# Patient Record
Sex: MALE | Race: WHITE | Employment: OTHER | URBAN - METROPOLITAN AREA
[De-identification: names, ages, dates, MRNs, and addresses within clinical notes are randomized per-mention and may not be internally consistent; named-entity substitution may affect disease eponyms.]

---

## 2017-01-02 ENCOUNTER — OFFICE VISIT (OUTPATIENT)
Dept: FAMILY MEDICINE | Facility: CLINIC | Age: 77
End: 2017-01-02
Payer: MEDICARE

## 2017-01-02 VITALS
RESPIRATION RATE: 18 BRPM | SYSTOLIC BLOOD PRESSURE: 134 MMHG | HEART RATE: 83 BPM | TEMPERATURE: 97 F | HEIGHT: 71 IN | OXYGEN SATURATION: 98 % | DIASTOLIC BLOOD PRESSURE: 86 MMHG | WEIGHT: 217 LBS | BODY MASS INDEX: 30.38 KG/M2

## 2017-01-02 DIAGNOSIS — F32.A ANXIETY AND DEPRESSION: Chronic | ICD-10-CM

## 2017-01-02 DIAGNOSIS — F03.90 DEMENTIA WITHOUT BEHAVIORAL DISTURBANCE, UNSPECIFIED DEMENTIA TYPE: Chronic | ICD-10-CM

## 2017-01-02 DIAGNOSIS — E78.5 HYPERLIPIDEMIA LDL GOAL <130: ICD-10-CM

## 2017-01-02 DIAGNOSIS — N18.3 CHRONIC KIDNEY DISEASE (CKD), STAGE 3 (MODERATE): Chronic | ICD-10-CM

## 2017-01-02 DIAGNOSIS — I42.9 CARDIOMYOPATHY (H): Primary | ICD-10-CM

## 2017-01-02 DIAGNOSIS — F41.9 ANXIETY AND DEPRESSION: Chronic | ICD-10-CM

## 2017-01-02 PROCEDURE — 99214 OFFICE O/P EST MOD 30 MIN: CPT | Performed by: INTERNAL MEDICINE

## 2017-01-02 RX ORDER — METOPROLOL SUCCINATE 50 MG/1
50 TABLET, EXTENDED RELEASE ORAL DAILY
Qty: 90 TABLET | Refills: 3 | Status: CANCELLED | OUTPATIENT
Start: 2017-01-02

## 2017-01-02 RX ORDER — ATORVASTATIN CALCIUM 40 MG/1
40 TABLET, FILM COATED ORAL AT BEDTIME
Qty: 90 TABLET | Refills: 3 | Status: SHIPPED | OUTPATIENT
Start: 2017-01-02 | End: 2017-11-09

## 2017-01-02 RX ORDER — VENLAFAXINE 75 MG/1
75 TABLET ORAL 2 TIMES DAILY
Qty: 180 TABLET | Refills: 1 | Status: SHIPPED | OUTPATIENT
Start: 2017-01-02 | End: 2017-07-24

## 2017-01-02 NOTE — NURSING NOTE
"Chief Complaint   Patient presents with     Hypertension     Hyperlipidemia       Initial /86 mmHg  Pulse 83  Temp(Src) 97  F (36.1  C) (Oral)  Resp 18  Ht 5' 11\" (1.803 m)  Wt 217 lb (98.431 kg)  BMI 30.28 kg/m2  SpO2 98% Estimated body mass index is 30.28 kg/(m^2) as calculated from the following:    Height as of this encounter: 5' 11\" (1.803 m).    Weight as of this encounter: 217 lb (98.431 kg).  BP completed using cuff size: large    Kayleigh Taveras CMA (AAMA)      "

## 2017-01-02 NOTE — MR AVS SNAPSHOT
After Visit Summary   1/2/2017    Gibson Villalta    MRN: [id number]           Patient Information     Date Of Birth          1940        Visit Information        Provider Department      1/2/2017 11:30 AM Flori Reyes,  [facility]        Today's Diagnoses     Cardiomyopathy (H)    -  1     Hyperlipidemia LDL goal <130         Dementia without behavioral disturbance, unspecified dementia type         Anxiety and depression           Care Instructions    Try taking 25 mg amitriptyline. If you notice difficulty sleeping take 50 mg again.  Schedule a morning appointment for the spring for fasting labs.  See you in about 6 months or sooner with questions.         Follow-ups after your visit        Your next 10 appointments already scheduled     Jan 17, 2017  2:00 PM   Anticoagulation Visit with  ANTICOAGULATION CLINIC   [facility] [place] ([facility])    [address]  [place] [address] [phone]            Mar 21, 2017  4:30 PM   Remote ICD Check with ALEJANDRO DCR2   [facility] [place] ([facility])    [address]  [place] [address] [phone]           This appointment is for a remote check of your debrillator.  This is not an appointment at the office.              Future tests that were ordered for you today     Open Future Orders        Priority Expected Expires Ordered    Lipid panel reflex to direct LDL Routine 1/3/2017 7/2/2017 1/2/2017    Comprehensive metabolic panel (BMP + Alb, Alk Phos, ALT, AST, Total. Bili, TP) Routine 1/3/2017 7/2/2017 1/2/2017            Who to contact     If you have questions or need follow up information about today's clinic visit or your schedule please contact [facility] directly at [phone].  Normal or non-critical lab and imaging results will be communicated to you by MyChart, letter or phone within 4 business days after the clinic  "has received the results. If you do not hear from us within 7 days, please contact the clinic through DOCUSYS or phone. If you have a critical or abnormal lab result, we will notify you by phone as soon as possible.  Submit refill requests through [employer] or call your pharmacy and they will forward the refill request to us. Please allow 3 business days for your refill to be completed.          Additional Information About Your Visit        DOCUSYS Information     [employer] gives you secure access to your electronic health record. If you see a primary care provider, you can also send messages to your care team and make appointments. If you have questions, please call your primary care clinic.  If you do not have a primary care provider, please call [phone] and they will assist you.        Your Vitals Were     Pulse Temperature Respirations Height BMI (Body Mass Index) Pulse Oximetry    83 97  F (36.1  C) (Oral) 18 5' 11\" (1.803 m) 30.28 kg/m2 98%       Blood Pressure from Last 3 Encounters:   01/02/17 134/86   12/13/16 108/74   11/05/16 118/78    Weight from Last 3 Encounters:   01/02/17 217 lb (98.431 kg)   12/13/16 211 lb 11.2 oz (96.026 kg)   11/05/16 206 lb (93.441 kg)                 Today's Medication Changes          These changes are accurate as of: 1/2/17 12:37 PM.  If you have any questions, ask your nurse or doctor.               These medicines have changed or have updated prescriptions.        Dose/Directions    amitriptyline 25 MG tablet   Commonly known as:  ELAVIL   This may have changed:    - medication strength  - how much to take   Used for:  Anxiety and depression   Changed by:  Flori Reyes DO        Dose:  25 mg   Take 1 tablet (25 mg) by mouth At Bedtime   Quantity:  90 tablet   Refills:  1       venlafaxine 75 MG tablet   Commonly known as:  EFFEXOR   This may have changed:  medication strength   Used for:  Anxiety and depression   Changed by:  Flori Reyes DO        Dose:  75 " mg   Take 1 tablet (75 mg) by mouth 2 times daily   Quantity:  180 tablet   Refills:  1            Where to get your medicines      These medications were sent to [id number] - [place] - [address]  [address], [place] [address]     Phone:  [phone]    - amitriptyline 25 MG tablet  - atorvastatin 40 MG tablet  - venlafaxine 75 MG tablet             Primary Care Provider Office Phone # Fax #    Flori Reyes,  [phone]       [facility] [address] [facility] [address]  [facility] [address]        Thank you!     Thank you for choosing [facility]  for your care. Our goal is always to provide you with excellent care. Hearing back from our patients is one way we can continue to improve our services. Please take a few minutes to complete the written survey that you may receive in the mail after your visit with us. Thank you!             Your Updated Medication List - Protect others around you: Learn how to safely use, store and throw away your medicines at www.disposemymeds.org.          This list is accurate as of: 1/2/17 12:37 PM.  Always use your most recent med list.                   Brand Name Dispense Instructions for use    ACE/ARB NOT PRESCRIBED (INTENTIONAL)      ACE & ARB not prescribed due to Symptomatic hypotension not due to excessive diuresis       albuterol 108 (90 BASE) MCG/ACT Inhaler    PROAIR HFA/PROVENTIL HFA/VENTOLIN HFA     Inhale 2 puffs into the lungs every 6 hours as needed for shortness of breath / dyspnea or wheezing       amitriptyline 25 MG tablet    ELAVIL    90 tablet    Take 1 tablet (25 mg) by mouth At Bedtime       ASPIRIN NOT PRESCRIBED    INTENTIONAL    0 each    Antiplatelet medication not prescribed intentionally due to Current anticoagulant therapy (warfarin/enoxaparin)       atorvastatin 40 MG tablet    LIPITOR    90 tablet    Take 1 tablet (40 mg) by mouth At Bedtime       ferrous  sulfate 325 (65 FE) MG tablet    IRON     Take 325 mg by mouth daily       LASIX 20 MG tablet   Generic drug:  furosemide     30 tablet    Take one half tablet daily 10 mg       metoprolol 50 MG 24 hr tablet    TOPROL-XL    90 tablet    Take 1 tablet (50 mg) by mouth daily       Multi-vitamin Tabs tablet      Take 1 tablet by mouth daily       OMEGA-3 FISH OIL PO      Take 1 g by mouth daily       venlafaxine 75 MG tablet    EFFEXOR    180 tablet    Take 1 tablet (75 mg) by mouth 2 times daily       VITAMIN D (CHOLECALCIFEROL) PO      Take 50,000 Units by mouth Once per week       warfarin 2 MG tablet    COUMADIN    180 tablet    6 mg on Thursdays; 4 mg all other days OR as instructed by INR clinic.

## 2017-01-02 NOTE — PATIENT INSTRUCTIONS
Try taking 25 mg amitriptyline. If you notice difficulty sleeping take 50 mg again.  Schedule a morning appointment for the spring for fasting labs.  See you in about 6 months or sooner with questions.

## 2017-01-02 NOTE — PROGRESS NOTES
"  .  SUBJECTIVE:                                                    Gibson Villalta is a 76 year old male who presents to clinic today for the following health issues:    Gibson presents to the clinic for a follow-up appointment accompanied by his wife Izabella.     Hyperlipidemia Follow-Up      Rate your low fat/cholesterol diet?: not monitoring fat    Taking statin?  Yes, no muscle aches from statin    Other lipid medications/supplements?:  none     Hypertension Follow-up      Outpatient blood pressures are not being checked.    Low Salt Diet: not monitoring salt       Amount of exercise or physical activity: around house    Problems taking medications regularly: No    Medication side effects: none    Diet: regular (no restrictions)    Gibson continues to follow with cardiology.   Gibson expresses that he is sleeping well and will sometimes be tired in the morning, however he has poor sleep hygiene.  Izabella notes that he stays up late until 0230 and waking at 0830. They both note that he will sometimes nap during the day.  She goes to be sooner but is only sleeping lightly until he comes to bed. He denies problems staying up late - he can't recall what he does when he is awake. Reportedly has always been a bit of a night owl.  Izabella manages medication dispensing in the home. Mood is good. Izabella got rid of the Xanax and she whispers, \"he's forgotten all about that one\".       Problem list and histories reviewed & adjusted, as indicated.    ROS:  Detailed as above    This document serves as a record of the services and decisions personally performed and made by Flori Reyes DO. It was created on his/her behalf by Liane Adam, a trained medical scribe. The creation of this document is based the provider's statements to the medical scribe.  Emanuel Adam 11:50 AM, January 2, 2017    OBJECTIVE:                                                    /86 mmHg  Pulse 83  Temp(Src) 97  F (36.1  C) " "(Oral)  Resp 18  Ht 1.803 m (5' 11\")  Wt 98.431 kg (217 lb)  BMI 30.28 kg/m2  SpO2 98%  Body mass index is 30.28 kg/(m^2).   Alert, pleasant, NAD  He is looking so much stronger physically but mentally continues to have significant memory loss with repetitive speech/ideas  Neck without adenopathy, no asymmetry, masses, or scars and thyroid normal to palpation  Lungs clear to auscultation - no rales, rhonchi or wheezes  Heart regular rate and rhythm, normal S1 S2, no S3 or S4, no murmur, click or rub, no peripheral edema  ICD scar well healed  Very talkative and cheerful     ASSESSMENT/PLAN:                                                      1. Cardiomyopathy (H)  Follows with cardiology  ICD scar well healed and pt expresses positive results  He is gaining physical strength and seems euvolemic with improved BPs  - Comprehensive metabolic panel (BMP + Alb, Alk Phos, ALT, AST, Total. Bili, TP); Future    2. Hyperlipidemia   - Lipid panel reflex to direct LDL; Future  - atorvastatin (LIPITOR) 40 MG tablet; Take 1 tablet (40 mg) by mouth At Bedtime  Dispense: 90 tablet; Refill: 3    3. Dementia without behavioral disturbance, unspecified dementia type  Very repetitive speech, pt admits he is more forgetful  Izabella watches over him very carefully  NOTE, due to his weight gain, ok to hold off on EGD at this time (he had a slightly abnormal CT stomach)    4. Anxiety and depression  Tapering amitriptyline from 50 mg down to 25 mg b/c unsure if it is really helpful anyway and I'd rather not have him on doses >25 mg due to age  He and Izabella are very agreeable to cutting down on meds as able  Of note he has some sleep hygiene issues that we discussed again today  - amitriptyline (ELAVIL) 25 MG tablet; Take 1 tablet (25 mg) by mouth At Bedtime  Dispense: 90 tablet; Refill: 1  - venlafaxine (EFFEXOR) 75 MG tablet; Take 1 tablet (75 mg) by mouth 2 times daily  Dispense: 180 tablet; Refill: 1    5. CKD3  Will recheck all " labs this spring    Patient Instructions   Try taking 25 mg amitriptyline. If you notice difficulty sleeping take 50 mg again.  Schedule a morning appointment for the spring for fasting labs.  See you in about 6 months or sooner with questions.     The information in this document, created by the medical scribe for me, accurately reflects the services I personally performed and the decisions made by me. I have reviewed and approved this document for accuracy prior to leaving the patient care area.  Flori Reyes DO  11:51 AM, 01/02/2017    Flori Reyes DO  [facility]

## 2017-01-17 ENCOUNTER — ANTICOAGULATION THERAPY VISIT (OUTPATIENT)
Dept: NURSING | Facility: CLINIC | Age: 77
End: 2017-01-17
Payer: MEDICARE

## 2017-01-17 DIAGNOSIS — Z79.01 LONG-TERM (CURRENT) USE OF ANTICOAGULANTS: Primary | ICD-10-CM

## 2017-01-17 LAB — INR POINT OF CARE: 1.7 (ref 0.86–1.14)

## 2017-01-17 PROCEDURE — 85610 PROTHROMBIN TIME: CPT | Mod: QW

## 2017-01-17 PROCEDURE — 36416 COLLJ CAPILLARY BLOOD SPEC: CPT

## 2017-01-17 PROCEDURE — 99207 ZZC NO CHARGE NURSE ONLY: CPT

## 2017-01-17 NOTE — PROGRESS NOTES
ANTICOAGULATION FOLLOW-UP CLINIC VISIT    Patient Name:  Gibson Villalta  Date:  1/17/2017  Contact Type:  Face to Face    SUBJECTIVE:        OBJECTIVE    INR PROTIME   Date Value Ref Range Status   01/17/2017 1.7* 0.86 - 1.14 Final       ASSESSMENT / PLAN  No question data found.  Anticoagulation Summary as of 1/17/2017     INR goal 2.0-3.0   Selected INR 1.7! (1/17/2017)   Maintenance plan 6 mg (1 mg x 6) on Tue, Thu; 4 mg (1 mg x 4) all other days   Full instructions 6 mg on Tue, Thu; 4 mg all other days   Weekly total 32 mg   Plan last modified Lesia Hallman RN (1/17/2017)   Next INR check 2/7/2017   Priority INR   Target end date     Indications   Long-term (current) use of anticoagulants [Z79.01] [Z79.01]         Anticoagulation Episode Summary     INR check location     Preferred lab     Send INR reminders to [facility] ANTICOAGULATION    Comments       Anticoagulation Care Providers     Provider Role Specialty Phone number    Flori Reyes DO [facility] [phone]            See the Encounter Report to view Anticoagulation Flowsheet and Dosing Calendar (Go to Encounters tab in chart review, and find the Anticoagulation Therapy Visit)    Dosage adjustment made based on physician directed care plan.  Made adjustment to maintenance plan.   Patient will do 6 mg on Tuesday and Thursday; 4 mg all other days.     Lesia Hallman RN

## 2017-01-17 NOTE — MR AVS SNAPSHOT
Gibson Villalta   1/17/2017 2:00 PM   Anticoagulation Therapy Visit    Description:  76 year old male   Provider:   ANTICOAGULATION CLINIC   Department:  [facility] Nurse           INR as of 1/17/2017     Selected INR 1.7! (1/17/2017)      Anticoagulation Summary as of 1/17/2017     INR goal 2.0-3.0   Selected INR 1.7! (1/17/2017)   Full instructions 6 mg on Tue, Thu; 4 mg all other days   Next INR check 2/7/2017    Indications   Long-term (current) use of anticoagulants [Z79.01] [Z79.01]         Your next Anticoagulation Clinic appointment(s)     Feb 07, 2017  8:30 AM   Anticoagulation Visit with  ANTICOAGULATION CLINIC   [facility] ([facility])    [address]  [place] [address]   [phone]              Contact Numbers     Clinic Number:         January 2017 Details    Sun Mon Tue Wed Thu Fri Sat     1               2               3               4               5               6               7                 8               9               10               11               12               13               14                 15               16               17      6 mg   See details      18      4 mg         19      6 mg         20      4 mg         21      4 mg           22      4 mg         23      4 mg         24      6 mg         25      4 mg         26      6 mg         27      4 mg         28      4 mg           29      4 mg         30      4 mg         31      6 mg              Date Details   01/17 This INR check               How to take your warfarin dose     To take:  4 mg Take 4 of the 1 mg tablets.    To take:  6 mg Take 6 of the 1 mg tablets.           February 2017 Details    Sun Mon Tue Wed Thu Fri Sat        1      4 mg         2      6 mg         3      4 mg         4      4 mg           5      4 mg         6      4 mg         7            8               9               10               11                 12               13               14                15               16               17               18                 19               20               21               22               23               24               25                 26               27               28                    Date Details   No additional details    Date of next INR:  2/7/2017         How to take your warfarin dose     To take:  4 mg Take 4 of the 1 mg tablets.    To take:  6 mg Take 6 of the 1 mg tablets.

## 2017-01-24 DIAGNOSIS — I50.22 CHRONIC SYSTOLIC HEART FAILURE (H): Primary | ICD-10-CM

## 2017-01-24 RX ORDER — FUROSEMIDE 20 MG
10 TABLET ORAL DAILY
Qty: 45 TABLET | Refills: 3 | Status: SHIPPED | OUTPATIENT
Start: 2017-01-24 | End: 2017-10-20

## 2017-02-07 ENCOUNTER — MYC MEDICAL ADVICE (OUTPATIENT)
Dept: FAMILY MEDICINE | Facility: CLINIC | Age: 77
End: 2017-02-07

## 2017-02-07 ENCOUNTER — ANTICOAGULATION THERAPY VISIT (OUTPATIENT)
Dept: NURSING | Facility: CLINIC | Age: 77
End: 2017-02-07
Payer: MEDICARE

## 2017-02-07 DIAGNOSIS — I50.21 ACUTE SYSTOLIC CONGESTIVE HEART FAILURE (H): ICD-10-CM

## 2017-02-07 DIAGNOSIS — E78.5 HYPERLIPIDEMIA LDL GOAL <130: ICD-10-CM

## 2017-02-07 DIAGNOSIS — I42.9 CARDIOMYOPATHY (H): ICD-10-CM

## 2017-02-07 DIAGNOSIS — Z79.01 LONG-TERM (CURRENT) USE OF ANTICOAGULANTS: Primary | ICD-10-CM

## 2017-02-07 DIAGNOSIS — N18.3 CHRONIC KIDNEY DISEASE (CKD), STAGE 3 (MODERATE): Chronic | ICD-10-CM

## 2017-02-07 LAB
ALBUMIN SERPL-MCNC: 3.8 G/DL (ref 3.4–5)
ALP SERPL-CCNC: 131 U/L (ref 40–150)
ALT SERPL W P-5'-P-CCNC: 50 U/L (ref 0–70)
ANION GAP SERPL CALCULATED.3IONS-SCNC: 8 MMOL/L (ref 3–14)
AST SERPL W P-5'-P-CCNC: ABNORMAL U/L (ref 0–45)
BILIRUB SERPL-MCNC: 0.6 MG/DL (ref 0.2–1.3)
BUN SERPL-MCNC: 18 MG/DL (ref 7–30)
CALCIUM SERPL-MCNC: 9.3 MG/DL (ref 8.5–10.1)
CHLORIDE SERPL-SCNC: 104 MMOL/L (ref 94–109)
CHOLEST SERPL-MCNC: 176 MG/DL
CO2 SERPL-SCNC: 29 MMOL/L (ref 20–32)
CREAT SERPL-MCNC: 1.3 MG/DL (ref 0.66–1.25)
CREAT UR-MCNC: 105 MG/DL
DEPRECATED CALCIDIOL+CALCIFEROL SERPL-MC: 37 UG/L (ref 20–75)
GFR SERPL CREATININE-BSD FRML MDRD: 54 ML/MIN/1.7M2
GLUCOSE SERPL-MCNC: 109 MG/DL (ref 70–99)
HDLC SERPL-MCNC: 42 MG/DL
HGB BLD-MCNC: 15.1 G/DL (ref 13.3–17.7)
INR POINT OF CARE: 1.4 (ref 0.86–1.14)
LDLC SERPL CALC-MCNC: 93 MG/DL
MAGNESIUM SERPL-MCNC: 2.5 MG/DL (ref 1.6–2.3)
MICROALBUMIN UR-MCNC: 22 MG/L
MICROALBUMIN/CREAT UR: 20.48 MG/G CR (ref 0–17)
NONHDLC SERPL-MCNC: 134 MG/DL
PHOSPHATE SERPL-MCNC: 3.5 MG/DL (ref 2.5–4.5)
POTASSIUM SERPL-SCNC: ABNORMAL MMOL/L (ref 3.4–5.3)
PROT SERPL-MCNC: 8.4 G/DL (ref 6.8–8.8)
PTH-INTACT SERPL-MCNC: 110 PG/ML (ref 12–72)
SODIUM SERPL-SCNC: 141 MMOL/L (ref 133–144)
TRIGL SERPL-MCNC: 203 MG/DL

## 2017-02-07 PROCEDURE — 85018 HEMOGLOBIN: CPT | Performed by: INTERNAL MEDICINE

## 2017-02-07 PROCEDURE — 82306 VITAMIN D 25 HYDROXY: CPT | Performed by: INTERNAL MEDICINE

## 2017-02-07 PROCEDURE — 84100 ASSAY OF PHOSPHORUS: CPT | Performed by: INTERNAL MEDICINE

## 2017-02-07 PROCEDURE — 85610 PROTHROMBIN TIME: CPT | Mod: QW

## 2017-02-07 PROCEDURE — 80061 LIPID PANEL: CPT | Performed by: INTERNAL MEDICINE

## 2017-02-07 PROCEDURE — 99207 ZZC NO CHARGE NURSE ONLY: CPT

## 2017-02-07 PROCEDURE — 83735 ASSAY OF MAGNESIUM: CPT | Performed by: INTERNAL MEDICINE

## 2017-02-07 PROCEDURE — 36416 COLLJ CAPILLARY BLOOD SPEC: CPT

## 2017-02-07 PROCEDURE — 83970 ASSAY OF PARATHORMONE: CPT | Performed by: INTERNAL MEDICINE

## 2017-02-07 PROCEDURE — 80053 COMPREHEN METABOLIC PANEL: CPT | Performed by: INTERNAL MEDICINE

## 2017-02-07 PROCEDURE — 82043 UR ALBUMIN QUANTITATIVE: CPT | Performed by: INTERNAL MEDICINE

## 2017-02-07 NOTE — MR AVS SNAPSHOT
Gibson Villalta   2/7/2017 8:30 AM   Anticoagulation Therapy Visit    Description:  76 year old male   Provider:   ANTICOAGULATION CLINIC   Department:   Nurse           INR as of 2/7/2017     Selected INR 1.4! (2/7/2017)      Anticoagulation Summary as of 2/7/2017     INR goal 2.0-3.0   Selected INR 1.4! (2/7/2017)   Full instructions 6 mg on Tue, Thu, Sat; 4 mg all other days   Next INR check 2/21/2017    Indications   Long-term (current) use of anticoagulants [Z79.01] [Z79.01]         Your next Anticoagulation Clinic appointment(s)     Feb 21, 2017  1:45 PM   Anticoagulation Visit with  ANTICOAGULATION CLINIC   [facility]    [address]  [place] [address]   [phone]              Contact Numbers     Clinic Number:         February 2017 Details    Sun Mon Tue Wed Thu Fri Sat        1               2               3               4                 5               6               7      6 mg   See details      8      4 mg         9      6 mg         10      4 mg         11      6 mg           12      4 mg         13      4 mg         14      6 mg         15      4 mg         16      6 mg         17      4 mg         18      6 mg           19      4 mg         20      4 mg         21            22               23               24               25                 26               27               28                    Date Details   02/07 This INR check       Date of next INR:  2/21/2017         How to take your warfarin dose     To take:  4 mg Take 4 of the 1 mg tablets.    To take:  6 mg Take 6 of the 1 mg tablets.

## 2017-02-07 NOTE — PROGRESS NOTES
ANTICOAGULATION FOLLOW-UP CLINIC VISIT    Patient Name:  Gibson Villalta  Date:  2/7/2017  Contact Type:  Face to Face    SUBJECTIVE:     Patient Findings     Positives Unexplained INR or factor level change    Comments Asked wife to check TARO Brand or not.            OBJECTIVE    INR PROTIME   Date Value Ref Range Status   02/07/2017 1.4* 0.86 - 1.14 Final       ASSESSMENT / PLAN  INR assessment SUB    Recheck INR In: 2 WEEKS    INR Location Clinic      Anticoagulation Summary as of 2/7/2017     INR goal 2.0-3.0   Selected INR 1.4! (2/7/2017)   Maintenance plan 6 mg (1 mg x 6) on Tue, Thu, Sat; 4 mg (1 mg x 4) all other days   Full instructions 6 mg on Tue, Thu, Sat; 4 mg all other days   Weekly total 34 mg   Plan last modified Lesia Hallman, RN (2/7/2017)   Next INR check 2/21/2017   Priority INR   Target end date     Indications   Long-term (current) use of anticoagulants [Z79.01] [Z79.01]         Anticoagulation Episode Summary     INR check location     Preferred lab     Send INR reminders to [facility] ANTICOAGULATION    Comments       Anticoagulation Care Providers     Provider Role Specialty Phone number    Flori ReyesDO [facility] [phone]            See the Encounter Report to view Anticoagulation Flowsheet and Dosing Calendar (Go to Encounters tab in chart review, and find the Anticoagulation Therapy Visit)    Dosage adjustment made based on physician directed care plan.  I asked wife to check pill bottle at home and see if it says TARO brand.  Currently they get medication through [facility].  Patient's wife will check bottle and either call clinic or send MyChart.     Lesia Hallman, RN

## 2017-02-09 DIAGNOSIS — R73.01 ELEVATED FASTING GLUCOSE: Primary | ICD-10-CM

## 2017-02-09 DIAGNOSIS — N18.3 CHRONIC KIDNEY DISEASE (CKD), STAGE 3 (MODERATE): Chronic | ICD-10-CM

## 2017-02-21 ENCOUNTER — ANTICOAGULATION THERAPY VISIT (OUTPATIENT)
Dept: NURSING | Facility: CLINIC | Age: 77
End: 2017-02-21
Payer: MEDICARE

## 2017-02-21 DIAGNOSIS — R73.01 ELEVATED FASTING GLUCOSE: ICD-10-CM

## 2017-02-21 DIAGNOSIS — Z79.01 LONG-TERM (CURRENT) USE OF ANTICOAGULANTS: ICD-10-CM

## 2017-02-21 DIAGNOSIS — N18.3 CHRONIC KIDNEY DISEASE (CKD), STAGE 3 (MODERATE): Chronic | ICD-10-CM

## 2017-02-21 LAB
HBA1C MFR BLD: 6.6 % (ref 4.3–6)
INR POINT OF CARE: 2.4 (ref 0.86–1.14)
MAGNESIUM SERPL-MCNC: 2.4 MG/DL (ref 1.6–2.3)
POTASSIUM SERPL-SCNC: 4.5 MMOL/L (ref 3.4–5.3)

## 2017-02-21 PROCEDURE — 83036 HEMOGLOBIN GLYCOSYLATED A1C: CPT | Performed by: INTERNAL MEDICINE

## 2017-02-21 PROCEDURE — 85610 PROTHROMBIN TIME: CPT | Mod: QW

## 2017-02-21 PROCEDURE — 84132 ASSAY OF SERUM POTASSIUM: CPT | Performed by: INTERNAL MEDICINE

## 2017-02-21 PROCEDURE — 99207 ZZC NO CHARGE NURSE ONLY: CPT

## 2017-02-21 PROCEDURE — 36416 COLLJ CAPILLARY BLOOD SPEC: CPT

## 2017-02-21 PROCEDURE — 83735 ASSAY OF MAGNESIUM: CPT | Performed by: INTERNAL MEDICINE

## 2017-02-21 NOTE — PROGRESS NOTES
ANTICOAGULATION FOLLOW-UP CLINIC VISIT    Patient Name:  Gibson Villalta  Date:  2/21/2017  Contact Type:  Face to Face    SUBJECTIVE:        OBJECTIVE    INR Protime   Date Value Ref Range Status   02/21/2017 2.4 (A) 0.86 - 1.14 Final       ASSESSMENT / PLAN  INR assessment THER    Recheck INR In: 3 WEEKS    INR Location Clinic      Anticoagulation Summary as of 2/21/2017     INR goal 2.0-3.0   Today's INR 2.4   Maintenance plan 6 mg (1 mg x 6) on Tue, Thu, Sat; 4 mg (1 mg x 4) all other days   Full instructions 6 mg on Tue, Thu, Sat; 4 mg all other days   Weekly total 34 mg   No change documented Lesia Hallman RN   Plan last modified Lesia Hallman RN (2/7/2017)   Next INR check 3/14/2017   Priority INR   Target end date     Indications   Long-term (current) use of anticoagulants [Z79.01] [Z79.01]         Anticoagulation Episode Summary     INR check location     Preferred lab     Send INR reminders to [facility] ANTICOAGULATION    Comments       Anticoagulation Care Providers     Provider Role Specialty Phone number    Flori Reyes DO [facility] [phone]            See the Encounter Report to view Anticoagulation Flowsheet and Dosing Calendar (Go to Encounters tab in chart review, and find the Anticoagulation Therapy Visit)    Dosage adjustment made based on physician directed care plan.    Lesia Hallman RN

## 2017-02-21 NOTE — MR AVS SNAPSHOT
Gibson JAYA Villalta   2/21/2017 1:45 PM   Anticoagulation Therapy Visit    Description:  76 year old male   Provider:   ANTICOAGULATION CLINIC   Department:   Nurse           INR as of 2/21/2017     Today's INR 2.4      Anticoagulation Summary as of 2/21/2017     INR goal 2.0-3.0   Today's INR 2.4   Full instructions 6 mg on Tue, Thu, Sat; 4 mg all other days   Next INR check 3/14/2017    Indications   Long-term (current) use of anticoagulants [Z79.01] [Z79.01]         Your next Anticoagulation Clinic appointment(s)     Mar 14, 2017  2:00 PM CDT   Anticoagulation Visit with  ANTICOAGULATION CLINIC   [facility]    [address]  [place] [address] [phone]              Contact Numbers     Clinic Number:         February 2017 Details    Sun Mon Tue Wed Thu Fri Sat        1               2               3               4                 5               6               7               8               9               10               11                 12               13               14               15               16               17               18                 19               20               21      6 mg   See details      22      4 mg         23      6 mg         24      4 mg         25      6 mg           26      4 mg         27      4 mg         28      6 mg              Date Details   02/21 This INR check               How to take your warfarin dose     To take:  4 mg Take 4 of the 1 mg tablets.    To take:  6 mg Take 6 of the 1 mg tablets.           March 2017 Details    Sun Mon Tue Wed Thu Fri Sat        1      4 mg         2      6 mg         3      4 mg         4      6 mg           5      4 mg         6      4 mg         7      6 mg         8      4 mg         9      6 mg         10      4 mg         11      6 mg           12      4 mg         13      4 mg         14            15               16               17               18                  19               20               21               22               23               24               25                 26               27               28               29               30               31                 Date Details   No additional details    Date of next INR:  3/14/2017         How to take your warfarin dose     To take:  4 mg Take 4 of the 1 mg tablets.    To take:  6 mg Take 6 of the 1 mg tablets.

## 2017-02-24 PROBLEM — E11.22: Chronic | Status: ACTIVE | Noted: 2017-02-24

## 2017-02-24 NOTE — PROGRESS NOTES
Gibson and Izabella,  Thank you for getting your labs drawn.  The potassium is normal and magnesium satisfactory.  A test that looks at your average blood sugar over a 3 month time period called, Hemoglobin A1c, is slightly elevated and actually in a diabetes range. However this is low enough that no additional medications are needed. We'll simply monitor this in clinic as I want to make sure you continue to get a good amount of healthy calories. You may want to try to work on reducing the portions of carbohydrates in your diet (such as: breads, rice, pasta, sugars) which will help to lower the blood sugar. We can discuss this more at your next office visit or sooner if you have questions/concerns.

## 2017-03-13 DIAGNOSIS — I51.3 RV (RIGHT VENTRICULAR) MURAL THROMBUS: Chronic | ICD-10-CM

## 2017-03-13 DIAGNOSIS — Z79.01 LONG-TERM (CURRENT) USE OF ANTICOAGULANTS: ICD-10-CM

## 2017-03-14 ENCOUNTER — OFFICE VISIT (OUTPATIENT)
Dept: CARDIOLOGY | Facility: CLINIC | Age: 77
End: 2017-03-14
Attending: NURSE PRACTITIONER
Payer: MEDICARE

## 2017-03-14 ENCOUNTER — ANTICOAGULATION THERAPY VISIT (OUTPATIENT)
Dept: NURSING | Facility: CLINIC | Age: 77
End: 2017-03-14
Payer: MEDICARE

## 2017-03-14 VITALS
OXYGEN SATURATION: 97 % | HEIGHT: 73 IN | HEART RATE: 70 BPM | WEIGHT: 227.7 LBS | SYSTOLIC BLOOD PRESSURE: 126 MMHG | DIASTOLIC BLOOD PRESSURE: 80 MMHG | BODY MASS INDEX: 30.18 KG/M2 | RESPIRATION RATE: 16 BRPM

## 2017-03-14 DIAGNOSIS — I50.22 CHRONIC SYSTOLIC HEART FAILURE (H): ICD-10-CM

## 2017-03-14 DIAGNOSIS — I42.9 CARDIOMYOPATHY (H): ICD-10-CM

## 2017-03-14 DIAGNOSIS — I42.9 CARDIOMYOPATHY, UNSPECIFIED (H): Chronic | ICD-10-CM

## 2017-03-14 DIAGNOSIS — Z79.01 LONG-TERM (CURRENT) USE OF ANTICOAGULANTS: ICD-10-CM

## 2017-03-14 LAB
ANION GAP SERPL CALCULATED.3IONS-SCNC: 13.6 MMOL/L (ref 6–17)
BUN SERPL-MCNC: 22 MG/DL (ref 7–30)
CALCIUM SERPL-MCNC: 9.5 MG/DL (ref 8.5–10.5)
CHLORIDE SERPL-SCNC: 103 MMOL/L (ref 98–107)
CO2 SERPL-SCNC: 27 MMOL/L (ref 23–29)
CREAT SERPL-MCNC: 1.62 MG/DL (ref 0.7–1.3)
GFR SERPL CREATININE-BSD FRML MDRD: 42 ML/MIN/1.7M2
GLUCOSE SERPL-MCNC: 184 MG/DL (ref 70–105)
INR POINT OF CARE: 1.9 (ref 0.86–1.14)
POTASSIUM SERPL-SCNC: 4.6 MMOL/L (ref 3.5–5.1)
SODIUM SERPL-SCNC: 139 MMOL/L (ref 136–145)

## 2017-03-14 PROCEDURE — 80048 BASIC METABOLIC PNL TOTAL CA: CPT | Performed by: NURSE PRACTITIONER

## 2017-03-14 PROCEDURE — 99207 ZZC NO CHARGE NURSE ONLY: CPT

## 2017-03-14 PROCEDURE — 85610 PROTHROMBIN TIME: CPT | Mod: QW

## 2017-03-14 PROCEDURE — 36416 COLLJ CAPILLARY BLOOD SPEC: CPT

## 2017-03-14 PROCEDURE — 99213 OFFICE O/P EST LOW 20 MIN: CPT | Performed by: NURSE PRACTITIONER

## 2017-03-14 RX ORDER — HYDRALAZINE HYDROCHLORIDE 10 MG/1
10 TABLET, FILM COATED ORAL 3 TIMES DAILY
Qty: 90 TABLET | Refills: 1 | Status: SHIPPED | OUTPATIENT
Start: 2017-03-14 | End: 2017-04-11

## 2017-03-14 RX ORDER — ISOSORBIDE MONONITRATE 30 MG/1
15 TABLET, EXTENDED RELEASE ORAL DAILY
Qty: 30 TABLET | Refills: 1 | Status: SHIPPED | OUTPATIENT
Start: 2017-03-14 | End: 2017-04-11

## 2017-03-14 RX ORDER — WARFARIN SODIUM 2 MG/1
TABLET ORAL
Qty: 180 TABLET | Refills: 0 | Status: SHIPPED | OUTPATIENT
Start: 2017-03-14 | End: 2017-05-19

## 2017-03-14 NOTE — PROGRESS NOTES
ANTICOAGULATION FOLLOW-UP CLINIC VISIT    Patient Name:  Gibson Villalta  Date:  3/14/2017  Contact Type:  Face to Face    SUBJECTIVE:     Patient Findings     Positives No Problem Findings           OBJECTIVE    INR Protime   Date Value Ref Range Status   03/14/2017 1.9 (A) 0.86 - 1.14 Final       ASSESSMENT / PLAN  INR assessment THER    Recheck INR In: 3 WEEKS    INR Location Clinic      Anticoagulation Summary as of 3/14/2017     INR goal 2.0-3.0   Today's INR 1.9!   Maintenance plan 4 mg (1 mg x 4) on Mon, Wed, Fri; 6 mg (1 mg x 6) all other days   Full instructions 4 mg on Mon, Wed, Fri; 6 mg all other days   Weekly total 36 mg   Plan last modified Lesia Hallman RN (3/14/2017)   Next INR check 4/4/2017   Priority INR   Target end date     Indications   Long-term (current) use of anticoagulants [Z79.01] [Z79.01]         Anticoagulation Episode Summary     INR check location     Preferred lab     Send INR reminders to [facility] ANTICOAGULATION    Comments       Anticoagulation Care Providers     Provider Role Specialty Phone number    Flori ReyesDO Responsible Internal Medicine [phone]            See the Encounter Report to view Anticoagulation Flowsheet and Dosing Calendar (Go to Encounters tab in chart review, and find the Anticoagulation Therapy Visit)    Dosage adjustment made based on physician directed care plan.  Made a slight adjustment again to maintenance dose.   Will have patient do 4 mg Mon, Wed, Fri; 6 mg all the rest.   This is a 5 % increase. We have gradually been increasing the patient's weekly dose and his INR only seems to fluctuate.   Will see where INR is at next check.     Lesia Hallman RN

## 2017-03-14 NOTE — MR AVS SNAPSHOT
After Visit Summary   3/14/2017    Gibson Vilallta    MRN: [id number]           Patient Information     Date Of Birth          1940        Visit Information        Provider Department      3/14/2017 3:50 PM Jinny Adam, ALLEN ENRIQUEZ [facility]ART AT [place]        Today's Diagnoses     Chronic systolic heart failure (H)        Cardiomyopathy (H)          Care Instructions    Call CORE Nurse for any questions or concerns:   Marta Maldonado or Anne: [phone]   If you have concerns after hours, please call [phone], option 2    1. Medication changes: 1. start hydralazine 10 mg three times per day, 8am-2 PM-8 PM  2. Imdur 15 mg once a day, this is 1/2 tablet of 30 mg     You need to lose 10 pounds     2. Weigh yourself daily and write it down.     3. Call CORE nurse if your weight is up more than 2 pounds in one day, or 5 pounds in one week.    4. Call CORE nurse if you feel more short of breath, have more abdominal bloating or leg swelling.    5. Continue low sodium diet (less than 2000mg daily). If you eat less salt, you will retain less fluid.     6. Lab results:   Component      Latest Ref Rng & Units 3/14/2017   Sodium      136 - 145 mmol/L 139   Potassium      3.5 - 5.1 mmol/L 4.6   Chloride      98 - 107 mmol/L 103   Carbon Dioxide      23 - 29 mmol/L 27   Anion Gap      6 - 17 mmol/L 13.6   Glucose      70 - 105 mg/dL 184 (H)   Urea Nitrogen      7 - 30 mg/dL 22   Creatinine      0.70 - 1.30 mg/dL 1.62 (H)   GFR Estimate      >60 mL/min/1.7m2 42 (L)   GFR Estimate If Black      >60 mL/min/1.7m2 51 (L)   Calcium      8.5 - 10.5 mg/dL 9.5       **Do NOT take Aleve or Ibuprofen without checking with your doctor first        [facility]: Cardiomyopathy, Optimization, Rehabilitation, Education   The CORE Clinic is a heart failure specialty clinic within the [facility] where you will work with specialized nurse  practioners, physician assistants, doctors and registered nurses. They are dedicated to helping patients with heart failure to carefully adjust medications, receive education, and learn who and when to call if symptoms develop. They specialize in helping you better understand your condition, slow the progression of your disease, improve the length and quality of your life, help you detect future heart problems before they become life threatening, and avoid hospitalizations.              Follow-ups after your visit        Additional Services     Follow-Up with CORE Clinic - ACE visit                 Your next 10 appointments already scheduled     Mar 21, 2017  4:30 PM CDT   Remote ICD Check with ALEJANDRO DCR2   [facility] AT [place] ([facility])    [address]  [place] [address] [phone]           This appointment is for a remote check of your debrillator.  This is not an appointment at the office.            Apr 04, 2017  2:15 PM CDT   Anticoagulation Visit with  ANTICOAGULATION CLINIC   [facility] ([facility])    [address]  [place] [id number] [phone]              Future tests that were ordered for you today     Open Future Orders        Priority Expected Expires Ordered    Follow-Up with CORE Clinic - ACE visit Routine 4/11/2017 3/14/2018 3/14/2017    Basic metabolic panel Routine 4/11/2017 3/14/2018 3/14/2017            Who to contact     If you have questions or need follow up information about today's clinic visit or your schedule please contact [facility] directly at [phone].  Normal or non-critical lab and imaging results will be communicated to you by MyChart, letter or phone within 4 business days after the clinic has received the results. If you do not hear from us within 7 days, please contact the clinic through MyChart or phone. If you have a critical or  "abnormal lab result, we will notify you by phone as soon as possible.  Submit refill requests through [employer] or call your pharmacy and they will forward the refill request to us. Please allow 3 business days for your refill to be completed.          Additional Information About Your Visit        [employer] Information     [employer] gives you secure access to your electronic health record. If you see a primary care provider, you can also send messages to your care team and make appointments. If you have questions, please call your primary care clinic.  If you do not have a primary care provider, please call [phone] and they will assist you.        Care EveryWhere ID     This is your Care EveryWhere ID. This could be used by other organizations to access your [facility] medical records  [id number]        Your Vitals Were     Pulse Respirations Height Pulse Oximetry BMI (Body Mass Index)       70 16 1.854 m (6' 1\") 97% 30.04 kg/m2        Blood Pressure from Last 3 Encounters:   03/14/17 126/80   01/02/17 134/86   12/13/16 108/74    Weight from Last 3 Encounters:   03/14/17 103.3 kg (227 lb 11.2 oz)   01/02/17 98.4 kg (217 lb)   12/13/16 96 kg (211 lb 11.2 oz)              We Performed the Following     Follow-Up with CORE Clinic - ACE visit          Today's Medication Changes          These changes are accurate as of: 3/14/17  4:24 PM.  If you have any questions, ask your nurse or doctor.               Start taking these medicines.        Dose/Directions    hydrALAZINE 10 MG tablet   Commonly known as:  APRESOLINE   Used for:  Chronic systolic heart failure (H), Cardiomyopathy (H)   Started by:  Jinny Adam APRN CNP        Dose:  10 mg   Take 1 tablet (10 mg) by mouth 3 times daily   Quantity:  90 tablet   Refills:  1       isosorbide mononitrate 30 MG 24 hr tablet   Commonly known as:  IMDUR   Used for:  Chronic systolic heart failure (H), Cardiomyopathy (H)   Started by:  Jinny Adam APRN CNP        Dose:  " 15 mg   Take 0.5 tablets (15 mg) by mouth daily   Quantity:  30 tablet   Refills:  1            Where to get your medicines      These medications [id number] - [place], [place] - [address]  [address], [place] [address]     Phone:  [phone]     hydrALAZINE 10 MG tablet    isosorbide mononitrate 30 MG 24 hr tablet                Primary Care Provider Office Phone # Fax #    Flori Reyes,  [phone]       [facility] [address]  [facility] [address]        Thank you!     Thank you for choosing [facility] AT [place]  for your care. Our goal is always to provide you with excellent care. Hearing back from our patients is one way we can continue to improve our services. Please take a few minutes to complete the written survey that you may receive in the mail after your visit with us. Thank you!             Your Updated Medication List - Protect others around you: Learn how to safely use, store and throw away your medicines at www.disposemymeds.org.          This list is accurate as of: 3/14/17  4:24 PM.  Always use your most recent med list.                   Brand Name Dispense Instructions for use    ACE/ARB NOT PRESCRIBED (INTENTIONAL)      ACE & ARB not prescribed due to Symptomatic hypotension not due to excessive diuresis       albuterol 108 (90 BASE) MCG/ACT Inhaler    PROAIR HFA/PROVENTIL HFA/VENTOLIN HFA     Inhale 2 puffs into the lungs every 6 hours as needed for shortness of breath / dyspnea or wheezing       amitriptyline 25 MG tablet    ELAVIL    90 tablet    Take 1 tablet (25 mg) by mouth At Bedtime       ASPIRIN NOT PRESCRIBED    INTENTIONAL    0 each    Antiplatelet medication not prescribed intentionally due to Current anticoagulant therapy (warfarin/enoxaparin)       atorvastatin 40 MG tablet    LIPITOR    90 tablet    Take 1 tablet (40 mg) by mouth At Bedtime        ferrous sulfate 325 (65 FE) MG tablet    IRON     Take 325 mg by mouth daily       furosemide 20 MG tablet    LASIX    45 tablet    Take 0.5 tablets (10 mg) by mouth daily       hydrALAZINE 10 MG tablet    APRESOLINE    90 tablet    Take 1 tablet (10 mg) by mouth 3 times daily       isosorbide mononitrate 30 MG 24 hr tablet    IMDUR    30 tablet    Take 0.5 tablets (15 mg) by mouth daily       metoprolol 50 MG 24 hr tablet    TOPROL-XL    90 tablet    Take 1 tablet (50 mg) by mouth daily       Multi-vitamin Tabs tablet      Take 1 tablet by mouth daily       OMEGA-3 FISH OIL PO      Take 1 g by mouth daily       venlafaxine 75 MG tablet    EFFEXOR    180 tablet    Take 1 tablet (75 mg) by mouth 2 times daily       VITAMIN D (CHOLECALCIFEROL) PO      Take 50,000 Units by mouth Once per week       * warfarin 2 MG tablet    COUMADIN    180 tablet    6 mg on Thursdays; 4 mg all other days OR as instructed by INR clinic.       * warfarin 2 MG tablet    COUMADIN    180 tablet    Take 3 tablets (6mg) by mouth Mon, Wed and 2 tablets (4mg) rest of the days of the week or as directed by your INR clinic       * Notice:  This list has 2 medication(s) that are the same as other medications prescribed for you. Read the directions carefully, and ask your doctor or other care provider to review them with you.

## 2017-03-14 NOTE — PATIENT INSTRUCTIONS
Call CORE Nurse for any questions or concerns:   Marta Maldonado, or Leah: [phone]   If you have concerns after hours, please call [phone], option 2    1. Medication changes: 1. start hydralazine 10 mg three times per day, 8am-2 PM-8 PM  2. Imdur 15 mg once a day, this is 1/2 tablet of 30 mg     You need to lose 10 pounds     2. Weigh yourself daily and write it down.     3. Call CORE nurse if your weight is up more than 2 pounds in one day, or 5 pounds in one week.    4. Call CORE nurse if you feel more short of breath, have more abdominal bloating or leg swelling.    5. Continue low sodium diet (less than 2000mg daily). If you eat less salt, you will retain less fluid.     6. Lab results:   Component      Latest Ref Rng & Units 3/14/2017   Sodium      136 - 145 mmol/L 139   Potassium      3.5 - 5.1 mmol/L 4.6   Chloride      98 - 107 mmol/L 103   Carbon Dioxide      23 - 29 mmol/L 27   Anion Gap      6 - 17 mmol/L 13.6   Glucose      70 - 105 mg/dL 184 (H)   Urea Nitrogen      7 - 30 mg/dL 22   Creatinine      0.70 - 1.30 mg/dL 1.62 (H)   GFR Estimate      >60 mL/min/1.7m2 42 (L)   GFR Estimate If Black      >60 mL/min/1.7m2 51 (L)   Calcium      8.5 - 10.5 mg/dL 9.5       **Do NOT take Aleve or Ibuprofen without checking with your doctor first        [facility]: Cardiomyopathy, Optimization, Rehabilitation, Education   The [facility] Clinic is a heart failure specialty clinic within the [facility] where you will work with specialized nurse practioners, physician assistants, doctors and registered nurses. They are dedicated to helping patients with heart failure to carefully adjust medications, receive education, and learn who and when to call if symptoms develop. They specialize in helping you better understand your condition, slow the progression of your disease, improve the length and quality of your life, help you detect future heart problems before they become life  threatening, and avoid hospitalizations.

## 2017-03-14 NOTE — MR AVS SNAPSHOT
Gibson Villalta   3/14/2017 2:00 PM   Anticoagulation Therapy Visit    Description:  76 year old male   Provider:   ANTICOAGULATION CLINIC   Department:  Cs Nurse           INR as of 3/14/2017     Today's INR 1.9!      Anticoagulation Summary as of 3/14/2017     INR goal 2.0-3.0   Today's INR 1.9!   Full instructions 4 mg on Mon, Wed, Fri; 6 mg all other days   Next INR check 4/4/2017    Indications   Long-term (current) use of anticoagulants [Z79.01] [Z79.01]         Your next Anticoagulation Clinic appointment(s)     Apr 04, 2017  2:15 PM CDT   Anticoagulation Visit with  ANTICOAGULATION CLINIC   [facility]    [address]  [place] [address] [phone]              Contact Numbers     Clinic Number:         March 2017 Details    Sun Mon Tue Wed Thu Fri Sat        1               2               3               4                 5               6               7               8               9               10               11                 12               13               14      6 mg   See details      15      4 mg         16      6 mg         17      4 mg         18      6 mg           19      6 mg         20      4 mg         21      6 mg         22      4 mg         23      6 mg         24      4 mg         25      6 mg           26      6 mg         27      4 mg         28      6 mg         29      4 mg         30      6 mg         31      4 mg           Date Details   03/14 This INR check               How to take your warfarin dose     To take:  4 mg Take 4 of the 1 mg tablets.    To take:  6 mg Take 6 of the 1 mg tablets.           April 2017 Details    Sun Mon Tue Wed Thu Fri Sat           1      6 mg           2      6 mg         3      4 mg         4            5               6               7               8                 9               10               11               12               13               14               15                  16               17               18               19               20               21               22                 23               24               25               26               27               28               29                 30                      Date Details   No additional details    Date of next INR:  4/4/2017         How to take your warfarin dose     To take:  4 mg Take 4 of the 1 mg tablets.    To take:  6 mg Take 6 of the 1 mg tablets.

## 2017-03-14 NOTE — LETTER
3/14/2017    Flori Reyes, [facility]   [address]  [place] [address]    RE: [place]       Dear Colleague,    I had the pleasure of seeing Gibson Villalta in the [facility].    Gibson is a 76-year-old gentleman who is here today for a 3-month followup for his heart failure with a reduced ejection fraction.  Gibson' past medical history includes coronary artery disease with an MI over 20 years ago, hypertension, hyperlipidemia and a history of dementia.  His last angiogram revealed disease in his proximal and mid LAD as well as his distal RCA; however, the FFR was negative and no stenting or revascularization was done.  In the past, he has had problems with lower blood pressure and he also has chronic kidney disease and we have been unable to add an ACE inhibitor or an ARB.        In the fall of 2016, he underwent biventricular ICD implantation by Dr. Hernandez.  He is followed in our Device Clinic.  His last device check was in December.  It showed his underlying rhythm was sinus rhythm with a first degree AV block.  He had good heart rate stability and good variability.  He had zero atrial or ventricular arrhythmias.  He is due for another device check next week.      He was seen by his primary clinic in January.  A fasting lipid profile was done.  It demonstrated total cholesterol of 176, HDL 42, LDL 93, triglycerides 203.  His LDL was elevated higher than it was the year before and his triglycerides were also elevated.  He is currently on atorvastatin 40 mg per day.      In the interim since we have seen him 3 months ago, he has gained about 10 pounds.  He has not been weighing himself daily.  He also has not been watching the sodium in his diet as carefully.  A basic metabolic panel was drawn today.  I reviewed the results with Gibson today.  His sodium was 139, his potassium 4.6, chloride was 103, his glucose was elevated to 184,  BUN 22.  His creatinine was elevated to 1.6.      Today, Gibson denies chest pain, chest pressure, worsening shortness of breath.  He denies any lower ankle swelling.  He denies syncope.  He denies any PND.  Please see physical exam.      Outpatient Encounter Prescriptions as of 3/14/2017   Medication Sig Dispense Refill     warfarin (COUMADIN) 2 MG tablet Take 3 tablets (6mg) by mouth Mon, Wed and 2 tablets (4mg) rest of the days of the week or as directed by your INR clinic 180 tablet 0     [DISCONTINUED] hydrALAZINE (APRESOLINE) 10 MG tablet Take 1 tablet (10 mg) by mouth 3 times daily 90 tablet 1     [DISCONTINUED] isosorbide mononitrate (IMDUR) 30 MG 24 hr tablet Take 0.5 tablets (15 mg) by mouth daily 30 tablet 1     furosemide (LASIX) 20 MG tablet Take 0.5 tablets (10 mg) by mouth daily 45 tablet 3     atorvastatin (LIPITOR) 40 MG tablet Take 1 tablet (40 mg) by mouth At Bedtime 90 tablet 3     amitriptyline (ELAVIL) 25 MG tablet Take 1 tablet (25 mg) by mouth At Bedtime 90 tablet 1     venlafaxine (EFFEXOR) 75 MG tablet Take 1 tablet (75 mg) by mouth 2 times daily 180 tablet 1     [DISCONTINUED] warfarin (COUMADIN) 2 MG tablet 6 mg on Thursdays; 4 mg all other days OR as instructed by INR clinic. 180 tablet 0     multivitamin, therapeutic with minerals (MULTI-VITAMIN) TABS Take 1 tablet by mouth daily       Omega-3 Fatty Acids (OMEGA-3 FISH OIL PO) Take 1 g by mouth daily       metoprolol (TOPROL-XL) 50 MG 24 hr tablet Take 1 tablet (50 mg) by mouth daily 90 tablet 3     ACE/ARB NOT PRESCRIBED, INTENTIONAL, ACE & ARB not prescribed due to Symptomatic hypotension not due to excessive diuresis       ASPIRIN NOT PRESCRIBED (INTENTIONAL) Antiplatelet medication not prescribed intentionally due to Current anticoagulant therapy (warfarin/enoxaparin) 0 each 0     albuterol (PROAIR HFA, PROVENTIL HFA, VENTOLIN HFA) 108 (90 BASE) MCG/ACT inhaler Inhale 2 puffs into the lungs every 6 hours as needed for shortness of  breath / dyspnea or wheezing       ferrous sulfate (IRON) 325 (65 FE) MG tablet Take 325 mg by mouth daily       VITAMIN D, CHOLECALCIFEROL, PO Take 50,000 Units by mouth Once per week       No facility-administered encounter medications on file as of 3/14/2017.      IMPRESSION AND PLAN:   1.  Patient with history of heart failure with a reduced ejection fraction.  He appears euvolemic.  His weight gain is more due to dietary indiscretion rather than fluid volume overload.  With a stronger blood pressure today, I would like to introduce hydralazine, isosorbide mononitrate for some vasodilatory effect.  I did talk about side effects of lightheadedness and dizziness.  He may not tolerate this, but because of his renal function he remains off of any ARB or ACE inhibitor.  He will come back and see me in one month.  We will recheck his lab work also at that time.   2.  Chronic kidney disease.  His creatinine has taken a bump.  We will recheck that in 1 month.   3.  Hyperlipidemia.  This is being followed by his primary clinic.  With his family history of coronary artery disease and his personal history of an MI 20 years ago, his LDL should be closer to 70.  I did talk with him about how weight loss can certainly help him reach that goal.      It has been my pleasure to be involved in Gibson' care.     Sincerely,    ALLEN Paredes CNP     [facility]

## 2017-03-15 NOTE — PROGRESS NOTES
HISTORY OF PRESENT ILLNESS:  Gibson is a 76-year-old gentleman who is here today for a 3-month followup for his heart failure with a reduced ejection fraction.  Gibson' past medical history includes coronary artery disease with an MI over 20 years ago, hypertension, hyperlipidemia and a history of dementia.  His last angiogram revealed disease in his proximal and mid LAD as well as his distal RCA; however, the FFR was negative and no stenting or revascularization was done.  In the past, he has had problems with lower blood pressure and he also has chronic kidney disease and we have been unable to add an ACE inhibitor or an ARB.        In the fall of 2016, he underwent biventricular ICD implantation by Dr. Hernandez.  He is followed in our Device Clinic.  His last device check was in December.  It showed his underlying rhythm was sinus rhythm with a first degree AV block.  He had good heart rate stability and good variability.  He had zero atrial or ventricular arrhythmias.  He is due for another device check next week.      He was seen by his primary clinic in January.  A fasting lipid profile was done.  It demonstrated total cholesterol of 176, HDL 42, LDL 93, triglycerides 203.  His LDL was elevated higher than it was the year before and his triglycerides were also elevated.  He is currently on atorvastatin 40 mg per day.      In the interim since we have seen him 3 months ago, he has gained about 10 pounds.  He has not been weighing himself daily.  He also has not been watching the sodium in his diet as carefully.  A basic metabolic panel was drawn today.  I reviewed the results with Gibson today.  His sodium was 139, his potassium 4.6, chloride was 103, his glucose was elevated to 184, BUN 22.  His creatinine was elevated to 1.6.      Today, Gibson denies chest pain, chest pressure, worsening shortness of breath.  He denies any lower ankle swelling.  He denies syncope.  He denies any PND.  Please see physical  exam.      IMPRESSION AND PLAN:   1.  Patient with history of heart failure with a reduced ejection fraction.  He appears euvolemic.  His weight gain is more due to dietary indiscretion rather than fluid volume overload.  With a stronger blood pressure today, I would like to introduce hydralazine, isosorbide mononitrate for some vasodilatory effect.  I did talk about side effects of lightheadedness and dizziness.  He may not tolerate this, but because of his renal function he remains off of any ARB or ACE inhibitor.  He will come back and see me in one month.  We will recheck his lab work also at that time.   2.  Chronic kidney disease.  His creatinine has taken a bump.  We will recheck that in 1 month.   3.  Hyperlipidemia.  This is being followed by his primary clinic.  With his family history of coronary artery disease and his personal history of an MI 20 years ago, his LDL should be closer to 70.  I did talk with him about how weight loss can certainly help him reach that goal.      It has been my pleasure to be involved in Isaias' care.         ALLEN WEINSTEIN, ZOE             D: 2017 16:54   T: 03/15/2017 12:50   MT: ANA      Name:     ISAIAS DUMONT   MRN:      [id number]        Account:      [id number]   :      1940           Service Date: 2017      Document: [id number]

## 2017-03-21 ENCOUNTER — ALLIED HEALTH/NURSE VISIT (OUTPATIENT)
Dept: CARDIOLOGY | Facility: CLINIC | Age: 77
End: 2017-03-21
Payer: MEDICARE

## 2017-03-21 DIAGNOSIS — Z95.810 ICD (IMPLANTABLE CARDIOVERTER-DEFIBRILLATOR) IN PLACE: Primary | ICD-10-CM

## 2017-03-21 PROCEDURE — 93296 REM INTERROG EVL PM/IDS: CPT | Performed by: INTERNAL MEDICINE

## 2017-03-21 PROCEDURE — 93295 DEV INTERROG REMOTE 1/2/MLT: CPT | Performed by: INTERNAL MEDICINE

## 2017-03-21 NOTE — MR AVS SNAPSHOT
After Visit Summary   3/21/2017    Gibson Villalta    MRN: [id number]           Patient Information     Date Of Birth          1940        Visit Information        Provider Department      3/21/2017 4:30 PM ALEJANDRO DCR2 [facility]        Today's Diagnoses     ICD (implantable cardioverter-defibrillator) in place    -  1       Follow-ups after your visit        Your next 10 appointments already scheduled     Mar 21, 2017  4:30 PM CDT   Remote ICD Check with ALEJANDRO DCR2   [facility] ([facility])    [address]  [place] [place] [address] [phone]           This appointment is for a remote check of your debrillator.  This is not an appointment at the office.            Apr 04, 2017  2:15 PM CDT   Anticoagulation Visit with  ANTICOAGULATION CLINIC   [facility]    [address]  [place] [address] [phone]            Apr 11, 2017 12:50 PM CDT   LAB with ALEJANDRO LAB   [facility] ([facility])    [id number]  [place] [address] [phone]           Patient must bring picture ID.  Patient should be prepared to give a urine specimen  Please do not eat 10-12 hours before your appointment if you are coming in fasting for labs on lipids, cholesterol, or glucose (sugar).  Pregnant women should follow their Care Team instructions. Water with medications is okay. Do not drink coffee or other fluids.   If you have concerns about taking  your medications, please ask at office or if scheduling via Cliptonet, send a message by clicking on Secure Messaging, Message Your Care Team.            Apr 11, 2017  1:50 PM CDT   Core Return with ALLEN Engle CNP   [facility] ([facility])    [id number]  [facility] [address]    [phone]            Jun 22, 2017  4:30 PM CDT   Remote ICD Check with ALEJANDRO DCR2   [facility] [place] ([facility])    [address]  [place] [address] [phone]           This appointment is for a remote check of your debrillator.  This is not an appointment at the office.              Who to contact     If you have questions or need follow up information about today's clinic visit or your schedule please contact [facility] [place] directly at [phone].  Normal or non-critical lab and imaging results will be communicated to you by Infotophart, letter or phone within 4 business days after the clinic has received the results. If you do not hear from us within 7 days, please contact the clinic through Egnytet or phone. If you have a critical or abnormal lab result, we will notify you by phone as soon as possible.  Submit refill requests through Tenrox or call your pharmacy and they will forward the refill request to us. Please allow 3 business days for your refill to be completed.          Additional Information About Your Visit        MyChart Information     [employer] gives you secure access to your electronic health record. If you see a primary care provider, you can also send messages to your care team and make appointments. If you have questions, please call your primary care clinic.  If you do not have a primary care provider, please call [phone] and they will assist you.        Care EveryWhere ID     This is your Care EveryWhere ID. This could be used by other organizations to access your [place] medical records  [id number]         Blood Pressure from Last 3 Encounters:   03/14/17 126/80   01/02/17 134/86   12/13/16 108/74    Weight from Last 3 Encounters:   03/14/17 103.3 kg (227 lb 11.2 oz)   01/02/17 98.4 kg (217 lb)   12/13/16 96 kg (211 lb 11.2 oz)              We Performed the Following     ICD  DEVICE INTERROGAT REMOTE (84596)     INTERROGATION DEVICE EVAL REMOTE, PACER/ICD (27796)        Primary Care Provider Office Phone # Fax #    Flori Reyes [phone]       [facility] [address]  [facility] [address]        Thank you!     Thank you for choosing [facility] [place]  for your care. Our goal is always to provide you with excellent care. Hearing back from our patients is one way we can continue to improve our services. Please take a few minutes to complete the written survey that you may receive in the mail after your visit with us. Thank you!             Your Updated Medication List - Protect others around you: Learn how to safely use, store and throw away your medicines at www.disposemymeds.org.          This list is accurate as of: 3/21/17  1:58 PM.  Always use your most recent med list.                   Brand Name Dispense Instructions for use    ACE/ARB NOT PRESCRIBED (INTENTIONAL)      ACE & ARB not prescribed due to Symptomatic hypotension not due to excessive diuresis       albuterol 108 (90 BASE) MCG/ACT Inhaler    PROAIR HFA/PROVENTIL HFA/VENTOLIN HFA     Inhale 2 puffs into the lungs every 6 hours as needed for shortness of breath / dyspnea or wheezing       amitriptyline 25 MG tablet    ELAVIL    90 tablet    Take 1 tablet (25 mg) by mouth At Bedtime       ASPIRIN NOT PRESCRIBED    INTENTIONAL    0 each    Antiplatelet medication not prescribed intentionally due to Current anticoagulant therapy (warfarin/enoxaparin)       atorvastatin 40 MG tablet    LIPITOR    90 tablet    Take 1 tablet (40 mg) by mouth At Bedtime       ferrous sulfate 325 (65 FE) MG tablet    IRON     Take 325 mg by mouth daily       furosemide 20 MG tablet    LASIX    45 tablet    Take 0.5 tablets (10 mg) by mouth daily       hydrALAZINE 10 MG tablet    APRESOLINE    90 tablet    Take 1 tablet (10 mg) by mouth 3 times daily       isosorbide  mononitrate 30 MG 24 hr tablet    IMDUR    30 tablet    Take 0.5 tablets (15 mg) by mouth daily       metoprolol 50 MG 24 hr tablet    TOPROL-XL    90 tablet    Take 1 tablet (50 mg) by mouth daily       Multi-vitamin Tabs tablet      Take 1 tablet by mouth daily       OMEGA-3 FISH OIL PO      Take 1 g by mouth daily       venlafaxine 75 MG tablet    EFFEXOR    180 tablet    Take 1 tablet (75 mg) by mouth 2 times daily       VITAMIN D (CHOLECALCIFEROL) PO      Take 50,000 Units by mouth Once per week       * warfarin 2 MG tablet    COUMADIN    180 tablet    6 mg on Thursdays; 4 mg all other days OR as instructed by INR clinic.       * warfarin 2 MG tablet    COUMADIN    180 tablet    Take 3 tablets (6mg) by mouth Mon, Wed and 2 tablets (4mg) rest of the days of the week or as directed by your INR clinic       * Notice:  This list has 2 medication(s) that are the same as other medications prescribed for you. Read the directions carefully, and ask your doctor or other care provider to review them with you.

## 2017-03-21 NOTE — PROGRESS NOTES
Medtronic Quad ICD Remote Device Check  AP: 4 % BVP: 97 %  Mode: DDDR        Presenting Rhythm: AS/BVP  Heart Rate: Adequate variation per histogram  Sensing: stable    Pacing Threshold: stable    Impedance: stable  Battery Status: 8.8 years  Atrial Arrhythmia: none  Ventricular Arrhythmia: none  ATP: none    Shocks: none    Care Plan: f/u 3 months remote ICD check. Called and reviewed results with pt and set up next remote ICD check. He is feeling well s/p BV implant. Kylah

## 2017-04-04 ENCOUNTER — ANTICOAGULATION THERAPY VISIT (OUTPATIENT)
Dept: NURSING | Facility: CLINIC | Age: 77
End: 2017-04-04
Payer: MEDICARE

## 2017-04-04 DIAGNOSIS — Z79.01 LONG-TERM (CURRENT) USE OF ANTICOAGULANTS: ICD-10-CM

## 2017-04-04 LAB — INR POINT OF CARE: 1.9 (ref 0.86–1.14)

## 2017-04-04 PROCEDURE — 85610 PROTHROMBIN TIME: CPT | Mod: QW

## 2017-04-04 PROCEDURE — 99207 ZZC NO CHARGE NURSE ONLY: CPT

## 2017-04-04 PROCEDURE — 36416 COLLJ CAPILLARY BLOOD SPEC: CPT

## 2017-04-04 NOTE — PROGRESS NOTES
ANTICOAGULATION FOLLOW-UP CLINIC VISIT    Patient Name:  Gibson Villalta  Date:  4/4/2017  Contact Type:  Face to Face    SUBJECTIVE:     Patient Findings     Positives Med error    Comments Patient was being given 4 mg on Sunday instead of instructed 6 mg dose.            OBJECTIVE    INR Protime   Date Value Ref Range Status   04/04/2017 1.9 (A) 0.86 - 1.14 Final       ASSESSMENT / PLAN  INR assessment THER    Recheck INR In: 3 WEEKS    INR Location Clinic      Anticoagulation Summary as of 4/4/2017     INR goal 2.0-3.0   Today's INR 1.9!   Maintenance plan 4 mg (1 mg x 4) on Mon, Wed, Fri; 6 mg (1 mg x 6) all other days   Full instructions 4 mg on Mon, Wed, Fri; 6 mg all other days   Weekly total 36 mg   No change documented Lesia Hallman RN   Plan last modified Lesia Hallman RN (3/14/2017)   Next INR check 4/25/2017   Priority INR   Target end date     Indications   Long-term (current) use of anticoagulants [Z79.01] [Z79.01]         Anticoagulation Episode Summary     INR check location     Preferred lab     Send INR reminders to CS ANTICOAGULATION    Comments       Anticoagulation Care Providers     Provider Role Specialty Phone number    Flori ReyesDO Responsible Internal Medicine [phone]            See the Encounter Report to view Anticoagulation Flowsheet and Dosing Calendar (Go to Encounters tab in chart review, and find the Anticoagulation Therapy Visit)    Dosage adjustment made based on physician directed care plan.  Medication error: patient was given 4 mg instead of instructed 6 mg dose on Sundays.   Will have patient do 4 mg MonWedFri, 6 mg all other days.   Recheck in 3 weeks.     Lesia Hallman RN

## 2017-04-11 ENCOUNTER — OFFICE VISIT (OUTPATIENT)
Dept: CARDIOLOGY | Facility: CLINIC | Age: 77
End: 2017-04-11
Attending: NURSE PRACTITIONER
Payer: MEDICARE

## 2017-04-11 VITALS
RESPIRATION RATE: 16 BRPM | HEART RATE: 75 BPM | OXYGEN SATURATION: 95 % | BODY MASS INDEX: 30.59 KG/M2 | DIASTOLIC BLOOD PRESSURE: 88 MMHG | WEIGHT: 230.8 LBS | HEIGHT: 73 IN | SYSTOLIC BLOOD PRESSURE: 128 MMHG

## 2017-04-11 DIAGNOSIS — I42.9 CARDIOMYOPATHY (H): ICD-10-CM

## 2017-04-11 DIAGNOSIS — I50.22 CHRONIC SYSTOLIC HEART FAILURE (H): ICD-10-CM

## 2017-04-11 LAB
ANION GAP SERPL CALCULATED.3IONS-SCNC: 15.8 MMOL/L (ref 6–17)
BUN SERPL-MCNC: 16 MG/DL (ref 7–30)
CALCIUM SERPL-MCNC: 8.6 MG/DL (ref 8.5–10.5)
CHLORIDE SERPL-SCNC: 105 MMOL/L (ref 98–107)
CO2 SERPL-SCNC: 23 MMOL/L (ref 23–29)
CREAT SERPL-MCNC: 1.46 MG/DL (ref 0.7–1.3)
GFR SERPL CREATININE-BSD FRML MDRD: 47 ML/MIN/1.7M2
GLUCOSE SERPL-MCNC: 150 MG/DL (ref 70–105)
POTASSIUM SERPL-SCNC: 3.8 MMOL/L (ref 3.5–5.1)
SODIUM SERPL-SCNC: 140 MMOL/L (ref 136–145)

## 2017-04-11 PROCEDURE — 80048 BASIC METABOLIC PNL TOTAL CA: CPT | Performed by: NURSE PRACTITIONER

## 2017-04-11 PROCEDURE — 99214 OFFICE O/P EST MOD 30 MIN: CPT | Performed by: NURSE PRACTITIONER

## 2017-04-11 PROCEDURE — 36415 COLL VENOUS BLD VENIPUNCTURE: CPT | Performed by: NURSE PRACTITIONER

## 2017-04-11 RX ORDER — ISOSORBIDE MONONITRATE 30 MG/1
15 TABLET, EXTENDED RELEASE ORAL DAILY
Qty: 45 TABLET | Refills: 1 | Status: SHIPPED | OUTPATIENT
Start: 2017-04-11 | End: 2017-10-24

## 2017-04-11 RX ORDER — HYDRALAZINE HYDROCHLORIDE 10 MG/1
10 TABLET, FILM COATED ORAL 3 TIMES DAILY
Qty: 180 TABLET | Refills: 1 | Status: SHIPPED | OUTPATIENT
Start: 2017-04-11 | End: 2017-09-11

## 2017-04-11 NOTE — PROGRESS NOTES
HISTORY OF PRESENT ILLNESS:  Gibson is a 76-year-old gentleman who returns to the [facility] at the [facility].  We follow him for his ischemic cardiomyopathy with a reduced ejection fraction.  His past medical history includes chronic kidney disease, obesity status post bariatric surgery, coronary artery disease to include a myocardial infarction, moderate coronary artery disease in his LAD and distal RCA status post biventricular ICD implantation for prevention of sudden cardiac death.  The patient was also diagnosed with multiple RV blood clots with his hospitalization 1 year ago.  He was started on warfarin therapy.  An echo done prior to his biventricular ICD on 10/19/2016 showed no RV clot.  The patient remains on warfarin therapy.      I saw Gibson about 1 month ago.  At that time he was doing well and at that time he had some weight gain due to some dietary indiscretions rather than fluid volume overload.  His blood pressure was strong; therefore, I introduced hydralazine with isosorbide mononitrate as his chronic kidney disease prohibited use with ARB or ACE inhibitor.  Today he is here in followup.  Today he denies chest pain, chest pressure.  He denies lightheadedness or dizziness.  Today he reports he has more energy.  He is much less short of breath when he does stairs, climbing.  He believes that with the addition of the above medications he has more energy.      Basic metabolic panel today shows a sodium of 140, potassium 3.8, BUN 16, creatinine 1.46.      PHYSICAL EXAMINATION:   VITAL SIGNS:  Weight today is 230 pounds.  Previous weight last month was 227.  Blood pressure 128/88.     GENERAL:  The patient is alert and oriented.   SKIN:  Warm and dry.  He is in no acute distress.   CARDIAC:  Heart tones are regular with an S1, S2 without an S3, S4.   LUNGS:  Clear without crackles or wheezes.   ABDOMEN:  Soft.   EXTREMITIES:  Lower extremities are free of edema.       IMPRESSION AND PLAN:     1.  The patient with heart failure with a depressed ejection fraction.  He appears to be euvolemic.  His weight gain is due to dietary indiscretion.  He has a history of obesity with previous body weight of 300 prior to bariatric surgery procedure.  He said after that he lost about 100 pounds.  Today I will continue with his current medications to include beta blockade, isosorbide mononitrate, hydralazine and low-dose Lasix.   2.  History of RV clot.  I am going to repeat a cardiac echo to help with the decision making if he still needs warfarin therapy.  I will have him follow up with his primary cardiologist, Dr. Hernandez, to review the results of the echo and to make the decision to stop the warfarin therapy.  He has had no issues with bleeding on the warfarin therapy.  I am hoping that he has an improvement with his ejection fraction with good medical management and with his biventricular ICD.  Perhaps, he can come off the warfarin therapy if there is no RV clot present.   3.  BiV ICD.  The patient's last device was interrogated last month.  He has had no shocks.  There has been no presence of atrial or ventricular arrhythmia.   4.  Chronic kidney disease.  The patient's creatinine has improved from 1.6 to 1.46.  I think this is his baseline.      It has been my pleasure to be involved in this gentleman's care.         ALLEN WEINSTEIN, ZOE             D: 2017 15:10   T: 2017 16:09   MT: ANA      Name:     ISAIAS DUMONT   MRN:      [id number]        Account:      [id number]   :      1940           Service Date: 2017      Document: [id number]

## 2017-04-11 NOTE — MR AVS SNAPSHOT
After Visit Summary   4/11/2017    Gibson Villalta    MRN: [id number]           Patient Information     Date Of Birth          1940        Visit Information        Provider Department      4/11/2017 1:50 PM Jinny Adam APRN CNP [facility] [place]        Today's Diagnoses     Chronic systolic heart failure (H)        Cardiomyopathy (H)          Care Instructions    Call CORE Nurse for any questions or concerns:   Marta Maldonado or Anne: [phone]   If you have concerns after hours, please call [phone], option 2    1. Medication changes: No medication changes    Cardiac echo and office visit with Dr. Hernandez    2. Weigh yourself daily and write it down.     3. Call CORE nurse if your weight is up more than 2 pounds in one day, or 5 pounds in one week.    4. Call CORE nurse if you feel more short of breath, have more abdominal bloating or leg swelling.    5. Continue low sodium diet (less than 2000mg daily). If you eat less salt, you will retain less fluid.     6. Lab results: **    **Do NOT take Aleve or Ibuprofen without checking with your doctor first        CORE Clinic: Cardiomyopathy, Optimization, Rehabilitation, Education   The CORE Clinic is a heart failure specialty clinic within the [facility] where you will work with specialized nurse practioners, physician assistants, doctors and registered nurses. They are dedicated to helping patients with heart failure to carefully adjust medications, receive education, and learn who and when to call if symptoms develop. They specialize in helping you better understand your condition, slow the progression of your disease, improve the length and quality of your life, help you detect future heart problems before they become life threatening, and avoid hospitalizations.              Follow-ups after your visit        Additional Services     Follow-Up with  Electrophysiologist                 Your next 10 appointments already scheduled     Apr 25, 2017  2:15 PM CDT   Anticoagulation Visit with  ANTICOAGULATION CLINIC   [facility]    [address]  [place] [address] [phone]            Jun 22, 2017  4:30 PM CDT   Remote ICD Check with ALEJANDRO DCR2   [facility] [place] ([facility])    [address]  [place] [address] [phone]           This appointment is for a remote check of your debrillator.  This is not an appointment at the office.              Future tests that were ordered for you today     Open Future Orders        Priority Expected Expires Ordered    Echocardiogram Routine 5/11/2017 4/11/2018 4/11/2017    Follow-Up with Electrophysiologist Routine 5/11/2017 4/11/2018 4/11/2017            Who to contact     If you have questions or need follow up information about today's clinic visit or your schedule please contact [facility] [place] directly at [phone].  Normal or non-critical lab and imaging results will be communicated to you by FOODithart, letter or phone within 4 business days after the clinic has received the results. If you do not hear from us within 7 days, please contact the clinic through Zimplistict or phone. If you have a critical or abnormal lab result, we will notify you by phone as soon as possible.  Submit refill requests through [employer] or call your pharmacy and they will forward the refill request to us. Please allow 3 business days for your refill to be completed.          Additional Information About Your Visit        FOODithart Information     [employer] gives you secure access to your electronic health record. If you see a primary care provider, you can also send messages to your care team and make appointments. If you have questions, please call your primary care clinic.  If you do not have a primary  "care provider, please call [phone] and they will assist you.        Care EveryWhere ID     This is your Care EveryWhere ID. This could be used by other organizations to access your [place] medical records  [id number]        Your Vitals Were     Pulse Respirations Height Pulse Oximetry BMI (Body Mass Index)       75 16 1.854 m (6' 1\") 95% 30.45 kg/m2        Blood Pressure from Last 3 Encounters:   04/11/17 128/88   03/14/17 126/80   01/02/17 134/86    Weight from Last 3 Encounters:   04/11/17 104.7 kg (230 lb 12.8 oz)   03/14/17 103.3 kg (227 lb 11.2 oz)   01/02/17 98.4 kg (217 lb)              We Performed the Following     Follow-Up with CORE Clinic - ACE visit          Where to get your medicines      [id number] [address] [address]     Phone:  [phone]     hydrALAZINE 10 MG tablet    isosorbide mononitrate 30 MG 24 hr tablet          Primary Care Provider Office Phone # Fax #    Flori Joshua ReyesDO [phone]       [facility] [address] [facility] [address]  [facility] [address]        Thank you!     Thank you for choosing [facility] [place]  for your care. Our goal is always to provide you with excellent care. Hearing back from our patients is one way we can continue to improve our services. Please take a few minutes to complete the written survey that you may receive in the mail after your visit with us. Thank you!             Your Updated Medication List - Protect others around you: Learn how to safely use, store and throw away your medicines at www.disposemymeds.org.          This list is accurate as of: 4/11/17  2:39 PM.  Always use your most recent med list.                   Brand Name Dispense Instructions for use    ACE/ARB NOT PRESCRIBED (INTENTIONAL)      ACE & ARB not prescribed due to Symptomatic hypotension not due " to excessive diuresis       albuterol 108 (90 BASE) MCG/ACT Inhaler    PROAIR HFA/PROVENTIL HFA/VENTOLIN HFA     Inhale 2 puffs into the lungs every 6 hours as needed for shortness of breath / dyspnea or wheezing       amitriptyline 25 MG tablet    ELAVIL    90 tablet    Take 1 tablet (25 mg) by mouth At Bedtime       ASPIRIN NOT PRESCRIBED    INTENTIONAL    0 each    Antiplatelet medication not prescribed intentionally due to Current anticoagulant therapy (warfarin/enoxaparin)       atorvastatin 40 MG tablet    LIPITOR    90 tablet    Take 1 tablet (40 mg) by mouth At Bedtime       ferrous sulfate 325 (65 FE) MG tablet    IRON     Take 325 mg by mouth daily       furosemide 20 MG tablet    LASIX    45 tablet    Take 0.5 tablets (10 mg) by mouth daily       hydrALAZINE 10 MG tablet    APRESOLINE    180 tablet    Take 1 tablet (10 mg) by mouth 3 times daily       isosorbide mononitrate 30 MG 24 hr tablet    IMDUR    45 tablet    Take 0.5 tablets (15 mg) by mouth daily       metoprolol 50 MG 24 hr tablet    TOPROL-XL    90 tablet    Take 1 tablet (50 mg) by mouth daily       Multi-vitamin Tabs tablet      Take 1 tablet by mouth daily       OMEGA-3 FISH OIL PO      Take 1 g by mouth daily       venlafaxine 75 MG tablet    EFFEXOR    180 tablet    Take 1 tablet (75 mg) by mouth 2 times daily       VITAMIN D (CHOLECALCIFEROL) PO      Take 50,000 Units by mouth Once per week       * warfarin 2 MG tablet    COUMADIN    180 tablet    6 mg on Thursdays; 4 mg all other days OR as instructed by INR clinic.       * warfarin 2 MG tablet    COUMADIN    180 tablet    Take 3 tablets (6mg) by mouth Mon, Wed and 2 tablets (4mg) rest of the days of the week or as directed by your INR clinic       * Notice:  This list has 2 medication(s) that are the same as other medications prescribed for you. Read the directions carefully, and ask your doctor or other care provider to review them with you.

## 2017-04-11 NOTE — PATIENT INSTRUCTIONS
Call CORE Nurse for any questions or concerns:   Marta Maldonado, or Leah: [phone]   If you have concerns after hours, please call [phone], option [phone]    1. Medication changes: No medication changes    Cardiac echo and office visit with Dr. Heranndez    2. Weigh yourself daily and write it down.     3. Call CORE nurse if your weight is up more than 2 pounds in one day, or 5 pounds in one week.    4. Call CORE nurse if you feel more short of breath, have more abdominal bloating or leg swelling.    5. Continue low sodium diet (less than 2000mg daily). If you eat less salt, you will retain less fluid.     6. Lab results: **    **Do NOT take Aleve or Ibuprofen without checking with your doctor first        [facility]: Cardiomyopathy, Optimization, Rehabilitation, Education   The [facility] is a heart failure specialty clinic within the [facility] where you will work with specialized nurse practioners, physician assistants, doctors and registered nurses. They are dedicated to helping patients with heart failure to carefully adjust medications, receive education, and learn who and when to call if symptoms develop. They specialize in helping you better understand your condition, slow the progression of your disease, improve the length and quality of your life, help you detect future heart problems before they become life threatening, and avoid hospitalizations.

## 2017-04-11 NOTE — LETTER
4/11/2017    Flori Reyes, [facility]   [address]  [facility] [address]    RE: Gibson Villalta       Dear Colleague,    I had the pleasure of seeing Gibson Villalta in the [facility].    Gibson is a 76-year-old gentleman who returns to the [facility] Clinic at the [facility].  We follow him for his ischemic cardiomyopathy with a reduced ejection fraction.  His past medical history includes chronic kidney disease, obesity status post bariatric surgery, coronary artery disease to include a myocardial infarction, moderate coronary artery disease in his LAD and distal RCA status post biventricular ICD implantation for prevention of sudden cardiac death.  The patient was also diagnosed with multiple RV blood clots with his hospitalization 1 year ago.  He was started on warfarin therapy.  An echo done prior to his biventricular ICD on 10/19/2016 showed no RV clot.  The patient remains on warfarin therapy.      I saw Gibson about 1 month ago.  At that time he was doing well and at that time he had some weight gain due to some dietary indiscretions rather than fluid volume overload.  His blood pressure was strong; therefore, I introduced hydralazine with isosorbide mononitrate as his chronic kidney disease prohibited use with ARB or ACE inhibitor.  Today he is here in followup.  Today he denies chest pain, chest pressure.  He denies lightheadedness or dizziness.  Today he reports he has more energy.  He is much less short of breath when he does stairs, climbing.  He believes that with the addition of the above medications he has more energy.      Basic metabolic panel today shows a sodium of 140, potassium 3.8, BUN 16, creatinine 1.46.      PHYSICAL EXAMINATION:   VITAL SIGNS:  Weight today is 230 pounds.  Previous weight last month was 227.  Blood pressure 128/88.     GENERAL:  The patient is alert and oriented.   SKIN:  Warm  and dry.  He is in no acute distress.   CARDIAC:  Heart tones are regular with an S1, S2 without an S3, S4.   LUNGS:  Clear without crackles or wheezes.   ABDOMEN:  Soft.   EXTREMITIES:  Lower extremities are free of edema.     Outpatient Encounter Prescriptions as of 4/11/2017   Medication Sig Dispense Refill     isosorbide mononitrate (IMDUR) 30 MG 24 hr tablet Take 0.5 tablets (15 mg) by mouth daily 45 tablet 1     hydrALAZINE (APRESOLINE) 10 MG tablet Take 1 tablet (10 mg) by mouth 3 times daily 180 tablet 1     [DISCONTINUED] warfarin (COUMADIN) 2 MG tablet Take 3 tablets (6mg) by mouth Mon, Wed and 2 tablets (4mg) rest of the days of the week or as directed by your INR clinic 180 tablet 0     furosemide (LASIX) 20 MG tablet Take 0.5 tablets (10 mg) by mouth daily 45 tablet 3     atorvastatin (LIPITOR) 40 MG tablet Take 1 tablet (40 mg) by mouth At Bedtime 90 tablet 3     amitriptyline (ELAVIL) 25 MG tablet Take 1 tablet (25 mg) by mouth At Bedtime 90 tablet 1     venlafaxine (EFFEXOR) 75 MG tablet Take 1 tablet (75 mg) by mouth 2 times daily 180 tablet 1     [DISCONTINUED] warfarin (COUMADIN) 2 MG tablet 6 mg on Thursdays; 4 mg all other days OR as instructed by INR clinic. 180 tablet 0     multivitamin, therapeutic with minerals (MULTI-VITAMIN) TABS Take 1 tablet by mouth daily       Omega-3 Fatty Acids (OMEGA-3 FISH OIL PO) Take 1 g by mouth daily       [DISCONTINUED] metoprolol (TOPROL-XL) 50 MG 24 hr tablet Take 1 tablet (50 mg) by mouth daily 90 tablet 3     ACE/ARB NOT PRESCRIBED, INTENTIONAL, ACE & ARB not prescribed due to Symptomatic hypotension not due to excessive diuresis       ASPIRIN NOT PRESCRIBED (INTENTIONAL) Antiplatelet medication not prescribed intentionally due to Current anticoagulant therapy (warfarin/enoxaparin) 0 each 0     albuterol (PROAIR HFA, PROVENTIL HFA, VENTOLIN HFA) 108 (90 BASE) MCG/ACT inhaler Inhale 2 puffs into the lungs every 6 hours as needed for shortness of breath /  dyspnea or wheezing       ferrous sulfate (IRON) 325 (65 FE) MG tablet Take 325 mg by mouth daily       VITAMIN D, CHOLECALCIFEROL, PO Take 50,000 Units by mouth Once per week       [DISCONTINUED] hydrALAZINE (APRESOLINE) 10 MG tablet Take 1 tablet (10 mg) by mouth 3 times daily 90 tablet 1     [DISCONTINUED] isosorbide mononitrate (IMDUR) 30 MG 24 hr tablet Take 0.5 tablets (15 mg) by mouth daily 30 tablet 1     No facility-administered encounter medications on file as of 4/11/2017.       IMPRESSION AND PLAN:     1.  The patient with heart failure with a depressed ejection fraction.  He appears to be euvolemic.  His weight gain is due to dietary indiscretion.  He has a history of obesity with previous body weight of 300 prior to bariatric surgery procedure.  He said after that he lost about 100 pounds.  Today I will continue with his current medications to include beta blockade, isosorbide mononitrate, hydralazine and low-dose Lasix.   2.  History of RV clot.  I am going to repeat a cardiac echo to help with the decision making if he still needs warfarin therapy.  I will have him follow up with his primary cardiologist, Dr. Hernandez, to review the results of the echo and to make the decision to stop the warfarin therapy.  He has had no issues with bleeding on the warfarin therapy.  I am hoping that he has an improvement with his ejection fraction with good medical management and with his biventricular ICD.  Perhaps, he can come off the warfarin therapy if there is no RV clot present.   3.  BiV ICD.  The patient's last device was interrogated last month.  He has had no shocks.  There has been no presence of atrial or ventricular arrhythmia.   4.  Chronic kidney disease.  The patient's creatinine has improved from 1.6 to 1.46.  I think this is his baseline.      It has been my pleasure to be involved in this gentleman's care.     Sincerely,    ALLEN Paredes [facility]

## 2017-04-25 ENCOUNTER — OFFICE VISIT (OUTPATIENT)
Dept: FAMILY MEDICINE | Facility: CLINIC | Age: 77
End: 2017-04-25
Payer: MEDICARE

## 2017-04-25 ENCOUNTER — ANTICOAGULATION THERAPY VISIT (OUTPATIENT)
Dept: NURSING | Facility: CLINIC | Age: 77
End: 2017-04-25
Payer: MEDICARE

## 2017-04-25 VITALS
HEART RATE: 90 BPM | OXYGEN SATURATION: 95 % | BODY MASS INDEX: 30.48 KG/M2 | DIASTOLIC BLOOD PRESSURE: 74 MMHG | HEIGHT: 73 IN | WEIGHT: 230 LBS | TEMPERATURE: 97.3 F | SYSTOLIC BLOOD PRESSURE: 106 MMHG

## 2017-04-25 DIAGNOSIS — M70.42 PREPATELLAR BURSITIS OF LEFT KNEE: Primary | ICD-10-CM

## 2017-04-25 DIAGNOSIS — Z79.01 LONG-TERM (CURRENT) USE OF ANTICOAGULANTS: ICD-10-CM

## 2017-04-25 LAB — INR POINT OF CARE: 2 (ref 0.86–1.14)

## 2017-04-25 PROCEDURE — 99213 OFFICE O/P EST LOW 20 MIN: CPT | Performed by: NURSE PRACTITIONER

## 2017-04-25 PROCEDURE — 36416 COLLJ CAPILLARY BLOOD SPEC: CPT

## 2017-04-25 PROCEDURE — 99207 ZZC NO CHARGE NURSE ONLY: CPT

## 2017-04-25 PROCEDURE — 85610 PROTHROMBIN TIME: CPT | Mod: QW

## 2017-04-25 NOTE — MR AVS SNAPSHOT
After Visit Summary   4/25/2017    Gibson Villalta    MRN: [id number]           Patient Information     Date Of Birth          1940        Visit Information        Provider Department      4/25/2017 3:00 PM Katelyn Akhtar APRN [facility]        Today's Diagnoses     Prepatellar bursitis of left knee    -  1      Care Instructions      IF you start getting discomfort, let me know and we can send you to our specialist     What is bursitis?  A bursa is a fluid-filled sac that helps cushion the muscles, tendons, and bones around a joint. When a bursa becomes inflamed, it s called bursitis. Common symptoms of bursitis include pain, tenderness, and swelling that limits movement of the joint.  What causes bursitis?  Bursitis is most often caused by overuse of a joint. The repeated movements irritate the bursa and may cause it to swell. When that happens, other surrounding tissues may become inflamed or have less space to move. Bursitis is most common in large joints such as the knee, shoulder, and hip.       Nonsurgical treatment involves both rest and exercise.    How is bursitis treated?  To help reduce pain and swelling, your healthcare provider may recommend one or more of the following:     Rest gives the bursa time to heal. This means limiting activities that put stress on the joint.    Anti-inflammatory medications help reduce painful swelling. In some cases, this can include injections of cortisone or other steroid medicines into the bursa.    Splints and support bandages improve your comfort and allow the bursa to heal.    Physical therapy may be used to increase flexibility and strengthen muscles that support the joint.    Aspiration removes extra fluid from the bursa using a needle. This can help your healthcare provider find out what is causing your bursitis. For example, it might be an infection or overuse.     Surgery can be used to remove an inflamed or  infected bursa. This is rarely needed.    9209-1479 [employer]. [address] [place] [address], [place] [address]. All rights reserved. This information is not intended as a substitute for professional medical care. Always follow your healthcare professional's instructions.              Follow-ups after your visit        Your next 10 appointments already scheduled     Apr 25, 2017  3:00 PM CDT   [facility] ALL[facility]    [address] [facility] [address] [phone]           Bring a current list of meds and any records pertaining to this visit.  For Physicals, please bring immunization records and any forms needing to be filled out.  Please arrive 10 minutes early to complete paperwork.            May 11, 2017  7:30 AM CDT   Ech Complete with SHCVECRUDDY   [facility] ([facility])    [address] [place], [place] [id number]  [facility] [address] [phone]           1.  Please bring or wear a comfortable two-piece outfit. 2.  You may eat, drink and take your normal medicines. 3.  For any questions that cannot be answered, please contact the ordering physician            May 11, 2017 10:45 AM CDT   P EP RETURN with Josias Hernandez MD   [facility] [place] ([facility])    [address] [place] [address] [address]  [facility] [address] [phone]            May 16, 2017  2:15 PM CDT   Anticoagulation Visit with [facility]   [facility]    [address] [facility] [address] [phone]            Jun 22, 2017  4:30 PM CDT   Remote ICD Check with ALEJANDRO DCR2   [facility] ([facility])    [address] [facility] [address]  [facility] [address] [phone]           This appointment is for a remote check of your debrillator.   "This is not an appointment at the office.              Who to contact     If you have questions or need follow up information about today's clinic visit or your schedule please contact [facility] directly at [phone].  Normal or non-critical lab and imaging results will be communicated to you by MyChart, letter or phone within 4 business days after the clinic has received the results. If you do not hear from us within 7 days, please contact the clinic through [employer] or phone. If you have a critical or abnormal lab result, we will notify you by phone as soon as possible.  Submit refill requests through [employer] or call your pharmacy and they will forward the refill request to us. Please allow 3 business days for your refill to be completed.          Additional Information About Your Visit        [employer] Information     [employer] gives you secure access to your electronic health record. If you see a primary care provider, you can also send messages to your care team and make appointments. If you have questions, please call your primary care clinic.  If you do not have a primary care provider, please call [phone] and they will assist you.        Care EveryWhere ID     This is your Care EveryWhere ID. This could be used by other organizations to access your [facility] medical records  [id number]        Your Vitals Were     Pulse Temperature Height Pulse Oximetry BMI (Body Mass Index)       90 97.3  F (36.3  C) (Oral) 6' 1\" (1.854 m) 95% 30.34 kg/m2        Blood Pressure from Last 3 Encounters:   04/25/17 106/74   04/11/17 128/88   03/14/17 126/80    Weight from Last 3 Encounters:   04/25/17 230 lb (104.3 kg)   04/11/17 230 lb 12.8 oz (104.7 kg)   03/14/17 227 lb 11.2 oz (103.3 kg)              Today, you had the following     No orders found for display         Today's Medication Changes          These changes are accurate as of: 4/25/17  2:59 PM.  If you have any questions, ask your nurse or doctor. "               These medicines have changed or have updated prescriptions.        Dose/Directions    warfarin 2 MG tablet   Commonly known as:  COUMADIN   This may have changed:  Another medication with the same name was removed. Continue taking this medication, and follow the directions you see here.   Used for:  Long-term (current) use of anticoagulants, RV (right ventricular) mural thrombus (H)   Changed by:  Flori Reyes DO        Take 3 tablets (6mg) by mouth Mon, Wed and 2 tablets (4mg) rest of the days of the week or as directed by your INR clinic   Quantity:  180 tablet   Refills:  0                Primary Care Provider Office Phone # Fax #    Flori Reyes [phone]       [facility] [address]  [facility] [address]        Thank you!     Thank you for choosing [facility]  for your care. Our goal is always to provide you with excellent care. Hearing back from our patients is one way we can continue to improve our services. Please take a few minutes to complete the written survey that you may receive in the mail after your visit with us. Thank you!             Your Updated Medication List - Protect others around you: Learn how to safely use, store and throw away your medicines at www.disposemymeds.org.          This list is accurate as of: 4/25/17  2:59 PM.  Always use your most recent med list.                   Brand Name Dispense Instructions for use    ACE/ARB NOT PRESCRIBED (INTENTIONAL)      ACE & ARB not prescribed due to Symptomatic hypotension not due to excessive diuresis       albuterol 108 (90 BASE) MCG/ACT Inhaler    PROAIR HFA/PROVENTIL HFA/VENTOLIN HFA     Inhale 2 puffs into the lungs every 6 hours as needed for shortness of breath / dyspnea or wheezing       amitriptyline 25 MG tablet    ELAVIL    90 tablet    Take 1 tablet (25 mg) by mouth At Bedtime       ASPIRIN NOT PRESCRIBED    INTENTIONAL    0 each    Antiplatelet medication  not prescribed intentionally due to Current anticoagulant therapy (warfarin/enoxaparin)       atorvastatin 40 MG tablet    LIPITOR    90 tablet    Take 1 tablet (40 mg) by mouth At Bedtime       COENZYME Q-10 PO      Take by mouth daily       ferrous sulfate 325 (65 FE) MG tablet    IRON     Take 325 mg by mouth daily       furosemide 20 MG tablet    LASIX    45 tablet    Take 0.5 tablets (10 mg) by mouth daily       hydrALAZINE 10 MG tablet    APRESOLINE    180 tablet    Take 1 tablet (10 mg) by mouth 3 times daily       isosorbide mononitrate 30 MG 24 hr tablet    IMDUR    45 tablet    Take 0.5 tablets (15 mg) by mouth daily       metoprolol 50 MG 24 hr tablet    TOPROL-XL    90 tablet    Take 1 tablet (50 mg) by mouth daily       Multi-vitamin Tabs tablet      Take 1 tablet by mouth daily       OMEGA-3 FISH OIL PO      Take 1 g by mouth daily       venlafaxine 75 MG tablet    EFFEXOR    180 tablet    Take 1 tablet (75 mg) by mouth 2 times daily       VITAMIN D (CHOLECALCIFEROL) PO      Take 50,000 Units by mouth Once per week       warfarin 2 MG tablet    COUMADIN    180 tablet    Take 3 tablets (6mg) by mouth Mon, Wed and 2 tablets (4mg) rest of the days of the week or as directed by your INR clinic

## 2017-04-25 NOTE — PATIENT INSTRUCTIONS
IF you start getting discomfort, let me know and we can send you to our specialist     What is bursitis?  A bursa is a fluid-filled sac that helps cushion the muscles, tendons, and bones around a joint. When a bursa becomes inflamed, it s called bursitis. Common symptoms of bursitis include pain, tenderness, and swelling that limits movement of the joint.  What causes bursitis?  Bursitis is most often caused by overuse of a joint. The repeated movements irritate the bursa and may cause it to swell. When that happens, other surrounding tissues may become inflamed or have less space to move. Bursitis is most common in large joints such as the knee, shoulder, and hip.       Nonsurgical treatment involves both rest and exercise.    How is bursitis treated?  To help reduce pain and swelling, your healthcare provider may recommend one or more of the following:     Rest gives the bursa time to heal. This means limiting activities that put stress on the joint.    Anti-inflammatory medications help reduce painful swelling. In some cases, this can include injections of cortisone or other steroid medicines into the bursa.    Splints and support bandages improve your comfort and allow the bursa to heal.    Physical therapy may be used to increase flexibility and strengthen muscles that support the joint.    Aspiration removes extra fluid from the bursa using a needle. This can help your healthcare provider find out what is causing your bursitis. For example, it might be an infection or overuse.     Surgery can be used to remove an inflamed or infected bursa. This is rarely needed.    4377-5350 [employer]. [address] [place] [address], [place] [address]. All rights reserved. This information is not intended as a substitute for professional medical care. Always follow your healthcare professional's instructions.

## 2017-04-25 NOTE — PROGRESS NOTES
ANTICOAGULATION FOLLOW-UP CLINIC VISIT    Patient Name:  Gibson Villalta  Date:  4/25/2017  Contact Type:  Face to Face    SUBJECTIVE:     Patient Findings     Positives Other complaints    Comments Per wife of patient, they may be discontinuing coumadin. Patient scheduled with Cardiology on 5/11/17 for discussion of possibly d/c coumadin.            OBJECTIVE    INR Protime   Date Value Ref Range Status   04/25/2017 2.0 (A) 0.86 - 1.14 Final       ASSESSMENT / PLAN  INR assessment THER    Recheck INR In: 3 WEEKS    INR Location Clinic      Anticoagulation Summary as of 4/25/2017     INR goal 2.0-3.0   Today's INR 2.0   Maintenance plan 4 mg (1 mg x 4) on Mon, Wed, Fri; 6 mg (1 mg x 6) all other days   Full instructions 4 mg on Mon, Wed, Fri; 6 mg all other days   Weekly total 36 mg   No change documented Lesia Hallman RN   Plan last modified Lesia Hallman RN (3/14/2017)   Next INR check 5/16/2017   Priority INR   Target end date     Indications   Long-term (current) use of anticoagulants [Z79.01] [Z79.01]         Anticoagulation Episode Summary     INR check location     Preferred lab     Send INR reminders to CS ANTICOAGULATION    Comments       Anticoagulation Care Providers     Provider Role Specialty Phone number    Flori Reyes DO Josiane [facility] Internal Medicine [phone]            See the Encounter Report to view Anticoagulation Flowsheet and Dosing Calendar (Go to Encounters tab in chart review, and find the Anticoagulation Therapy Visit)    Dosage adjustment made based on physician directed care plan.  Patient is scheduled with Cardiology 5/11/17 to see if patient can discontinue coumadin.   Scheduled patient for 3 week follow up. Call to cancel if not needed if coumadin discontinued.     Lesia Hallman RN

## 2017-04-25 NOTE — NURSING NOTE
"Chief Complaint   Patient presents with     Knee left     lump noticed Thursda.  No known injury, pain, not warm to the touch.         Initial /74 (BP Location: Right arm, Patient Position: Chair, Cuff Size: Adult Regular)  Pulse 90  Temp 97.3  F (36.3  C) (Oral)  Ht 6' 1\" (1.854 m)  Wt 230 lb (104.3 kg)  SpO2 95%  BMI 30.34 kg/m2 Estimated body mass index is 30.34 kg/(m^2) as calculated from the following:    Height as of this encounter: 6' 1\" (1.854 m).    Weight as of this encounter: 230 lb (104.3 kg).  Medication Reconciliation: complete  "

## 2017-04-25 NOTE — PROGRESS NOTES
HPI      Thursday, 5 days ago, noticed swelling overlying kneecap   No known trauma  When he sits in the recliner he crosses the foot on top of the knee which presses down   No pain   No change of ROM       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     Myocardial infarction (H) 1995 and 2010     Nonischemic cardiomyopathy (H) 7/22/2016     Obesity      Rosacea      Rotator cuff disorder     Tear x 2     RV (right ventricular) mural thrombus (H) 7/22/2016     Past Surgical History:   Procedure Laterality Date     ANGIOPLASTY  1995 and 2010 with stent     GASTRIC BYPASS  2001     HEART CATH LEFT HEART CATH  7/19/16    Non ischemic cardiomyopathy; Non functionally significant LAD and RCA disease      OTHER SURGICAL HISTORY  2006    Spinal surgery     OTHER SURGICAL HISTORY  Nov 2016    CRT-D implantation     Social History   Substance Use Topics     Smoking status: Former Smoker     Packs/day: 2.00     Years: 8.00     Types: Cigarettes     Quit date: 1/1/1980     Smokeless tobacco: Never Used      Comment: Quit in his 30s - 2 ppd for 8 years     Alcohol use 0.0 oz/week     0 Standard drinks or equivalent per week      Comment: maybe 1 beer a month     Current Outpatient Prescriptions   Medication Sig Dispense Refill     COENZYME Q-10 PO Take by mouth daily       isosorbide mononitrate (IMDUR) 30 MG 24 hr tablet Take 0.5 tablets (15 mg) by mouth daily 45 tablet 1     hydrALAZINE (APRESOLINE) 10 MG tablet Take 1 tablet (10 mg) by mouth 3 times daily 180 tablet 1     warfarin (COUMADIN) 2 MG tablet Take 3 tablets (6mg) by mouth Mon, Wed and 2 tablets (4mg) rest of the days of the week or  "as directed by your INR clinic 180 tablet 0     furosemide (LASIX) 20 MG tablet Take 0.5 tablets (10 mg) by mouth daily 45 tablet 3     atorvastatin (LIPITOR) 40 MG tablet Take 1 tablet (40 mg) by mouth At Bedtime 90 tablet 3     amitriptyline (ELAVIL) 25 MG tablet Take 1 tablet (25 mg) by mouth At Bedtime 90 tablet 1     venlafaxine (EFFEXOR) 75 MG tablet Take 1 tablet (75 mg) by mouth 2 times daily 180 tablet 1     multivitamin, therapeutic with minerals (MULTI-VITAMIN) TABS Take 1 tablet by mouth daily       Omega-3 Fatty Acids (OMEGA-3 FISH OIL PO) Take 1 g by mouth daily       metoprolol (TOPROL-XL) 50 MG 24 hr tablet Take 1 tablet (50 mg) by mouth daily 90 tablet 3     ACE/ARB NOT PRESCRIBED, INTENTIONAL, ACE & ARB not prescribed due to Symptomatic hypotension not due to excessive diuresis       ASPIRIN NOT PRESCRIBED (INTENTIONAL) Antiplatelet medication not prescribed intentionally due to Current anticoagulant therapy (warfarin/enoxaparin) 0 each 0     albuterol (PROAIR HFA, PROVENTIL HFA, VENTOLIN HFA) 108 (90 BASE) MCG/ACT inhaler Inhale 2 puffs into the lungs every 6 hours as needed for shortness of breath / dyspnea or wheezing       ferrous sulfate (IRON) 325 (65 FE) MG tablet Take 325 mg by mouth daily       VITAMIN D, CHOLECALCIFEROL, PO Take 50,000 Units by mouth Once per week       [DISCONTINUED] warfarin (COUMADIN) 2 MG tablet 6 mg on Thursdays; 4 mg all other days OR as instructed by INR clinic. 180 tablet 0     Allergies   Allergen Reactions     Wasps [Hornets]      Sand wasp --- years ago.         Reviewed PMH, med list and allergies.      ROS  Detailed as above       /74 (BP Location: Right arm, Patient Position: Chair, Cuff Size: Adult Regular)  Pulse 90  Temp 97.3  F (36.3  C) (Oral)  Ht 6' 1\" (1.854 m)  Wt 230 lb (104.3 kg)  SpO2 95%  BMI 30.34 kg/m2      Physical Exam   Constitutional: He is well-developed, well-nourished, and in no distress.   Pulmonary/Chest: Effort normal. "   Musculoskeletal: Normal range of motion.   Enlarged bursa overlying left patella. nontender    Neurological: He is alert.   Skin: Skin is warm and dry. No erythema.   Psychiatric: Mood and affect normal.   Vitals reviewed.      Assessment and Plan:       ICD-10-CM    1. Prepatellar bursitis of left knee M70.42        As he is not having any pain, will continue to watch and wait   He should call with any discomfort and we will then refer to ortho if needed       ALLEN Saldana, CNP  [facility]

## 2017-05-11 ENCOUNTER — OFFICE VISIT (OUTPATIENT)
Dept: CARDIOLOGY | Facility: CLINIC | Age: 77
End: 2017-05-11
Attending: NURSE PRACTITIONER
Payer: MEDICARE

## 2017-05-11 ENCOUNTER — HOSPITAL ENCOUNTER (OUTPATIENT)
Dept: CARDIOLOGY | Facility: CLINIC | Age: 77
Discharge: HOME | End: 2017-05-11
Attending: NURSE PRACTITIONER | Admitting: NURSE PRACTITIONER
Payer: MEDICARE

## 2017-05-11 VITALS
DIASTOLIC BLOOD PRESSURE: 88 MMHG | BODY MASS INDEX: 30.91 KG/M2 | WEIGHT: 233.2 LBS | HEIGHT: 73 IN | SYSTOLIC BLOOD PRESSURE: 128 MMHG | HEART RATE: 82 BPM

## 2017-05-11 DIAGNOSIS — I42.9 CARDIOMYOPATHY (H): ICD-10-CM

## 2017-05-11 DIAGNOSIS — I50.22 CHRONIC SYSTOLIC HEART FAILURE (H): ICD-10-CM

## 2017-05-11 PROCEDURE — 25500064 ZZH RX 255 OP 636: Performed by: NURSE PRACTITIONER

## 2017-05-11 PROCEDURE — 99214 OFFICE O/P EST MOD 30 MIN: CPT | Mod: 25 | Performed by: INTERNAL MEDICINE

## 2017-05-11 PROCEDURE — 93306 TTE W/DOPPLER COMPLETE: CPT | Mod: 26 | Performed by: INTERNAL MEDICINE

## 2017-05-11 PROCEDURE — 40000264 ECHO COMPLETE WITH OPTISON

## 2017-05-11 RX ORDER — METOPROLOL SUCCINATE 50 MG/1
50 TABLET, EXTENDED RELEASE ORAL DAILY
Qty: 90 TABLET | Refills: 3 | Status: SHIPPED | OUTPATIENT
Start: 2017-05-11 | End: 2017-10-18

## 2017-05-11 RX ADMIN — HUMAN ALBUMIN MICROSPHERES AND PERFLUTREN 3 ML: 10; .22 INJECTION, SOLUTION INTRAVENOUS at 08:30

## 2017-05-11 NOTE — LETTER
5/11/2017    Flori Reyes, [facility]   [address]  [facility] [address]    RE: [place]       Dear Colleague,    I had the pleasure of seeing Gibson Villalta in the [facility].    REASON FOR VISIT:  Evaluation of cardiomyopathy and biventricular ICD.      Mr. 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      He informs me that his respiratory symptoms have improved substantially.  He seems to be more  energetic and heart failure seems to be under control.        Most recently he was evaluated by Jinny Adam and there was a question about whether or not he could stop taking Coumadin.  He previously had an LV clot, however, on most recent echo in 10/2016 showed false cord and no presence of LV clot; therefore, there was a question whether or not he could stop anticoagulation.      An echo was repeated yesterday and he was found to have recurrence of LV clot.  I reviewed INR data and he has had several subtherapeutic INRs.  He had a  subtherapeutic in February, March and April.       Outpatient Encounter Prescriptions as of 5/11/2017   Medication Sig Dispense Refill     metoprolol (TOPROL-XL) 50 MG 24 hr tablet Take 1 tablet (50 mg) by mouth daily 90 tablet 3     COENZYME Q-10 PO Take by mouth daily       isosorbide mononitrate (IMDUR) 30 MG 24 hr tablet Take 0.5 tablets (15 mg) by mouth daily 45 tablet 1     hydrALAZINE (APRESOLINE) 10 MG tablet Take 1 tablet (10 mg) by mouth 3 times daily 180 tablet 1     [DISCONTINUED] warfarin (COUMADIN) 2 MG tablet Take 3 tablets (6mg) by mouth Mon, Wed and 2 tablets (4mg) rest of the days of the week or as directed by your INR clinic 180 tablet 0     furosemide (LASIX)  20 MG tablet Take 0.5 tablets (10 mg) by mouth daily 45 tablet 3     atorvastatin (LIPITOR) 40 MG tablet Take 1 tablet (40 mg) by mouth At Bedtime 90 tablet 3     amitriptyline (ELAVIL) 25 MG tablet Take 1 tablet (25 mg) by mouth At Bedtime 90 tablet 1     [DISCONTINUED] venlafaxine (EFFEXOR) 75 MG tablet Take 1 tablet (75 mg) by mouth 2 times daily 180 tablet 1     multivitamin, therapeutic with minerals (MULTI-VITAMIN) TABS Take 1 tablet by mouth daily       Omega-3 Fatty Acids (OMEGA-3 FISH OIL PO) Take 1 g by mouth daily       albuterol (PROAIR HFA, PROVENTIL HFA, VENTOLIN HFA) 108 (90 BASE) MCG/ACT inhaler Inhale 2 puffs into the lungs every 6 hours as needed for shortness of breath / dyspnea or wheezing       ferrous sulfate (IRON) 325 (65 FE) MG tablet Take 325 mg by mouth daily       [DISCONTINUED] VITAMIN D, CHOLECALCIFEROL, PO Take 50,000 Units by mouth Once per week       [DISCONTINUED] metoprolol (TOPROL-XL) 50 MG 24 hr tablet Take 1 tablet (50 mg) by mouth daily 90 tablet 3     ACE/ARB NOT PRESCRIBED, INTENTIONAL, ACE & ARB not prescribed due to Symptomatic hypotension not due to excessive diuresis       ASPIRIN NOT PRESCRIBED (INTENTIONAL) Antiplatelet medication not prescribed intentionally due to Current anticoagulant therapy (warfarin/enoxaparin) 0 each 0     [DISCONTINUED] sodium chloride (PF) 0.9% PF flush 10 mL        No facility-administered encounter medications on file as of 5/11/2017.       ASSESSMENT AND PLAN:   1.  Heart failure secondary to mixed cardiomyopathy.  He responded well to CRT-D implantation.  He experienced significant improvement in overall respiratory/heart failure symptoms.  He is euvolemic on physical exam.  We will continue current medical therapy.     2.  Device care.  Device was interrogated in March.  Lead parameters are stable.  He is biventricular pacing over 95% of the time.   3.  LV clot.  INR was subtherapeutic.  We will check an INR today.  I recommend checking an  INR on a weekly basis for the next month.  If we have trouble keeping his INRs therapeutic we may switch him to a NOAC.  We should repeat an echo in 1 month     4.  Hypertension.  Blood pressure is well controlled.   5.  Acute renal failure/CKD.  Most recent creatinine of 1.46 on 04/11 seems to be stable.     Again, thank you for allowing me to participate in the care of your patient.      Sincerely,    Josias Hernandez MD     [facility]

## 2017-05-11 NOTE — MR AVS SNAPSHOT
After Visit Summary   5/11/2017    Gibson Villalta    MRN: [id number]           Patient Information     Date Of Birth          1940        Visit Information        Provider Department      5/11/2017 10:45 AM Josias Hernandez MD [facility]        Today's Diagnoses     Chronic systolic heart failure (H)        Cardiomyopathy (H)           Follow-ups after your visit        Your next 10 appointments already scheduled     May 11, 2017  2:00 PM CDT   Anticoagulation Visit with ALEJANDRO ANTICOAGULATION   [facility]    [address]  [place] [place] [address] [phone]            May 16, 2017  2:15 PM CDT   Anticoagulation Visit with [facility]AGULATION CLINIC   [facility]    [address]  [place] [address] [phone]            Jun 20, 2017  2:00 PM CDT   Ech Limited with [id number]   [facility] CV Echocardiography ([facility])    [address]  [place] [address] [phone]           1.  Please bring or wear a comfortable two-piece outfit. 2.  You may eat, drink and take your normal medicines. 3.  For any questions that cannot be answered, please contact the ordering physician            Jun 22, 2017  4:30 PM CDT   Remote ICD Check with ALEJANDRO DCR2   [facility] ([facility])    [address]  [place] [address] [phone]           This appointment is for a remote check of your debrillator.  This is not an appointment at the office.              Future tests that were ordered for you today     Open Future Orders        Priority Expected Expires Ordered    INR Routine 5/11/2017 5/11/2019 5/11/2017    Echocardiogram Limited Routine 6/20/2017 5/11/2018 5/11/2017    ECHO COMPLETE WITH OPTISON  "Routine 5/11/2017 4/11/2018 4/11/2017            Who to contact     If you have questions or need follow up information about today's clinic visit or your schedule please contact [facility] [place] directly at [phone].  Normal or non-critical lab and imaging results will be communicated to you by MyChart, letter or phone within 4 business days after the clinic has received the results. If you do not hear from us within 7 days, please contact the clinic through [employer] or phone. If you have a critical or abnormal lab result, we will notify you by phone as soon as possible.  Submit refill requests through [employer] or call your pharmacy and they will forward the refill request to us. Please allow 3 business days for your refill to be completed.          Additional Information About Your Visit        [employer] Information     [employer] gives you secure access to your electronic health record. If you see a primary care provider, you can also send messages to your care team and make appointments. If you have questions, please call your primary care clinic.  If you do not have a primary care provider, please call [phone] and they will assist you.        Care EveryWhere ID     This is your Care EveryWhere ID. This could be used by other organizations to access your [place] medical records  [id number]        Your Vitals Were     Pulse Height BMI (Body Mass Index)             82 1.854 m (6' 1\") 30.77 kg/m2          Blood Pressure from Last 3 Encounters:   05/11/17 128/88   04/25/17 106/74   04/11/17 128/88    Weight from Last 3 Encounters:   05/11/17 105.8 kg (233 lb 3.2 oz)   04/25/17 104.3 kg (230 lb)   04/11/17 104.7 kg (230 lb 12.8 oz)              We Performed the Following     Follow-Up with Electrophysiologist          Where to get your medicines      These medications were sent to [id number] - [place], [place] - [address] [place] [address] " [place], [place] [address]     Phone:  [phone]     metoprolol 50 MG 24 hr tablet          Primary Care Provider Office Phone # Fax #    Flori Reyes [phone]       [facility] [address]  [facility] [address]        Thank you!     Thank you for choosing [facility] [place]  for your care. Our goal is always to provide you with excellent care. Hearing back from our patients is one way we can continue to improve our services. Please take a few minutes to complete the written survey that you may receive in the mail after your visit with us. Thank you!             Your Updated Medication List - Protect others around you: Learn how to safely use, store and throw away your medicines at www.disposemymeds.org.          This list is accurate as of: 5/11/17 11:13 AM.  Always use your most recent med list.                   Brand Name Dispense Instructions for use    ACE/ARB NOT PRESCRIBED (INTENTIONAL)      ACE & ARB not prescribed due to Symptomatic hypotension not due to excessive diuresis       albuterol 108 (90 BASE) MCG/ACT Inhaler    PROAIR HFA/PROVENTIL HFA/VENTOLIN HFA     Inhale 2 puffs into the lungs every 6 hours as needed for shortness of breath / dyspnea or wheezing       amitriptyline 25 MG tablet    ELAVIL    90 tablet    Take 1 tablet (25 mg) by mouth At Bedtime       ASPIRIN NOT PRESCRIBED    INTENTIONAL    0 each    Antiplatelet medication not prescribed intentionally due to Current anticoagulant therapy (warfarin/enoxaparin)       atorvastatin 40 MG tablet    LIPITOR    90 tablet    Take 1 tablet (40 mg) by mouth At Bedtime       COENZYME Q-10 PO      Take by mouth daily       ferrous sulfate 325 (65 FE) MG tablet    IRON     Take 325 mg by mouth daily       furosemide 20 MG tablet    LASIX    45 tablet    Take 0.5 tablets (10 mg) by mouth daily       hydrALAZINE 10 MG tablet    APRESOLINE    180 tablet    Take 1 tablet (10  mg) by mouth 3 times daily       isosorbide mononitrate 30 MG 24 hr tablet    IMDUR    45 tablet    Take 0.5 tablets (15 mg) by mouth daily       metoprolol 50 MG 24 hr tablet    TOPROL-XL    90 tablet    Take 1 tablet (50 mg) by mouth daily       Multi-vitamin Tabs tablet      Take 1 tablet by mouth daily       OMEGA-3 FISH OIL PO      Take 1 g by mouth daily       venlafaxine 75 MG tablet    EFFEXOR    180 tablet    Take 1 tablet (75 mg) by mouth 2 times daily       VITAMIN D (CHOLECALCIFEROL) PO      Take 50,000 Units by mouth Once per week       warfarin 2 MG tablet    COUMADIN    180 tablet    Take 3 tablets (6mg) by mouth Mon, Wed and 2 tablets (4mg) rest of the days of the week or as directed by your INR clinic

## 2017-05-11 NOTE — PROGRESS NOTES
"HPI and Plan:   See dictation  [id number]    Physical Exam:  Vitals: /88  Pulse 82  Ht 1.854 m (6' 1\")  Wt 105.8 kg (233 lb 3.2 oz)  BMI 30.77 kg/m2    Constitutional:  AAO x3.  Pt is in NAD.  HEAD: normocephalic.  SKIN: Skin normal color, texture and turgor with no lesions or eruptions.  Eyes: PERRL, EOMI.  ENT:  Supple, normal JVP. No lymphadenopathy or thyroid enlargement.  Chest:  CTAB.  Cardiac:    RRR, normal  S1 and S2.  No murmurs rubs or gallop.    Abdomen:  Normal BS.  Soft, non-tender and non-distended.  No rebound or guarding.    Extremities:  Pedious pulses palpable B/L.  No LE edema noticed.   Neurological: Strength and sensation grossly symmetric and intact throughout.       CURRENT MEDICATIONS:  Current Outpatient Prescriptions   Medication Sig Dispense Refill     COENZYME Q-10 PO Take by mouth daily       isosorbide mononitrate (IMDUR) 30 MG 24 hr tablet Take 0.5 tablets (15 mg) by mouth daily 45 tablet 1     hydrALAZINE (APRESOLINE) 10 MG tablet Take 1 tablet (10 mg) by mouth 3 times daily 180 tablet 1     warfarin (COUMADIN) 2 MG tablet Take 3 tablets (6mg) by mouth Mon, Wed and 2 tablets (4mg) rest of the days of the week or as directed by your INR clinic 180 tablet 0     furosemide (LASIX) 20 MG tablet Take 0.5 tablets (10 mg) by mouth daily 45 tablet 3     atorvastatin (LIPITOR) 40 MG tablet Take 1 tablet (40 mg) by mouth At Bedtime 90 tablet 3     amitriptyline (ELAVIL) 25 MG tablet Take 1 tablet (25 mg) by mouth At Bedtime 90 tablet 1     venlafaxine (EFFEXOR) 75 MG tablet Take 1 tablet (75 mg) by mouth 2 times daily 180 tablet 1     multivitamin, therapeutic with minerals (MULTI-VITAMIN) TABS Take 1 tablet by mouth daily       Omega-3 Fatty Acids (OMEGA-3 FISH OIL PO) Take 1 g by mouth daily       albuterol (PROAIR HFA, PROVENTIL HFA, VENTOLIN HFA) 108 (90 BASE) MCG/ACT inhaler Inhale 2 puffs into the lungs every 6 hours as needed for shortness of breath / dyspnea or wheezing       " ferrous sulfate (IRON) 325 (65 FE) MG tablet Take 325 mg by mouth daily       VITAMIN D, CHOLECALCIFEROL, PO Take 50,000 Units by mouth Once per week       ACE/ARB NOT PRESCRIBED, INTENTIONAL, ACE & ARB not prescribed due to Symptomatic hypotension not due to excessive diuresis       ASPIRIN NOT PRESCRIBED (INTENTIONAL) Antiplatelet medication not prescribed intentionally due to Current anticoagulant therapy (warfarin/enoxaparin) 0 each 0       ALLERGIES     Allergies   Allergen Reactions     Wasps [Hornets]      Sand wasp --- years ago.         PAST MEDICAL HISTORY:  Past Medical History:   Diagnosis Date     Acute kidney injury (H)      Anemia      Anxiety      BPH (benign prostatic hypertrophy)      Carpal tunnel syndrome     Right     Chronic kidney disease (CKD), stage 3 (moderate)      Dementia      History of depression      LBBB (left bundle branch block)      Lumbar herniated disc     L5-S1     Mixed cardiomyopathy 2016     Myocardial infarction (H)  and      Nonischemic cardiomyopathy (H) 2016     Obesity      Rosacea      Rotator cuff disorder     Tear x 2     RV (right ventricular) mural thrombus (H) 2016       PAST SURGICAL HISTORY:  Past Surgical History:   Procedure Laterality Date     ANGIOPLASTY   and  with stent     GASTRIC BYPASS       HEART CATH LEFT HEART CATH  16    Non ischemic cardiomyopathy; Non functionally significant LAD and RCA disease      OTHER SURGICAL HISTORY      Spinal surgery     OTHER SURGICAL HISTORY  2016    CRT-D implantation       FAMILY HISTORY:  Family History   Problem Relation Age of Onset     Coronary Artery Disease Father 39     Alzheimer Disease Mother      Coronary Artery Disease Son      Two sons have  from MIs (ages 39 and 51)       SOCIAL HISTORY:  Social History     Social History     Marital status:      Spouse name: N/A     Number of children: N/A     Years of education: N/A     Occupational  History     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 No     Special Diet Yes     low fat, low salt      Exercise No     Social History Narrative       Review of Systems:  Skin:  Negative     Eyes:  Positive for glasses  ENT:  Negative    Respiratory:  Negative    Cardiovascular:    Positive for;palpitations  Gastroenterology: Negative    Genitourinary:  Negative    Musculoskeletal:  Negative    Neurologic:  Negative    Psychiatric:  Negative    Heme/Lymph/Imm:  Negative    Endocrine:  Negative        Recent Lab Results:  LIPID RESULTS:  Lab Results   Component Value Date    CHOL 176 02/07/2017    HDL 42 02/07/2017    LDL 93 02/07/2017    TRIG 203 (H) 02/07/2017       LIVER ENZYME RESULTS:  Lab Results   Component Value Date    AST Unsatisfactory specimen - hemolyzed 02/07/2017    ALT 50 02/07/2017       CBC RESULTS:  Lab Results   Component Value Date    WBC 7.8 11/04/2016    RBC 4.26 (L) 11/04/2016    HGB 15.1 02/07/2017    HCT 42.1 11/04/2016    MCV 99 11/04/2016    MCH 32.9 11/04/2016    MCHC 33.3 11/04/2016    RDW 15.0 11/04/2016     11/04/2016       BMP RESULTS:  Lab Results   Component Value Date     04/11/2017    POTASSIUM 3.8 04/11/2017    CHLORIDE 105 04/11/2017    CO2 23 04/11/2017    ANIONGAP 15.8 04/11/2017     (H) 04/11/2017    BUN 16 04/11/2017    CR 1.46 (H) 04/11/2017    GFRESTIMATED 47 (L) 04/11/2017    GFRESTBLACK 57 (L) 04/11/2017    SHELLEY 8.6 04/11/2017        A1C RESULTS:  Lab  Results   Component Value Date    A1C 6.6 (H) 2017       INR RESULTS:  Lab Results   Component Value Date    INR 2.0 (A) 2017    INR 1.9 (A) 2017    INR 2.70 (H) 2016    INR 2.03 (H) 2016         ECHOCARDIOGRAM  Recent Results (from the past 8760 hour(s))   ECHO COMPLETE WITH OPTISON    Narrative    [id number]  [facility]  [id number]  [id number]^KORI^CRISTINA^KATHERINE           [facility]  Echocardiography Laboratory  [address] [place] [address]        Name: ISAIAS DUMONT  MRN: [id number]  : 1940  Study Date: 2017 07:31 AM  Age: 76 yrs  Gender: Male  Patient Location: [facility] [place] [facility]  Reason For Study: Chronic systolic (congestive) heart failure, Cardiomyopathy  Ordering Physician: CRISTINA SHEIKH  Referring Physician: CRISTINA SHEIKH  Performed By: [facility] Gloria aAron     BSA: 2.2 m2  Height: 73 in  Weight: 200 lb  HR: 80  BP: 108/74 mmHg  _____________________________________________________________________________  __     _____________________________________________________________________________  __        Interpretation Summary     There is what appears to be an apical mural thrombus at the distal  inferolateral wall. This was not seen on prior study of 10/19/2016 on  comparison of images. Findings called to Dr. Hernandez's nurse.  Left ventricular systolic function is severely reduced.  There are regional wall motion abnormalities as specified.  The study was technically difficult.  _____________________________________________________________________________  __        Left Ventricle  There is mild-moderate left ventricular dilatation. There is mild to moderate  concentric left ventricular hypertrophy. The visual ejection fraction is  estimated at 25-30%. Left ventricular systolic function is severely reduced. E  by E prime ratio is greater than 15, that likely suggests increased left  ventricular filling pressures. There are regional wall motion abnormalities  as  specified. There is anterior, distal denae-and inferoseptal and apical  akinesis. There is severe hypokinesis of the distal lateral wall and mid-  distal inferolateral walls. These are consistent with infarcts in the LAD and  LCx territories. Flattened septum is consistent with RV pressure/volume  overload. There is what appears to be an apical mural thrombus at the distal  inferolateral wall. This was not seen on prior study of 10/19/2016. Findings  called to Dr. Hernandez's nurse.     Right Ventricle  The right ventricle is mildly dilated. The right ventricle is not well  visualized. Mildly decreased right ventricular systolic function. Cannot  visualize the ventricle well enough to evaluate for thrombus. There is a  pacemaker lead in the right ventricle.     Atria  The left atrium is mildly dilated. The left atrium is not well visualized.  Pacer wire in right atrium. Right atrium not well visualized. Reverberations  are seen from the wire but it is not adequately visualized on available views.  The septum is not well visualized.     Mitral Valve  The mitral valve leaflets appear thickened, but open well. The mitral  regurgitant jet is anteriorly directed, which is consistent with posterior  leaflet pathology. Probably moderate mitral regurgitation by color flow. Jet  may be underestimated on the basis of eccentricity. Appears less prominent  than on prior study of 10/16/2016.        Tricuspid Valve  The tricuspid valve is not well visualized. There is trace to mild tricuspid  regurgitation. Right ventricular systolic pressure could not be approximated  due to inadequate tricuspid regurgitation.     Aortic Valve  The aortic valve is trileaflet. There is mild to moderate (1-2+) aortic  regurgitation. Diastolic flow reversal is seen in the descending thoracic  aorta but do not appear clearly holodiastolic. No hemodynamically significant  valvular aortic stenosis.     Pulmonic Valve  The pulmonic valve is not well  seen, but is grossly normal. The pulmonic valve  is not well visualized. There is no pulmonic valvular regurgitation. Normal  pulmonic valve velocity.     Vessels  Borderline aortic root dilatation. Normal size ascending aorta. Inferior vena  cava not well visualized for estimation of right atrial pressure.     Pericardium  There is no pericardial effusion.        Rhythm  The rhythm was normal sinus.  _____________________________________________________________________________  __  MMode/2D Measurements & Calculations  IVSd: 1.4 cm     LVIDd: 5.9 cm  LVIDs: 5.2 cm  LVPWd: 1.1 cm  FS: 11.2 %  EDV(Teich): 171.7 ml  ESV(Teich): 130.6 ml  LV mass(C)d: 328.4 grams  LV mass(C)dI: 152.6 grams/m2  Ao root diam: 3.7 cm  LA dimension: 4.0 cm  asc Aorta Diam: 3.5 cm  LA/Ao: 1.1  LVOT diam: 2.2 cm  LVOT area: 3.9 cm2        Doppler Measurements & Calculations  MV E max annalisa: 64.1 cm/sec  MV A max annalisa: 90.2 cm/sec  MV E/A: 0.71  MV dec time: 0.20 sec  LV V1 max P.3 mmHg  LV V1 max: 76.2 cm/sec  LV V1 VTI: 14.4 cm  SV(LVOT): 55.9 ml  PA acc time: 0.10 sec  Lateral E/e': 15.9  Medial E/e': 19.0              _____________________________________________________________________________  __        Report approved by: Kathleen Javier 2017 10:16 AM      ECHO COMPLETE WITH OPTISON    Narrative    Interpretation Summary              [facility]  [facility]  Echocardiography Laboratory  [address]  [place] [address]  Phone [phone]  Fax [phone]        Name: ISAIAS DUMONT  MRN: [id number]  : 1940  Study Date: 10/19/2016 03:16 PM  Age: 75 yrs  Gender: Male  Patient Location: [facility]  Reason For Study:     Ordering Physician: AMY MORE  Referring Physician: Flori Reyes  Performed By: Anastasia Dhaliwal RDCS  BSA: 2.2 m2  Height: 73 in  Weight: 210 lb      ______________________________________________________________________________     Procedure  Complete Echo Adult. Contrast Optison.     ______________________________________________________________________________     Interpretation Summary     The visual ejection fraction is estimated at 20-25%.  Left ventricular systolic function is severely reduced.  The right ventricular systolic function is moderate to severely reduced.  There is moderate to mod-severe (2-3+) mitral regurgitation.  Compared to prior study, there is no significant change.  ______________________________________________________________________________           Left Ventricle  The left ventricle is moderately dilated. The visual ejection fraction is  estimated at 20-25%. Left ventricular systolic function is severely reduced.  E by E prime ratio is greater than 15, that likely suggests increased left  ventricular filling pressures. The mid to basal anterior wall, the basal  anterolateral wall, the basal inferior and basal inferolateral wall contracts  normally. The remaining walls are esentially akinetic. A false chord is noted  (normal variant).  Right Ventricle  The right ventricle is normal size. The right ventricular systolic function  is moderate to severely reduced.     Atria  The left atrium is mildly dilated. Right atrial size is normal. There is no  color Doppler evidence of an atrial shunt.     Mitral Valve  There is moderate to mod-severe (2-3+) mitral regurgitation.     Tricuspid Valve  There is trace tricuspid regurgitation. The right ventricular systolic  pressure is approximated at 33.8 mmHg plus the right atrial pressure.     Aortic Valve  The aortic valve is trileaflet. There is trace aortic regurgitation. No  hemodynamically significant valvular aortic stenosis.     Pulmonic Valve  Normal pulmonic valve velocity.  Vessels  The ascending aorta is Mildly dilated. The inferior vena cava is not dilated.     Pericardium  There is  no pericardial effusion.     Rhythm  The rhythm appears to be sinus rhythm on the EKG tracing . However no  distinct A wave can be seen on the mitral inflow nor tissue Doppler analysis.  Clinical correlation required.  ______________________________________________________________________________     MMode/2D Measurements & Calculations  IVSd: 0.99 cm  LVIDd: 6.5 cm  LVIDs: 5.9 cm  LVPWd: 0.79 cm  FS: 10.0 %  EDV(Teich): 218.9 ml  ESV(Teich): 172.0 ml  LV mass(C)d: 245.9 grams  asc Aorta Diam: 3.9 cm  LA Volume (BP): 78.6 ml  LA Volume Index (BP): 35.7 ml/m2           Doppler Measurements & Calculations  MV E max annalisa: 137.3 cm/sec  MV dec time: 0.13 sec  AI P1/2t: 666.8 msec  MR ERO: 0.16 cm2  MR volume: 25.8 ml  PA acc time: 0.05 sec  TR max annalisa: 290.7 cm/sec  TR max P.8 mmHg  Medial E/e': 21.0              ______________________________________________________________________________        Report approved by: Kathleen Mueller 10/19/2016 04:39 PM      ECHO COMPLETE WITH OPTISON    Narrative    Interpretation Summary                    [facility]  Echocardiography Laboratory  [address] [place] [address]        Name: ISAIAS DUMONT  MRN: [id number]  : 1940  Study Date: 2016 09:17 AM  Age: 75 yrs  Gender: Male  Patient Location: [facility]  Reason For Study: Syncope  Ordering Physician: JOSE MARTINEZ  Performed By: Flori Forman RDCS     BSA: 2.1 m2  Height: 73 in  Weight: 190 lb  HR: 85  BP: 114/91 mmHg  ______________________________________________________________________________        Procedure  Complete Portable Echo Adult. Contrast Optison.  ______________________________________________________________________________     Interpretation Summary     Left ventricular systolic function is severely reduced.  The visual ejection fraction is estimated at <20%.  The right ventricular systolic function is moderately reduced.  There is moderate to  mod-severe (2-3+) mitral regurgitation.  There is no comparison study available. The study was technically adequate.  ______________________________________________________________________________           Left Ventricle  The left ventricle is moderately dilated. There is mild concentric left  ventricular hypertrophy. Left ventricular systolic function is severely  reduced. The visual ejection fraction is estimated at <20%. Left ventricular  diastolic function is indeterminent. There is apical akinesis. Distal  anterior/anteroseptal and inferior akinesis. Best thickening segments are mid  and basal inferolateral, lateral and antererior. There is severe global  hypokinesia of the left ventricle. There is no thrombus seen in the left  ventricle.     Right Ventricle  The right ventricle is moderately dilated. The right ventricular systolic  function is moderately reduced.  Atria  The left atrium is moderately dilated. The right atrium is moderate to  severely dilated. There is no color Doppler evidence of an atrial shunt.     Mitral Valve  There is no evidence of mitral valve prolapse. MV leaflet motion is tethered  with apical displacement of papillary muscles and increased tenting of MV  apparatus. There is moderate to mod-severe (2-3+) mitral regurgitation.     Tricuspid Valve  There is mild (1+) tricuspid regurgitation. The right ventricular systolic  pressure is approximated at 40.8 mmHg plus the right atrial pressure. Dilated  IVC (>2.5cm) with no respiratory collapse; right atrial pressure is estimated  at >20mmHg. Right ventricular systolic pressure is elevated, consistent with  moderate to severe pulmonary hypertension.     Aortic Valve  The aortic valve is trileaflet. There is mild trileaflet aortic sclerosis.  There is mild to moderate (1-2+) aortic regurgitation. No aortic stenosis is  present.     Pulmonic Valve  Normal pulmonic valve.     Vessels  The ascending aorta is Borderline  dilated.  Pericardium  There is no pericardial effusion.     Rhythm  The rhythm was atrial fibrillation. BBB noted.     ______________________________________________________________________________  MMode/2D Measurements & Calculations  IVSd: 1.2 cm  LVIDd: 6.5 cm  LVIDs: 5.8 cm  LVPWd: 1.0 cm  FS: 10.8 %  EDV(Teich): 213.2 ml  ESV(Teich): 164.1 ml  LV mass(C)d: 318.8 grams  Ao root diam: 3.2 cm  LA dimension: 4.8 cm  asc Aorta Diam: 3.9 cm  LA/Ao: 1.5     EF(MOD-bp): 24.6 %  LA Volume (BP): 81.6 ml     LA Volume Index (BP): 38.7 ml/m2        Doppler Measurements & Calculations  MV E max annalisa: 124.8 cm/sec  MV dec time: 0.18 sec  MR ERO: 0.18 cm2  MR volume: 27.2 ml  TR max annalisa: 319.3 cm/sec  TR max P.8 mmHg  Lateral E/e': 12.3  Medial E/e': 28.7              ______________________________________________________________________________        Report approved by: Kathleen Potter 2016 11:56 AM            Orders Placed This Encounter   Procedures     INR     Holter Monitor 48 hour - Adult     Echocardiogram Limited     No orders of the defined types were placed in this encounter.    Medications Discontinued During This Encounter   Medication Reason     metoprolol (TOPROL-XL) 50 MG 24 hr tablet          Encounter Diagnoses   Name Primary?     Chronic systolic heart failure (H)      Cardiomyopathy (H)          CC  Jinny Adam, APRN [facility]  [address]  [place] [id number]

## 2017-05-12 ENCOUNTER — ANTICOAGULATION THERAPY VISIT (OUTPATIENT)
Dept: NURSING | Facility: CLINIC | Age: 77
End: 2017-05-12
Payer: MEDICARE

## 2017-05-12 DIAGNOSIS — Z79.01 LONG-TERM (CURRENT) USE OF ANTICOAGULANTS: ICD-10-CM

## 2017-05-12 LAB — INR POINT OF CARE: 2.3 (ref 0.86–1.14)

## 2017-05-12 PROCEDURE — 99207 ZZC NO CHARGE NURSE ONLY: CPT

## 2017-05-12 PROCEDURE — 36416 COLLJ CAPILLARY BLOOD SPEC: CPT

## 2017-05-12 PROCEDURE — 85610 PROTHROMBIN TIME: CPT | Mod: QW

## 2017-05-12 NOTE — MR AVS SNAPSHOT
Gibson Villalta   5/12/2017 9:00 AM   Anticoagulation Therapy Visit    Description:  76 year old male   Provider:   ANTICOAGULATION CLINIC   Department:   Nurse           INR as of 5/12/2017     Today's INR 2.3      Anticoagulation Summary as of 5/12/2017     INR goal 2.0-3.0   Today's INR 2.3   Full instructions 4 mg on Mon, Wed, Fri; 6 mg all other days   Next INR check     Indications   Long-term (current) use of anticoagulants [Z79.01] [Z79.01]         Your next Anticoagulation Clinic appointment(s)     May 22, 2017  2:45 PM CDT   [facility]  [facility]    [address] [facility] [id number] [phone]              Contact Numbers     Clinic Number:         May 2017 Details    Sun Mon Tue Wed Thu Fri Sat      1               2               3               4               5               6                 7               8               9               10               11               12      4 mg   See details      13      6 mg           14      6 mg         15      4 mg         16      6 mg         17      4 mg         18      6 mg         19      4 mg         20      6 mg           21      6 mg         22      4 mg         23      6 mg         24      4 mg         25      6 mg         26      4 mg         27      6 mg           28      6 mg         29      4 mg         30      6 mg         31      4 mg             Date Details   05/12 This INR check      Date of next INR: No date specified         How to take your warfarin dose     To take:  4 mg Take 4 of the 1 mg tablets.    To take:  6 mg Take 6 of the 1 mg tablets.

## 2017-05-12 NOTE — PROGRESS NOTES
"  ANTICOAGULATION FOLLOW-UP CLINIC VISIT    Patient Name:  Gibson Villalta  Date:  5/12/2017  Contact Type:  Face to Face    SUBJECTIVE:     Patient Findings     Positives No Problem Findings           OBJECTIVE    INR Protime   Date Value Ref Range Status   05/12/2017 2.3 (A) 0.86 - 1.14 Final       ASSESSMENT / PLAN  INR assessment THER    Recheck INR In: 10 DAYS    INR Location Clinic      Anticoagulation Summary as of 5/12/2017     INR goal 2.0-3.0   Today's INR 2.3   Maintenance plan 4 mg (1 mg x 4) on Mon, Wed, Fri; 6 mg (1 mg x 6) all other days   Full instructions 4 mg on Mon, Wed, Fri; 6 mg all other days   Weekly total 36 mg   Plan last modified Lesia Hallman RN (3/14/2017)   Next INR check    Priority INR   Target end date     Indications   Long-term (current) use of anticoagulants [Z79.01] [Z79.01]         Anticoagulation Episode Summary     INR check location     Preferred lab     Send INR reminders to [facility] ANTICOAGULATION    Comments       Anticoagulation Care Providers     Provider Role Specialty Phone number    Flori Reyes Josiane,  [facility] [phone]            See the Encounter Report to view Anticoagulation Flowsheet and Dosing Calendar (Go to Encounters tab in chart review, and find the Anticoagulation Therapy Visit)    Dosage adjustment made based on physician directed care plan.  Continue same dosing and recheck 10 days.  Wife states the cardiologist prefers \"more frequent INR checks.\"    Julia Burden RN               "

## 2017-05-12 NOTE — PROGRESS NOTES
REASON FOR VISIT:  Evaluation of cardiomyopathy and biventricular ICD.      HISTORY OF PRESENT ILLNESS:  Mr. 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      He informs me that his respiratory symptoms have improved substantially.  He seems to be more  energetic and heart failure seems to be under control.        Most recently he was evaluated by Jinny Adam and there was a question about whether or not he could stop taking Coumadin.  He previously had an LV clot, however, on most recent echo in 10/2016 showed false cord and no presence of LV clot; therefore, there was a question whether or not he could stop anticoagulation.      An echo was repeated yesterday and he was found to have recurrence of LV clot.  I reviewed INR data and he has had several subtherapeutic INRs.  He had a  subtherapeutic in February, March and April.        ASSESSMENT AND PLAN:   1.  Heart failure secondary to mixed cardiomyopathy.  He responded well to CRT-D implantation.  He experienced significant improvement in overall respiratory/heart failure symptoms.  He is euvolemic on physical exam.  We will continue current medical therapy.     2.  Device care.  Device was interrogated in March.  Lead parameters are stable.  He is biventricular pacing over 95% of the time.   3.  LV clot.  INR was subtherapeutic.  We will check an INR today.  I recommend checking an INR on a weekly basis for the next month.  If we have trouble keeping his INRs therapeutic we may switch him to a NOAC.  We should repeat an echo in 1 month     4.  Hypertension.  Blood pressure is well controlled.   5.  Acute renal failure/CKD.  Most recent creatinine of 1.46 on 04/11 seems to be stable.           AMY MORE MD              D: 2017 11:12   T: 2017 08:05   MT: IKER      Name:     ISAIAS DUMONT   MRN:      [id number]        Account:      [id number]   :      1940           Service Date: 2017      Document: [id number]

## 2017-05-19 DIAGNOSIS — Z79.01 LONG-TERM (CURRENT) USE OF ANTICOAGULANTS: ICD-10-CM

## 2017-05-19 DIAGNOSIS — I51.3 RV (RIGHT VENTRICULAR) MURAL THROMBUS: Chronic | ICD-10-CM

## 2017-05-19 NOTE — TELEPHONE ENCOUNTER
Pending Prescriptions:                       Disp   Refills    warfarin (COUMADIN) 2 MG tablet [Pharmacy*180 ta*0            Sig: TAKE 3 TABLETS (6MG) BY MOUTH ON MON & WED. AND           TAKE 2 TABLETS (4MG) THE REST OF THE DAYS OF THE           WEEK OR AS DIRECTED BY INR CLINIC        Last Written Prescription Date: 3/14/17  Last Fill Qty: 180, # refills: 0  Last Office Visit with [facility], [facility] or [facility] prescribing provider: 4/25/17 Giovana, 1/2/17 Reyes       Date and Result of Last PT/INR:   Lab Results   Component Value Date    INR 2.3 05/12/2017    INR 2.0 04/25/2017    INR 2.70 11/05/2016    INR 2.03 11/04/2016          RT Justin(R)

## 2017-05-22 ENCOUNTER — ANTICOAGULATION THERAPY VISIT (OUTPATIENT)
Dept: NURSING | Facility: CLINIC | Age: 77
End: 2017-05-22
Payer: MEDICARE

## 2017-05-22 DIAGNOSIS — Z79.01 LONG-TERM (CURRENT) USE OF ANTICOAGULANTS: ICD-10-CM

## 2017-05-22 LAB — INR POINT OF CARE: 2.1 (ref 0.86–1.14)

## 2017-05-22 PROCEDURE — 85610 PROTHROMBIN TIME: CPT | Mod: QW

## 2017-05-22 PROCEDURE — 36416 COLLJ CAPILLARY BLOOD SPEC: CPT

## 2017-05-22 PROCEDURE — 99207 ZZC NO CHARGE NURSE ONLY: CPT

## 2017-05-22 RX ORDER — WARFARIN SODIUM 2 MG/1
TABLET ORAL
Qty: 180 TABLET | Refills: 0 | Status: SHIPPED | OUTPATIENT
Start: 2017-05-22 | End: 2017-07-24

## 2017-05-22 NOTE — PROGRESS NOTES
ANTICOAGULATION FOLLOW-UP CLINIC VISIT    Patient Name:  Gibson Villalta  Date:  5/22/2017  Contact Type:  Face to Face    SUBJECTIVE:     Patient Findings     Comments Appointment with Dr. Hernandez 5/11/17 results explaining recurrence of LV clot:     He previously had an LV clot, however, on most recent echo in 10/2016 showed false cord and no presence of LV clot; therefore, there was a question whether or not he could stop anticoagulation. The echo was repeated yesterday and he was found to have recurrence of LV clot.       I reviewed his INRs and he has had several INRs that have been subtherapeutic. He was subtherapeutic in February, March and April.   3.  He had recurrence of clot.  INR was subtherapeutic.  We will check an INR today.  I recommend checking an INR on a weekly basis for the next month.  If we have trouble keeping his INRs therapeutic we may switch him to a NOAC.       I have increased his dose to try and keep him in the upper half of his range and we are to check him every week for a month as above.           OBJECTIVE    INR Protime   Date Value Ref Range Status   05/22/2017 2.1 (A) 0.86 - 1.14 Final       ASSESSMENT / PLAN  INR assessment THER    Recheck INR In: 8 DAYS    INR Location Clinic      Anticoagulation Summary as of 5/22/2017     INR goal 2.0-3.0   Today's INR 2.1   Maintenance plan 4 mg (2 mg x 2) on Sun; 6 mg (2 mg x 3) all other days   Full instructions 4 mg on Sun; 6 mg all other days   Weekly total 40 mg   Plan last modified Anne Joseph RN (5/22/2017)   Next INR check 5/30/2017   Priority INR   Target end date     Indications   Long-term (current) use of anticoagulants [Z79.01] [Z79.01]         Anticoagulation Episode Summary     INR check location     Preferred lab     Send INR reminders to  ANTICOAGULATION    Comments       Anticoagulation Care Providers     Provider Role Specialty Phone number    Flori Reyes DO [facility] Internal Medicine [phone]             See the Encounter Report to view Anticoagulation Flowsheet and Dosing Calendar (Go to Encounters tab in chart review, and find the Anticoagulation Therapy Visit)    Dosage adjustment made based on physician directed care plan.    Anne Joseph RN

## 2017-05-22 NOTE — MR AVS SNAPSHOT
Gibson Villalta   5/22/2017 2:45 PM   Anticoagulation Therapy Visit    Description:  76 year old male   Provider:   ANTICOAGULATION CLINIC   Department:   Nurse           INR as of 5/22/2017     Today's INR 2.1      Anticoagulation Summary as of 5/22/2017     INR goal 2.0-3.0   Today's INR 2.1   Full instructions 4 mg on Sun; 6 mg all other days   Next INR check 5/30/2017    Indications   Long-term (current) use of anticoagulants [Z79.01] [Z79.01]         Your next Anticoagulation Clinic appointment(s)     May 30, 2017  3:00 PM CDT   Anticoagulation Visit with  ANTICOAGULATION CLINIC   [facility] ([facility])    [address]  [place] [address]   [phone]              Contact Numbers     Clinic Number:         May 2017 Details    Sun Mon Tue Wed Thu Fri Sat      1               2               3               4               5               6                 7               8               9               10               11               12               13                 14               15               16               17               18               19               20                 21               22      6 mg   See details      23      6 mg         24      6 mg         25      6 mg         26      6 mg         27      6 mg           28      4 mg         29      6 mg         30            31                   Date Details   05/22 This INR check       Date of next INR:  5/30/2017         How to take your warfarin dose     To take:  4 mg Take 2 of the 2 mg tablets.    To take:  6 mg Take 3 of the 2 mg tablets.

## 2017-05-22 NOTE — TELEPHONE ENCOUNTER
Prescription approved per [facility] Refill Protocol.  Lesia Hallman RN

## 2017-05-30 ENCOUNTER — ANTICOAGULATION THERAPY VISIT (OUTPATIENT)
Dept: NURSING | Facility: CLINIC | Age: 77
End: 2017-05-30
Payer: MEDICARE

## 2017-05-30 DIAGNOSIS — Z79.01 LONG-TERM (CURRENT) USE OF ANTICOAGULANTS: ICD-10-CM

## 2017-05-30 LAB — INR POINT OF CARE: 2.8 (ref 0.86–1.14)

## 2017-05-30 PROCEDURE — 85610 PROTHROMBIN TIME: CPT | Mod: QW

## 2017-05-30 PROCEDURE — 99207 ZZC NO CHARGE NURSE ONLY: CPT

## 2017-05-30 PROCEDURE — 36416 COLLJ CAPILLARY BLOOD SPEC: CPT

## 2017-05-30 NOTE — PROGRESS NOTES
ANTICOAGULATION FOLLOW-UP CLINIC VISIT    Patient Name:  Gibson Villalta  Date:  5/30/2017  Contact Type:  Face to Face    SUBJECTIVE:     Patient Findings     Positives No Problem Findings           OBJECTIVE    INR Protime   Date Value Ref Range Status   05/30/2017 2.8 (A) 0.86 - 1.14 Final       ASSESSMENT / PLAN  INR assessment THER    Recheck INR In: 1 WEEK    INR Location Clinic      Anticoagulation Summary as of 5/30/2017     INR goal 2.0-3.0   Today's INR 2.8   Maintenance plan 4 mg (2 mg x 2) on Sun; 6 mg (2 mg x 3) all other days   Full instructions 4 mg on Sun; 6 mg all other days   Weekly total 40 mg   No change documented Anne Joseph RN   Plan last modified Anne Joseph RN (5/22/2017)   Next INR check    Priority INR   Target end date     Indications   Long-term (current) use of anticoagulants [Z79.01] [Z79.01]         Anticoagulation Episode Summary     INR check location     Preferred lab     Send INR reminders to [facility] ANTICOAGULATION    Comments       Anticoagulation Care Providers     Provider Role Specialty Phone number    Flori Reyes DO [facility] Internal Medicine [phone]            See the Encounter Report to view Anticoagulation Flowsheet and Dosing Calendar (Go to Encounters tab in chart review, and find the Anticoagulation Therapy Visit)    Dosage adjustment made based on physician directed care plan.    Anne Joseph RN

## 2017-05-30 NOTE — MR AVS SNAPSHOT
Gibson Villalta   5/30/2017 3:00 PM   Anticoagulation Therapy Visit    Description:  76 year old male   Provider:   ANTICOAGULATION CLINIC   Department:   Nurse           INR as of 5/30/2017     Today's INR 2.8      Anticoagulation Summary as of 5/30/2017     INR goal 2.0-3.0   Today's INR 2.8   Full instructions 4 mg on Sun; 6 mg all other days   Next INR check 6/6/2017    Indications   Long-term (current) use of anticoagulants [Z79.01] [Z79.01]         Your next Anticoagulation Clinic appointment(s)     Jun 06, 2017  1:45 PM CDT   Anticoagulation Visit with  ANTICOAGULATION CLINIC   [facility] ([facility])    [address]  [place] [address] [phone]              Contact Numbers     Clinic Number:         May 2017 Details    Sun Mon Tue Wed Thu Fri Sat      1               2               3               4               5               6                 7               8               9               10               11               12               13                 14               15               16               17               18               19               20                 21               22               23               24               25               26               27                 28               29               30      6 mg   See details      31      6 mg             Date Details   05/30 This INR check               How to take your warfarin dose     To take:  6 mg Take 3 of the 2 mg tablets.           June 2017 Details    Sun Mon Tue Wed Thu Fri Sat         1      6 mg         2      6 mg         3      6 mg           4      4 mg         5      6 mg         6            7               8               9               10                 11               12               13               14               15               16               17                 18               19               20               21               22                23               24                 25               26               27               28               29               30                 Date Details   No additional details    Date of next INR:  6/6/2017         How to take your warfarin dose     To take:  4 mg Take 2 of the 2 mg tablets.    To take:  6 mg Take 3 of the 2 mg tablets.

## 2017-06-06 ENCOUNTER — ANTICOAGULATION THERAPY VISIT (OUTPATIENT)
Dept: NURSING | Facility: CLINIC | Age: 77
End: 2017-06-06
Payer: MEDICARE

## 2017-06-06 DIAGNOSIS — Z79.01 LONG-TERM (CURRENT) USE OF ANTICOAGULANTS: ICD-10-CM

## 2017-06-06 LAB — INR POINT OF CARE: 2.6 (ref 0.86–1.14)

## 2017-06-06 PROCEDURE — 99207 ZZC NO CHARGE NURSE ONLY: CPT

## 2017-06-06 PROCEDURE — 36416 COLLJ CAPILLARY BLOOD SPEC: CPT

## 2017-06-06 PROCEDURE — 85610 PROTHROMBIN TIME: CPT | Mod: QW

## 2017-06-06 NOTE — MR AVS SNAPSHOT
Gibson Villalta   6/6/2017 1:45 PM   Anticoagulation Therapy Visit    Description:  76 year old male   Provider:   ANTICOAGULATION CLINIC   Department:   Nurse           INR as of 6/6/2017     Today's INR 2.6      Anticoagulation Summary as of 6/6/2017     INR goal 2.0-3.0   Today's INR 2.6   Full instructions 4 mg on Sun; 6 mg all other days   Next INR check 6/19/2017    Indications   Long-term (current) use of anticoagulants [Z79.01] [Z79.01]         Your next Anticoagulation Clinic appointment(s)     Jun 19, 2017  2:15 PM CDT   Anticoagulation Visit with  ANTICOAGULATION CLINIC   [facility]    [address]  [place] [address] [phone]              Contact Numbers     Clinic Number:         June 2017 Details    Sun Mon Tue Wed Thu Fri Sat         1               2               3                 4               5               6      6 mg   See details      7      6 mg         8      6 mg         9      6 mg         10      6 mg           11      4 mg         12      6 mg         13      6 mg         14      6 mg         15      6 mg         16      6 mg         17      6 mg           18      4 mg         19            20               21               22               23               24                 25               26               27               28               29               30                 Date Details   06/06 This INR check       Date of next INR:  6/19/2017         How to take your warfarin dose     To take:  4 mg Take 2 of the 2 mg tablets.    To take:  6 mg Take 3 of the 2 mg tablets.

## 2017-06-06 NOTE — PROGRESS NOTES
ANTICOAGULATION FOLLOW-UP CLINIC VISIT    Patient Name:  Gibson Villalta  Date:  6/6/2017  Contact Type:  Face to Face    SUBJECTIVE:     Patient Findings     Positives No Problem Findings    Comments Patient requiring close monitoring per Cardiologist Dr. Hernandez due to recent clot formation.  Patient will be out of town from 6/11/17-6/18/17. Will recheck INR on 6/19/17. Patient has Echo 6/20/17             OBJECTIVE    INR Protime   Date Value Ref Range Status   06/06/2017 2.6 (A) 0.86 - 1.14 Final       ASSESSMENT / PLAN  INR assessment THER    Recheck INR In: 2 WEEKS    INR Location Clinic      Anticoagulation Summary as of 6/6/2017     INR goal 2.0-3.0   Today's INR 2.6   Maintenance plan 4 mg (2 mg x 2) on Sun; 6 mg (2 mg x 3) all other days   Full instructions 4 mg on Sun; 6 mg all other days   Weekly total 40 mg   No change documented Lesia Hallman RN   Plan last modified Anne Joseph RN (5/22/2017)   Next INR check 6/19/2017   Priority INR   Target end date     Indications   Long-term (current) use of anticoagulants [Z79.01] [Z79.01]         Anticoagulation Episode Summary     INR check location     Preferred lab     Send INR reminders to [facility] ANTICOAGULATION    Comments       Anticoagulation Care Providers     Provider Role Specialty Phone number    Flori ReyesDO [facility] Internal Medicine [phone]            See the Encounter Report to view Anticoagulation Flowsheet and Dosing Calendar (Go to Encounters tab in chart review, and find the Anticoagulation Therapy Visit)    Dosage adjustment made based on physician directed care plan.  Patient here with wife.   Patient is to have INR checked on weekly basis due to recent clot formation per Dr. Hernandez Cardiologist.   Patient's INR is therapeutic today. Will recheck on 6/19/17 when he returns from being out of town from 6/11-6/18.   Patient has echo scheduled 6/20/17.    Lesia Hallman RN

## 2017-06-19 ENCOUNTER — ANTICOAGULATION THERAPY VISIT (OUTPATIENT)
Dept: NURSING | Facility: CLINIC | Age: 77
End: 2017-06-19
Payer: MEDICARE

## 2017-06-19 DIAGNOSIS — Z79.01 LONG-TERM (CURRENT) USE OF ANTICOAGULANTS: ICD-10-CM

## 2017-06-19 LAB — INR POINT OF CARE: 3.9 (ref 0.86–1.14)

## 2017-06-19 PROCEDURE — 36416 COLLJ CAPILLARY BLOOD SPEC: CPT

## 2017-06-19 PROCEDURE — 85610 PROTHROMBIN TIME: CPT | Mod: QW

## 2017-06-19 PROCEDURE — 99207 ZZC NO CHARGE NURSE ONLY: CPT

## 2017-06-19 NOTE — PROGRESS NOTES
ANTICOAGULATION FOLLOW-UP CLINIC VISIT    Patient Name:  Gibson Villalta  Date:  6/19/2017  Contact Type:  Face to Face    SUBJECTIVE:        OBJECTIVE    INR Protime   Date Value Ref Range Status   06/19/2017 3.9 (A) 0.86 - 1.14 Final       ASSESSMENT / PLAN  INR assessment SUPRA    Recheck INR In: 2 WEEKS    INR Location Clinic      Anticoagulation Summary as of 6/19/2017     INR goal 2.0-3.0   Today's INR 3.9!   Maintenance plan 4 mg (2 mg x 2) on Sun; 6 mg (2 mg x 3) all other days   Full instructions 6/19: Hold; Otherwise 4 mg on Sun; 6 mg all other days   Weekly total 40 mg   Plan last modified Anne Joseph RN (5/22/2017)   Next INR check 7/3/2017   Priority INR   Target end date     Indications   Long-term (current) use of anticoagulants [Z79.01] [Z79.01]         Anticoagulation Episode Summary     INR check location     Preferred lab     Send INR reminders to [facility] ANTICOAGULATION    Comments       Anticoagulation Care Providers     Provider Role Specialty Phone number    Reyes Flori DO [facility] Internal Medicine [phone]            See the Encounter Report to view Anticoagulation Flowsheet and Dosing Calendar (Go to Encounters tab in chart review, and find the Anticoagulation Therapy Visit)    Patient is here with his wife.  Denies any changes with diet, exercise, medications.  Patient was on vacation last week; some dietary changes. Dosing based on [facility] Protocol and Provider directed care plan.      Rachel Mckeon RN

## 2017-06-19 NOTE — MR AVS SNAPSHOT
Gibson Villalta   6/19/2017 2:15 PM   Anticoagulation Therapy Visit    Description:  76 year old male   Provider:   ANTICOAGULATION CLINIC   Department:   Nurse           INR as of 6/19/2017     Today's INR 3.9!      Anticoagulation Summary as of 6/19/2017     INR goal 2.0-3.0   Today's INR 3.9!   Full instructions 6/19: Hold; Otherwise 4 mg on Sun; 6 mg all other days   Next INR check 7/3/2017    Indications   Long-term (current) use of anticoagulants [Z79.01] [Z79.01]         Your next Anticoagulation Clinic appointment(s)     Jul 03, 2017  1:30 PM CDT   Anticoagulation [facility] with  [facility] CLINIC   [facility]    [address]  [place] [address] [phone]              Contact Numbers     Clinic Number:         June 2017 Details    Sun Mon Tue Wed Thu Fri Sat         1               2               3                 4               5               6               7               8               9               10                 11               12               13               14               15               16               17                 18               19      Hold   See details      20      6 mg         21      6 mg         22      6 mg         23      6 mg         24      6 mg           25      4 mg         26      6 mg         27      6 mg         28      6 mg         29      6 mg         30      6 mg           Date Details   06/19 This INR check               How to take your warfarin dose     To take:  4 mg Take 2 of the 2 mg tablets.    To take:  6 mg Take 3 of the 2 mg tablets.    Hold Do not take your warfarin dose. See the Details table to the right for additional instructions.                July 2017 Details    Sun Mon Tue Wed Thu Fri Sat           1      6 mg           2      4 mg         3            4               5               6               7               8                 9               10               11                12               13               14               15                 16               17               18               19               20               21               22                 23               24               25               26               27               28               29                 30               31                     Date Details   No additional details    Date of next INR:  7/3/2017         How to take your warfarin dose     To take:  4 mg Take 2 of the 2 mg tablets.    To take:  6 mg Take 3 of the 2 mg tablets.

## 2017-06-20 ENCOUNTER — HOSPITAL ENCOUNTER (OUTPATIENT)
Dept: CARDIOLOGY | Facility: CLINIC | Age: 77
Discharge: HOME | End: 2017-06-20
Attending: INTERNAL MEDICINE | Admitting: INTERNAL MEDICINE
Payer: MEDICARE

## 2017-06-20 DIAGNOSIS — I42.9 CARDIOMYOPATHY (H): ICD-10-CM

## 2017-06-20 PROCEDURE — 93321 DOPPLER ECHO F-UP/LMTD STD: CPT | Mod: 26 | Performed by: INTERNAL MEDICINE

## 2017-06-20 PROCEDURE — 25500064 ZZH RX 255 OP 636: Performed by: INTERNAL MEDICINE

## 2017-06-20 PROCEDURE — 93308 TTE F-UP OR LMTD: CPT

## 2017-06-20 PROCEDURE — 93325 DOPPLER ECHO COLOR FLOW MAPG: CPT | Mod: 26 | Performed by: INTERNAL MEDICINE

## 2017-06-20 PROCEDURE — 93308 TTE F-UP OR LMTD: CPT | Mod: 26 | Performed by: INTERNAL MEDICINE

## 2017-06-20 PROCEDURE — 40000264 ECHO LIMITED WITH LUMASON

## 2017-06-20 RX ADMIN — SULFUR HEXAFLUORIDE 2 ML: KIT at 14:45

## 2017-06-22 ENCOUNTER — ALLIED HEALTH/NURSE VISIT (OUTPATIENT)
Dept: CARDIOLOGY | Facility: CLINIC | Age: 77
End: 2017-06-22
Payer: MEDICARE

## 2017-06-22 DIAGNOSIS — Z95.810 ICD (IMPLANTABLE CARDIOVERTER-DEFIBRILLATOR) IN PLACE: Primary | ICD-10-CM

## 2017-06-22 PROCEDURE — 93295 DEV INTERROG REMOTE 1/2/MLT: CPT | Performed by: INTERNAL MEDICINE

## 2017-06-22 PROCEDURE — 93296 REM INTERROG EVL PM/IDS: CPT | Performed by: INTERNAL MEDICINE

## 2017-06-22 NOTE — MR AVS SNAPSHOT
After Visit Summary   6/22/2017    Gibson Villalta    MRN: [id number]           Patient Information     Date Of Birth          1940        Visit Information        Provider Department      6/22/2017 4:30 PM ALEJANDRO DCR2 [facility]        Today's Diagnoses     ICD (implantable cardioverter-defibrillator) in place    -  1       Follow-ups after your visit        Your next 10 appointments already scheduled     Jun 22, 2017  4:30 PM CDT   Remote ICD Check with ALEJANDRO DCR2   [facility] ([facility])    [address]  [place] [address]   [phone]           This appointment is for a remote check of your debrillator.  This is not an appointment at the office.            Jul 03, 2017  1:30 PM CDT   Anticoagulation Visit with  ANTICOAGULATION CLINIC   [facility] ([facility])    [address]  [place] [address]   [phone]            Oct 05, 2017  4:30 PM CDT   Remote ICD Check with ALEJANDRO DCR2   [facility] ([facility])    [address]  [place] [address]   [phone]           This appointment is for a remote check of your debrillator.  This is not an appointment at the office.              Who to contact     If you have questions or need follow up information about today's clinic visit or your schedule please contact [facility] directly at [phone].  Normal or non-critical lab and imaging results will be communicated to you by MyChart, letter or phone within 4 business days after the clinic has received the results. If you do not hear from us within 7 days, please contact the clinic through Sp[employer] or phone. If you have a critical or abnormal lab result, we will notify you by phone as soon as possible.  Submit refill requests through LinQpayt or call  your pharmacy and they will forward the refill request to us. Please allow 3 business days for your refill to be completed.          Additional Information About Your Visit        MyChart Information     [facility] gives you secure access to your electronic health record. If you see a primary care provider, you can also send messages to your care team and make appointments. If you have questions, please call your primary care clinic.  If you do not have a primary care provider, please call [phone] and they will assist you.        Care EveryWhere ID     This is your Care EveryWhere ID. This could be used by other organizations to access your [place] medical records  [id number]         Blood Pressure from Last 3 Encounters:   05/11/17 128/88   04/25/17 106/74   04/11/17 128/88    Weight from Last 3 Encounters:   05/11/17 105.8 kg (233 lb 3.2 oz)   04/25/17 104.3 kg (230 lb)   04/11/17 104.7 kg (230 lb 12.8 oz)              We Performed the Following     ICD DEVICE INTERROGAT REMOTE (59667)     INTERROGATION DEVICE EVAL REMOTE, PACER/ICD (02289)        Primary Care Provider Office Phone # Fax #    Flori Reyes, [phone]       [facility] [address]  [place] [address]        Equal Access to Services     BEL MCLAIN : Hadii aad ku hadasho Soomaali, waaxda luqadaha, qaybta kaalmada adeegyada, waxay idiin hayaan aparna doty . So [facility] [phone].    ATENCIÓN: Si habla español, tiene a de luna disposición servicios gratuitos de asistencia lingüística. Llame al [phone].    We comply with applicable federal civil rights laws and [place] laws. We do not discriminate on the basis of race, color, national origin, age, disability sex, sexual orientation or gender identity.            Thank you!     Thank you for choosing [facility]  for your care. Our goal is always to provide you with excellent care. Hearing back from our  patients is one way we can continue to improve our services. Please take a few minutes to complete the written survey that you may receive in the mail after your visit with us. Thank you!             Your Updated Medication List - Protect others around you: Learn how to safely use, store and throw away your medicines at www.disposemymeds.org.          This list is accurate as of: 6/22/17  2:50 PM.  Always use your most recent med list.                   Brand Name Dispense Instructions for use Diagnosis    ACE/ARB NOT PRESCRIBED (INTENTIONAL)      ACE & ARB not prescribed due to Symptomatic hypotension not due to excessive diuresis    HFrEF (heart failure with reduced ejection fraction) (H)       albuterol 108 (90 BASE) MCG/ACT Inhaler    PROAIR HFA/PROVENTIL HFA/VENTOLIN HFA     Inhale 2 puffs into the lungs every 6 hours as needed for shortness of breath / dyspnea or wheezing        amitriptyline 25 MG tablet    ELAVIL    90 tablet    Take 1 tablet (25 mg) by mouth At Bedtime    Anxiety and depression       ASPIRIN NOT PRESCRIBED    INTENTIONAL    0 each    Antiplatelet medication not prescribed intentionally due to Current anticoagulant therapy (warfarin/enoxaparin)    HFrEF (heart failure with reduced ejection fraction) (H)       atorvastatin 40 MG tablet    LIPITOR    90 tablet    Take 1 tablet (40 mg) by mouth At Bedtime    Hyperlipidemia LDL goal <130       COENZYME Q-10 PO      Take by mouth daily        ferrous sulfate 325 (65 FE) MG tablet    IRON     Take 325 mg by mouth daily        furosemide 20 MG tablet    LASIX    45 tablet    Take 0.5 tablets (10 mg) by mouth daily    Chronic systolic heart failure (H)       hydrALAZINE 10 MG tablet    APRESOLINE    180 tablet    Take 1 tablet (10 mg) by mouth 3 times daily    Chronic systolic heart failure (H), Cardiomyopathy (H)       isosorbide mononitrate 30 MG 24 hr tablet    IMDUR    45 tablet    Take 0.5 tablets (15 mg) by mouth daily    Chronic systolic  heart failure (H), Cardiomyopathy (H)       metoprolol 50 MG 24 hr tablet    TOPROL-XL    90 tablet    Take 1 tablet (50 mg) by mouth daily    Cardiomyopathy (H)       Multi-vitamin Tabs tablet      Take 1 tablet by mouth daily        OMEGA-3 FISH OIL PO      Take 1 g by mouth daily        venlafaxine 75 MG tablet    EFFEXOR    180 tablet    Take 1 tablet (75 mg) by mouth 2 times daily    Anxiety and depression       VITAMIN D (CHOLECALCIFEROL) PO      Take 50,000 Units by mouth Once per week        warfarin 2 MG tablet    COUMADIN    180 tablet    TAKE 3 TABLETS (6MG) BY MOUTH ON MON & WED. AND TAKE 2 TABLETS (4MG) THE REST OF THE DAYS OF THE WEEK OR AS DIRECTED BY INR CLINIC    Long-term (current) use of anticoagulants, RV (right ventricular) mural thrombus (H)

## 2017-06-22 NOTE — PROGRESS NOTES
Medtronic Amplia CRT-D Remote Device Check  AP: 5 % BIV Paced: 98.8 %  Mode: DDDR 50        Presenting Rhythm: AS?BIV Paced  Heart Rate: Stable with adequate variability  Sensing: Stable    Pacing Threshold: Stable    Impedance: Stable  Battery Status: 8.4 yrs  Atrial Arrhythmia: None  Ventricular Arrhythmia: None  ATP: None    Shocks: None    Care Plan: Writer called pt with results of transmission and he has no complaints. Informed of next Carelink scheduled for October. MADISON Alegria RN.

## 2017-06-23 ENCOUNTER — TELEPHONE (OUTPATIENT)
Dept: CARDIOLOGY | Facility: CLINIC | Age: 77
End: 2017-06-23

## 2017-06-23 NOTE — TELEPHONE ENCOUNTER
Message    Caregiver waiting Received: Yesterday       Josias Hernandez MD  [facility] Ep Nurse                     Echo revealed resolution of LV clot.  Please update pt on results.  No change in plan.     Josias Hernandez       Writer called pt with results as above and he verbalized understanding. MADISON Alegria RN.

## 2017-07-03 ENCOUNTER — ANTICOAGULATION THERAPY VISIT (OUTPATIENT)
Dept: NURSING | Facility: CLINIC | Age: 77
End: 2017-07-03
Payer: MEDICARE

## 2017-07-03 DIAGNOSIS — Z79.01 LONG-TERM (CURRENT) USE OF ANTICOAGULANTS: ICD-10-CM

## 2017-07-03 LAB — INR POINT OF CARE: 3 (ref 0.86–1.14)

## 2017-07-03 PROCEDURE — 36416 COLLJ CAPILLARY BLOOD SPEC: CPT

## 2017-07-03 PROCEDURE — 99207 ZZC NO CHARGE NURSE ONLY: CPT

## 2017-07-03 PROCEDURE — 85610 PROTHROMBIN TIME: CPT | Mod: QW

## 2017-07-03 NOTE — PROGRESS NOTES
ANTICOAGULATION FOLLOW-UP CLINIC VISIT    Patient Name:  Gibson Villalta  Date:  7/3/2017  Contact Type:  Face to Face    SUBJECTIVE:     Patient Findings     Positives No Problem Findings           OBJECTIVE    INR Protime   Date Value Ref Range Status   07/03/2017 3.0 (A) 0.86 - 1.14 Final       ASSESSMENT / PLAN  INR assessment THER    Recheck INR In: 3 WEEKS    INR Location Clinic      Anticoagulation Summary as of 7/3/2017     INR goal 2.0-3.0   Today's INR 3.0   Maintenance plan 4 mg (2 mg x 2) on Sun, Tue; 6 mg (2 mg x 3) all other days   Full instructions 7/4: 4 mg; 7/11: 4 mg; 7/18: 4 mg; Otherwise 4 mg on Sun, Tue; 6 mg all other days   Weekly total 38 mg   Plan last modified Rachel Mckeon RN (7/3/2017)   Next INR check 7/24/2017   Priority INR   Target end date     Indications   Long-term (current) use of anticoagulants [Z79.01] [Z79.01]         Anticoagulation Episode Summary     INR check location     Preferred lab     Send INR reminders to  ANTICOAGULATION    Comments       Anticoagulation Care Providers     Provider Role Specialty Phone number    Flori Reyes DO Josiane [facility] Internal Medicine [phone]            See the Encounter Report to view Anticoagulation Flowsheet and Dosing Calendar (Go to Encounters tab in chart review, and find the Anticoagulation Therapy Visit)    Patient is here with his wife Izabella.  Dosing based on [facility] Protocol and Provider directed care plan.      Rachel Mckeon, RN

## 2017-07-03 NOTE — MR AVS SNAPSHOT
Gibson JAYA Villalta   7/3/2017 1:30 PM   Anticoagulation Therapy Visit    Description:  76 year old male   Provider:   ANTICOAGULATION CLINIC   Department:   Nurse           INR as of 7/3/2017     Today's INR 3.0      Anticoagulation Summary as of 7/3/2017     INR goal 2.0-3.0   Today's INR 3.0   Full instructions 7/4: 4 mg; 7/11: 4 mg; 7/18: 4 mg; Otherwise 4 mg on Sun, Tue; 6 mg all other days   Next INR check 7/24/2017    Indications   Long-term (current) use of anticoagulants [Z79.01] [Z79.01]         Your next Anticoagulation Clinic appointment(s)     Jul 24, 2017  1:45 PM CDT   Anticoagulation Visit with  ANTICOAGULATION CLINIC   [facility] [place] ([facility])    [address]  [place] [address]   [phone]              Contact Numbers     Clinic Number:         July 2017 Details    Sun Mon Tue Wed Thu Fri Sat           1                 2               3      6 mg   See details      4      4 mg         5      6 mg         6      6 mg         7      6 mg         8      6 mg           9      4 mg         10      6 mg         11      4 mg         12      6 mg         13      6 mg         14      6 mg         15      6 mg           16      4 mg         17      6 mg         18      4 mg         19      6 mg         20      6 mg         21      6 mg         22      6 mg           23      4 mg         24            25               26               27               28               29                 30               31                     Date Details   07/03 This INR check       Date of next INR:  7/24/2017         How to take your warfarin dose     To take:  4 mg Take 2 of the 2 mg tablets.    To take:  6 mg Take 3 of the 2 mg tablets.

## 2017-07-07 ENCOUNTER — TELEPHONE (OUTPATIENT)
Dept: CARDIOLOGY | Facility: CLINIC | Age: 77
End: 2017-07-07

## 2017-07-24 ENCOUNTER — ANTICOAGULATION THERAPY VISIT (OUTPATIENT)
Dept: NURSING | Facility: CLINIC | Age: 77
End: 2017-07-24
Payer: MEDICARE

## 2017-07-24 DIAGNOSIS — Z79.01 LONG-TERM (CURRENT) USE OF ANTICOAGULANTS: ICD-10-CM

## 2017-07-24 DIAGNOSIS — F41.9 ANXIETY AND DEPRESSION: Chronic | ICD-10-CM

## 2017-07-24 DIAGNOSIS — F32.A ANXIETY AND DEPRESSION: Chronic | ICD-10-CM

## 2017-07-24 DIAGNOSIS — E55.9 VITAMIN D DEFICIENCY: ICD-10-CM

## 2017-07-24 LAB — INR POINT OF CARE: 2.6 (ref 0.86–1.14)

## 2017-07-24 PROCEDURE — 85610 PROTHROMBIN TIME: CPT | Mod: QW

## 2017-07-24 PROCEDURE — 36416 COLLJ CAPILLARY BLOOD SPEC: CPT

## 2017-07-24 PROCEDURE — 99207 ZZC NO CHARGE NURSE ONLY: CPT

## 2017-07-24 RX ORDER — ERGOCALCIFEROL 1.25 MG/1
50000 CAPSULE, LIQUID FILLED ORAL WEEKLY
Qty: 12 CAPSULE | Refills: 3 | Status: SHIPPED | OUTPATIENT
Start: 2017-07-24 | End: 2018-01-26

## 2017-07-24 RX ORDER — VENLAFAXINE 75 MG/1
75 TABLET ORAL 2 TIMES DAILY
Qty: 180 TABLET | Refills: 1 | Status: SHIPPED | OUTPATIENT
Start: 2017-07-24 | End: 2018-01-24

## 2017-07-24 RX ORDER — WARFARIN SODIUM 2 MG/1
TABLET ORAL
Qty: 180 TABLET | Refills: 0 | Status: SHIPPED | OUTPATIENT
Start: 2017-07-24 | End: 2017-09-21

## 2017-07-24 NOTE — TELEPHONE ENCOUNTER
PCP,  Pt requested refills at end of INR visit. Prepped below.  Pt has next inr in 3 weeks and could add on any labs needed.  Did not complete PHQ9 today, but pt did verbalize feeling very good on current effexor dose.   Last PHQ score 2 on 7/26/17   Also requested Vitamin D 50,000iu, in which this dosing is not on protocol, and no history of VitD lab in system.  Please review both.  Approved Warfarin per protocol.  Jeimy Nazario RN      Pending Prescriptions:                       Disp   Refills    venlafaxine (EFFEXOR) 75 MG tablet        180 ta*1            Sig: Take 1 tablet (75 mg) by mouth 2 times daily    vitamin D (ERGOCALCIFEROL) 35253 UNIT cap*24 cap*1            Sig: Take 1 capsule (50,000 Units) by mouth twice a           week    Signed Prescriptions:                        Disp   Refills    warfarin (COUMADIN) 2 MG tablet            180 ta*0        Sig: Take 2-3 tablets daily as directed by INR clinic  Authorizing Provider: RICK PIZARRO  Ordering User: JEIMY NAZARIO           Last Written Prescription Date: 1/2017  Last Fill Quantity: 180; # refills: 1  Last Office Visit with [facility], [facility] or [facility] prescribing provider:  1/2/17        Last PHQ-9 score on record=   PHQ-9 SCORE 7/27/2016   Total Score 2       Lab Results   Component Value Date    AST Unsatisfactory specimen - hemolyzed 02/07/2017     Lab Results   Component Value Date    ALT 50 02/07/2017       No results found for: EHV938, DXXH448, FXPW16WNHKY, VITD3, D2VIT, D3VIT, DTOT, [id number], [id number], [id number], [id number], [id number], [id number]

## 2017-07-24 NOTE — MR AVS SNAPSHOT
Gibson LAKE Ambar   7/24/2017 1:45 PM   Anticoagulation Therapy Visit    Description:  76 year old male   Provider:   ANTICOAGULATION CLINIC   Department:   Nurse           INR as of 7/24/2017     Today's INR 2.6      Anticoagulation Summary as of 7/24/2017     INR goal 2.0-3.0   Today's INR 2.6   Full instructions 4 mg on Sun, Tue; 6 mg all other days   Next INR check 8/14/2017    Indications   Long-term (current) use of anticoagulants [Z79.01] [Z79.01]         Your next Anticoagulation Clinic appointment(s)     Aug 14, 2017  1:30 PM CDT   Anticoagulation Visit with  ANTICOAGULATION CLINIC   [facility] ([facility]    [address]  [place] [address] [phone]              Contact Numbers     Clinic Number:         July 2017 Details    Sun Mon Tue Wed Thu Fri Sat           1                 2               3               4               5               6               7               8                 9               10               11               12               13               14               15                 16               17               18               19               20               21               22                 23               24      6 mg   See details      25      4 mg         26      6 mg         27      6 mg         28      6 mg         29      6 mg           30      4 mg         31      6 mg               Date Details   07/24 This INR check               How to take your warfarin dose     To take:  4 mg Take 2 of the 2 mg tablets.    To take:  6 mg Take 3 of the 2 mg tablets.           August 2017 Details    Sun Mon Tue Wed Thu Fri Sat       1      4 mg         2      6 mg         3      6 mg         4      6 mg         5      6 mg           6      4 mg         7      6 mg         8      4 mg         9      6 mg         10      6 mg         11      6 mg         12      6 mg           13      4 mg         14            15                16               17               18               19                 20               21               22               23               24               25               26                 27               28               29               30               31                  Date Details   No additional details    Date of next INR:  8/14/2017         How to take your warfarin dose     To take:  4 mg Take 2 of the 2 mg tablets.    To take:  6 mg Take 3 of the 2 mg tablets.

## 2017-07-24 NOTE — PROGRESS NOTES
ANTICOAGULATION FOLLOW-UP CLINIC VISIT    Patient Name:  Gibson Villalta  Date:  7/24/2017  Contact Type:  Face to Face    SUBJECTIVE:     Patient Findings     Positives No Problem Findings           OBJECTIVE    INR Protime   Date Value Ref Range Status   07/24/2017 2.6 (A) 0.86 - 1.14 Final       ASSESSMENT / PLAN  No question data found.  Anticoagulation Summary as of 7/24/2017     INR goal 2.0-3.0   Today's INR 2.6   Maintenance plan 4 mg (2 mg x 2) on Sun, Tue; 6 mg (2 mg x 3) all other days   Full instructions 4 mg on Sun, Tue; 6 mg all other days   Weekly total 38 mg   No change documented Bushra Rivera RN   Plan last modified Rachel Mckeon RN (7/3/2017)   Next INR check 8/14/2017   Priority INR   Target end date     Indications   Long-term (current) use of anticoagulants [Z79.01] [Z79.01]         Anticoagulation Episode Summary     INR check location     Preferred lab     Send INR reminders to [facility] ANTICOAGULATION    Comments       Anticoagulation Care Providers     Provider Role Specialty Phone number    Flori Reyes DO [facility] Internal Medicine [phone]            See the Encounter Report to view Anticoagulation Flowsheet and Dosing Calendar (Go to Encounters tab in chart review, and find the Anticoagulation Therapy Visit)    Dosage adjustment made based on physician directed care plan.    Bushra Rivera RN

## 2017-07-25 NOTE — TELEPHONE ENCOUNTER
Meds sent in  His Vit D is up to date from February - please let him know he doesn't need labs right now but should f/u with me later this fall for OV

## 2017-08-14 ENCOUNTER — ANTICOAGULATION THERAPY VISIT (OUTPATIENT)
Dept: NURSING | Facility: CLINIC | Age: 77
End: 2017-08-14
Payer: MEDICARE

## 2017-08-14 DIAGNOSIS — Z79.01 LONG-TERM (CURRENT) USE OF ANTICOAGULANTS: ICD-10-CM

## 2017-08-14 LAB — INR POINT OF CARE: 2.2 (ref 0.86–1.14)

## 2017-08-14 PROCEDURE — 85610 PROTHROMBIN TIME: CPT | Mod: QW

## 2017-08-14 PROCEDURE — 99207 ZZC NO CHARGE NURSE ONLY: CPT

## 2017-08-14 PROCEDURE — 36416 COLLJ CAPILLARY BLOOD SPEC: CPT

## 2017-08-14 NOTE — MR AVS SNAPSHOT
Gibson Villalta   8/14/2017 1:30 PM   Anticoagulation Therapy Visit    Description:  76 year old male   Provider:   ANTICOAGULATION CLINIC   Department:   Nurse           INR as of 8/14/2017     Today's INR 2.2      Anticoagulation Summary as of 8/14/2017     INR goal 2.0-3.0   Today's INR 2.2   Full instructions 4 mg on Sun; 6 mg all other days   Next INR check 9/5/2017    Indications   Long-term (current) use of anticoagulants [Z79.01] [Z79.01]         Your next Anticoagulation Clinic appointment(s)     Sep 05, 2017  1:45 PM CDT   Anticoagulation Visit with  ANTICOAGULATION CLINIC   [facility]    [address]  [place] [address] [phone]              Contact Numbers     Clinic Number:         August 2017 Details    Sun Mon Tue Wed Thu Fri Sat       1               2               3               4               5                 6               7               8               9               10               11               12                 13               14      6 mg   See details      15      6 mg         16      6 mg         17      6 mg         18      6 mg         19      6 mg           20      4 mg         21      6 mg         22      6 mg         23      6 mg         24      6 mg         25      6 mg         26      6 mg           27      4 mg         28      6 mg         29      6 mg         30      6 mg         31      6 mg            Date Details   08/14 This INR check               How to take your warfarin dose     To take:  4 mg Take 2 of the 2 mg tablets.    To take:  6 mg Take 3 of the 2 mg tablets.           September 2017 Details    Sun Mon Tue Wed Thu Fri Sat          1      6 mg         2      6 mg           3      4 mg         4      6 mg         5            6               7               8               9                 10               11               12               13               14               15               16  "PRIMARY CARE PHYSICIAN:  Jocelyn Ramos MD    CHIEF COMPLAINT:  Sent by the hematology oncologist Dr. Silverman for   dehydration.    HISTORY OF PRESENT ILLNESS:  This is a 66-year-old gentleman with history of   colon cancer.  He is followed by Dr. Tray Silverman and is currently undergoing   chemotherapy.  He had surgery about 5 months ago at [place] to remove the   tumor itself and he had 3 positive lymph nodes.  Since that time, he is   repleted 2 rounds of chemo.  He reports he was feeling well up until end of   December when he feels like the chemo \"caught up to me.\"  He states he started   to feel more fatigued, started to have bouts of nausea, though no vomiting.    He has lost a fair amount of weight since then, and he reports that food has   an unpleasant taste _____ and he has much less appetite.  He developed   diarrhea about 7 days ago.  He denies any blood or melena in the diarrhea.    The patient was seen by Dr. Tray Silverman today and labs were drawn.  He was   found to be dehydrated with elevated creatinine and was therefore told to come   to the emergency room for evaluation.    REVIEW OF SYSTEMS:  Positive as noted above, otherwise all systems are   reviewed and negative.    PREVIOUS MEDICAL HISTORY:  1.  Colon cancer as noted above.  2.  Hypertension.  3.  Dyslipidemia.  4.  Question type 2 diabetes:  The patient states he does not have diabetes,   though it is listed in our system.  His last A1c was 5.7, which would imply   that he is correct and our records are mistaken.  5.  Gout.    MEDICATIONS:  1.  Allopurinol 100 mg p.o. daily.  2.  Clonazepam 1 mg p.o. b.i.d.  3.  Ferrous sulfate 325 mg p.o. daily.  4.  Cozaar 100 mg p.o. daily.  5.  Crestor 40 mg at bedtime.  6.  Maxzide/Dyazide 37.5/25 one p.o. daily.    ALLERGIES:  LIDOCAINE.    SOCIAL HISTORY:  The patient is a former smoker having quit in the late 1990s,   20-pack-year total exposure.  He drinks alcohol very rarely.    SURGICAL "                 17               18               19               20               21               22               23                 24               25               26               27               28               29               30                Date Details   No additional details    Date of next INR:  9/5/2017         How to take your warfarin dose     To take:  4 mg Take 2 of the 2 mg tablets.    To take:  6 mg Take 3 of the 2 mg tablets.            HISTORY:  1.  ORIF.  2.  Colon resection.    FAMILY HISTORY:  Family history is reviewed, but not relevant.    PHYSICAL EXAMINATION:  VITAL SIGNS:  In ED, temp 36.9, heart rate 60, respiratory rate 20, BP 93/47,   satting 95% on room air.  GENERAL:  The patient is awake, alert.  He is in no acute distress.  HEENT:  Head is normocephalic and atraumatic.  Mucous members are moist.    Sclerae and conjunctivae are benign.  NECK:  Trachea is in the midline.  Neck is supple.  There is no JVD or bruits.  RESPIRATORY:  Clear to auscultation bilaterally.  CARDIAC:  Regular rate and rhythm.  No murmurs, rubs or clicks.  ABDOMEN:  Soft.  No guarding, rebound, hepatosplenomegaly, or masses.    Nontender to palpation.  EXTREMITIES:  Trace edema.  No clubbing or cyanosis.  Peripheral pulses   maintained.  Calves nontender to palpation.  SKIN:  Warm and dry.  No rashes are appreciated.  NEUROLOGIC:  Alert and oriented.  Speech clear, content logical.  PSYCHIATRIC:  Mood and affect are appropriate.  Hygiene neat and clean.    LABORATORY DATA:  White count 5.4, hemoglobin 11.8, platelet count 262.    Sodium is 137, potassium 2.8, BUN 81, creatinine 5.12.  LFTs are within normal   limits.  Phosphorus is 4.6 and magnesium of 1.9.  Troponin I is 0.03.  Renal   ultrasound demonstrates a solitary kidney on the left side, right not   visualized.  Kidney on the left side shows cystic lesions, but no   hydronephrosis.    ASSESSMENT AND PLAN:  This is a 66-year-old gentleman with problem list as   follows:  1.  Acute kidney failure or acute kidney injury:  This is at least in part due   to dehydration as he has had a week of diarrhea.  The patient will be   admitted.  We will fluid resuscitate him, renal ultrasound does not   demonstrate any obstructive etiology, though it does demonstrate a solitary   kidney.  Nephrology, Dr. Salamanca has been consulted and we look forward to his   evaluation.  We will avoid nephrotoxins, hold the patient's  losartan and   diuretics.  We will also be holding his allopurinol.  2.  Hypokalemia:  We will replace both IV and p.o.  His magnesium is   reasonable today, but will follow that closely as well.  I suspect the   etiology is related to the patient's diarrhea.  3.  History of colon cancer:  We will consult oncology tomorrow.  The patient   does have a third round of chemo coming up.  Hopefully, we can keep him on the   schedule.  4.  History of gout:  Holding allopurinol given the patient's renal function.  5.  Question type 2 diabetes:  I suspect this has been entered in error in our   system.  A1c 5.7 in October.  6.  Dyslipidemia:  Continue statin.  7.  Hypertension:  Holding diuretics due to renal function and angiotensin   receptor blockers and due to renal function.  Cover with p.r.n. and consider   institution of other medications dependent on how his renal function does over   the next 24 hours.  8.  Diarrhea:  The patient has no immediately obvious explanation for his   diarrhea; however, he does have multiple _____ exposures and is   immunosuppressed.  We will check C. diff and stool studies.       ____________________________________     DO MINAL Burgos / ISABEL    DD:  01/20/2017 17:58:17  DT:  01/20/2017 19:31:21    D#:  [id number]  Job#:  [id number]    cc: Jocelyn Ramos MD

## 2017-08-14 NOTE — PROGRESS NOTES
ANTICOAGULATION FOLLOW-UP CLINIC VISIT    Patient Name:  Gibson Villalta  Date:  8/14/2017  Contact Type:  Face to Face    SUBJECTIVE:        OBJECTIVE    INR Protime   Date Value Ref Range Status   08/14/2017 2.2 (A) 0.86 - 1.14 Final       ASSESSMENT / PLAN  INR assessment THER    Recheck INR In: 3 WEEKS    INR Location Clinic      Anticoagulation Summary as of 8/14/2017     INR goal 2.0-3.0   Today's INR 2.2   Maintenance plan 4 mg (2 mg x 2) on Sun; 6 mg (2 mg x 3) all other days   Full instructions 4 mg on Sun; 6 mg all other days   Weekly total 40 mg   Plan last modified Rachel Mckeon RN (8/14/2017)   Next INR check 9/5/2017   Priority INR   Target end date     Indications   Long-term (current) use of anticoagulants [Z79.01] [Z79.01]         Anticoagulation Episode Summary     INR check location     Preferred lab     Send INR reminders to [facility] ANTICOAGULATION    Comments       Anticoagulation Care Providers     Provider Role Specialty Phone number    Reyes Flori Josiane,  [facility] [phone]            See the Encounter Report to view Anticoagulation Flowsheet and Dosing Calendar (Go to Encounters tab in chart review, and find the Anticoagulation Therapy Visit)    Patient is here with his wife. She states INR should be above 2.5. This is what cardiology prefers.  Because of this, Weekly coumadin dosing increased by 2 mg. Dosing based on [facility] Protocol and Provider directed care plan.      Rachel Mckeon, RN

## 2017-08-25 ENCOUNTER — OFFICE VISIT (OUTPATIENT)
Dept: FAMILY MEDICINE | Facility: CLINIC | Age: 77
End: 2017-08-25
Payer: MEDICARE

## 2017-08-25 VITALS
HEART RATE: 84 BPM | BODY MASS INDEX: 31.37 KG/M2 | SYSTOLIC BLOOD PRESSURE: 122 MMHG | HEIGHT: 73 IN | TEMPERATURE: 98 F | OXYGEN SATURATION: 95 % | DIASTOLIC BLOOD PRESSURE: 82 MMHG | WEIGHT: 236.7 LBS

## 2017-08-25 DIAGNOSIS — F03.90 DEMENTIA WITHOUT BEHAVIORAL DISTURBANCE, UNSPECIFIED DEMENTIA TYPE: Chronic | ICD-10-CM

## 2017-08-25 DIAGNOSIS — E11.22 CONTROLLED TYPE 2 DIABETES MELLITUS WITH CHRONIC KIDNEY DISEASE, WITHOUT LONG-TERM CURRENT USE OF INSULIN, UNSPECIFIED CKD STAGE (H): Chronic | ICD-10-CM

## 2017-08-25 DIAGNOSIS — I50.22 CHRONIC SYSTOLIC HEART FAILURE (H): Chronic | ICD-10-CM

## 2017-08-25 DIAGNOSIS — H61.21 IMPACTED CERUMEN OF RIGHT EAR: ICD-10-CM

## 2017-08-25 DIAGNOSIS — Z98.84 HISTORY OF WEIGHT LOSS SURGERY: ICD-10-CM

## 2017-08-25 DIAGNOSIS — Z00.01 ENCOUNTER FOR ROUTINE ADULT MEDICAL EXAM WITH ABNORMAL FINDINGS: Primary | ICD-10-CM

## 2017-08-25 DIAGNOSIS — Z13.0 SCREENING FOR IRON DEFICIENCY ANEMIA: ICD-10-CM

## 2017-08-25 LAB
ERYTHROCYTE [DISTWIDTH] IN BLOOD BY AUTOMATED COUNT: 13 % (ref 10–15)
HBA1C MFR BLD: 6.5 % (ref 4.3–6)
HCT VFR BLD AUTO: 44.9 % (ref 40–53)
HGB BLD-MCNC: 15.1 G/DL (ref 13.3–17.7)
MCH RBC QN AUTO: 32.8 PG (ref 26.5–33)
MCHC RBC AUTO-ENTMCNC: 33.6 G/DL (ref 31.5–36.5)
MCV RBC AUTO: 97 FL (ref 78–100)
PLATELET # BLD AUTO: 203 10E9/L (ref 150–450)
RBC # BLD AUTO: 4.61 10E12/L (ref 4.4–5.9)
VIT B12 SERPL-MCNC: 364 PG/ML (ref 193–986)
WBC # BLD AUTO: 7.7 10E9/L (ref 4–11)

## 2017-08-25 PROCEDURE — 80053 COMPREHEN METABOLIC PANEL: CPT | Performed by: INTERNAL MEDICINE

## 2017-08-25 PROCEDURE — 99207 C FOOT EXAM  NO CHARGE: CPT | Performed by: INTERNAL MEDICINE

## 2017-08-25 PROCEDURE — 36415 COLL VENOUS BLD VENIPUNCTURE: CPT | Performed by: INTERNAL MEDICINE

## 2017-08-25 PROCEDURE — 69209 REMOVE IMPACTED EAR WAX UNI: CPT | Performed by: INTERNAL MEDICINE

## 2017-08-25 PROCEDURE — 85027 COMPLETE CBC AUTOMATED: CPT | Performed by: INTERNAL MEDICINE

## 2017-08-25 PROCEDURE — 82607 VITAMIN B-12: CPT | Performed by: INTERNAL MEDICINE

## 2017-08-25 PROCEDURE — 83036 HEMOGLOBIN GLYCOSYLATED A1C: CPT | Performed by: INTERNAL MEDICINE

## 2017-08-25 PROCEDURE — 82728 ASSAY OF FERRITIN: CPT | Performed by: INTERNAL MEDICINE

## 2017-08-25 PROCEDURE — G0439 PPPS, SUBSEQ VISIT: HCPCS | Performed by: INTERNAL MEDICINE

## 2017-08-25 NOTE — MR AVS SNAPSHOT
After Visit Summary   8/25/2017    Gibson Villalta    MRN: [id number]           Patient Information     Date Of Birth          1940        Visit Information        Provider Department      8/25/2017 1:00 PM Flori Reyes,  [facility]        Today's Diagnoses     Encounter for routine adult medical exam with abnormal findings    -  1    Dementia without behavioral disturbance, unspecified dementia type        Chronic systolic heart failure (H)        Controlled type 2 diabetes mellitus with chronic kidney disease, without long-term current use of insulin, unspecified CKD stage (H)        History of weight loss surgery        Screening for iron deficiency anemia        Impacted cerumen of right ear          Care Instructions    Labs today and ear wash  STOP Amitriptyline and let me know if you notice any changes in mood or sleep  Follow up in 6 months    Preventive Health Recommendations:   Male Ages 65 and over    Yearly exam:             See your health care provider every year in order to  o   Review health changes.   o   Discuss preventive care.    o   Review your medicines if your doctor has prescribed any.    Talk with your health care provider about whether you should have a test to screen for prostate cancer (PSA).    Every 3 years, have a diabetes test (fasting glucose). If you are at risk for diabetes, you should have this test more often.    Every 5 years, have a cholesterol test. Have this test more often if you are at risk for high cholesterol or heart disease.     Every 10 years, have a colonoscopy. Or, have a yearly FIT test (stool test). These exams will check for colon cancer.    Talk to with your health care provider about screening for Abdominal Aortic Aneurysm if you have a family history of AAA or have a history of smoking.    Shots:     Get a flu shot each year.     Get a tetanus shot every 10 years.     Talk to your doctor about your pneumonia vaccines.  There are now two you should receive - Pneumovax (PPSV 23) and Prevnar (PCV 13).     Talk to your doctor about a shingles vaccine.     Talk to your doctor about the hepatitis B vaccine.  Nutrition:     Eat at least 5 servings of fruits and vegetables each day.     Eat whole-grain bread, whole-wheat pasta and brown rice instead of white grains and rice.     Talk to your provider about Calcium and Vitamin D.   Lifestyle    Exercise for at least 150 minutes a week (30 minutes a day, 5 days a week). This will help you control your weight and prevent disease.     Limit alcohol to one drink per day.     No smoking.     Wear sunscreen to prevent skin cancer.     See your dentist every six months for an exam and cleaning.     See your eye doctor every 1 to 2 years to screen for conditions such as glaucoma, macular degeneration, cataracts, etc           Follow-ups after your visit        Your next 10 appointments already scheduled     Sep 05, 2017  1:45 PM CDT   Anticoagulation Visit with  ANTICOAGULATION CLINIC   [facility] ([facility])    [address]  [place] [id number] [phone]            Oct 05, 2017  4:30 PM CDT   Remote ICD Check with ALEJANDRO DCR2   [facility] [place] ([facility])    [address]  [place] [id number] [phone]           This appointment is for a remote check of your debrillator.  This is not an appointment at the office.              Who to contact     If you have questions or need follow up information about today's clinic visit or your schedule please contact [facility] directly at [phone].  Normal or non-critical lab and imaging results will be communicated to you by MyChart, letter or phone within 4 business days after the clinic has received the results. If you do not hear from us within 7 days, please contact the clinic through MyChart or phone. If you have a critical or abnormal  "lab result, we will notify you by phone as soon as possible.  Submit refill requests through [employer] or call your pharmacy and they will forward the refill request to us. Please allow 3 business days for your refill to be completed.          Additional Information About Your Visit        Front Apphart Information     [employer] gives you secure access to your electronic health record. If you see a primary care provider, you can also send messages to your care team and make appointments. If you have questions, please call your primary care clinic.  If you do not have a primary care provider, please call [phone] and they will assist you.        Care EveryWhere ID     This is your Care EveryWhere ID. This could be used by other organizations to access your [place] medical records  [id number]        Your Vitals Were     Pulse Temperature Height Pulse Oximetry BMI (Body Mass Index)       84 98  F (36.7  C) (Oral) 6' 1\" (1.854 m) 95% 31.23 kg/m2        Blood Pressure from Last 3 Encounters:   08/25/17 122/82   05/11/17 128/88   04/25/17 106/74    Weight from Last 3 Encounters:   08/25/17 236 lb 11.2 oz (107.4 kg)   05/11/17 233 lb 3.2 oz (105.8 kg)   04/25/17 230 lb (104.3 kg)              We Performed the Following     CBC with platelets     Comprehensive metabolic panel     Ferritin     HC REMOVAL IMPACTED CERUMEN IRRIGATION/LVG UNILAT     Hemoglobin A1c     Vitamin B12        Primary Care Provider Office Phone # Fax #    Flori Joshua DO Amy [phone] [address]  [place] [address]        Equal Access to Services     [facility]: Hadii aad ku hadasho Soomaali, waaxda luqadaha, qaybta kaalmada adedave, rusty doty . So [facility] [phone].    ATENCIÓN: Si habla español, tiene a de luna disposición servicios gratuitos de asistencia lingüística. Llame al [phone].    We comply with applicable federal civil rights laws and [place] laws. We do not discriminate " on the basis of race, color, national origin, age, disability sex, sexual orientation or gender identity.            Thank you!     Thank you for choosing [facility]  for your care. Our goal is always to provide you with excellent care. Hearing back from our patients is one way we can continue to improve our services. Please take a few minutes to complete the written survey that you may receive in the mail after your visit with us. Thank you!             Your Updated Medication List - Protect others around you: Learn how to safely use, store and throw away your medicines at www.disposemymeds.org.          This list is accurate as of: 8/25/17  1:41 PM.  Always use your most recent med list.                   Brand Name Dispense Instructions for use Diagnosis    ACE/ARB NOT PRESCRIBED (INTENTIONAL)      ACE & ARB not prescribed due to Symptomatic hypotension not due to excessive diuresis    HFrEF (heart failure with reduced ejection fraction) (H)       albuterol 108 (90 BASE) MCG/ACT Inhaler    PROAIR HFA/PROVENTIL HFA/VENTOLIN HFA     Inhale 2 puffs into the lungs every 6 hours as needed for shortness of breath / dyspnea or wheezing        ASPIRIN NOT PRESCRIBED    INTENTIONAL    0 each    Antiplatelet medication not prescribed intentionally due to Current anticoagulant therapy (warfarin/enoxaparin)    HFrEF (heart failure with reduced ejection fraction) (H)       atorvastatin 40 MG tablet    LIPITOR    90 tablet    Take 1 tablet (40 mg) by mouth At Bedtime    Hyperlipidemia LDL goal <130       COENZYME Q-10 PO      Take by mouth daily        ferrous sulfate 325 (65 FE) MG tablet    IRON     Take 325 mg by mouth daily        furosemide 20 MG tablet    LASIX    45 tablet    Take 0.5 tablets (10 mg) by mouth daily    Chronic systolic heart failure (H)       hydrALAZINE 10 MG tablet    APRESOLINE    180 tablet    Take 1 tablet (10 mg) by mouth 3 times daily    Chronic systolic heart failure (H),  Cardiomyopathy (H)       isosorbide mononitrate 30 MG 24 hr tablet    IMDUR    45 tablet    Take 0.5 tablets (15 mg) by mouth daily    Chronic systolic heart failure (H), Cardiomyopathy (H)       metoprolol 50 MG 24 hr tablet    TOPROL-XL    90 tablet    Take 1 tablet (50 mg) by mouth daily    Cardiomyopathy (H)       Multi-vitamin Tabs tablet      Take 1 tablet by mouth daily        OMEGA-3 FISH OIL PO      Take 1 g by mouth daily        venlafaxine 75 MG tablet    EFFEXOR    180 tablet    Take 1 tablet (75 mg) by mouth 2 times daily    Anxiety and depression       vitamin D 44992 UNIT capsule    ERGOCALCIFEROL    12 capsule    Take 1 capsule (50,000 Units) by mouth once a week    Vitamin D deficiency       warfarin 2 MG tablet    COUMADIN    180 tablet    Take 2-3 tablets daily as directed by INR clinic    Long-term (current) use of anticoagulants

## 2017-08-25 NOTE — PROGRESS NOTES
SUBJECTIVE:   CC: Gibson Villalta is an 76 year old male who presents for preventative health visit.     Answers for HPI/ROS submitted by the patient on 8/22/2017   Annual Exam:  Getting at least 3 servings of Calcium per day:: Yes  Bi-annual eye exam:: Yes - new glasses  Dental care twice a year:: Yes - dentures now  Sleep apnea or symptoms of sleep apnea:: None  Diet:: Low salt  Frequency of exercise:: 1 day/week  Taking medications regularly:: Yes  Medication side effects:: None  Additional concerns today:: No  PHQ-2 Score: 0  Duration of exercise:: Less than 15 minutes      COGNITIVE SCREENING:  Not appropriate due to known dementia    Here with his wife Izabella and he appreciates being able to breathe better the past year since had ICD placed and cardiac meds stabilized  Good appetite and gaining weight (but has to be careful not to gain too much); h/o past bariatric surgery  Sleeps from 2 am - 10 am ; has always been a night owl. Izabella can hear him up eating ice cream late at night.  Didn't notice any significant change good or bad with cutting Amitriptyline dose in half at last OV  He has lack of insight into dementia but admits that he recalls people telling him that he has dementia and Izabella says he is doing pretty good  [place] well; kids nearby  No known skin changes  No bowel or bladder changes and has good urinary flow; feels able to empty bladder all the way  Mood is good with great sense of humor    Today's PHQ-2 Score:   PHQ-2 ( 1999 [employer]) 8/25/2017 8/25/2017   Q1: Little interest or pleasure in doing things 0 0   Q2: Feeling down, depressed or hopeless 0 0   PHQ-2 Score 0 0   Q1: Little interest or pleasure in doing things - -   Q2: Feeling down, depressed or hopeless - -   PHQ-2 Score - -     Abuse: Current or Past(Physical, Sexual or Emotional)- No  Do you feel safe in your environment - Yes    Social History   Substance Use Topics     Smoking status: Former Smoker     Packs/day:  "2.00     Years: 8.00     Types: Cigarettes     Quit date: 1/1/1980     Smokeless tobacco: Never Used      Comment: Quit in his 30s - 2 ppd for 8 years     Alcohol use 0.0 oz/week     0 Standard drinks or equivalent per week      Comment: maybe 1 beer a month     The patient does not drink >3 drinks per day nor >7 drinks per week.    Last PSA:   PSA   Date Value Ref Range Status   12/22/2015 1.79 ng/mL Final       Reviewed orders with patient. Reviewed health maintenance and updated orders accordingly - Yes      ROS:  Comprehensive ROS negative unless as stated above in HPI.        OBJECTIVE:   /82 (BP Location: Right arm, Patient Position: Sitting, Cuff Size: Adult Large)  Pulse 84  Temp 98  F (36.7  C) (Oral)  Ht 6' 1\" (1.854 m)  Wt 236 lb 11.2 oz (107.4 kg)  SpO2 95%  BMI 31.23 kg/m2  EXAM:  GENERAL: healthy, alert and no distress  EYES: Eyes grossly normal to inspection, PERRL and conjunctivae and sclerae normal  HENT: oropharynx clear, oral mucous membranes moist and upper/lower dentures; cerumen impaction on right  NECK: no adenopathy, no asymmetry, masses, or scars and thyroid normal to palpation  RESP: lungs clear to auscultation - no rales, rhonchi or wheezes  CV: regular rate and rhythm, normal S1 S2, no S3 or S4, no murmur, click or rub, no peripheral edema and no carotid bruits, ICD site well healed  ABDOMEN: obese, soft, nontender, no hepatosplenomegaly, no masses and bowel sounds normal  MS: Left prepatellar bursa swelling without erythema/warmth/tenderness  SKIN: no suspicious lesions or rashes  NEURO: Normal strength and tone, speech normal  PSYCH: making jokes, affect normal/bright, able to carry on a conversation well but does look to Izabella some  DIABETIC FOOT EXAM: no ulcers/lesions noted    ASSESSMENT/PLAN:   1. Encounter for routine adult medical exam with abnormal findings  Major health issues are cardiac and dementia which are currently stable  Has reportedly never had colonoscopy; " "ok to hold off given appropriate weight trend, normal hemoglobin and comorbidities     2. Dementia without behavioral disturbance, unspecified dementia type  Wife Izabella is here to help with corroborative history and she states things have been stable  H/o past scores as follows:  SLUMS 11 and CPT 4.1/5.7 requiring 24 hr supervision    3. Chronic systolic heart failure as well as history of cardiomyopathy with ICD implantation and RV thrombus on chronic anticoagulation  Currently stable and patient is appreciative of how well he feels  Appreciate cardiology support and INR team  - CBC with platelets  - Comprehensive metabolic panel    4. Controlled type 2 diabetes mellitus with chronic kidney disease, without long-term current use of insulin (H)  Diet controlled, however, he is eating more ice cream  - Hemoglobin A1c  - Vitamin B12    5. History of weight loss surgery  Will check iron and B12 levels  History of fluctuating weight over the years    6. Screening for iron deficiency anemia  Still takes iron  - Ferritin    7. Impacted cerumen of right ear  Ear wash by MA  - HC REMOVAL IMPACTED CERUMEN IRRIGATION/LVG UNILAT    COUNSELING:  Reviewed preventive health counseling, as reflected in patient instructions       Regular exercise       Healthy diet/nutrition   reports that he quit smoking about 37 years ago. His smoking use included Cigarettes. He has a 16.00 pack-year smoking history. He has never used smokeless tobacco.  Estimated body mass index is 31.23 kg/(m^2) as calculated from the following:    Height as of this encounter: 6' 1\" (1.854 m).    Weight as of this encounter: 236 lb 11.2 oz (107.4 kg).   Weight management plan: Discussed healthy diet and exercise guidelines and patient will follow up in 6 months in clinic to re-evaluate.    Patient Instructions   Labs today and ear wash  STOP Amitriptyline and let me know if you notice any changes in mood or sleep  Follow up in 6 months    Flori Reyes, " DO  [facility]

## 2017-08-25 NOTE — NURSING NOTE
"Chief Complaint   Patient presents with     Wellness Visit       Initial /82 (BP Location: Right arm, Patient Position: Sitting, Cuff Size: Adult Large)  Pulse 84  Temp 98  F (36.7  C) (Oral)  Ht 6' 1\" (1.854 m)  Wt 236 lb 11.2 oz (107.4 kg)  SpO2 95%  BMI 31.23 kg/m2 Estimated body mass index is 31.23 kg/(m^2) as calculated from the following:    Height as of this encounter: 6' 1\" (1.854 m).    Weight as of this encounter: 236 lb 11.2 oz (107.4 kg).  Medication Reconciliation: complete     Carlos Alberto Perez CMA     "

## 2017-08-25 NOTE — PROGRESS NOTES
SUBJECTIVE:   Gibson Villalta is a 76 year old male who presents for Preventive Visit.    Are you in the first 12 months of your Medicare coverage?  No    Physical   Annual:     Getting at least 3 servings of Calcium per day::  Yes    Bi-annual eye exam::  Yes    Dental care twice a year::  Yes    Sleep apnea or symptoms of sleep apnea::  None    Diet::  Low salt    Frequency of exercise::  1 day/week    Duration of exercise::  Less than 15 minutes    Taking medications regularly::  Yes    Medication side effects::  None    Additional concerns today::  No      COGNITIVE SCREENING:  Not appropriate due to known dementia    Reviewed and updated as needed this visit by clinical staff  Tobacco  Allergies         Reviewed and updated as needed this visit by Provider        Social History   Substance Use Topics     Smoking status: Former Smoker     Packs/day: 2.00     Years: 8.00     Types: Cigarettes     Quit date: 1/1/1980     Smokeless tobacco: Never Used      Comment: Quit in his 30s - 2 ppd for 8 years     Alcohol use 0.0 oz/week     0 Standard drinks or equivalent per week      Comment: maybe 1 beer a month       The patient does not drink >3 drinks per day nor >7 drinks per week.          Today's PHQ-2 Score: PHQ-2 ( 1999 Pfizer) 8/25/2017   Q1: Little interest or pleasure in doing things 0   Q2: Feeling down, depressed or hopeless 0   PHQ-2 Score 0   Q1: Little interest or pleasure in doing things -   Q2: Feeling down, depressed or hopeless -   PHQ-2 Score -       Do you feel safe in your environment - Yes    Do you have a Health Care Directive?: Yes: Advance Directive has been received and scanned.    Current providers sharing in care for this patient include:   Patient Care Team:  Flori Reyes DO as PCP - General (Internal Medicine)      Hearing impairment: No    Ability to successfully perform activities of daily living: Yes, no assistance needed     Fall risk:  Fallen 2 or more times in the past  "year?: No  Any fall with injury in the past year?: No      Home safety:  none identified    The following health maintenance items are reviewed in Epic and correct as of today:Health Maintenance   Topic Date Due     FOOT EXAM Q1 YEAR  10/21/1941     EYE EXAM Q1 YEAR  10/21/1941     COLON CANCER SCREEN (SYSTEM ASSIGNED)  10/21/1991     ADVANCE DIRECTIVE PLANNING Q5 YRS  10/21/1996     TETANUS IMMUNIZATION (SYSTEM ASSIGNED)  2014     A1C Q6 MO  2017     FALL RISK ASSESSMENT  2017     INFLUENZA VACCINE (SYSTEM ASSIGNED)  2017     HAIR QUESTIONNAIRE 1 YEAR  2017     BMP Q6 MOS  10/11/2017     CBC Q1 YR  2017     ALT Q1 YR  2018     HEMOGLOBIN Q1 YR  2018     LIPID MONITORING Q1 YEAR  2018     MICROALBUMIN Q1 YEAR  2018     CREATININE Q1 YEAR  2018     BMP Q1 YR  2018     TSH W/ FREE T4 REFLEX Q2 YEAR  2018     HF ACTION PLAN Q3 YR  2019     PNEUMOCOCCAL  Completed     AORTIC ANEURYSM SCREENING (SYSTEM ASSIGNED)  Completed     {Chronicprobdata (Optional):074616}    {Decision Support (Optional):102214}    ROS:  {ROS COMP:318437}    OBJECTIVE:   /82 (BP Location: Right arm, Patient Position: Sitting, Cuff Size: Adult Large)  Pulse 84  Temp 98  F (36.7  C) (Oral)  Ht 6' 1\" (1.854 m)  Wt 236 lb 11.2 oz (107.4 kg)  SpO2 95%  BMI 31.23 kg/m2 Estimated body mass index is 31.23 kg/(m^2) as calculated from the following:    Height as of this encounter: 6' 1\" (1.854 m).    Weight as of this encounter: 236 lb 11.2 oz (107.4 kg).  EXAM:   {Exam :778080}    ASSESSMENT / PLAN:   {Diag Picklist:553014}    End of Life Planning:  Patient currently has an advanced directive: { :035809}    COUNSELING:  {Medicare Counselin}    {BP Counseling- Complete if BP >= 120/80  (Optional):068013}    Estimated body mass index is 31.23 kg/(m^2) as calculated from the following:    Height as of this encounter: 6' 1\" (1.854 m).    Weight as of this " encounter: 236 lb 11.2 oz (107.4 kg).  {Weight Management Plan -- Complete if patient has an normal BMI (Optional):780574}   reports that he quit smoking about 37 years ago. His smoking use included Cigarettes. He has a 16.00 pack-year smoking history. He has never used smokeless tobacco.  {Tobacco Cessation -- Complete if patient is a smoker (Optional):894380}    Appropriate preventive services were discussed with this patient, including applicable screening as appropriate for cardiovascular disease, diabetes, osteopenia/osteoporosis, and glaucoma.  As appropriate for age/gender, discussed screening for colorectal cancer, prostate cancer, breast cancer, and cervical cancer. Checklist reviewing preventive services available has been given to the patient.    Reviewed patients plan of care and provided an AVS. The {CarePlan:119491} for Gibson meets the Care Plan requirement. This Care Plan has been established and reviewed with the {PATIENT, FAMILY MEMBER, CAREGIVER:077469}.    Counseling Resources:  ATP IV Guidelines  Pooled Cohorts Equation Calculator  Breast Cancer Risk Calculator  FRAX Risk Assessment  ICSI Preventive Guidelines  Dietary Guidelines for Americans, 2010  USDA's MyPlate  ASA Prophylaxis  Lung CA Screening    Flori Reyes DO  [facility]

## 2017-08-25 NOTE — PATIENT INSTRUCTIONS
Labs today and ear wash  STOP Amitriptyline and let me know if you notice any changes in mood or sleep  Follow up in 6 months    Preventive Health Recommendations:   Male Ages 65 and over    Yearly exam:             See your health care provider every year in order to  o   Review health changes.   o   Discuss preventive care.    o   Review your medicines if your doctor has prescribed any.    Talk with your health care provider about whether you should have a test to screen for prostate cancer (PSA).    Every 3 years, have a diabetes test (fasting glucose). If you are at risk for diabetes, you should have this test more often.    Every 5 years, have a cholesterol test. Have this test more often if you are at risk for high cholesterol or heart disease.     Every 10 years, have a colonoscopy. Or, have a yearly FIT test (stool test). These exams will check for colon cancer.    Talk to with your health care provider about screening for Abdominal Aortic Aneurysm if you have a family history of AAA or have a history of smoking.    Shots:     Get a flu shot each year.     Get a tetanus shot every 10 years.     Talk to your doctor about your pneumonia vaccines. There are now two you should receive - Pneumovax (PPSV 23) and Prevnar (PCV 13).     Talk to your doctor about a shingles vaccine.     Talk to your doctor about the hepatitis B vaccine.  Nutrition:     Eat at least 5 servings of fruits and vegetables each day.     Eat whole-grain bread, whole-wheat pasta and brown rice instead of white grains and rice.     Talk to your provider about Calcium and Vitamin D.   Lifestyle    Exercise for at least 150 minutes a week (30 minutes a day, 5 days a week). This will help you control your weight and prevent disease.     Limit alcohol to one drink per day.     No smoking.     Wear sunscreen to prevent skin cancer.     See your dentist every six months for an exam and cleaning.     See your eye doctor every 1 to 2 years to screen  for conditions such as glaucoma, macular degeneration, cataracts, etc

## 2017-08-26 LAB
ALBUMIN SERPL-MCNC: 3.6 G/DL (ref 3.4–5)
ALP SERPL-CCNC: 128 U/L (ref 40–150)
ALT SERPL W P-5'-P-CCNC: 51 U/L (ref 0–70)
ANION GAP SERPL CALCULATED.3IONS-SCNC: 9 MMOL/L (ref 3–14)
AST SERPL W P-5'-P-CCNC: 32 U/L (ref 0–45)
BILIRUB SERPL-MCNC: 0.4 MG/DL (ref 0.2–1.3)
BUN SERPL-MCNC: 18 MG/DL (ref 7–30)
CALCIUM SERPL-MCNC: 8.9 MG/DL (ref 8.5–10.1)
CHLORIDE SERPL-SCNC: 107 MMOL/L (ref 94–109)
CO2 SERPL-SCNC: 23 MMOL/L (ref 20–32)
CREAT SERPL-MCNC: 1.34 MG/DL (ref 0.66–1.25)
FERRITIN SERPL-MCNC: 107 NG/ML (ref 26–388)
GFR SERPL CREATININE-BSD FRML MDRD: 52 ML/MIN/1.7M2
GLUCOSE SERPL-MCNC: 116 MG/DL (ref 70–99)
POTASSIUM SERPL-SCNC: 4 MMOL/L (ref 3.4–5.3)
PROT SERPL-MCNC: 8 G/DL (ref 6.8–8.8)
SODIUM SERPL-SCNC: 139 MMOL/L (ref 133–144)

## 2017-09-05 ENCOUNTER — ANTICOAGULATION THERAPY VISIT (OUTPATIENT)
Dept: NURSING | Facility: CLINIC | Age: 77
End: 2017-09-05
Payer: MEDICARE

## 2017-09-05 DIAGNOSIS — Z79.01 LONG-TERM (CURRENT) USE OF ANTICOAGULANTS: ICD-10-CM

## 2017-09-05 LAB — INR POINT OF CARE: 2.4 (ref 0.86–1.14)

## 2017-09-05 PROCEDURE — 36416 COLLJ CAPILLARY BLOOD SPEC: CPT

## 2017-09-05 PROCEDURE — 85610 PROTHROMBIN TIME: CPT | Mod: QW

## 2017-09-05 PROCEDURE — 99207 ZZC NO CHARGE NURSE ONLY: CPT

## 2017-09-05 NOTE — MR AVS SNAPSHOT
Gibson Villalta   9/5/2017 1:45 PM   Anticoagulation Therapy Visit    Description:  76 year old male   Provider:   ANTICOAGULATION CLINIC   Department:   Nurse           INR as of 9/5/2017     Today's INR 2.4      Anticoagulation Summary as of 9/5/2017     INR goal 2.0-3.0   Today's INR 2.4   Full instructions 4 mg on Sun; 6 mg all other days   Next INR check 10/3/2017    Indications   Long-term (current) use of anticoagulants [Z79.01] [Z79.01]         Your next Anticoagulation Clinic appointment(s)     Oct 03, 2017  9:15 AM CDT   Anticoagulation Visit with  ANTICOAGULATION CLINIC   [facility]    [address]  [place] [address] [phone]              Contact Numbers     Clinic Number:         September 2017 Details    Sun Mon Tue Wed Thu Fri Sat          1               2                 3               4               5      6 mg   See details      6      6 mg         7      6 mg         8      6 mg         9      6 mg           10      4 mg         11      6 mg         12      6 mg         13      6 mg         14      6 mg         15      6 mg         16      6 mg           17      4 mg         18      6 mg         19      6 mg         20      6 mg         21      6 mg         22      6 mg         23      6 mg           24      4 mg         25      6 mg         26      6 mg         27      6 mg         28      6 mg         29      6 mg         30      6 mg          Date Details   09/05 This INR check               How to take your warfarin dose     To take:  4 mg Take 2 of the 2 mg tablets.    To take:  6 mg Take 3 of the 2 mg tablets.           October 2017 Details    Sun Mon Tue Wed Thu Fri Sat     1      4 mg         2      6 mg         3            4               5               6               7                 8               9               10               11               12               13               14                 15                16               17               18               19               20               21                 22               23               24               25               26               27               28                 29               30               31                    Date Details   No additional details    Date of next INR:  10/3/2017         How to take your warfarin dose     To take:  4 mg Take 2 of the 2 mg tablets.    To take:  6 mg Take 3 of the 2 mg tablets.

## 2017-09-05 NOTE — PROGRESS NOTES
ANTICOAGULATION FOLLOW-UP CLINIC VISIT    Patient Name:  Gibson Villalta  Date:  9/5/2017  Contact Type:  Face to Face    SUBJECTIVE:        OBJECTIVE    INR Protime   Date Value Ref Range Status   09/05/2017 2.4 (A) 0.86 - 1.14 Final       ASSESSMENT / PLAN  INR assessment THER    Recheck INR In: 4 WEEKS    INR Location Clinic      Anticoagulation Summary as of 9/5/2017     INR goal 2.0-3.0   Today's INR 2.4   Maintenance plan 4 mg (2 mg x 2) on Sun; 6 mg (2 mg x 3) all other days   Full instructions 4 mg on Sun; 6 mg all other days   Weekly total 40 mg   No change documented Rachel Mckeon RN   Plan last modified Rachel Mckeon RN (8/14/2017)   Next INR check 10/3/2017   Priority INR   Target end date     Indications   Long-term (current) use of anticoagulants [Z79.01] [Z79.01]         Anticoagulation Episode Summary     INR check location     Preferred lab     Send INR reminders to [facility] ANT[facility]AGUL[facility]    Comments       Anticoagulation Care Providers     Provider Role Specialty Phone number    ReyesFlori hawkins DO [facility] Internal Medicine [phone]            See the Encounter Report to view Anticoagulation Flowsheet and Dosing Calendar (Go to Encounters tab in chart review, and find the Anticoagulation Therapy Visit)    Patient is here with his wife Izabella.  Dosing based on [facility] Protocol and Provider directed care plan.      Rachel Mckeon RN

## 2017-09-11 DIAGNOSIS — I50.22 CHRONIC SYSTOLIC HEART FAILURE (H): ICD-10-CM

## 2017-09-11 DIAGNOSIS — I42.9 CARDIOMYOPATHY (H): ICD-10-CM

## 2017-09-11 RX ORDER — HYDRALAZINE HYDROCHLORIDE 10 MG/1
10 TABLET, FILM COATED ORAL 3 TIMES DAILY
Qty: 270 TABLET | Refills: 2 | Status: SHIPPED | OUTPATIENT
Start: 2017-09-11 | End: 2018-01-29

## 2017-09-21 DIAGNOSIS — Z79.01 LONG-TERM (CURRENT) USE OF ANTICOAGULANTS: ICD-10-CM

## 2017-09-22 RX ORDER — WARFARIN SODIUM 2 MG/1
TABLET ORAL
Qty: 270 TABLET | Refills: 0 | Status: SHIPPED | OUTPATIENT
Start: 2017-09-22 | End: 2017-12-19

## 2017-09-22 NOTE — TELEPHONE ENCOUNTER
Pending Prescriptions:                       Disp   Refills    warfarin (COUMADIN) 2 MG tablet [Pharmacy*270 ta*0            Sig: TAKE 2-3 TABLETS BY MOUTH DAILY AS DIRECTED BY           INR CLINIC        Last Written Prescription Date: 7/24/17  Last Fill Qty: 180, # refills: 0  Last Office Visit with [facility], [facility] or [facility] prescribing provider: 8/25/17 Reyes       Date and Result of Last PT/INR:   Lab Results   Component Value Date    INR 2.4 09/05/2017    INR 2.2 08/14/2017    INR 2.70 11/05/2016    INR 2.03 11/04/2016          Viola Urena RT(R)

## 2017-10-03 ENCOUNTER — ANTICOAGULATION THERAPY VISIT (OUTPATIENT)
Dept: NURSING | Facility: CLINIC | Age: 77
End: 2017-10-03
Payer: MEDICARE

## 2017-10-03 DIAGNOSIS — Z79.01 LONG-TERM (CURRENT) USE OF ANTICOAGULANTS: ICD-10-CM

## 2017-10-03 LAB — INR POINT OF CARE: 2.6 (ref 0.86–1.14)

## 2017-10-03 PROCEDURE — 36416 COLLJ CAPILLARY BLOOD SPEC: CPT

## 2017-10-03 PROCEDURE — 99207 ZZC NO CHARGE NURSE ONLY: CPT

## 2017-10-03 PROCEDURE — 85610 PROTHROMBIN TIME: CPT | Mod: QW

## 2017-10-03 NOTE — PROGRESS NOTES
ANTICOAGULATION FOLLOW-UP CLINIC VISIT    Patient Name:  Gibson Villalta  Date:  10/3/2017  Contact Type:  Face to Face    SUBJECTIVE:     Patient Findings     Positives No Problem Findings           OBJECTIVE    INR Protime   Date Value Ref Range Status   10/03/2017 2.6 (A) 0.86 - 1.14 Final       ASSESSMENT / PLAN  INR assessment THER    Recheck INR In: 4 WEEKS    INR Location Clinic      Anticoagulation Summary as of 10/3/2017     INR goal 2.0-3.0   Today's INR 2.6   Maintenance plan 4 mg (2 mg x 2) on Sun; 6 mg (2 mg x 3) all other days   Full instructions 4 mg on Sun; 6 mg all other days   Weekly total 40 mg   No change documented Rachel Mckeon RN   Plan last modified Rachel Mckeon RN (8/14/2017)   Next INR check 10/31/2017   Priority INR   Target end date     Indications   Long-term (current) use of anticoagulants [Z79.01] [Z79.01]         Anticoagulation Episode Summary     INR check location     Preferred lab     Send INR reminders to [facility] ANTICOAGULATION    Comments       Anticoagulation Care Providers     Provider Role Specialty Phone number    Flori Reyes DO [facility] Internal Medicine [phone]            See the Encounter Report to view Anticoagulation Flowsheet and Dosing Calendar (Go to Encounters tab in chart review, and find the Anticoagulation Therapy Visit)    Dosing based on [facility] Protocol and Provider directed care plan.      Rachel Mckeon RN

## 2017-10-03 NOTE — MR AVS SNAPSHOT
Gibson Villalta   10/3/2017 9:15 AM   Anticoagulation Therapy Visit    Description:  76 year old male   Provider:   ANTICOAGULATION CLINIC   Department:   Nurse           INR as of 10/3/2017     Today's INR 2.6      Anticoagulation Summary as of 10/3/2017     INR goal 2.0-3.0   Today's INR 2.6   Full instructions 4 mg on Sun; 6 mg all other days   Next INR check 10/31/2017    Indications   Long-term (current) use of anticoagulants [Z79.01] [Z79.01]         Your next Anticoagulation Clinic appointment(s)     Oct 31, 2017  9:15 AM CDT   Anticoagulation Visit with  ANTICOAGULATION CLINIC   [facility] ([facility])    [address]  [place] [address] [phone]              Contact Numbers     Clinic Number:         October 2017 Details    Sun Mon Tue Wed Thu Fri Sat     1               2               3      6 mg   See details      4      6 mg         5      6 mg         6      6 mg         7      6 mg           8      4 mg         9      6 mg         10      6 mg         11      6 mg         12      6 mg         13      6 mg         14      6 mg           15      4 mg         16      6 mg         17      6 mg         18      6 mg         19      6 mg         20      6 mg         21      6 mg           22      4 mg         23      6 mg         24      6 mg         25      6 mg         26      6 mg         27      6 mg         28      6 mg           29      4 mg         30      6 mg         31                 Date Details   10/03 This INR check       Date of next INR:  10/31/2017         How to take your warfarin dose     To take:  4 mg Take 2 of the 2 mg tablets.    To take:  6 mg Take 3 of the 2 mg tablets.

## 2017-10-05 ENCOUNTER — ALLIED HEALTH/NURSE VISIT (OUTPATIENT)
Dept: CARDIOLOGY | Facility: CLINIC | Age: 77
End: 2017-10-05
Payer: MEDICARE

## 2017-10-05 DIAGNOSIS — Z95.810 ICD (IMPLANTABLE CARDIOVERTER-DEFIBRILLATOR) IN PLACE: ICD-10-CM

## 2017-10-05 DIAGNOSIS — I42.8 NONISCHEMIC CARDIOMYOPATHY (H): Primary | ICD-10-CM

## 2017-10-05 PROCEDURE — 93296 REM INTERROG EVL PM/IDS: CPT | Performed by: INTERNAL MEDICINE

## 2017-10-05 PROCEDURE — 93295 DEV INTERROG REMOTE 1/2/MLT: CPT | Performed by: INTERNAL MEDICINE

## 2017-10-05 NOTE — PROGRESS NOTES
Medtronic Amplia MRI compatible (D) ICD Remote Device Check  AP: 5 % : 98 % BiV paved  Mode: DDDR 50/130        Presenting Rhythm: AS/BiV paced  Heart Rate: Stable with good variability  Sensing: WNL    Pacing Threshold: WNL    Impedance: WNL  Battery Status: Estimated at 8.2 years remaining longevity  Atrial Arrhythmia: 0  Ventricular Arrhythmia: 0  ATP: 0    Shocks: 0    Care Plan: Due for annual in office device check in 3 months. Patient saw Dr Hernandez this summer.BESS Nesbitt

## 2017-10-05 NOTE — MR AVS SNAPSHOT
After Visit Summary   10/5/2017    Gibson Villalta    MRN: [id number]           Patient Information     Date Of Birth          1940        Visit Information        Provider Department      10/5/2017 4:30 PM ALEJANDRO DCR2 [facility]        Today's Diagnoses     Nonischemic cardiomyopathy (H)    -  1    ICD (implantable cardioverter-defibrillator) in place           Follow-ups after your visit        Your next 10 appointments already scheduled     Oct 05, 2017  4:30 PM CDT   Remote ICD Check with ALEJANDRO DCR2   [facility] ([facility])    [address]  [place] [id number] [phone]           This appointment is for a remote check of your debrillator.  This is not an appointment at the office.            Oct 31, 2017  9:15 AM CDT   Anticoagulation Visit with  ANTICOAGULATION CLINIC   [facility]    [address]  [place] [id number] [phone]              Who to contact     If you have questions or need follow up information about today's clinic visit or your schedule please contact [facility] directly at [phone].  Normal or non-critical lab and imaging results will be communicated to you by MyChart, letter or phone within 4 business days after the clinic has received the results. If you do not hear from us within 7 days, please contact the clinic through MyChart or phone. If you have a critical or abnormal lab result, we will notify you by phone as soon as possible.  Submit refill requests through [employer] or call your pharmacy and they will forward the refill request to us. Please allow 3 business days for your refill to be completed.          Additional Information About Your Visit        MyChart Information     [employer] gives you secure access to your electronic health record. If you see a primary  care provider, you can also send messages to your care team and make appointments. If you have questions, please call your primary care clinic.  If you do not have a primary care provider, please call [phone] and they will assist you.        Care EveryWhere ID     This is your Care EveryWhere ID. This could be used by other organizations to access your [facility] medical records  [id number]         Blood Pressure from Last 3 Encounters:   08/25/17 122/82   05/11/17 128/88   04/25/17 106/74    Weight from Last 3 Encounters:   08/25/17 107.4 kg (236 lb 11.2 oz)   05/11/17 105.8 kg (233 lb 3.2 oz)   04/25/17 104.3 kg (230 lb)              We Performed the Following     ICD DEVICE INTERROGAT REMOTE (16858)     INTERROGATION DEVICE EVAL REMOTE, PACER/ICD (89565)        Primary Care Provider Office Phone # Fax #    Flori Reyes,  [phone] [address] [facility] [address]  [facility] [address]        Equal Access to Services     [facility] Center[facility]AY[facility] : Hadii aad ku hadasho Soomaali, waaxda luqadaha, qaybta kaalmada adeegyada, waxay irlanda doty . So [facility] [phone].    ATENCIÓN: Si habla español, tiene a de luna disposición servicios gratuitos de asistencia lingüística. Yvette al [phone].    We comply with applicable federal civil rights laws and [place] laws. We do not discriminate on the basis of race, color, national origin, age, disability, sex, sexual orientation, or gender identity.            Thank you!     Thank you for choosing [facility] [place]  for your care. Our goal is always to provide you with excellent care. Hearing back from our patients is one way we can continue to improve our services. Please take a few minutes to complete the written survey that you may receive in the mail after your visit with us. Thank you!             Your Updated Medication List - Protect others around you: Learn how to safely use, store and throw away your  medicines at www.disposemymeds.org.          This list is accurate as of: 10/5/17  7:41 AM.  Always use your most recent med list.                   Brand Name Dispense Instructions for use Diagnosis    ACE/ARB NOT PRESCRIBED (INTENTIONAL)      ACE & ARB not prescribed due to Symptomatic hypotension not due to excessive diuresis    HFrEF (heart failure with reduced ejection fraction) (H)       albuterol 108 (90 BASE) MCG/ACT Inhaler    PROAIR HFA/PROVENTIL HFA/VENTOLIN HFA     Inhale 2 puffs into the lungs every 6 hours as needed for shortness of breath / dyspnea or wheezing        ASPIRIN NOT PRESCRIBED    INTENTIONAL    0 each    Antiplatelet medication not prescribed intentionally due to Current anticoagulant therapy (warfarin/enoxaparin)    HFrEF (heart failure with reduced ejection fraction) (H)       atorvastatin 40 MG tablet    LIPITOR    90 tablet    Take 1 tablet (40 mg) by mouth At Bedtime    Hyperlipidemia LDL goal <130       COENZYME Q-10 PO      Take by mouth daily        ferrous sulfate 325 (65 FE) MG tablet    IRON     Take 325 mg by mouth daily        furosemide 20 MG tablet    LASIX    45 tablet    Take 0.5 tablets (10 mg) by mouth daily    Chronic systolic heart failure (H)       hydrALAZINE 10 MG tablet    APRESOLINE    270 tablet    Take 1 tablet (10 mg) by mouth 3 times daily    Chronic systolic heart failure (H), Cardiomyopathy (H)       isosorbide mononitrate 30 MG 24 hr tablet    IMDUR    45 tablet    Take 0.5 tablets (15 mg) by mouth daily    Chronic systolic heart failure (H), Cardiomyopathy (H)       metoprolol 50 MG 24 hr tablet    TOPROL-XL    90 tablet    Take 1 tablet (50 mg) by mouth daily    Cardiomyopathy (H)       Multi-vitamin Tabs tablet      Take 1 tablet by mouth daily        OMEGA-3 FISH OIL PO      Take 1 g by mouth daily        venlafaxine 75 MG tablet    EFFEXOR    180 tablet    Take 1 tablet (75 mg) by mouth 2 times daily    Anxiety and depression       vitamin D 99249  UNIT capsule    ERGOCALCIFEROL    12 capsule    Take 1 capsule (50,000 Units) by mouth once a week    Vitamin D deficiency       warfarin 2 MG tablet    COUMADIN    270 tablet    TAKE 2-3 TABLETS BY MOUTH DAILY AS DIRECTED BY INR CLINIC    Long-term (current) use of anticoagulants

## 2017-10-18 DIAGNOSIS — I42.9 CARDIOMYOPATHY (H): ICD-10-CM

## 2017-10-18 RX ORDER — METOPROLOL SUCCINATE 50 MG/1
50 TABLET, EXTENDED RELEASE ORAL DAILY
Qty: 90 TABLET | Refills: 2 | Status: SHIPPED | OUTPATIENT
Start: 2017-10-18 | End: 2018-01-26

## 2017-10-20 DIAGNOSIS — I42.9 CARDIOMYOPATHY (H): ICD-10-CM

## 2017-10-20 DIAGNOSIS — I50.22 CHRONIC SYSTOLIC HEART FAILURE (H): ICD-10-CM

## 2017-10-20 RX ORDER — FUROSEMIDE 20 MG
10 TABLET ORAL DAILY
Qty: 45 TABLET | Refills: 3 | Status: SHIPPED | OUTPATIENT
Start: 2017-10-20 | End: 2018-01-26

## 2017-10-24 RX ORDER — ISOSORBIDE MONONITRATE 30 MG/1
15 TABLET, EXTENDED RELEASE ORAL DAILY
Qty: 45 TABLET | Refills: 2 | Status: SHIPPED | OUTPATIENT
Start: 2017-10-24 | End: 2018-01-29

## 2017-10-31 ENCOUNTER — ANTICOAGULATION THERAPY VISIT (OUTPATIENT)
Dept: NURSING | Facility: CLINIC | Age: 77
End: 2017-10-31
Payer: MEDICARE

## 2017-10-31 DIAGNOSIS — Z79.01 LONG-TERM (CURRENT) USE OF ANTICOAGULANTS: ICD-10-CM

## 2017-10-31 LAB — INR POINT OF CARE: 3.2 (ref 0.86–1.14)

## 2017-10-31 PROCEDURE — 36416 COLLJ CAPILLARY BLOOD SPEC: CPT

## 2017-10-31 PROCEDURE — 99207 ZZC NO CHARGE NURSE ONLY: CPT

## 2017-10-31 PROCEDURE — 85610 PROTHROMBIN TIME: CPT | Mod: QW

## 2017-10-31 NOTE — PROGRESS NOTES
ANTICOAGULATION FOLLOW-UP CLINIC VISIT    Patient Name:  Gibson Villalta  Date:  10/31/2017  Contact Type:  Face to Face    SUBJECTIVE:     Patient Findings     Positives Unexplained INR or factor level change           OBJECTIVE    INR Protime   Date Value Ref Range Status   10/31/2017 3.2 (A) 0.86 - 1.14 Final       ASSESSMENT / PLAN  INR assessment SUPRA    Recheck INR In: 4 WEEKS    INR Location Clinic      Anticoagulation Summary as of 10/31/2017     INR goal 2.0-3.0   Today's INR 3.2!   Maintenance plan 4 mg (2 mg x 2) on Sun; 6 mg (2 mg x 3) all other days   Full instructions 4 mg on Sun; 6 mg all other days   Weekly total 40 mg   No change documented Rachel Mckeon RN   Plan last modified Rachel Mckeon RN (8/14/2017)   Next INR check 11/28/2017   Priority INR   Target end date     Indications   Long-term (current) use of anticoagulants [Z79.01] [Z79.01]         Anticoagulation Episode Summary     INR check location     Preferred lab     Send INR reminders to [facility] ANTICOAGULATION    Comments       Anticoagulation Care Providers     Provider Role Specialty Phone number    Flori Reyes DO [facility] Internal Medicine [phone]            See the Encounter Report to view Anticoagulation Flowsheet and Dosing Calendar (Go to Encounters tab in chart review, and find the Anticoagulation Therapy Visit)    Denies any changes overall. Discussed Vit K food intake this week.  Dosing based on [facility] Protocol and Provider directed care plan.      Rachel Mckeon, RN

## 2017-10-31 NOTE — MR AVS SNAPSHOT
Gibson Villalta   10/31/2017 9:15 AM   Anticoagulation Therapy Visit    Description:  77 year old male   Provider:   ANTICOAGULATION CLINIC   Department:   Nurse           INR as of 10/31/2017     Today's INR 3.2!      Anticoagulation Summary as of 10/31/2017     INR goal 2.0-3.0   Today's INR 3.2!   Full instructions 4 mg on Sun; 6 mg all other days   Next INR check 11/28/2017    Indications   Long-term (current) use of anticoagulants [Z79.01] [Z79.01]         Your next Anticoagulation Clinic appointment(s)     Nov 28, 2017  1:30 PM CST   Anticoagulation Visit with  ANTICOAGULATION CLINIC   [facility]    [address]  [place] [address] [phone]              Contact Numbers     Clinic Number:         October 2017 Details    Sun Mon Tue Wed Thu Fri Sat     1               2               3               4               5               6               7                 8               9               10               11               12               13               14                 15               16               17               18               19               20               21                 22               23               24               25               26               27               28                 29               30               31      6 mg   See details           Date Details   10/31 This INR check               How to take your warfarin dose     To take:  6 mg Take 3 of the 2 mg tablets.           November 2017 Details    Sun Mon Tue Wed Thu Fri Sat        1      6 mg         2      6 mg         3      6 mg         4      6 mg           5      4 mg         6      6 mg         7      6 mg         8      6 mg         9      6 mg         10      6 mg         11      6 mg           12      4 mg         13      6 mg         14      6 mg         15      6 mg         16      6 mg         17      6 mg         18      6 mg            19      4 mg         20      6 mg         21      6 mg         22      6 mg         23      6 mg         24      6 mg         25      6 mg           26      4 mg         27      6 mg         28            29               30                  Date Details   No additional details    Date of next INR:  11/28/2017         How to take your warfarin dose     To take:  4 mg Take 2 of the 2 mg tablets.    To take:  6 mg Take 3 of the 2 mg tablets.

## 2017-11-09 DIAGNOSIS — E78.5 HYPERLIPIDEMIA LDL GOAL <130: ICD-10-CM

## 2017-11-09 RX ORDER — ATORVASTATIN CALCIUM 40 MG/1
TABLET, FILM COATED ORAL
Qty: 90 TABLET | Refills: 1 | Status: SHIPPED | OUTPATIENT
Start: 2017-11-09 | End: 2018-01-26

## 2017-11-28 ENCOUNTER — ANTICOAGULATION THERAPY VISIT (OUTPATIENT)
Dept: NURSING | Facility: CLINIC | Age: 77
End: 2017-11-28
Payer: MEDICARE

## 2017-11-28 ENCOUNTER — TELEPHONE (OUTPATIENT)
Dept: FAMILY MEDICINE | Facility: CLINIC | Age: 77
End: 2017-11-28

## 2017-11-28 DIAGNOSIS — Z79.01 LONG-TERM (CURRENT) USE OF ANTICOAGULANTS: ICD-10-CM

## 2017-11-28 DIAGNOSIS — J34.89 PERFORATED NASAL SEPTUM: ICD-10-CM

## 2017-11-28 DIAGNOSIS — M70.52 BURSITIS OF LEFT KNEE, UNSPECIFIED BURSA: Primary | ICD-10-CM

## 2017-11-28 LAB — INR POINT OF CARE: 4.4 (ref 0.86–1.14)

## 2017-11-28 PROCEDURE — 36416 COLLJ CAPILLARY BLOOD SPEC: CPT

## 2017-11-28 PROCEDURE — 99207 ZZC NO CHARGE NURSE ONLY: CPT

## 2017-11-28 PROCEDURE — 85610 PROTHROMBIN TIME: CPT | Mod: QW

## 2017-11-28 NOTE — TELEPHONE ENCOUNTER
"TO PCP:  Patient was seen today in INR and has a couple questions:    1) He has a bursitis pocket on his left Knee Cap the size of a golf ball x 6 months.  Can you drain?  I also recommended home remedies to see if helps.    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  Can you refer? States his son also has this.    Please advise.  Thank you.  Rachel Mckeon RN      "

## 2017-11-28 NOTE — MR AVS SNAPSHOT
Gibson LA[place] Melaashliesaturnino   11/28/2017 1:30 PM   Anticoagulation Therapy Visit    Description:  77 year old male   Provider:   ANTICOAGULATION CLINIC   Department:  [facility] Nurse           INR as of 11/28/2017     Today's INR 4.4!      Anticoagulation Summary as of 11/28/2017     INR goal 2.0-3.0   Today's INR 4.4!   Full instructions 11/28: Hold; Otherwise 4 mg on Sun; 6 mg all other days   Next INR check 12/12/2017    Indications   Long-term (current) use of anticoagulants [Z79.01] [Z79.01]         Your next Anticoagulation Clinic appointment(s)     Dec 12, 2017  2:15 PM CST   Anticoagulation Visit with  ANTICOAGULATION CLINIC   [facility]    [address]  [place] [address]   [phone]              Contact Numbers     Clinic Number:         November 2017 Details    Sun Mon Tue Wed Thu Fri Sat        1               2               3               4                 5               6               7               8               9               10               11                 12               13               14               15               16               17               18                 19               20               21               22               23               24               25                 26               27               28      Hold   See details      29      6 mg         30      6 mg            Date Details   11/28 This INR check               How to take your warfarin dose     To take:  6 mg Take 3 of the 2 mg tablets.    Hold Do not take your warfarin dose. See the Details table to the right for additional instructions.                December 2017 Details    Sun Mon Tue Wed Thu Fri Sat          1      6 mg         2      6 mg           3      4 mg         4      6 mg         5      6 mg         6      6 mg         7      6 mg         8      6 mg         9      6 mg           10      4 mg         11      6 mg         12            13                14               15               16                 17               18               19               20               21               22               23                 24               25               26               27               28               29               30                 31                      Date Details   No additional details    Date of next INR:  12/12/2017         How to take your warfarin dose     To take:  4 mg Take 2 of the 2 mg tablets.    To take:  6 mg Take 3 of the 2 mg tablets.

## 2017-11-28 NOTE — PROGRESS NOTES
ANTICOAGULATION FOLLOW-UP CLINIC VISIT    Patient Name:  Gibson Villalta  Date:  11/28/2017  Contact Type:  Face to Face    SUBJECTIVE:     Patient Findings     Positives Unexplained INR or factor level change           OBJECTIVE    INR Protime   Date Value Ref Range Status   11/28/2017 4.4 (A) 0.86 - 1.14 Final       ASSESSMENT / PLAN  INR assessment SUPRA    Recheck INR In: 2 WEEKS    INR Location Clinic      Anticoagulation Summary as of 11/28/2017     INR goal 2.0-3.0   Today's INR 4.4!   Maintenance plan 4 mg (2 mg x 2) on Sun; 6 mg (2 mg x 3) all other days   Full instructions 11/28: Hold; Otherwise 4 mg on Sun; 6 mg all other days   Weekly total 40 mg   Plan last modified Rachel Mckeon RN (8/14/2017)   Next INR check 12/12/2017   Priority INR   Target end date     Indications   Long-term (current) use of anticoagulants [Z79.01] [Z79.01]         Anticoagulation Episode Summary     INR check location     Preferred lab     Send INR reminders to [facility] ANT[facility]AGULATION    Comments       Anticoagulation Care Providers     Provider Role Specialty Phone number    Flori Reyes,  [facility] [phone]            See the Encounter Report to view Anticoagulation Flowsheet and Dosing Calendar (Go to Encounters tab in chart review, and find the Anticoagulation Therapy Visit)    Denies changes overall. Wife Izabella helps with medications.  Dosing based on [facility] Protocol and Provider directed care plan.      Rachel Mckeon, RN

## 2017-11-29 ENCOUNTER — TELEPHONE (OUTPATIENT)
Dept: FAMILY MEDICINE | Facility: CLINIC | Age: 77
End: 2017-11-29

## 2017-11-29 DIAGNOSIS — I24.0 RV (RIGHT VENTRICULAR) MURAL THROMBUS WITHOUT MI (H): Chronic | ICD-10-CM

## 2017-11-29 DIAGNOSIS — Z79.01 LONG-TERM (CURRENT) USE OF ANTICOAGULANTS: Primary | Chronic | ICD-10-CM

## 2017-11-29 NOTE — TELEPHONE ENCOUNTER
I cannot drain the bursitis  I did place ortho  and ENT referrals  Please call his wife Izabella to give her the information for scheduling  Thanks!

## 2017-11-29 NOTE — TELEPHONE ENCOUNTER
To PCP:  Patient's INR Clinic Referral has .  Please see new INR clinic referral above.  Please sign if appropriate.  Thank you.  Rachel Mckeon RN

## 2017-11-29 NOTE — TELEPHONE ENCOUNTER
Patient has been informed of referrals and information.  Rachel Mckeon, RN          Ortho -   All areas ~ [phone]    ENT  [facility] [place] [phone]

## 2017-12-04 ENCOUNTER — OFFICE VISIT (OUTPATIENT)
Dept: ORTHOPEDICS | Facility: CLINIC | Age: 77
End: 2017-12-04
Payer: MEDICARE

## 2017-12-04 VITALS
BODY MASS INDEX: 31.28 KG/M2 | SYSTOLIC BLOOD PRESSURE: 140 MMHG | DIASTOLIC BLOOD PRESSURE: 82 MMHG | WEIGHT: 236 LBS | HEIGHT: 73 IN

## 2017-12-04 DIAGNOSIS — Z79.01 LONG-TERM (CURRENT) USE OF ANTICOAGULANTS: Chronic | ICD-10-CM

## 2017-12-04 DIAGNOSIS — M70.42 PREPATELLAR BURSITIS OF LEFT KNEE: Primary | ICD-10-CM

## 2017-12-04 PROCEDURE — 99203 OFFICE O/P NEW LOW 30 MIN: CPT | Performed by: FAMILY MEDICINE

## 2017-12-04 NOTE — PROGRESS NOTES
[facility]   Clinic Visit s Dec 4, 2017    PCP: Flori Reyes Josiane Arreaga is a 77 year old male who is seen in consultation at the request of Dr. Reyes for   Chief Complaint   Patient presents with     Knee Pain       Injury: Reports insidious onset without acute precipitating event.     Location of Pain: left anterior, minimal pain, just discomfort with appearance and with pressure by kneeling.   Duration of Pain: 5 month(s)  Rating of Pain at worst: 0/10  Rating of Pain Currently: 0/10  Pain is worse with: nothing  Pain is better with: nothing  Treatment so far consists of: no treatment tried to date  Associated symptoms: large swollen bursa anteriorly, no distal numbness or tingling; denies swelling or warmth  Prior History of related problems: none    Hx of recurrent falls until pacemaker placed. Wife recalls direct blow to anterior LEFT knee months ago.       Social History: retired     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     Myocardial infarction 1995 and 2010     Nonischemic cardiomyopathy (H) 7/22/2016     Obesity      Rosacea      Rotator cuff disorder     Tear x 2     RV (right ventricular) mural thrombus 7/22/2016       Patient Active Problem List    Diagnosis Date Noted     Cardiomyopathy (H) 03/14/2017     Priority: Medium     Controlled type 2 diabetes mellitus with chronic kidney disease, without long-term current use of insulin, unspecified CKD stage (H) 02/24/2017     Priority: Medium     New Dx Feb 2017       HFrEF (heart failure with reduced ejection fraction) (H) 08/16/2016     Priority: Medium     Chronic kidney disease  "(CKD), stage 3 (moderate)      Priority: Medium     Anxiety and depression      Priority: Medium     BPH (benign prostatic hypertrophy)      Priority: Medium     LBBB (left bundle branch block)      Priority: Medium     Long-term (current) use of anticoagulants [Z79.01] 2016     Priority: Medium     Mixed cardiomyopathy 2016     Priority: Medium     RV (right ventricular) mural thrombus without MI 2016     Priority: Medium     Dementia without behavioral disturbance, unspecified dementia type 2016     Priority: Medium     History of gastric bypass 2016     Priority: Medium       Family History   Problem Relation Age of Onset     Coronary Artery Disease Father 39     Alzheimer Disease Mother      Coronary Artery Disease Son      Two sons have  from MIs (ages 39 and 51)       Social History     Social History     Marital status:      Spouse name: N/A     Number of children: N/A     Years of education: N/A     Occupational History     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[employer]; retired     Social History Main Topics     Smoking status: Former Smoker     Packs/day: 2.00     Years: 8.00     Types: Cigarettes     Quit date: 1980     Smokeless tobacco: Never Used      Comment: Quit in his 30s - 2 ppd for 8 years     Alcohol use 0.0 oz/week     0 Standard drinks or equivalent per week      Comment: maybe 1 beer a month       Past Surgical History:   Procedure Laterality Date     ANGIOPLASTY   and  with stent     GASTRIC BYPASS       HEART CATH LEFT HEART CATH  16    Non ischemic cardiomyopathy; Non functionally significant LAD and RCA disease      OTHER SURGICAL HISTORY      Spinal surgery     OTHER SURGICAL HISTORY  2016    CRT-D implantation       Review of Systems   Musculoskeletal: Positive for joint pain.   All other systems reviewed and are negative.        Physical Exam   Musculoskeletal:        Right knee: Normal.     /82  Ht 6' 1\" (1.854 m)  " Wt 236 lb (107 kg)  BMI 31.14 kg/m2  Constitutional:well-developed, well-nourished, and in no distress.   Cardiovascular: Intact distal pulses.    Neurological: alert. Gait Normal:   Gait, station, stance, and balance appear normal for age  Skin: Skin is warm and dry.   Psychiatric: Mood and affect normal.   Respiratory: unlabored, speaks in full sentences  Lymph: no LAD, no lymphangitis          Left Knee Exam   Swelling: Mild  Effusion: No    Tenderness   None    Range of Motion   Extension: Normal  Flexion:     Normal    Tests   McMurrays:  Medial - Negative      Lateral - Negative  Lachman:  Anterior - Negative    Posterior - Negative  Drawer:       Anterior - Negative     Posterior - Negative  Varus:  Negative  Valgus: Negative  Pivot Shift: Negative  Patellar Apprehension: No    Comments:  Golf ball sized swelling anterior LEFT knee, no unusual warmth or redness, no acute tenderness to palpation/       Crepitus with range of motion    Well healed transverse scar across surface of swollen bursa      Right Knee Exam   Right knee exam is normal.    Tenderness   None    Range of Motion   Normal right knee ROM    Muscle Strength   Normal right knee strength        ASSESSMENT/PLAN    ICD-10-CM    1. Prepatellar bursitis of left knee M70.42    2. Long-term (current) use of anticoagulants [Z79.01] Z79.01      Nature of bursitis discussed.  Probable cause due to prolonged pressure OR single isolated acute blow. acute Aspiration and injection not recommended given lack of constitutional or local infection symptoms, and high rates of recurrence following aspiration as well as risk of chronic draining lesions and risk of promoting infection.  However current swelling is painless, and has no local or systemic inflammatory symptoms. anticoagulation warrants caution but a one time attempt of aspiration for this well consolidated bursitis is an option, under [place] guidance. Pt agrees to proceed ,and will schedule this at a  later date.

## 2017-12-04 NOTE — LETTER
12/4/2017         RE: Gibson Villalta  [address]   [place] [address]        Dear Colleague,    Thank you for referring your patient, Gibson Villalta, to the [facility]. Please see a copy of my visit note below.    HPI     [facility]   Clinic Visit s Dec 4, 2017    PCP: Flori Reyes Josianefrances Arreaga is a 77 year old male who is seen in consultation at the request of Dr. Reyes for   Chief Complaint   Patient presents with     Knee Pain       Injury: Reports insidious onset without acute precipitating event.     Location of Pain: left anterior, minimal pain, just discomfort with appearance and with pressure by kneeling.   Duration of Pain: 5 month(s)  Rating of Pain at worst: 0/10  Rating of Pain Currently: 0/10  Pain is worse with: nothing  Pain is better with: nothing  Treatment so far consists of: no treatment tried to date  Associated symptoms: large swollen bursa anteriorly, no distal numbness or tingling; denies swelling or warmth  Prior History of related problems: none    Hx of recurrent falls until pacemaker placed. Wife recalls direct blow to anterior LEFT knee months ago.       Social History: retired     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     Myocardial infarction 1995 and 2010     Nonischemic cardiomyopathy (H) 7/22/2016     Obesity      Rosacea      Rotator cuff disorder     Tear x 2     RV (right ventricular) mural thrombus 7/22/2016       Patient Active Problem List    Diagnosis Date Noted     Cardiomyopathy (H) 03/14/2017     Priority: Medium      Controlled type 2 diabetes mellitus with chronic kidney disease, without long-term current use of insulin, unspecified CKD stage (H) 2017     Priority: Medium     New Dx 2017       HFrEF (heart failure with reduced ejection fraction) (H) 2016     Priority: Medium     Chronic kidney disease (CKD), stage 3 (moderate)      Priority: Medium     Anxiety and depression      Priority: Medium     BPH (benign prostatic hypertrophy)      Priority: Medium     LBBB (left bundle branch block)      Priority: Medium     Long-term (current) use of anticoagulants [Z79.01] 2016     Priority: Medium     Mixed cardiomyopathy 2016     Priority: Medium     RV (right ventricular) mural thrombus without MI 2016     Priority: Medium     Dementia without behavioral disturbance, unspecified dementia type 2016     Priority: Medium     History of gastric bypass 2016     Priority: Medium       Family History   Problem Relation Age of Onset     Coronary Artery Disease Father 39     Alzheimer Disease Mother      Coronary Artery Disease Son      Two sons have  from MIs (ages 39 and 51)       Social History     Social History     Marital status:      Spouse name: N/A     Number of children: N/A     Years of education: N/A     Occupational History     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[employer]; retired     Social History Main Topics     Smoking status: Former Smoker     Packs/day: 2.00     Years: 8.00     Types: Cigarettes     Quit date: 1980     Smokeless tobacco: Never Used      Comment: Quit in his 30s - 2 ppd for 8 years     Alcohol use 0.0 oz/week     0 Standard drinks or equivalent per week      Comment: maybe 1 beer a month       Past Surgical History:   Procedure Laterality Date     ANGIOPLASTY   and  with stent     GASTRIC BYPASS       HEART CATH LEFT HEART CATH  16    Non ischemic cardiomyopathy; Non functionally significant LAD and RCA disease      OTHER SURGICAL  "HISTORY  2006    Spinal surgery     OTHER SURGICAL HISTORY  Nov 2016    CRT-D implantation       Review of Systems   Musculoskeletal: Positive for joint pain.   All other systems reviewed and are negative.        Physical Exam   Musculoskeletal:        Right knee: Normal.     /82  Ht 6' 1\" (1.854 m)  Wt 236 lb (107 kg)  BMI 31.14 kg/m2  Constitutional:well-developed, well-nourished, and in no distress.   Cardiovascular: Intact distal pulses.    Neurological: alert. Gait Normal:   Gait, station, stance, and balance appear normal for age  Skin: Skin is warm and dry.   Psychiatric: Mood and affect normal.   Respiratory: unlabored, speaks in full sentences  Lymph: no LAD, no lymphangitis          Left Knee Exam   Swelling: Mild  Effusion: No    Tenderness   None    Range of Motion   Extension: Normal  Flexion:     Normal    Tests   McMurrays:  Medial - Negative      Lateral - Negative  Lachman:  Anterior - Negative    Posterior - Negative  Drawer:       Anterior - Negative     Posterior - Negative  Varus:  Negative  Valgus: Negative  Pivot Shift: Negative  Patellar Apprehension: No    Comments:  Golf ball sized swelling anterior LEFT knee, no unusual warmth or redness, no acute tenderness to palpation/       Crepitus with range of motion    Well healed transverse scar across surface of swollen bursa      Right Knee Exam   Right knee exam is normal.    Tenderness   None    Range of Motion   Normal right knee ROM    Muscle Strength   Normal right knee strength        ASSESSMENT/PLAN    ICD-10-CM    1. Prepatellar bursitis of left knee M70.42    2. Long-term (current) use of anticoagulants [Z79.01] Z79.01      Nature of bursitis discussed.  Probable cause due to prolonged pressure OR single isolated acute blow. acute Aspiration and injection not recommended given lack of constitutional or local infection symptoms, and high rates of recurrence following aspiration as well as risk of chronic draining lesions and " risk of promoting infection.  However current swelling is painless, and has no local or systemic inflammatory symptoms. anticoagulation warrants caution but a one time attempt of aspiration for this well consolidated bursitis is an option, under US guidance. Pt agrees to proceed ,and will schedule this at a later date.       Again, thank you for allowing me to participate in the care of your patient.        Sincerely,        Ronaldo Turcios MD

## 2017-12-04 NOTE — MR AVS SNAPSHOT
After Visit Summary   12/4/2017    Gibson Villalta    MRN: [id number]           Patient Information     Date Of Birth          1940        Visit Information        Provider Department      12/4/2017 4:20 PM Ronaldo Turcios MD [facility]        Today's Diagnoses     Prepatellar bursitis of left knee    -  1    Long-term (current) use of anticoagulants [Z79.01]           Follow-ups after your visit        Your next 10 appointments already scheduled     Dec 11, 2017  2:00 PM CST   Return Visit with Ronaldo Turcios MD   [facility] ([facility] [place])    [address] [place] [address] , [place] [address]  [place] [id number] [phone]            Dec 12, 2017  2:15 PM CST   Anticoagulation Visit with  ANTICOAGULATION CLINIC   [facility]    [address] [address] [phone]            Jan 09, 2018 10:30 AM CST   ICD Check with FLAVIA FAROOQ   [facility])    [address]  [place] [address] [phone]              Who to contact     If you have questions or need follow up information about today's clinic visit or your schedule please contact [facility] directly at [phone].  Normal or non-critical lab and imaging results will be communicated to you by MyChart, letter or phone within 4 business days after the clinic has received the results. If you do not hear from us within 7 days, please contact the clinic through MyChart or phone. If you have a critical or abnormal lab result, we will notify you by phone as soon as possible.  Submit refill requests through E la Carte or call your pharmacy and they will forward the refill request to us. Please allow 3 business days for your refill to be completed.     "      Additional Information About Your Visit        MyChart Information     Urge gives you secure access to your electronic health record. If you see a primary care provider, you can also send messages to your care team and make appointments. If you have questions, please call your primary care clinic.  If you do not have a primary care provider, please call [phone] and they will assist you.        Care EveryWhere ID     This is your Care EveryWhere ID. This could be used by other organizations to access your [place] medical records  [id number]        Your Vitals Were     Height BMI (Body Mass Index)                6' 1\" (1.854 m) 31.14 kg/m2           Blood Pressure from Last 3 Encounters:   12/04/17 140/82   08/25/17 122/82   05/11/17 128/88    Weight from Last 3 Encounters:   12/04/17 236 lb (107 kg)   08/25/17 236 lb 11.2 oz (107.4 kg)   05/11/17 233 lb 3.2 oz (105.8 kg)              Today, you had the following     No orders found for display       Primary Care Provider Office Phone # Fax #    Flori Reyes,  [phone] [address]  [place] [address]        Equal Access to Services     BEL MCLAIN AH: Hadii aad ku hadasho Soomaali, waaxda luqadaha, qaybta kaalmada adeegyada, waxay idiin hayjohnn adeeg khwild lawinsome ah. So [facility] [phone].    ATENCIÓN: Si habla español, tiene a de luna disposición servicios gratuitos de asistencia lingüística. Llame al [phone].    We comply with applicable federal civil rights laws and [place] laws. We do not discriminate on the basis of race, color, national origin, age, disability, sex, sexual orientation, or gender identity.            Thank you!     Thank you for choosing [place] [facility]  for your care. Our goal is always to provide you with excellent care. Hearing back from our patients is one way we can continue to improve our services. Please take a few minutes to complete the " written survey that you may receive in the mail after your visit with us. Thank you!             Your Updated Medication List - Protect others around you: Learn how to safely use, store and throw away your medicines at www.Oddslifeem19payeds.org.          This list is accurate as of: 12/4/17 11:59 PM.  Always use your most recent med list.                   Brand Name Dispense Instructions for use Diagnosis    ACE/ARB/ARNI NOT PRESCRIBED (INTENTIONAL)      ACE & ARB not prescribed due to Symptomatic hypotension not due to excessive diuresis    HFrEF (heart failure with reduced ejection fraction) (H)       albuterol 108 (90 BASE) MCG/ACT Inhaler    PROAIR HFA/PROVENTIL HFA/VENTOLIN HFA     Inhale 2 puffs into the lungs every 6 hours as needed for shortness of breath / dyspnea or wheezing        ASPIRIN NOT PRESCRIBED    INTENTIONAL    0 each    Antiplatelet medication not prescribed intentionally due to Current anticoagulant therapy (warfarin/enoxaparin)    HFrEF (heart failure with reduced ejection fraction) (H)       atorvastatin 40 MG tablet    LIPITOR    90 tablet    TAKE 1 TABLET BY MOUTH AT BEDTIME    Hyperlipidemia LDL goal <130       COENZYME Q-10 PO      Take by mouth daily        ferrous sulfate 325 (65 FE) MG tablet    IRON     Take 325 mg by mouth daily        furosemide 20 MG tablet    LASIX    45 tablet    Take 0.5 tablets (10 mg) by mouth daily    Chronic systolic heart failure (H)       hydrALAZINE 10 MG tablet    APRESOLINE    270 tablet    Take 1 tablet (10 mg) by mouth 3 times daily    Chronic systolic heart failure (H), Cardiomyopathy (H)       isosorbide mononitrate 30 MG 24 hr tablet    IMDUR    45 tablet    Take 0.5 tablets (15 mg) by mouth daily    Chronic systolic heart failure (H), Cardiomyopathy (H)       metoprolol 50 MG 24 hr tablet    TOPROL-XL    90 tablet    Take 1 tablet (50 mg) by mouth daily    Cardiomyopathy (H)       Multi-vitamin Tabs tablet      Take 1 tablet by mouth daily         OMEGA-3 FISH OIL PO      Take 1 g by mouth daily        venlafaxine 75 MG tablet    EFFEXOR    180 tablet    Take 1 tablet (75 mg) by mouth 2 times daily    Anxiety and depression       vitamin D 52946 UNIT capsule    ERGOCALCIFEROL    12 capsule    Take 1 capsule (50,000 Units) by mouth once a week    Vitamin D deficiency       warfarin 2 MG tablet    COUMADIN    270 tablet    TAKE 2-3 TABLETS BY MOUTH DAILY AS DIRECTED BY INR CLINIC    Long-term (current) use of anticoagulants

## 2017-12-11 ENCOUNTER — OFFICE VISIT (OUTPATIENT)
Dept: ORTHOPEDICS | Facility: CLINIC | Age: 77
End: 2017-12-11
Payer: MEDICARE

## 2017-12-11 DIAGNOSIS — M70.42 PREPATELLAR BURSITIS OF LEFT KNEE: Primary | ICD-10-CM

## 2017-12-11 PROCEDURE — 20611 DRAIN/INJ JOINT/BURSA W/US: CPT | Mod: LT | Performed by: FAMILY MEDICINE

## 2017-12-11 RX ORDER — LIDOCAINE HYDROCHLORIDE 10 MG/ML
1 INJECTION, SOLUTION INFILTRATION; PERINEURAL ONCE
Qty: 1 ML | Refills: 0 | OUTPATIENT
Start: 2017-12-11 | End: 2017-12-11

## 2017-12-11 RX ORDER — METHYLPREDNISOLONE ACETATE 40 MG/ML
40 INJECTION, SUSPENSION INTRA-ARTICULAR; INTRALESIONAL; INTRAMUSCULAR; SOFT TISSUE ONCE
Qty: 1 ML | Refills: 0 | OUTPATIENT
Start: 2017-12-11 | End: 2017-12-11

## 2017-12-11 NOTE — MR AVS SNAPSHOT
After Visit Summary   12/11/2017    Gibson Villalta    MRN: [id number]           Patient Information     Date Of Birth          1940        Visit Information        Provider Department      12/11/2017 2:00 PM Ronaldo Turcios MD [facility]        Today's Diagnoses     Prepatellar bursitis of left knee    -  1       Follow-ups after your visit        Your next 10 appointments already scheduled     Dec 12, 2017  2:15 PM CST   Anticoagulation Visit with  ANTICOAGULATION CLINIC   [facility] ([facility])    [address] [facility] [id number] [phone]            Jan 09, 2018 10:30 AM CST   ICD Check with FLAVIA FAROOQ   [facility] ([facility])    [address]  [facility] [id number] [phone]              Who to contact     If you have questions or need follow up information about today's clinic visit or your schedule please contact [facility] directly at [phone].  Normal or non-critical lab and imaging results will be communicated to you by Red Karaokehart, letter or phone within 4 business days after the clinic has received the results. If you do not hear from us within 7 days, please contact the clinic through Red Karaokehart or phone. If you have a critical or abnormal lab result, we will notify you by phone as soon as possible.  Submit refill requests through Inmagic or call your pharmacy and they will forward the refill request to us. Please allow 3 business days for your refill to be completed.          Additional Information About Your Visit        MyChart Information     Inmagic gives you secure access to your electronic health record. If you see a primary care provider, you can also send messages to your care team and make appointments. If you have questions, please call your primary care clinic.  If  you do not have a primary care provider, please call [phone] and they will assist you.        Care EveryWhere ID     This is your Care EveryWhere ID. This could be used by other organizations to access your [place] medical records  [id number]         Blood Pressure from Last 3 Encounters:   12/04/17 140/82   08/25/17 122/82   05/11/17 128/88    Weight from Last 3 Encounters:   12/04/17 236 lb (107 kg)   08/25/17 236 lb 11.2 oz (107.4 kg)   05/11/17 233 lb 3.2 oz (105.8 kg)              We Performed the Following     HC ARTHROCENTESIS ASPIR&/INJ MAJOR JT/BURSA W/US     METHYLPREDNISOLONE 40 MG INJ          Today's Medication Changes          These changes are accurate as of: 12/11/17  9:37 PM.  If you have any questions, ask your nurse or doctor.               Start taking these medicines.        Dose/Directions    lidocaine 1 % injection   Used for:  Prepatellar bursitis of left knee        Dose:  1 mL   1 mL by INTRA-ARTICULAR route once for 1 dose   Quantity:  1 mL   Refills:  0       methylPREDNISolone acetate 40 MG/ML injection   Commonly known as:  DEPO-MEDROL   Used for:  Prepatellar bursitis of left knee        Dose:  40 mg   1 mL (40 mg) by INTRA-ARTICULAR route once for 1 dose   Quantity:  1 mL   Refills:  0            Where to get your medicines      Some of these will need a paper prescription and others can be bought over the counter.  Ask your nurse if you have questions.     You don't need a prescription for these medications     lidocaine 1 % injection    methylPREDNISolone acetate 40 MG/ML injection                Primary Care Provider Office Phone # Fax #    Flori Joshua DO Amy [phone] [address] [facility] [address]  [place] [address]        Equal Access to Services     BG MCLAIN AH: Mark Lance, waalisada doreen, qaybta kaalmadelon tim, rusty thomas. So [facility] [phone].    ATENCIÓN: Si habla español, tiene a de luna disposición  servicios gratuitos de asistencia lingüística. Yvette vogel [phone].    We comply with applicable federal civil rights laws and [place] laws. We do not discriminate on the basis of race, color, national origin, age, disability, sex, sexual orientation, or gender identity.            Thank you!     Thank you for choosing [facility]  for your care. Our goal is always to provide you with excellent care. Hearing back from our patients is one way we can continue to improve our services. Please take a few minutes to complete the written survey that you may receive in the mail after your visit with us. Thank you!             Your Updated Medication List - Protect others around you: Learn how to safely use, store and throw away your medicines at www.disposemymeds.org.          This list is accurate as of: 12/11/17  9:37 PM.  Always use your most recent med list.                   Brand Name Dispense Instructions for use Diagnosis    ACE/ARB/ARNI NOT PRESCRIBED (INTENTIONAL)      ACE & ARB not prescribed due to Symptomatic hypotension not due to excessive diuresis    HFrEF (heart failure with reduced ejection fraction) (H)       albuterol 108 (90 BASE) MCG/ACT Inhaler    PROAIR HFA/PROVENTIL HFA/VENTOLIN HFA     Inhale 2 puffs into the lungs every 6 hours as needed for shortness of breath / dyspnea or wheezing        ASPIRIN NOT PRESCRIBED    INTENTIONAL    0 each    Antiplatelet medication not prescribed intentionally due to Current anticoagulant therapy (warfarin/enoxaparin)    HFrEF (heart failure with reduced ejection fraction) (H)       atorvastatin 40 MG tablet    LIPITOR    90 tablet    TAKE 1 TABLET BY MOUTH AT BEDTIME    Hyperlipidemia LDL goal <130       COENZYME Q-10 PO      Take by mouth daily        ferrous sulfate 325 (65 FE) MG tablet    IRON     Take 325 mg by mouth daily        furosemide 20 MG tablet    LASIX    45 tablet    Take 0.5 tablets (10 mg) by mouth  daily    Chronic systolic heart failure (H)       hydrALAZINE 10 MG tablet    APRESOLINE    270 tablet    Take 1 tablet (10 mg) by mouth 3 times daily    Chronic systolic heart failure (H), Cardiomyopathy (H)       isosorbide mononitrate 30 MG 24 hr tablet    IMDUR    45 tablet    Take 0.5 tablets (15 mg) by mouth daily    Chronic systolic heart failure (H), Cardiomyopathy (H)       lidocaine 1 % injection     1 mL    1 mL by INTRA-ARTICULAR route once for 1 dose    Prepatellar bursitis of left knee       methylPREDNISolone acetate 40 MG/ML injection    DEPO-MEDROL    1 mL    1 mL (40 mg) by INTRA-ARTICULAR route once for 1 dose    Prepatellar bursitis of left knee       metoprolol 50 MG 24 hr tablet    TOPROL-XL    90 tablet    Take 1 tablet (50 mg) by mouth daily    Cardiomyopathy (H)       Multi-vitamin Tabs tablet      Take 1 tablet by mouth daily        OMEGA-3 FISH OIL PO      Take 1 g by mouth daily        venlafaxine 75 MG tablet    EFFEXOR    180 tablet    Take 1 tablet (75 mg) by mouth 2 times daily    Anxiety and depression       vitamin D 24295 UNIT capsule    ERGOCALCIFEROL    12 capsule    Take 1 capsule (50,000 Units) by mouth once a week    Vitamin D deficiency       warfarin 2 MG tablet    COUMADIN    270 tablet    TAKE 2-3 TABLETS BY MOUTH DAILY AS DIRECTED BY INR CLINIC    Long-term (current) use of anticoagulants

## 2017-12-11 NOTE — LETTER
12/11/2017         RE: Gibson Villalta  [address]   [place] [facility] [address]        Dear Colleague,    Thank you for referring your patient, Gibson Villalta, to the [facility] [facility]. Please see a copy of my visit note below.    ASSESSMENT/PLAN    ICD-10-CM    1. Prepatellar bursitis of left knee M70.42 HC ARTHROCENTESIS ASPIR&/INJ MAJOR JT/BURSA W/US     METHYLPREDNISOLONE 40 MG INJ     methylPREDNISolone acetate (DEPO-MEDROL) 40 MG/ML injection     lidocaine 1 % injection      Pt placed in supine position. 12 MHZ probe used to confirm effusion and needle trajectory into prepatellar bursa of the LEFT knee. Once identified,the  anterior left knee was prepped in sterile fashion. using Chloroprep applied to skin, 5 cc 1% lidocaine injected subq for pain control prior to aspiration. Needle removed and aspiration conducted.     Aspirate: sterile gel used for US visualization. Sonographically guided 18G 1.5 inch needle was directly visualized entering down into the LEFT prepatellar bursa and 6 cc of serous fluid was removed from the LEFT  Knee. Aspirate was not sent to lab. Injection site was wiped off, dried and clean bandage applied. Tubigrip sleeve then given for compression Pt tolerated procedure well, no complications observed. Pt instructed to monitor for increased pain, redness, swelling or fevers.       Again, thank you for allowing me to participate in the care of your patient.        Sincerely,        Ronaldo Turcios MD

## 2017-12-12 ENCOUNTER — ANTICOAGULATION THERAPY VISIT (OUTPATIENT)
Dept: NURSING | Facility: CLINIC | Age: 77
End: 2017-12-12
Payer: MEDICARE

## 2017-12-12 DIAGNOSIS — Z79.01 LONG-TERM (CURRENT) USE OF ANTICOAGULANTS: ICD-10-CM

## 2017-12-12 LAB — INR POINT OF CARE: 2.8 (ref 0.86–1.14)

## 2017-12-12 PROCEDURE — 85610 PROTHROMBIN TIME: CPT | Mod: QW

## 2017-12-12 PROCEDURE — 36416 COLLJ CAPILLARY BLOOD SPEC: CPT

## 2017-12-12 PROCEDURE — 99207 ZZC NO CHARGE NURSE ONLY: CPT

## 2017-12-12 NOTE — PROGRESS NOTES
ANTICOAGULATION FOLLOW-UP CLINIC VISIT    Patient Name:  Gibson Villalta  Date:  12/12/2017  Contact Type:  Face to Face    SUBJECTIVE:     Patient Findings     Positives No Problem Findings           OBJECTIVE    INR Protime   Date Value Ref Range Status   12/12/2017 2.8 (A) 0.86 - 1.14 Final       ASSESSMENT / PLAN  INR assessment THER    Recheck INR In: 4 WEEKS    INR Location Clinic      Anticoagulation Summary as of 12/12/2017     INR goal 2.0-3.0   Today's INR 2.8   Maintenance plan 4 mg (2 mg x 2) on Sun; 6 mg (2 mg x 3) all other days   Full instructions 4 mg on Sun; 6 mg all other days   Weekly total 40 mg   No change documented Leonor Merritt RN   Plan last modified Rachel Mckeon RN (8/14/2017)   Next INR check 1/9/2018   Priority INR   Target end date     Indications   Long-term (current) use of anticoagulants [Z79.01] [Z79.01]         Anticoagulation Episode Summary     INR check location     Preferred lab     Send INR reminders to [facility] ANTICOAGULATION    Comments       Anticoagulation Care Providers     Provider Role Specialty Phone number    Flori Reyes DO [facility] [phone]            See the Encounter Report to view Anticoagulation Flowsheet and Dosing Calendar (Go to Encounters tab in chart review, and find the Anticoagulation Therapy Visit)    Dosage adjustment made based on physician directed care plan.    Leonor Merritt, BESS

## 2017-12-12 NOTE — MR AVS SNAPSHOT
Gibson Villalta   12/12/2017 2:15 PM   Anticoagulation Therapy Visit    Description:  77 year old male   Provider:   ANTICOAGULATION CLINIC   Department:  [facility] Nurse           INR as of 12/12/2017     Today's INR 2.8      Anticoagulation Summary as of 12/12/2017     INR goal 2.0-3.0   Today's INR 2.8   Full instructions 4 mg on Sun; 6 mg all other days   Next INR check 1/9/2018    Indications   Long-term (current) use of anticoagulants [Z79.01] [Z79.01]         Your next Anticoagulation Clinic appointment(s)     Jan 09, 2018 11:45 AM CST   Anticoagulation Visit with  ANTICOAGULATION CLINIC   [facility]    [address]  [place] [address] [phone]              Contact Numbers     Clinic Number:         December 2017 Details    Sun Mon Tue Wed Thu Fri Sat          1               2                 3               4               5               6               7               8               9                 10               11               12      6 mg   See details      13      6 mg         14      6 mg         15      6 mg         16      6 mg           17      4 mg         18      6 mg         19      6 mg         20      6 mg         21      6 mg         22      6 mg         23      6 mg           24      4 mg         25      6 mg         26      6 mg         27      6 mg         28      6 mg         29      6 mg         30      6 mg           31      4 mg                Date Details   12/12 This INR check               How to take your warfarin dose     To take:  4 mg Take 2 of the 2 mg tablets.    To take:  6 mg Take 3 of the 2 mg tablets.           January 2018 Details    Sun Mon Tue Wed Thu Fri Sat      1      6 mg         2      6 mg         3      6 mg         4      6 mg         5      6 mg         6      6 mg           7      4 mg         8      6 mg         9            10               11               12               13                 14                15               16               17               18               19               20                 21               22               23               24               25               26               27                 28               29               30               31                   Date Details   No additional details    Date of next INR:  1/9/2018         How to take your warfarin dose     To take:  4 mg Take 2 of the 2 mg tablets.    To take:  6 mg Take 3 of the 2 mg tablets.

## 2017-12-12 NOTE — PROGRESS NOTES
ASSESSMENT/PLAN    ICD-10-CM    1. Prepatellar bursitis of left knee M70.42 HC ARTHROCENTESIS ASPIR&/INJ MAJOR JT/BURSA W/US     METHYLPREDNISOLONE 40 MG INJ     methylPREDNISolone acetate (DEPO-MEDROL) 40 MG/ML injection     lidocaine 1 % injection      Pt placed in supine position. 12 MHZ probe used to confirm effusion and needle trajectory into prepatellar bursa of the LEFT knee. Once identified,the  anterior left knee was prepped in sterile fashion. using Chloroprep applied to skin, 5 cc 1% lidocaine injected subq for pain control prior to aspiration. Needle removed and aspiration conducted.     Aspirate: sterile gel used for US visualization. Sonographically guided 18G 1.5 inch needle was directly visualized entering down into the LEFT prepatellar bursa and 6 cc of serous fluid was removed from the LEFT  Knee. Aspirate was not sent to lab. Injection site was wiped off, dried and clean bandage applied. Tubigrip sleeve then given for compression Pt tolerated procedure well, no complications observed. Pt instructed to monitor for increased pain, redness, swelling or fevers.

## 2018-01-09 ENCOUNTER — ANTICOAGULATION THERAPY VISIT (OUTPATIENT)
Dept: NURSING | Facility: CLINIC | Age: 78
End: 2018-01-09
Payer: COMMERCIAL

## 2018-01-09 ENCOUNTER — ALLIED HEALTH/NURSE VISIT (OUTPATIENT)
Dept: CARDIOLOGY | Facility: CLINIC | Age: 78
End: 2018-01-09
Payer: COMMERCIAL

## 2018-01-09 DIAGNOSIS — Z79.01 LONG-TERM (CURRENT) USE OF ANTICOAGULANTS: ICD-10-CM

## 2018-01-09 DIAGNOSIS — Z95.810 ICD (IMPLANTABLE CARDIOVERTER-DEFIBRILLATOR) IN PLACE: Primary | ICD-10-CM

## 2018-01-09 LAB — INR POINT OF CARE: 3.4 (ref 0.86–1.14)

## 2018-01-09 PROCEDURE — 93284 PRGRMG EVAL IMPLANTABLE DFB: CPT | Performed by: INTERNAL MEDICINE

## 2018-01-09 PROCEDURE — 85610 PROTHROMBIN TIME: CPT | Mod: QW

## 2018-01-09 PROCEDURE — 36416 COLLJ CAPILLARY BLOOD SPEC: CPT

## 2018-01-09 PROCEDURE — 99207 ZZC NO CHARGE NURSE ONLY: CPT

## 2018-01-09 NOTE — MR AVS SNAPSHOT
After Visit Summary   1/9/2018    Gibson Villalta    MRN: [id number]           Patient Information     Date Of Birth          1940        Visit Information        Provider Department      1/9/2018 10:30 AM ALEJANDRO DCRN [facility]        Today's Diagnoses     ICD (implantable cardioverter-defibrillator) in place    -  1       Follow-ups after your visit        Your next 10 appointments already scheduled     Jan 09, 2018 11:45 AM CST   Anticoagulation Visit with  ANTICOAGULATION CLINIC   [facility]    [address]  [place] [id number] [phone]            Apr 26, 2018  4:30 PM CDT   Remote ICD Check with ALEJANDRO DCR2   [facility] ([facility])    [address]  [place] [address] [phone]           This appointment is for a remote check of your debrillator.  This is not an appointment at the office.              Who to contact     If you have questions or need follow up information about today's clinic visit or your schedule please contact [facility] directly at [phone].  Normal or non-critical lab and imaging results will be communicated to you by Dominohart, letter or phone within 4 business days after the clinic has received the results. If you do not hear from us within 7 days, please contact the clinic through Dominohart or phone. If you have a critical or abnormal lab result, we will notify you by phone as soon as possible.  Submit refill requests through [employer] or call your pharmacy and they will forward the refill request to us. Please allow 3 business days for your refill to be completed.          Additional Information About Your Visit        MyChart Information     [employer] gives you secure access to your electronic health record. If you see a primary care provider, you can also send messages to your  care team and make appointments. If you have questions, please call your primary care clinic.  If you do not have a primary care provider, please call [phone] and they will assist you.        Care EveryWhere ID     This is your Care EveryWhere ID. This could be used by other organizations to access your [facility] medical records  [id number]         Blood Pressure from Last 3 Encounters:   12/04/17 140/82   08/25/17 122/82   05/11/17 128/88    Weight from Last 3 Encounters:   12/04/17 107 kg (236 lb)   08/25/17 107.4 kg (236 lb 11.2 oz)   05/11/17 105.8 kg (233 lb 3.2 oz)              We Performed the Following     ICD DEVICE PROGRAMMING EVAL, MULTI LEAD ICD (51361)        Primary Care Provider Office Phone # Fax #    Flori Reyes [phone] [address] [facility] [address]  [place] [address]        Equal Access to Services     [facility] : Hadii aad ku hadasho Soomaali, waaxda luqadaha, qaybta kaalmada adeegyada, waxay idiin hayaan adeeg kharash la'aan . So [facility] [phone].    ATENCIÓN: Si habla español, tiene a de luna disposición servicios gratuitos de asistencia lingüística. Llame al [phone].    We comply with applicable federal civil rights laws and [place] laws. We do not discriminate on the basis of race, color, national origin, age, disability, sex, sexual orientation, or gender identity.            Thank you!     Thank you for choosing [facility]  for your care. Our goal is always to provide you with excellent care. Hearing back from our patients is one way we can continue to improve our services. Please take a few minutes to complete the written survey that you may receive in the mail after your visit with us. Thank you!             Your Updated Medication List - Protect others around you: Learn how to safely use, store and throw away your medicines at www.disposemymeds.org.          This list is accurate as of: 1/9/18 11:04 AM.  Always  use your most recent med list.                   Brand Name Dispense Instructions for use Diagnosis    ACE/ARB/ARNI NOT PRESCRIBED (INTENTIONAL)      ACE & ARB not prescribed due to Symptomatic hypotension not due to excessive diuresis    HFrEF (heart failure with reduced ejection fraction) (H)       albuterol 108 (90 BASE) MCG/ACT Inhaler    PROAIR HFA/PROVENTIL HFA/VENTOLIN HFA     Inhale 2 puffs into the lungs every 6 hours as needed for shortness of breath / dyspnea or wheezing        ASPIRIN NOT PRESCRIBED    INTENTIONAL    0 each    Antiplatelet medication not prescribed intentionally due to Current anticoagulant therapy (warfarin/enoxaparin)    HFrEF (heart failure with reduced ejection fraction) (H)       atorvastatin 40 MG tablet    LIPITOR    90 tablet    TAKE 1 TABLET BY MOUTH AT BEDTIME    Hyperlipidemia LDL goal <130       COENZYME Q-10 PO      Take by mouth daily        ferrous sulfate 325 (65 FE) MG tablet    IRON     Take 325 mg by mouth daily        furosemide 20 MG tablet    LASIX    45 tablet    Take 0.5 tablets (10 mg) by mouth daily    Chronic systolic heart failure (H)       hydrALAZINE 10 MG tablet    APRESOLINE    270 tablet    Take 1 tablet (10 mg) by mouth 3 times daily    Chronic systolic heart failure (H), Cardiomyopathy (H)       isosorbide mononitrate 30 MG 24 hr tablet    IMDUR    45 tablet    Take 0.5 tablets (15 mg) by mouth daily    Chronic systolic heart failure (H), Cardiomyopathy (H)       metoprolol 50 MG 24 hr tablet    TOPROL-XL    90 tablet    Take 1 tablet (50 mg) by mouth daily    Cardiomyopathy (H)       Multi-vitamin Tabs tablet      Take 1 tablet by mouth daily        OMEGA-3 FISH OIL PO      Take 1 g by mouth daily        venlafaxine 75 MG tablet    EFFEXOR    180 tablet    Take 1 tablet (75 mg) by mouth 2 times daily    Anxiety and depression       vitamin D 60338 UNIT capsule    ERGOCALCIFEROL    12 capsule    Take 1 capsule (50,000 Units) by mouth once a week     Vitamin D deficiency       warfarin 2 MG tablet    COUMADIN    270 tablet    TAKE 2-3 TABLETS BY MOUTH DAILY AS DIRECTED BY INR CLINIC    Long-term (current) use of anticoagulants

## 2018-01-09 NOTE — PROGRESS NOTES
ANTICOAGULATION FOLLOW-UP CLINIC VISIT    Patient Name:  Gibson Villalta  Date:  1/9/2018  Contact Type:  Face to Face    SUBJECTIVE:        OBJECTIVE    INR Protime   Date Value Ref Range Status   01/09/2018 3.4 (A) 0.86 - 1.14 Final       ASSESSMENT / PLAN  INR assessment SUPRA    Recheck INR In: 4 WEEKS    INR Location Clinic      Anticoagulation Summary as of 1/9/2018     INR goal 2.0-3.0   Today's INR 3.4!   Maintenance plan 4 mg (2 mg x 2) on Sun; 6 mg (2 mg x 3) all other days   Full instructions 4 mg on Sun; 6 mg all other days   Weekly total 40 mg   No change documented Rachel Mckeon RN   Plan last modified Rachel Mckeon RN (8/14/2017)   Next INR check 2/6/2018   Priority INR   Target end date     Indications   Long-term (current) use of anticoagulants [Z79.01] [Z79.01]         Anticoagulation Episode Summary     INR check location     Preferred lab     Send INR reminders to CS ANTICOAGULATION    Comments       Anticoagulation Care Providers     Provider Role Specialty Phone number    Flori Reyes DO [facility] Internal Medicine [phone]            See the Encounter Report to view Anticoagulation Flowsheet and Dosing Calendar (Go to Encounters tab in chart review, and find the Anticoagulation Therapy Visit)    Patient is here with his wife.  No changes overall.  No extra signs of bruising or bleeding.  Dosing based on [facility] Protocol and Provider directed care plan.      Rachel Mckeon RN

## 2018-01-09 NOTE — MR AVS SNAPSHOT
Gibson Villalta   1/9/2018 11:45 AM   Anticoagulation Therapy Visit    Description:  77 year old male   Provider:   ANTICOAGULATION CLINIC   Department:   Nurse           INR as of 1/9/2018     Today's INR 3.4!      Anticoagulation Summary as of 1/9/2018     INR goal 2.0-3.0   Today's INR 3.4!   Full instructions 4 mg on Sun; 6 mg all other days   Next INR check 2/6/2018    Indications   Long-term (current) use of anticoagulants [Z79.01] [Z79.01]         Your next Anticoagulation Clinic appointment(s)     Jan 09, 2018 11:45 AM CST   Anticoagulation Visit with  ANT[facility]AGULATION CLINIC   [facility]    [address]  [place] [address]   [phone]            Feb 06, 2018 11:30 AM CST   Anticoagulation Visit with  ANTICOAGULATION CLINIC   [facility]    [address]  [place] [address]   [phone]              Contact Numbers     Clinic Number:         January 2018 Details    Sun Mon Tue Wed Thu Fri Sat      1               2               3               4               5               6                 7               8               9      6 mg   See details      10      6 mg         11      6 mg         12      6 mg         13      6 mg           14      4 mg         15      6 mg         16      6 mg         17      6 mg         18      6 mg         19      6 mg         20      6 mg           21      4 mg         22      6 mg         23      6 mg         24      6 mg         25      6 mg         26      6 mg         27      6 mg           28      4 mg         29      6 mg         30      6 mg         31      6 mg             Date Details   01/09 This INR check               How to take your warfarin dose     To take:  4 mg Take 2 of the 2 mg tablets.    To take:  6 mg Take 3 of the 2 mg tablets.           February 2018 Details    Sun Mon Tue Wed Thu Fri Sat         1      6 mg         2      6 mg         3      6  mg           4      4 mg         5      6 mg         6            7               8               9               10                 11               12               13               14               15               16               17                 18               19               20               21               22               23               24                 25               26               27               28                   Date Details   No additional details    Date of next INR:  2/6/2018         How to take your warfarin dose     To take:  4 mg Take 2 of the 2 mg tablets.    To take:  6 mg Take 3 of the 2 mg tablets.

## 2018-01-09 NOTE — PROGRESS NOTES
Medtronic Amplia CRT-D Device Check  AP: 5 % BIV Paced: 97.7 % (of which on 0.6% is LV Paced)  Mode: DDDR 50        Underlying Rhythm: NSR with 1st degree HB  Heart Rate: Stable with adequate variability.  Sensing: Stable    Pacing Threshold: Stable    Impedance: Stable  Battery Status: 7.9 yrs  Device Site: Without signs of infection  Atrial Arrhythmia: None  Ventricular Arrhythmia: None  ATP: None    Shocks: None  Setting Change: None    Care Plan: Pt has no complaints. Next Carelink scheduled in April. MADISON Alegria RN.

## 2018-01-24 ENCOUNTER — TELEPHONE (OUTPATIENT)
Dept: FAMILY MEDICINE | Facility: CLINIC | Age: 78
End: 2018-01-24

## 2018-01-24 DIAGNOSIS — F41.9 ANXIETY AND DEPRESSION: Chronic | ICD-10-CM

## 2018-01-24 DIAGNOSIS — F32.A ANXIETY AND DEPRESSION: Chronic | ICD-10-CM

## 2018-01-24 RX ORDER — VENLAFAXINE 75 MG/1
75 TABLET ORAL 2 TIMES DAILY
Qty: 180 TABLET | Refills: 1 | Status: SHIPPED | OUTPATIENT
Start: 2018-01-24 | End: 2018-07-30

## 2018-01-24 RX ORDER — VENLAFAXINE 75 MG/1
75 TABLET ORAL 2 TIMES DAILY
Qty: 60 TABLET | Refills: 0 | Status: SHIPPED | OUTPATIENT
Start: 2018-01-24 | End: 2018-02-14

## 2018-01-24 NOTE — TELEPHONE ENCOUNTER
Reason for Call: call back and prescription    Detailed comments: Pt's wife calling in stating that her  never received venlafaxine (EFFEXOR) 75 MG tablet through mail order and he is now out of medication. They're hoping to have this refilled through [facility] pharmacy in [facility] [phone]. Please call when filled.     Phone Number Patient can be reached at: Home number on file [phone] (home)    Best Time: any    Can we leave a detailed message on this number? YES    Call taken on 1/24/2018 at 10:14 AM by Leah Kay

## 2018-01-24 NOTE — TELEPHONE ENCOUNTER
Spoke with Izabella.  Mail order has not sent Effexor as of yet.  Short term refill sent to local [facility] pharmacy.  Also sent new prescription to [facility] pharmacy per [facility] Protocol.  Rachel Mckeon RN

## 2018-01-26 DIAGNOSIS — E55.9 VITAMIN D DEFICIENCY: ICD-10-CM

## 2018-01-26 DIAGNOSIS — E78.5 HYPERLIPIDEMIA LDL GOAL <130: ICD-10-CM

## 2018-01-26 DIAGNOSIS — I50.22 CHRONIC SYSTOLIC HEART FAILURE (H): ICD-10-CM

## 2018-01-26 DIAGNOSIS — F41.9 ANXIETY AND DEPRESSION: Chronic | ICD-10-CM

## 2018-01-26 DIAGNOSIS — I42.9 CARDIOMYOPATHY (H): ICD-10-CM

## 2018-01-26 DIAGNOSIS — Z79.01 LONG-TERM (CURRENT) USE OF ANTICOAGULANTS: ICD-10-CM

## 2018-01-26 DIAGNOSIS — F32.A ANXIETY AND DEPRESSION: Chronic | ICD-10-CM

## 2018-01-27 NOTE — TELEPHONE ENCOUNTER
"Pt switching to [employer]    vitamin D (ERGOCALCIFEROL) 34600 UNIT capsule      Last Written Prescription Date:  7/24/17  Last Fill Quantity: 12 capsule,   # refills: 3  Last Office Visit: 8/25/17 Reyes  Future Office visit:       Routing refill request to provider for review/approval because:  Drug not on the [facility], [facility] or [facility] refill protocol or controlled substance          Requested Prescriptions   Pending Prescriptions Disp Refills     atorvastatin (LIPITOR) 40 MG tablet  Last Written Prescription Date:  11/9/17  Last Fill Quantity: 90 tablet,  # refills: 1   Last Office Visit with [facility] provider:  8/25/17 Amy   Future Office Visit:      90 tablet 1     Sig: Take 1 tablet (40 mg) by mouth At Bedtime    Statins Protocol Failed    1/26/2018  7:33 PM       Failed - LDL on file in past 12 months    Recent Labs   Lab Test  02/07/17   0817   LDL  93          Passed - No abnormal creatine kinase in past 12 months    No lab results found.         Passed - Recent or future visit with authorizing provider    Patient had office visit in the last year or has a visit in the next 30 days with authorizing provider.  See \"Patient Info\" tab in inbasket, or \"Choose Columns\" in Meds & Orders section of the refill encounter.          Passed - Patient is age 18 or older                  venlafaxine (EFFEXOR) 75 MG tablet  This may be a duplicate med  Last Written Prescription Date:  1/24/18  Last Fill Quantity: 180 tablet,  # refills: 1   Last Office Visit with [facility] provider:  8/25/17 Amy   Future Office Visit:      180 tablet 1     Sig: Take 1 tablet (75 mg) by mouth 2 times daily    Serotonin-Norepinephrine Reuptake Inhibitors  Failed    1/26/2018  7:33 PM       Failed - Blood pressure under 140/90    BP Readings from Last 3 Encounters:   12/04/17 140/82   08/25/17 122/82   05/11/17 128/88          Failed - Normal serum creatinine on file in past 12 months    Recent Labs   Lab Test  08/25/17   1416   CR  1.34*          Passed - " "Recent or future visit with authorizing provider's specialty    Patient had office visit in the last year or has a visit in the next 30 days with authorizing provider.  See \"Patient Info\" tab in inbasket, or \"Choose Columns\" in Meds & Orders section of the refill encounter.          Passed - Patient is age 18 or older                    warfarin (COUMADIN) 2 MG tablet  Last Written Prescription Date:  12/20/17  Last Fill Quantity: 270 tablet,  # refills: 0   Last Office Visit with [facility] provider:  8/25/17 Amy   Future Office Visit:      270 tablet 0     Sig: TAKE 2-3 TABLETS BY MOUTH DAILY AS DIRECTED BY INR CLINIC    Vitamin K Antagonists Failed    1/26/2018  7:33 PM       Failed - INR is within goal in the past 6 weeks    Confirm INR is within goal in the past 6 weeks.     Recent Labs   Lab Test 01/09/18   INR  3.4*          Passed - Recent or future visit with authorizing provider's specialty    Patient had office visit in the last year or has a visit in the next 30 days with authorizing provider.  See \"Patient Info\" tab in inbasket, or \"Choose Columns\" in Meds & Orders section of the refill encounter.          Passed - Patient is 18 years of age or older                    metoprolol succinate (TOPROL-XL) 50 MG 24 hr tablet  Last Written Prescription Date:  10/18/17  Last Fill Quantity: 90 tablet,  # refills: 2   Last Office Visit with [facility] provider:  8/25/17 Amy   Future Office Visit:      90 tablet 2     Sig: Take 1 tablet (50 mg) by mouth daily    Beta-Blockers Protocol Failed    1/26/2018  7:33 PM       Failed - Blood pressure under 140/90    BP Readings from Last 3 Encounters:   12/04/17 140/82   08/25/17 122/82   05/11/17 128/88          Passed - Patient is age 6 or older       Passed - Recent or future visit with authorizing provider's specialty    Patient had office visit in the last year or has a visit in the next 30 days with authorizing provider.  See \"Patient Info\" tab in inbasket, or \"Choose " "Columns\" in Meds & Orders section of the refill encounter.                       vitamin D (ERGOCALCIFEROL) 51729 UNIT capsule  See above 12 capsule 3     Sig: Take 1 capsule (50,000 Units) by mouth once a week    There is no refill protocol information for this order                    furosemide (LASIX) 20 MG tablet  Last Written Prescription Date:  10/20/17   Last Fill Quantity: 45 tablet,  # refills: 3   Last Office Visit with [facility] provider:  8/25/17 Amy   Future Office Visit:      45 tablet 3     Sig: Take 0.5 tablets (10 mg) by mouth daily    Diuretics (Including Combos) Protocol Failed    1/26/2018  7:33 PM       Failed - Blood pressure under 140/90    BP Readings from Last 3 Encounters:   12/04/17 140/82   08/25/17 122/82   05/11/17 128/88          Failed - Normal serum creatinine on file in past 12 months    Recent Labs   Lab Test  08/25/17   1416   CR  1.34*           Passed - Recent or future visit with authorizing provider's specialty    Patient had office visit in the last year or has a visit in the next 30 days with authorizing provider.  See \"Patient Info\" tab in inbasket, or \"Choose Columns\" in Meds & Orders section of the refill encounter.          Passed - Patient is age 18 or older       Passed - Normal serum potassium on file in past 12 months    Recent Labs   Lab Test  08/25/17   1416   POTASSIUM  4.0           Passed - Normal serum sodium on file in past 12 months    Recent Labs   Lab Test  08/25/17   1416   NA  139              "

## 2018-01-29 ENCOUNTER — TELEPHONE (OUTPATIENT)
Dept: CARDIOLOGY | Facility: CLINIC | Age: 78
End: 2018-01-29

## 2018-01-29 DIAGNOSIS — I50.22 CHRONIC SYSTOLIC HEART FAILURE (H): ICD-10-CM

## 2018-01-29 DIAGNOSIS — I42.9 CARDIOMYOPATHY (H): ICD-10-CM

## 2018-01-29 RX ORDER — ERGOCALCIFEROL 1.25 MG/1
50000 CAPSULE, LIQUID FILLED ORAL WEEKLY
Qty: 12 CAPSULE | Refills: 1 | Status: SHIPPED | OUTPATIENT
Start: 2018-01-29 | End: 2018-02-14

## 2018-01-29 RX ORDER — HYDRALAZINE HYDROCHLORIDE 10 MG/1
10 TABLET, FILM COATED ORAL 3 TIMES DAILY
Qty: 270 TABLET | Refills: 1 | Status: SHIPPED | OUTPATIENT
Start: 2018-01-29 | End: 2018-09-05

## 2018-01-29 RX ORDER — ISOSORBIDE MONONITRATE 30 MG/1
15 TABLET, EXTENDED RELEASE ORAL DAILY
Qty: 45 TABLET | Refills: 1 | Status: SHIPPED | OUTPATIENT
Start: 2018-01-29 | End: 2018-07-19

## 2018-01-29 RX ORDER — FUROSEMIDE 20 MG
10 TABLET ORAL DAILY
Qty: 45 TABLET | Refills: 2 | Status: SHIPPED | OUTPATIENT
Start: 2018-01-29 | End: 2018-02-14

## 2018-01-29 RX ORDER — VENLAFAXINE 75 MG/1
75 TABLET ORAL 2 TIMES DAILY
Start: 2018-01-29

## 2018-01-29 RX ORDER — METOPROLOL SUCCINATE 50 MG/1
50 TABLET, EXTENDED RELEASE ORAL DAILY
Qty: 90 TABLET | Refills: 1 | Status: SHIPPED | OUTPATIENT
Start: 2018-01-29 | End: 2018-02-14

## 2018-01-29 RX ORDER — WARFARIN SODIUM 2 MG/1
TABLET ORAL
Qty: 270 TABLET | Refills: 0 | Status: SHIPPED | OUTPATIENT
Start: 2018-01-29 | End: 2018-02-14

## 2018-01-29 RX ORDER — ATORVASTATIN CALCIUM 40 MG/1
40 TABLET, FILM COATED ORAL AT BEDTIME
Qty: 90 TABLET | Refills: 0 | Status: SHIPPED | OUTPATIENT
Start: 2018-01-29 | End: 2018-02-14

## 2018-01-29 NOTE — TELEPHONE ENCOUNTER
Patient is switching pharmacies.     Due for OV in 8/2018. Updated refills for this date.     Catie ROMERO RN

## 2018-01-29 NOTE — TELEPHONE ENCOUNTER
Fax received from pt's [facility], requesting refill for Hydralazine and Imdur. Writer called pt and he verified that he would like all medications now filled via [facility] vs [facility]. Refills completed. Annual OV with Dr. Hernandez due in May. Order placed.  Writer called [facility] and message left regarding pharmacy change. MADISON Alegria RN.

## 2018-02-06 ENCOUNTER — ANTICOAGULATION THERAPY VISIT (OUTPATIENT)
Dept: NURSING | Facility: CLINIC | Age: 78
End: 2018-02-06
Payer: COMMERCIAL

## 2018-02-06 DIAGNOSIS — Z79.01 LONG-TERM (CURRENT) USE OF ANTICOAGULANTS: ICD-10-CM

## 2018-02-06 LAB — INR POINT OF CARE: 2.2 (ref 0.86–1.14)

## 2018-02-06 PROCEDURE — 85610 PROTHROMBIN TIME: CPT | Mod: QW

## 2018-02-06 PROCEDURE — 36416 COLLJ CAPILLARY BLOOD SPEC: CPT

## 2018-02-06 PROCEDURE — 99207 ZZC NO CHARGE NURSE ONLY: CPT

## 2018-02-06 NOTE — PROGRESS NOTES
ANTICOAGULATION FOLLOW-UP CLINIC VISIT    Patient Name:  Gibson Villalta  Date:  2/6/2018  Contact Type:  Face to Face    SUBJECTIVE:        OBJECTIVE    INR Protime   Date Value Ref Range Status   02/06/2018 2.2 (A) 0.86 - 1.14 Final       ASSESSMENT / PLAN  INR assessment THER    Recheck INR In: 4 WEEKS    INR Location Clinic      Anticoagulation Summary as of 2/6/2018     INR goal 2.0-3.0   Today's INR 2.2   Maintenance plan 4 mg (2 mg x 2) on Sun; 6 mg (2 mg x 3) all other days   Full instructions 4 mg on Sun; 6 mg all other days   Weekly total 40 mg   No change documented Bushra Rivera RN   Plan last modified Rachel Mckeon RN (8/14/2017)   Next INR check 3/6/2018   Priority INR   Target end date     Indications   Long-term (current) use of anticoagulants [Z79.01] [Z79.01]         Anticoagulation Episode Summary     INR check location     Preferred lab     Send INR reminders to [facility] ANTICOAGULATION    Comments       Anticoagulation Care Providers     Provider Role Specialty Phone number    Flori Reyes DO [facility] Internal Medicine [phone]            See the Encounter Report to view Anticoagulation Flowsheet and Dosing Calendar (Go to Encounters tab in chart review, and find the Anticoagulation Therapy Visit)    Dosage adjustment made based on physician directed care plan.    Bushra Rivera, RN

## 2018-02-06 NOTE — MR AVS SNAPSHOT
Gibson Villalta   2/6/2018 11:30 AM   Anticoagulation Therapy Visit    Description:  77 year old male   Provider:   ANTICOAGULATION CLINIC   Department:  [facility] Nurse           INR as of 2/6/2018     Today's INR 2.2      Anticoagulation Summary as of 2/6/2018     INR goal 2.0-3.0   Today's INR 2.2   Full instructions 4 mg on Sun; 6 mg all other days   Next INR check 3/6/2018    Indications   Long-term (current) use of anticoagulants [Z79.01] [Z79.01]         Your next Anticoagulation Clinic appointment(s)     Mar 06, 2018 11:15 AM CST   Anticoagulation Visit with  [facility]ULATION CLINIC   [facility]    [address]  [place] [address] [phone]              Contact Numbers     Clinic Number:         February 2018 Details    Sun Mon Tue Wed Thu Fri Sat         1               2               3                 4               5               6      6 mg   See details      7      6 mg         8      6 mg         9      6 mg         10      6 mg           11      4 mg         12      6 mg         13      6 mg         14      6 mg         15      6 mg         16      6 mg         17      6 mg           18      4 mg         19      6 mg         20      6 mg         21      6 mg         22      6 mg         23      6 mg         24      6 mg           25      4 mg         26      6 mg         27      6 mg         28      6 mg             Date Details   02/06 This INR check               How to take your warfarin dose     To take:  4 mg Take 2 of the 2 mg tablets.    To take:  6 mg Take 3 of the 2 mg tablets.           March 2018 Details    Sun Mon Tue Wed Thu Fri Sat         1      6 mg         2      6 mg         3      6 mg           4      4 mg         5      6 mg         6            7               8               9               10                 11               12               13               14               15               16               17                  18               19               20               21               22               23               24                 25               26               27               28               29               30               31                Date Details   No additional details    Date of next INR:  3/6/2018         How to take your warfarin dose     To take:  4 mg Take 2 of the 2 mg tablets.    To take:  6 mg Take 3 of the 2 mg tablets.

## 2018-02-14 DIAGNOSIS — I42.9 CARDIOMYOPATHY (H): ICD-10-CM

## 2018-02-14 DIAGNOSIS — F32.A ANXIETY AND DEPRESSION: Chronic | ICD-10-CM

## 2018-02-14 DIAGNOSIS — F41.9 ANXIETY AND DEPRESSION: Chronic | ICD-10-CM

## 2018-02-14 DIAGNOSIS — E55.9 VITAMIN D DEFICIENCY: ICD-10-CM

## 2018-02-14 DIAGNOSIS — I50.22 CHRONIC SYSTOLIC HEART FAILURE (H): ICD-10-CM

## 2018-02-14 DIAGNOSIS — Z79.01 LONG-TERM (CURRENT) USE OF ANTICOAGULANTS: ICD-10-CM

## 2018-02-14 DIAGNOSIS — E78.5 HYPERLIPIDEMIA LDL GOAL <130: ICD-10-CM

## 2018-02-14 NOTE — TELEPHONE ENCOUNTER
Warfarin  Last Written Prescription Date:  01/29/2018  Last Fill Quantity: 270,  # refills: 1   Last office visit: 8/25/2017 with prescribing provider:  Dr.Olson De La Garza Office Visit:      Vit D  Last Written Prescription Date:  01/29/2018  Last Fill Quantity: 12,  # refills: 1   Last office visit: 8/25/2017 with prescribing provider:  Dr.Olson De La Garza Office Visit:      Venlafaxine  Last Written Prescription Date:  01/24/2018  Last Fill Quantity: 60,  # refills: 0   Last office visit: 8/25/2017 with prescribing provider:  Dr.Olson De La Garza Office Visit:      Metoprolol  Last Written Prescription Date:  90  Last Fill Quantity: 90,  # refills: 1   Last office visit: 8/25/2017 with prescribing provider:  Dr.Olson De La Garza Office Visit:      Furosemide  Last Written Prescription Date:  01/29/2018  Last Fill Quantity: 45,  # refills: 2   Last office visit: 8/25/2017 with prescribing provider:  Dr.Olson De La Garza Office Visit:      Atorvastatin  Last Written Prescription Date:  01/29/2018  Last Fill Quantity: 90,  # refills: 0   Last office visit: 8/25/2017 with prescribing provider:  Dr.Olson De La Garza Office Visit:        Requested Prescriptions   Pending Prescriptions Disp Refills     warfarin (COUMADIN) 2 MG tablet 270 tablet 0     Sig: TAKE 2-3 TABLETS BY MOUTH DAILY AS DIRECTED BY INR CLINIC    There is no refill protocol information for this order        vitamin D (ERGOCALCIFEROL) 68399 UNIT capsule 12 capsule 1     Sig: Take 1 capsule (50,000 Units) by mouth once a week    There is no refill protocol information for this order        venlafaxine (EFFEXOR) 75 MG tablet 60 tablet 0     Sig: Take 1 tablet (75 mg) by mouth 2 times daily    There is no refill protocol information for this order        metoprolol succinate (TOPROL-XL) 50 MG 24 hr tablet 90 tablet 1     Sig: Take 1 tablet (50 mg) by mouth daily    There is no refill protocol information for this order        furosemide (LASIX) 20 MG tablet 45 tablet 2      Sig: Take 0.5 tablets (10 mg) by mouth daily    There is no refill protocol information for this order        atorvastatin (LIPITOR) 40 MG tablet 90 tablet 0     Sig: Take 1 tablet (40 mg) by mouth At Bedtime    There is no refill protocol information for this order

## 2018-02-15 RX ORDER — FUROSEMIDE 20 MG
10 TABLET ORAL DAILY
Qty: 45 TABLET | Refills: 1 | Status: ON HOLD | OUTPATIENT
Start: 2018-02-15 | End: 2018-10-16

## 2018-02-15 RX ORDER — VENLAFAXINE 75 MG/1
75 TABLET ORAL 2 TIMES DAILY
Qty: 60 TABLET | Refills: 0 | Status: SHIPPED | OUTPATIENT
Start: 2018-02-15 | End: 2018-06-08

## 2018-02-15 RX ORDER — ATORVASTATIN CALCIUM 40 MG/1
40 TABLET, FILM COATED ORAL AT BEDTIME
Qty: 90 TABLET | Refills: 1 | Status: SHIPPED | OUTPATIENT
Start: 2018-02-15 | End: 2018-08-29

## 2018-02-15 RX ORDER — WARFARIN SODIUM 2 MG/1
TABLET ORAL
Qty: 270 TABLET | Refills: 0 | Status: SHIPPED | OUTPATIENT
Start: 2018-02-15 | End: 2018-06-08

## 2018-02-15 RX ORDER — METOPROLOL SUCCINATE 50 MG/1
50 TABLET, EXTENDED RELEASE ORAL DAILY
Qty: 90 TABLET | Refills: 1 | Status: SHIPPED | OUTPATIENT
Start: 2018-02-15 | End: 2018-08-20

## 2018-02-15 RX ORDER — ERGOCALCIFEROL 1.25 MG/1
50000 CAPSULE, LIQUID FILLED ORAL WEEKLY
Qty: 12 CAPSULE | Refills: 1 | Status: SHIPPED | OUTPATIENT
Start: 2018-02-15 | End: 2018-06-15

## 2018-02-15 NOTE — TELEPHONE ENCOUNTER
"Requested Prescriptions   Pending Prescriptions Disp Refills     warfarin (COUMADIN) 2 MG tablet 270 tablet 0     Sig: TAKE 2-3 TABLETS BY MOUTH DAILY AS DIRECTED BY INR CLINIC    Vitamin K Antagonists Failed    2/14/2018  3:06 PM       Failed - INR is within goal in the past 6 weeks    Confirm INR is within goal in the past 6 weeks.     Recent Labs   Lab Test 02/06/18   INR  2.2*                      Passed - Recent or future visit with authorizing provider's specialty    Patient had office visit in the last year or has a visit in the next 30 days with authorizing provider.  See \"Patient Info\" tab in inbasket, or \"Choose Columns\" in Meds & Orders section of the refill encounter.            Passed - Patient is 18 years of age or older        vitamin D (ERGOCALCIFEROL) 98929 UNIT capsule 12 capsule 1     Sig: Take 1 capsule (50,000 Units) by mouth once a week    There is no refill protocol information for this order        venlafaxine (EFFEXOR) 75 MG tablet 60 tablet 0     Sig: Take 1 tablet (75 mg) by mouth 2 times daily    Serotonin-Norepinephrine Reuptake Inhibitors  Failed    2/14/2018  3:06 PM       Failed - Blood pressure under 140/90 in past 12 months    BP Readings from Last 3 Encounters:   12/04/17 140/82   08/25/17 122/82   05/11/17 128/88                Failed - Normal serum creatinine on file in past 12 months    Recent Labs   Lab Test  08/25/17   1416   CR  1.34*            Passed - Recent or future visit with authorizing provider's specialty    Patient had office visit in the last year or has a visit in the next 30 days with authorizing provider.  See \"Patient Info\" tab in inbasket, or \"Choose Columns\" in Meds & Orders section of the refill encounter.            Passed - Patient is age 18 or older        metoprolol succinate (TOPROL-XL) 50 MG 24 hr tablet 90 tablet 1     Sig: Take 1 tablet (50 mg) by mouth daily    Beta-Blockers Protocol Failed    2/14/2018  3:06 PM       Failed - Blood pressure under " "140/90 in past 12 months    BP Readings from Last 3 Encounters:   12/04/17 140/82   08/25/17 122/82   05/11/17 128/88                Passed - Patient is age 6 or older       Passed - Recent or future visit with authorizing provider's specialty    Patient had office visit in the last year or has a visit in the next 30 days with authorizing provider.  See \"Patient Info\" tab in inbasket, or \"Choose Columns\" in Meds & Orders section of the refill encounter.             furosemide (LASIX) 20 MG tablet 45 tablet 2     Sig: Take 0.5 tablets (10 mg) by mouth daily    Diuretics (Including Combos) Protocol Failed    2/14/2018  3:06 PM       Failed - Blood pressure under 140/90 in past 12 months    BP Readings from Last 3 Encounters:   12/04/17 140/82   08/25/17 122/82   05/11/17 128/88                Failed - Normal serum creatinine on file in past 12 months    Recent Labs   Lab Test  08/25/17   1416   CR  1.34*             Passed - Recent or future visit with authorizing provider's specialty    Patient had office visit in the last year or has a visit in the next 30 days with authorizing provider.  See \"Patient Info\" tab in inbasket, or \"Choose Columns\" in Meds & Orders section of the refill encounter.            Passed - Patient is age 18 or older       Passed - Normal serum potassium on file in past 12 months    Recent Labs   Lab Test  08/25/17   1416   POTASSIUM  4.0                   Passed - Normal serum sodium on file in past 12 months    Recent Labs   Lab Test  08/25/17   1416   NA  139              atorvastatin (LIPITOR) 40 MG tablet 90 tablet 0     Sig: Take 1 tablet (40 mg) by mouth At Bedtime    Statins Protocol Failed    2/14/2018  3:06 PM       Failed - LDL on file in past 12 months    Recent Labs   Lab Test  02/07/17   0817   LDL  93            Passed - No abnormal creatine kinase in past 12 months    No lab results found.         Passed - Recent or future visit with authorizing provider    Patient had office " "visit in the last year or has a visit in the next 30 days with authorizing provider.  See \"Patient Info\" tab in inbasket, or \"Choose Columns\" in Meds & Orders section of the refill encounter.            Passed - Patient is age 18 or older          "

## 2018-03-06 ENCOUNTER — ANTICOAGULATION THERAPY VISIT (OUTPATIENT)
Dept: NURSING | Facility: CLINIC | Age: 78
End: 2018-03-06
Payer: COMMERCIAL

## 2018-03-06 DIAGNOSIS — Z79.01 LONG-TERM (CURRENT) USE OF ANTICOAGULANTS: ICD-10-CM

## 2018-03-06 LAB — INR POINT OF CARE: 2.3 (ref 0.86–1.14)

## 2018-03-06 PROCEDURE — 36416 COLLJ CAPILLARY BLOOD SPEC: CPT

## 2018-03-06 PROCEDURE — 85610 PROTHROMBIN TIME: CPT | Mod: QW

## 2018-03-06 PROCEDURE — 99207 ZZC NO CHARGE NURSE ONLY: CPT

## 2018-03-06 NOTE — PROGRESS NOTES
ANTICOAGULATION FOLLOW-UP CLINIC VISIT    Patient Name:  Gibson Villalta  Date:  3/6/2018  Contact Type:  Face to Face    SUBJECTIVE:        OBJECTIVE    INR Protime   Date Value Ref Range Status   03/06/2018 2.3 (A) 0.86 - 1.14 Final       ASSESSMENT / PLAN  INR assessment THER    Recheck INR In: 5 WEEKS    INR Location Clinic      Anticoagulation Summary as of 3/6/2018     INR goal 2.0-3.0   Today's INR 2.3   Maintenance plan 4 mg (2 mg x 2) on Sun; 6 mg (2 mg x 3) all other days   Full instructions 4 mg on Sun; 6 mg all other days   Weekly total 40 mg   No change documented Rachel Mckeon RN   Plan last modified Rachel Mckeon RN (8/14/2017)   Next INR check 4/10/2018   Priority INR   Target end date     Indications   Long-term (current) use of anticoagulants [Z79.01] [Z79.01]         Anticoagulation Episode Summary     INR check location     Preferred lab     Send INR reminders to [facility] ANT[facility]AG[facility]    Comments       Anticoagulation Care Providers     Provider Role Specialty Phone number    Reyes Flori DO Josiane [facility] [phone]            See the Encounter Report to view Anticoagulation Flowsheet and Dosing Calendar (Go to Encounters tab in chart review, and find the Anticoagulation Therapy Visit)    Patient is here with his wife Izabella. Denies any signs of increased bruising or bleeding. Dosing based on [facility] Protocol and Provider directed care plan.      Rachel Mckeon RN

## 2018-03-06 NOTE — MR AVS SNAPSHOT
Gibson Villalta   3/6/2018 11:15 AM   Anticoagulation Therapy Visit    Description:  77 year old male   Provider:   ANTICOAGULATION CLINIC   Department:   Nurse           INR as of 3/6/2018     Today's INR 2.3      Anticoagulation Summary as of 3/6/2018     INR goal 2.0-3.0   Today's INR 2.3   Full instructions 4 mg on Sun; 6 mg all other days   Next INR check 4/10/2018    Indications   Long-term (current) use of anticoagulants [Z79.01] [Z79.01]         Your next Anticoagulation Clinic appointment(s)     Apr 10, 2018 11:30 AM CDT   Anticoagulation Visit with  ANTICOAGULATION CLINIC   [facility]    [address]  [place] [id number] [phone]              Contact Numbers     Clinic Number:         March 2018 Details    Sun Mon Tue Wed Thu Fri Sat         1               2               3                 4               5               6      6 mg   See details      7      6 mg         8      6 mg         9      6 mg         10      6 mg           11      4 mg         12      6 mg         13      6 mg         14      6 mg         15      6 mg         16      6 mg         17      6 mg           18      4 mg         19      6 mg         20      6 mg         21      6 mg         22      6 mg         23      6 mg         24      6 mg           25      4 mg         26      6 mg         27      6 mg         28      6 mg         29      6 mg         30      6 mg         31      6 mg          Date Details   03/06 This INR check               How to take your warfarin dose     To take:  4 mg Take 2 of the 2 mg tablets.    To take:  6 mg Take 3 of the 2 mg tablets.           April 2018 Details    Sun Mon Tue Wed Thu Fri Sat     1      4 mg         2      6 mg         3      6 mg         4      6 mg         5      6 mg         6      6 mg         7      6 mg           8      4 mg         9      6 mg         10            11               12               13                14                 15               16               17               18               19               20               21                 22               23               24               25               26               27               28                 29               30                     Date Details   No additional details    Date of next INR:  4/10/2018         How to take your warfarin dose     To take:  4 mg Take 2 of the 2 mg tablets.    To take:  6 mg Take 3 of the 2 mg tablets.

## 2018-04-10 ENCOUNTER — ANTICOAGULATION THERAPY VISIT (OUTPATIENT)
Dept: NURSING | Facility: CLINIC | Age: 78
End: 2018-04-10
Payer: COMMERCIAL

## 2018-04-10 DIAGNOSIS — Z79.01 LONG-TERM (CURRENT) USE OF ANTICOAGULANTS: ICD-10-CM

## 2018-04-10 LAB — INR POINT OF CARE: 4 (ref 0.86–1.14)

## 2018-04-10 PROCEDURE — 36416 COLLJ CAPILLARY BLOOD SPEC: CPT

## 2018-04-10 PROCEDURE — 85610 PROTHROMBIN TIME: CPT | Mod: QW

## 2018-04-10 PROCEDURE — 99207 ZZC NO CHARGE NURSE ONLY: CPT

## 2018-04-10 NOTE — PROGRESS NOTES
ANTICOAGULATION FOLLOW-UP CLINIC VISIT    Patient Name:  Gibson Villalta  Date:  4/10/2018  Contact Type:  Face to Face    SUBJECTIVE:     Patient Findings     Positives Unexplained INR or factor level change    Comments Denies any unusual bleeding, no blood in stool/urine, no bloody noses, no bruising  Patient reports feeling well. No new medications added.   No accidental double dosing.           OBJECTIVE    INR Protime   Date Value Ref Range Status   04/10/2018 4.0 (A) 0.86 - 1.14 Final       ASSESSMENT / PLAN  INR assessment SUPRA    Recheck INR In: 2 WEEKS    INR Location Clinic      Anticoagulation Summary as of 4/10/2018     INR goal 2.0-3.0   Today's INR 4.0!   Maintenance plan 4 mg (2 mg x 2) on Sun; 6 mg (2 mg x 3) all other days   Full instructions 4/10: Hold; Otherwise 4 mg on Sun; 6 mg all other days   Weekly total 40 mg   Plan last modified Rachel Mckeon RN (8/14/2017)   Next INR check 4/24/2018   Priority INR   Target end date     Indications   Long-term (current) use of anticoagulants [Z79.01] [Z79.01]         Anticoagulation Episode Summary     INR check location     Preferred lab     Send INR reminders to [facility]    Comments       Anticoagulation Care Providers     Provider Role Specialty Phone number    Flori Reyes Josiane,  [facility] Internal Medicine [phone]            See the Encounter Report to view Anticoagulation Flowsheet and Dosing Calendar (Go to Encounters tab in chart review, and find the Anticoagulation Therapy Visit)    Dosage adjustment made based on physician directed care plan.  Hold dosing tonight. Resume maintenance plan starting tomorrow. Recheck in 2 weeks.     Lesia Hallman RN

## 2018-04-10 NOTE — MR AVS SNAPSHOT
Gibson Villalta   4/10/2018 11:30 AM   Anticoagulation Therapy Visit    Description:  77 year old male   Provider:   ANTICOAGULATION CLINIC   Department:   Nurse           INR as of 4/10/2018     Today's INR 4.0!      Anticoagulation Summary as of 4/10/2018     INR goal 2.0-3.0   Today's INR 4.0!   Full instructions 4/10: Hold; Otherwise 4 mg on Sun; 6 mg all other days   Next INR check 4/24/2018    Indications   Long-term (current) use of anticoagulants [Z79.01] [Z79.01]         Your next Anticoagulation Clinic appointment(s)     Apr 10, 2018 11:30 AM CDT   Anticoagulation Visit with  ANTICOAGULATION CLINIC   [facility]    [address]  [place] [address]   [phone]            Apr 24, 2018  2:00 PM CDT   Anticoagulation Visit with  ANTICOAGULATION CLINIC   [facility]    [address]  [place] [address]   [phone]              Contact Numbers     Clinic Number:         April 2018 Details    Sun Mon Tue Wed Thu Fri Sat     1               2               3               4               5               6               7                 8               9               10      Hold   See details      11      6 mg         12      6 mg         13      6 mg         14      6 mg           15      4 mg         16      6 mg         17      6 mg         18      6 mg         19      6 mg         20      6 mg         21      6 mg           22      4 mg         23      6 mg         24            25               26               27               28                 29               30                     Date Details   04/10 This INR check       Date of next INR:  4/24/2018         How to take your warfarin dose     To take:  4 mg Take 2 of the 2 mg tablets.    To take:  6 mg Take 3 of the 2 mg tablets.    Hold Do not take your warfarin dose. See the Details table to the right for additional instructions.

## 2018-04-24 ENCOUNTER — ANTICOAGULATION THERAPY VISIT (OUTPATIENT)
Dept: NURSING | Facility: CLINIC | Age: 78
End: 2018-04-24
Payer: COMMERCIAL

## 2018-04-24 DIAGNOSIS — Z79.01 LONG-TERM (CURRENT) USE OF ANTICOAGULANTS: ICD-10-CM

## 2018-04-24 LAB — INR POINT OF CARE: 3.8 (ref 0.86–1.14)

## 2018-04-24 PROCEDURE — 85610 PROTHROMBIN TIME: CPT | Mod: QW

## 2018-04-24 PROCEDURE — 99207 ZZC NO CHARGE NURSE ONLY: CPT

## 2018-04-24 PROCEDURE — 36416 COLLJ CAPILLARY BLOOD SPEC: CPT

## 2018-04-24 NOTE — PROGRESS NOTES
ANTICOAGULATION FOLLOW-UP CLINIC VISIT    Patient Name:  Gibson Villalta  Date:  4/24/2018  Contact Type:  Face to Face    SUBJECTIVE:     Patient Findings     Positives Unexplained INR or factor level change           OBJECTIVE    INR Protime   Date Value Ref Range Status   04/24/2018 3.8 (A) 0.86 - 1.14 Final       ASSESSMENT / PLAN  INR assessment SUPRA    Recheck INR In: 2 WEEKS    INR Location Clinic      Anticoagulation Summary as of 4/24/2018     INR goal 2.0-3.0   Today's INR 3.8!   Maintenance plan 4 mg (2 mg x 2) on Sun, Thu; 6 mg (2 mg x 3) all other days   Full instructions 4 mg on Sun, Thu; 6 mg all other days   Weekly total 38 mg   Plan last modified Lesia Hallman RN (4/24/2018)   Next INR check 5/8/2018   Priority INR   Target end date     Indications   Long-term (current) use of anticoagulants [Z79.01] [Z79.01]         Anticoagulation Episode Summary     INR check location     Preferred lab     Send INR reminders to [facility] ANT[facility]AG[facility]    Comments       Anticoagulation Care Providers     Provider Role Specialty Phone number    Flori Reyes DO [facility] Internal Medicine [phone]            See the Encounter Report to view Anticoagulation Flowsheet and Dosing Calendar (Go to Encounters tab in chart review, and find the Anticoagulation Therapy Visit)    Dosage adjustment made based on physician directed care plan.    Lesia Hallman RN

## 2018-04-24 NOTE — MR AVS SNAPSHOT
Gibson Villalta   4/24/2018 2:00 PM   Anticoagulation Therapy Visit    Description:  77 year old male   Provider:   ANTICOAGULATION CLINIC   Department:   Nurse           INR as of 4/24/2018     Today's INR 3.8!      Anticoagulation Summary as of 4/24/2018     INR goal 2.0-3.0   Today's INR 3.8!   Full instructions 4 mg on Sun, Thu; 6 mg all other days   Next INR check 5/8/2018    Indications   Long-term (current) use of anticoagulants [Z79.01] [Z79.01]         Your next Anticoagulation Clinic appointment(s)     May 08, 2018  1:30 PM CDT   Anticoagulation Visit with  ANTICOAGULATION CLINIC   [facility]    [address]  [place] [address]   [phone]              Contact Numbers     Clinic Number:         April 2018 Details    Sun Mon Tue Wed Thu Fri Sat     1               2               3               4               5               6               7                 8               9               10               11               12               13               14                 15               16               17               18               19               20               21                 22               23               24      6 mg   See details      25      6 mg         26      4 mg         27      6 mg         28      6 mg           29      4 mg         30      6 mg               Date Details   04/24 This INR check               How to take your warfarin dose     To take:  4 mg Take 2 of the 2 mg tablets.    To take:  6 mg Take 3 of the 2 mg tablets.           May 2018 Details    Sun Mon Tue Wed Thu Fri Sat       1      6 mg         2      6 mg         3      4 mg         4      6 mg         5      6 mg           6      4 mg         7      6 mg         8            9               10               11               12                 13               14               15               16               17               18               19                  20               21               22               23               24               25               26                 27               28               29               30               31                  Date Details   No additional details    Date of next INR:  5/8/2018         How to take your warfarin dose     To take:  4 mg Take 2 of the 2 mg tablets.    To take:  6 mg Take 3 of the 2 mg tablets.

## 2018-04-26 ENCOUNTER — ALLIED HEALTH/NURSE VISIT (OUTPATIENT)
Dept: CARDIOLOGY | Facility: CLINIC | Age: 78
End: 2018-04-26
Payer: COMMERCIAL

## 2018-04-26 DIAGNOSIS — I42.8 NONISCHEMIC CARDIOMYOPATHY (H): Primary | ICD-10-CM

## 2018-04-26 DIAGNOSIS — Z95.810 ICD (IMPLANTABLE CARDIOVERTER-DEFIBRILLATOR) IN PLACE: ICD-10-CM

## 2018-04-26 PROCEDURE — 93295 DEV INTERROG REMOTE 1/2/MLT: CPT | Performed by: INTERNAL MEDICINE

## 2018-04-26 PROCEDURE — 93296 REM INTERROG EVL PM/IDS: CPT | Performed by: INTERNAL MEDICINE

## 2018-04-26 NOTE — PROGRESS NOTES
[employer] Quad CRT-D Remote Device Check    AP: 5 % BiVP: 98 %  Mode: DDDR         Presenting Rhythm: AS/BiVP  Heart Rate: stable with good variability, optivol at baseline, patient is active 0.5 hours/day  Sensing: WNL    Pacing Threshold: WNL    Impedance: WNL  Battery Status: estimated 7.8 years longevity remaining   Atrial Arrhythmia: none  Ventricular Arrhythmia: none  ATP: 0    Shocks: 0    Care Plan: Continue with Q3 month remote checks on 8/16/18. Patient is seeing Dr Hernandez on 5/17. Letter sent with results and plan. SP

## 2018-04-26 NOTE — MR AVS SNAPSHOT
After Visit Summary   4/26/2018    Gibson Villalta    MRN: [id number]           Patient Information     Date Of Birth          1940        Visit Information        Provider Department      4/26/2018 4:30 PM ALEJANDRO DCR2 [facility]        Today's Diagnoses     Nonischemic cardiomyopathy (H)    -  1    ICD (implantable cardioverter-defibrillator) in place           Follow-ups after your visit        Your next 10 appointments already scheduled     Apr 26, 2018  4:30 PM CDT   Remote ICD Check with ALEJANDRO DCR2   [facility] ([facility])    [address] [address] [phone] OPT 2           This appointment is for a remote check of your debrillator.  This is not an appointment at the office.            May 08, 2018  1:30 PM CDT   Anticoagulation Visit with  ANTICOAGULATION CLINIC   [facility] ([facility]    [address] [facility] [address]   [phone]            May 17, 2018  2:15 PM CDT   [facility] EP RETURN with Josias Hernandez MD   [facility] ([facility])    [address] [address] [phone] OPT 2            Aug 16, 2018  4:30 PM CDT   Remote ICD Check with ALEJANDRO DCR2   [facility] ([facility])    [address] [address] [phone] OPT 2           This appointment is for a remote check of your debrillator.  This is not an appointment at the office.              Who to contact     If you have questions or need follow up information about today's clinic visit or your schedule please contact [facility] directly at [phone].  Normal or non-critical lab and imaging results will be communicated to you by MyChart, letter or phone within 4 business days after the  clinic has received the results. If you do not hear from us within 7 days, please contact the clinic through [employer] or phone. If you have a critical or abnormal lab result, we will notify you by phone as soon as possible.  Submit refill requests through [employer] or call your pharmacy and they will forward the refill request to us. Please allow 3 business days for your refill to be completed.          Additional Information About Your Visit        [employer] Information     [employer] gives you secure access to your electronic health record. If you see a primary care provider, you can also send messages to your care team and make appointments. If you have questions, please call your primary care clinic.  If you do not have a primary care provider, please call [phone] and they will assist you.        Care EveryWhere ID     This is your Care EveryWhere ID. This could be used by other organizations to access your [facility] medical records  [id number]         Blood Pressure from Last 3 Encounters:   12/04/17 140/82   08/25/17 122/82   05/11/17 128/88    Weight from Last 3 Encounters:   12/04/17 107 kg (236 lb)   08/25/17 107.4 kg (236 lb 11.2 oz)   05/11/17 105.8 kg (233 lb 3.2 oz)              We Performed the Following     ICD DEVICE INTERROGAT REMOTE (32033)     INTERROGATION DEVICE EVAL REMOTE, PACER/ICD (05302)        Primary Care Provider Office Phone # Fax #    Flori Reyes,  [phone] [address] [facility] [address]  [facility] [place] [address]        Equal Access to Services     BEL MCLAIN AH: Hadii aad ku hadasho Soomaali, waaxda luqadaha, qaybta kaalmada adeegyada, waxay irlanda haybradley doty . So [facility] [phone].    ATENCIÓN: Si habla español, tiene a de luna disposición servicios gratuitos de asistencia lingüística. Llame al [phone].    We comply with applicable federal civil rights laws and [place] laws. We do not discriminate on the basis of race, color, national origin, age,  disability, sex, sexual orientation, or gender identity.            Thank you!     Thank you for choosing [facility]  for your care. Our goal is always to provide you with excellent care. Hearing back from our patients is one way we can continue to improve our services. Please take a few minutes to complete the written survey that you may receive in the mail after your visit with us. Thank you!             Your Updated Medication List - Protect others around you: Learn how to safely use, store and throw away your medicines at www.disposemymeds.org.          This list is accurate as of 4/26/18  8:20 AM.  Always use your most recent med list.                   Brand Name Dispense Instructions for use Diagnosis    ACE/ARB/ARNI NOT PRESCRIBED (INTENTIONAL)      ACE & ARB not prescribed due to Symptomatic hypotension not due to excessive diuresis    HFrEF (heart failure with reduced ejection fraction) (H)       albuterol 108 (90 Base) MCG/ACT Inhaler    PROAIR HFA/PROVENTIL HFA/VENTOLIN HFA     Inhale 2 puffs into the lungs every 6 hours as needed for shortness of breath / dyspnea or wheezing        ASPIRIN NOT PRESCRIBED    INTENTIONAL    0 each    Antiplatelet medication not prescribed intentionally due to Current anticoagulant therapy (warfarin/enoxaparin)    HFrEF (heart failure with reduced ejection fraction) (H)       atorvastatin 40 MG tablet    LIPITOR    90 tablet    Take 1 tablet (40 mg) by mouth At Bedtime    Hyperlipidemia LDL goal <130       COENZYME Q-10 PO      Take by mouth daily        ferrous sulfate 325 (65 Fe) MG tablet    IRON     Take 325 mg by mouth daily        furosemide 20 MG tablet    LASIX    45 tablet    Take 0.5 tablets (10 mg) by mouth daily    Chronic systolic heart failure (H)       hydrALAZINE 10 MG tablet    APRESOLINE    270 tablet    Take 1 tablet (10 mg) by mouth 3 times daily    Chronic systolic heart failure (H), Cardiomyopathy (H)        isosorbide mononitrate 30 MG 24 hr tablet    IMDUR    45 tablet    Take 0.5 tablets (15 mg) by mouth daily    Chronic systolic heart failure (H), Cardiomyopathy (H)       metoprolol succinate 50 MG 24 hr tablet    TOPROL-XL    90 tablet    Take 1 tablet (50 mg) by mouth daily    Cardiomyopathy (H)       Multi-vitamin Tabs tablet      Take 1 tablet by mouth daily        OMEGA-3 FISH OIL PO      Take 1 g by mouth daily        * venlafaxine 75 MG tablet    EFFEXOR    180 tablet    Take 1 tablet (75 mg) by mouth 2 times daily    Anxiety and depression       * venlafaxine 75 MG tablet    EFFEXOR    60 tablet    Take 1 tablet (75 mg) by mouth 2 times daily    Anxiety and depression       vitamin D 42760 UNIT capsule    ERGOCALCIFEROL    12 capsule    Take 1 capsule (50,000 Units) by mouth once a week    Vitamin D deficiency       warfarin 2 MG tablet    COUMADIN    270 tablet    TAKE 2-3 TABLETS BY MOUTH DAILY AS DIRECTED BY INR CLINIC    Long-term (current) use of anticoagulants       * Notice:  This list has 2 medication(s) that are the same as other medications prescribed for you. Read the directions carefully, and ask your doctor or other care provider to review them with you.

## 2018-04-26 NOTE — LETTER
Gibson Villalta    [address]   [place] [address]    April 26, 2018      Dear Gibson Villalta,     Your transmission sent on 4/26 has been reviewed. It was determined the results are normal. No events were detected.   __X__ Your next scheduled date for you to send a transmission is on 8/16.   Please call [facility] at [phone] to change your appointment if needed. You may call and leave a message for a device RN if you have any questions at [phone] and they will return your call. Device clinic hours: Monday - Friday  8:00am-4:00pm   Sincerely,   PRECIOUS

## 2018-05-08 ENCOUNTER — ANTICOAGULATION THERAPY VISIT (OUTPATIENT)
Dept: NURSING | Facility: CLINIC | Age: 78
End: 2018-05-08
Payer: COMMERCIAL

## 2018-05-08 DIAGNOSIS — Z79.01 LONG-TERM (CURRENT) USE OF ANTICOAGULANTS: ICD-10-CM

## 2018-05-08 LAB — INR POINT OF CARE: 3.6 (ref 0.86–1.14)

## 2018-05-08 PROCEDURE — 36416 COLLJ CAPILLARY BLOOD SPEC: CPT

## 2018-05-08 PROCEDURE — 99207 ZZC NO CHARGE NURSE ONLY: CPT

## 2018-05-08 PROCEDURE — 85610 PROTHROMBIN TIME: CPT | Mod: QW

## 2018-05-08 NOTE — MR AVS SNAPSHOT
Gibson JAYA Villalta   5/8/2018 1:30 PM   Anticoagulation Therapy Visit    Description:  77 year old male   Provider:   ANTICOAGULATION CLINIC   Department:   Nurse           INR as of 5/8/2018     Today's INR 3.6!      Anticoagulation Summary as of 5/8/2018     INR goal 2.0-3.0   Today's INR 3.6!   Full instructions 4 mg on Sun, Tue, Thu; 6 mg all other days   Next INR check 5/23/2018    Indications   Long-term (current) use of anticoagulants [Z79.01] [Z79.01]         Your next Anticoagulation Clinic appointment(s)     May 23, 2018  1:30 PM CDT   Anticoagulation Visit with  ANTICOAGULATION CLINIC   [facility]    [address]  [place] [address] [phone]              Contact Numbers     Clinic Number:         May 2018 Details    Sun Mon Tue Wed Thu Fri Sat       1               2               3               4               5                 6               7               8      4 mg   See details      9      6 mg         10      4 mg         11      6 mg         12      6 mg           13      4 mg         14      6 mg         15      4 mg         16      6 mg         17      4 mg         18      6 mg         19      6 mg           20      4 mg         21      6 mg         22      4 mg         23            24               25               26                 27               28               29               30               31                  Date Details   05/08 This INR check       Date of next INR:  5/23/2018         How to take your warfarin dose     To take:  4 mg Take 2 of the 2 mg tablets.    To take:  6 mg Take 3 of the 2 mg tablets.

## 2018-05-08 NOTE — PROGRESS NOTES
ANTICOAGULATION FOLLOW-UP CLINIC VISIT    Patient Name:  Gibson Villalta  Date:  5/8/2018  Contact Type:  Face to Face    SUBJECTIVE:     Patient Findings     Positives Unexplained INR or factor level change    Comments Denies any change in diet, new medications, patient reports feeling well today.  Denies bruising/bleeding.  Unexplained elevated INR.   Will do 4 mg three days week and 6 mg AOD. Recheck in 2 weeks.            OBJECTIVE    INR Protime   Date Value Ref Range Status   05/08/2018 3.6 (A) 0.86 - 1.14 Final       ASSESSMENT / PLAN  INR assessment SUPRA    Recheck INR In: 2 WEEKS    INR Location Clinic      Anticoagulation Summary as of 5/8/2018     INR goal 2.0-3.0   Today's INR 3.6!   Maintenance plan 4 mg (2 mg x 2) on Sun, Tue, Thu; 6 mg (2 mg x 3) all other days   Full instructions 4 mg on Sun, Tue, Thu; 6 mg all other days   Weekly total 36 mg   Plan last modified Lesia Hallman RN (5/8/2018)   Next INR check 5/23/2018   Priority INR   Target end date     Indications   Long-term (current) use of anticoagulants [Z79.01] [Z79.01]         Anticoagulation Episode Summary     INR check location     Preferred lab     Send INR reminders to [facility] ANTICOAGULATION    Comments       Anticoagulation Care Providers     Provider Role Specialty Phone number    Reyes Flori DO [facility] [phone]            See the Encounter Report to view Anticoagulation Flowsheet and Dosing Calendar (Go to Encounters tab in chart review, and find the Anticoagulation Therapy Visit)    Dosage adjustment made based on physician directed care plan.      Lesia Hallman RN

## 2018-05-14 ENCOUNTER — TRANSFERRED RECORDS (OUTPATIENT)
Dept: HEALTH INFORMATION MANAGEMENT | Facility: CLINIC | Age: 78
End: 2018-05-14

## 2018-05-17 ENCOUNTER — OFFICE VISIT (OUTPATIENT)
Dept: CARDIOLOGY | Facility: CLINIC | Age: 78
End: 2018-05-17
Attending: INTERNAL MEDICINE
Payer: COMMERCIAL

## 2018-05-17 VITALS
BODY MASS INDEX: 30.75 KG/M2 | WEIGHT: 232 LBS | DIASTOLIC BLOOD PRESSURE: 63 MMHG | SYSTOLIC BLOOD PRESSURE: 103 MMHG | HEIGHT: 73 IN | HEART RATE: 91 BPM

## 2018-05-17 DIAGNOSIS — Z95.810 ICD (IMPLANTABLE CARDIOVERTER-DEFIBRILLATOR) IN PLACE: Primary | ICD-10-CM

## 2018-05-17 DIAGNOSIS — I50.22 CHRONIC SYSTOLIC HEART FAILURE (H): ICD-10-CM

## 2018-05-17 DIAGNOSIS — I42.8 NONISCHEMIC CARDIOMYOPATHY (H): ICD-10-CM

## 2018-05-17 PROCEDURE — 99214 OFFICE O/P EST MOD 30 MIN: CPT | Performed by: INTERNAL MEDICINE

## 2018-05-17 NOTE — PROGRESS NOTES
"[id number]    Electrophysiology/ Clinic Note         H&P and Plan:         Josias Hernandez MD    Physical Exam:  Vitals: /63  Pulse 91  Ht 1.854 m (6' 1\")  Wt 105.2 kg (232 lb)  BMI 30.61 kg/m2    Constitutional:  AAO x3.  Pt is in NAD.  HEAD: normocephalic.  SKIN: Skin normal color, texture and turgor with no lesions or eruptions.  Eyes: PERRL, EOMI.  ENT:  Supple, normal JVP. No lymphadenopathy or thyroid enlargement.  Chest:  CTAB.  Cardiac:    RRR, normal  S1 and S2.  No murmurs rubs or gallop.   Abdomen:  Normal BS.  Soft, non-tender and non-distended.  No rebound or guarding.    Extremities:  Pedious pulses palpable B/L.  No LE edema noticed.   Neurological: Strength and sensation grossly symmetric and intact throughout.       CURRENT MEDICATIONS:  Current Outpatient Prescriptions   Medication Sig Dispense Refill     ACE/ARB NOT PRESCRIBED, INTENTIONAL, ACE & ARB not prescribed due to Symptomatic hypotension not due to excessive diuresis       ASPIRIN NOT PRESCRIBED (INTENTIONAL) Antiplatelet medication not prescribed intentionally due to Current anticoagulant therapy (warfarin/enoxaparin) 0 each 0     atorvastatin (LIPITOR) 40 MG tablet Take 1 tablet (40 mg) by mouth At Bedtime 90 tablet 1     COENZYME Q-10 PO Take by mouth daily       ferrous sulfate (IRON) 325 (65 FE) MG tablet Take 325 mg by mouth daily       furosemide (LASIX) 20 MG tablet Take 0.5 tablets (10 mg) by mouth daily 45 tablet 1     hydrALAZINE (APRESOLINE) 10 MG tablet Take 1 tablet (10 mg) by mouth 3 times daily 270 tablet 1     isosorbide mononitrate (IMDUR) 30 MG 24 hr tablet Take 0.5 tablets (15 mg) by mouth daily 45 tablet 1     metoprolol succinate (TOPROL-XL) 50 MG 24 hr tablet Take 1 tablet (50 mg) by mouth daily 90 tablet 1     multivitamin, therapeutic with minerals (MULTI-VITAMIN) TABS Take 1 tablet by mouth daily       venlafaxine (EFFEXOR) 75 MG tablet Take 1 tablet (75 mg) by mouth 2 times daily 180 tablet 1     venlafaxine " (EFFEXOR) 75 MG tablet Take 1 tablet (75 mg) by mouth 2 times daily 60 tablet 0     vitamin D (ERGOCALCIFEROL) 25047 UNIT capsule Take 1 capsule (50,000 Units) by mouth once a week 12 capsule 1     warfarin (COUMADIN) 2 MG tablet TAKE 2-3 TABLETS BY MOUTH DAILY AS DIRECTED BY INR CLINIC 270 tablet 0     albuterol (PROAIR HFA, PROVENTIL HFA, VENTOLIN HFA) 108 (90 BASE) MCG/ACT inhaler Inhale 2 puffs into the lungs every 6 hours as needed for shortness of breath / dyspnea or wheezing         ALLERGIES     Allergies   Allergen Reactions     Wasps [Hornets]      Sand wasp --- years ago.         PAST MEDICAL HISTORY:  Past Medical History:   Diagnosis Date     Acute kidney injury (H)      Anemia      Anxiety      BPH (benign prostatic hypertrophy)      Carpal tunnel syndrome     Right     Chronic kidney disease (CKD), stage 3 (moderate)      Dementia      History of depression      LBBB (left bundle branch block) 2013     Lumbar herniated disc     L5-S1     Mixed cardiomyopathy 2016     Myocardial infarction  and      Nonischemic cardiomyopathy (H) 2016     Obesity      Rosacea      Rotator cuff disorder     Tear x 2     RV (right ventricular) mural thrombus 2016       PAST SURGICAL HISTORY:  Past Surgical History:   Procedure Laterality Date     ANGIOPLASTY   and  with stent     GASTRIC BYPASS       HEART CATH LEFT HEART CATH  16    Non ischemic cardiomyopathy; Non functionally significant LAD and RCA disease      OTHER SURGICAL HISTORY      Spinal surgery     OTHER SURGICAL HISTORY  2016    CRT-D implantation       FAMILY HISTORY:  Family History   Problem Relation Age of Onset     Coronary Artery Disease Father 39     Alzheimer Disease Mother      Coronary Artery Disease Son      Two sons have  from MIs (ages 39 and 51)       SOCIAL HISTORY:  Social History     Social History     Marital status:      Spouse name: N/A     Number of children: N/A     Years  of education: N/A     Occupational History     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 No     Special Diet Yes     low fat, low salt      Exercise No     Social History Narrative       Review of Systems:  Skin:  Negative     Eyes:  Positive for glasses  ENT:  Negative    Respiratory:  Negative    Cardiovascular:  Negative    Gastroenterology: Negative    Genitourinary:  Negative    Musculoskeletal:  Negative    Neurologic:  Negative    Psychiatric:  Negative    Heme/Lymph/Imm:  Negative    Endocrine:  Negative        Recent Lab Results:  LIPID RESULTS:  Lab Results   Component Value Date    CHOL 176 02/07/2017    HDL 42 02/07/2017    LDL 93 02/07/2017    TRIG 203 (H) 02/07/2017       LIVER ENZYME RESULTS:  Lab Results   Component Value Date    AST 32 08/25/2017    ALT 51 08/25/2017       CBC RESULTS:  Lab Results   Component Value Date    WBC 7.7 08/25/2017    RBC 4.61 08/25/2017    HGB 15.1 08/25/2017    HCT 44.9 08/25/2017    MCV 97 08/25/2017    MCH 32.8 08/25/2017    MCHC 33.6 08/25/2017    RDW 13.0 08/25/2017     08/25/2017       BMP RESULTS:  Lab Results   Component Value Date     08/25/2017    POTASSIUM 4.0 08/25/2017    CHLORIDE 107 08/25/2017    CO2 23 08/25/2017    ANIONGAP 9 08/25/2017     (H) 08/25/2017    BUN 18 08/25/2017    CR 1.34 (H) 08/25/2017    GFRESTIMATED 52 (L) 08/25/2017    GFRESTBLACK 63 08/25/2017    SHELLEY 8.9 08/25/2017        A1C RESULTS:  Lab Results   Component Value  Date    A1C 6.5 (H) 08/25/2017       INR RESULTS:  Lab Results   Component Value Date    INR 3.6 (A) 05/08/2018    INR 3.8 (A) 04/24/2018    INR 2.70 (H) 11/05/2016    INR 2.03 (H) 11/04/2016         ECHOCARDIOGRAM  No results found for this or any previous visit (from the past 8760 hour(s)).      Orders Placed This Encounter   Procedures     Echocardiogram Limited     No orders of the defined types were placed in this encounter.    Medications Discontinued During This Encounter   Medication Reason     Omega-3 Fatty Acids (OMEGA-3 FISH OIL PO) Stopped by Patient         Encounter Diagnoses   Name Primary?     Chronic systolic heart failure (H)      Cardiomyopathy (H)          CC  Josias Hernandez MD  [address]  [place] [id number]

## 2018-05-17 NOTE — LETTER
2018      Flori Josianefrances Reyes, DO  [address]  [place] [address]      RE: Gibson Dumont       Dear Colleague,    I had the pleasure of seeing Gibson Dumont in the [facility].    Service Date: 2018      REASON FOR VISIT:  Evaluation of cardiomyopathy and biventricular ICD.      HISTORY OF PRESENT ILLNESS:  Mr. 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      At the moment, he is doing well.  He informs me that after BiV was implanted, his stamina has improved substantially.  He no longer feels any shortness of breath.  He denies any symptoms such as chest pain, lightheadedness, near-syncope or syncopal episode.      Device was interrogated on 2018.  He is biventricular paced 98% of the time.  Lead parameters are stable.  Echo obtained in 2017 revealed EF around 20-25% and no clots were seen.      ASSESSMENT AND PLAN:   1.  Heart failure secondary to mixed cardiomyopathy.  He is euvolemic on physical exam.  Continue current medical therapy with Lipitor, Lasix, hydralazine, Imdur, metoprolol and Coumadin.   2.  Device care.  Continue device checks 3 months.   3.  Previous LV clot resolved.  Continue anticoagulation with Coumadin indefinitely.   4.  Hypertension.  Blood pressure is well controlled.   5.  Followup care.  We will obtain an echo this year.  He will follow up with us on a yearly basis.         AMY MORE MD             D: 2018   T: 2018   MT: [facility]      Name:     GIBSON DUMONT   MRN:      [id number]        Account:      [id number]   :      1940           Service Date: 2018      Document: [id number]         Outpatient Encounter Prescriptions as of 2018    Medication Sig Dispense Refill     ACE/ARB NOT PRESCRIBED, INTENTIONAL, ACE & ARB not prescribed due to Symptomatic hypotension not due to excessive diuresis       ASPIRIN NOT PRESCRIBED (INTENTIONAL) Antiplatelet medication not prescribed intentionally due to Current anticoagulant therapy (warfarin/enoxaparin) 0 each 0     atorvastatin (LIPITOR) 40 MG tablet Take 1 tablet (40 mg) by mouth At Bedtime 90 tablet 1     COENZYME Q-10 PO Take by mouth daily       ferrous sulfate (IRON) 325 (65 FE) MG tablet Take 325 mg by mouth daily       furosemide (LASIX) 20 MG tablet Take 0.5 tablets (10 mg) by mouth daily 45 tablet 1     hydrALAZINE (APRESOLINE) 10 MG tablet Take 1 tablet (10 mg) by mouth 3 times daily 270 tablet 1     isosorbide mononitrate (IMDUR) 30 MG 24 hr tablet Take 0.5 tablets (15 mg) by mouth daily 45 tablet 1     metoprolol succinate (TOPROL-XL) 50 MG 24 hr tablet Take 1 tablet (50 mg) by mouth daily 90 tablet 1     multivitamin, therapeutic with minerals (MULTI-VITAMIN) TABS Take 1 tablet by mouth daily       venlafaxine (EFFEXOR) 75 MG tablet Take 1 tablet (75 mg) by mouth 2 times daily 180 tablet 1     venlafaxine (EFFEXOR) 75 MG tablet Take 1 tablet (75 mg) by mouth 2 times daily 60 tablet 0     vitamin D (ERGOCALCIFEROL) 35675 UNIT capsule Take 1 capsule (50,000 Units) by mouth once a week 12 capsule 1     warfarin (COUMADIN) 2 MG tablet TAKE 2-3 TABLETS BY MOUTH DAILY AS DIRECTED BY INR CLINIC 270 tablet 0     albuterol (PROAIR HFA, PROVENTIL HFA, VENTOLIN HFA) 108 (90 BASE) MCG/ACT inhaler Inhale 2 puffs into the lungs every 6 hours as needed for shortness of breath / dyspnea or wheezing       [DISCONTINUED] Omega-3 Fatty Acids (OMEGA-3 FISH OIL PO) Take 1 g by mouth daily       No facility-administered encounter medications on file as of 5/17/2018.        Again, thank you for allowing me to participate in the care of your patient.      Sincerely,    Josias Hernandez MD     [facility]  [facility]

## 2018-05-17 NOTE — MR AVS SNAPSHOT
After Visit Summary   5/17/2018    Gibson Villalta    MRN: [id number]           Patient Information     Date Of Birth          1940        Visit Information        Provider Department      5/17/2018 2:15 PM Josias Hernandez MD [facility]        Today's Diagnoses     ICD (implantable cardioverter-defibrillator) in place    -  1    Chronic systolic heart failure (H)        Nonischemic cardiomyopathy (H)           Follow-ups after your visit        Your next 10 appointments already scheduled     May 23, 2018  1:30 PM CDT   Anticoagulation Visit with [facility]   [facility] ([facility])    [address] [address] [phone]            May 29, 2018  1:45 PM CDT   Ech Limited with [id number]   [facility] CV Echocardiography (Cardiovascular Imaging at [facility])    [address]  [address]  [place] [id number] [phone]           1.  Please bring or wear a comfortable two-piece outfit. 2.  You may eat, drink and take your normal medicines. 3.  For any questions that cannot be answered, please contact the ordering physician            Aug 16, 2018  4:30 PM CDT   Remote ICD Check with ALEJANDRO DCR2   [facility] ([facility])    [address]  [place] [id number] [phone] OPT 2           This appointment is for a remote check of your debrillator.  This is not an appointment at the office.              Future tests that were ordered for you today     Open Future Orders        Priority Expected Expires Ordered    Echocardiogram Limited Routine 5/24/2018 5/17/2019 5/17/2018            Who to contact     If you have questions or need follow up information about today's clinic visit or your schedule please contact [facility] directly at [phone].  Normal or  "non-critical lab and imaging results will be communicated to you by MyChart, letter or phone within 4 business days after the clinic has received the results. If you do not hear from us within 7 days, please contact the clinic through [employer] or phone. If you have a critical or abnormal lab result, we will notify you by phone as soon as possible.  Submit refill requests through [employer] or call your pharmacy and they will forward the refill request to us. Please allow 3 business days for your refill to be completed.          Additional Information About Your Visit        [employer] Information     [employer] gives you secure access to your electronic health record. If you see a primary care provider, you can also send messages to your care team and make appointments. If you have questions, please call your primary care clinic.  If you do not have a primary care provider, please call [phone] and they will assist you.        Care EveryWhere ID     This is your Care EveryWhere ID. This could be used by other organizations to access your [facility] medical records  [id number]        Your Vitals Were     Pulse Height BMI (Body Mass Index)             91 1.854 m (6' 1\") 30.61 kg/m2          Blood Pressure from Last 3 Encounters:   05/17/18 103/63   12/04/17 140/82   08/25/17 122/82    Weight from Last 3 Encounters:   05/17/18 105.2 kg (232 lb)   12/04/17 107 kg (236 lb)   08/25/17 107.4 kg (236 lb 11.2 oz)              We Performed the Following     Follow-Up with Electrophysiologist        Primary Care Provider Office Phone # Fax #    Flori Josiane Reyes [phone] [address]  [place] [address]        Equal Access to Services     [facility] : Hadii aad ku hadkarissao Sovalentinaali, waaxda luqadaha, qaybta kaalmada aderenanyada, rusty thomas. So [facility] [phone].    ATENCIÓN: Si habla español, tiene a de luna disposición servicios gratuitos de asistencia lingüística. Llame al " [phone].    We comply with applicable federal civil rights laws and [place] laws. We do not discriminate on the basis of race, color, national origin, age, disability, sex, sexual orientation, or gender identity.            Thank you!     Thank you for choosing [facility]  [facility]. Our goal is always to provide you with excellent care. Hearing back from our patients is one way we can continue to improve our services. Please take a few minutes to complete the written survey that you may receive in the mail after your visit with us. Thank you!             Your Updated Medication List - Protect others around you: Learn how to safely use, store and throw away your medicines at www.disposemymeds.org.          This list is accurate as of 5/17/18  3:32 PM.  Always use your most recent med list.                   Brand Name Dispense Instructions for use Diagnosis    ACE/ARB/ARNI NOT PRESCRIBED (INTENTIONAL)      ACE & ARB not prescribed due to Symptomatic hypotension not due to excessive diuresis    HFrEF (heart failure with reduced ejection fraction) (H)       albuterol 108 (90 Base) MCG/ACT Inhaler    PROAIR HFA/PROVENTIL HFA/VENTOLIN HFA     Inhale 2 puffs into the lungs every 6 hours as needed for shortness of breath / dyspnea or wheezing        ASPIRIN NOT PRESCRIBED    INTENTIONAL    0 each    Antiplatelet medication not prescribed intentionally due to Current anticoagulant therapy (warfarin/enoxaparin)    HFrEF (heart failure with reduced ejection fraction) (H)       atorvastatin 40 MG tablet    LIPITOR    90 tablet    Take 1 tablet (40 mg) by mouth At Bedtime    Hyperlipidemia LDL goal <130       COENZYME Q-10 PO      Take by mouth daily        ferrous sulfate 325 (65 Fe) MG tablet    IRON     Take 325 mg by mouth daily        furosemide 20 MG tablet    LASIX    45 tablet    Take 0.5 tablets (10 mg) by mouth daily    Chronic systolic heart failure (H)        hydrALAZINE 10 MG tablet    APRESOLINE    270 tablet    Take 1 tablet (10 mg) by mouth 3 times daily    Chronic systolic heart failure (H), Cardiomyopathy (H)       isosorbide mononitrate 30 MG 24 hr tablet    IMDUR    45 tablet    Take 0.5 tablets (15 mg) by mouth daily    Chronic systolic heart failure (H), Cardiomyopathy (H)       metoprolol succinate 50 MG 24 hr tablet    TOPROL-XL    90 tablet    Take 1 tablet (50 mg) by mouth daily    Cardiomyopathy (H)       Multi-vitamin Tabs tablet      Take 1 tablet by mouth daily        * venlafaxine 75 MG tablet    EFFEXOR    180 tablet    Take 1 tablet (75 mg) by mouth 2 times daily    Anxiety and depression       * venlafaxine 75 MG tablet    EFFEXOR    60 tablet    Take 1 tablet (75 mg) by mouth 2 times daily    Anxiety and depression       vitamin D 59715 UNIT capsule    ERGOCALCIFEROL    12 capsule    Take 1 capsule (50,000 Units) by mouth once a week    Vitamin D deficiency       warfarin 2 MG tablet    COUMADIN    270 tablet    TAKE 2-3 TABLETS BY MOUTH DAILY AS DIRECTED BY INR CLINIC    Long-term (current) use of anticoagulants       * Notice:  This list has 2 medication(s) that are the same as other medications prescribed for you. Read the directions carefully, and ask your doctor or other care provider to review them with you.

## 2018-05-18 NOTE — PROGRESS NOTES
Service Date: 2018      REASON FOR VISIT:  Evaluation of cardiomyopathy and biventricular ICD.      HISTORY OF PRESENT ILLNESS:  Mr. 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      At the moment, he is doing well.  He informs that after BiV was implanted, his stamina has improved substantially.  He no longer feels any shortness of breath.  He denies any symptoms such as chest pain, lightheadedness, near-syncope or syncopal episode.      Device was interrogated on 2018.  He is biventricular paced 98% of the time.  Lead parameters are stable.  Echo obtained in 2017 revealed EF around 20-25% and no clots were seen.      ASSESSMENT AND PLAN:   1.  Heart failure secondary to mixed cardiomyopathy.  He is euvolemic on physical exam.  Continue current medical therapy with Lipitor, Lasix, hydralazine, Imdur, metoprolol and Coumadin.   2.  Device care.  Continue device checks 3 months.   3.  Previous LV clot resolved.  Continue anticoagulation with Coumadin indefinitely.   4.  Hypertension.  Blood pressure is well controlled.   5.  Followup care.  We will obtain an echo this year.  He will follow up with us on a yearly basis.         AMY MORE MD             D: 2018   T: 2018   MT: [facility]      Name:     ISAAIS DUMONT   MRN:      [id number]        Account:      [id number]   :      1940           Service Date: 2018      Document: [id number]

## 2018-05-23 ENCOUNTER — ANTICOAGULATION THERAPY VISIT (OUTPATIENT)
Dept: NURSING | Facility: CLINIC | Age: 78
End: 2018-05-23
Payer: COMMERCIAL

## 2018-05-23 DIAGNOSIS — Z79.01 LONG-TERM (CURRENT) USE OF ANTICOAGULANTS: ICD-10-CM

## 2018-05-23 LAB — INR POINT OF CARE: 2.6 (ref 0.86–1.14)

## 2018-05-23 PROCEDURE — 99207 ZZC NO CHARGE NURSE ONLY: CPT

## 2018-05-23 PROCEDURE — 85610 PROTHROMBIN TIME: CPT | Mod: QW

## 2018-05-23 PROCEDURE — 36416 COLLJ CAPILLARY BLOOD SPEC: CPT

## 2018-05-23 NOTE — MR AVS SNAPSHOT
Gibson JAYA Villalta   5/23/2018 1:30 PM   Anticoagulation Therapy Visit    Description:  77 year old male   Provider:   ANTICOAGULATION CLINIC   Department:   Nurse           INR as of 5/23/2018     Today's INR 2.6      Anticoagulation Summary as of 5/23/2018     INR goal 2.0-3.0   Today's INR 2.6   Full warfarin instructions 4 mg on Sun, Tue, Thu; 6 mg all other days   Next INR check 6/13/2018    Indications   Long-term (current) use of anticoagulants [Z79.01] [Z79.01]         Your next Anticoagulation Clinic appointment(s)     Jun 13, 2018  1:30 PM CDT   Anticoagulation [facility] with  [facility] CLINIC   [facility]    [address]  [place] [address] [phone]              Contact Numbers     Clinic Number:         May 2018 Details    Sun Mon Tue Wed Thu Fri Sat       1               2               3               4               5                 6               7               8               9               10               11               12                 13               14               15               16               17               18               19                 20               21               22               23      6 mg   See details      24      4 mg         25      6 mg         26      6 mg           27      4 mg         28      6 mg         29      4 mg         30      6 mg         31      4 mg            Date Details   05/23 This INR check               How to take your warfarin dose     To take:  4 mg Take 2 of the 2 mg tablets.    To take:  6 mg Take 3 of the 2 mg tablets.           June 2018 Details    Sun Mon Tue Wed Thu Fri Sat          1      6 mg         2      6 mg           3      4 mg         4      6 mg         5      4 mg         6      6 mg         7      4 mg         8      6 mg         9      6 mg           10      4 mg         11      6 mg         12      4 mg         13            14               15                16                 17               18               19               20               21               22               23                 24               25               26               27               28               29               30                Date Details   No additional details    Date of next INR:  6/13/2018         How to take your warfarin dose     To take:  4 mg Take 2 of the 2 mg tablets.    To take:  6 mg Take 3 of the 2 mg tablets.

## 2018-05-23 NOTE — PROGRESS NOTES
ANTICOAGULATION FOLLOW-UP CLINIC VISIT    Patient Name:  Gibson Villalta  Date:  5/23/2018  Contact Type:  Face to Face    SUBJECTIVE:     Patient Findings     Positives No Problem Findings           OBJECTIVE    INR Protime   Date Value Ref Range Status   05/23/2018 2.6 (A) 0.86 - 1.14 Final       ASSESSMENT / PLAN  INR assessment THER    Recheck INR In: 3 WEEKS    INR Location Clinic      Anticoagulation Summary as of 5/23/2018     INR goal 2.0-3.0   Today's INR 2.6   Warfarin maintenance plan 4 mg (2 mg x 2) on Sun, Tue, Thu; 6 mg (2 mg x 3) all other days   Full warfarin instructions 4 mg on Sun, Tue, Thu; 6 mg all other days   Weekly warfarin total 36 mg   No change documented Leonor Merritt RN   Plan last modified Lesia Hallman RN (5/8/2018)   Next INR check 6/13/2018   Priority INR   Target end date     Indications   Long-term (current) use of anticoagulants [Z79.01] [Z79.01]         Anticoagulation Episode Summary     INR check location     Preferred lab     Send INR reminders to CS ANTICOAGULATION    Comments       Anticoagulation Care Providers     Provider Role Specialty Phone number    Flori Reyes DO [facility] [phone]            See the Encounter Report to view Anticoagulation Flowsheet and Dosing Calendar (Go to Encounters tab in chart review, and find the Anticoagulation Therapy Visit)    Dosage adjustment made based on physician directed care plan.    Leonor Merritt RN

## 2018-05-29 ENCOUNTER — HOSPITAL ENCOUNTER (OUTPATIENT)
Dept: CARDIOLOGY | Facility: CLINIC | Age: 78
Discharge: HOME | End: 2018-05-29
Attending: INTERNAL MEDICINE | Admitting: INTERNAL MEDICINE
Payer: MEDICARE

## 2018-05-29 DIAGNOSIS — I50.22 CHRONIC SYSTOLIC HEART FAILURE (H): ICD-10-CM

## 2018-05-29 PROCEDURE — 93308 TTE F-UP OR LMTD: CPT | Mod: 26 | Performed by: INTERNAL MEDICINE

## 2018-05-29 PROCEDURE — 93321 DOPPLER ECHO F-UP/LMTD STD: CPT | Mod: 26 | Performed by: INTERNAL MEDICINE

## 2018-05-29 PROCEDURE — 25500064 ZZH RX 255 OP 636: Performed by: INTERNAL MEDICINE

## 2018-05-29 PROCEDURE — 93325 DOPPLER ECHO COLOR FLOW MAPG: CPT | Mod: 26 | Performed by: INTERNAL MEDICINE

## 2018-05-29 PROCEDURE — 93325 DOPPLER ECHO COLOR FLOW MAPG: CPT

## 2018-05-29 RX ADMIN — HUMAN ALBUMIN MICROSPHERES AND PERFLUTREN 5 ML: 10; .22 INJECTION, SOLUTION INTRAVENOUS at 14:30

## 2018-06-13 ENCOUNTER — ANTICOAGULATION THERAPY VISIT (OUTPATIENT)
Dept: NURSING | Facility: CLINIC | Age: 78
End: 2018-06-13
Payer: COMMERCIAL

## 2018-06-13 LAB — INR POINT OF CARE: 2.3 (ref 0.86–1.14)

## 2018-06-13 PROCEDURE — 99207 ZZC NO CHARGE NURSE ONLY: CPT

## 2018-06-13 PROCEDURE — 85610 PROTHROMBIN TIME: CPT | Mod: QW

## 2018-06-13 PROCEDURE — 36416 COLLJ CAPILLARY BLOOD SPEC: CPT

## 2018-06-13 NOTE — MR AVS SNAPSHOT
Gibson Villalta   6/13/2018 1:30 PM   Anticoagulation Therapy Visit    Description:  77 year old male   Provider:   ANTICOAGULATION CLINIC   Department:   Nurse           INR as of 6/13/2018     Today's INR 2.3      Anticoagulation Summary as of 6/13/2018     INR goal 2.0-3.0   Today's INR 2.3   Full warfarin instructions 4 mg on Sun, Tue, Thu; 6 mg all other days   Next INR check 7/11/2018    Indications   Long-term (current) use of anticoagulants [Z79.01] [Z79.01]         Your next Anticoagulation Clinic appointment(s)     Jul 11, 2018  1:30 PM CDT   Anticoagulation Visit with  ANTICOAGULATION CLINIC   [facility] ([facility])    [address]  [place] [address] [phone]              Contact Numbers     Clinic Number:         June 2018 Details    Sun Mon Tue Wed Thu Fri Sat          1               2                 3               4               5               6               7               8               9                 10               11               12               13      6 mg   See details      14      4 mg         15      6 mg         16      6 mg           17      4 mg         18      6 mg         19      4 mg         20      6 mg         21      4 mg         22      6 mg         23      6 mg           24      4 mg         25      6 mg         26      4 mg         27      6 mg         28      4 mg         29      6 mg         30      6 mg          Date Details   06/13 This INR check               How to take your warfarin dose     To take:  4 mg Take 2 of the 2 mg tablets.    To take:  6 mg Take 3 of the 2 mg tablets.           July 2018 Details    Sun Mon Tue Wed Thu Fri Sat     1      4 mg         2      6 mg         3      4 mg         4      6 mg         5      4 mg         6      6 mg         7      6 mg           8      4 mg         9      6 mg         10      4 mg         11            12               13               14                 15                16               17               18               19               20               21                 22               23               24               25               26               27               28                 29               30               31                    Date Details   No additional details    Date of next INR:  7/11/2018         How to take your warfarin dose     To take:  4 mg Take 2 of the 2 mg tablets.    To take:  6 mg Take 3 of the 2 mg tablets.

## 2018-06-13 NOTE — PROGRESS NOTES
ANTICOAGULATION FOLLOW-UP CLINIC VISIT    Patient Name:  Gibson Villalta  Date:  6/13/2018  Contact Type:  Face to Face    SUBJECTIVE:     Patient Findings     Positives No Problem Findings           OBJECTIVE    INR Protime   Date Value Ref Range Status   06/13/2018 2.3 (A) 0.86 - 1.14 Final       ASSESSMENT / PLAN  INR assessment THER    Recheck INR In: 3 WEEKS    INR Location Clinic      Anticoagulation Summary as of 6/13/2018     INR goal 2.0-3.0   Today's INR 2.3   Warfarin maintenance plan 4 mg (2 mg x 2) on Sun, Tue, Thu; 6 mg (2 mg x 3) all other days   Full warfarin instructions 4 mg on Sun, Tue, Thu; 6 mg all other days   Weekly warfarin total 36 mg   Plan last modified Lesia Hallman RN (5/8/2018)   Next INR check    Priority INR   Target end date     Indications   Long-term (current) use of anticoagulants [Z79.01] [Z79.01]         Anticoagulation Episode Summary     INR check location     Preferred lab     Send INR reminders to CS ANTICOAGULATION    Comments       Anticoagulation Care Providers     Provider Role Specialty Phone number    Betzaida Reyesserge Joshua DO [facility] Internal Medicine [phone]            See the Encounter Report to view Anticoagulation Flowsheet and Dosing Calendar (Go to Encounters tab in chart review, and find the Anticoagulation Therapy Visit)    Dosage adjustment made based on physician directed care plan. INR 2.3 after slight weekly decrease 3 weeks ago. Continue same.     Julia Burden RN

## 2018-06-15 DIAGNOSIS — E55.9 VITAMIN D DEFICIENCY: ICD-10-CM

## 2018-06-18 NOTE — TELEPHONE ENCOUNTER
Vitamin D      Last Written Prescription Date:  02/15/18  Last Fill Quantity: 12,   # refills: 1  Last Office Visit: 08/25/17  Future Office visit:       Routing refill request to provider for review/approval because:  Drug not on the [facility], [facility] or [facility] refill protocol or controlled substance    Meena Mcmahan MA

## 2018-06-19 RX ORDER — ERGOCALCIFEROL 1.25 MG/1
CAPSULE, LIQUID FILLED ORAL
Qty: 12 CAPSULE | Refills: 1 | Status: SHIPPED | OUTPATIENT
Start: 2018-06-19 | End: 2019-03-15

## 2018-06-19 NOTE — TELEPHONE ENCOUNTER
Routing refill request to provider for review/approval because:  Drug not on the [facility] refill protocol   Please authorize if appropriate.  Thanks,  Eri Goddard RN

## 2018-07-11 ENCOUNTER — ANTICOAGULATION THERAPY VISIT (OUTPATIENT)
Dept: NURSING | Facility: CLINIC | Age: 78
End: 2018-07-11
Payer: COMMERCIAL

## 2018-07-11 DIAGNOSIS — Z79.01 LONG-TERM (CURRENT) USE OF ANTICOAGULANTS: ICD-10-CM

## 2018-07-11 LAB — INR POINT OF CARE: 3 (ref 0.86–1.14)

## 2018-07-11 PROCEDURE — 85610 PROTHROMBIN TIME: CPT | Mod: QW

## 2018-07-11 PROCEDURE — 99207 ZZC NO CHARGE NURSE ONLY: CPT

## 2018-07-11 PROCEDURE — 36416 COLLJ CAPILLARY BLOOD SPEC: CPT

## 2018-07-11 NOTE — MR AVS SNAPSHOT
Gibson Villalta   7/11/2018 1:30 PM   Anticoagulation Therapy Visit    Description:  77 year old male   Provider:   ANTICOAGULATION CLINIC   Department:  Cs Nurse           INR as of 7/11/2018     Today's INR 3.0      Anticoagulation Summary as of 7/11/2018     INR goal 2.0-3.0   Today's INR 3.0   Full warfarin instructions 4 mg on Sun, Tue, Thu; 6 mg all other days   Next INR check 8/15/2018    Indications   Long-term (current) use of anticoagulants [Z79.01] [Z79.01]         Your next Anticoagulation Clinic appointment(s)     Aug 15, 2018  1:45 PM CDT   Anticoagulation Visit with  [facility]AGULATION CLINIC   [facility]    [address]  [place] [address] [phone]              Contact Numbers     Clinic Number:         July 2018 Details    Sun Mon Tue Wed Thu Fri Sat     1               2               3               4               5               6               7                 8               9               10               11      6 mg   See details      12      4 mg         13      6 mg         14      6 mg           15      4 mg         16      6 mg         17      4 mg         18      6 mg         19      4 mg         20      6 mg         21      6 mg           22      4 mg         23      6 mg         24      4 mg         25      6 mg         26      4 mg         27      6 mg         28      6 mg           29      4 mg         30      6 mg         31      4 mg              Date Details   07/11 This INR check               How to take your warfarin dose     To take:  4 mg Take 2 of the 2 mg tablets.    To take:  6 mg Take 3 of the 2 mg tablets.           August 2018 Details    Sun Mon Tue Wed Thu Fri Sat        1      6 mg         2      4 mg         3      6 mg         4      6 mg           5      4 mg         6      6 mg         7      4 mg         8      6 mg         9      4 mg         10      6 mg         11      6 mg           12      4 mg          13      6 mg         14      4 mg         15            16               17               18                 19               20               21               22               23               24               25                 26               27               28               29               30               31                 Date Details   No additional details    Date of next INR:  8/15/2018         How to take your warfarin dose     To take:  4 mg Take 2 of the 2 mg tablets.    To take:  6 mg Take 3 of the 2 mg tablets.

## 2018-07-11 NOTE — PROGRESS NOTES
ANTICOAGULATION FOLLOW-UP CLINIC VISIT    Patient Name:  Gibson Villalta  Date:  7/11/2018  Contact Type:  Face to Face    SUBJECTIVE:     Patient Findings     Positives No Problem Findings           OBJECTIVE    INR Protime   Date Value Ref Range Status   07/11/2018 3.0 (A) 0.86 - 1.14 Final       ASSESSMENT / PLAN  INR assessment THER    Recheck INR In: 6 WEEKS    INR Location Clinic      Anticoagulation Summary as of 7/11/2018     INR goal 2.0-3.0   Today's INR 3.0   Warfarin maintenance plan 4 mg (2 mg x 2) on Sun, Tue, Thu; 6 mg (2 mg x 3) all other days   Full warfarin instructions 4 mg on Sun, Tue, Thu; 6 mg all other days   Weekly warfarin total 36 mg   No change documented Leonor Merritt RN   Plan last modified Lesia Hallman RN (5/8/2018)   Next INR check 8/15/2018   Priority INR   Target end date     Indications   Long-term (current) use of anticoagulants [Z79.01] [Z79.01]         Anticoagulation Episode Summary     INR check location     Preferred lab     Send INR reminders to CS ANTICOAGULATION    Comments       Anticoagulation Care Providers     Provider Role Specialty Phone number    Flori Reyes DO [facility] Internal Medicine [phone]            See the Encounter Report to view Anticoagulation Flowsheet and Dosing Calendar (Go to Encounters tab in chart review, and find the Anticoagulation Therapy Visit)    Dosage adjustment made based on physician directed care plan.    Leonor Merritt RN

## 2018-07-19 DIAGNOSIS — I50.22 CHRONIC SYSTOLIC HEART FAILURE (H): ICD-10-CM

## 2018-07-19 DIAGNOSIS — I42.9 CARDIOMYOPATHY (H): ICD-10-CM

## 2018-07-19 RX ORDER — ISOSORBIDE MONONITRATE 30 MG/1
15 TABLET, EXTENDED RELEASE ORAL DAILY
Qty: 45 TABLET | Refills: 1 | Status: ON HOLD | OUTPATIENT
Start: 2018-07-19 | End: 2018-10-16

## 2018-07-30 ENCOUNTER — HOSPITAL ENCOUNTER (INPATIENT)
Facility: CLINIC | Age: 78
LOS: 3 days | Discharge: HOME | DRG: 841 | End: 2018-08-02
Attending: EMERGENCY MEDICINE | Admitting: HOSPITALIST
Payer: MEDICARE

## 2018-07-30 ENCOUNTER — ANESTHESIA EVENT (OUTPATIENT)
Dept: GASTROENTEROLOGY | Facility: CLINIC | Age: 78
DRG: 841 | End: 2018-07-30
Payer: MEDICARE

## 2018-07-30 ENCOUNTER — ANESTHESIA (OUTPATIENT)
Dept: GASTROENTEROLOGY | Facility: CLINIC | Age: 78
DRG: 841 | End: 2018-07-30
Payer: MEDICARE

## 2018-07-30 ENCOUNTER — APPOINTMENT (OUTPATIENT)
Dept: CT IMAGING | Facility: CLINIC | Age: 78
DRG: 841 | End: 2018-07-30
Attending: EMERGENCY MEDICINE
Payer: MEDICARE

## 2018-07-30 DIAGNOSIS — K92.2 UPPER GI BLEED: ICD-10-CM

## 2018-07-30 PROBLEM — K92.1 MELENA: Status: ACTIVE | Noted: 2018-07-30

## 2018-07-30 LAB
ABO + RH BLD: NORMAL
ABO + RH BLD: NORMAL
ALBUMIN SERPL-MCNC: 3.4 G/DL (ref 3.4–5)
ALP SERPL-CCNC: 143 U/L (ref 40–150)
ALT SERPL W P-5'-P-CCNC: 40 U/L (ref 0–70)
ANION GAP SERPL CALCULATED.3IONS-SCNC: 9 MMOL/L (ref 3–14)
AST SERPL W P-5'-P-CCNC: 27 U/L (ref 0–45)
BASOPHILS # BLD AUTO: 0 10E9/L (ref 0–0.2)
BASOPHILS NFR BLD AUTO: 0.3 %
BILIRUB SERPL-MCNC: 0.4 MG/DL (ref 0.2–1.3)
BLD GP AB SCN SERPL QL: NORMAL
BLOOD BANK CMNT PATIENT-IMP: NORMAL
BUN SERPL-MCNC: 29 MG/DL (ref 7–30)
CALCIUM SERPL-MCNC: 8.5 MG/DL (ref 8.5–10.1)
CHLORIDE SERPL-SCNC: 105 MMOL/L (ref 94–109)
CO2 SERPL-SCNC: 24 MMOL/L (ref 20–32)
CREAT SERPL-MCNC: 1.23 MG/DL (ref 0.66–1.25)
DIFFERENTIAL METHOD BLD: ABNORMAL
EOSINOPHIL # BLD AUTO: 0.3 10E9/L (ref 0–0.7)
EOSINOPHIL NFR BLD AUTO: 2.2 %
ERYTHROCYTE [DISTWIDTH] IN BLOOD BY AUTOMATED COUNT: 13 % (ref 10–15)
GFR SERPL CREATININE-BSD FRML MDRD: 57 ML/MIN/1.7M2
GLUCOSE BLDC GLUCOMTR-MCNC: 151 MG/DL (ref 70–99)
GLUCOSE BLDC GLUCOMTR-MCNC: 164 MG/DL (ref 70–99)
GLUCOSE SERPL-MCNC: 152 MG/DL (ref 70–99)
HBA1C MFR BLD: 7.1 % (ref 0–5.6)
HCT VFR BLD AUTO: 41.3 % (ref 40–53)
HEMOCCULT STL QL: POSITIVE
HGB BLD-MCNC: 12.5 G/DL (ref 13.3–17.7)
HGB BLD-MCNC: 13.9 G/DL (ref 13.3–17.7)
IMM GRANULOCYTES # BLD: 0.1 10E9/L (ref 0–0.4)
IMM GRANULOCYTES NFR BLD: 0.4 %
INR PPP: 3.56 (ref 0.86–1.14)
LIPASE SERPL-CCNC: 105 U/L (ref 73–393)
LYMPHOCYTES # BLD AUTO: 1.1 10E9/L (ref 0.8–5.3)
LYMPHOCYTES NFR BLD AUTO: 9.5 %
MCH RBC QN AUTO: 31.8 PG (ref 26.5–33)
MCHC RBC AUTO-ENTMCNC: 33.7 G/DL (ref 31.5–36.5)
MCV RBC AUTO: 95 FL (ref 78–100)
MONOCYTES # BLD AUTO: 1.2 10E9/L (ref 0–1.3)
MONOCYTES NFR BLD AUTO: 10.3 %
NEUTROPHILS # BLD AUTO: 8.9 10E9/L (ref 1.6–8.3)
NEUTROPHILS NFR BLD AUTO: 77.3 %
NRBC # BLD AUTO: 0 10*3/UL
NRBC BLD AUTO-RTO: 0 /100
PLATELET # BLD AUTO: 218 10E9/L (ref 150–450)
POTASSIUM SERPL-SCNC: 3.7 MMOL/L (ref 3.4–5.3)
PROT SERPL-MCNC: 7.8 G/DL (ref 6.8–8.8)
RBC # BLD AUTO: 4.37 10E12/L (ref 4.4–5.9)
SODIUM SERPL-SCNC: 138 MMOL/L (ref 133–144)
SPECIMEN EXP DATE BLD: NORMAL
UPPER GI ENDOSCOPY: NORMAL
WBC # BLD AUTO: 11.5 10E9/L (ref 4–11)

## 2018-07-30 PROCEDURE — 00000146 ZZHCL STATISTIC GLUCOSE BY METER IP

## 2018-07-30 PROCEDURE — 88342 IMHCHEM/IMCYTCHM 1ST ANTB: CPT | Performed by: INTERNAL MEDICINE

## 2018-07-30 PROCEDURE — 88341 IMHCHEM/IMCYTCHM EA ADD ANTB: CPT | Mod: 26 | Performed by: INTERNAL MEDICINE

## 2018-07-30 PROCEDURE — 86850 RBC ANTIBODY SCREEN: CPT | Performed by: NURSE PRACTITIONER

## 2018-07-30 PROCEDURE — 25000125 ZZHC RX 250: Performed by: NURSE ANESTHETIST, CERTIFIED REGISTERED

## 2018-07-30 PROCEDURE — 25000128 H RX IP 250 OP 636: Performed by: PHYSICIAN ASSISTANT

## 2018-07-30 PROCEDURE — 93005 ELECTROCARDIOGRAM TRACING: CPT

## 2018-07-30 PROCEDURE — 88342 IMHCHEM/IMCYTCHM 1ST ANTB: CPT | Mod: 26 | Performed by: INTERNAL MEDICINE

## 2018-07-30 PROCEDURE — 25000125 ZZHC RX 250: Performed by: EMERGENCY MEDICINE

## 2018-07-30 PROCEDURE — 80053 COMPREHEN METABOLIC PANEL: CPT | Performed by: EMERGENCY MEDICINE

## 2018-07-30 PROCEDURE — A9270 NON-COVERED ITEM OR SERVICE: HCPCS | Mod: GY | Performed by: PHYSICIAN ASSISTANT

## 2018-07-30 PROCEDURE — 96374 THER/PROPH/DIAG INJ IV PUSH: CPT | Mod: 59

## 2018-07-30 PROCEDURE — 25000128 H RX IP 250 OP 636: Performed by: INTERNAL MEDICINE

## 2018-07-30 PROCEDURE — 12000000 ZZH R&B MED SURG/OB

## 2018-07-30 PROCEDURE — 83690 ASSAY OF LIPASE: CPT | Performed by: EMERGENCY MEDICINE

## 2018-07-30 PROCEDURE — 99285 EMERGENCY DEPT VISIT HI MDM: CPT | Mod: 25

## 2018-07-30 PROCEDURE — 40000010 ZZH STATISTIC ANES STAT CODE-CRNA PER MINUTE: Performed by: INTERNAL MEDICINE

## 2018-07-30 PROCEDURE — 82272 OCCULT BLD FECES 1-3 TESTS: CPT | Performed by: EMERGENCY MEDICINE

## 2018-07-30 PROCEDURE — G0500 MOD SEDAT ENDO SERVICE >5YRS: HCPCS | Performed by: INTERNAL MEDICINE

## 2018-07-30 PROCEDURE — 96376 TX/PRO/DX INJ SAME DRUG ADON: CPT

## 2018-07-30 PROCEDURE — 43239 EGD BIOPSY SINGLE/MULTIPLE: CPT | Performed by: INTERNAL MEDICINE

## 2018-07-30 PROCEDURE — 86901 BLOOD TYPING SEROLOGIC RH(D): CPT | Performed by: NURSE PRACTITIONER

## 2018-07-30 PROCEDURE — 0DB68ZX EXCISION OF STOMACH, VIA NATURAL OR ARTIFICIAL OPENING ENDOSCOPIC, DIAGNOSTIC: ICD-10-PCS | Performed by: INTERNAL MEDICINE

## 2018-07-30 PROCEDURE — 86900 BLOOD TYPING SEROLOGIC ABO: CPT | Performed by: NURSE PRACTITIONER

## 2018-07-30 PROCEDURE — 25000128 H RX IP 250 OP 636: Performed by: EMERGENCY MEDICINE

## 2018-07-30 PROCEDURE — 37000009 ZZH ANESTHESIA TECHNICAL FEE, EACH ADDTL 15 MIN: Performed by: INTERNAL MEDICINE

## 2018-07-30 PROCEDURE — 88305 TISSUE EXAM BY PATHOLOGIST: CPT | Mod: 26 | Performed by: INTERNAL MEDICINE

## 2018-07-30 PROCEDURE — 83036 HEMOGLOBIN GLYCOSYLATED A1C: CPT | Performed by: EMERGENCY MEDICINE

## 2018-07-30 PROCEDURE — 85018 HEMOGLOBIN: CPT | Performed by: PHYSICIAN ASSISTANT

## 2018-07-30 PROCEDURE — 93010 ELECTROCARDIOGRAM REPORT: CPT | Performed by: INTERNAL MEDICINE

## 2018-07-30 PROCEDURE — 88305 TISSUE EXAM BY PATHOLOGIST: CPT | Performed by: INTERNAL MEDICINE

## 2018-07-30 PROCEDURE — 25000128 H RX IP 250 OP 636: Performed by: ANESTHESIOLOGY

## 2018-07-30 PROCEDURE — 99223 1ST HOSP IP/OBS HIGH 75: CPT | Mod: AI | Performed by: PHYSICIAN ASSISTANT

## 2018-07-30 PROCEDURE — 25000132 ZZH RX MED GY IP 250 OP 250 PS 637: Mod: GY | Performed by: PHYSICIAN ASSISTANT

## 2018-07-30 PROCEDURE — 96375 TX/PRO/DX INJ NEW DRUG ADDON: CPT

## 2018-07-30 PROCEDURE — 43235 EGD DIAGNOSTIC BRUSH WASH: CPT | Performed by: INTERNAL MEDICINE

## 2018-07-30 PROCEDURE — 37000008 ZZH ANESTHESIA TECHNICAL FEE, 1ST 30 MIN: Performed by: INTERNAL MEDICINE

## 2018-07-30 PROCEDURE — 25000128 H RX IP 250 OP 636: Performed by: NURSE ANESTHETIST, CERTIFIED REGISTERED

## 2018-07-30 PROCEDURE — 88341 IMHCHEM/IMCYTCHM EA ADD ANTB: CPT | Performed by: INTERNAL MEDICINE

## 2018-07-30 PROCEDURE — 85018 HEMOGLOBIN: CPT | Performed by: NURSE PRACTITIONER

## 2018-07-30 PROCEDURE — 36415 COLL VENOUS BLD VENIPUNCTURE: CPT | Performed by: NURSE PRACTITIONER

## 2018-07-30 PROCEDURE — 74177 CT ABD & PELVIS W/CONTRAST: CPT

## 2018-07-30 PROCEDURE — 85025 COMPLETE CBC W/AUTO DIFF WBC: CPT | Performed by: EMERGENCY MEDICINE

## 2018-07-30 PROCEDURE — 85610 PROTHROMBIN TIME: CPT | Performed by: EMERGENCY MEDICINE

## 2018-07-30 RX ORDER — ATORVASTATIN CALCIUM 40 MG/1
40 TABLET, FILM COATED ORAL AT BEDTIME
Status: DISCONTINUED | OUTPATIENT
Start: 2018-07-30 | End: 2018-08-02 | Stop reason: HOSPADM

## 2018-07-30 RX ORDER — ONDANSETRON 2 MG/ML
4 INJECTION INTRAMUSCULAR; INTRAVENOUS EVERY 30 MIN PRN
Status: DISCONTINUED | OUTPATIENT
Start: 2018-07-30 | End: 2018-07-30

## 2018-07-30 RX ORDER — LIDOCAINE HYDROCHLORIDE 20 MG/ML
INJECTION, SOLUTION INFILTRATION; PERINEURAL PRN
Status: DISCONTINUED | OUTPATIENT
Start: 2018-07-30 | End: 2018-07-30

## 2018-07-30 RX ORDER — SODIUM CHLORIDE 9 MG/ML
INJECTION, SOLUTION INTRAVENOUS CONTINUOUS
Status: ACTIVE | OUTPATIENT
Start: 2018-07-30 | End: 2018-07-31

## 2018-07-30 RX ORDER — SODIUM CHLORIDE, SODIUM LACTATE, POTASSIUM CHLORIDE, CALCIUM CHLORIDE 600; 310; 30; 20 MG/100ML; MG/100ML; MG/100ML; MG/100ML
INJECTION, SOLUTION INTRAVENOUS CONTINUOUS
Status: DISCONTINUED | OUTPATIENT
Start: 2018-07-30 | End: 2018-07-30

## 2018-07-30 RX ORDER — DEXAMETHASONE SODIUM PHOSPHATE 4 MG/ML
INJECTION, SOLUTION INTRA-ARTICULAR; INTRALESIONAL; INTRAMUSCULAR; INTRAVENOUS; SOFT TISSUE PRN
Status: DISCONTINUED | OUTPATIENT
Start: 2018-07-30 | End: 2018-07-30

## 2018-07-30 RX ORDER — ONDANSETRON 4 MG/1
4 TABLET, ORALLY DISINTEGRATING ORAL EVERY 6 HOURS PRN
Status: DISCONTINUED | OUTPATIENT
Start: 2018-07-30 | End: 2018-08-02 | Stop reason: HOSPADM

## 2018-07-30 RX ORDER — ONDANSETRON 4 MG/1
4 TABLET, ORALLY DISINTEGRATING ORAL EVERY 30 MIN PRN
Status: DISCONTINUED | OUTPATIENT
Start: 2018-07-30 | End: 2018-07-30

## 2018-07-30 RX ORDER — HYDRALAZINE HYDROCHLORIDE 10 MG/1
10 TABLET, FILM COATED ORAL 3 TIMES DAILY
Status: DISCONTINUED | OUTPATIENT
Start: 2018-07-30 | End: 2018-08-02 | Stop reason: HOSPADM

## 2018-07-30 RX ORDER — HYDROMORPHONE HYDROCHLORIDE 1 MG/ML
.3-.5 INJECTION, SOLUTION INTRAMUSCULAR; INTRAVENOUS; SUBCUTANEOUS EVERY 10 MIN PRN
Status: DISCONTINUED | OUTPATIENT
Start: 2018-07-30 | End: 2018-07-30

## 2018-07-30 RX ORDER — ONDANSETRON 2 MG/ML
4 INJECTION INTRAMUSCULAR; INTRAVENOUS
Status: COMPLETED | OUTPATIENT
Start: 2018-07-30 | End: 2018-07-30

## 2018-07-30 RX ORDER — ONDANSETRON 2 MG/ML
4 INJECTION INTRAMUSCULAR; INTRAVENOUS ONCE
Status: COMPLETED | OUTPATIENT
Start: 2018-07-30 | End: 2018-07-30

## 2018-07-30 RX ORDER — METOPROLOL SUCCINATE 50 MG/1
50 TABLET, EXTENDED RELEASE ORAL EVERY EVENING
Status: DISCONTINUED | OUTPATIENT
Start: 2018-07-30 | End: 2018-08-02 | Stop reason: HOSPADM

## 2018-07-30 RX ORDER — FENTANYL CITRATE 50 UG/ML
25-50 INJECTION, SOLUTION INTRAMUSCULAR; INTRAVENOUS
Status: DISCONTINUED | OUTPATIENT
Start: 2018-07-30 | End: 2018-07-30 | Stop reason: HOSPADM

## 2018-07-30 RX ORDER — HYDRALAZINE HYDROCHLORIDE 20 MG/ML
2.5-5 INJECTION INTRAMUSCULAR; INTRAVENOUS EVERY 10 MIN PRN
Status: DISCONTINUED | OUTPATIENT
Start: 2018-07-30 | End: 2018-07-30 | Stop reason: HOSPADM

## 2018-07-30 RX ORDER — IOPAMIDOL 755 MG/ML
110 INJECTION, SOLUTION INTRAVASCULAR ONCE
Status: COMPLETED | OUTPATIENT
Start: 2018-07-30 | End: 2018-07-30

## 2018-07-30 RX ORDER — FENTANYL CITRATE 50 UG/ML
INJECTION, SOLUTION INTRAMUSCULAR; INTRAVENOUS PRN
Status: DISCONTINUED | OUTPATIENT
Start: 2018-07-30 | End: 2018-07-30 | Stop reason: HOSPADM

## 2018-07-30 RX ORDER — ALBUTEROL SULFATE 0.83 MG/ML
2.5 SOLUTION RESPIRATORY (INHALATION) EVERY 4 HOURS PRN
Status: DISCONTINUED | OUTPATIENT
Start: 2018-07-30 | End: 2018-07-30 | Stop reason: HOSPADM

## 2018-07-30 RX ORDER — ONDANSETRON 2 MG/ML
4 INJECTION INTRAMUSCULAR; INTRAVENOUS EVERY 6 HOURS PRN
Status: DISCONTINUED | OUTPATIENT
Start: 2018-07-30 | End: 2018-08-02 | Stop reason: HOSPADM

## 2018-07-30 RX ORDER — PROPOFOL 10 MG/ML
INJECTION, EMULSION INTRAVENOUS PRN
Status: DISCONTINUED | OUTPATIENT
Start: 2018-07-30 | End: 2018-07-30

## 2018-07-30 RX ORDER — NALOXONE HYDROCHLORIDE 0.4 MG/ML
.1-.4 INJECTION, SOLUTION INTRAMUSCULAR; INTRAVENOUS; SUBCUTANEOUS
Status: DISCONTINUED | OUTPATIENT
Start: 2018-07-30 | End: 2018-08-02 | Stop reason: HOSPADM

## 2018-07-30 RX ORDER — VENLAFAXINE 75 MG/1
75 TABLET ORAL 2 TIMES DAILY
Status: DISCONTINUED | OUTPATIENT
Start: 2018-07-30 | End: 2018-08-02 | Stop reason: HOSPADM

## 2018-07-30 RX ORDER — LIDOCAINE 40 MG/G
CREAM TOPICAL
Status: DISCONTINUED | OUTPATIENT
Start: 2018-07-30 | End: 2018-07-30 | Stop reason: HOSPADM

## 2018-07-30 RX ORDER — PROPOFOL 10 MG/ML
INJECTION, EMULSION INTRAVENOUS CONTINUOUS PRN
Status: DISCONTINUED | OUTPATIENT
Start: 2018-07-30 | End: 2018-07-30

## 2018-07-30 RX ORDER — SODIUM CHLORIDE 9 MG/ML
INJECTION, SOLUTION INTRAVENOUS CONTINUOUS
Status: DISCONTINUED | OUTPATIENT
Start: 2018-07-30 | End: 2018-07-30

## 2018-07-30 RX ORDER — MEPERIDINE HYDROCHLORIDE 25 MG/ML
12.5 INJECTION INTRAMUSCULAR; INTRAVENOUS; SUBCUTANEOUS
Status: DISCONTINUED | OUTPATIENT
Start: 2018-07-30 | End: 2018-07-30

## 2018-07-30 RX ORDER — FUROSEMIDE 20 MG/1
10 TABLET ORAL DAILY
Status: DISCONTINUED | OUTPATIENT
Start: 2018-07-31 | End: 2018-08-02 | Stop reason: HOSPADM

## 2018-07-30 RX ORDER — NALOXONE HYDROCHLORIDE 0.4 MG/ML
.1-.4 INJECTION, SOLUTION INTRAMUSCULAR; INTRAVENOUS; SUBCUTANEOUS
Status: DISCONTINUED | OUTPATIENT
Start: 2018-07-30 | End: 2018-07-30

## 2018-07-30 RX ADMIN — Medication 1 MG: at 21:51

## 2018-07-30 RX ADMIN — SODIUM CHLORIDE: 9 INJECTION, SOLUTION INTRAVENOUS at 16:31

## 2018-07-30 RX ADMIN — SUCCINYLCHOLINE CHLORIDE 100 MG: 20 INJECTION, SOLUTION INTRAMUSCULAR; INTRAVENOUS; PARENTERAL at 13:19

## 2018-07-30 RX ADMIN — SODIUM CHLORIDE 8 MG/HR: 9 INJECTION, SOLUTION INTRAVENOUS at 11:27

## 2018-07-30 RX ADMIN — PHENYLEPHRINE HYDROCHLORIDE 100 MCG: 10 INJECTION, SOLUTION INTRAMUSCULAR; INTRAVENOUS; SUBCUTANEOUS at 13:40

## 2018-07-30 RX ADMIN — PHYTONADIONE 2 MG: 2 INJECTION, EMULSION INTRAMUSCULAR; INTRAVENOUS; SUBCUTANEOUS at 10:56

## 2018-07-30 RX ADMIN — SODIUM CHLORIDE, POTASSIUM CHLORIDE, SODIUM LACTATE AND CALCIUM CHLORIDE: 600; 310; 30; 20 INJECTION, SOLUTION INTRAVENOUS at 13:14

## 2018-07-30 RX ADMIN — IOPAMIDOL 110 ML: 755 INJECTION, SOLUTION INTRAVENOUS at 09:18

## 2018-07-30 RX ADMIN — DEXAMETHASONE SODIUM PHOSPHATE 4 MG: 4 INJECTION, SOLUTION INTRA-ARTICULAR; INTRALESIONAL; INTRAMUSCULAR; INTRAVENOUS; SOFT TISSUE at 13:33

## 2018-07-30 RX ADMIN — VENLAFAXINE HYDROCHLORIDE 75 MG: 75 TABLET ORAL at 21:47

## 2018-07-30 RX ADMIN — ONDANSETRON 4 MG: 2 INJECTION INTRAMUSCULAR; INTRAVENOUS at 08:24

## 2018-07-30 RX ADMIN — PHENYLEPHRINE HYDROCHLORIDE 100 MCG: 10 INJECTION, SOLUTION INTRAMUSCULAR; INTRAVENOUS; SUBCUTANEOUS at 13:19

## 2018-07-30 RX ADMIN — LIDOCAINE HYDROCHLORIDE 100 MG: 20 INJECTION, SOLUTION INFILTRATION; PERINEURAL at 13:19

## 2018-07-30 RX ADMIN — HYDRALAZINE HYDROCHLORIDE 10 MG: 10 TABLET ORAL at 16:40

## 2018-07-30 RX ADMIN — SODIUM CHLORIDE, PRESERVATIVE FREE 73 ML: 5 INJECTION INTRAVENOUS at 09:18

## 2018-07-30 RX ADMIN — PROPOFOL 75 MCG/KG/MIN: 10 INJECTION, EMULSION INTRAVENOUS at 13:23

## 2018-07-30 RX ADMIN — DEXMEDETOMIDINE HYDROCHLORIDE 20 MCG: 100 INJECTION, SOLUTION INTRAVENOUS at 13:19

## 2018-07-30 RX ADMIN — PROPOFOL 130 MG: 10 INJECTION, EMULSION INTRAVENOUS at 13:19

## 2018-07-30 RX ADMIN — ATORVASTATIN CALCIUM 40 MG: 40 TABLET, FILM COATED ORAL at 21:47

## 2018-07-30 RX ADMIN — SODIUM CHLORIDE 1000 ML: 9 INJECTION, SOLUTION INTRAVENOUS at 08:00

## 2018-07-30 RX ADMIN — ONDANSETRON 4 MG: 2 INJECTION INTRAMUSCULAR; INTRAVENOUS at 13:22

## 2018-07-30 RX ADMIN — PANTOPRAZOLE SODIUM 40 MG: 40 INJECTION, POWDER, FOR SOLUTION INTRAVENOUS at 08:10

## 2018-07-30 ASSESSMENT — ACTIVITIES OF DAILY LIVING (ADL)
FALL_HISTORY_WITHIN_LAST_SIX_MONTHS: NO
TOILETING: 0-->INDEPENDENT
ADLS_ACUITY_SCORE: 12
SWALLOWING: 0-->SWALLOWS FOODS/LIQUIDS WITHOUT DIFFICULTY
AMBULATION: 0-->INDEPENDENT
RETIRED_EATING: 0-->INDEPENDENT
RETIRED_COMMUNICATION: 0-->UNDERSTANDS/COMMUNICATES WITHOUT DIFFICULTY
COGNITION: 1 - ATTENTION OR MEMORY DEFICITS
DRESS: 0-->INDEPENDENT
TRANSFERRING: 0-->INDEPENDENT
BATHING: 0-->INDEPENDENT

## 2018-07-30 ASSESSMENT — ENCOUNTER SYMPTOMS
ABDOMINAL PAIN: 1
FEVER: 0
BLOOD IN STOOL: 0
DIARRHEA: 0
COUGH: 0
ANAL BLEEDING: 1
HEMATURIA: 0
VOMITING: 0
SHORTNESS OF BREATH: 0
DYSRHYTHMIAS: 0
DYSURIA: 0
BACK PAIN: 0
SEIZURES: 0
NAUSEA: 0

## 2018-07-30 ASSESSMENT — COPD QUESTIONNAIRES: COPD: 0

## 2018-07-30 ASSESSMENT — LIFESTYLE VARIABLES: TOBACCO_USE: 0

## 2018-07-30 NOTE — IP AVS SNAPSHOT
[facility] [address] [facility]    [address] [place] [address]    Phone:  [phone]                                       After Visit Summary   7/30/2018    Gibson Villalta    MRN: [id number]           After Visit Summary Signature Page     I have received my discharge instructions, and my questions have been answered. I have discussed any challenges I see with this plan with the nurse or doctor.    ..........................................................................................................................................  Patient/Patient Representative Signature      ..........................................................................................................................................  Patient Representative Print Name and Relationship to Patient    ..................................................               ................................................  Date                                            Time    ..........................................................................................................................................  Reviewed by Signature/Title    ...................................................              ..............................................  Date                                                            Time

## 2018-07-30 NOTE — ED NOTES
"[facility]  ED Nurse Handoff Report    ED Chief complaint: Rectal Bleeding (Patient states being awake since 2am.  Stomachache, bloating.  This morning 1 episode of black stool.  )      ED Diagnosis:   Final diagnoses:   Upper GI bleed       Code Status: Full Code    Allergies:   Allergies   Allergen Reactions     Wasps [Hornets]      Sand wasp --- years ago.         Activity level - Baseline/Home:  Independent    Activity Level - Current:   Independent     Needed?: No    Isolation: No  Infection: Not Applicable  Bariatric?: No    Vital Signs:   Vitals:    07/30/18 0712 07/30/18 0928   BP: (!) 188/98 (!) 188/98   Resp: 16 (!) 38   Temp: 97.6  F (36.4  C)    TempSrc: Oral    SpO2: 97%    Weight: 98.9 kg (218 lb)    Height: 1.854 m (6' 1\")        Cardiac Rhythm: ,          Pain level: 0-10 Pain Scale: 7    Is this patient confused?: Yes  Pt diagnosed in 2012 for dementia, is forgetful but does well   [place] - Suicide Severity Rating Scale Completed?  Yes  If yes, what color did the patient score?  White    Patient Report: Initial Complaint: Pt here after noticing dark stool with blood.  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 Pt is forgetful but even tempered.     OBS brochure/video discussed/provided to patient: N/A    ED Medications:   Medications   pantoprazole (PROTONIX) 40 mg IV push injection (not administered)   phytonadione (AQUA-MEPHYTON) 2 mg in sodium chloride 0.9 % 50 mL intermittent infusion (not administered)   0.9% sodium chloride BOLUS (0 mLs Intravenous Stopped 7/30/18 0827)   pantoprazole (PROTONIX) 40 mg IV push injection (40 mg Intravenous Given 7/30/18 0810)   ondansetron (ZOFRAN) injection 4 mg (4 mg Intravenous Given 7/30/18 0824) "   iopamidol (ISOVUE-370) solution 110 mL (110 mLs Intravenous Given 7/30/18 0918)   Saline flush (73 mLs Intravenous Given 7/30/18 0918)       Drips infusing?:  Yes fluids    For the majority of the shift this patient was Green.   Interventions performed were none.    Severe Sepsis OR Septic Shock Diagnosis Present: No      ED NURSE PHONE NUMBER:  [id number]  Jamia Garcia RN

## 2018-07-30 NOTE — H&P
Admitted:     07/30/2018      PRIMARY CARE PROVIDER:  Flori Reyes DO      CHIEF COMPLAINT:  Rectal bleeding.      HISTORY OBTAINED:  History obtained from the patient though limited due to his dementia, as well as his wife who was present at bedside.      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  The patient reports he has recently been in his usual state of health.  This morning he had a black liquidy stool around 6 a.m.  He also noted some epigastric abdominal discomfort throughout the day today.  His wife adds that he was complaining of a stomachache occasionally yesterday, but seemed to be worsening today.  He had no issues with food the day prior and last night had pizza and ice cream.  He was concerned with the appearance of his stool and decided to present to the emergency department for evaluation.  He denies any previous history of GI bleeding.  He did not see any david red blood in his stool.        The patient was evaluated in the emergency department by Dr. Sanchez.  Laboratory evaluation was notable for a stable hemoglobin of 13.9 and a supratherapeutic INR of 3.56.  His Hemoccult was positive.  The patient was started on IV Protonix and given 2 mg of IV vitamin K due to concern for possible GI bleeding.  Due to his epigastric discomfort, a CT of the abdomen and pelvis was obtained showing some soft tissue thickening near his gastric bypass site as well as some fluid and higher density material in the excluded portion of the stomach of unclear etiology and lastly with 2 abnormal  bladder wall lesions concerning for possible malignancy.        The patient is presently evaluated in the emergency department by me.  He reports he is feeling well at this time and denies any current abdominal pain.  He denies any further stools since his initial melenic stool this morning, however, his wife adds that he mentioned having another stool after arrival to the emergency department, so validity of this is unclear.  He has not had a colonoscopy in many years per patient and family report.  He does not take any NSAID medications and does not drink much alcohol.  From a cardiac standpoint, he reports he has been very stable and has not had issues with chest pain or shortness of breath or lower extremity edema.  He currently reports feeling well and denies any dizziness, chest pain, shortness of breath, nausea, vomiting, or abdominal pain.      REVIEW OF SYSTEMS:  A 10-point review of systems was conducted and is negative aside from the information in the HPI.      PAST MEDICAL HISTORY:   1.  Dementia, requires 24-hour supervision and does not drive.   2.  History of RV and LV mural thrombus, initially seen on cardiac MRI in 07/2016.  A repeat echo a few months later showed resolution of this, but thrombus was again identified in 05/2017 and resolved the following month.  He has been on warfarin since initial diagnosis in 2016.  Last echocardiogram was 05/2018 and did not show any clot at that time. He will be on warfarin indefinitely.   3.  Gastric bypass, Smita-en-Y in the year 2000 at [facility].  No complications per family.   4.  Hypertension.   5.  Chronic kidney disease, baseline creatinine seems to be around 1.3-1.5.   6.  Coronary artery disease with prior MI.  The patient's last angiogram was 07/2016 and showed disease in mid and proximal LAD as well as distal RCA.  No stents were placed at that time.   7.  Cardiomyopathy with reduced ejection fraction, status post ICD placement.  Last echo  2018 showed an EF of 25-30%.  He follows with Dr. Hernandez as an outpatient.   8.  Type 2 diabetes mellitus, diet-controlled.  Last A1c was from 2017 and was 6.5.   9.  BPH.   10.  Left bundle branch block.      PAST SURGICAL HISTORY:    Past Surgical History:   Procedure Laterality Date     ANGIOPLASTY   and  with stent     ESOPHAGOSCOPY, GASTROSCOPY, DUODENOSCOPY (EGD), COMBINED N/A 2018    Procedure: COMBINED ESOPHAGOSCOPY, GASTROSCOPY, DUODENOSCOPY (EGD), BIOPSY SINGLE OR MULTIPLE;  gastroscopy;  Surgeon: Parish Xiong MD;  Location:  GI     ESOPHAGOSCOPY, GASTROSCOPY, DUODENOSCOPY (EGD), COMBINED N/A 2018    Procedure: COMBINED ESOPHAGOSCOPY, GASTROSCOPY, DUODENOSCOPY (EGD);  EGD;  Surgeon: Parish Xiong MD;  Location:  GI     GASTRIC BYPASS       HEART CATH LEFT HEART CATH  16    Non ischemic cardiomyopathy; Non functionally significant LAD and RCA disease      OTHER SURGICAL HISTORY      Spinal surgery     OTHER SURGICAL HISTORY  2016    CRT-D implantation          ALLERGIES:  WASPS.      SOCIAL HISTORY:  The patient reports rare alcohol use.  He is a former smoker who smoked for approximately 20 years and quit in .  He lives with his wife in [place].  He does not drive due to dementia.      FAMILY HISTORY:  His father  of an MI at age 54.  Two of his children have also  of MI at age 39 and 51.      LABORATORY DATA:  CMP is within normal limits aside from creatinine of 1.23 and GFR estimated of 57.  He is Hemoccult-positive.  Glucose is 152.  On admission, his white blood cell count is 11.5, hemoglobin 13.9, hematocrit 41.3 and platelets are 218.  He has a left shift with neutrophil 8.9.  His INR is 3.56.      IMAGING:  A CT of the abdomen and pelvis shows an ill-defined soft tissue thickening adjacent to gastric bypass, postoperative changes in stomach increased in prominence since prior exam.  A normal finding versus gastric neoplasm.  There is  food and higher density material within the excluded portion of the stomach of greater volume that would typically be seen in the setting of gastric bypass.  Higher density material could represent debris, ingested food or possibly blood products.  Two bladder wall lesions are seen and new since 08/2016, suspicious for neoplasm.      PHYSICAL EXAMINATION:    VITAL SIGNS:  Temperature 97.6, heart rate 80, blood pressure 140/96, respiratory rate 26, oxygen saturation is 97% on room air.   GENERAL:  Alert, oriented to time, general situation.  Believes it is 2017 and knows the month.  He is quite forgetful.   EYES:  Pupils equal and reactive to light, EOMI.   ENT:  Mucous membranes are moist.   CARDIOVASCULAR:  Regular rate and rhythm, no murmurs appreciated.   RESPIRATORY:  Lungs are clear to auscultation bilaterally, no increased work of breathing or wheezing.   GASTROINTESTINAL:  Positive bowel sounds.  Abdomen is soft.  He has mild tenderness to palpation in the epigastric region.  No rebound or guarding.   MUSCULOSKELETAL:  The patient moves all 4 extremities.  Normal tone.   SKIN:  Warm and dry.  He has a few scabbed areas in the right lower extremity.   NEUROLOGIC:  Cranial nerves II-XII are grossly intact.  No focal deficits.      ASSESSMENT AND PLAN:  Gibson Villalta is a 77-year-old gentleman with a history of dementia, RV and LV mural thrombus on Coumadin, Smita-en-Y gastric bypass, hypertension, chronic kidney disease, coronary artery disease, mixed cardiomyopathy with severely reduced ejection fraction of 25-30%, diet-controlled diabetes and known left bundle branch block, who presented to the emergency department for evaluation of rectal bleeding.  He will be admitted to inpatient status, as I anticipate greater than a 2-midnight stay.   1.  Melena, suspected upper GI bleed:  The patient reports 1 episode of loose black stool this morning prior to admission.  He did not notice any gross red blood.  He  does have some mild associated epigastric discomfort.  He is anticoagulated with warfarin for a history of a RV thrombus and INR is 3.56 on admission.  He has no previous history of GI bleeding.  He does not use NSAIDs or drink much alcohol.  Hemoglobin is stable on admission at 13.9.  He is hypertensive, but otherwise hemodynamically stable.  CT of the abdomen and pelvis was obtained due to abdominal pain showing post-gastric bypass changes with some tissue thickening in bypass site which could be normal or representative of neoplasm.  General surgery was curb-sided in the emergency department and recommended a GI consult to start.   -  GI consulted, spoke with Dr. Xiong who will take patient to endoscopy from the emergency department.  I appreciate assistance:   -  The patient received 2 mg of IV vitamin K in the ER.  We will hold warfarin in the setting of suspected GI bleed and follow INR daily.     -  N.p.o. until procedure.   -  Protonix drip was started in the ER, we will continue.   -  Hemoglobin q. 8 hours.   -  We will hold off on general surgery consults until EGD is complete.   2.  Bladder wall lesions.  A concern for possible neoplasm.  The patient has no known history of a bladder malignancy and denies any recent symptoms of hematuria.  Again, hemoglobin is stable at 13.9.   -  We will ask urology to see him during this admission to determine whether he needs inpatient or outpatient followup, appreciate assistance.   3.  History of a RV and LV mural thrombus.  Initially identified on a cardiac MRI in 2016.  This has been present intermittently twice since that time.  He has been on warfarin since 2016.  Last echocardiogram in 05/2018 was negative for signs of clot.  INR this admission is 3.5, however, this is being reversed in the setting of suspected acute GI bleed:   -  We will resume warfarin when safe per GI.   4.  Cardiomyopathy with reduced ejection fraction.  Last echocardiogram in 05/2018 showed  ejection fraction of 25-30%.  He follows with Dr. Hernandez as an outpatient.  He reports that he has been stable from a cardiac standpoint without any recent or active chest pain, shortness of breath or volume overload.  He is on Lasix daily.  He currently appears euvolemic:   -  We will continue prior to admission beta blocker and Imdur as long as BP is stable.   -  Hold Lasix pending results of the EGD.   -  We will follow intake and output and daily weights.   5.  Coronary artery disease with prior MI.  The patient denies any recent issues with chest pain or shortness of breath.  His last cardiac catheterization was in 07/2016 and showed a proximal and mid LAD disease as well as some distal RCA disease.  No stents were placed at that time.  He is not on aspirin due to his warfarin use.  He is on a beta blocker and Imdur:   -  Continue prior to admission Imdur and metoprolol if BP remains stable.   6.  Hypertension.  Continue prior to admission metoprolol, hydralazine, Imdur with hold parameters.   7.  Type 2 diabetes mellitus, diet-controlled.  Last A1c in 2017 was 6.5:   -  We will recheck A1c.   -  We will monitor blood sugars with sliding scale insulin p.r.n. with frequency pending EGD findings.   8.  Chronic kidney disease.  His baseline creatinine appears to be around 1.3-1.5.  It was 1.2 on admission:   -  Avoid nephrotoxins.   -  Follow BMP.   9.  Dementia.  He requires 24-hour assistance.  No longer drives.  Per wife, orientation waxes and wanes but on my exam he is conversant and has a general understanding of why he was here:   -  Monitor clinically, avoid narcotics and benzodiazepines to prevent delirium.   10. Leukocytosis. Unclear significance. No fever. Will follow in am and pursue further if increasing evidence of infection.      CODE STATUS:  The patient is FULL CODE.  This was discussed with him and his wife on admission.      The patient was seen and examined with Dr. Suresh Mireles who agrees with  the above plan.            OMAYRA SANCHEZ MD       As dictated by TOVA MCKEON PA-C            D: 2018   T: 2018   MT: DA      Name:     ISAIAS DUMONT   MRN:      [id number]        Account:      [id number]   :      1940        Admitted:     2018                   Document: [id number]

## 2018-07-30 NOTE — OR NURSING
VSS,  alert and oriented.  Patient is pain free.  Discharge criteria from pacu has been met and am waiting to give report to the floor nurse.

## 2018-07-30 NOTE — ED NOTES
Called report to Adan RN. Pt will be going to Endo before going to floor. Floor called with update.

## 2018-07-30 NOTE — PHARMACY-ADMISSION MEDICATION HISTORY
Admission medication history interview status for the 7/30/2018  admission is complete. See EPIC admission navigator for prior to admission medications     Medication history source reliability:Good    Actions taken by pharmacist (provider contacted, etc):Verified all medications except atorvastatin & metoprolol with patient's pills that he brought into the hospital. Patient states no recent changes in medications since last clinic visit.      Additional medication history information not noted on PTA med list :None    Medication reconciliation/reorder completed by provider prior to medication history? No    Time spent in this activity: 20 minutes    Prior to Admission medications    Medication Sig Last Dose Taking? Auth Provider   atorvastatin (LIPITOR) 40 MG tablet Take 1 tablet (40 mg) by mouth At Bedtime 7/29/2018 at pm Yes Flori Reyes DO   COENZYME Q-10 PO Take 200 mg by mouth daily  7/29/2018 at am Yes Reported, Patient   ferrous sulfate (IRON) 325 (65 FE) MG tablet Take 325 mg by mouth daily 7/29/2018 at am Yes Unknown, Entered By History   furosemide (LASIX) 20 MG tablet Take 0.5 tablets (10 mg) by mouth daily 7/29/2018 at am Yes Flori Reyes DO   hydrALAZINE (APRESOLINE) 10 MG tablet Take 1 tablet (10 mg) by mouth 3 times daily 7/29/2018 at Unknown time Yes Josias Hernandez MD   isosorbide mononitrate (IMDUR) 30 MG 24 hr tablet Take 0.5 tablets (15 mg) by mouth daily 7/29/2018 at am Yes Josias Hernandez MD   metoprolol succinate (TOPROL-XL) 50 MG 24 hr tablet Take 1 tablet (50 mg) by mouth daily  Patient taking differently: Take 50 mg by mouth every evening  7/29/2018 at pm Yes Flori Reyes DO   multivitamin, therapeutic with minerals (MULTI-VITAMIN) TABS Take 1 tablet by mouth daily 7/29/2018 at am Yes Reported, Patient   venlafaxine (EFFEXOR) 75 MG tablet TAKE 1 TABLET TWICE DAILY 7/29/2018 at Unknown time Yes Flori Reyes DO   vitamin D (ERGOCALCIFEROL) 06003 UNIT  capsule TAKE 1 CAPSULE ONCE A WEEK 7/24/2018 Yes Katelyn Akhtar APRN CNP   WARFARIN SODIUM PO Take 4 mg by mouth three times a week Sun, Tues, Thurs 7/29/2018 at pm Yes Unknown, Entered By History   WARFARIN SODIUM PO Take 6 mg by mouth four times a week Mon, Wed, Fri, Sat 7/28/2018 at pm Yes Unknown, Entered By History   ACE/ARB NOT PRESCRIBED, INTENTIONAL, ACE & ARB not prescribed due to Symptomatic hypotension not due to excessive diuresis   Folri Reyes,    ASPIRIN NOT PRESCRIBED (INTENTIONAL) Antiplatelet medication not prescribed intentionally due to Current anticoagulant therapy (warfarin/enoxaparin)   Flori Reyes, DO

## 2018-07-30 NOTE — ANESTHESIA POSTPROCEDURE EVALUATION
Patient: Gibson Villalta    Procedure(s):  gastroscopy - Wound Class: II-Clean Contaminated    Diagnosis:GI BLEED  Diagnosis Additional Information: No value filed.    Anesthesia Type:  General, RSI, ETT    Note:  Anesthesia Post Evaluation    Patient location during evaluation: PACU  Patient participation: Able to fully participate in evaluation  Level of consciousness: awake  Pain management: adequate  Airway patency: patent  Cardiovascular status: acceptable  Respiratory status: acceptable  Hydration status: acceptable  PONV: none     Anesthetic complications: None          Last vitals:  Vitals:    07/30/18 1400 07/30/18 1410 07/30/18 1420   BP: 97/63 91/63 103/78   Resp: 19 17 18   Temp:      SpO2: 98% 98% 97%         Electronically Signed By: TREY ALSTON MD  July 30, 2018  2:32 PM

## 2018-07-30 NOTE — ANESTHESIA CARE TRANSFER NOTE
Patient: Gibson Villalta    Procedure(s):  gastroscopy - Wound Class: II-Clean Contaminated    Diagnosis: GI BLEED  Diagnosis Additional Information: No value filed.    Anesthesia Type:   General, RSI, ETT     Note:  Airway :Face Mask  Patient transferred to:PACU  Comments: Neuromuscular blockade not used after succinylcholine for intubation, spontaneous return of TOF 4/4 with sustained tetany, spontaneous respirations, adequate tidal volumes, followed commands to voice, oropharynx suctioned with soft flexible catheter, extubated atraumatically, extubated with suction, airway patent after extubation.  Oxygen via facemask at 10 liters per minute to PACU. Oxygen tubing connected to wall O2 in PACU, SpO2, NiBP, and EKG monitors and alarms on and functioning, Ronit Hugger warmer connected to patient gown, report on patient's clinical status given to PACU RN. Handoff Report: Identifed the Patient, Identified the Reponsible Provider, Reviewed the pertinent medical history, Discussed the surgical course, Reviewed Intra-OP anesthesia mangement and issues during anesthesia, Set expectations for post-procedure period and Allowed opportunity for questions and acknowledgement of understanding      Vitals: (Last set prior to Anesthesia Care Transfer)    CRNA VITALS  7/30/2018 1323 - 7/30/2018 1402      7/30/2018             Pulse: 82    Ht Rate: 82    SpO2: 95 %    Resp Rate (observed): (!)  1                Electronically Signed By: ALLEN Mccauley CRNA  July 30, 2018  2:02 PM

## 2018-07-30 NOTE — ANESTHESIA PREPROCEDURE EVALUATION
Procedure: Procedure(s):  COMBINED ESOPHAGOSCOPY, GASTROSCOPY, DUODENOSCOPY (EGD)  Preop diagnosis: GI BLEED    Allergies   Allergen Reactions     Wasps [Hornets]      Sand wasp --- years ago.       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     Myocardial infarction 1995 and 2010     Nonischemic cardiomyopathy (H) 7/22/2016     Obesity      Rosacea      Rotator cuff disorder     Tear x 2     RV (right ventricular) mural thrombus 7/22/2016     Past Surgical History:   Procedure Laterality Date     ANGIOPLASTY  1995 and 2010 with stent     GASTRIC BYPASS  2001     HEART CATH LEFT HEART CATH  7/19/16    Non ischemic cardiomyopathy; Non functionally significant LAD and RCA disease      OTHER SURGICAL HISTORY  2006    Spinal surgery     OTHER SURGICAL HISTORY  Nov 2016    CRT-D implantation     Prior to Admission medications    Medication Sig Start Date End Date Taking? Authorizing Provider   atorvastatin (LIPITOR) 40 MG tablet Take 1 tablet (40 mg) by mouth At Bedtime 2/15/18  Yes Flori Reyes DO   COENZYME Q-10 PO Take 200 mg by mouth daily    Yes Reported, Patient   ferrous sulfate (IRON) 325 (65 FE) MG tablet Take 325 mg by mouth daily   Yes Unknown, Entered By History   furosemide (LASIX) 20 MG tablet Take 0.5 tablets (10 mg) by mouth daily 2/15/18  Yes Flori Reyes DO   hydrALAZINE (APRESOLINE) 10 MG tablet Take 1 tablet (10 mg) by mouth 3 times daily 1/29/18  Yes Josias Hernandez MD   isosorbide mononitrate (IMDUR) 30 MG 24 hr tablet Take 0.5 tablets (15 mg) by mouth daily 7/19/18  Yes Josias Hernandez MD   metoprolol succinate (TOPROL-XL) 50 MG 24 hr tablet  Take 1 tablet (50 mg) by mouth daily  Patient taking differently: Take 50 mg by mouth every evening  2/15/18  Yes Flori Reyes DO   multivitamin, therapeutic with minerals (MULTI-VITAMIN) TABS Take 1 tablet by mouth daily   Yes Reported, Patient   venlafaxine (EFFEXOR) 75 MG tablet TAKE 1 TABLET TWICE DAILY 6/11/18  Yes Flori Reyes DO   vitamin D (ERGOCALCIFEROL) 06271 UNIT capsule TAKE 1 CAPSULE ONCE A WEEK 6/19/18  Yes Katelyn Akhtar, ALLEN CNP   WARFARIN SODIUM PO Take 4 mg by mouth three times a week Sun, Tues, Thurs   Yes Unknown, Entered By History   WARFARIN SODIUM PO Take 6 mg by mouth four times a week Mon, Wed, Fri, Sat   Yes Unknown, Entered By History   ACE/ARB NOT PRESCRIBED, INTENTIONAL, ACE & ARB not prescribed due to Symptomatic hypotension not due to excessive diuresis 9/29/16   Flori Reyes DO   ASPIRIN NOT PRESCRIBED (INTENTIONAL) Antiplatelet medication not prescribed intentionally due to Current anticoagulant therapy (warfarin/enoxaparin) 9/29/16   Flori Reyes DO     Current Facility-Administered Medications Ordered in Epic   Medication Dose Route Frequency Last Rate Last Dose     fentaNYL (PF) (SUBLIMAZE) injection    PRN   50 mcg at 07/30/18 1216     lidocaine (LMX4) cream   Topical Q1H PRN         lidocaine 1 % 1 mL  1 mL Other Q1H PRN         midazolam (VERSED) injection    PRN   1 mg at 07/30/18 1222     ondansetron (ZOFRAN) injection 4 mg  4 mg Intravenous Once PRN         pantoprazole (PROTONIX) 80 mg in sodium chloride 0.9 % 100 mL infusion  8 mg/hr Intravenous Continuous 10 mL/hr at 07/30/18 1127 8 mg/hr at 07/30/18 1127     sodium chloride (PF) 0.9% PF flush 3 mL  3 mL Intracatheter Q1H PRN         sodium chloride (PF) 0.9% PF flush 3 mL  3 mL Intracatheter Q8H         Current Outpatient Prescriptions Ordered in Epic   Medication     [DISCONTINUED] venlafaxine (EFFEXOR) 75 MG tablet     [DISCONTINUED] warfarin (COUMADIN) 2 MG tablet     Wt Readings from  Last 1 Encounters:   07/30/18 98.9 kg (218 lb)     Temp Readings from Last 1 Encounters:   07/30/18 36.4  C (97.6  F) (Oral)     BP Readings from Last 6 Encounters:   07/30/18 (!) 138/120   05/17/18 103/63   12/04/17 140/82   08/25/17 122/82   05/11/17 128/88   04/25/17 106/74     Pulse Readings from Last 4 Encounters:   05/17/18 91   08/25/17 84   05/11/17 82   04/25/17 90     Resp Readings from Last 1 Encounters:   07/30/18 21     SpO2 Readings from Last 1 Encounters:   07/30/18 96%     Recent Labs   Lab Test  07/30/18   0750  08/25/17   1416   NA  138  139   POTASSIUM  3.7  4.0   CHLORIDE  105  107   CO2  24  23   ANIONGAP  9  9   GLC  152*  116*   BUN  29  18   CR  1.23  1.34*   SHELLEY  8.5  8.9     Recent Labs   Lab Test  07/30/18   0750  08/25/17   1416   WBC  11.5*  7.7   HGB  13.9  15.1   PLT  218  203     Recent Labs   Lab Test  07/30/18   0750 07/11/18   INR  3.56*  3.0*      RECENT LABS:   ECG:   ECHO:   CXR:        Anesthesia Evaluation     .             ROS/MED HX    ENT/Pulmonary:      (-) tobacco use, asthma, COPD and sleep apnea   Neurologic:      (-) seizures, CVA and migraines   Cardiovascular:     (+) Dyslipidemia, hypertension--CAD, --stent,. Taking blood thinners : . . POLLARD, . pacemaker :. .      (-) arrhythmias   METS/Exercise Tolerance:     Hematologic:     (+) History of blood clots (hx RV thrombus) -     (-) anemia and other hematologic disorder   Musculoskeletal:        (-) arthritis   GI/Hepatic:     (+) GERD Asymptomatic on medication, Other GI/Hepatic upper GI bleed, hx gastric bypass      Renal/Genitourinary:     (+) chronic renal disease, type: CRI, BPH,       Endo:     (+) type II DM .   (-) Type I DM, thyroid disease and obesity   Psychiatric:     (+) psychiatric history anxiety      Infectious Disease:         Malignancy:         Other:                     Physical Exam  Normal systems: cardiovascular    Airway   Mallampati: III  TM distance: >3 FB  Neck ROM: full    Dental   (+)  chipped    Cardiovascular   Rhythm and rate: regular and normal  (-) no murmur    Pulmonary    breath sounds clear to auscultation                    Anesthesia Plan      History & Physical Review      ASA Status:  3 .    NPO Status:  Full stomach    Plan for General, RSI and ETT with Propofol induction. Maintenance will be TIVA.    PONV prophylaxis:  Ondansetron (or other 5HT-3)  Patient not tolerating EGD - found stomach full of blood and aborted procedure      Postoperative Care  Postoperative pain management:  Multi-modal analgesia.      Consents  Anesthetic plan, risks, benefits and alternatives discussed with:  Patient..                          .

## 2018-07-30 NOTE — PROGRESS NOTES
Admission    Patient arrives to room 622-2 via cart from ED.  Care plan note:  A&Ox4 but forgetful with known dementia. VSS on 2 L NC. On continuous pulse ox. Denies pain or SOB. SBA to BR. EGD performed today and general anesthesia required. Biopsies taken, pathology results pending. NS infusing at 50 mL/hr. Monitor closely for S/S of FVO and inform MD if present. Clear liquid diet- BID and HS glucose checks. Hgb checks Q12hr. Skin with abrasions on mari LE otherwise WDL. Wife at bedside.     Inpatient nursing criteria listed below were met:    PCD's Documented: Yes  Skin issues/needs documented :Yes  Isolation education started/completed NA  Patient allergies verified with patient: Yes  Verified completion of [place] Risk Assessment Tool:  Yes  Verified completion of Guardianship screening tool: Yes  Fall Prevention: Care plan updated, Education given and documented Yes  Care Plan initiated: Yes  Home medications documented in belongings flowsheet: NA  Patient belongings documented in belongings flowsheet: Yes  Reminder note (belongings/ medications) placed in discharge instructions:Yes  Admission profile/ required documentation complete: Yes

## 2018-07-30 NOTE — CONSULTS
See GI dictated consult    Will--> EGD  Agree with IVF/PPIs    Parish Xiong MD  [facility]   Office: [phone]   Pager: [phone]  Monday-Thursday 8am to 5pm, Fridays 8am to 4pm

## 2018-07-30 NOTE — PROVIDER NOTIFICATION
Update. Initial H&P dictated and pending. 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 Discussed with Dr. Xiong. Path pending and should be back tomorrow. For now patient can have clear liquids. Continue to hold warfarin and let INR drift down in case further intervention is needed this hospitalization.  Will check hgb q12. Hydrate with gently NS, low threshold to discontinue this if he develops any respiratory issues in the setting of known CHF. He is hemodynamically stable.     Livia Garrett PA-C

## 2018-07-30 NOTE — CONSULTS
Consult Date:  07/30/2018      HISTORY OF PRESENT ILLNESS:  This patient is a 77-year-old man that we are asked to see because of bleeding.  By history, he states he was well until early this morning.  He developed mild nonspecific discomfort.  It was not associated with nausea or vomiting.  With this, he and his wife state he had dark-colored liquid diarrhea.      With these symptoms, he presented to the hospital and was admitted.  He has no history of ulcer disease.  He has had a gastric bypass in the past.  He has not been taking anti-inflammatories.      In the ER he was noted to have a normal hemoglobin, minimally elevated INR of 3.2 and occult blood in the stool.  Because of this, we are asked to see him.      As noted, the patient up to this point had otherwise been feeling well and without issues.      ALLERGIES:  HE HAS NO ALLERGIES.      MEDICATIONS:  Prior to admission include atorvastatin, Lasix, iron, hydralazine, isosorbide, metoprolol, vitamins, venlafaxine, Coumadin and baby aspirin.      PAST SURGICAL HISTORY:  Prior surgeries include gastric bypass as discussed.        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      SOCIAL HISTORY:  The patient quit smoking many years ago, is not a drinker.  He is seen today with his wife.      FAMILY HISTORY:  Noncontributory with no GI disease noted.      REVIEW OF SYSTEMS:  No drinking, no vomiting, no yellowness or jaundice.      PHYSICAL EXAMINATION:   GENERAL:  Now he appears a pleasant man in no acute distress, alert and oriented but slow to answer questions.  He has no real pallor.   HEENT:  Pharynx is clear.   NECK:  No adenopathy in the neck.   HEART:  S1, S2 appear normal.   LUNGS:  Clear.   ABDOMEN:  Soft and nontender, without  hepatosplenomegaly.  He does not have an enlarged liver.     SKIN:  Shows no liver stigmata.   EXTREMITIES:  No peripheral edema is noted.     MUSCULOSKELETAL:  No CVA or spinal tenderness.   NEUROLOGIC:  He is oriented to person, place, but not to date.      LABORATORY DATA:  Electrolytes are intact.  Creatinine is 1.23.  His BUN is 29.  Hemoglobin 13.9.  INR is 3.56.  CAT scan of his abdomen is reviewed.  It shows evidence of question of thickening by the area of his gastric bypass.  Two bladder lesions are also noted, which are felt to be suspicious.      IMPRESSION:  The etiology for the clinical bleeding is indeterminate.  With the history, an upper endoscopy will be performed.  While his INR is mildly elevated, we can do this with perhaps clipping or biopsying of any lesion that is seen.      To this end, this procedure will be performed shortly.  I have spoken with the patient and his wife in detail regarding this.  Informed consent was obtained and further recommendations will be made pending the procedure.      We appreciate assisting in Mr. Dumont's care.         YU HOLLAND MD             D: 2018   T: 2018   MT: SUJATA      Name:     ISAIAS DUMONT   MRN:      [id number]        Account:       [id number]   :      1940           Consult Date:  2018      Document: [id number]       cc: Flori Bee MD

## 2018-07-30 NOTE — PROGRESS NOTES
EGD attempted--pt fighting sedation  Will ask anesthesia for help with MAC    Parish Xiong MD  [facility]   Office: [phone]   Pager: [phone]  Monday-Thursday 8am to 5pm, Fridays 8am to 4pm

## 2018-07-30 NOTE — IP AVS SNAPSHOT
MRN:[id number]                      After Visit Summary   7/30/2018    Gibson Villalta    MRN: [id number]           Thank you!     Thank you for choosing [facility] for your care. Our goal is always to provide you with excellent care. Hearing back from our patients is one way we can continue to improve our services. Please take a few minutes to complete the written survey that you may receive in the mail after you visit with us. Thank you!        Patient Information     Date Of Birth          1940        Designated Caregiver       Most Recent Value    Caregiver    Will someone help with your care after discharge? yes    Name of designated caregiver Izabella    Phone number of caregiver [phone]    Caregiver address Same as pt      About your hospital stay     You were admitted on:  July 30, 2018 You last received care in the:  [place] [address] [facility]    You were discharged on:  August 2, 2018        Reason for your hospital stay       GI bleed, probable cancer                  Who to Call     For medical emergencies, please call 911.  For non-urgent questions about your medical care, please call your primary care provider or clinic, [phone]  For questions related to your surgery, please call your surgery clinic        Attending Provider     Provider Janette Chilel MD Emergency Medicine    Suresh Mireles MD Internal Medicine       Primary Care Provider Office Phone # Fax #    Flori Joshua DO Amy [phone]      After Care Instructions     Activity       Your activity upon discharge: activity as tolerated            Diet       Follow this diet upon discharge:       Regular Diet Adult                  Follow-up Appointments     Follow-up and recommended labs and tests        Follow up with oncology in clinic for discussion of biopsy results and discussion of treatment plan.                  Your next 10 appointments  "already scheduled     Aug 15, 2018  1:45 PM CDT   Anticoagulation Visit with [facility]   [facility]    [address]  [place] [address] [phone]            Aug 16, 2018  4:30 PM CDT   Remote ICD Check with ALEJANDRO DCR2   [facility]   [place] ([facility])    [address]  [place] [id number] [phone] OPT 2           This appointment is for a remote check of your debrillator.  This is not an appointment at the office.              Pending Results     Date and Time Order Name Status Description    8/2/2018 1229 Surgical pathology exam In process     8/2/2018 1227 Leukemia Lymphoma Evaluation (Flow Cytometry) In process             Statement of Approval     Ordered          08/02/18 9669  I have reviewed and agree with all the recommendations and orders detailed in this document.  EFFECTIVE NOW     Approved and electronically signed by:  Asha Brand MD             Admission Information     Date & Time Provider Department Dept. Phone    7/30/2018 Suresh Mireles MD [place] [address] [facility] [phone]      Your Vitals Were     Blood Pressure Pulse Temperature Respirations Height Weight    123/76 (BP Location: Right arm) 71 97.8  F (36.6  C) (Oral) 16 1.854 m (6' 1\") 101.4 kg (223 lb 8.7 oz)    Pulse Oximetry BMI (Body Mass Index)                95% 29.49 kg/m2          [employer] Information     [employer] gives you secure access to your electronic health record. If you see a primary care provider, you can also send messages to your care team and make appointments. If you have questions, please call your primary care clinic.  If you do not have a primary care provider, please call [phone] and they will assist you.        Care EveryWhere ID     This is your Care EveryWhere ID. This could be used by other organizations to access your [facility] medical " records  [id number]        Equal Access to Services     [facility] RAYNE : Hadii swetha Lance, bam logan, rusty valderrama. So [facility] [phone].    ATENCIÓN: Si habla español, tiene a de luna disposición servicios gratuitos de asistencia lingüística. Llame al [phone].    We comply with applicable federal civil rights laws and [place] laws. We do not discriminate on the basis of race, color, national origin, age, disability, sex, sexual orientation, or gender identity.               Review of your medicines      CONTINUE these medicines which may have CHANGED, or have new prescriptions. If we are uncertain of the size of tablets/capsules you have at home, strength may be listed as something that might have changed.        Dose / Directions    metoprolol succinate 50 MG 24 hr tablet   Commonly known as:  TOPROL-XL   This may have changed:  when to take this   Used for:  Cardiomyopathy (H)        Dose:  50 mg   Take 1 tablet (50 mg) by mouth daily   Quantity:  90 tablet   Refills:  1         CONTINUE these medicines which have NOT CHANGED        Dose / Directions    ACE/ARB/ARNI NOT PRESCRIBED (INTENTIONAL)   Used for:  HFrEF (heart failure with reduced ejection fraction) (H)        ACE & ARB not prescribed due to Symptomatic hypotension not due to excessive diuresis   Refills:  0       ASPIRIN NOT PRESCRIBED   Commonly known as:  INTENTIONAL   Used for:  HFrEF (heart failure with reduced ejection fraction) (H)        Antiplatelet medication not prescribed intentionally due to Current anticoagulant therapy (warfarin/enoxaparin)   Quantity:  0 each   Refills:  0       atorvastatin 40 MG tablet   Commonly known as:  LIPITOR   Used for:  Hyperlipidemia LDL goal <130        Dose:  40 mg   Take 1 tablet (40 mg) by mouth At Bedtime   Quantity:  90 tablet   Refills:  1       COENZYME Q-10 PO        Dose:  200 mg   Take 200 mg by mouth daily   Refills:  0        ferrous sulfate 325 (65 Fe) MG tablet   Commonly known as:  IRON        Dose:  325 mg   Take 325 mg by mouth daily   Refills:  0       furosemide 20 MG tablet   Commonly known as:  LASIX   Used for:  Chronic systolic heart failure (H)        Dose:  10 mg   Take 0.5 tablets (10 mg) by mouth daily   Quantity:  45 tablet   Refills:  1       hydrALAZINE 10 MG tablet   Commonly known as:  APRESOLINE   Used for:  Chronic systolic heart failure (H), Cardiomyopathy (H)        Dose:  10 mg   Take 1 tablet (10 mg) by mouth 3 times daily   Quantity:  270 tablet   Refills:  1       isosorbide mononitrate 30 MG 24 hr tablet   Commonly known as:  IMDUR   Used for:  Chronic systolic heart failure (H), Cardiomyopathy (H)        Dose:  15 mg   Take 0.5 tablets (15 mg) by mouth daily   Quantity:  45 tablet   Refills:  1       Multi-vitamin Tabs tablet        Dose:  1 tablet   Take 1 tablet by mouth daily   Refills:  0       venlafaxine 75 MG tablet   Commonly known as:  EFFEXOR   Used for:  Anxiety and depression        TAKE 1 TABLET TWICE DAILY   Quantity:  180 tablet   Refills:  1       vitamin D 56942 UNIT capsule   Commonly known as:  ERGOCALCIFEROL   Used for:  Vitamin D deficiency        TAKE 1 CAPSULE ONCE A WEEK   Quantity:  12 capsule   Refills:  1         STOP taking     WARFARIN SODIUM PO                    Protect others around you: Learn how to safely use, store and throw away your medicines at www.disposemymeds.org.             Medication List: This is a list of all your medications and when to take them. Check marks below indicate your daily home schedule. Keep this list as a reference.      Medications           Morning Afternoon Evening Bedtime As Needed    ACE/ARB/ARNI NOT PRESCRIBED (INTENTIONAL)   ACE & ARB not prescribed due to Symptomatic hypotension not due to excessive diuresis                                ASPIRIN NOT PRESCRIBED   Commonly known as:  INTENTIONAL   Antiplatelet medication not prescribed  intentionally due to Current anticoagulant therapy (warfarin/enoxaparin)                                atorvastatin 40 MG tablet   Commonly known as:  LIPITOR   Take 1 tablet (40 mg) by mouth At Bedtime   Last time this was given:  40 mg on 8/1/2018  9:28 PM                                COENZYME Q-10 PO   Take 200 mg by mouth daily                                ferrous sulfate 325 (65 Fe) MG tablet   Commonly known as:  IRON   Take 325 mg by mouth daily                                furosemide 20 MG tablet   Commonly known as:  LASIX   Take 0.5 tablets (10 mg) by mouth daily   Last time this was given:  10 mg on 8/2/2018  9:29 AM                                hydrALAZINE 10 MG tablet   Commonly known as:  APRESOLINE   Take 1 tablet (10 mg) by mouth 3 times daily   Last time this was given:  10 mg on 8/2/2018  4:18 PM                                isosorbide mononitrate 30 MG 24 hr tablet   Commonly known as:  IMDUR   Take 0.5 tablets (15 mg) by mouth daily   Last time this was given:  15 mg on 8/2/2018  9:29 AM                                metoprolol succinate 50 MG 24 hr tablet   Commonly known as:  TOPROL-XL   Take 1 tablet (50 mg) by mouth daily   Last time this was given:  50 mg on 8/1/2018  8:15 PM                                Multi-vitamin Tabs tablet   Take 1 tablet by mouth daily                                venlafaxine 75 MG tablet   Commonly known as:  EFFEXOR   TAKE 1 TABLET TWICE DAILY   Last time this was given:  75 mg on 8/2/2018  9:29 AM                                vitamin D 77996 UNIT capsule   Commonly known as:  ERGOCALCIFEROL   TAKE 1 CAPSULE ONCE A WEEK

## 2018-07-30 NOTE — OR NURSING
EGD note:  Pt unable to tolerate scope in esophagus.  Procedure aborted per MD.  Ordered with anesthesia.

## 2018-07-30 NOTE — ED PROVIDER NOTES
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 The patient states that last night he felt a little bloated. He ate normal food yesterday including pizza and ice cream, as did his wife. He is now complaining of generalized upper abdominal discomfort that is hard to describe. No vomiting. He denies any david blood in his stool. No back pain, hematuria, dysuria. Of note, the patient is status post gastric bypass in 2000 and has not had any complications from this. The patient otherwise denies any fevers, cough, dyspnea, chest pain.    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   Respiratory: Negative for cough and shortness of breath.    Gastrointestinal: Positive for abdominal pain and anal bleeding. Negative for blood in stool, diarrhea, nausea and vomiting.   Genitourinary: Negative for dysuria and hematuria.   Musculoskeletal: Negative for back pain.   All other systems reviewed and are negative.    Physical Exam   Patient Vitals for the past 24 hrs:   BP Temp Temp src Heart Rate Resp SpO2 Height Weight   07/30/18  "1158 (!) 134/116 - - 114 13 95 % - -   07/30/18 1157 - - - - - - 1.854 m (6' 1\") 98.9 kg (218 lb)   07/30/18 1130 125/74 - - 92 25 - - -   07/30/18 1100 130/79 - - 98 14 - - -   07/30/18 1030 (!) 140/96 - - 80 26 - - -   07/30/18 1000 144/86 - - 80 12 - - -   07/30/18 0928 (!) 188/98 - - 75 (!) 38 - - -   07/30/18 0712 (!) 188/98 97.6  F (36.4  C) Oral 72 16 97 % 1.854 m (6' 1\") 98.9 kg (218 lb)     Physical Exam    Physical Exam   Constitutional:  Patient is oriented to person, place, and time. They appear well-developed and well-nourished. Mild distress secondary to abdominal discomfort   HENT:   Mouth/Throat:   Oropharynx is clear and moist.   Eyes:    Conjunctivae normal and EOM are normal. Pupils are equal, round, and reactive to light.   Neck:    Normal range of motion.   Cardiovascular: Normal rate, regular rhythm and normal heart sounds.  Exam reveals no gallop and no friction rub.  No murmur heard.  Pulmonary/Chest:  Effort normal and breath sounds normal. Patient has no wheezes. Patient has no rales.   Abdominal:   Soft. Bowel sounds are normal. Patient exhibits no mass. Mild tenderness in the epigastrium. There is no rebound and no guarding.   Genitourinary:  TAWNY shows a scant amount of black stool. No hemorrhoids. No active bleeding.  Musculoskeletal:  Normal range of motion. Patient exhibits no edema.   Neurological:   Patient is alert and oriented to person, place, and time. Patient has normal strength. No cranial nerve deficit or sensory deficit. GCS 15  Skin:   Skin is warm and dry. No rash noted. No erythema.   Psychiatric:   Patient has a normal mood and affect. Patient's behavior is normal. Judgment and thought content normal.         Emergency Department Course     Imaging:  Radiographic findings were communicated with the patient who voiced understanding of the findings.    CT-scan Abdomen/Pelvis w/ contrast:  1. Ill-defined soft tissue thickening adjacent to the gastric bypass  postoperative " changes in the stomach have increased in prominence  since the previous exam. This finding could be a normal postoperative  appearance; however, development of gastric neoplasm cannot be  excluded.  2. There is fluid and higher density material within the excluded  portion of the stomach, of greater volume than would typically be seen  in the setting of a gastric bypass. This higher density material  within the stomach is of uncertain etiology and could represent  debris, ingested food, or possibly blood products. No definite  communication between the gastric remnant and the excluded portion of  the stomach is visualized.  3. Two bladder wall lesions appear to enhance, and the larger of these  on the right posteriorly is new since 8/9/2016, and suspicious for  bladder neoplasm such as transitional cell carcinoma. Urologic  consultation is recommended. Cystoscopy should be considered for  further evaluation.  Preliminary result per radiology.     Laboratory:  CBC: WBC 11.5 (H), o/w WNL (HGB 13.9, )  INR: 3.56 (high)  CMP: Glucose 152, o/w WNL (Creatinine 1.23)  Lipase: 105  Occult Stool: Positive    Interventions:  0726 NS 1 L bolus IV  0810 Protonix 40 mg IV  0824 Zofran 4 mg IV  1056 - Aqua-mephyton 2 mg IV in NS 50 ml intermittent  1127 - Protonix 80 mg IV drip (8 unit/hour)    Emergency Department Course:  Past medical records, nursing notes, and vitals reviewed.  0710: I performed an exam of the patient and obtained history, as documented above.    IV inserted and blood drawn.    A stool sample was sent to laboratory for testing.     The patient was sent for a CT while in the emergency department, findings above.    1012: I discussed the case with Dr. Alvarez from General Surgery regarding the patient.    I rechecked the patient. Explained findings to patient. I reexamined the patient's abdomen, who did not have significantly reproducible pain.    1052: Rechecked the patient, findings and plan explained  to the patient, who consents to admission. Discussed the patient with Dr. Mireles, who will admit the patient to a monitored bed for further observation, evaluation, and treatment.    Impression & Plan      Medical Decision Making:   Gibson Villalta is a 77 year old male who presents to the ED with black liquid stool this morning and epigastric pain. He's never had this before. He felt fine yesterday. His physical exam here did not show peritoneal signs and its noted that he was hypertensive on arrival, but heart rate was normal and he was afebrile while his blood pressure has come down through his stay here.     Diagnostic evaluation was performed. His WBC is 11.5, his hemoglobin is normal. His INR is supratherapeutic at 3.56 (H) secondary to Coumadin use. His BUN and Creatinine are normal, no metabolic derangement. His liver function and lipase are normal. Hemoccult was positive, but there were no signs of gross hematochezia or hemorrhoids or bleeding on exam. CT findings as noted above. I did speak with Dr. Alvarez of General Surgery regarding this patient being that he had abnormal findings at the bypass site and certainly could have been a complication from his original gastric bypass. However, at this time, Dr. Alvarez that his was more consistent with acute peptic ulcer.     I did reexamine the patient's abdomen after he returned from CT scan. His pain had improved and he did not have any significantly reproducible pain. He received a Protonix bolus followed by a Protonix drip for presumed upper GI bleed. I will reverse his Coumadin level per protocol. This order was changed to 2 mg IV and I will admit to Dr. Mireles.     Diagnosis:    ICD-10-CM    1. Upper GI bleed K92.2      Disposition:  Admitted to Medicine/telemetry.  Janette Sanchez  7/30/2018    EMERGENCY DEPARTMENT  Jeb MAYBERRY Chi, am serving as a scribe at 10:16 AM on 7/30/2018 to document services personally performed by Janette Sanchez  MD Milly based on my observations and the provider's statements to me.           Janette Sanchez MD  07/30/18 7754

## 2018-07-30 NOTE — PROGRESS NOTES
RECEIVING UNIT ED HANDOFF REVIEW    ED Nurse Handoff Report was reviewed by: Gloria Matta on July 30, 2018 at 11:15 AM

## 2018-07-31 LAB
ALBUMIN UR-MCNC: NEGATIVE MG/DL
ANION GAP SERPL CALCULATED.3IONS-SCNC: 5 MMOL/L (ref 3–14)
APPEARANCE UR: CLEAR
BILIRUB UR QL STRIP: NEGATIVE
BUN SERPL-MCNC: 33 MG/DL (ref 7–30)
CALCIUM SERPL-MCNC: 8.3 MG/DL (ref 8.5–10.1)
CHLORIDE SERPL-SCNC: 107 MMOL/L (ref 94–109)
CO2 SERPL-SCNC: 27 MMOL/L (ref 20–32)
COLOR UR AUTO: ABNORMAL
CREAT SERPL-MCNC: 1.29 MG/DL (ref 0.66–1.25)
ERYTHROCYTE [DISTWIDTH] IN BLOOD BY AUTOMATED COUNT: 13.2 % (ref 10–15)
GFR SERPL CREATININE-BSD FRML MDRD: 54 ML/MIN/1.7M2
GLUCOSE BLDC GLUCOMTR-MCNC: 122 MG/DL (ref 70–99)
GLUCOSE BLDC GLUCOMTR-MCNC: 138 MG/DL (ref 70–99)
GLUCOSE BLDC GLUCOMTR-MCNC: 173 MG/DL (ref 70–99)
GLUCOSE SERPL-MCNC: 129 MG/DL (ref 70–99)
GLUCOSE UR STRIP-MCNC: NEGATIVE MG/DL
HCT VFR BLD AUTO: 34 % (ref 40–53)
HGB BLD-MCNC: 10.9 G/DL (ref 13.3–17.7)
HGB BLD-MCNC: 11.3 G/DL (ref 13.3–17.7)
HGB UR QL STRIP: ABNORMAL
INR PPP: 1.21 (ref 0.86–1.14)
KETONES UR STRIP-MCNC: NEGATIVE MG/DL
LEUKOCYTE ESTERASE UR QL STRIP: NEGATIVE
MCH RBC QN AUTO: 31.7 PG (ref 26.5–33)
MCHC RBC AUTO-ENTMCNC: 33.2 G/DL (ref 31.5–36.5)
MCV RBC AUTO: 95 FL (ref 78–100)
NITRATE UR QL: NEGATIVE
PH UR STRIP: 5 PH (ref 5–7)
PLATELET # BLD AUTO: 180 10E9/L (ref 150–450)
POTASSIUM SERPL-SCNC: 4 MMOL/L (ref 3.4–5.3)
RBC # BLD AUTO: 3.57 10E12/L (ref 4.4–5.9)
SODIUM SERPL-SCNC: 139 MMOL/L (ref 133–144)
SOURCE: ABNORMAL
SP GR UR STRIP: 1.01 (ref 1–1.03)
UROBILINOGEN UR STRIP-MCNC: NORMAL MG/DL (ref 0–2)
WBC # BLD AUTO: 17.3 10E9/L (ref 4–11)

## 2018-07-31 PROCEDURE — 81003 URINALYSIS AUTO W/O SCOPE: CPT | Performed by: PHYSICIAN ASSISTANT

## 2018-07-31 PROCEDURE — 85018 HEMOGLOBIN: CPT | Performed by: NURSE PRACTITIONER

## 2018-07-31 PROCEDURE — 25000132 ZZH RX MED GY IP 250 OP 250 PS 637: Mod: GY | Performed by: PHYSICIAN ASSISTANT

## 2018-07-31 PROCEDURE — 25000125 ZZHC RX 250: Performed by: PHYSICIAN ASSISTANT

## 2018-07-31 PROCEDURE — 80048 BASIC METABOLIC PNL TOTAL CA: CPT | Performed by: PHYSICIAN ASSISTANT

## 2018-07-31 PROCEDURE — 99232 SBSQ HOSP IP/OBS MODERATE 35: CPT | Performed by: HOSPITALIST

## 2018-07-31 PROCEDURE — 12000000 ZZH R&B MED SURG/OB

## 2018-07-31 PROCEDURE — 36415 COLL VENOUS BLD VENIPUNCTURE: CPT | Performed by: NURSE PRACTITIONER

## 2018-07-31 PROCEDURE — 85027 COMPLETE CBC AUTOMATED: CPT | Performed by: PHYSICIAN ASSISTANT

## 2018-07-31 PROCEDURE — A9270 NON-COVERED ITEM OR SERVICE: HCPCS | Mod: GY | Performed by: PHYSICIAN ASSISTANT

## 2018-07-31 PROCEDURE — 00000146 ZZHCL STATISTIC GLUCOSE BY METER IP

## 2018-07-31 PROCEDURE — 85610 PROTHROMBIN TIME: CPT | Performed by: PHYSICIAN ASSISTANT

## 2018-07-31 PROCEDURE — 36415 COLL VENOUS BLD VENIPUNCTURE: CPT | Performed by: PHYSICIAN ASSISTANT

## 2018-07-31 RX ADMIN — VENLAFAXINE HYDROCHLORIDE 75 MG: 75 TABLET ORAL at 20:22

## 2018-07-31 RX ADMIN — ATORVASTATIN CALCIUM 40 MG: 40 TABLET, FILM COATED ORAL at 21:41

## 2018-07-31 RX ADMIN — Medication 10 MG: at 08:13

## 2018-07-31 RX ADMIN — PANTOPRAZOLE SODIUM 40 MG: 40 INJECTION, POWDER, FOR SOLUTION INTRAVENOUS at 08:13

## 2018-07-31 RX ADMIN — HYDRALAZINE HYDROCHLORIDE 10 MG: 10 TABLET ORAL at 21:42

## 2018-07-31 RX ADMIN — HYDRALAZINE HYDROCHLORIDE 10 MG: 10 TABLET ORAL at 08:13

## 2018-07-31 RX ADMIN — VENLAFAXINE HYDROCHLORIDE 75 MG: 75 TABLET ORAL at 08:13

## 2018-07-31 RX ADMIN — METOPROLOL SUCCINATE 50 MG: 50 TABLET, EXTENDED RELEASE ORAL at 20:21

## 2018-07-31 RX ADMIN — HYDRALAZINE HYDROCHLORIDE 10 MG: 10 TABLET ORAL at 16:28

## 2018-07-31 RX ADMIN — Medication 15 MG: at 08:12

## 2018-07-31 ASSESSMENT — ACTIVITIES OF DAILY LIVING (ADL)
ADLS_ACUITY_SCORE: 12

## 2018-07-31 NOTE — PROGRESS NOTES
[facility]  Hospitalist Progress Note        Suresh Mireles MD   07/31/2018        Interval History:      Feels fine, no more episodes of melena today         Assessment and Plan:        Gibson Villalta is a 77-year-old gentleman with a history of dementia, RV and LV mural thrombus on Coumadin, Smita-en-Y gastric bypass, hypertension, chronic kidney disease, coronary artery disease, mixed cardiomyopathy with severely reduced ejection fraction of 25-30%, diet-controlled diabetes and known left bundle branch block, who presented to the emergency department for evaluation of rectal bleeding.     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 Biopsied.; biopsies pending  ; per Dr Xiong, prelim results suggestive of lymphoma; awaiting final pathology  - will plan to consult surgery/oncology after biopsy results  - Hb 13.9---12.5---11.3  - monitor Hb q 12 hrs and transfuse prn for Hb <8  - got Vitamin K for INR 3.56; coumadin held; will repeat INR in am  - continue protonix IV daily  - discontinue IV fluids    2.  Bladder wall lesions.  A concern for possible neoplasm.  The patient has no known history of a bladder malignancy and denies any recent symptoms of hematuria.  .   -  awaiting urology evaluation    3.  History of a RV and LV mural thrombus.  Initially identified on a cardiac MRI in 2016.; PTA on coumadin; INR reversed due to suspected GI bleed ;   -  We will plan to resume warfarin when safe per GI and pending further plans based on the gastric biopsy.     4.  Cardiomyopathy with reduced ejection fraction  5.  Coronary artery disease with prior MI.  -  Last echocardiogram in 05/2018 showed ejection fraction of 25-30%.  - Currently appears euvolemic  -  We will continue prior to admission Toprol-XL (50 mg daily), Lasix 10 mg daily and Imdur     6.   "Hypertension.  Continue prior to admission metoprolol, hydralazine, Imdur with hold parameters.     7.  Type 2 diabetes mellitus, diet-controlled.   -   repeat A1c 7.1.   -  We will monitor blood sugars with sliding scale insulin p.r.n.    8.  Chronic kidney disease.  His baseline creatinine appears to be around 1.3-1.5.  It was 1.2 on admission:   -  Avoid nephrotoxins.   -  Follow BMP.     9.  Dementia.  He requires 24-hour assistance.  No longer drives.  Per wife, orientation waxes and wanes; currently seems stable alert oriented     10. Leukocytosis. Unclear significance. No fever. Wbc 11---17; UA unremarkable;  Will follow in am and pursue further if increasing evidence of infection; CT abdomen apart from the gastric and bladder lesions is unremarkable; no respiratory symptoms; repeat CBC in a.m.       CODE STATUS:  The patient is FULL CODE.      Disposition: Likely in 2-3 days pending stable hemoglobin, reinitiation of anti-coagulation and final biopsy results     # Pain Assessment:  Current Pain Score 7/30/2018   Patient currently in pain? denies   Pain score (0-10) -   Pain location -   Gibson s pain level was assessed and he currently denies pain.                         Physical Exam:      Blood pressure 147/90, pulse 102, temperature 98  F (36.7  C), temperature source Oral, resp. rate 18, height 1.854 m (6' 1\"), weight 102.7 kg (226 lb 6.6 oz), SpO2 96 %.  Vitals:    07/30/18 0712 07/30/18 1157 07/31/18 0629   Weight: 98.9 kg (218 lb) 98.9 kg (218 lb) 102.7 kg (226 lb 6.6 oz)     Vital Signs with Ranges  Temp:  [97.3  F (36.3  C)-98.2  F (36.8  C)] 98  F (36.7  C)  Pulse:  [] 102  Heart Rate:  [] 97  Resp:  [11-25] 18  BP: ()/() 147/90  SpO2:  [94 %-99 %] 96 %  I/O's Last 24 hours  I/O last 3 completed shifts:  In: 2100 [P.O.:300; I.V.:800; IV Piggyback:1000]  Out: 550 [Urine:550]    Constitutional: Alert, awake and orienetd X 3; lying comfortably in bed in no apparent distress "   HEENT: Pupils equal and reactive to light and accomodation, EOMI intact; neck supple no raised JVD or rigidity    Oral cavity: Moist mucosa   Cardiovascular: Normal s1 s2, regular rate and rhythm, no murmur   Lungs: B/l clear to auscultation, no wheezes or crepitations   Abdomen: Soft, nt, nd, no guarding, rigidity or rebound; BS +   LE : No edema   Musculoskeletal: Power 5/5 in all extremities   Neuro: No focal neurological deficits noted, CN II to XII grossly intact   Psychiatry: normal mood and affect                Medications:          atorvastatin  40 mg Oral At Bedtime     furosemide  10 mg Oral Daily     hydrALAZINE  10 mg Oral TID     isosorbide mononitrate  15 mg Oral Daily     metoprolol succinate  50 mg Oral QPM     pantoprazole (PROTONIX) IV  40 mg Intravenous Daily with breakfast     venlafaxine  75 mg Oral BID     PRN Meds: melatonin, naloxone, ondansetron **OR** ondansetron, - MEDICATION INSTRUCTIONS -         Data:      All new lab and imaging data was reviewed.   Recent Labs   Lab Test  07/31/18 0627 07/30/18   1800  07/30/18   0750 07/11/18 06/13/18 08/25/17   1416   WBC  17.3*   --   11.5*   --    --    --   7.7   HGB  11.3*  12.5*  13.9   --    --    --   15.1   MCV  95   --   95   --    --    --   97   PLT  180   --   218   --    --    --   203   INR   --    --   3.56*  3.0*  2.3*   < >   --     < > = values in this interval not displayed.      Recent Labs   Lab Test  07/31/18 0627 07/30/18   0750  08/25/17   1416   NA  139  138  139   POTASSIUM  4.0  3.7  4.0   CHLORIDE  107  105  107   CO2  27  24  23   BUN  33*  29  18   CR  1.29*  1.23  1.34*   ANIONGAP  5  9  9   SHELLEY  8.3*  8.5  8.9   GLC  129*  152*  116*     Recent Labs   Lab Test  07/23/16   2105  07/23/16   1712  07/23/16   1312   TROPI  0.355*  0.338*  0.407*

## 2018-07-31 NOTE — PLAN OF CARE
Problem: Patient Care Overview  Goal: Plan of Care/Patient Progress Review  Outcome: No Change  A&O, SBA to bathroom, denies pain, nausea and vomiting, VSS on RA, metoprolol and hydralazine held, BP parameters not met, tele 100% paced, last hgb was 12.5 now 11.3, melatonin given at bedtime per patient request, had dark red-black loose stool x 1, no shortness of breath reported, lung sound clear and diminished in lower bases, IVF infusing at 50 ml/hr,  and 138 no coverage given.

## 2018-07-31 NOTE — PROVIDER NOTIFICATION
MD Notification    Notified Person: MD    Notified Person Name: Cornell    Notification Date/Time: 7/30/18 1900    Notification Interaction: phone    Purpose of Notification: HR elevated above 100 with pacemaker    Orders Received: EKG- call back if abnormal    Comments: will pass along to oncoming nurse

## 2018-07-31 NOTE — PLAN OF CARE
Problem: Patient Care Overview  Goal: Plan of Care/Patient Progress Review  A&Ox4 but forgetful with known dementia. VSS on 2 L NC. On continuous pulse ox. Denies pain or SOB. SBA to BR. EGD performed today and general anesthesia required. Biopsies taken, pathology results pending. NS infusing at 50 mL/hr. Monitor closely for S/S of FVO and inform MD if present. Clear liquid diet- BID and HS glucose checks. Hgb checks Q12hr. Skin with abrasions on mari LE otherwise WDL. Tele paced but benny- MD informed, EKG to be performed. Nursing will continue to monitor.

## 2018-07-31 NOTE — PROGRESS NOTES
"[facility]  [facility]  Gastroenterology Progress note    Interval History:      Patient without complaints.  Tolerating liquid diet.  Maroon-black stool x 1 last night.      Vital Signs:      /90 (BP Location: Right arm)  Pulse 102  Temp 98  F (36.7  C) (Oral)  Resp 18  Ht 1.854 m (6' 1\")  Wt 102.7 kg (226 lb 6.6 oz)  SpO2 96%  BMI 29.87 kg/m2  Temp (24hrs), Av.7  F (36.5  C), Min:97.3  F (36.3  C), Max:98.2  F (36.8  C)    Patient Vitals for the past 72 hrs:   Weight   18 0629 102.7 kg (226 lb 6.6 oz)   18 1157 98.9 kg (218 lb)   18 0712 98.9 kg (218 lb)       Intake/Output Summary (Last 24 hours) at 18 0901  Last data filed at 18 0734   Gross per 24 hour   Intake             1854 ml   Output              700 ml   Net             1154 ml         Constitutional: NAD, comfortable  Cardiovascular: RRR, normal S1, S2   Respiratory: CTAB  Abdomen: soft, non-tender, nondistended.    Additional Comments:  ROS, FH, SH: See initial GI consult for details.    Laboratory Data:  Recent Labs   Lab Test  18   0618   1800  18   0750 18   1416   WBC  17.3*   --   11.5*   --    --    --   7.7   HGB  11.3*  12.5*  13.9   --    --    --   15.1   MCV  95   --   95   --    --    --   97   PLT  180   --   218   --    --    --   203   INR   --    --   3.56*  3.0*  2.3*   < >   --     < > = values in this interval not displayed.     Recent Labs   Lab Test  18   0618   0750  17   1416   NA  139  138  139   POTASSIUM  4.0  3.7  4.0   CHLORIDE  107  105  107   CO2  27  24  23   BUN  33*  29  18   CR  1.29*  1.23  1.34*   ANIONGAP  5  9  9   SHELLEY  8.3*  8.5  8.9     Recent Labs   Lab Test  18   0750  17   1416  17   0817   16   1745   16   1247  16   2120   ALBUMIN  3.4  3.6  3.8   < >   --    < >  3.1*   --    BILITOTAL  0.4  0.4  0.6   < >   --    < > "  0.9   --    DBIL   --    --    --    --    --    --   0.3*   --    ALT  40  51  50   < >   --    < >  24   --    AST  27  32  Unsatisfactory specimen - hemolyzed   < >   --    < >  27   --    ALKPHOS  143  128  131   < >   --    < >  139   --    PROTEIN   --    --    --    --   Negative   --    --   Negative   LIPASE  105   --    --    --    --    --    --    --     < > = values in this interval not displayed.         Assessment:    GI bleed likely secondary to gastric mass.  Biopsies pending, likely malignant.  Patient currently without complaints.  Tolerating liquid diet.  Maroon-black stool x 1 last night.  Plan:    -  Await biopsy results.  -  Continue liquid diet.  -  PPI.  -  Monitor H/H; transfuse prn.                Dunia Diop, PAC  [facility]  Office:  [phone] call if needed after 5PM  Cell:  [phone], not available after 5PM at this number

## 2018-07-31 NOTE — PLAN OF CARE
Problem: Patient Care Overview  Goal: Plan of Care/Patient Progress Review  Pt alert and oriented. Up SBA to bathroom. Voiding adequately. No bloody stools this shift. Denies pain, N/V. VSS, except tachychardic at times. Tele 100% V Paced. Lungs with crackles in the bases. IV fluids discontinued this morning.  this morning. UA sent. Awaiting biopsy results.

## 2018-07-31 NOTE — PLAN OF CARE
Problem: Patient Care Overview  Goal: Plan of Care/Patient Progress Review  Outcome: No Change  [id number]: VSS, O2 wnl RA. A&Ox4. Tele 100% V paced tachy with -109, asymptomatic. SBA/FR. Tolerating clears. Blood sugar 173. Denies pain. Hgb trending down 13.9->12.5->11.3->10.9, checking q12h, conditional order to transfuse for hgb <8. No BM this shift. Voiding adequately. IVSL. Awaiting biopsy result. Possible Surgery and Oncology consult after biopsy results are back. RN will cont to monitor.

## 2018-07-31 NOTE — CONSULTS
[facility] Consultation by [facility]    Gibson Villalta MRN# [id number]   Age: 77 year old YOB: 1940     Date of Admission:  7/30/2018    Reason for consult: Bladder lesions       Requesting PA/MD: Livia Garrett PA-C       Level of consult: Consult, follow and place orders           Assessment and Plan:   Assessment:   Bladder lesions (on CT), gastric mass      Plan:   No gross hematuria.   Check UA today.    Needs outpatient follow up with us and cysto to fully evaluate the interior of the bladder to determine if he requires biopsies/resection, etc. This can be done in the clinic in the coming weeks once acute issues resolved. Will have our office coordinate.     Camille Koo PA-C  [facility]  Cell: [phone] (text or call, Mon-Wed & Fri until 5pm)               Chief Complaint:   Rectal bleeding     History is obtained from the patient and EMR.         History of Present Illness:   This patient is a 77 year old male whom was admitted through the ER for evaluation and management of rectal bleeding 2/2 gastric mass (path pend). He has numerous comorbidities and on anticoagulation. A CT was performed and was noted to have two, possibly enhancing, bladder wall lesions. He has a remote smoking history. Denies any previous episodes of gross hematuria. Voids without difficulty.      Last PSA normal in 2015. UA in 2016 notable for 3 RBCs/HPF.              Past Medical History:     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     Myocardial infarction 1995 and 2010     Nonischemic cardiomyopathy (H) 7/22/2016     Obesity      Rosacea      Rotator  cuff disorder     Tear x 2     RV (right ventricular) mural thrombus 2016             Past Surgical History:     Past Surgical History:   Procedure Laterality Date     ANGIOPLASTY   and  with stent     ESOPHAGOSCOPY, GASTROSCOPY, DUODENOSCOPY (EGD), COMBINED N/A 2018    Procedure: COMBINED ESOPHAGOSCOPY, GASTROSCOPY, DUODENOSCOPY (EGD), BIOPSY SINGLE OR MULTIPLE;  gastroscopy;  Surgeon: Parish Xiong MD;  Location:  GI     ESOPHAGOSCOPY, GASTROSCOPY, DUODENOSCOPY (EGD), COMBINED N/A 2018    Procedure: COMBINED ESOPHAGOSCOPY, GASTROSCOPY, DUODENOSCOPY (EGD);  EGD;  Surgeon: Parish Xiong MD;  Location:  GI     GASTRIC BYPASS       HEART CATH LEFT HEART CATH  16    Non ischemic cardiomyopathy; Non functionally significant LAD and RCA disease      OTHER SURGICAL HISTORY      Spinal surgery     OTHER SURGICAL HISTORY  2016    CRT-D implantation             Social History:     Social History   Substance Use Topics     Smoking status: Former Smoker     Packs/day: 2.00     Years: 8.00     Types: Cigarettes     Quit date: 1980     Smokeless tobacco: Never Used      Comment: Quit in his 30s - 2 ppd for 8 years     Alcohol use 0.0 oz/week     0 Standard drinks or equivalent per week      Comment: maybe 1 beer a month             Family History:     Family History   Problem Relation Age of Onset     Coronary Artery Disease Father 39     Alzheimer Disease Mother      Coronary Artery Disease Son      Two sons have  from MIs (ages 39 and 51)     Family history reviewed.             Allergies:     Allergies   Allergen Reactions     Wasps [Hornets]      Sand wasp --- years ago.               Medications:     Current Facility-Administered Medications   Medication     atorvastatin (LIPITOR) tablet 40 mg     furosemide (LASIX) half-tab 10 mg     hydrALAZINE (APRESOLINE) tablet 10 mg     isosorbide mononitrate (IMDUR) 24 hr half-tab 15 mg     melatonin tablet 1 mg     metoprolol  "succinate (TOPROL-XL) 24 hr tablet 50 mg     naloxone (NARCAN) injection 0.1-0.4 mg     ondansetron (ZOFRAN-ODT) ODT tab 4 mg    Or     ondansetron (ZOFRAN) injection 4 mg     ORAL Pain Medications -  may administer as ordered by surgeon for take home use     pantoprazole (PROTONIX) 40 mg IV push injection     venlafaxine (EFFEXOR) tablet 75 mg             Review of Systems:   A comprehensive review of systems was performed and found to be negative except as described in this note     /90 (BP Location: Right arm)  Pulse 102  Temp 98  F (36.7  C) (Oral)  Resp 18  Ht 1.854 m (6' 1\")  Wt 102.7 kg (226 lb 6.6 oz)  SpO2 96%  BMI 29.87 kg/m2  GEN: NAD, lying in bed  HEENT: EOMI  NECK: Supple  ABD: Obese, soft  EXT: No LE edema            Data:     Lab Results   Component Value Date    WBC 17.3 (H) 07/31/2018    HGB 11.3 (L) 07/31/2018    HCT 34.0 (L) 07/31/2018    MCV 95 07/31/2018     07/31/2018     Lab Results   Component Value Date    CR 1.29 (H) 07/31/2018     CT ABDOMEN AND PELVIS WITH CONTRAST   7/30/2018 9:23 AM      HISTORY: Upper abdominal pain.     TECHNIQUE: 110 mL Isovue-370 IV were administered. After contrast  administration, volumetric helical sections were acquired from the  lung bases to the ischial tuberosities. Coronal images were also  reconstructed. Radiation dose for this scan was reduced using  automated exposure control, adjustment of the mA and/or kV according  to patient size, or iterative reconstruction technique.     COMPARISON: CT of the abdomen and pelvis performed 8/9/2016.      FINDINGS: Postoperative changes of prior gastric bypass procedure are  noted. There is ill-defined soft tissue thickening in the proximal  stomach adjacent to the gastric bypass postoperative changes, and have  increased in prominence since the previous exam (series 3 image 28).  There is also fluid and higher-density material within the excluded  stomach. No evidence for bowel obstruction. No " colitis or  diverticulitis. No free fluid in the pelvis.     There are two bladder wall lesions that appear to enhance. The largest  is on the right posteriorly (series 3 image 87) measuring 3.9 x 1.8  cm, and is new since the previous exam. A smaller bladder wall lesion  posteriorly near midline is not significantly changed, measuring 2 x  1.4 cm. Several left renal cysts are noted, with the largest  posteriorly measuring 4.1 cm. The liver, gallbladder, spleen, adrenal  glands, pancreas, and kidneys are otherwise unremarkable. No  hydronephrosis. Mild atherosclerotic aortoiliac calcification. Mild  scarring and/or atelectasis at the right lung base posteriorly. There  is elevation of the right hemidiaphragm. Degenerative changes are  noted in the visualized thoracolumbar spine.         IMPRESSION:   1. Ill-defined soft tissue thickening adjacent to the gastric bypass  postoperative changes in the stomach have increased in prominence  since the previous exam. This finding could be a normal variant  postoperative appearance; however, development of gastric neoplasm  cannot be excluded.  2. There is fluid and higher density material within the excluded  portion of the stomach, of greater volume than would typically be seen  in the setting of a gastric bypass. This higher density material  within the stomach is of uncertain etiology and could represent  debris, ingested food, or possibly blood products. No definite  communication between the gastric remnant and the excluded portion of  the stomach is visualized.  3. Two bladder wall lesions appear to enhance, and the larger of these  on the right posteriorly is new since 8/9/2016, suspicious for bladder  neoplasm such as transitional cell carcinoma. Urologic consultation is  recommended. Cystoscopy should be considered for further evaluation.

## 2018-08-01 LAB
COPATH REPORT: NORMAL
GLUCOSE BLDC GLUCOMTR-MCNC: 112 MG/DL (ref 70–99)
GLUCOSE BLDC GLUCOMTR-MCNC: 94 MG/DL (ref 70–99)
GLUCOSE BLDC GLUCOMTR-MCNC: 97 MG/DL (ref 70–99)
GLUCOSE BLDC GLUCOMTR-MCNC: 97 MG/DL (ref 70–99)
HGB BLD-MCNC: 10.7 G/DL (ref 13.3–17.7)
HGB BLD-MCNC: 11.1 G/DL (ref 13.3–17.7)
INTERPRETATION ECG - MUSE: NORMAL

## 2018-08-01 PROCEDURE — 25000132 ZZH RX MED GY IP 250 OP 250 PS 637: Mod: GY | Performed by: PHYSICIAN ASSISTANT

## 2018-08-01 PROCEDURE — 99232 SBSQ HOSP IP/OBS MODERATE 35: CPT | Performed by: HOSPITALIST

## 2018-08-01 PROCEDURE — A9270 NON-COVERED ITEM OR SERVICE: HCPCS | Mod: GY | Performed by: PHYSICIAN ASSISTANT

## 2018-08-01 PROCEDURE — 25000125 ZZHC RX 250: Performed by: PHYSICIAN ASSISTANT

## 2018-08-01 PROCEDURE — 85018 HEMOGLOBIN: CPT | Performed by: NURSE PRACTITIONER

## 2018-08-01 PROCEDURE — 36415 COLL VENOUS BLD VENIPUNCTURE: CPT | Performed by: NURSE PRACTITIONER

## 2018-08-01 PROCEDURE — 12000000 ZZH R&B MED SURG/OB

## 2018-08-01 PROCEDURE — 00000146 ZZHCL STATISTIC GLUCOSE BY METER IP

## 2018-08-01 RX ADMIN — Medication 15 MG: at 08:56

## 2018-08-01 RX ADMIN — HYDRALAZINE HYDROCHLORIDE 10 MG: 10 TABLET ORAL at 15:47

## 2018-08-01 RX ADMIN — Medication 10 MG: at 08:57

## 2018-08-01 RX ADMIN — VENLAFAXINE HYDROCHLORIDE 75 MG: 75 TABLET ORAL at 20:15

## 2018-08-01 RX ADMIN — VENLAFAXINE HYDROCHLORIDE 75 MG: 75 TABLET ORAL at 08:56

## 2018-08-01 RX ADMIN — HYDRALAZINE HYDROCHLORIDE 10 MG: 10 TABLET ORAL at 21:28

## 2018-08-01 RX ADMIN — METOPROLOL SUCCINATE 50 MG: 50 TABLET, EXTENDED RELEASE ORAL at 20:15

## 2018-08-01 RX ADMIN — HYDRALAZINE HYDROCHLORIDE 10 MG: 10 TABLET ORAL at 08:57

## 2018-08-01 RX ADMIN — PANTOPRAZOLE SODIUM 40 MG: 40 INJECTION, POWDER, FOR SOLUTION INTRAVENOUS at 08:56

## 2018-08-01 RX ADMIN — ATORVASTATIN CALCIUM 40 MG: 40 TABLET, FILM COATED ORAL at 21:28

## 2018-08-01 ASSESSMENT — ACTIVITIES OF DAILY LIVING (ADL)
ADLS_ACUITY_SCORE: 14
ADLS_ACUITY_SCORE: 12
ADLS_ACUITY_SCORE: 14
ADLS_ACUITY_SCORE: 12
ADLS_ACUITY_SCORE: 14
ADLS_ACUITY_SCORE: 12

## 2018-08-01 NOTE — PROGRESS NOTES
"GASTROENTEROLOGY PROGRESS NOTE     SUBJECTIVE:  No stools overnight or this morning. Denies abdominal pain. On clear liquid diet. Tolerating. Wife at bedside.     OBJECTIVE:    /80  Pulse 71  Temp 97.5  F (36.4  C) (Oral)  Resp 16  Ht 1.854 m (6' 1\")  Wt 102.3 kg (225 lb 8.5 oz)  SpO2 97%  BMI 29.76 kg/m2  Temp (24hrs), Av.7  F (36.5  C), Min:97.4  F (36.3  C), Max:98.1  F (36.7  C)    Patient Vitals for the past 72 hrs:   Weight   18 0707 102.3 kg (225 lb 8.5 oz)   18 0629 102.7 kg (226 lb 6.6 oz)   18 1157 98.9 kg (218 lb)   18 0712 98.9 kg (218 lb)       Intake/Output Summary (Last 24 hours) at 18 1056  Last data filed at 18 0900   Gross per 24 hour   Intake              240 ml   Output             1175 ml   Net             -935 ml        PHYSICAL EXAM  Gen: alert, oriented, NAD  CV: RRR  Lungs: clear  Abd: soft, NT/ND, +BS           Additional Comments:  ROS, FH, SH: See initial GI consult for details.     I have reviewed the patient's new clinical lab results:     Recent Labs   Lab Test  18   0607  18   1825  18   1035  18   0627   18   0750 18   1416   WBC   --    --    --   17.3*   --   11.5*   --    --   7.7   HGB  10.7*  10.9*   --   11.3*   < >  13.9   --    --   15.1   MCV   --    --    --   95   --   95   --    --   97   PLT   --    --    --   180   --   218   --    --   203   INR   --    --   1.21*   --    --   3.56*  3.0*   < >   --     < > = values in this interval not displayed.     Recent Labs   Lab Test  18   0618   0750  17   1416   POTASSIUM  4.0  3.7  4.0   CHLORIDE  107  105  107   CO2  27  24  23   BUN  33*  29  18   ANIONGAP  5  9  9     Recent Labs   Lab Test  18   1325  18   0750  17   1416  17   0817   16   1745   16   2120   ALBUMIN   --   3.4  3.6  3.8   < >   --    < >   --    BILITOTAL   --   0.4  0.4  0.6   < >   --    < >   --  "   ALT   --   40  51  50   < >   --    < >   --    AST   --   27  32  Unsatisfactory specimen - hemolyzed   < >   --    < >   --    PROTEIN  Negative   --    --    --    --   Negative   --   Negative   LIPASE   --   105   --    --    --    --    --    --     < > = values in this interval not displayed.     EGD 7/30/18  Impression:               - Normal esophagus.                             - Normal examined jejunum.                             - Gastric bypass with a normal-sized pouch and                             intact staple line.                             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 Biopsied.     Path - pending.     Assessment:  77 year old male with history of dementia, RV/LV mural thrombus on warfarin, RY gastric bypass, HTN, CKD, CAD, mixed cardiomyopathy, diet controlled DM, left bundle branch block admitted 7/30 with symptoms of melena. EGD 7/30 showed likely malignant gastric tumor. Path pending.     1. Melena. 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 Lymphoma suspected. Path still pending. Likely source of melena. HGB drifting down since admit but overall stable. Melena appears to have resolved.   --Monitor HGB and transfuse prn.   --Await path, hopeful for results today.   --ADAT.   --Ok to continue IV PPI BID.   --Pending biopsy results, oncology consult may be indicated.     D/w Dr. Xiong.     Arlene Foster PAGRAYSON  [facility]

## 2018-08-01 NOTE — CONSULTS
[facility] Consultation      Gibsno Villalta MRN# [id number]   YOB: 1940 Age: 77 year old   Date of Admission: 7/30/2018  Requesting physician: Suresh Mireles MD  Reason for consult: Gastric mass.           Assessment and Plan:   77 year old male with history of dementia, RV and LV mural thrombus on chronic anticoagulation with Coumadin, s/p Smita-en-Y gastric bypass, hypertension, chronic kidney disease, CAD, cardiomyopathy with EF 25-30%, diabetes who presents with rectal bleeding/melena and now found to have gastric mass and bladder lesions.    1. Gastric mass  2. Bladder lesions  - Biopsied and preliminarily reported as negative for adenocarcinoma but possible lymphoma; not definitive.  - 7/30/18 CT A/P with contrast showed thickening in the stomach near gastric bypass line and two enhancing bladder lesions.  - Recommend repeat EGD with biopsy for more tissue for diagnostic purposes.  If lymphoma confirmed, will proceed with remaining staging workup (such as outpatient PET/CT, bone marrow biopsy, etc.).  - Bladder lesions concerning for malignancy -- note Urology outpatient follow-up with cystoscopy. and biopsies.    3. Melena/rectal bleeding  4. Anemia related to blood loss  5. History of long-term anticoagulation for RV and LV mural thrombus  - Had one dark stool today.  Hgb overall relatively stable.  Has not required transfusion.  - Recommend continuing to hold anticoagulation as patient will need repeat EGD and cystoscopy with biopsies.  - Recommend post-discharge CBC follow-up.    Thank you for the consult request. If patient undergoes outpatient repeat EGD, will arrange outpatient follow-up with me post-EGD and repeat biopsies.    Maribel Bender MD             Chief Complaint:   Rectal Bleeding (Patient states being awake since 2am.  Stomachache, bloating.  This morning 1 episode of black stool.  )           History of Present Illness:   This patient is a 77 year old male with  "history of dementia, RV and LV mural thrombus on chronic anticoagulation with Coumadin, s/p Smita-en-Y gastric bypass, hypertension, chronic kidney disease, CAD, cardiomyopathy with EF 25-30%, diabetes who presents with rectal bleeding/melena. On 18 admission, Hgb was 13.9, WBC 11.5, platelets 218,000.  Creatinine 1.23, BUN 29, liver enzymes normal, INR 3.56. 18 CT abdomen/pelvis with contrast showed postop changes of prior gastric bypass procedure, ill-defined soft tissue thickening in the proximal stomach, increased since 2016, no evidence for bowel obstruction, colitis, or diverticulitis; no free fluid; 2 bladder wall lesions enhancing, largest measuring 3.9 x 1.8 cm and smaller bladder wall lesion posteriorly near midline 2 x 1.4 cm; several left renal cysts; liver, gallbladder, spleen, adrenal glands, pancreas, and kidneys unremarkable.  18 EGD showed a medium-sized, fungating, partially circumferential mass with oozing and stigmata of recent bleeding on the greater curvature of the stomach; biopsies pending but preliminary was negative for adenocarcinoma but possible lymphoma; esophagus and jejunum normal; gastric bypass with normal-sized pouch and intact stable line.    Patient denies abdominal pain, fevers, chills, night sweats, unintentional weight loss, chest pain, dyspnea, hematuria, dysuria.         Physical Exam:   Vitals were reviewed  Blood pressure 137/80, pulse 71, temperature 97.5  F (36.4  C), temperature source Oral, resp. rate 16, height 1.854 m (6' 1\"), weight 102.3 kg (225 lb 8.5 oz), SpO2 97 %.  Temperatures:  Current - Temp: 97.5  F (36.4  C); Max - Temp  Av.7  F (36.5  C)  Min: 97.4  F (36.3  C)  Max: 98.1  F (36.7  C)  Respiration range: Resp  Av.7  Min: 16  Max: 18  Pulse range: Pulse  Av.3  Min: 71  Max: 93  Blood pressure range: Systolic (24hrs), Av , Min:123 , Max:146   ; Diastolic (24hrs), Av, Min:77, Max:89    Pulse oximetry range: SpO2  Avg: " 95.8 %  Min: 94 %  Max: 97 %    Intake/Output Summary (Last 24 hours) at 08/01/18 1240  Last data filed at 08/01/18 0900   Gross per 24 hour   Intake              240 ml   Output             1175 ml   Net             -935 ml       GENERAL: No acute distress.  SKIN: No rashes or jaundice. Pacemaker in place over left upper chest wall.  HEENT: Normocephalic, atraumatic. Eyes anicteric. Oropharynx is grossly clear.  LYMPH: No palpable lymphadenopathy in the cervical, supraclavicular, axillary, or inguinal regions.  HEART: Regular rate and rhythm with no murmurs.  LUNGS: Clear bilaterally.  ABDOMEN: Soft, nontender, nondistended with no palpable hepatosplenomegaly or masses.  EXTREMITIES: No clubbing, cyanosis, or edema.  MENTAL: Alert and oriented to person.  NEURO: Cranial nerves II through XII grossly intact.            Past Medical History:   I have reviewed this patient's past medical history  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     Myocardial infarction 1995 and 2010     Nonischemic cardiomyopathy (H) 7/22/2016     Obesity      Rosacea      Rotator cuff disorder     Tear x 2     RV (right ventricular) mural thrombus 7/22/2016             Past Surgical History:   I have reviewed this patient's past surgical history  Past Surgical History:   Procedure Laterality Date     ANGIOPLASTY  1995 and 2010 with stent     ESOPHAGOSCOPY, GASTROSCOPY, DUODENOSCOPY (EGD), COMBINED N/A 7/30/2018    Procedure: COMBINED ESOPHAGOSCOPY, GASTROSCOPY, DUODENOSCOPY (EGD), BIOPSY SINGLE OR MULTIPLE;  gastroscopy;  Surgeon: Parish Xiong MD;  Location: [facility]     ESOPHAGOSCOPY, GASTROSCOPY,  DUODENOSCOPY (EGD), COMBINED N/A 2018    Procedure: COMBINED ESOPHAGOSCOPY, GASTROSCOPY, DUODENOSCOPY (EGD);  EGD;  Surgeon: Parish Xiong MD;  Location:  GI     GASTRIC BYPASS       HEART CATH LEFT HEART CATH  16    Non ischemic cardiomyopathy; Non functionally significant LAD and RCA disease      OTHER SURGICAL HISTORY      Spinal surgery     OTHER SURGICAL HISTORY  2016    CRT-D implantation               Social History:   I have reviewed this patient's social history  Social History   Substance Use Topics     Smoking status: Former Smoker     Packs/day: 2.00     Years: 8.00     Types: Cigarettes     Quit date: 1980     Smokeless tobacco: Never Used      Comment: Quit in his 30s - 2 ppd for 8 years     Alcohol use 0.0 oz/week     0 Standard drinks or equivalent per week      Comment: maybe 1 beer a month             Family History:   I have reviewed this patient's family history  Family History   Problem Relation Age of Onset     Coronary Artery Disease Father 39     Alzheimer Disease Mother      Coronary Artery Disease Son      Two sons have  from MIs (ages 39 and 51)   Negative for malignancy.          Allergies:     Allergies   Allergen Reactions     Wasps [Hornets]      Sand wasp --- years ago.               Medications:   I have reviewed this patient's current medications  Prescriptions Prior to Admission   Medication Sig Dispense Refill Last Dose     atorvastatin (LIPITOR) 40 MG tablet Take 1 tablet (40 mg) by mouth At Bedtime 90 tablet 1 2018 at pm     COENZYME Q-10 PO Take 200 mg by mouth daily    2018 at am     ferrous sulfate (IRON) 325 (65 FE) MG tablet Take 325 mg by mouth daily   2018 at am     furosemide (LASIX) 20 MG tablet Take 0.5 tablets (10 mg) by mouth daily 45 tablet 1 2018 at am     hydrALAZINE (APRESOLINE) 10 MG tablet Take 1 tablet (10 mg) by mouth 3 times daily 270 tablet 1 2018 at Unknown time     isosorbide mononitrate (IMDUR)  30 MG 24 hr tablet Take 0.5 tablets (15 mg) by mouth daily 45 tablet 1 7/29/2018 at am     metoprolol succinate (TOPROL-XL) 50 MG 24 hr tablet Take 1 tablet (50 mg) by mouth daily (Patient taking differently: Take 50 mg by mouth every evening ) 90 tablet 1 7/29/2018 at pm     multivitamin, therapeutic with minerals (MULTI-VITAMIN) TABS Take 1 tablet by mouth daily   7/29/2018 at am     venlafaxine (EFFEXOR) 75 MG tablet TAKE 1 TABLET TWICE DAILY 180 tablet 1 7/29/2018 at Unknown time     vitamin D (ERGOCALCIFEROL) 38966 UNIT capsule TAKE 1 CAPSULE ONCE A WEEK 12 capsule 1 7/24/2018     WARFARIN SODIUM PO Take 4 mg by mouth three times a week Sun, Tues, Thurs 7/29/2018 at pm     WARFARIN SODIUM PO Take 6 mg by mouth four times a week Mon, Wed, Fri, Sat   7/28/2018 at pm     ACE/ARB NOT PRESCRIBED, INTENTIONAL, ACE & ARB not prescribed due to Symptomatic hypotension not due to excessive diuresis   Taking     ASPIRIN NOT PRESCRIBED (INTENTIONAL) Antiplatelet medication not prescribed intentionally due to Current anticoagulant therapy (warfarin/enoxaparin) 0 each 0 Taking     Current Facility-Administered Medications Ordered in Epic   Medication Dose Route Frequency Last Rate Last Dose     atorvastatin (LIPITOR) tablet 40 mg  40 mg Oral At Bedtime   40 mg at 07/31/18 2141     furosemide (LASIX) half-tab 10 mg  10 mg Oral Daily   10 mg at 08/01/18 0857     hydrALAZINE (APRESOLINE) tablet 10 mg  10 mg Oral TID   10 mg at 08/01/18 0857     isosorbide mononitrate (IMDUR) 24 hr half-tab 15 mg  15 mg Oral Daily   15 mg at 08/01/18 0856     melatonin tablet 1 mg  1 mg Oral At Bedtime PRN   1 mg at 07/30/18 2151     metoprolol succinate (TOPROL-XL) 24 hr tablet 50 mg  50 mg Oral QPM   50 mg at 07/31/18 2021     naloxone (NARCAN) injection 0.1-0.4 mg  0.1-0.4 mg Intravenous Q2 Min PRN         ondansetron (ZOFRAN-ODT) ODT tab 4 mg  4 mg Oral Q6H PRN        Or     ondansetron (ZOFRAN) injection 4 mg  4 mg Intravenous Q6H PRN          ORAL Pain Medications -  may administer as ordered by surgeon for take home use   Does not apply Continuous PRN         pantoprazole (PROTONIX) 40 mg IV push injection  40 mg Intravenous Daily with breakfast   40 mg at 08/01/18 0856     venlafaxine (EFFEXOR) tablet 75 mg  75 mg Oral BID   75 mg at 08/01/18 0856     No current [facility]-ordered outpatient prescriptions on file.             Review of Systems:   The 10 point Review of Systems is negative other than noted in the HPI.            Data:   All laboratory data reviewed  Results for orders placed or performed during the hospital encounter of 07/30/18 (from the past 24 hour(s))   UA without Microscopic   Result Value Ref Range    Color Urine Light Yellow     Appearance Urine Clear     Glucose Urine Negative NEG^Negative mg/dL    Bilirubin Urine Negative NEG^Negative    Ketones Urine Negative NEG^Negative mg/dL    Specific Gravity Urine 1.014 1.003 - 1.035    Blood Urine Moderate (A) NEG^Negative    pH Urine 5.0 5.0 - 7.0 pH    Protein Albumin Urine Negative NEG^Negative mg/dL    Urobilinogen mg/dL Normal 0.0 - 2.0 mg/dL    Nitrite Urine Negative NEG^Negative    Leukocyte Esterase Urine Negative NEG^Negative    Source Midstream Urine    Glucose by meter   Result Value Ref Range    Glucose 173 (H) 70 - 99 mg/dL   Hemoglobin   Result Value Ref Range    Hemoglobin 10.9 (L) 13.3 - 17.7 g/dL   Glucose by meter   Result Value Ref Range    Glucose 112 (H) 70 - 99 mg/dL   Hemoglobin   Result Value Ref Range    Hemoglobin 10.7 (L) 13.3 - 17.7 g/dL   Glucose by meter   Result Value Ref Range    Glucose 94 70 - 99 mg/dL

## 2018-08-01 NOTE — PLAN OF CARE
Problem: Patient Care Overview  Goal: Plan of Care/Patient Progress Review  Outcome: No Change  VSS, O2 wnl RA. A&Ox4. SBA/FR. NPO for repeat EGD tomorrow. 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 Tele 100% Vpaced. IVSL. Denies pain. Checking hgb q12h, Hgb 11.1 (10.7 0600). Large dark red BM earlier today, no other signs of active bleeding. Hem/Onc and GI following. RN will cont to monitor.

## 2018-08-01 NOTE — PROGRESS NOTES
"GASTROENTEROLOGY PROGRESS NOTE       SUBJECTIVE:  feels fine-eating well     OBJECTIVE:  /80  Pulse 71  Temp 97.5  F (36.4  C) (Oral)  Resp 16  Ht 1.854 m (6' 1\")  Wt 102.3 kg (225 lb 8.5 oz)  SpO2 97%  BMI 29.76 kg/m2  Temp (24hrs), Av.7  F (36.5  C), Min:97.4  F (36.3  C), Max:98.1  F (36.7  C)    Patient Vitals for the past 72 hrs:   Weight   18 0707 102.3 kg (225 lb 8.5 oz)   18 0629 102.7 kg (226 lb 6.6 oz)   18 1157 98.9 kg (218 lb)   18 0712 98.9 kg (218 lb)       Intake/Output Summary (Last 24 hours) at 18 1133  Last data filed at 18 0900   Gross per 24 hour   Intake              240 ml   Output             1175 ml   Net             -935 ml                 Additional Comments:  ROS, FH, SH: See initial GI consult for details.     I have reviewed the patient's new clinical lab results:     Recent Labs   Lab Test  18   0607  18   1825  18   1035  18   0627   18   0750 18   1416   WBC   --    --    --   17.3*   --   11.5*   --    --   7.7   HGB  10.7*  10.9*   --   11.3*   < >  13.9   --    --   15.1   MCV   --    --    --   95   --   95   --    --   97   PLT   --    --    --   180   --   218   --    --   203   INR   --    --   1.21*   --    --   3.56*  3.0*   < >   --     < > = values in this interval not displayed.     Recent Labs   Lab Test  18   0627  18   0750  17   1416   POTASSIUM  4.0  3.7  4.0   CHLORIDE  107  105  107   CO2  27  24  23   BUN  33*  29  18   ANIONGAP  5  9  9     Recent Labs   Lab Test  18   1325  18   0750  17   1416  17   0817   16   1745   16   2120   ALBUMIN   --   3.4  3.6  3.8   < >   --    < >   --    BILITOTAL   --   0.4  0.4  0.6   < >   --    < >   --    ALT   --   40  51  50   < >   --    < >   --    AST   --   27  32  Unsatisfactory specimen - hemolyzed   < >   --    < >   --    PROTEIN  Negative   --    --    --    --   " Negative   --   Negative   LIPASE   --   105   --    --    --    --    --    --     < > = values in this interval not displayed.           Active Problems:    Melena    Assessment: GI    Biopsy of stomach---> probable lymphoma, but Path hesitant to make definitive call.  Lymphoma could account for anemia as well. Discussed with HSx and [facility]On[facility]. Mn Onc to see. If more tissue needed, we can re-scope. Pt and wife anxious to go home and finish W/U as OP.    Plan: as above        Parish Xiong MD  [facility]  Office:  [phone]  Pager:  [phone] M-Th 8am to 5pm, Fri 8am to 4pm

## 2018-08-01 NOTE — PROGRESS NOTES
ONC note seen  Discussed with pt/wife  Will repeat EGD tomorrow    Parish Xiong MD  [facility]   Office: [phone]   Pager: [phone]  Monday-Thursday 8am to 5pm, Fridays 8am to 4pm

## 2018-08-01 NOTE — PLAN OF CARE
Problem: Patient Care Overview  Goal: Plan of Care/Patient Progress Review  Outcome: No Change  A&O x 4, sometimes forgetful. VSS on RA, except BP can run high. Patient is on continuous Pulse Oximetry. VS q4h. Tele 100% V paced. Up SBA. 0200 Blood Glucose was 112. Denies pain. Hgb is being checked q12h, as it is decreasing, orders for Hgb less than 8. IVSL. Biopsy results are pending. Surgery and Oncology may be consulted after return of biopsy results. Will continue to monitor.

## 2018-08-01 NOTE — PROGRESS NOTES
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 Biopsied.; biopsies pending  ; per Dr Xiong, prelim results suggestive of lymphoma; awaiting final pathology  - consulted oncology, suggests repeat EGD with biopsy for more tissue for diagnostic purpose; GI following  - Continues to have intermittent melanotic stool  - Hb 13.9---12.5---11.3---10.9---10.7; slowly trending down but hopefully stabilizing; hemodynamically stable  - monitor Hb q 12 hrs and transfuse prn for Hb <8  - got Vitamin K for INR 3.56---->1.21; coumadin held;   - continue protonix IV daily  - discontinued IV fluids    2.  Bladder wall lesions.  A concern for possible neoplasm.  The patient has no known history of a bladder malignancy and denies any recent symptoms of hematuria.  .   -  s/p urology evaluation, suggested \"outpatient follow up with us and cysto to fully evaluate the interior of the bladder to determine if he requires biopsies/resection, etc\"    3.  History of a RV and LV mural thrombus.  Initially identified on a cardiac MRI in 2016.; PTA on coumadin; INR reversed due to suspected GI bleed ;   -  coumadin currently on hold; oncology suggests holding off " "on anticoagulation as will require repeat EGD/cystoscopy  - when ready for discharge we probably will need to discuss with cardiology to see how long can we safely withhold the anticoagulation     4.  Cardiomyopathy with reduced ejection fraction  5.  Coronary artery disease with prior MI.  6.  Hypertension  -  Last echocardiogram in 05/2018 showed ejection fraction of 25-30%.  - Currently appears euvolemic  -  We will continue prior to admission Toprol-XL (50 mg daily), Lasix 10 mg daily and Imdur     7.  Type 2 diabetes mellitus, diet-controlled.   -   repeat A1c 7.1.   -  We will monitor blood sugars with sliding scale insulin p.r.n.    8.  Chronic kidney disease.  His baseline creatinine appears to be around 1.3-1.5.  It was 1.2 on admission:   -  Avoid nephrotoxins.   -  Follow BMP.     9.  Dementia.  He requires 24-hour assistance.  No longer drives.  Per wife, orientation waxes and wanes; currently seems stable alert oriented     10. Leukocytosis. Unclear significance. No fever. Wbc 11---17; UA unremarkable; CT abdomen apart from the gastric and bladder lesions is unremarkable; no respiratory symptoms; repeat CBC in a.m.       CODE STATUS:  The patient is FULL CODE.      Disposition: Likely in 1-2 days pending stable hemoglobin, further plans for GI/Oncology workup and final biopsy results     # Pain Assessment:  Current Pain Score 8/1/2018   Patient currently in pain? denies   Pain score (0-10) -   Pain location -   Gibson s pain level was assessed and he currently denies pain.                         Physical Exam:      Blood pressure 137/80, pulse 71, temperature 97.5  F (36.4  C), temperature source Oral, resp. rate 16, height 1.854 m (6' 1\"), weight 102.3 kg (225 lb 8.5 oz), SpO2 97 %.  Vitals:    07/30/18 1157 07/31/18 0629 08/01/18 0707   Weight: 98.9 kg (218 lb) 102.7 kg (226 lb 6.6 oz) 102.3 kg (225 lb 8.5 oz)     Vital Signs with Ranges  Temp:  [97.4  F (36.3  C)-98.1  F (36.7  C)] 97.5  F (36.4 "  C)  Pulse:  [71-93] 71  Heart Rate:  [77-93] 77  Resp:  [16-18] 16  BP: (123-146)/(77-89) 137/80  SpO2:  [94 %-97 %] 97 %  I/O's Last 24 hours  I/O last 3 completed shifts:  In: 754 [I.V.:754]  Out: 1250 [Urine:1250]    Constitutional: Alert, awake and orienetd X 3; lying comfortably in bed in no apparent distress   HEENT: Pupils equal and reactive to light and accomodation, EOMI intact; neck supple no raised JVD or rigidity    Oral cavity: Moist mucosa   Cardiovascular: Normal s1 s2, regular rate and rhythm, no murmur   Lungs: B/l clear to auscultation, no wheezes or crepitations   Abdomen: Soft, nt, nd, no guarding, rigidity or rebound; BS +   LE : No edema   Musculoskeletal: Power 5/5 in all extremities   Neuro: No focal neurological deficits noted, CN II to XII grossly intact   Psychiatry: normal mood and affect                Medications:          atorvastatin  40 mg Oral At Bedtime     furosemide  10 mg Oral Daily     hydrALAZINE  10 mg Oral TID     isosorbide mononitrate  15 mg Oral Daily     metoprolol succinate  50 mg Oral QPM     pantoprazole (PROTONIX) IV  40 mg Intravenous Daily with breakfast     venlafaxine  75 mg Oral BID     PRN Meds: melatonin, naloxone, ondansetron **OR** ondansetron, - MEDICATION INSTRUCTIONS -         Data:      All new lab and imaging data was reviewed.   Recent Labs   Lab Test  08/01/18   0607  07/31/18   1825  07/31/18   1035  07/31/18   0627   07/30/18   0750 07/11/18 08/25/17   1416   WBC   --    --    --   17.3*   --   11.5*   --    --   7.7   HGB  10.7*  10.9*   --   11.3*   < >  13.9   --    --   15.1   MCV   --    --    --   95   --   95   --    --   97   PLT   --    --    --   180   --   218   --    --   203   INR   --    --   1.21*   --    --   3.56*  3.0*   < >   --     < > = values in this interval not displayed.      Recent Labs   Lab Test  07/31/18   0627  07/30/18   0750  08/25/17   1416   NA  139  138  139   POTASSIUM  4.0  3.7  4.0   CHLORIDE  107  105  107    CO2  27  24  23   BUN  33*  29  18   CR  1.29*  1.23  1.34*   ANIONGAP  5  9  9   SHELLEY  8.3*  8.5  8.9   GLC  129*  152*  116*     Recent Labs   Lab Test  07/23/16   2105  07/23/16   1712  07/23/16   1312   TROPI  0.355*  0.338*  0.407*

## 2018-08-01 NOTE — PLAN OF CARE
Problem: Patient Care Overview  Goal: Plan of Care/Patient Progress Review  Outcome: No Change  VSS. O2 96% on RA. Tele 100% V paced. UW SBA. A&O x4. Denies pain/n/v. Uses urinal. One dark red medium stool. IV saline locked. Hgb 10.7 (7/31/2018), down from 10.9 (7/30/2018). Ate full liquid lunch. NPO @ 6440. EGD scheduled for 8/2/18. Oncology/GI/Urology following. Spouse at bedside.

## 2018-08-02 ENCOUNTER — ANESTHESIA EVENT (OUTPATIENT)
Dept: GASTROENTEROLOGY | Facility: CLINIC | Age: 78
DRG: 841 | End: 2018-08-02
Payer: MEDICARE

## 2018-08-02 ENCOUNTER — ANESTHESIA (OUTPATIENT)
Dept: GASTROENTEROLOGY | Facility: CLINIC | Age: 78
DRG: 841 | End: 2018-08-02
Payer: MEDICARE

## 2018-08-02 VITALS
HEART RATE: 71 BPM | WEIGHT: 223.55 LBS | DIASTOLIC BLOOD PRESSURE: 76 MMHG | SYSTOLIC BLOOD PRESSURE: 123 MMHG | BODY MASS INDEX: 29.63 KG/M2 | RESPIRATION RATE: 16 BRPM | HEIGHT: 73 IN | OXYGEN SATURATION: 95 % | TEMPERATURE: 97.8 F

## 2018-08-02 LAB
ANION GAP SERPL CALCULATED.3IONS-SCNC: 9 MMOL/L (ref 3–14)
BUN SERPL-MCNC: 29 MG/DL (ref 7–30)
CALCIUM SERPL-MCNC: 8.3 MG/DL (ref 8.5–10.1)
CHLORIDE SERPL-SCNC: 103 MMOL/L (ref 94–109)
CO2 SERPL-SCNC: 26 MMOL/L (ref 20–32)
CREAT SERPL-MCNC: 1.23 MG/DL (ref 0.66–1.25)
ERYTHROCYTE [DISTWIDTH] IN BLOOD BY AUTOMATED COUNT: 12.9 % (ref 10–15)
GFR SERPL CREATININE-BSD FRML MDRD: 57 ML/MIN/1.7M2
GLUCOSE BLDC GLUCOMTR-MCNC: 104 MG/DL (ref 70–99)
GLUCOSE BLDC GLUCOMTR-MCNC: 109 MG/DL (ref 70–99)
GLUCOSE SERPL-MCNC: 91 MG/DL (ref 70–99)
HCT VFR BLD AUTO: 34.9 % (ref 40–53)
HGB BLD-MCNC: 11.4 G/DL (ref 13.3–17.7)
MCH RBC QN AUTO: 31.3 PG (ref 26.5–33)
MCHC RBC AUTO-ENTMCNC: 32.7 G/DL (ref 31.5–36.5)
MCV RBC AUTO: 96 FL (ref 78–100)
PLATELET # BLD AUTO: 185 10E9/L (ref 150–450)
POTASSIUM SERPL-SCNC: 3.7 MMOL/L (ref 3.4–5.3)
RBC # BLD AUTO: 3.64 10E12/L (ref 4.4–5.9)
SODIUM SERPL-SCNC: 138 MMOL/L (ref 133–144)
UPPER GI ENDOSCOPY: NORMAL
WBC # BLD AUTO: 9.2 10E9/L (ref 4–11)

## 2018-08-02 PROCEDURE — 88342 IMHCHEM/IMCYTCHM 1ST ANTB: CPT | Mod: 26 | Performed by: INTERNAL MEDICINE

## 2018-08-02 PROCEDURE — 0DB68ZX EXCISION OF STOMACH, VIA NATURAL OR ARTIFICIAL OPENING ENDOSCOPIC, DIAGNOSTIC: ICD-10-PCS | Performed by: INTERNAL MEDICINE

## 2018-08-02 PROCEDURE — 88305 TISSUE EXAM BY PATHOLOGIST: CPT | Mod: 26 | Performed by: INTERNAL MEDICINE

## 2018-08-02 PROCEDURE — 25000128 H RX IP 250 OP 636: Performed by: NURSE ANESTHETIST, CERTIFIED REGISTERED

## 2018-08-02 PROCEDURE — 25000132 ZZH RX MED GY IP 250 OP 250 PS 637: Mod: GY | Performed by: INTERNAL MEDICINE

## 2018-08-02 PROCEDURE — 88185 FLOWCYTOMETRY/TC ADD-ON: CPT | Performed by: INTERNAL MEDICINE

## 2018-08-02 PROCEDURE — 00000146 ZZHCL STATISTIC GLUCOSE BY METER IP

## 2018-08-02 PROCEDURE — 40001004 ZZHCL STATISTIC FLOW INT 9-15 ABY TC 88188: Performed by: INTERNAL MEDICINE

## 2018-08-02 PROCEDURE — 43239 EGD BIOPSY SINGLE/MULTIPLE: CPT | Performed by: INTERNAL MEDICINE

## 2018-08-02 PROCEDURE — 40000010 ZZH STATISTIC ANES STAT CODE-CRNA PER MINUTE: Performed by: INTERNAL MEDICINE

## 2018-08-02 PROCEDURE — 88342 IMHCHEM/IMCYTCHM 1ST ANTB: CPT | Performed by: INTERNAL MEDICINE

## 2018-08-02 PROCEDURE — 00000160 ZZHCL STATISTIC H-SPECIAL HANDLING: Performed by: INTERNAL MEDICINE

## 2018-08-02 PROCEDURE — 37000008 ZZH ANESTHESIA TECHNICAL FEE, 1ST 30 MIN: Performed by: INTERNAL MEDICINE

## 2018-08-02 PROCEDURE — 99239 HOSP IP/OBS DSCHRG MGMT >30: CPT | Performed by: INTERNAL MEDICINE

## 2018-08-02 PROCEDURE — 80048 BASIC METABOLIC PNL TOTAL CA: CPT | Performed by: HOSPITALIST

## 2018-08-02 PROCEDURE — 88184 FLOWCYTOMETRY/ TC 1 MARKER: CPT | Performed by: INTERNAL MEDICINE

## 2018-08-02 PROCEDURE — 36415 COLL VENOUS BLD VENIPUNCTURE: CPT | Performed by: HOSPITALIST

## 2018-08-02 PROCEDURE — 88161 CYTOPATH SMEAR OTHER SOURCE: CPT | Mod: 26 | Performed by: INTERNAL MEDICINE

## 2018-08-02 PROCEDURE — A9270 NON-COVERED ITEM OR SERVICE: HCPCS | Mod: GY | Performed by: INTERNAL MEDICINE

## 2018-08-02 PROCEDURE — A9270 NON-COVERED ITEM OR SERVICE: HCPCS | Mod: GY | Performed by: PHYSICIAN ASSISTANT

## 2018-08-02 PROCEDURE — 25000132 ZZH RX MED GY IP 250 OP 250 PS 637: Mod: GY | Performed by: PHYSICIAN ASSISTANT

## 2018-08-02 PROCEDURE — 99222 1ST HOSP IP/OBS MODERATE 55: CPT | Performed by: INTERNAL MEDICINE

## 2018-08-02 PROCEDURE — 85027 COMPLETE CBC AUTOMATED: CPT | Performed by: HOSPITALIST

## 2018-08-02 PROCEDURE — 25000125 ZZHC RX 250: Performed by: NURSE ANESTHETIST, CERTIFIED REGISTERED

## 2018-08-02 PROCEDURE — 25000125 ZZHC RX 250: Performed by: PHYSICIAN ASSISTANT

## 2018-08-02 PROCEDURE — 88305 TISSUE EXAM BY PATHOLOGIST: CPT | Performed by: INTERNAL MEDICINE

## 2018-08-02 PROCEDURE — 00000159 ZZHCL STATISTIC H-SEND OUTS PREP: Performed by: INTERNAL MEDICINE

## 2018-08-02 PROCEDURE — 88161 CYTOPATH SMEAR OTHER SOURCE: CPT | Performed by: INTERNAL MEDICINE

## 2018-08-02 RX ORDER — ACETAMINOPHEN 650 MG/1
650 SUPPOSITORY RECTAL EVERY 4 HOURS PRN
Status: DISCONTINUED | OUTPATIENT
Start: 2018-08-02 | End: 2018-08-02 | Stop reason: HOSPADM

## 2018-08-02 RX ORDER — PROPOFOL 10 MG/ML
INJECTION, EMULSION INTRAVENOUS CONTINUOUS PRN
Status: DISCONTINUED | OUTPATIENT
Start: 2018-08-02 | End: 2018-08-02

## 2018-08-02 RX ORDER — LIDOCAINE HYDROCHLORIDE 20 MG/ML
INJECTION, SOLUTION INFILTRATION; PERINEURAL PRN
Status: DISCONTINUED | OUTPATIENT
Start: 2018-08-02 | End: 2018-08-02

## 2018-08-02 RX ORDER — LIDOCAINE 40 MG/G
CREAM TOPICAL
Status: DISCONTINUED | OUTPATIENT
Start: 2018-08-02 | End: 2018-08-02 | Stop reason: HOSPADM

## 2018-08-02 RX ORDER — GLYCOPYRROLATE 0.2 MG/ML
INJECTION, SOLUTION INTRAMUSCULAR; INTRAVENOUS PRN
Status: DISCONTINUED | OUTPATIENT
Start: 2018-08-02 | End: 2018-08-02

## 2018-08-02 RX ORDER — ACETAMINOPHEN 325 MG/1
650 TABLET ORAL EVERY 4 HOURS PRN
Status: DISCONTINUED | OUTPATIENT
Start: 2018-08-02 | End: 2018-08-02 | Stop reason: HOSPADM

## 2018-08-02 RX ORDER — SODIUM CHLORIDE, SODIUM LACTATE, POTASSIUM CHLORIDE, CALCIUM CHLORIDE 600; 310; 30; 20 MG/100ML; MG/100ML; MG/100ML; MG/100ML
INJECTION, SOLUTION INTRAVENOUS CONTINUOUS PRN
Status: DISCONTINUED | OUTPATIENT
Start: 2018-08-02 | End: 2018-08-02

## 2018-08-02 RX ADMIN — VENLAFAXINE HYDROCHLORIDE 75 MG: 75 TABLET ORAL at 09:29

## 2018-08-02 RX ADMIN — PHENYLEPHRINE HYDROCHLORIDE 100 MCG: 10 INJECTION, SOLUTION INTRAMUSCULAR; INTRAVENOUS; SUBCUTANEOUS at 12:33

## 2018-08-02 RX ADMIN — PHENYLEPHRINE HYDROCHLORIDE 100 MCG: 10 INJECTION, SOLUTION INTRAMUSCULAR; INTRAVENOUS; SUBCUTANEOUS at 12:22

## 2018-08-02 RX ADMIN — GLYCOPYRROLATE 0.2 MG: 0.2 INJECTION, SOLUTION INTRAMUSCULAR; INTRAVENOUS at 12:10

## 2018-08-02 RX ADMIN — Medication 15 MG: at 09:29

## 2018-08-02 RX ADMIN — PANTOPRAZOLE SODIUM 40 MG: 40 INJECTION, POWDER, FOR SOLUTION INTRAVENOUS at 09:03

## 2018-08-02 RX ADMIN — PROPOFOL 150 MCG/KG/MIN: 10 INJECTION, EMULSION INTRAVENOUS at 12:11

## 2018-08-02 RX ADMIN — ACETAMINOPHEN 650 MG: 325 TABLET, FILM COATED ORAL at 09:56

## 2018-08-02 RX ADMIN — SODIUM CHLORIDE, POTASSIUM CHLORIDE, SODIUM LACTATE AND CALCIUM CHLORIDE: 600; 310; 30; 20 INJECTION, SOLUTION INTRAVENOUS at 12:10

## 2018-08-02 RX ADMIN — Medication 10 MG: at 09:29

## 2018-08-02 RX ADMIN — PHENYLEPHRINE HYDROCHLORIDE 100 MCG: 10 INJECTION, SOLUTION INTRAMUSCULAR; INTRAVENOUS; SUBCUTANEOUS at 12:28

## 2018-08-02 RX ADMIN — HYDRALAZINE HYDROCHLORIDE 10 MG: 10 TABLET ORAL at 09:29

## 2018-08-02 RX ADMIN — LIDOCAINE HYDROCHLORIDE 100 MG: 20 INJECTION, SOLUTION INFILTRATION; PERINEURAL at 12:17

## 2018-08-02 RX ADMIN — HYDRALAZINE HYDROCHLORIDE 10 MG: 10 TABLET ORAL at 16:18

## 2018-08-02 ASSESSMENT — LIFESTYLE VARIABLES: TOBACCO_USE: 0

## 2018-08-02 ASSESSMENT — COPD QUESTIONNAIRES: COPD: 0

## 2018-08-02 ASSESSMENT — ACTIVITIES OF DAILY LIVING (ADL)
ADLS_ACUITY_SCORE: 14
ADLS_ACUITY_SCORE: 12

## 2018-08-02 ASSESSMENT — ENCOUNTER SYMPTOMS
SEIZURES: 0
DYSRHYTHMIAS: 0

## 2018-08-02 NOTE — PROGRESS NOTES
GI Attending  EGD was repeated with numerous biopsies of ulcerated mass along greater curvature were taken. Will await biopsy results.  As per Heme/Onc: OK for discharge today.    Jonathon Bush MD  [facility]  [phone] Cell  [phone]  Office

## 2018-08-02 NOTE — ANESTHESIA PREPROCEDURE EVALUATION
Procedure: Procedure(s):  COMBINED ESOPHAGOSCOPY, GASTROSCOPY, DUODENOSCOPY (EGD)  Preop diagnosis: GI BLEED    Allergies   Allergen Reactions     Wasps [Hornets]      Sand wasp --- years ago.       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     Myocardial infarction 1995 and 2010     Nonischemic cardiomyopathy (H) 7/22/2016     Obesity      Rosacea      Rotator cuff disorder     Tear x 2     RV (right ventricular) mural thrombus 7/22/2016     Past Surgical History:   Procedure Laterality Date     ANGIOPLASTY  1995 and 2010 with stent     ESOPHAGOSCOPY, GASTROSCOPY, DUODENOSCOPY (EGD), COMBINED N/A 7/30/2018    Procedure: COMBINED ESOPHAGOSCOPY, GASTROSCOPY, DUODENOSCOPY (EGD), BIOPSY SINGLE OR MULTIPLE;  gastroscopy;  Surgeon: Parish Xiong MD;  Location:  GI     ESOPHAGOSCOPY, GASTROSCOPY, DUODENOSCOPY (EGD), COMBINED N/A 7/30/2018    Procedure: COMBINED ESOPHAGOSCOPY, GASTROSCOPY, DUODENOSCOPY (EGD);  EGD;  Surgeon: Parish Xiong MD;  Location:  GI     GASTRIC BYPASS  2001     HEART CATH LEFT HEART CATH  7/19/16    Non ischemic cardiomyopathy; Non functionally significant LAD and RCA disease      OTHER SURGICAL HISTORY  2006    Spinal surgery     OTHER SURGICAL HISTORY  Nov 2016    CRT-D implantation     Prior to Admission medications    Medication Sig Start Date End Date Taking? Authorizing Provider   atorvastatin (LIPITOR) 40 MG tablet Take 1 tablet (40 mg) by mouth At Bedtime 2/15/18  Yes Flori Reyes,    COENZYME Q-10 PO Take 200 mg by mouth daily    Yes Reported, Patient   ferrous sulfate (IRON) 325 (65 FE) MG tablet Take 325 mg by mouth daily   Yes Unknown,  Entered By History   furosemide (LASIX) 20 MG tablet Take 0.5 tablets (10 mg) by mouth daily 2/15/18  Yes Flori Reyes DO   hydrALAZINE (APRESOLINE) 10 MG tablet Take 1 tablet (10 mg) by mouth 3 times daily 1/29/18  Yes Josias Hernandez MD   isosorbide mononitrate (IMDUR) 30 MG 24 hr tablet Take 0.5 tablets (15 mg) by mouth daily 7/19/18  Yes Josias Hernandez MD   metoprolol succinate (TOPROL-XL) 50 MG 24 hr tablet Take 1 tablet (50 mg) by mouth daily  Patient taking differently: Take 50 mg by mouth every evening  2/15/18  Yes Flori Reyes DO   multivitamin, therapeutic with minerals (MULTI-VITAMIN) TABS Take 1 tablet by mouth daily   Yes Reported, Patient   venlafaxine (EFFEXOR) 75 MG tablet TAKE 1 TABLET TWICE DAILY 6/11/18  Yes Flori Reyes DO   vitamin D (ERGOCALCIFEROL) 21261 UNIT capsule TAKE 1 CAPSULE ONCE A WEEK 6/19/18  Yes Katelyn Akhtar, APRN CNP   WARFARIN SODIUM PO Take 4 mg by mouth three times a week Sun, Tues, Thurs   Yes Unknown, Entered By History   WARFARIN SODIUM PO Take 6 mg by mouth four times a week Mon, Wed, Fri, Sat   Yes Unknown, Entered By History   ACE/ARB NOT PRESCRIBED, INTENTIONAL, ACE & ARB not prescribed due to Symptomatic hypotension not due to excessive diuresis 9/29/16   Flori Reyes DO   ASPIRIN NOT PRESCRIBED (INTENTIONAL) Antiplatelet medication not prescribed intentionally due to Current anticoagulant therapy (warfarin/enoxaparin) 9/29/16   Flori Reyes DO     Current Facility-Administered Medications Ordered in Epic   Medication Dose Route Frequency Last Rate Last Dose     acetaminophen (TYLENOL) tablet 650 mg  650 mg Oral Q4H PRN   650 mg at 08/02/18 0956    Or     acetaminophen (TYLENOL) Suppository 650 mg  650 mg Rectal Q4H PRN         atorvastatin (LIPITOR) tablet 40 mg  40 mg Oral At Bedtime   40 mg at 08/01/18 2128     furosemide (LASIX) half-tab 10 mg  10 mg Oral Daily   10 mg at 08/02/18 0929     hydrALAZINE (APRESOLINE)  tablet 10 mg  10 mg Oral TID   10 mg at 08/02/18 0929     isosorbide mononitrate (IMDUR) 24 hr half-tab 15 mg  15 mg Oral Daily   15 mg at 08/02/18 0929     melatonin tablet 1 mg  1 mg Oral At Bedtime PRN   1 mg at 07/30/18 2151     metoprolol succinate (TOPROL-XL) 24 hr tablet 50 mg  50 mg Oral QPM   50 mg at 08/01/18 2015     naloxone (NARCAN) injection 0.1-0.4 mg  0.1-0.4 mg Intravenous Q2 Min PRN         ondansetron (ZOFRAN-ODT) ODT tab 4 mg  4 mg Oral Q6H PRN        Or     ondansetron (ZOFRAN) injection 4 mg  4 mg Intravenous Q6H PRN         ORAL Pain Medications -  may administer as ordered by surgeon for take home use   Does not apply Continuous PRN         pantoprazole (PROTONIX) 40 mg IV push injection  40 mg Intravenous Daily with breakfast   40 mg at 08/02/18 0903     venlafaxine (EFFEXOR) tablet 75 mg  75 mg Oral BID   75 mg at 08/02/18 0929     No current [facility]-ordered outpatient prescriptions on file.     Wt Readings from Last 1 Encounters:   08/02/18 101.4 kg (223 lb 8.7 oz)     Temp Readings from Last 1 Encounters:   08/02/18 36.4  C (97.6  F) (Oral)     BP Readings from Last 6 Encounters:   08/02/18 (!) 155/93   05/17/18 103/63   12/04/17 140/82   08/25/17 122/82   05/11/17 128/88   04/25/17 106/74     Pulse Readings from Last 4 Encounters:   08/01/18 71   05/17/18 91   08/25/17 84   05/11/17 82     Resp Readings from Last 1 Encounters:   08/02/18 16     SpO2 Readings from Last 1 Encounters:   08/02/18 96%     Recent Labs   Lab Test  07/30/18   0750  08/25/17   1416   NA  138  139   POTASSIUM  3.7  4.0   CHLORIDE  105  107   CO2  24  23   ANIONGAP  9  9   GLC  152*  116*   BUN  29  18   CR  1.23  1.34*   SHELLEY  8.5  8.9     Recent Labs   Lab Test  07/30/18   0750  08/25/17   1416   WBC  11.5*  7.7   HGB  13.9  15.1   PLT  218  203     Recent Labs   Lab Test  07/30/18   0750 07/11/18   INR  3.56*  3.0*      RECENT LABS:   ECG:   ECHO:   CXR:        Anesthesia Evaluation     .             ROS/MED  HX    ENT/Pulmonary:      (-) tobacco use, asthma, COPD and sleep apnea   Neurologic:      (-) seizures, CVA and migraines   Cardiovascular:     (+) Dyslipidemia, hypertension--CAD, --stent,. Taking blood thinners : . . POLLARD, . pacemaker :. .      (-) arrhythmias   METS/Exercise Tolerance:     Hematologic:     (+) History of blood clots (hx RV thrombus) -     (-) anemia and other hematologic disorder   Musculoskeletal:        (-) arthritis   GI/Hepatic:     (+) GERD Asymptomatic on medication, Other GI/Hepatic upper GI bleed, hx gastric bypass      Renal/Genitourinary:     (+) chronic renal disease, type: CRI, BPH,       Endo:     (+) type II DM .   (-) Type I DM, thyroid disease and obesity   Psychiatric:     (+) psychiatric history anxiety      Infectious Disease:         Malignancy:         Other:                     Physical Exam  Normal systems: cardiovascular    Airway   Mallampati: III  TM distance: >3 FB  Neck ROM: full    Dental   (+) chipped    Cardiovascular   Rhythm and rate: regular and normal  (-) no murmur    Pulmonary    breath sounds clear to auscultation                        Anesthesia Plan      History & Physical Review  History and physical reviewed and following examination; no interval change.    ASA Status:  3 .        Plan for MAC Reason for MAC:  Procedure to face, neck, head or breast  PONV prophylaxis:  Ondansetron (or other 5HT-3)       Postoperative Care      Consents  Anesthetic plan, risks, benefits and alternatives discussed with:  Patient..                          .

## 2018-08-02 NOTE — PROGRESS NOTES
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  Preliminary results suggestive of lymphoma.   -Repeat EGD today  -Follow-up biopsy pathology  -Continue serial hemoglobin monitoring   -Coumadin on hold   -Daily IV Protonix for now.  Can switch to p.o. after procedure.    Bladder wall lesions  Concern for possible neoplasm.  No known history of bladder malignancy and denies any hematuria.  -Urology consultation obtained.  -Outpatient follow-up for cystoscopy to fully evaluate bladder interior and determine if he requires biopsies or resection.    History of right ventricular and left ventricular mural thrombi  Initially identified on cardiac MRI in 2016.  On chronic Coumadin.  INR reversed due to reason for admission of GI bleed.  -Vitamin K was given to reverse INR.  Coumadin continues on hold.  -Per oncology hold off on resuming anticoagulation until repeat EGD then outpatient cystoscopy  -Cardiology consultation to determine cohesive plan on risk versus benefits of how long to hold anticoagulation, when to resume    Cardiomyopathy  Coronary artery disease with history of myocardial infarction  Hypertension  Most recent echocardiogram in May 2018 showed an ejection fraction of 25-30%.  Clinically euvolemic at present.  -Continues on PTA Toprol-XL, Imdur and Lasix 10 mg daily    Type 2 diabetes mellitus  Not on medications prior to admission.  Hemoglobin  A1c 7.1%  -Monitor blood sugars while inpatient and aspart corrective dose insulin is available.  Would not discharge with insulin.    Chronic kidney disease, stage II  Baseline creatinine around 1.3-1.5.  -Avoid nephrotoxins and monitor renal function periodically    Dementia  Requires 24-hour supervision and no longer drives.  Per wife his orientation waxes and wanes.  -Supportive care    Leukocytosis, likely stress reaction  Unclear etiology as there are no localizing signs or symptoms of infection.  He remains afebrile.  Urinalysis unremarkable.  CT of the abdomen did not reveal any infectious process.  No respiratory symptoms and does not require oxygen.  He has not received any steroids.  -Monitor WBC trend    # Pain Assessment:  Current Pain Score 8/1/2018   Patient currently in pain? denies   Pain score (0-10) -   Pain location -   Gibson s pain level was assessed and he currently denies pain.        Active Diet Order      NPO per Anesthesia Guidelines for Procedure/Surgery Except for: Meds    DVT prophylaxis: Pneumatic Compression Devices and Ambulate every shift  Code Status: Full Code    Disposition: Expected discharge to home today/tomorrow if stable after EGD and after seen by cardiology.     Asha Brand MD  [phone] (7am - 6pm)  Text Page  ~~~~~~~~~~~~~~~~~~~~~~~~~~~~~~~~~~~~~~~~~~~~~~~  Interval History   To repeat EGD this morning. Denies hematemesis, BRBPR, tarry stools. Discussed anticoagulation dilemma with patient and his wife.     -Data reviewed today: I reviewed all new labs and imaging results over the last 24 hours.     Physical Exam   Temp: 97.6  F (36.4  C) Temp src: Oral BP: (!) 155/93   Heart Rate: 64 Resp: 16 SpO2: 96 % O2 Device: None (Room air)    Vitals:    07/31/18 0629 08/01/18 0707 08/02/18 0625   Weight: 102.7 kg (226 lb 6.6 oz) 102.3 kg (225 lb 8.5 oz) 101.4 kg (223 lb 8.7 oz)     Vital Signs with Ranges  Temp:  [97.4  F (36.3  C)-98  F (36.7  C)] 97.6  F (36.4   C)  Heart Rate:  [63-82] 64  Resp:  [16] 16  BP: (116-155)/(73-93) 155/93  SpO2:  [96 %-98 %] 96 %  I/O last 3 completed shifts:  In: 480 [P.O.:480]  Out: 850 [Urine:850]    Constitutional: Alert, NAD, pleasant and interactive  HEENT: mmm, sclerae anicteric  Respiratory: Lungs CTAB, no wheezes or crackles  Cardiovascular: RRR, no murmurs  no LE edema  GI: soft, non-tender, nondistended  Skin/Integument: warm, dry, no acute rashes  Musc: ARTHUR, normal limb strength x 4  Neuro: AOx3, no tremors or focal deficits, speech fluent  Psych: not anxious, no confusion      Medications     - MEDICATION INSTRUCTIONS -         atorvastatin  40 mg Oral At Bedtime     furosemide  10 mg Oral Daily     hydrALAZINE  10 mg Oral TID     isosorbide mononitrate  15 mg Oral Daily     metoprolol succinate  50 mg Oral QPM     pantoprazole (PROTONIX) IV  40 mg Intravenous Daily with breakfast     venlafaxine  75 mg Oral BID       Data     Recent Labs  Lab 08/01/18  1840 08/01/18  0607 07/31/18  1825 07/31/18  1035 07/31/18  0627  07/30/18  0750   WBC  --   --   --   --  17.3*  --  11.5*   HGB 11.1* 10.7* 10.9*  --  11.3*  < > 13.9   MCV  --   --   --   --  95  --  95   PLT  --   --   --   --  180  --  218   INR  --   --   --  1.21*  --   --  3.56*   NA  --   --   --   --  139  --  138   POTASSIUM  --   --   --   --  4.0  --  3.7   CHLORIDE  --   --   --   --  107  --  105   CO2  --   --   --   --  27  --  24   BUN  --   --   --   --  33*  --  29   CR  --   --   --   --  1.29*  --  1.23   ANIONGAP  --   --   --   --  5  --  9   SHELLEY  --   --   --   --  8.3*  --  8.5   GLC  --   --   --   --  129*  --  152*   ALBUMIN  --   --   --   --   --   --  3.4   PROTTOTAL  --   --   --   --   --   --  7.8   BILITOTAL  --   --   --   --   --   --  0.4   ALKPHOS  --   --   --   --   --   --  143   ALT  --   --   --   --   --   --  40   AST  --   --   --   --   --   --  27   LIPASE  --   --   --   --   --   --  105   < > = values in this interval not  displayed.    Imaging:  No results found for this or any previous visit (from the past 24 hour(s)).

## 2018-08-02 NOTE — PROGRESS NOTES
VSS, A&Ox4, NPO for EGD tomorrow. Will continue to monitor B/P. Checking hgb q12, last hgb 11.1. No stools during shift.     Gladis Casillas RN

## 2018-08-02 NOTE — PLAN OF CARE
Problem: Patient Care Overview  Goal: Plan of Care/Patient Progress Review  Outcome: Adequate for Discharge Date Met: 08/02/18  Discharge    Patient discharged to home via wheelchair with wife.  Care plan note.  Alert.  Forgetful.  SBA.  IV removed.  AVS reviewed with patient and wife, questions answered.    Listed belongings gathered and returned to patient. Yes  Care Plan and Patient education resolved: Yes  Prescriptions if needed, hard copies sent with patient  NA  Home and hospital acquired medications returned to patient: NA  Medication Bin checked and emptied on discharge Yes - empty.  Follow up appointment made for patient: Yes

## 2018-08-02 NOTE — PROGRESS NOTES
Chart reviewed.  Note pathology results from first EGD biopsy of gastric mass -- atypical lymphoid infiltrate, some large B-cells noted -- plan for repeat EGD biopsy of gastric mass today.  Hgb stable to improved.  Patient will also need outpatient Urology evaluation of bladder lesions.  Okay from heme/onc standpoint to discharge pt after EGD with outpatient follow-up with me next week.  Will also plan repeat CBC at hospital follow-up.    Maribel Bender MD  [facility]

## 2018-08-02 NOTE — CONSULTS
[facility]    Cardiology Consultation    Date of Admission:  7/30/2018  Primary cardiologist: Dr. Hernandez    Assessment & Plan   Gibson Villalta is a 77 year old male who was admitted on 7/30/2018. I was asked to see the patient for anticoagulation recommedations.    Summary:   1.   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 Plan for outpatient oncology follow up.   -  Will need Urology evaluation of bladder lesions as an outpatient, I believe likely in several weeks.  2.  Nonischemic cardiomyopathy. LVEF 25 - 30%. Maintained on lasix 10 mg daily, beta blockers, imdur, hydralazine.   3.  History of RV and LV thrombus. He has been maintained on chronic coumadin therapy. Had RV clot in 2016 with LV clot diagnosed in 2017 in the context of several subtherapeutic INRs. INR was supratherapeutic on admission. On review, recently, his INRs appear to be fairly well regulated with occasional elevations. Last echocardiogram was in May. There was no evidence of clot.  -  He will need ongoing treatment of his gastric mass as well as urology follow up and cystoscopy for evaluation of bladder mass. Warfarin remains on hold for now. Will review with Dr. Kimble.     Tamiko Corrales PA-C    Reason for Consult   Reason for consult: I was asked by Dr. Mireles to evaluate this patient for anticoagulation recommendations in the setting of GI bleed.    Primary Care Physician   MD Flori Reyes    Chief Complaint   GI bleed    History is obtained from the patient and wife    History of Present Illness   Gibson Villalta is a 77 year old male with a history of dementia, mural thrombus maintained on coumadin, Smita-en Y gastric bypass, hypertension, CKD, CAD, mixed cardiomyopathy with an LVEF of 25 - 30$, DM which is diet controlled, LBBB who was admitted on 7/30/18 with rectal bleeding which started the day of admission accompanied by some recent abdominal pain. Hemoglobin was 13.9 on  admission and INR was 3.56. This was reversed with Vitamin K. GI was consulted. He underwent EGD which showed likely a malignant gastric tumor. Heme/onc was consulted and he underwent repeat EGD today. Numerous biopsies were obtained. His hemoglobin has trended down slightly but has stabilized around 11. He had a melanotic stool yesterday, none yet today.    Pathology results returned today with atypical lymphoid infiltrate with some large B cells noted. Outpatient hematology/oncology follow up is arranged. He also has several bladder lesions which will need evaluation.    Cardiology was consulted today for recommendations regarding anticoagulation. He has been maintained on warfarin due to his history of mural thrombus. A small RV apical thrombus was initially diagnosed on cardiac MRI during an admission in September of 2016. He presented that time with syncope and was found to have a severe cardiomyopathy at that time with LVEF < 20%. Coronary angiogram showed moderate CAD without obstructive lesions. He was discharged on warfarin at that time.     A follow up echo in October showed no RV thrombus. There was subsequent discussion about discontinuing his coumadin. A follow up echocardiogram in May of 2017 showed an apical mural thrombus and therefore he was continued on coumadin. He had several subtherapeutic INRs prior to this. A follow up echo one month later showed no LV clot. It was recommended he continue on coumadin indefinitely.     His last echocardiogram was in May of this year. This showed a stable LVEF of 25 - 30% with a moderately dilated LV. He is on medical therapy with metoprolol XL, hydralazine, imdur and is maintained on 10 mg of lasix daily. Fortunately, it does not appear he has had significant issues with congestive heart failure.     Past Medical History    Past Medical History:   Diagnosis Date     Acute kidney injury (H) 2012     Anemia      Anxiety      BPH (benign prostatic hypertrophy)       Carpal tunnel syndrome     Right     Chronic kidney disease (CKD), stage 3 (moderate)      Dementia      History of depression      LBBB (left bundle branch block) 2013     Lumbar herniated disc     L5-S1     Mixed cardiomyopathy 7/22/2016     Myocardial infarction 1995 and 2010     Nonischemic cardiomyopathy (H) 7/22/2016     Obesity      Rosacea      Rotator cuff disorder     Tear x 2     RV (right ventricular) mural thrombus 7/22/2016       Past Surgical History   Past Surgical History:   Procedure Laterality Date     ANGIOPLASTY  1995 and 2010 with stent     ESOPHAGOSCOPY, GASTROSCOPY, DUODENOSCOPY (EGD), COMBINED N/A 7/30/2018    Procedure: COMBINED ESOPHAGOSCOPY, GASTROSCOPY, DUODENOSCOPY (EGD), BIOPSY SINGLE OR MULTIPLE;  gastroscopy;  Surgeon: Parish Xiong MD;  Location:  GI     ESOPHAGOSCOPY, GASTROSCOPY, DUODENOSCOPY (EGD), COMBINED N/A 7/30/2018    Procedure: COMBINED ESOPHAGOSCOPY, GASTROSCOPY, DUODENOSCOPY (EGD);  EGD;  Surgeon: Parish Xiong MD;  Location:  GI     ESOPHAGOSCOPY, GASTROSCOPY, DUODENOSCOPY (EGD), COMBINED N/A 8/2/2018    Procedure: COMBINED ESOPHAGOSCOPY, GASTROSCOPY, DUODENOSCOPY (EGD), BIOPSY SINGLE OR MULTIPLE;  gastroscopy BIOPSYSHOULD BE SENT TO LAB IN NS NOT FORMALIN PER ;  Surgeon: Jonathon Bush MD;  Location:  GI     GASTRIC BYPASS  2001     HEART CATH LEFT HEART CATH  7/19/16    Non ischemic cardiomyopathy; Non functionally significant LAD and RCA disease      OTHER SURGICAL HISTORY  2006    Spinal surgery     OTHER SURGICAL HISTORY  Nov 2016    CRT-D implantation       Prior to Admission Medications   Prior to Admission Medications   Prescriptions Last Dose Informant Patient Reported? Taking?   ACE/ARB NOT PRESCRIBED, INTENTIONAL,  Self No No   Sig: ACE & ARB not prescribed due to Symptomatic hypotension not due to excessive diuresis   ASPIRIN NOT PRESCRIBED (INTENTIONAL)  Self No No   Sig: Antiplatelet medication not prescribed intentionally due  to Current anticoagulant therapy (warfarin/enoxaparin)   COENZYME Q-10 PO 2018 at am Self Yes Yes   Sig: Take 200 mg by mouth daily    WARFARIN SODIUM PO 2018 at pm Self Yes Yes   Sig: Take 4 mg by mouth three times a week Sun, Tues, Thurs   WARFARIN SODIUM PO 2018 at pm Self Yes Yes   Sig: Take 6 mg by mouth four times a week Mon, Wed, Fri, Sat   atorvastatin (LIPITOR) 40 MG tablet 2018 at pm Self No Yes   Sig: Take 1 tablet (40 mg) by mouth At Bedtime   ferrous sulfate (IRON) 325 (65 FE) MG tablet 2018 at am Self Yes Yes   Sig: Take 325 mg by mouth daily   furosemide (LASIX) 20 MG tablet 2018 at am Self No Yes   Sig: Take 0.5 tablets (10 mg) by mouth daily   hydrALAZINE (APRESOLINE) 10 MG tablet 2018 at Unknown time Self No Yes   Sig: Take 1 tablet (10 mg) by mouth 3 times daily   isosorbide mononitrate (IMDUR) 30 MG 24 hr tablet 2018 at am Self No Yes   Sig: Take 0.5 tablets (15 mg) by mouth daily   metoprolol succinate (TOPROL-XL) 50 MG 24 hr tablet 2018 at pm Self No Yes   Sig: Take 1 tablet (50 mg) by mouth daily   Patient taking differently: Take 50 mg by mouth every evening    multivitamin, therapeutic with minerals (MULTI-VITAMIN) TABS 2018 at am Self Yes Yes   Sig: Take 1 tablet by mouth daily   venlafaxine (EFFEXOR) 75 MG tablet 2018 at Unknown time Self No Yes   Sig: TAKE 1 TABLET TWICE DAILY   vitamin D (ERGOCALCIFEROL) 56873 UNIT capsule 2018 Self No Yes   Sig: TAKE 1 CAPSULE ONCE A WEEK      Facility-Administered Medications: None     Allergies   Allergies   Allergen Reactions     Wasps [Hornets]      Sand wasp --- years ago.         Social History   . Lives at home with his wife. Former smoker, quit in his 30s. Rare alcohol use.     Family History   Family History   Problem Relation Age of Onset     Coronary Artery Disease Father 39     Alzheimer Disease Mother      Coronary Artery Disease Son      Two sons have  from MIs (ages 39  and 51)       Review of Systems   The 10 point Review of Systems is negative other than noted in the HPI or here.     Physical Exam   Temp: 97.3  F (36.3  C) Temp src: Oral BP: 120/73   Heart Rate: 85 Resp: 18 SpO2: 96 % O2 Device: None (Room air)    Vital Signs with Ranges  Temp:  [97.3  F (36.3  C)-98  F (36.7  C)] 97.3  F (36.3  C)  Heart Rate:  [63-92] 85  Resp:  [10-22] 18  BP: ()/(58-93) 120/73  SpO2:  [93 %-98 %] 96 %  223 lbs 8.74 oz    Constitutional: Alert, no acute distress.  Eyes: sclera anicteric  Respiratory: No respiratory distress. Breath sounds are CTAB. No wheezes, rhonchi, or rales   Cardiovascular: Regular rate and rhythm. Normal S1, S2. No murmurs, rubs, or gallops.   GI: abdomen soft.  Skin: warm and dry.   Musculoskeletal: No LE edema bilaterally. Michelle  Neurologic: alert and oriented x 3.  Psychiatric: affect appropriate.     Data   -Data reviewed today: All pertinent laboratory and imaging results from this encounter were reviewed. I personally reviewed no images or EKG's today.    Recent Labs  Lab 08/02/18  0845 08/01/18  1840 08/01/18  0607  07/31/18  1035 07/31/18  0627  07/30/18  0750   WBC 9.2  --   --   --   --  17.3*  --  11.5*   HGB 11.4* 11.1* 10.7*  < >  --  11.3*  < > 13.9   MCV 96  --   --   --   --  95  --  95     --   --   --   --  180  --  218   INR  --   --   --   --  1.21*  --   --  3.56*     --   --   --   --  139  --  138   POTASSIUM 3.7  --   --   --   --  4.0  --  3.7   CHLORIDE 103  --   --   --   --  107  --  105   CO2 26  --   --   --   --  27  --  24   BUN 29  --   --   --   --  33*  --  29   CR 1.23  --   --   --   --  1.29*  --  1.23   ANIONGAP 9  --   --   --   --  5  --  9   SHELLEY 8.3*  --   --   --   --  8.3*  --  8.5   GLC 91  --   --   --   --  129*  --  152*   ALBUMIN  --   --   --   --   --   --   --  3.4   PROTTOTAL  --   --   --   --   --   --   --  7.8   BILITOTAL  --   --   --   --   --   --   --  0.4   ALKPHOS  --   --   --   --   --    --   --  143   ALT  --   --   --   --   --   --   --  40   AST  --   --   --   --   --   --   --  27   LIPASE  --   --   --   --   --   --   --  105   < > = values in this interval not displayed.    Imaging:  No results found for this or any previous visit (from the past 24 hour(s)).

## 2018-08-02 NOTE — PLAN OF CARE
Problem: Patient Care Overview  Goal: Plan of Care/Patient Progress Review  Outcome: Improving  A&O x 4, occasionally forgetful. VSS on RA. Continuous Pulse Oximetry. IV is saline locked. Up SBA. Uses urinal. No BM on NOC shift. Last Hgb 08/01/2018 at 1840 was 11.1, conditional order for <8. Denies pain. NPO for EGD, today. Biopsy of neck mass is likely Lymphoma. Plan is to discharge to home, today, possibly after EGD. Patient and family wish to see other specialties on an outpatient basis. Will continue to monitor.

## 2018-08-02 NOTE — ANESTHESIA CARE TRANSFER NOTE
Patient: Gibson Villalta    Procedure(s):  gastroscopy BIOPSYSHOULD BE SENT TO LAB IN NS NOT FORMALIN PER  - Wound Class: II-Clean Contaminated    Diagnosis: gastric lymphoma  Diagnosis Additional Information: No value filed.    Anesthesia Type:   General, RSI, ETT     Note:  Airway :Nasal Cannula  Patient transferred to:PACU  Handoff Report: Identifed the Patient, Identified the Reponsible Provider, Reviewed the pertinent medical history, Discussed the surgical course, Reviewed Intra-OP anesthesia mangement and issues during anesthesia, Set expectations for post-procedure period and Allowed opportunity for questions and acknowledgement of understanding      Vitals: (Last set prior to Anesthesia Care Transfer)    CRNA VITALS  8/2/2018 1203 - 8/2/2018 1237      8/2/2018             NIBP: (!)  76/45    NIBP Mean: 56                Electronically Signed By: ALLEN Flor CRNA  August 2, 2018  12:37 PM

## 2018-08-02 NOTE — PLAN OF CARE
Problem: Patient Care Overview  Goal: Plan of Care/Patient Progress Review  Outcome: No Change  A&O x 3,  Forgetful to situation. VSS on RA. Declines Continuous Pulse Oximetry. IV is saline locked. Uses urinal. No s/s bleeding, hgb 11.4. Denies pain.  EGD, completed with biopsies.  Advanced to regular diet. Plan is to discharge to home, likely tomorrow after cardiology consult.  Will continue to monitor.

## 2018-08-02 NOTE — DISCHARGE SUMMARY
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 Please see admission H&P for complete details.     Hospital Course   Gibson Villalta was admitted on 7/30/2018.  The following problems were addressed during his hospitalization:    Melena  Gastric tumor, probable lymphoma   EGD done 7/30/18 with likely malignant gastric tumor on the greater curvature of the stomach which was biopsied.  Preliminary results suggestive of lymphoma.   -Repeat EGD 8/2/2018  -Follow-up biopsy pathology  -Coumadin discontinued  -Daily IV Protonix given during hospitalization. No recommendation from GI on continuing this after discharge so did not order.      Bladder wall lesions  Concern for possible neoplasm.  No known history of bladder malignancy and denies any hematuria.  -Urology consultation obtained.  -Outpatient follow-up for cystoscopy to fully evaluate bladder interior and determine if he requires biopsies or resection.     History of right ventricular and left ventricular mural thrombi  Initially identified on cardiac MRI in 2016.  On chronic Coumadin.  INR reversed due to reason for admission of GI bleed.  -Vitamin K was given to reverse INR.  Coumadin continues on hold.  -Per oncology hold off on resuming  anticoagulation until repeat EGD then outpatient cystoscopy  -Cardiology consultation obtained to determine cohesive plan on risk versus benefits of how long to hold anticoagulation, when to resume. Decision made to discontinue anticoagulation.      Cardiomyopathy  Coronary artery disease with history of myocardial infarction  Hypertension  Most recent echocardiogram in May 2018 showed an ejection fraction of 25-30%.  Clinically euvolemic at present.  -Continues on PTA Toprol-XL, Imdur and Lasix 10 mg daily     Type 2 diabetes mellitus  Not on medications prior to admission.  Hemoglobin A1c 7.1%  -Monitor blood sugars while inpatient and aspart corrective dose insulin was available.     Chronic kidney disease, stage II  Baseline creatinine around 1.3-1.5.  -Avoid nephrotoxins and monitor renal function periodically     Dementia  Requires 24-hour supervision and no longer drives.  Per wife his orientation waxes and wanes.  -Supportive care     Leukocytosis, likely stress reaction  Unclear etiology as there are no localizing signs or symptoms of infection.  He remains afebrile.  Urinalysis unremarkable.  CT of the abdomen did not reveal any infectious process.  No respiratory symptoms and does not require oxygen.  He has not received any steroids.  -Monitor WBC trend    # Pain Assessment:  Current Pain Score 8/2/2018   Patient currently in pain? denies   Pain score (0-10) -   Pain location -   Gibson s pain level was assessed and he currently denies pain.      Active Problems:    Duyen Brand MD  ~~~~~~~~~~~~~~~~~~~~~~~~~~~~~~~~~~~~~~~~~~~~~~~~~~~~~~~~~~~    Pending Results   These results will be followed up by Hospitalist.  Unresulted Labs Ordered in the Past 30 Days of this Admission     Date and Time Order Name Status Description    8/2/2018 1229 Surgical pathology exam In process     8/2/2018 1227 Leukemia Lymphoma Evaluation (Flow Cytometry) In process           Code Status   Full Code        Primary Care Physician   Flori Reyes    Physical Exam   Temp: 97.8  F (36.6  C) Temp src: Oral BP: 123/76   Heart Rate: 70 Resp: 16 SpO2: 95 % O2 Device: None (Room air)    Vitals:    07/31/18 0629 08/01/18 0707 08/02/18 0625   Weight: 102.7 kg (226 lb 6.6 oz) 102.3 kg (225 lb 8.5 oz) 101.4 kg (223 lb 8.7 oz)     Vital Signs with Ranges  Temp:  [97.3  F (36.3  C)-98  F (36.7  C)] 97.8  F (36.6  C)  Heart Rate:  [63-92] 70  Resp:  [10-22] 16  BP: ()/(58-93) 123/76  SpO2:  [93 %-98 %] 95 %  I/O last 3 completed shifts:  In: 300 [I.V.:300]  Out: 625 [Urine:625]    Constitutional: Alert, NAD, pleasant and cooperative      Discharge Disposition   Discharged to home  Condition at discharge: Stable    Consultations This Hospital Stay   GASTROENTEROLOGY IP CONSULT  UROLOGY IP CONSULT  UROLOGY IP CONSULT  HEMATOLOGY & ONCOLOGY IP CONSULT  CARDIOLOGY IP CONSULT    Time Spent on this Encounter   I, Asha Brand, personally saw the patient today and spent 35 minutes discharging this patient.    Discharge Orders     Reason for your hospital stay   GI bleed, probable cancer     Follow-up and recommended labs and tests    Follow up with oncology in clinic for discussion of biopsy results and discussion of treatment plan.     Activity   Your activity upon discharge: activity as tolerated     Full Code     Diet   Follow this diet upon discharge:      Regular Diet Adult       Discharge Medications   Current Discharge Medication List      CONTINUE these medications which have NOT CHANGED    Details   atorvastatin (LIPITOR) 40 MG tablet Take 1 tablet (40 mg) by mouth At Bedtime  Qty: 90 tablet, Refills: 1    Associated Diagnoses: Hyperlipidemia LDL goal <130      COENZYME Q-10 PO Take 200 mg by mouth daily       ferrous sulfate (IRON) 325 (65 FE) MG tablet Take 325 mg by mouth daily      furosemide (LASIX) 20 MG tablet Take 0.5 tablets (10 mg) by mouth daily  Qty: 45 tablet, Refills: 1    Associated Diagnoses: Chronic  systolic heart failure (H)      hydrALAZINE (APRESOLINE) 10 MG tablet Take 1 tablet (10 mg) by mouth 3 times daily  Qty: 270 tablet, Refills: 1    Associated Diagnoses: Chronic systolic heart failure (H); Cardiomyopathy (H)      isosorbide mononitrate (IMDUR) 30 MG 24 hr tablet Take 0.5 tablets (15 mg) by mouth daily  Qty: 45 tablet, Refills: 1    Associated Diagnoses: Chronic systolic heart failure (H); Cardiomyopathy (H)      metoprolol succinate (TOPROL-XL) 50 MG 24 hr tablet Take 1 tablet (50 mg) by mouth daily  Qty: 90 tablet, Refills: 1    Associated Diagnoses: Cardiomyopathy (H)      multivitamin, therapeutic with minerals (MULTI-VITAMIN) TABS Take 1 tablet by mouth daily      venlafaxine (EFFEXOR) 75 MG tablet TAKE 1 TABLET TWICE DAILY  Qty: 180 tablet, Refills: 1    Associated Diagnoses: Anxiety and depression      vitamin D (ERGOCALCIFEROL) 93772 UNIT capsule TAKE 1 CAPSULE ONCE A WEEK  Qty: 12 capsule, Refills: 1    Associated Diagnoses: Vitamin D deficiency      ACE/ARB NOT PRESCRIBED, INTENTIONAL, ACE & ARB not prescribed due to Symptomatic hypotension not due to excessive diuresis    Associated Diagnoses: HFrEF (heart failure with reduced ejection fraction) (H)      ASPIRIN NOT PRESCRIBED (INTENTIONAL) Antiplatelet medication not prescribed intentionally due to Current anticoagulant therapy (warfarin/enoxaparin)  Qty: 0 each, Refills: 0    Associated Diagnoses: HFrEF (heart failure with reduced ejection fraction) (H)         STOP taking these medications       WARFARIN SODIUM PO Comments:   Reason for Stopping:         WARFARIN SODIUM PO Comments:   Reason for Stopping:             Allergies   Allergies   Allergen Reactions     Wasps [Hornets]      Sand wasp --- years ago.       Data

## 2018-08-03 ENCOUNTER — TELEPHONE (OUTPATIENT)
Dept: FAMILY MEDICINE | Facility: CLINIC | Age: 78
End: 2018-08-03

## 2018-08-03 ENCOUNTER — NURSE TRIAGE (OUTPATIENT)
Dept: NURSING | Facility: CLINIC | Age: 78
End: 2018-08-03

## 2018-08-03 LAB — COPATH REPORT: NORMAL

## 2018-08-03 NOTE — TELEPHONE ENCOUNTER
"  Reason for Disposition    Blood in urine, but all triage questions negative  (Exception: could be normal menstrual bleeding)     \"I had surgery yesterday (see epic) and just now when I peed it was pink looked like blood(times one).\" Denies clots, flank pain, fever or other sx. Triaged and gave home care advice. Also advised UC to be seen within 24 hrs. Sooner if you develop the above sx. Patient agrees. Call back if needed.    Additional Information    Negative: Shock suspected (e.g., cold/pale/clammy skin, too weak to stand, low BP, rapid pulse)    Negative: Sounds like a life-threatening emergency to the triager    Negative: Followed a genital area injury    Negative: Followed a genital area injury (penis, scrotum)    Negative: Vaginal discharge    Negative: Pus (white, yellow) or bloody discharge from end of penis    Negative: [1] Taking antibiotic for urinary tract infection (UTI) AND [2] female    Negative: [1] Taking antibiotic for urinary tract infection (UTI) AND [2] male    Negative: [1] Discomfort (pain, burning or stinging) when passing urine AND [2] pregnant    Negative: [1] Discomfort (pain, burning or stinging) when passing urine AND [2] postpartum < 1 month    Negative: [1] Discomfort (pain, burning or stinging) when passing urine AND [2] female    Negative: [1] Discomfort (pain, burning or stinging) when passing urine AND [2] male    Negative: Pain in scrotum is main symptom    Negative: Pain or itching in the vulvar area    Negative: Symptoms arising from use of a urinary catheter (Renner or Coude)    Blood in the urine is main symptom    Negative: Shock suspected (e.g., cold/pale/clammy skin, too weak to stand, low BP, rapid pulse)    Negative: Sounds like a life-threatening emergency to the triager    Negative: Urinary catheter, questions about    Negative: Recent back or abdominal injury    Negative: Recent genital injury    Negative: [1] Unable to urinate (or only a few drops) > 4 hours AND [2] " bladder feels very full (e.g., palpable bladder or strong urge to urinate)    Negative: Passing pure blood or large blood clots (i.e., size > a dime) (Exception: daisy or small strands)    Negative: Fever > 100.5 F (38.1 C)    Negative: Patient sounds very sick or weak to the triager    Negative: Known sickle cell disease    Negative: Taking Coumadin (warfarin) or other strong blood thinner, or known bleeding disorder (e.g., thrombocytopenia)    Negative: Side (flank) or back pain present    Negative: Pain or burning with passing urine    Negative: [1] Pink or red-colored urine and likely from food (beets, rhubarb, red food dye) AND [2] lasts > 24 hours after stopping food    Protocols used: URINE - BLOOD IN-ADULT-, URINARY SYMPTOMS-ADULT-AH

## 2018-08-03 NOTE — TELEPHONE ENCOUNTER
"ED/Discharge Protocol    \"Hi, my name is Jeannie Boyd, a registered nurse, and I am calling on behalf of Dr. Reyes's office at [place].  I am calling to follow up and see how things are going for you after your recent visit.\"    \"I see that you were in the (ER/UC/IP) on 7/30/2018-8/2/2018.    How are you doing now that you are home?\" patient states he's doing ok - has not had BM yet so is unsure if still has GI bleeding       Is patient experiencing symptoms that may require a hospital visit?  No    Discharge Instructions    \"Let's review your discharge instructions.  What is/are the follow-up recommendations?  Pt. Response: F/U with oncology - pt states he hasn't made that appt yet - will call them to schedule    \"Were you instructed to make a follow-up appointment?\"  Pt. Response: No.       \"When you see the provider, I would recommend that you bring your discharge instructions with you.    Medications    \"How many new medications are you on since your hospitalization/ED visit?\"    0-1  \"How many of your current medicines changed (dose, timing, name, etc.) while you were in the hospital/ED visit?\"   0-1  \"Do you have questions about your medications?\"   No  \"Were you newly diagnosed with heart failure, COPD, diabetes or did you have a heart attack?\"   No  For patients on insulin: \"Did you start on insulin in the hospital or did you have your insulin dose changed?\"   No    Medication reconciliation completed? Yes    Was MTM referral placed (*Make sure to put transitions as reason for referral)?   No    Call Summary    \"Do you have any questions or concerns about your condition or care plan at the moment?\"    No  Triage nurse advice given: call to schedule with oncology, schedule with PCP as needed also     Patient was in ER once in the past year (assess appropriateness of ER visits.)      \"If you have questions or things don't continue to improve, we encourage you contact us through the main clinic number,  " "[phone].  Even if the clinic is not open, triage nurses are available 24/7 to help you.     We would like you to know that our clinic has extended hours (provide information).  We also have urgent care (provide details on closest location and hours/contact info)\"      \"Thank you for your time and take care!\"    Jeannie FRIEND RN    Discharge Orders         Reason for your hospital stay   GI bleed, probable cancer      Follow-up and recommended labs and tests    Follow up with oncology in clinic for discussion of biopsy results and discussion of treatment plan.      Activity   Your activity upon discharge: activity as tolerated      Full Code      Diet   Follow this diet upon discharge:      Regular Diet Adult       "

## 2018-08-04 ENCOUNTER — OFFICE VISIT (OUTPATIENT)
Dept: URGENT CARE | Facility: URGENT CARE | Age: 78
End: 2018-08-04
Payer: COMMERCIAL

## 2018-08-04 VITALS
TEMPERATURE: 98 F | SYSTOLIC BLOOD PRESSURE: 118 MMHG | DIASTOLIC BLOOD PRESSURE: 72 MMHG | HEART RATE: 90 BPM | OXYGEN SATURATION: 99 %

## 2018-08-04 DIAGNOSIS — R31.0 GROSS HEMATURIA: Primary | ICD-10-CM

## 2018-08-04 LAB
ALBUMIN UR-MCNC: >=300 MG/DL
APPEARANCE UR: ABNORMAL
BILIRUB UR QL STRIP: ABNORMAL
COLOR UR AUTO: ABNORMAL
GLUCOSE UR STRIP-MCNC: NEGATIVE MG/DL
HGB UR QL STRIP: ABNORMAL
KETONES UR STRIP-MCNC: ABNORMAL MG/DL
LEUKOCYTE ESTERASE UR QL STRIP: NEGATIVE
NITRATE UR QL: NEGATIVE
PH UR STRIP: 5.5 PH (ref 5–7)
RBC #/AREA URNS AUTO: >100 /HPF
SOURCE: ABNORMAL
SP GR UR STRIP: 1.02 (ref 1–1.03)
UROBILINOGEN UR STRIP-ACNC: 0.2 EU/DL (ref 0.2–1)
WBC #/AREA URNS AUTO: ABNORMAL /HPF

## 2018-08-04 PROCEDURE — 81001 URINALYSIS AUTO W/SCOPE: CPT | Performed by: PHYSICIAN ASSISTANT

## 2018-08-04 PROCEDURE — 99214 OFFICE O/P EST MOD 30 MIN: CPT | Performed by: PHYSICIAN ASSISTANT

## 2018-08-04 PROCEDURE — 87086 URINE CULTURE/COLONY COUNT: CPT | Performed by: PHYSICIAN ASSISTANT

## 2018-08-04 NOTE — MR AVS SNAPSHOT
After Visit Summary   8/4/2018    Gibson Villalta    MRN: [id number]           Patient Information     Date Of Birth          1940        Visit Information        Provider Department      8/4/2018 11:50 AM Flori Hernandez PA-C [facility]        Today's Diagnoses     Gross hematuria    -  1      Care Instructions      Follow up with DR. Flori Reyes in the next two days to discuss hospital follow up      Blood in the Urine    Blood in the urine (hematuria) has many possible causes. If it occurs after an injury (such as a car accident or fall), it is most often a sign of bruising to the kidney or bladder. Common causes of blood in the urine include urinary tract infections, kidney stones, inflammation, tumors, or certain other diseases of the kidney or bladder. Menstruation can cause blood to appear in the urine sample, although it is not coming from the urinary tract.  If only a trace amount of blood is present, it will show up on the urine test, even though the urine may be yellow and not pink or red. This may occur with any of the above conditions, as well as heavy exercise or high fever. In this case, your doctor may want to repeat the urine test on another day. This will show if the blood is still present. If it is, then other tests can be done to find out the cause.  Home care  Follow these home care guidelines:    If your urine does not appear bloody (pink, brown or red) then you do not need to restrict your activity in any way.    If you can see blood in your urine, rest and avoid heavy exertion until your next exam. Do not use aspirin, blood thinners, or anti-platelet or anti-inflammatory medicines. These include ibuprofen and naproxen. These thin the blood and may increase bleeding.  Follow-up care  Follow up with your healthcare provider, or as advised. If you were injured and had blood in your urine, you should have a repeat urine test in 1 to 2 days.  Contact your doctor for this test.  A radiologist will review any X-rays that were taken. You will be told of any new findings that may affect your care.  When to seek medical advice  Call your healthcare provider right away if any of these occur:    Bright red blood or blood clots in the urine (if you did not have this before)    Weakness, dizziness or fainting    New groin, abdominal, or back pain    Fever of 100.4 F (38 C) or higher, or as directed by your healthcare provider    Repeated vomiting    Bleeding from the nose or gums or easy bruising  Date Last Reviewed: 9/1/2016 2000-2017 [employer]. [address] [place] [address]. All rights reserved. This information is not intended as a substitute for professional medical care. Always follow your healthcare professional's instructions.                Follow-ups after your visit        Your next 10 appointments already scheduled     Aug 09, 2018 12:00 PM CDT   Office Visit with Flori Reyes,    [facility]    [address] [place] [id number] [phone]           Bring a current list of meds and any records pertaining to this visit. For Physicals, please bring immunization records and any forms needing to be filled out. Please arrive 10 minutes early to complete paperwork.            Aug 15, 2018  1:45 PM CDT   Anticoagulation Visit with  ANTICOAGULATION CLINIC   [facility]    [address] [id number] [phone]            Aug 16, 2018  4:30 PM CDT   Remote ICD Check with ALEJANDRO DCR2   [facility]    [address]  [facility] [id number] [phone] OPT 2           This appointment is for a remote check of your debrillator.  This is not an appointment at the office.              Who to contact     If you have questions or need follow up information about  today's clinic visit or your schedule please contact [facility] directly at [phone].  Normal or non-critical lab and imaging results will be communicated to you by [employer], letter or phone within 4 business days after the clinic has received the results. If you do not hear from us within 7 days, please contact the clinic through [employer] or phone. If you have a critical or abnormal lab result, we will notify you by phone as soon as possible.  Submit refill requests through [employer] or call your pharmacy and they will forward the refill request to us. Please allow 3 business days for your refill to be completed.          Additional Information About Your Visit        [employer] Information     [employer] gives you secure access to your electronic health record. If you see a primary care provider, you can also send messages to your care team and make appointments. If you have questions, please call your primary care clinic.  If you do not have a primary care provider, please call [phone] and they will assist you.        Care EveryWhere ID     This is your Care EveryWhere ID. This could be used by other organizations to access your [facility] medical records  [id number]        Your Vitals Were     Pulse Temperature Pulse Oximetry             90 98  F (36.7  C) 99%          Blood Pressure from Last 3 Encounters:   08/04/18 118/72   08/02/18 123/76   05/17/18 103/63    Weight from Last 3 Encounters:   08/02/18 223 lb 8.7 oz (101.4 kg)   05/17/18 232 lb (105.2 kg)   12/04/17 236 lb (107 kg)              We Performed the Following     *UA reflex to Microscopic and Culture ([facility] (except [place] and [place])     Urine Culture Aerobic Bacterial     Urine Microscopic          Today's Medication Changes          These changes are accurate as of 8/4/18  3:16 PM.  If you have any questions, ask your nurse or doctor.               These medicines have changed or have updated  prescriptions.        Dose/Directions    metoprolol succinate 50 MG 24 hr tablet   Commonly known as:  TOPROL-XL   This may have changed:  when to take this   Used for:  Cardiomyopathy (H)        Dose:  50 mg   Take 1 tablet (50 mg) by mouth daily   Quantity:  90 tablet   Refills:  1                Primary Care Provider Office Phone # Fax #    Flori Reyes [phone] [address]  [facility] [address]        Equal Access to Services     BEL MCLAIN AH: Hadii aad ku hadasho Soomaali, waaxda luqadaha, qaybta kaalmada adeegyada, waxay idiin hayaan adeeg kharash lawinsome . So [facility] [phone].    ATENCIÓN: Si habla español, tiene a de luna disposición servicios gratuitos de asistencia lingüística. [facility] [phone].    We comply with applicable federal civil rights laws and [place] laws. We do not discriminate on the basis of race, color, national origin, age, disability, sex, sexual orientation, or gender identity.            Thank you!     Thank you for choosing [facility]  for your care. Our goal is always to provide you with excellent care. Hearing back from our patients is one way we can continue to improve our services. Please take a few minutes to complete the written survey that you may receive in the mail after your visit with us. Thank you!             Your Updated Medication List - Protect others around you: Learn how to safely use, store and throw away your medicines at www.disposemymeds.org.          This list is accurate as of 8/4/18  3:16 PM.  Always use your most recent med list.                   Brand Name Dispense Instructions for use Diagnosis    ACE/ARB/ARNI NOT PRESCRIBED (INTENTIONAL)      ACE & ARB not prescribed due to Symptomatic hypotension not due to excessive diuresis    HFrEF (heart failure with reduced ejection fraction) (H)       ASPIRIN NOT PRESCRIBED    INTENTIONAL    0 each    Antiplatelet medication not prescribed  intentionally due to Current anticoagulant therapy (warfarin/enoxaparin)    HFrEF (heart failure with reduced ejection fraction) (H)       atorvastatin 40 MG tablet    LIPITOR    90 tablet    Take 1 tablet (40 mg) by mouth At Bedtime    Hyperlipidemia LDL goal <130       COENZYME Q-10 PO      Take 200 mg by mouth daily        ferrous sulfate 325 (65 Fe) MG tablet    IRON     Take 325 mg by mouth daily        furosemide 20 MG tablet    LASIX    45 tablet    Take 0.5 tablets (10 mg) by mouth daily    Chronic systolic heart failure (H)       hydrALAZINE 10 MG tablet    APRESOLINE    270 tablet    Take 1 tablet (10 mg) by mouth 3 times daily    Chronic systolic heart failure (H), Cardiomyopathy (H)       isosorbide mononitrate 30 MG 24 hr tablet    IMDUR    45 tablet    Take 0.5 tablets (15 mg) by mouth daily    Chronic systolic heart failure (H), Cardiomyopathy (H)       metoprolol succinate 50 MG 24 hr tablet    TOPROL-XL    90 tablet    Take 1 tablet (50 mg) by mouth daily    Cardiomyopathy (H)       Multi-vitamin Tabs tablet      Take 1 tablet by mouth daily        venlafaxine 75 MG tablet    EFFEXOR    180 tablet    TAKE 1 TABLET TWICE DAILY    Anxiety and depression       vitamin D 37137 UNIT capsule    ERGOCALCIFEROL    12 capsule    TAKE 1 CAPSULE ONCE A WEEK    Vitamin D deficiency

## 2018-08-04 NOTE — PROGRESS NOTES
SUBJECTIVE:   Gibson Villalta is a 77 year old male with a history of CAD, DM2 and Dementia who  presents today for evaluation. Symptoms of hematuria have been going on for 1day(s).  Symptoms were sudden onset.  Patient was discharged from the hospital 2 days ago for upper GI bleed.  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 He is to follow up with urology in the next month. He was not catheterized during his hospital stay. There is no history of fever, chills, nausea or vomiting.  No history of penile discharge. This patient does NOT have a history of urinary tract infections.    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     Myocardial infarction 1995 and 2010     Nonischemic cardiomyopathy (H) 7/22/2016     Obesity      Rosacea      Rotator cuff disorder     Tear x 2     RV (right ventricular) mural thrombus 7/22/2016     Current Outpatient Prescriptions   Medication Sig Dispense Refill     ACE/ARB NOT PRESCRIBED, INTENTIONAL, ACE & ARB not prescribed due to Symptomatic hypotension not due to excessive diuresis       ASPIRIN NOT PRESCRIBED (INTENTIONAL) Antiplatelet medication not prescribed intentionally due to Current anticoagulant therapy (warfarin/enoxaparin) 0 each 0     atorvastatin (LIPITOR) 40 MG tablet Take 1 tablet (40 mg) by mouth At Bedtime 90 tablet 1     COENZYME Q-10 PO Take 200 mg by mouth daily        ferrous sulfate (IRON) 325 (65 FE) MG tablet Take 325 mg by mouth daily       furosemide (LASIX) 20 MG tablet Take 0.5 tablets (10 mg) by mouth daily 45 tablet 1     hydrALAZINE (APRESOLINE) 10 MG tablet Take 1 tablet  (10 mg) by mouth 3 times daily 270 tablet 1     isosorbide mononitrate (IMDUR) 30 MG 24 hr tablet Take 0.5 tablets (15 mg) by mouth daily 45 tablet 1     metoprolol succinate (TOPROL-XL) 50 MG 24 hr tablet Take 1 tablet (50 mg) by mouth daily (Patient taking differently: Take 50 mg by mouth every evening ) 90 tablet 1     multivitamin, therapeutic with minerals (MULTI-VITAMIN) TABS Take 1 tablet by mouth daily       venlafaxine (EFFEXOR) 75 MG tablet TAKE 1 TABLET TWICE DAILY 180 tablet 1     vitamin D (ERGOCALCIFEROL) 46125 UNIT capsule TAKE 1 CAPSULE ONCE A WEEK 12 capsule 1     Social History   Substance Use Topics     Smoking status: Former Smoker     Packs/day: 2.00     Years: 8.00     Types: Cigarettes     Quit date: 1/1/1980     Smokeless tobacco: Never Used      Comment: Quit in his 30s - 2 ppd for 8 years     Alcohol use 0.0 oz/week     0 Standard drinks or equivalent per week      Comment: maybe 1 beer a month     ROS:  C: NEGATIVE for fever, chills, change in weight  INTEGUMENTARY/SKIN: NEGATIVE for worrisome rashes, moles or lesions  CV: NEGATIVE for chest pain, palpitations or peripheral edema  GI: NEGATIVE for nausea, abdominal pain, heartburn, or change in bowel habits  : POSITIVE for hematuria  MUSCULOSKELETAL: NEGATIVE for significant arthralgias or myalgia  NEURO: NEGATIVE for weakness, dizziness or paresthesias  HEME/ALLERGY/IMMUNE: NEGATIVE for swollen nodes  All other systems negative    OBJECTIVE:  /72  Pulse 90  Temp 98  F (36.7  C)  SpO2 99%  GENERAL APPEARANCE: healthy, alert and no distress  RESP: lungs clear to auscultation - no rales, rhonchi or wheezes  CV: regular rates and rhythm, normal S1 S2, no murmur noted  ABDOMEN:  soft, nontender, no HSM or masses and bowel sounds normal  BACK: No CVA tenderness  SKIN: no suspicious lesions or rashes    Results for orders placed or performed in visit on 08/04/18   *UA reflex to Microscopic and Culture ([place] [facility]  (except [place] and [place])   Result Value Ref Range    Color Urine Brown     Appearance Urine Cloudy     Glucose Urine Negative NEG^Negative mg/dL    Bilirubin Urine Small (A) NEG^Negative    Ketones Urine Trace (A) NEG^Negative mg/dL    Specific Gravity Urine 1.025 1.003 - 1.035    Blood Urine Large (A) NEG^Negative    pH Urine 5.5 5.0 - 7.0 pH    Protein Albumin Urine >=300 (A) NEG^Negative mg/dL    Urobilinogen Urine 0.2 0.2 - 1.0 EU/dL    Nitrite Urine Negative NEG^Negative    Leukocyte Esterase Urine Negative NEG^Negative    Source Midstream Urine    Urine Microscopic   Result Value Ref Range    WBC Urine 0 - 5 OTO5^0 - 5 /HPF    RBC Urine >100 (A) OTO2^O - 2 /HPF       ASSESSMENT / PLAN:  1. Gross hematuria  77-year-old male presents with a one-day history of gross hematuria.  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  UA is not significant for infection, likely source of bleeding is related to his bladder lesions /neoplasm.  We discussed blood loss, lightheadedness and associated symptoms.  When he was discharged from the hospital on 8/2 hemoglobin was at 11.4.  Advised him to follow-up with Dr. Reyes in the following week for hospital follow-up review, for another referral for to oncology. (Looks like urology referral has been placed.)  Patient and wife agree with treatment plan.  - *UA reflex to Microscopic and Culture ([facility] (except [place] and [place])  - Urine Microscopic  - Urine Culture Aerobic Bacterial    Flori Hernandez PA-C

## 2018-08-04 NOTE — PATIENT INSTRUCTIONS
Follow up with DR. Flori Reyes in the next two days to discuss hospital follow up      Blood in the Urine    Blood in the urine (hematuria) has many possible causes. If it occurs after an injury (such as a car accident or fall), it is most often a sign of bruising to the kidney or bladder. Common causes of blood in the urine include urinary tract infections, kidney stones, inflammation, tumors, or certain other diseases of the kidney or bladder. Menstruation can cause blood to appear in the urine sample, although it is not coming from the urinary tract.  If only a trace amount of blood is present, it will show up on the urine test, even though the urine may be yellow and not pink or red. This may occur with any of the above conditions, as well as heavy exercise or high fever. In this case, your doctor may want to repeat the urine test on another day. This will show if the blood is still present. If it is, then other tests can be done to find out the cause.  Home care  Follow these home care guidelines:    If your urine does not appear bloody (pink, brown or red) then you do not need to restrict your activity in any way.    If you can see blood in your urine, rest and avoid heavy exertion until your next exam. Do not use aspirin, blood thinners, or anti-platelet or anti-inflammatory medicines. These include ibuprofen and naproxen. These thin the blood and may increase bleeding.  Follow-up care  Follow up with your healthcare provider, or as advised. If you were injured and had blood in your urine, you should have a repeat urine test in 1 to 2 days. Contact your doctor for this test.  A radiologist will review any X-rays that were taken. You will be told of any new findings that may affect your care.  When to seek medical advice  Call your healthcare provider right away if any of these occur:    Bright red blood or blood clots in the urine (if you did not have this before)    Weakness, dizziness or fainting    New  groin, abdominal, or back pain    Fever of 100.4 F (38 C) or higher, or as directed by your healthcare provider    Repeated vomiting    Bleeding from the nose or gums or easy bruising  Date Last Reviewed: 9/1/2016 2000-2017 [employer]. [address] [place] [address]. All rights reserved. This information is not intended as a substitute for professional medical care. Always follow your healthcare professional's instructions.

## 2018-08-06 LAB
BACTERIA SPEC CULT: NORMAL
SPECIMEN SOURCE: NORMAL

## 2018-08-07 LAB — COPATH REPORT: NORMAL

## 2018-08-09 ENCOUNTER — OFFICE VISIT (OUTPATIENT)
Dept: FAMILY MEDICINE | Facility: CLINIC | Age: 78
End: 2018-08-09
Payer: COMMERCIAL

## 2018-08-09 VITALS
TEMPERATURE: 97.3 F | OXYGEN SATURATION: 94 % | BODY MASS INDEX: 29.55 KG/M2 | DIASTOLIC BLOOD PRESSURE: 80 MMHG | WEIGHT: 224 LBS | SYSTOLIC BLOOD PRESSURE: 118 MMHG | RESPIRATION RATE: 16 BRPM | HEART RATE: 104 BPM

## 2018-08-09 DIAGNOSIS — N32.89 BLADDER MASS: ICD-10-CM

## 2018-08-09 DIAGNOSIS — F03.90 DEMENTIA WITHOUT BEHAVIORAL DISTURBANCE, UNSPECIFIED DEMENTIA TYPE: Chronic | ICD-10-CM

## 2018-08-09 DIAGNOSIS — Z09 HOSPITAL DISCHARGE FOLLOW-UP: Primary | ICD-10-CM

## 2018-08-09 DIAGNOSIS — M70.42 PREPATELLAR BURSITIS OF LEFT KNEE: ICD-10-CM

## 2018-08-09 DIAGNOSIS — E11.22 CONTROLLED TYPE 2 DIABETES MELLITUS WITH CHRONIC KIDNEY DISEASE, WITHOUT LONG-TERM CURRENT USE OF INSULIN, UNSPECIFIED CKD STAGE (H): ICD-10-CM

## 2018-08-09 DIAGNOSIS — F32.A ANXIETY AND DEPRESSION: Chronic | ICD-10-CM

## 2018-08-09 DIAGNOSIS — F41.9 ANXIETY AND DEPRESSION: Chronic | ICD-10-CM

## 2018-08-09 DIAGNOSIS — I24.0 RV (RIGHT VENTRICULAR) MURAL THROMBUS WITHOUT MI (H): Chronic | ICD-10-CM

## 2018-08-09 DIAGNOSIS — K31.89 GASTRIC MASS: ICD-10-CM

## 2018-08-09 PROBLEM — K92.1 MELENA: Status: RESOLVED | Noted: 2018-07-30 | Resolved: 2018-08-09

## 2018-08-09 LAB
ERYTHROCYTE [DISTWIDTH] IN BLOOD BY AUTOMATED COUNT: 13.5 % (ref 10–15)
HCT VFR BLD AUTO: 37.2 % (ref 40–53)
HGB BLD-MCNC: 12.1 G/DL (ref 13.3–17.7)
MCH RBC QN AUTO: 32.4 PG (ref 26.5–33)
MCHC RBC AUTO-ENTMCNC: 32.5 G/DL (ref 31.5–36.5)
MCV RBC AUTO: 100 FL (ref 78–100)
PLATELET # BLD AUTO: 288 10E9/L (ref 150–450)
RBC # BLD AUTO: 3.74 10E12/L (ref 4.4–5.9)
TSH SERPL DL<=0.005 MIU/L-ACNC: 0.92 MU/L (ref 0.4–4)
URATE SERPL-MCNC: 7.1 MG/DL (ref 3.5–7.2)
WBC # BLD AUTO: 9.1 10E9/L (ref 4–11)

## 2018-08-09 PROCEDURE — 99496 TRANSJ CARE MGMT HIGH F2F 7D: CPT | Performed by: INTERNAL MEDICINE

## 2018-08-09 PROCEDURE — 85027 COMPLETE CBC AUTOMATED: CPT | Performed by: INTERNAL MEDICINE

## 2018-08-09 PROCEDURE — 36415 COLL VENOUS BLD VENIPUNCTURE: CPT | Performed by: INTERNAL MEDICINE

## 2018-08-09 PROCEDURE — 84443 ASSAY THYROID STIM HORMONE: CPT | Performed by: INTERNAL MEDICINE

## 2018-08-09 PROCEDURE — 84550 ASSAY OF BLOOD/URIC ACID: CPT | Performed by: INTERNAL MEDICINE

## 2018-08-09 RX ORDER — PREDNISONE 20 MG/1
20 TABLET ORAL DAILY
Qty: 5 TABLET | Refills: 0 | Status: SHIPPED | OUTPATIENT
Start: 2018-08-09 | End: 2018-08-14

## 2018-08-09 NOTE — ANESTHESIA POSTPROCEDURE EVALUATION
Patient: Gibson Villalta    Procedure(s):  gastroscopy BIOPSYSHOULD BE SENT TO LAB IN NS NOT FORMALIN PER  - Wound Class: II-Clean Contaminated    Diagnosis:gastric lymphoma  Diagnosis Additional Information: No value filed.    Anesthesia Type:  General, RSI, ETT    Note:  Anesthesia Post Evaluation    Patient location during evaluation: PACU  Patient participation: Able to fully participate in evaluation  Level of consciousness: awake  Pain management: adequate  Airway patency: patent  Cardiovascular status: acceptable  Respiratory status: acceptable  Hydration status: acceptable  PONV: none     Anesthetic complications: None          Last vitals:  Vitals:    08/02/18 1310 08/02/18 1330 08/02/18 1545   BP: 100/65 120/73 123/76   Pulse:      Resp: 10 18 16   Temp:  36.3  C (97.3  F) 36.6  C (97.8  F)   SpO2: 97% 96% 95%         Electronically Signed By: Sara Hess MD, MD  August 9, 2018  3:06 PM

## 2018-08-09 NOTE — PATIENT INSTRUCTIONS
Lab work today and I'll check with [facility] about next steps    Keep appointment with urology as scheduled    Tylenol or ice for knee and if gout test is abnormal, then I'll treat accordingly

## 2018-08-09 NOTE — MR AVS SNAPSHOT
After Visit Summary   8/9/2018    Gibson Villalta    MRN: [id number]           Patient Information     Date Of Birth          1940        Visit Information        Provider Department      8/9/2018 12:00 PM Flori Reyes DO [facility] [place]        Today's Diagnoses     Hospital discharge follow-up    -  1    Gastric mass        Bladder mass        Controlled type 2 diabetes mellitus with chronic kidney disease, without long-term current use of insulin, unspecified CKD stage (H)        Dementia without behavioral disturbance, unspecified dementia type        Prepatellar bursitis of left knee          Care Instructions    Lab work today and I'll check with [facility] about next steps    Keep appointment with urology as scheduled    Tylenol or ice for knee and if gout test is abnormal, then I'll treat accordingly          Follow-ups after your visit        Additional Services     ONC/HEME ADULT REFERRAL       Your provider has referred you to: N: [place] [facility] [place] [phone]   [email or web address]  MD DAYAMI [facility]    Please be aware that coverage of these services is subject to the terms and limitations of your health insurance plan.  Call member services at your health plan with any benefit or coverage questions.      Please bring the following with you to your appointment:    (1) Any X-Rays, CTs or MRIs which have been performed.  Contact the facility where they were done to arrange for  prior to your scheduled appointment.   (2) List of current medications  (3) This referral request   (4) Any documents/labs given to you for this referral                  Your next 10 appointments already scheduled     Aug 15, 2018  1:45 PM CDT   Anticoagulation Visit with  ANTICOAGULATION CLINIC   [facility] [place] ([facility])    [address]  [place] [address] [phone]            Aug  16, 2018  4:30 PM CDT   Remote ICD Check with ALEJANDRO DCR2   [facility]   [place] ([facility])    [address]  [place] [address] [phone] OPT 2           This appointment is for a remote check of your debrillator.  This is not an appointment at the office.            Sep 07, 2018 10:00 AM CDT   Cystoscopy with Ryan Camarillo MD, UA CYF   [facility] [place] ([facility] [place])    [address]  [address]  [place] [address] [phone]              Who to contact     If you have questions or need follow up information about today's clinic visit or your schedule please contact [facility] directly at [phone].  Normal or non-critical lab and imaging results will be communicated to you by Transactishart, letter or phone within 4 business days after the clinic has received the results. If you do not hear from us within 7 days, please contact the clinic through Transactishart or phone. If you have a critical or abnormal lab result, we will notify you by phone as soon as possible.  Submit refill requests through [employer] or call your pharmacy and they will forward the refill request to us. Please allow 3 business days for your refill to be completed.          Additional Information About Your Visit        [employer] Information     [employer] gives you secure access to your electronic health record. If you see a primary care provider, you can also send messages to your care team and make appointments. If you have questions, please call your primary care clinic.  If you do not have a primary care provider, please call [phone] and they will assist you.        Care EveryWhere ID     This is your Care EveryWhere ID. This could be used by other organizations to access your [place] medical records  [id number]        Your Vitals Were     Pulse Temperature Respirations Pulse Oximetry BMI (Body Mass Index)       104 97.3   F (36.3  C) (Oral) 16 94% 29.55 kg/m2        Blood Pressure from Last 3 Encounters:   08/09/18 118/80   08/04/18 118/72   08/02/18 123/76    Weight from Last 3 Encounters:   08/09/18 224 lb (101.6 kg)   08/02/18 223 lb 8.7 oz (101.4 kg)   05/17/18 232 lb (105.2 kg)              We Performed the Following     CBC with platelets     ONC/HEME ADULT REFERRAL     TSH with free T4 reflex     Uric acid          Today's Medication Changes          These changes are accurate as of 8/9/18 12:50 PM.  If you have any questions, ask your nurse or doctor.               These medicines have changed or have updated prescriptions.        Dose/Directions    metoprolol succinate 50 MG 24 hr tablet   Commonly known as:  TOPROL-XL   This may have changed:  when to take this   Used for:  Cardiomyopathy (H)        Dose:  50 mg   Take 1 tablet (50 mg) by mouth daily   Quantity:  90 tablet   Refills:  1                Primary Care Provider Office Phone # Fax #    Flori Farooqfrances Reyes,  [phone] [address]  [facility] [address]        Equal Access to Services     [facility] AH: Hadii swetha faith Sonathalie, waaxda luqadaha, qaybta kaalmada adedave, rusty doty . So [facility] [phone].    ATENCIÓN: Si habla español, tiene a de luna disposición servicios gratuitos de asistencia lingüística. Llame al [phone].    We comply with applicable federal civil rights laws and [place] laws. We do not discriminate on the basis of race, color, national origin, age, disability, sex, sexual orientation, or gender identity.            Thank you!     Thank you for choosing [facility]  for your care. Our goal is always to provide you with excellent care. Hearing back from our patients is one way we can continue to improve our services. Please take a few minutes to complete the written survey that you may receive in the mail after your visit with us. Thank you!             Your Updated  Medication List - Protect others around you: Learn how to safely use, store and throw away your medicines at www.disposemymeds.org.          This list is accurate as of 8/9/18 12:50 PM.  Always use your most recent med list.                   Brand Name Dispense Instructions for use Diagnosis    ACE/ARB/ARNI NOT PRESCRIBED (INTENTIONAL)      ACE & ARB not prescribed due to Symptomatic hypotension not due to excessive diuresis    HFrEF (heart failure with reduced ejection fraction) (H)       ASPIRIN NOT PRESCRIBED    INTENTIONAL    0 each    Antiplatelet medication not prescribed intentionally due to Current anticoagulant therapy (warfarin/enoxaparin)    HFrEF (heart failure with reduced ejection fraction) (H)       atorvastatin 40 MG tablet    LIPITOR    90 tablet    Take 1 tablet (40 mg) by mouth At Bedtime    Hyperlipidemia LDL goal <130       COENZYME Q-10 PO      Take 200 mg by mouth daily        ferrous sulfate 325 (65 Fe) MG tablet    IRON     Take 325 mg by mouth daily        furosemide 20 MG tablet    LASIX    45 tablet    Take 0.5 tablets (10 mg) by mouth daily    Chronic systolic heart failure (H)       hydrALAZINE 10 MG tablet    APRESOLINE    270 tablet    Take 1 tablet (10 mg) by mouth 3 times daily    Chronic systolic heart failure (H), Cardiomyopathy (H)       isosorbide mononitrate 30 MG 24 hr tablet    IMDUR    45 tablet    Take 0.5 tablets (15 mg) by mouth daily    Chronic systolic heart failure (H), Cardiomyopathy (H)       metoprolol succinate 50 MG 24 hr tablet    TOPROL-XL    90 tablet    Take 1 tablet (50 mg) by mouth daily    Cardiomyopathy (H)       Multi-vitamin Tabs tablet      Take 1 tablet by mouth daily        venlafaxine 75 MG tablet    EFFEXOR    180 tablet    TAKE 1 TABLET TWICE DAILY    Anxiety and depression       vitamin D 07706 UNIT capsule    ERGOCALCIFEROL    12 capsule    TAKE 1 CAPSULE ONCE A WEEK    Vitamin D deficiency

## 2018-08-09 NOTE — PROGRESS NOTES
SUBJECTIVE:   Gibson Villalta is a 77 year old male who presents to clinic today for the following health issues:      Hospital Follow-up Visit:    Hospital/Nursing Home/IP Rehab Facility: [facility]  Date of Admission: 7/30/18  Date of Discharge: 8/2/18  Reason(s) for Admission: Rectal Bleeding             Problems taking medications regularly:  None       Medication changes since discharge: coumadin was removed from patient's med list        Problems adhering to non-medication therapy:  None    Summary of hospitalization:  [facility] discharge summary reviewed  Diagnostic Tests/Treatments reviewed.  Follow up needed: clinical follow up and labs  Other Healthcare Providers Involved in Patient s Care:         Specialist appointment - oncology  Update since discharge: improved.     Post Discharge Medication Reconciliation: discharge medications reconciled, continue medications without change.  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 Also gross hematuria concerning for bladder cancer. Coumadin discontinued as a result after review of risk vs benefits.    No recurrent melena, no abdominal pain, and he feels good. He is pleasant but with dementia he doesn't remember details of hospitalization. No more hematuria either.  Still taking iron once daily. No B12 supplement. Takes MVI.     Son has gout and he has acutely inflamed left knee.    Nasal septal bleeding periodically too and has seen ENT.    Problem list and histories reviewed & adjusted, as indicated.      ROS:  Detailed as above     OBJECTIVE:     /80 (BP  Location: Right arm, Patient Position: Sitting, Cuff Size: Adult Large)  Pulse 104  Temp 97.3  F (36.3  C) (Oral)  Resp 16  Wt 224 lb (101.6 kg)  SpO2 94%  BMI 29.55 kg/m2  Body mass index is 29.55 kg/(m^2).  Alert, jovial, NAD, not pale  Nasal septum irritation on left but no active bleeding  No pain over epigastric area  Left knee with findings concerning for prepatellar bursitis - slightly warm but not erythematous and no signs of skin breakdown or pustule; does not seem to involve knee joint; walks with slight limp    Wt Readings from Last 4 Encounters:   08/09/18 224 lb (101.6 kg)   08/02/18 223 lb 8.7 oz (101.4 kg)   05/17/18 232 lb (105.2 kg)   12/04/17 236 lb (107 kg)       ASSESSMENT/PLAN:       1. Hospital discharge follow-up  Reviewed together with him and his wife recent hospitalization (which he doesn't remember details b/c of his dementia)    2. Gastric mass  S/p biopsy x 2 while hospitalized with inconclusive results each time. I called and spoke with oncologist Dr. Bender who had met him in the hospital knowing second biopsy results were inconclusive as well. She had noted that the mass truly was a fungating appearing mass so concern for malignancy still persists. She will meet with Carlita in clinic to review options in the context of this and his other comorbidities including dementia. NOTE, h/o past robel-en-Y gastric bypass. No specific recommendation was made re: GI protection.  - ONC/HEME ADULT REFERRAL  - CBC with platelets    3. Bladder mass  Did have hematuria now resolved as well but will f/u with oncology  - ONC/HEME ADULT REFERRAL  - CBC with platelets    4. Controlled type 2 diabetes mellitus with chronic kidney disease, without long-term current use of insulin, unspecified CKD stage (H)  Diet controlled; h/o bariatric surgery  Hemoglobin A1C   Date Value Ref Range Status   07/30/2018 7.1 (H) 0 - 5.6 % Final     Comment:     Normal <5.7% Prediabetes 5.7-6.4%  Diabetes 6.5%  or higher - adopted from ADA   consensus guidelines.       5. Dementia without behavioral disturbance, unspecified dementia type  Past cognitive scores: SLUMS 11 and CPT 4.1/5.7  Pleasant but forgetful; 24-7 care per family  - TSH with free T4 reflex    6. Prepatellar bursitis of left knee  Uric acid technically within normal limits but high end of normal at 7.1  No signs of infection; Rx prednisone for 5 days as can't be on NSAIDs with GI mass and recent bleeding  Urged f/u ASAP if worsening to r/o infection  Consider pseudogout given recent hospitalization and his age, but doesn't seem to affect the knee joint itself  NOTE, he also had prepatellar bursitis of left knee in Dec 2017 and saw colleague Dr. Turcios - it was aspirated but sample not sent to the lab at that time  - Uric acid    7. H/o RV (right ventricular) mural thrombus   Off of anticoagulation now after hospital team (including cardiology) considered risk vs benefits  He does follow with cardiologist DR. Hernandez annually    8. Anxiety and depression  Continues on Effexor for chronic use      Patient Instructions   Lab work today and I'll check with [facility] about next steps    Keep appointment with urology as scheduled    Tylenol or ice for knee and if gout test is abnormal, then I'll treat accordingly      Flori Reyes, DO  [facility]

## 2018-08-14 ENCOUNTER — TRANSFERRED RECORDS (OUTPATIENT)
Dept: HEALTH INFORMATION MANAGEMENT | Facility: CLINIC | Age: 78
End: 2018-08-14

## 2018-08-16 ENCOUNTER — ALLIED HEALTH/NURSE VISIT (OUTPATIENT)
Dept: CARDIOLOGY | Facility: CLINIC | Age: 78
End: 2018-08-16
Payer: COMMERCIAL

## 2018-08-16 DIAGNOSIS — Z95.810 ICD (IMPLANTABLE CARDIOVERTER-DEFIBRILLATOR) IN PLACE: Primary | ICD-10-CM

## 2018-08-16 DIAGNOSIS — I42.8 NONISCHEMIC CARDIOMYOPATHY (H): ICD-10-CM

## 2018-08-16 PROCEDURE — 93296 REM INTERROG EVL PM/IDS: CPT | Performed by: INTERNAL MEDICINE

## 2018-08-16 PROCEDURE — 93295 DEV INTERROG REMOTE 1/2/MLT: CPT | Performed by: INTERNAL MEDICINE

## 2018-08-16 NOTE — MR AVS SNAPSHOT
After Visit Summary   8/16/2018    Gibson Villalta    MRN: [id number]           Patient Information     Date Of Birth          1940        Visit Information        Provider Department      8/16/2018 4:30 PM ALEJANDRO DCR2 [facility]        Today's Diagnoses     ICD (implantable cardioverter-defibrillator) in place    -  1    Nonischemic cardiomyopathy (H)           Follow-ups after your visit        Additional Services     Follow-Up with Electrophysiologist                 Your next 10 appointments already scheduled     Aug 16, 2018  4:30 PM CDT   Remote ICD Check with ALEJANDRO DCR2   [facility] ([facility])    [address]  [facility] [address] [phone] OPT 2           This appointment is for a remote check of your debrillator.  This is not an appointment at the office.            Sep 07, 2018 10:00 AM CDT   Cystoscopy with Ryan Camarillo MD, [facility] [place] ([facility] [place])    [address]  [address]  [facility] [place] [facility] [address] [phone]            Dec 10, 2018  4:30 PM CST   Remote ICD Check with ALEJANDRO DCR2   [facility] ([facility])    [address]  [facility] [address] [phone] OPT 2           This appointment is for a remote check of your debrillator.  This is not an appointment at the office.              Future tests that were ordered for you today     Open Future Orders        Priority Expected Expires Ordered    Follow-Up with Electrophysiologist Routine 5/17/2019 8/15/2020 8/16/2018            Who to contact     If you have questions or need follow up information about today's clinic visit or your schedule please contact [facility] directly at [phone].  Normal or non-critical lab and imaging results will be  communicated to you by VIOSOhart, letter or phone within 4 business days after the clinic has received the results. If you do not hear from us within 7 days, please contact the clinic through [employer] or phone. If you have a critical or abnormal lab result, we will notify you by phone as soon as possible.  Submit refill requests through [employer] or call your pharmacy and they will forward the refill request to us. Please allow 3 business days for your refill to be completed.          Additional Information About Your Visit        [employer] Information     [employer] gives you secure access to your electronic health record. If you see a primary care provider, you can also send messages to your care team and make appointments. If you have questions, please call your primary care clinic.  If you do not have a primary care provider, please call [phone] and they will assist you.        Care EveryWhere ID     This is your Care EveryWhere ID. This could be used by other organizations to access your [place] medical records  [id number]         Blood Pressure from Last 3 Encounters:   08/09/18 118/80   08/04/18 118/72   08/02/18 123/76    Weight from Last 3 Encounters:   08/09/18 101.6 kg (224 lb)   08/02/18 101.4 kg (223 lb 8.7 oz)   05/17/18 105.2 kg (232 lb)              We Performed the Following     ICD DEVICE INTERROGAT REMOTE (80785)     INTERROGATION DEVICE EVAL REMOTE, PACER/ICD (86451)          Today's Medication Changes          These changes are accurate as of 8/16/18  7:43 AM.  If you have any questions, ask your nurse or doctor.               These medicines have changed or have updated prescriptions.        Dose/Directions    metoprolol succinate 50 MG 24 hr tablet   Commonly known as:  TOPROL-XL   This may have changed:  when to take this   Used for:  Cardiomyopathy (H)        Dose:  50 mg   Take 1 tablet (50 mg) by mouth daily   Quantity:  90 tablet   Refills:  1                Primary Care Provider Office  Phone # Fax #    Flori Reyse [phone] [address]  [facility] [address]        Equal Access to Services     BEL MCLAIN : Hadii swetha ku hadkarissao Sovalentinaali, waaxda luqadaha, qaybta kaalmada adeegyada, rusty godfrey filibertorenan morenoravindra thomas. So [facility] [phone].    ATENCIÓN: Si habla español, tiene a de luna disposición servicios gratuitos de asistencia lingüística. Llame al [phone].    We comply with applicable federal civil rights laws and [place] laws. We do not discriminate on the basis of race, color, national origin, age, disability, sex, sexual orientation, or gender identity.            Thank you!     Thank you for choosing [facility]  for your care. Our goal is always to provide you with excellent care. Hearing back from our patients is one way we can continue to improve our services. Please take a few minutes to complete the written survey that you may receive in the mail after your visit with us. Thank you!             Your Updated Medication List - Protect others around you: Learn how to safely use, store and throw away your medicines at www.disposemymeds.org.          This list is accurate as of 8/16/18  7:43 AM.  Always use your most recent med list.                   Brand Name Dispense Instructions for use Diagnosis    ACE/ARB/ARNI NOT PRESCRIBED (INTENTIONAL)      ACE & ARB not prescribed due to Symptomatic hypotension not due to excessive diuresis    HFrEF (heart failure with reduced ejection fraction) (H)       ASPIRIN NOT PRESCRIBED    INTENTIONAL    0 each    Antiplatelet medication not prescribed intentionally due to Current anticoagulant therapy (warfarin/enoxaparin)    HFrEF (heart failure with reduced ejection fraction) (H)       atorvastatin 40 MG tablet    LIPITOR    90 tablet    Take 1 tablet (40 mg) by mouth At Bedtime    Hyperlipidemia LDL goal <130       COENZYME Q-10 PO      Take 200 mg by mouth daily         ferrous sulfate 325 (65 Fe) MG tablet    IRON     Take 325 mg by mouth daily        furosemide 20 MG tablet    LASIX    45 tablet    Take 0.5 tablets (10 mg) by mouth daily    Chronic systolic heart failure (H)       hydrALAZINE 10 MG tablet    APRESOLINE    270 tablet    Take 1 tablet (10 mg) by mouth 3 times daily    Chronic systolic heart failure (H), Cardiomyopathy (H)       isosorbide mononitrate 30 MG 24 hr tablet    IMDUR    45 tablet    Take 0.5 tablets (15 mg) by mouth daily    Chronic systolic heart failure (H), Cardiomyopathy (H)       metoprolol succinate 50 MG 24 hr tablet    TOPROL-XL    90 tablet    Take 1 tablet (50 mg) by mouth daily    Cardiomyopathy (H)       Multi-vitamin Tabs tablet      Take 1 tablet by mouth daily        venlafaxine 75 MG tablet    EFFEXOR    180 tablet    TAKE 1 TABLET TWICE DAILY    Anxiety and depression       vitamin D 27416 UNIT capsule    ERGOCALCIFEROL    12 capsule    TAKE 1 CAPSULE ONCE A WEEK    Vitamin D deficiency

## 2018-08-16 NOTE — PROGRESS NOTES
Medtronic Amplia Quad  CRT-ICD Remote Device Check    AP: 5 % BiVP: 99 %  Mode: DDDR         Presenting Rhythm: AS/BiVP  Heart Rate: good variability, patient is active 0.6 hours/day  Sensing: WNL    Pacing Threshold: WNL    Impedance: WNL  Battery Status: 7.3 years  Atrial Arrhythmia: none  Ventricular Arrhythmia: none  ATP: 0    Shocks: 0    Care Plan: next remote on 12/10, placed orders for leonel in May/2019. Letter sent with results and plan. SK

## 2018-08-16 NOTE — LETTER
Gibson Villalta    [address]  [place] [address]    August 16, 2018      Dear Gibson Villalta,     Your transmission sent on 8/16/18 has been reviewed. It was determined the results are normal. No events were detected.     _X____ Your next scheduled date for you to send a transmission is on 12/10/18.      Please call [facility] at [phone] to change your appointment if needed. You may call and leave a message for a device RN if you have any questions at [phone] and they will return your call. Device clinic hours: Monday - Friday  8:00am-4:00pm   Sincerely,   MILLI

## 2018-08-20 DIAGNOSIS — I42.9 CARDIOMYOPATHY (H): ICD-10-CM

## 2018-08-21 NOTE — TELEPHONE ENCOUNTER
"Last Written Prescription Date:  2/15/18  Last Fill Quantity: 90 tablet,  # refills: 1   Last office visit: 8/9/2018 with prescribing provider:  Amy   Future Office Visit:      Requested Prescriptions   Pending Prescriptions Disp Refills     metoprolol succinate (TOPROL-XL) 50 MG 24 hr tablet [Pharmacy Med Name: METOPROLOL SUCCINATE ER 50 MG Tablet Extended Release 24 Hour] 90 tablet 1     Sig: TAKE 1 TABLET (50 MG) DAILY    Beta-Blockers Protocol Passed    8/20/2018  7:08 PM       Passed - Blood pressure under 140/90 in past 12 months    BP Readings from Last 3 Encounters:   08/09/18 118/80   08/04/18 118/72   08/02/18 123/76                Passed - Patient is age 6 or older       Passed - Recent (12 mo) or future (30 days) visit within the authorizing provider's specialty    Patient had office visit in the last 12 months or has a visit in the next 30 days with authorizing provider or within the authorizing provider's specialty.  See \"Patient Info\" tab in inbasket, or \"Choose Columns\" in Meds & Orders section of the refill encounter.              "

## 2018-08-22 RX ORDER — METOPROLOL SUCCINATE 50 MG/1
TABLET, EXTENDED RELEASE ORAL
Qty: 90 TABLET | Refills: 3 | Status: ON HOLD | OUTPATIENT
Start: 2018-08-22 | End: 2019-10-24

## 2018-08-22 NOTE — TELEPHONE ENCOUNTER
Prescription approved per [facility] Refill Protocol.    Cholo BURLESON RN

## 2018-08-29 DIAGNOSIS — E78.5 HYPERLIPIDEMIA LDL GOAL <130: ICD-10-CM

## 2018-08-29 NOTE — TELEPHONE ENCOUNTER
"atorvastatin (LIPITOR) 40 MG tablet 90 tablet 1 2/15/2018         Last Written Prescription Date:  02/15/2018  Last Fill Quantity: 90,  # refills: 1   Last office visit: 8/9/2018 with prescribing provider:     Future Office Visit:  Unknown    Requested Prescriptions   Pending Prescriptions Disp Refills     atorvastatin (LIPITOR) 40 MG tablet [Pharmacy Med Name: ATORVASTATIN CALCIUM 40 MG Tablet] 90 tablet 1     Sig: TAKE 1 TABLET AT BEDTIME    Statins Protocol Failed    8/29/2018  2:31 PM       Failed - LDL on file in past 12 months    Recent Labs   Lab Test  02/07/17   0817   LDL  93            Passed - No abnormal creatine kinase in past 12 months    No lab results found.            Passed - Recent (12 mo) or future (30 days) visit within the authorizing provider's specialty    Patient had office visit in the last 12 months or has a visit in the next 30 days with authorizing provider or within the authorizing provider's specialty.  See \"Patient Info\" tab in inbasket, or \"Choose Columns\" in Meds & Orders section of the refill encounter.           Passed - Patient is age 18 or older          "

## 2018-08-30 DIAGNOSIS — R31.9 HEMATURIA: Primary | ICD-10-CM

## 2018-08-30 RX ORDER — ATORVASTATIN CALCIUM 40 MG/1
TABLET, FILM COATED ORAL
Qty: 90 TABLET | Refills: 3 | Status: SHIPPED | OUTPATIENT
Start: 2018-08-30 | End: 2019-04-26

## 2018-08-30 NOTE — TELEPHONE ENCOUNTER
Routing refill request to provider for review/approval because:  Labs not current:  LDL  Vicki Azevedo RN  [employer] [facility]

## 2018-09-05 DIAGNOSIS — I42.9 CARDIOMYOPATHY (H): ICD-10-CM

## 2018-09-05 DIAGNOSIS — I50.22 CHRONIC SYSTOLIC HEART FAILURE (H): ICD-10-CM

## 2018-09-05 RX ORDER — HYDRALAZINE HYDROCHLORIDE 10 MG/1
10 TABLET, FILM COATED ORAL 3 TIMES DAILY
Qty: 270 TABLET | Refills: 1 | Status: ON HOLD | OUTPATIENT
Start: 2018-09-05 | End: 2018-10-16

## 2018-09-07 ENCOUNTER — OFFICE VISIT (OUTPATIENT)
Dept: UROLOGY | Facility: CLINIC | Age: 78
End: 2018-09-07
Payer: COMMERCIAL

## 2018-09-07 VITALS
HEIGHT: 73 IN | DIASTOLIC BLOOD PRESSURE: 80 MMHG | WEIGHT: 224 LBS | BODY MASS INDEX: 29.69 KG/M2 | SYSTOLIC BLOOD PRESSURE: 118 MMHG

## 2018-09-07 DIAGNOSIS — F03.90 DEMENTIA WITHOUT BEHAVIORAL DISTURBANCE, UNSPECIFIED DEMENTIA TYPE: Chronic | ICD-10-CM

## 2018-09-07 DIAGNOSIS — N32.89 BLADDER MASS: Primary | ICD-10-CM

## 2018-09-07 DIAGNOSIS — R31.0 GROSS HEMATURIA: ICD-10-CM

## 2018-09-07 LAB
ALBUMIN UR-MCNC: 100 MG/DL
APPEARANCE UR: CLEAR
BILIRUB UR QL STRIP: NEGATIVE
COLOR UR AUTO: YELLOW
GLUCOSE UR STRIP-MCNC: NEGATIVE MG/DL
HGB UR QL STRIP: ABNORMAL
KETONES UR STRIP-MCNC: NEGATIVE MG/DL
LEUKOCYTE ESTERASE UR QL STRIP: ABNORMAL
NITRATE UR QL: NEGATIVE
PH UR STRIP: 6 PH (ref 5–7)
SOURCE: ABNORMAL
SP GR UR STRIP: 1.02 (ref 1–1.03)
UROBILINOGEN UR STRIP-ACNC: 0.2 EU/DL (ref 0.2–1)

## 2018-09-07 PROCEDURE — 52000 CYSTOURETHROSCOPY: CPT | Performed by: UROLOGY

## 2018-09-07 PROCEDURE — 99204 OFFICE O/P NEW MOD 45 MIN: CPT | Mod: 25 | Performed by: UROLOGY

## 2018-09-07 PROCEDURE — 81003 URINALYSIS AUTO W/O SCOPE: CPT | Performed by: UROLOGY

## 2018-09-07 ASSESSMENT — PAIN SCALES - GENERAL: PAINLEVEL: NO PAIN (0)

## 2018-09-07 NOTE — PROGRESS NOTES
Urology Consult History and Physical    Name: Gibson Villalta    MRN: [id number]   YOB: 1940       We were asked to see Gibson Villalta at the request of Dr. Reyes for evaluation and treatment of Bladder masses.        Chief Complaint:   Bladder masses    History is obtained from the patient and his wife.            History of Present Illness:   Gibson Villalta is a 77 year old male who is being seen for evaluation of bladder masses. He also has history of dementia, RV and LV mural thrombus on chronic anticoagulation with Coumadin, s/p Smita-en-Y gastric bypass, hypertension, chronic kidney disease, CAD, cardiomyopathy with EF 25-30%, diabetes who presents with rectal bleeding/melena and now found to have gastric mass and bladder lesions.    He had a CT completed at the end of July secondary to blood in the stool which noted some suspicious masses in the bladder. He then had one episode of possible gross hematuria following this. He had upper endoscopy for the GI bleed with work up ongoing.     He does have 40 pack year history, quit in the 80's.     (4 or >)  Location: bladder   Quality: gross  Duration: since July  Asso. Sign/symp: significant smoking history           Past Medical History:     Past Medical History:   Diagnosis Date     Acute kidney injury (H) 2012     Anemia      Anxiety      BPH (benign prostatic hypertrophy)      Carpal tunnel syndrome     Right     Chronic kidney disease (CKD), stage 3 (moderate)      Dementia      Gout      History of depression      LBBB (left bundle branch block) 2013     Lumbar herniated disc     L5-S1     Mixed cardiomyopathy 7/22/2016     Myocardial infarction 1995 and 2010     Nonischemic cardiomyopathy (H) 7/22/2016     Obesity      Rosacea      Rotator cuff disorder     Tear x 2     RV (right ventricular) mural thrombus 7/22/2016            Past Surgical History:     Past Surgical History:   Procedure Laterality Date     ANGIOPLASTY  1995  and  with stent     ESOPHAGOSCOPY, GASTROSCOPY, DUODENOSCOPY (EGD), COMBINED N/A 2018    Procedure: COMBINED ESOPHAGOSCOPY, GASTROSCOPY, DUODENOSCOPY (EGD), BIOPSY SINGLE OR MULTIPLE;  gastroscopy;  Surgeon: Parish Xiong MD;  Location:  GI     ESOPHAGOSCOPY, GASTROSCOPY, DUODENOSCOPY (EGD), COMBINED N/A 2018    Procedure: COMBINED ESOPHAGOSCOPY, GASTROSCOPY, DUODENOSCOPY (EGD);  EGD;  Surgeon: Parish Xiong MD;  Location:  GI     ESOPHAGOSCOPY, GASTROSCOPY, DUODENOSCOPY (EGD), COMBINED N/A 2018    Procedure: COMBINED ESOPHAGOSCOPY, GASTROSCOPY, DUODENOSCOPY (EGD), BIOPSY SINGLE OR MULTIPLE;  gastroscopy BIOPSYSHOULD BE SENT TO LAB IN NS NOT FORMALIN PER ;  Surgeon: Jonathon Bush MD;  Location:  GI     GASTRIC BYPASS       HEART CATH LEFT HEART CATH  16    Non ischemic cardiomyopathy; Non functionally significant LAD and RCA disease      OTHER SURGICAL HISTORY      Spinal surgery     OTHER SURGICAL HISTORY  2016    CRT-D implantation            Social History:     Social History   Substance Use Topics     Smoking status: Former Smoker     Packs/day: 2.00     Years: 20.00     Types: Cigarettes     Quit date: 1980     Smokeless tobacco: Never Used      Comment: Quit in his 30s - 2 ppd for 20 years     Alcohol use 0.0 oz/week     0 Standard drinks or equivalent per week      Comment: maybe 1 beer a month       History   Smoking Status     Former Smoker     Packs/day: 2.00     Years: 20.00     Types: Cigarettes     Quit date: 1980   Smokeless Tobacco     Never Used     Comment: Quit in his 30s - 2 ppd for 20 years            Family History:     Family History   Problem Relation Age of Onset     Coronary Artery Disease Father 39     Alzheimer Disease Mother      Coronary Artery Disease Son      Two sons have  from MIs (ages 39 and 51)              Allergies:     Allergies   Allergen Reactions     Wasps [Hornets]      Sand wasp --- years ago.               Medications:     Current Outpatient Prescriptions   Medication Sig     ACE/ARB NOT PRESCRIBED, INTENTIONAL, ACE & ARB not prescribed due to Symptomatic hypotension not due to excessive diuresis     ASPIRIN NOT PRESCRIBED (INTENTIONAL) Antiplatelet medication not prescribed intentionally due to Current anticoagulant therapy (warfarin/enoxaparin)     atorvastatin (LIPITOR) 40 MG tablet TAKE 1 TABLET AT BEDTIME     COENZYME Q-10 PO Take 200 mg by mouth daily      ferrous sulfate (IRON) 325 (65 FE) MG tablet Take 325 mg by mouth daily     furosemide (LASIX) 20 MG tablet Take 0.5 tablets (10 mg) by mouth daily     hydrALAZINE (APRESOLINE) 10 MG tablet Take 1 tablet (10 mg) by mouth 3 times daily     isosorbide mononitrate (IMDUR) 30 MG 24 hr tablet Take 0.5 tablets (15 mg) by mouth daily     metoprolol succinate (TOPROL-XL) 50 MG 24 hr tablet TAKE 1 TABLET (50 MG) DAILY     multivitamin, therapeutic with minerals (MULTI-VITAMIN) TABS Take 1 tablet by mouth daily     venlafaxine (EFFEXOR) 75 MG tablet TAKE 1 TABLET TWICE DAILY     vitamin D (ERGOCALCIFEROL) 36360 UNIT capsule TAKE 1 CAPSULE ONCE A WEEK (Patient not taking: Reported on 9/7/2018)     No current facility-administered medications for this visit.              Review of Systems:     Skin: negative  Eyes: negative  Ears/Nose/Throat: negative  Respiratory: No shortness of breath, dyspnea on exertion, cough, or hemoptysis  Cardiovascular: negative  Gastrointestinal: as above  Genitourinary: as above  Musculoskeletal: negative  Neurologic: dementia  Psychiatric: negative  Hematologic/Lymphatic/Immunologic: negative  Endocrine: negative          Physical Exam:     No data found.    Body mass index is 29.55 kg/(m^2).     General: age-appropriate appearing male in NAD  HEENT: Head AT/NC, EOMI, CN Grossly intact  Lungs: no respiratory distress, or pursed lip breathing  Heart: No obvious jugular venous distension present  Back: no bony midline tenderness, no CVAT  bilaterally.  Abdomen: soft, non-distended, non-tender. No organomegaly  : normal male external genitalia.   Lymph: no palpable inguinal lymphadenopathy.  LE: no edema.   Musculoskeltal: extremities normal, no peripheral edema  Skin: no suspicious lesions or rashes  Neuro: Alert, oriented, mild confusion with question repeating; moving all 4 extremities equally.  Psych: affect and mood normal          Data:   All laboratory data reviewed:    UA RESULTS:  Recent Labs   Lab Test  09/07/18   1012  08/04/18   1356   COLOR  Yellow  Brown   APPEARANCE  Clear  Cloudy   URINEGLC  Negative  Negative   URINEBILI  Negative  Small*   URINEKETONE  Negative  Trace*   SG  1.025  1.025   UBLD  Large*  Large*   URINEPH  6.0  5.5   PROTEIN  100*  >=300*   UROBILINOGEN  0.2  0.2   NITRITE  Negative  Negative   LEUKEST  Trace*  Negative   RBCU   --   >100*   WBCU   --   0 - 5        PSA   Date Value Ref Range Status   12/22/2015 1.79 ng/mL Final      Lab Results   Component Value Date    CR 1.23 08/02/2018        All pertinent imaging reviewed:    All imaging studies reviewed by me.  I personally reviewed these imaging films.      A formal report from radiology will follow.    CT Abd/Pel 7/30/18  IMPRESSION:   1. Ill-defined soft tissue thickening adjacent to the gastric bypass  postoperative changes in the stomach have increased in prominence  since the previous exam. This finding could be a normal variant  postoperative appearance; however, development of gastric neoplasm  cannot be excluded.  2. There is fluid and higher density material within the excluded  portion of the stomach, of greater volume than would typically be seen  in the setting of a gastric bypass. This higher density material  within the stomach is of uncertain etiology and could represent  debris, ingested food, or possibly blood products. No definite  communication between the gastric remnant and the excluded portion of  the stomach is visualized.  3. Two  bladder wall lesions appear to enhance, and the larger of these  on the right posteriorly is new since 8/9/2016, suspicious for bladder  neoplasm such as transitional cell carcinoma. Urologic consultation is  recommended. Cystoscopy should be considered for further evaluation.             Impression and Plan:   Impression:   78 y/o man with complex medical history significant for dementia, RV and LV mural thrombus on chronic anticoagulation with Coumadin, s/p Smita-en-Y gastric bypass, hypertension, chronic kidney disease, CAD, cardiomyopathy with EF 25-30%, diabetes who presents with rectal bleeding/melena and now found to have gastric mass and bladder lesions.      Plan:   Bladder mass and gross hematuria  - Urine cytology today  - Cystoscopy today which confirmed the presence of the right sided bladder mass. This is concerning for neoplasm and required transurethral resection of the bladder tumor (TURBT)  - We discussed the risks and benefits of surgery and will schedule for the near future    Gastric mass  - This work up is on going with Dr. Xiong and following TURBT pathology results we will discuss care goals    Dementia  - Stable and manages will with wife  - Currently living at home with his wife     Thank you for the kind consultation.    Time spent: 45 minutes of which >50% was spent counseling.    Ryan Camarillo MD   Urology  [facility]  [facility] Phone: [phone]  [facility] Phone: [phone]

## 2018-09-07 NOTE — NURSING NOTE
Chief Complaint   Patient presents with     hx of gross hematuria     pt. is here for cystoscopy due to bladder tumor      UA RESULTS:  Recent Labs   Lab Test  09/07/18   1012  08/04/18   1356   COLOR  Yellow  Brown   APPEARANCE  Clear  Cloudy   URINEGLC  Negative  Negative   URINEBILI  Negative  Small*   URINEKETONE  Negative  Trace*   SG  1.025  1.025   UBLD  Large*  Large*   URINEPH  6.0  5.5   PROTEIN  100*  >=300*   UROBILINOGEN  0.2  0.2   NITRITE  Negative  Negative   LEUKEST  Trace*  Negative   RBCU   --   >100*   WBCU   --   0 - 5       Prior to the start of the procedure and with procedural staff participation, I verbally confirmed the patient s identity using two indicators, relevant allergies, that the procedure was appropriate and matched the consent or emergent situation, and that the correct equipment/implants were available. Immediately prior to starting the procedure I conducted the Time Out with the procedural staff and re-confirmed the patient s name, procedure, and site/side. I have wiped the patient off with the povidone-Iodine solution, draped them,  used Lidocaine hydrochloride jelly, and instilled sterile water into the bladder. (The Joint Commission universal protocol was followed.)  Yes    Sedation (Moderate or Deep): None

## 2018-09-07 NOTE — LETTER
9/7/2018       RE: Gibson Villalta  [address]  [place] [address]     Dear Colleague,    Thank you for referring your patient, Gibson Villalta, to the [facility] [place] [facility]. Please see a copy of my visit note below.    Urology Consult History and Physical    Name: Gibson Villalta    MRN: [id number]   YOB: 1940       We were asked to see Gibson Villalta at the request of Dr. Reyes for evaluation and treatment of Bladder masses.        Chief Complaint:   Bladder masses    History is obtained from the patient and his wife.            History of Present Illness:   Gibson Villalta is a 77 year old male who is being seen for evaluation of bladder masses. He also has history of dementia, RV and LV mural thrombus on chronic anticoagulation with Coumadin, s/p Smita-en-Y gastric bypass, hypertension, chronic kidney disease, CAD, cardiomyopathy with EF 25-30%, diabetes who presents with rectal bleeding/melena and now found to have gastric mass and bladder lesions.    He had a CT completed at the end of July secondary to blood in the stool which noted some suspicious masses in the bladder. He then had one episode of possible gross hematuria following this. He had upper endoscopy for the GI bleed with work up ongoing.     He does have 40 pack year history, quit in the 80's.     (4 or >)  Location: bladder   Quality: gross  Duration: since July  Asso. Sign/symp: significant smoking history           Past Medical History:     Past Medical History:   Diagnosis Date     Acute kidney injury (H) 2012     Anemia      Anxiety      BPH (benign prostatic hypertrophy)      Carpal tunnel syndrome     Right     Chronic kidney disease (CKD), stage 3 (moderate)      Dementia      Gout      History of depression      LBBB (left bundle branch block) 2013     Lumbar herniated disc     L5-S1     Mixed  cardiomyopathy 7/22/2016     Myocardial infarction 1995 and 2010     Nonischemic cardiomyopathy (H) 7/22/2016     Obesity      Rosacea      Rotator cuff disorder     Tear x 2     RV (right ventricular) mural thrombus 7/22/2016            Past Surgical History:     Past Surgical History:   Procedure Laterality Date     ANGIOPLASTY  1995 and 2010 with stent     ESOPHAGOSCOPY, GASTROSCOPY, DUODENOSCOPY (EGD), COMBINED N/A 7/30/2018    Procedure: COMBINED ESOPHAGOSCOPY, GASTROSCOPY, DUODENOSCOPY (EGD), BIOPSY SINGLE OR MULTIPLE;  gastroscopy;  Surgeon: Parish Xiong MD;  Location:  GI     ESOPHAGOSCOPY, GASTROSCOPY, DUODENOSCOPY (EGD), COMBINED N/A 7/30/2018    Procedure: COMBINED ESOPHAGOSCOPY, GASTROSCOPY, DUODENOSCOPY (EGD);  EGD;  Surgeon: Praish Xiong MD;  Location:  GI     ESOPHAGOSCOPY, GASTROSCOPY, DUODENOSCOPY (EGD), COMBINED N/A 8/2/2018    Procedure: COMBINED ESOPHAGOSCOPY, GASTROSCOPY, DUODENOSCOPY (EGD), BIOPSY SINGLE OR MULTIPLE;  gastroscopy BIOPSYSHOULD BE SENT TO LAB IN NS NOT FORMALIN PER ;  Surgeon: Jonathon Bush MD;  Location:  GI     GASTRIC BYPASS  2001     HEART CATH LEFT HEART CATH  7/19/16    Non ischemic cardiomyopathy; Non functionally significant LAD and RCA disease      OTHER SURGICAL HISTORY  2006    Spinal surgery     OTHER SURGICAL HISTORY  Nov 2016    CRT-D implantation            Social History:     Social History   Substance Use Topics     Smoking status: Former Smoker     Packs/day: 2.00     Years: 20.00     Types: Cigarettes     Quit date: 1/1/1980     Smokeless tobacco: Never Used      Comment: Quit in his 30s - 2 ppd for 20 years     Alcohol use 0.0 oz/week     0 Standard drinks or equivalent per week      Comment: maybe 1 beer a month       History   Smoking Status     Former Smoker     Packs/day: 2.00     Years: 20.00     Types: Cigarettes     Quit date: 1/1/1980   Smokeless Tobacco     Never Used     Comment: Quit in his 30s - 2 ppd for 20 years             Family History:     Family History   Problem Relation Age of Onset     Coronary Artery Disease Father 39     Alzheimer Disease Mother      Coronary Artery Disease Son      Two sons have  from MIs (ages 39 and 51)              Allergies:     Allergies   Allergen Reactions     Wasps [Hornets]      Sand wasp --- years ago.              Medications:     Current Outpatient Prescriptions   Medication Sig     ACE/ARB NOT PRESCRIBED, INTENTIONAL, ACE & ARB not prescribed due to Symptomatic hypotension not due to excessive diuresis     ASPIRIN NOT PRESCRIBED (INTENTIONAL) Antiplatelet medication not prescribed intentionally due to Current anticoagulant therapy (warfarin/enoxaparin)     atorvastatin (LIPITOR) 40 MG tablet TAKE 1 TABLET AT BEDTIME     COENZYME Q-10 PO Take 200 mg by mouth daily      ferrous sulfate (IRON) 325 (65 FE) MG tablet Take 325 mg by mouth daily     furosemide (LASIX) 20 MG tablet Take 0.5 tablets (10 mg) by mouth daily     hydrALAZINE (APRESOLINE) 10 MG tablet Take 1 tablet (10 mg) by mouth 3 times daily     isosorbide mononitrate (IMDUR) 30 MG 24 hr tablet Take 0.5 tablets (15 mg) by mouth daily     metoprolol succinate (TOPROL-XL) 50 MG 24 hr tablet TAKE 1 TABLET (50 MG) DAILY     multivitamin, therapeutic with minerals (MULTI-VITAMIN) TABS Take 1 tablet by mouth daily     venlafaxine (EFFEXOR) 75 MG tablet TAKE 1 TABLET TWICE DAILY     vitamin D (ERGOCALCIFEROL) 62466 UNIT capsule TAKE 1 CAPSULE ONCE A WEEK (Patient not taking: Reported on 2018)     No current facility-administered medications for this visit.              Review of Systems:     Skin: negative  Eyes: negative  Ears/Nose/Throat: negative  Respiratory: No shortness of breath, dyspnea on exertion, cough, or hemoptysis  Cardiovascular: negative  Gastrointestinal: as above  Genitourinary: as above  Musculoskeletal: negative  Neurologic: dementia  Psychiatric: negative  Hematologic/Lymphatic/Immunologic: negative  Endocrine:  negative          Physical Exam:     No data found.    Body mass index is 29.55 kg/(m^2).     General: age-appropriate appearing male in NAD  HEENT: Head AT/NC, EOMI, CN Grossly intact  Lungs: no respiratory distress, or pursed lip breathing  Heart: No obvious jugular venous distension present  Back: no bony midline tenderness, no CVAT bilaterally.  Abdomen: soft, non-distended, non-tender. No organomegaly  : normal male external genitalia.   Lymph: no palpable inguinal lymphadenopathy.  LE: no edema.   Musculoskeltal: extremities normal, no peripheral edema  Skin: no suspicious lesions or rashes  Neuro: Alert, oriented, mild confusion with question repeating; moving all 4 extremities equally.  Psych: affect and mood normal          Data:   All laboratory data reviewed:    UA RESULTS:  Recent Labs   Lab Test  09/07/18   1012  08/04/18   1356   COLOR  Yellow  Brown   APPEARANCE  Clear  Cloudy   URINEGLC  Negative  Negative   URINEBILI  Negative  Small*   URINEKETONE  Negative  Trace*   SG  1.025  1.025   UBLD  Large*  Large*   URINEPH  6.0  5.5   PROTEIN  100*  >=300*   UROBILINOGEN  0.2  0.2   NITRITE  Negative  Negative   LEUKEST  Trace*  Negative   RBCU   --   >100*   WBCU   --   0 - 5        PSA   Date Value Ref Range Status   12/22/2015 1.79 ng/mL Final      Lab Results   Component Value Date    CR 1.23 08/02/2018        All pertinent imaging reviewed:    All imaging studies reviewed by me.  I personally reviewed these imaging films.      A formal report from radiology will follow.    CT Abd/Pel 7/30/18  IMPRESSION:   1. Ill-defined soft tissue thickening adjacent to the gastric bypass  postoperative changes in the stomach have increased in prominence  since the previous exam. This finding could be a normal variant  postoperative appearance; however, development of gastric neoplasm  cannot be excluded.  2. There is fluid and higher density material within the excluded  portion of the stomach, of greater volume  than would typically be seen  in the setting of a gastric bypass. This higher density material  within the stomach is of uncertain etiology and could represent  debris, ingested food, or possibly blood products. No definite  communication between the gastric remnant and the excluded portion of  the stomach is visualized.  3. Two bladder wall lesions appear to enhance, and the larger of these  on the right posteriorly is new since 8/9/2016, suspicious for bladder  neoplasm such as transitional cell carcinoma. Urologic consultation is  recommended. Cystoscopy should be considered for further evaluation.             Impression and Plan:   Impression:   76 y/o man with complex medical history significant for dementia, RV and LV mural thrombus on chronic anticoagulation with Coumadin, s/p Smita-en-Y gastric bypass, hypertension, chronic kidney disease, CAD, cardiomyopathy with EF 25-30%, diabetes who presents with rectal bleeding/melena and now found to have gastric mass and bladder lesions.      Plan:   Bladder mass and gross hematuria  - Urine cytology today  - Cystoscopy today which confirmed the presence of the right sided bladder mass. This is concerning for neoplasm and required transurethral resection of the bladder tumor (TURBT)  - We discussed the risks and benefits of surgery and will schedule for the near future    Gastric mass  - This work up is on going with Dr. Xiong and following TURBT pathology results we will discuss care goals    Dementia  - Stable and manages will with wife  - Currently living at home with his wife     Thank you for the kind consultation.    Time spent: 45 minutes of which >50% was spent counseling.    Ryan Camarillo MD   Urology  [facility]  [facility] Phone: [phone]  [facility] Phone: [phone]         Again, thank you for allowing me to participate in the care of your patient.      Sincerely,    Ryan Camarillo MD

## 2018-09-07 NOTE — LETTER
9/7/2018      RE: Gibson Villalta  [address]  [place] [address]       Urology Consult History and Physical    Name: Gibson Villalta    MRN: [id number]   YOB: 1940       We were asked to see Gibson Villalta at the request of Dr. Reyes for evaluation and treatment of Bladder masses.        Chief Complaint:   Bladder masses    History is obtained from the patient and his wife.            History of Present Illness:   Gibson Villalta is a 77 year old male who is being seen for evaluation of bladder masses. He also has history of dementia, RV and LV mural thrombus on chronic anticoagulation with Coumadin, s/p Smita-en-Y gastric bypass, hypertension, chronic kidney disease, CAD, cardiomyopathy with EF 25-30%, diabetes who presents with rectal bleeding/melena and now found to have gastric mass and bladder lesions.    He had a CT completed at the end of July secondary to blood in the stool which noted some suspicious masses in the bladder. He then had one episode of possible gross hematuria following this. He had upper endoscopy for the GI bleed with work up ongoing.     He does have 40 pack year history, quit in the 80's.     (4 or >)  Location: bladder   Quality: gross  Duration: since July  Asso. Sign/symp: significant smoking history           Past Medical History:     Past Medical History:   Diagnosis Date     Acute kidney injury (H) 2012     Anemia      Anxiety      BPH (benign prostatic hypertrophy)      Carpal tunnel syndrome     Right     Chronic kidney disease (CKD), stage 3 (moderate)      Dementia      Gout      History of depression      LBBB (left bundle branch block) 2013     Lumbar herniated disc     L5-S1     Mixed cardiomyopathy 7/22/2016     Myocardial infarction 1995 and 2010     Nonischemic cardiomyopathy (H) 7/22/2016     Obesity      Rosacea      Rotator cuff disorder     Tear x 2     RV (right ventricular) mural thrombus 7/22/2016             Past Surgical History:     Past Surgical History:   Procedure Laterality Date     ANGIOPLASTY   and  with stent     ESOPHAGOSCOPY, GASTROSCOPY, DUODENOSCOPY (EGD), COMBINED N/A 2018    Procedure: COMBINED ESOPHAGOSCOPY, GASTROSCOPY, DUODENOSCOPY (EGD), BIOPSY SINGLE OR MULTIPLE;  gastroscopy;  Surgeon: Parish Xiong MD;  Location:  GI     ESOPHAGOSCOPY, GASTROSCOPY, DUODENOSCOPY (EGD), COMBINED N/A 2018    Procedure: COMBINED ESOPHAGOSCOPY, GASTROSCOPY, DUODENOSCOPY (EGD);  EGD;  Surgeon: Parish Xiong MD;  Location:  GI     ESOPHAGOSCOPY, GASTROSCOPY, DUODENOSCOPY (EGD), COMBINED N/A 2018    Procedure: COMBINED ESOPHAGOSCOPY, GASTROSCOPY, DUODENOSCOPY (EGD), BIOPSY SINGLE OR MULTIPLE;  gastroscopy BIOPSYSHOULD BE SENT TO LAB IN NS NOT FORMALIN PER ;  Surgeon: Jonathon Bush MD;  Location:  GI     GASTRIC BYPASS       HEART CATH LEFT HEART CATH  16    Non ischemic cardiomyopathy; Non functionally significant LAD and RCA disease      OTHER SURGICAL HISTORY      Spinal surgery     OTHER SURGICAL HISTORY  2016    CRT-D implantation            Social History:     Social History   Substance Use Topics     Smoking status: Former Smoker     Packs/day: 2.00     Years: 20.00     Types: Cigarettes     Quit date: 1980     Smokeless tobacco: Never Used      Comment: Quit in his 30s - 2 ppd for 20 years     Alcohol use 0.0 oz/week     0 Standard drinks or equivalent per week      Comment: maybe 1 beer a month       History   Smoking Status     Former Smoker     Packs/day: 2.00     Years: 20.00     Types: Cigarettes     Quit date: 1980   Smokeless Tobacco     Never Used     Comment: Quit in his 30s - 2 ppd for 20 years            Family History:     Family History   Problem Relation Age of Onset     Coronary Artery Disease Father 39     Alzheimer Disease Mother      Coronary Artery Disease Son      Two sons have  from MIs (ages 39 and 51)               Allergies:     Allergies   Allergen Reactions     Wasps [Hornets]      Sand wasp --- years ago.              Medications:     Current Outpatient Prescriptions   Medication Sig     ACE/ARB NOT PRESCRIBED, INTENTIONAL, ACE & ARB not prescribed due to Symptomatic hypotension not due to excessive diuresis     ASPIRIN NOT PRESCRIBED (INTENTIONAL) Antiplatelet medication not prescribed intentionally due to Current anticoagulant therapy (warfarin/enoxaparin)     atorvastatin (LIPITOR) 40 MG tablet TAKE 1 TABLET AT BEDTIME     COENZYME Q-10 PO Take 200 mg by mouth daily      ferrous sulfate (IRON) 325 (65 FE) MG tablet Take 325 mg by mouth daily     furosemide (LASIX) 20 MG tablet Take 0.5 tablets (10 mg) by mouth daily     hydrALAZINE (APRESOLINE) 10 MG tablet Take 1 tablet (10 mg) by mouth 3 times daily     isosorbide mononitrate (IMDUR) 30 MG 24 hr tablet Take 0.5 tablets (15 mg) by mouth daily     metoprolol succinate (TOPROL-XL) 50 MG 24 hr tablet TAKE 1 TABLET (50 MG) DAILY     multivitamin, therapeutic with minerals (MULTI-VITAMIN) TABS Take 1 tablet by mouth daily     venlafaxine (EFFEXOR) 75 MG tablet TAKE 1 TABLET TWICE DAILY     vitamin D (ERGOCALCIFEROL) 36850 UNIT capsule TAKE 1 CAPSULE ONCE A WEEK (Patient not taking: Reported on 9/7/2018)     No current facility-administered medications for this visit.              Review of Systems:     Skin: negative  Eyes: negative  Ears/Nose/Throat: negative  Respiratory: No shortness of breath, dyspnea on exertion, cough, or hemoptysis  Cardiovascular: negative  Gastrointestinal: as above  Genitourinary: as above  Musculoskeletal: negative  Neurologic: dementia  Psychiatric: negative  Hematologic/Lymphatic/Immunologic: negative  Endocrine: negative          Physical Exam:     No data found.    Body mass index is 29.55 kg/(m^2).     General: age-appropriate appearing male in NAD  HEENT: Head AT/NC, EOMI, CN Grossly intact  Lungs: no respiratory distress, or pursed lip  breathing  Heart: No obvious jugular venous distension present  Back: no bony midline tenderness, no CVAT bilaterally.  Abdomen: soft, non-distended, non-tender. No organomegaly  : normal male external genitalia.   Lymph: no palpable inguinal lymphadenopathy.  LE: no edema.   Musculoskeltal: extremities normal, no peripheral edema  Skin: no suspicious lesions or rashes  Neuro: Alert, oriented, mild confusion with question repeating; moving all 4 extremities equally.  Psych: affect and mood normal          Data:   All laboratory data reviewed:    UA RESULTS:  Recent Labs   Lab Test  09/07/18   1012  08/04/18   1356   COLOR  Yellow  Brown   APPEARANCE  Clear  Cloudy   URINEGLC  Negative  Negative   URINEBILI  Negative  Small*   URINEKETONE  Negative  Trace*   SG  1.025  1.025   UBLD  Large*  Large*   URINEPH  6.0  5.5   PROTEIN  100*  >=300*   UROBILINOGEN  0.2  0.2   NITRITE  Negative  Negative   LEUKEST  Trace*  Negative   RBCU   --   >100*   WBCU   --   0 - 5        PSA   Date Value Ref Range Status   12/22/2015 1.79 ng/mL Final      Lab Results   Component Value Date    CR 1.23 08/02/2018        All pertinent imaging reviewed:    All imaging studies reviewed by me.  I personally reviewed these imaging films.      A formal report from radiology will follow.    CT Abd/Pel 7/30/18  IMPRESSION:   1. Ill-defined soft tissue thickening adjacent to the gastric bypass  postoperative changes in the stomach have increased in prominence  since the previous exam. This finding could be a normal variant  postoperative appearance; however, development of gastric neoplasm  cannot be excluded.  2. There is fluid and higher density material within the excluded  portion of the stomach, of greater volume than would typically be seen  in the setting of a gastric bypass. This higher density material  within the stomach is of uncertain etiology and could represent  debris, ingested food, or possibly blood products. No  definite  communication between the gastric remnant and the excluded portion of  the stomach is visualized.  3. Two bladder wall lesions appear to enhance, and the larger of these  on the right posteriorly is new since 8/9/2016, suspicious for bladder  neoplasm such as transitional cell carcinoma. Urologic consultation is  recommended. Cystoscopy should be considered for further evaluation.             Impression and Plan:   Impression:   76 y/o man with complex medical history significant for dementia, RV and LV mural thrombus on chronic anticoagulation with Coumadin, s/p Smita-en-Y gastric bypass, hypertension, chronic kidney disease, CAD, cardiomyopathy with EF 25-30%, diabetes who presents with rectal bleeding/melena and now found to have gastric mass and bladder lesions.      Plan:   Bladder mass and gross hematuria  - Urine cytology today  - Cystoscopy today which confirmed the presence of the right sided bladder mass. This is concerning for neoplasm and required transurethral resection of the bladder tumor (TURBT)  - We discussed the risks and benefits of surgery and will schedule for the near future    Gastric mass  - This work up is on going with Dr. Xiong and following TURBT pathology results we will discuss care goals    Dementia  - Stable and manages will with wife  - Currently living at home with his wife     Thank you for the kind consultation.    Time spent: 45 minutes of which >50% was spent counseling.    Ryan Camarillo MD   Urology  [facility]  [facility] Phone: [phone]  [facility] [place] Phone: [phone]

## 2018-09-07 NOTE — MR AVS SNAPSHOT
"              After Visit Summary   9/7/2018    Gibson Villalta    MRN: [id number]           Patient Information     Date Of Birth          1940        Visit Information        Provider Department      9/7/2018 10:00 AM Ryan Camarillo MD; [facility] [place]        Today's Diagnoses     Bladder mass    -  1    Gross hematuria        Dementia without behavioral disturbance, unspecified dementia type          Care Instructions         AFTER YOUR CYSTOSCOPY         You have just completed a cystoscopy, or \"cysto\", which allowed your physician to learn more about your bladder (or to remove a stent placed after surgery). We suggest that you continue to avoid caffeine, fruit juice, and alcohol for the next 24 hours, however, you are encouraged to return to your normal activities.       A few things that are considered normal after your cystoscopy:    * small amount of bleeding (or spotting) that clears within the next 24 hours    * slight burning sensation with urination    * sensation to of needing to avoid more frequently    * the feeling of \"air\" in your urine    * mild discomfort that is relieved with Tylonol        Please contact our office promptly if you:    * develop a fever above 101 degrees    * are unable to urinate    * develop bright red blood that does not stop    * severe pain or swelling        And of course, please contact our office with any concerns or questions [phone]              Follow-ups after your visit        Your next 10 appointments already scheduled     Sep 19, 2018   Procedure with Ryan Camarillo MD   [facility] (--)    [address]  [facility] [address] [phone]            Sep 26, 2018  4:30 PM CDT   Return Visit with Ryan Camarillo MD   [facility] [place] ([facility] [place])    [address]  [address]  [facility] [id number] [phone]         " "   Dec 10, 2018  4:30 PM CST   Remote ICD Check with ALEJANDRO DCR2   [facility]   [place] ([facility])    [address]  [place] [address] [phone] OPT 2           This appointment is for a remote check of your debrillator.  This is not an appointment at the office.              Who to contact     If you have questions or need follow up information about today's clinic visit or your schedule please contact [facility] [place] directly at [phone].  Normal or non-critical lab and imaging results will be communicated to you by [employer], letter or phone within 4 business days after the clinic has received the results. If you do not hear from us within 7 days, please contact the clinic through [employer] or phone. If you have a critical or abnormal lab result, we will notify you by phone as soon as possible.  Submit refill requests through [employer] or call your pharmacy and they will forward the refill request to us. Please allow 3 business days for your refill to be completed.          Additional Information About Your Visit        [employer] Information     [employer] gives you secure access to your electronic health record. If you see a primary care provider, you can also send messages to your care team and make appointments. If you have questions, please call your primary care clinic.  If you do not have a primary care provider, please call [phone] and they will assist you.        Care EveryWhere ID     This is your Care EveryWhere ID. This could be used by other organizations to access your [facility] medical records  [id number]        Your Vitals Were     Height BMI (Body Mass Index)                1.854 m (6' 1\") 29.55 kg/m2           Blood Pressure from Last 3 Encounters:   09/07/18 118/80   08/09/18 118/80   08/04/18 118/72    Weight from Last 3 Encounters:   09/07/18 101.6 kg (224 lb)   08/09/18 101.6 kg (224 lb)   08/02/18 " 101.4 kg (223 lb 8.7 oz)              We Performed the Following     Sheree-Operative Worksheet (Urology)     UA without Microscopic        Primary Care Provider Office Phone # Fax #    Flori Reyes [phone] [address]  [facility] [address]        Equal Access to Services     BG MCLAIN : Hadii aad ku hadasho Soomaali, waaxda luqadaha, qaybta kaalmada adeegyada, waxay idiin hayaan adeeg kharash la'aan . So [facility] [phone].    ATENCIÓN: Si habla español, tiene a de luna disposición servicios gratuitos de asistencia lingüística. Llame al [phone].    We comply with applicable federal civil rights laws and [place] laws. We do not discriminate on the basis of race, color, national origin, age, disability, sex, sexual orientation, or gender identity.            Thank you!     Thank you for choosing [facility] [place]  for your care. Our goal is always to provide you with excellent care. Hearing back from our patients is one way we can continue to improve our services. Please take a few minutes to complete the written survey that you may receive in the mail after your visit with us. Thank you!             Your Updated Medication List - Protect others around you: Learn how to safely use, store and throw away your medicines at www.disposemymeds.org.          This list is accurate as of 9/7/18 11:59 PM.  Always use your most recent med list.                   Brand Name Dispense Instructions for use Diagnosis    ACE/ARB/ARNI NOT PRESCRIBED (INTENTIONAL)      ACE & ARB not prescribed due to Symptomatic hypotension not due to excessive diuresis    HFrEF (heart failure with reduced ejection fraction) (H)       ASPIRIN NOT PRESCRIBED    INTENTIONAL    0 each    Antiplatelet medication not prescribed intentionally due to Current anticoagulant therapy (warfarin/enoxaparin)    HFrEF (heart failure with reduced ejection fraction) (H)       atorvastatin 40  MG tablet    LIPITOR    90 tablet    TAKE 1 TABLET AT BEDTIME    Hyperlipidemia LDL goal <130       COENZYME Q-10 PO      Take 200 mg by mouth daily        ferrous sulfate 325 (65 Fe) MG tablet    IRON     Take 325 mg by mouth daily        furosemide 20 MG tablet    LASIX    45 tablet    Take 0.5 tablets (10 mg) by mouth daily    Chronic systolic heart failure (H)       hydrALAZINE 10 MG tablet    APRESOLINE    270 tablet    Take 1 tablet (10 mg) by mouth 3 times daily    Chronic systolic heart failure (H), Cardiomyopathy (H)       isosorbide mononitrate 30 MG 24 hr tablet    IMDUR    45 tablet    Take 0.5 tablets (15 mg) by mouth daily    Chronic systolic heart failure (H), Cardiomyopathy (H)       metoprolol succinate 50 MG 24 hr tablet    TOPROL-XL    90 tablet    TAKE 1 TABLET (50 MG) DAILY    Cardiomyopathy (H)       Multi-vitamin Tabs tablet      Take 1 tablet by mouth daily        venlafaxine 75 MG tablet    EFFEXOR    180 tablet    TAKE 1 TABLET TWICE DAILY    Anxiety and depression       vitamin D 86143 UNIT capsule    ERGOCALCIFEROL    12 capsule    TAKE 1 CAPSULE ONCE A WEEK    Vitamin D deficiency

## 2018-09-07 NOTE — PATIENT INSTRUCTIONS
"     AFTER YOUR CYSTOSCOPY         You have just completed a cystoscopy, or \"cysto\", which allowed your physician to learn more about your bladder (or to remove a stent placed after surgery). We suggest that you continue to avoid caffeine, fruit juice, and alcohol for the next 24 hours, however, you are encouraged to return to your normal activities.       A few things that are considered normal after your cystoscopy:    * small amount of bleeding (or spotting) that clears within the next 24 hours    * slight burning sensation with urination    * sensation to of needing to avoid more frequently    * the feeling of \"air\" in your urine    * mild discomfort that is relieved with Tylonol        Please contact our office promptly if you:    * develop a fever above 101 degrees    * are unable to urinate    * develop bright red blood that does not stop    * severe pain or swelling        And of course, please contact our office with any concerns or questions [phone]      "

## 2018-09-08 NOTE — PROCEDURES
CYSTOSCOPY PROCEDURE NOTE:    Pt ID verified with patient: Yes     Procedure verified with patient: Yes     Procedure confirmed with physician and support staff: Yes     Consent form confirmed with physician and support staff.    Sign In  History and Physical Exam reviewed.  Informed Consent Discussed: Yes   Sign in Communication: Yes   Time Out:  Team Confirms the Correct Patient, Correct Procedure; Yes , Correct Site and Site Marking, Correct Position (if applicable).    Affirmation of Time Out: Yes   Sign Out:  Sign Out Discussion: Yes   Physician: Ryan Camarillo MD    A urinalysis was performed revealing no evidence of infection.    The benefits, risks, alternatives of the cystoscopy procedure and personnel were discussed with the patient. The verbal consent was obtained and the patient agrees to proceed.    Description of procedure:   After fully informed, voluntary consent was obtained, the patient was brought into the procedure room, identified and placed in a supine position on the cystoscopy table.  The groin/scrotum were prepped with betadine and draped in a sterile fashion.  Urojet lidocaine gel was introduced.  A 17F flexible cystoscope was inserted into the urethra, and the bladder and urethra wereexamined in a systematic manner.  The patient tolerated the procedure well and there were no complications.      Cystoscopic findings:  The urethra was normal without strictures.  The prostate was 1.5cm long and demonstrated mild bilobar hypertrophy.  There was no median lobe.  The external sphincter coapted normaly and the bladder neck was normal. The bladder was  entered and careful pan endoscopy was carried out. The posterior, superior and lateral walls and dome of the bladder were all well visualized and the scope was retroflexed upon itself..  There was mild trabeculation.  There were right sided neoplasms consistent with the CT scan and appeared a combination of sessile and papillary. There were no  stones or diverticula identifed.  The ureteric orifices were normal in position and number and effluxing clear urine.    Assessment/Plan:   Gibson Villalta is a 77 year old male with a history of bladder mass found on CT. See clinic note.    Ryan Camarillo MD

## 2018-09-09 PROBLEM — N32.89 BLADDER MASS: Status: ACTIVE | Noted: 2018-09-09

## 2018-09-09 PROBLEM — K31.89 GASTRIC MASS: Status: ACTIVE | Noted: 2018-09-09

## 2018-09-12 NOTE — PROGRESS NOTES
[place] [address] [address] [facility] [address]  [phone]  Dept: [phone]    PRE-OP EVALUATION:  Today's date: 2018    Gibson Villalta (: 1940) presents for pre-operative evaluation assessment as requested by Dr. Camarillo.  He requires evaluation and anesthesia risk assessment prior to undergoing surgery/procedure for treatment of Bladder Tumor .    Proposed Surgery/ Procedure: Cystoscopy, transurethral resection bladder tumor  Date of Surgery/ Procedure: 18  Time of Surgery/ Procedure: 730   Hospital/Surgical Facility:  OR  Fax number for surgical facility:   Primary Physician: Flori Reyes  Type of Anesthesia Anticipated: General    Patient has a Health Care Directive or Living Will:  YES     1. YES - DO YOU HAVE A HISTORY OF HEART ATTACK, STROKE, STENT, BYPASS OR SURGERY ON AN ARTERY IN THE HEAD, NECK, HEART OR LEG? MI with Stents   2. NO - DO YOU EVER HAVE ANY PAIN OR DISCOMFORT IN YOUR CHEST? Not recently   3. YES - DO YOU HAVE A HISTORY OF HEART FAILURE? cardiomyopathy, HRrEF 25-30%, Pacer  4. NO - Are you troubled by shortness of breath when: walking on the level, up a slight hill or at night?  5. NO - Do you currently have a cold, bronchitis or other respiratory infection?  6. NO - Do you have a cough, shortness of breath or wheezing?  7. NO - Do you sometimes get pains in the calves of your legs when you walk?  8. NO - Do you or anyone in your family have previous history of blood clots?  9. NO - Do you or does anyone in your family have a serious bleeding problem such as prolonged bleeding following surgeries or cuts?  10. NO - Have you ever had problems with anemia or been told to take iron pills?  11. NO - Have you had any abnormal blood loss such as black, tarry or bloody stools, or abnormal vaginal bleeding?  12. NO - Have you ever had a blood transfusion?  13. NO - Have you or any of your relatives ever had problems with anesthesia?  14. NO -  Do you have sleep apnea, excessive snoring or daytime drowsiness?  15. NO - Do you have any prosthetic heart valves?  16. NO - Do you have prosthetic joints?  17. NO - Is there any chance that you may be pregnant?      HPI:     HPI related to upcoming procedure: All history is gathered from pt's wife d/t dementia. Pt is very pleasant and quite forgetful. He has a very complex cardiac history with last echo stable with EF of 25-30%. Pt has pacer. He has a newly diagnosed Bladder tumor. He was admitted 2 months ago for GI bleed and this was incidentally found. Pt's wife states that he has not had any complaints lately and has been doing well. He is not very active at baseline and has no changes from baseline that she has noticed. He was able to walk into clinic today without any difficulty.       See problem list for active medical problems.  Problems all longstanding and stable, except as noted/documented.  See ROS for pertinent symptoms related to these conditions.                                                                                                                                                          .    MEDICAL HISTORY:     Patient Active Problem List    Diagnosis Date Noted     Gastric mass 09/09/2018     Priority: Medium     Bladder mass 09/09/2018     Priority: Medium     Prepatellar bursitis of left knee 12/11/2017     Priority: Medium     Cardiomyopathy (H) 03/14/2017     Priority: Medium     Controlled type 2 diabetes mellitus with chronic kidney disease, without long-term current use of insulin, unspecified CKD stage (H) 02/24/2017     Priority: Medium     New Dx Feb 2017       HFrEF (heart failure with reduced ejection fraction) (H) 08/16/2016     Priority: Medium     Chronic kidney disease (CKD), stage 3 (moderate)      Priority: Medium     Anxiety and depression      Priority: Medium     BPH (benign prostatic hypertrophy)      Priority: Medium     LBBB (left bundle branch block)       Priority: Medium     Mixed cardiomyopathy 07/22/2016     Priority: Medium     RV (right ventricular) mural thrombus without MI 07/22/2016     Priority: Medium     Dementia without behavioral disturbance, unspecified dementia type 07/22/2016     Priority: Medium     History of gastric bypass 07/22/2016     Priority: Medium      Past Medical History:   Diagnosis Date     Acute kidney injury (H) 2012     Anemia      Anxiety      BPH (benign prostatic hypertrophy)      Carpal tunnel syndrome     Right     Chronic kidney disease (CKD), stage 3 (moderate)      Dementia      Gout      History of depression      LBBB (left bundle branch block) 2013     Lumbar herniated disc     L5-S1     Mixed cardiomyopathy 7/22/2016     Myocardial infarction 1995 and 2010     Nonischemic cardiomyopathy (H) 7/22/2016     Obesity      Rosacea      Rotator cuff disorder     Tear x 2     RV (right ventricular) mural thrombus 7/22/2016     Past Surgical History:   Procedure Laterality Date     ANGIOPLASTY  1995 and 2010 with stent     ESOPHAGOSCOPY, GASTROSCOPY, DUODENOSCOPY (EGD), COMBINED N/A 7/30/2018    Procedure: COMBINED ESOPHAGOSCOPY, GASTROSCOPY, DUODENOSCOPY (EGD), BIOPSY SINGLE OR MULTIPLE;  gastroscopy;  Surgeon: Parish Xiong MD;  Location:  GI     ESOPHAGOSCOPY, GASTROSCOPY, DUODENOSCOPY (EGD), COMBINED N/A 7/30/2018    Procedure: COMBINED ESOPHAGOSCOPY, GASTROSCOPY, DUODENOSCOPY (EGD);  EGD;  Surgeon: Parish Xiong MD;  Location:  GI     ESOPHAGOSCOPY, GASTROSCOPY, DUODENOSCOPY (EGD), COMBINED N/A 8/2/2018    Procedure: COMBINED ESOPHAGOSCOPY, GASTROSCOPY, DUODENOSCOPY (EGD), BIOPSY SINGLE OR MULTIPLE;  gastroscopy BIOPSYSHOULD BE SENT TO LAB IN NS NOT FORMALIN PER ;  Surgeon: Jonathon Bush MD;  Location:  GI     GASTRIC BYPASS  2001     HEART CATH LEFT HEART CATH  7/19/16    Non ischemic cardiomyopathy; Non functionally significant LAD and RCA disease      OTHER SURGICAL HISTORY  2006    Spinal surgery      OTHER SURGICAL HISTORY  Nov 2016    CRT-D implantation     Current Outpatient Prescriptions   Medication Sig Dispense Refill     ACE/ARB NOT PRESCRIBED, INTENTIONAL, ACE & ARB not prescribed due to Symptomatic hypotension not due to excessive diuresis       ASPIRIN NOT PRESCRIBED (INTENTIONAL) Please choose reason not prescribed, below 0 each 0     atorvastatin (LIPITOR) 40 MG tablet TAKE 1 TABLET AT BEDTIME 90 tablet 3     COENZYME Q-10 PO Take 200 mg by mouth daily        ferrous sulfate (IRON) 325 (65 FE) MG tablet Take 325 mg by mouth daily       furosemide (LASIX) 20 MG tablet Take 0.5 tablets (10 mg) by mouth daily 45 tablet 1     hydrALAZINE (APRESOLINE) 10 MG tablet Take 1 tablet (10 mg) by mouth 3 times daily 270 tablet 1     isosorbide mononitrate (IMDUR) 30 MG 24 hr tablet Take 0.5 tablets (15 mg) by mouth daily 45 tablet 1     metoprolol succinate (TOPROL-XL) 50 MG 24 hr tablet TAKE 1 TABLET (50 MG) DAILY 90 tablet 3     multivitamin, therapeutic with minerals (MULTI-VITAMIN) TABS Take 1 tablet by mouth daily       venlafaxine (EFFEXOR) 75 MG tablet TAKE 1 TABLET TWICE DAILY 180 tablet 1     vitamin D (ERGOCALCIFEROL) 01453 UNIT capsule TAKE 1 CAPSULE ONCE A WEEK (Patient not taking: Reported on 9/7/2018) 12 capsule 1     OTC products: None, except as noted above    Allergies   Allergen Reactions     Wasps [Hornets]      Sand wasp --- years ago.        Latex Allergy: NO    Social History   Substance Use Topics     Smoking status: Former Smoker     Packs/day: 2.00     Years: 20.00     Types: Cigarettes     Quit date: 1/1/1980     Smokeless tobacco: Never Used      Comment: Quit in his 30s - 2 ppd for 20 years     Alcohol use 0.0 oz/week     0 Standard drinks or equivalent per week      Comment: maybe 1 beer a month     History   Drug Use No       REVIEW OF SYSTEMS:   Unable to complete d/t dementia. History gathered from wife.   She states he has been fatigued.   Denies SOB, CP     EXAM:   /72  "(BP Location: Right arm, Patient Position: Sitting, Cuff Size: Adult Large)  Pulse 92  Temp 97  F (36.1  C) (Oral)  Ht 6' 1\" (1.854 m)  Wt 223 lb (101.2 kg)  SpO2 96%  BMI 29.42 kg/m2    GENERAL APPEARANCE: healthy, alert and no distress     EYES: EOMI,  PERRL     HENT: ear canals and TM's normal and nose and mouth without ulcers or lesions     NECK: no adenopathy, no asymmetry, masses, or scars and thyroid normal to palpation     RESP: lungs clear to auscultation - no rales, rhonchi or wheezes     CV: regular rates and rhythm, normal S1 S2, no S3 or S4 and no murmur, click or rub     MS: extremities normal- no gross deformities noted, no evidence of inflammation in joints, FROM in all extremities.     SKIN: no suspicious lesions or rashes     NEURO: Normal strength and tone, sensory exam grossly normal, mentation intact and speech normal     PSYCH: mentation appears normal. and affect normal/bright     LYMPHATICS: No cervical adenopathy    DIAGNOSTICS:     Labs Resulted Today:   Results for orders placed or performed in visit on 09/13/18   Basic metabolic panel  (Ca, Cl, CO2, Creat, Gluc, K, Na, BUN)   Result Value Ref Range    Sodium 140 133 - 144 mmol/L    Potassium 4.2 3.4 - 5.3 mmol/L    Chloride 104 94 - 109 mmol/L    Carbon Dioxide 25 20 - 32 mmol/L    Anion Gap 11 3 - 14 mmol/L    Glucose 100 (H) 70 - 99 mg/dL    Urea Nitrogen 22 7 - 30 mg/dL    Creatinine 1.49 (H) 0.66 - 1.25 mg/dL    GFR Estimate 46 (L) >60 mL/min/1.7m2    GFR Estimate If Black 55 (L) >60 mL/min/1.7m2    Calcium 9.6 8.5 - 10.1 mg/dL   CBC with platelets   Result Value Ref Range    WBC 10.2 4.0 - 11.0 10e9/L    RBC Count 3.99 (L) 4.4 - 5.9 10e12/L    Hemoglobin 12.6 (L) 13.3 - 17.7 g/dL    Hematocrit 39.8 (L) 40.0 - 53.0 %     78 - 100 fl    MCH 31.6 26.5 - 33.0 pg    MCHC 31.7 31.5 - 36.5 g/dL    RDW 13.1 10.0 - 15.0 %    Platelet Count 283 150 - 450 10e9/L       Recent Labs   Lab Test  08/09/18   1256  08/02/18   0845   " 07/31/18   1035  07/31/18   0627   07/30/18   0750   08/25/17   1416   HGB  12.1*  11.4*   < >   --   11.3*   < >  13.9   --   15.1   PLT  288  185   --    --   180   --   218   --   203   INR   --    --    --   1.21*   --    --   3.56*   < >   --    NA   --   138   --    --   139   --   138   --   139   POTASSIUM   --   3.7   --    --   4.0   --   3.7   --   4.0   CR   --   1.23   --    --   1.29*   --   1.23   --   1.34*   A1C   --    --    --    --    --    --   7.1*   --   6.5*    < > = values in this interval not displayed.        IMPRESSION:   Reason for surgery/procedure: bladder tumor  Diagnosis/reason for consult: medical preop    The proposed surgical procedure is considered INTERMEDIATE risk.    REVISED CARDIAC RISK INDEX  The patient has the following serious cardiovascular risks for perioperative complications such as (MI, PE, VFib and 3  AV Block):  Coronary Artery Disease (MI, positive stress test, angina, Qs on EKG)  Congestive Heart Failure (pulmonary edema, PND, s3 danay, CXR with pulmonary congestion, basilar rales)  INTERPRETATION: 2 risks: Class III (moderate risk - 6.6% complication rate)    The patient has the following additional risks for perioperative complications:  Delirium or Dementia      ICD-10-CM    1. Preop general physical exam Z01.818 Basic metabolic panel  (Ca, Cl, CO2, Creat, Gluc, K, Na, BUN)     CBC with platelets   2. Bladder mass N32.89    3. Chronic systolic heart failure (H) I50.22        RECOMMENDATIONS:       Cardiovascular Risk  Patient is already on a Beta Blocker. Continue Betablocker therapy after surgery, using Beta blocker order set as necessary for NPO status.  Echo on 5/29/18 revealed similar LVEF and worsened LV dilation when compared to 6/20/17. Symptomatically has has not had any changes.   He is at very high risk for general anesthesia.       Anemia  Anemia and does not require treatment prior to surgery.  Monitor Hemoglobin postoperatively.         I had  discussion with pt and wife that he is at high risk d/t his PMH and current cardiac function. With his advanced dementia as well, this may be a difficult recovery. I recommended that they have a conversation with the urologist to see if this is a necessary procedure.   They know that risks of surgery include MI or even death.       --Patient is to take all scheduled medications on the day of surgery EXCEPT for modifications listed below.        APPROVAL GIVEN to proceed with proposed procedure, without further diagnostic evaluation       Signed Electronically by: ALLEN Colón CNP    Copy of this evaluation report is provided to requesting physician.    [facility] Preop Guidelines    Revised Cardiac Risk Index

## 2018-09-12 NOTE — PATIENT INSTRUCTIONS
Before Your Surgery      Call your surgeon if there is any change in your health. This includes signs of a cold or flu (such as a sore throat, runny nose, cough, rash or fever).    Do not smoke, drink alcohol or take over the counter medicine (unless your surgeon or primary care doctor tells you to) for the 24 hours before and after surgery.    If you take prescribed drugs: Follow your doctor s orders about which medicines to take and which to stop until after surgery.    Eating and drinking prior to surgery: follow the instructions from your surgeon    Take a shower or bath the night before surgery. Use the soap your surgeon gave you to gently clean your skin. If you do not have soap from your surgeon, use your regular soap. Do not shave or scrub the surgery site.  Wear clean pajamas and have clean sheets on your bed.     You can take all of your meds the morning of surgery. Think about talking to your urologist about the benefits of surgery.

## 2018-09-13 ENCOUNTER — OFFICE VISIT (OUTPATIENT)
Dept: FAMILY MEDICINE | Facility: CLINIC | Age: 78
End: 2018-09-13
Payer: COMMERCIAL

## 2018-09-13 VITALS
DIASTOLIC BLOOD PRESSURE: 72 MMHG | HEART RATE: 92 BPM | BODY MASS INDEX: 29.55 KG/M2 | HEIGHT: 73 IN | SYSTOLIC BLOOD PRESSURE: 116 MMHG | TEMPERATURE: 97 F | WEIGHT: 223 LBS | OXYGEN SATURATION: 96 %

## 2018-09-13 DIAGNOSIS — I50.22 CHRONIC SYSTOLIC HEART FAILURE (H): Chronic | ICD-10-CM

## 2018-09-13 DIAGNOSIS — N32.89 BLADDER MASS: ICD-10-CM

## 2018-09-13 DIAGNOSIS — Z01.818 PREOP GENERAL PHYSICAL EXAM: Primary | ICD-10-CM

## 2018-09-13 LAB
ERYTHROCYTE [DISTWIDTH] IN BLOOD BY AUTOMATED COUNT: 13.1 % (ref 10–15)
HCT VFR BLD AUTO: 39.8 % (ref 40–53)
HGB BLD-MCNC: 12.6 G/DL (ref 13.3–17.7)
MCH RBC QN AUTO: 31.6 PG (ref 26.5–33)
MCHC RBC AUTO-ENTMCNC: 31.7 G/DL (ref 31.5–36.5)
MCV RBC AUTO: 100 FL (ref 78–100)
PLATELET # BLD AUTO: 283 10E9/L (ref 150–450)
RBC # BLD AUTO: 3.99 10E12/L (ref 4.4–5.9)
WBC # BLD AUTO: 10.2 10E9/L (ref 4–11)

## 2018-09-13 PROCEDURE — 99215 OFFICE O/P EST HI 40 MIN: CPT | Performed by: NURSE PRACTITIONER

## 2018-09-13 PROCEDURE — 85027 COMPLETE CBC AUTOMATED: CPT | Performed by: NURSE PRACTITIONER

## 2018-09-13 PROCEDURE — 80048 BASIC METABOLIC PNL TOTAL CA: CPT | Performed by: NURSE PRACTITIONER

## 2018-09-13 PROCEDURE — 36415 COLL VENOUS BLD VENIPUNCTURE: CPT | Performed by: NURSE PRACTITIONER

## 2018-09-13 NOTE — MR AVS SNAPSHOT
After Visit Summary   9/13/2018    Gibson Villalta    MRN: [id number]           Patient Information     Date Of Birth          1940        Visit Information        Provider Department      9/13/2018 10:30 AM Katelyn Akhtar APRN [facility]        Today's Diagnoses     Preop general physical exam    -  1    Bladder mass        Chronic systolic heart failure (H)          Care Instructions      Before Your Surgery      Call your surgeon if there is any change in your health. This includes signs of a cold or flu (such as a sore throat, runny nose, cough, rash or fever).    Do not smoke, drink alcohol or take over the counter medicine (unless your surgeon or primary care doctor tells you to) for the 24 hours before and after surgery.    If you take prescribed drugs: Follow your doctor s orders about which medicines to take and which to stop until after surgery.    Eating and drinking prior to surgery: follow the instructions from your surgeon    Take a shower or bath the night before surgery. Use the soap your surgeon gave you to gently clean your skin. If you do not have soap from your surgeon, use your regular soap. Do not shave or scrub the surgery site.  Wear clean pajamas and have clean sheets on your bed.     You can take all of your meds the morning of surgery. Think about talking to your urologist about the benefits of surgery.          Follow-ups after your visit        Your next 10 appointments already scheduled     Sep 19, 2018   Procedure with Ryan Camarillo MD   [facility] (--)    [address]  [facility] [address]   [phone]            Sep 26, 2018  4:30 PM CDT   Return Visit with Ryan Camarillo MD   [facility] ([facility])    [address]  [address]  [facility] [address]   [phone]            Dec 10, 2018  4:30 PM CST   Remote ICD Check with ALEJANDRO DCR2  "  [facility] ([facility])    [address]  [place] [address] [phone] OPT 2           This appointment is for a remote check of your debrillator.  This is not an appointment at the office.              Who to contact     If you have questions or need follow up information about today's clinic visit or your schedule please contact [facility] directly at [phone].  Normal or non-critical lab and imaging results will be communicated to you by [employer], letter or phone within 4 business days after the clinic has received the results. If you do not hear from us within 7 days, please contact the clinic through [employer] or phone. If you have a critical or abnormal lab result, we will notify you by phone as soon as possible.  Submit refill requests through [employer] or call your pharmacy and they will forward the refill request to us. Please allow 3 business days for your refill to be completed.          Additional Information About Your Visit        [employer] Information     [employer] gives you secure access to your electronic health record. If you see a primary care provider, you can also send messages to your care team and make appointments. If you have questions, please call your primary care clinic.  If you do not have a primary care provider, please call [phone] and they will assist you.        Care EveryWhere ID     This is your Care EveryWhere ID. This could be used by other organizations to access your [facility] medical records  [id number]        Your Vitals Were     Pulse Temperature Height Pulse Oximetry BMI (Body Mass Index)       92 97  F (36.1  C) (Oral) 6' 1\" (1.854 m) 96% 29.42 kg/m2        Blood Pressure from Last 3 Encounters:   09/13/18 116/72   09/07/18 118/80   08/09/18 118/80    Weight from Last 3 Encounters:   09/13/18 223 lb (101.2 kg)   09/07/18 224 lb (101.6 kg)   08/09/18 224 lb (101.6 kg)              We " Performed the Following     Basic metabolic panel  (Ca, Cl, CO2, Creat, Gluc, K, Na, BUN)     CBC with platelets        Primary Care Provider Office Phone # Fax #    Flori Reyes,  [phone] [address] [facility] [address]  [facility] [address]        Equal Access to Services     BG MCLAIN : Hadii aad ku hadasho Soomaali, waaxda luqadaha, qaybta kaalmada adeegyada, waxay idiin hayaan adeeg kharash lawinsome . So [facility] [phone].    ATENCIÓN: Si habla español, tiene a de luna disposición servicios gratuitos de asistencia lingüística. Llame al [phone].    We comply with applicable federal civil rights laws and [place] laws. We do not discriminate on the basis of race, color, national origin, age, disability, sex, sexual orientation, or gender identity.            Thank you!     Thank you for choosing [facility]  for your care. Our goal is always to provide you with excellent care. Hearing back from our patients is one way we can continue to improve our services. Please take a few minutes to complete the written survey that you may receive in the mail after your visit with us. Thank you!             Your Updated Medication List - Protect others around you: Learn how to safely use, store and throw away your medicines at www.disposemymeds.org.          This list is accurate as of 9/13/18 11:02 AM.  Always use your most recent med list.                   Brand Name Dispense Instructions for use Diagnosis    ACE/ARB/ARNI NOT PRESCRIBED (INTENTIONAL)      ACE & ARB not prescribed due to Symptomatic hypotension not due to excessive diuresis    HFrEF (heart failure with reduced ejection fraction) (H)       ASPIRIN NOT PRESCRIBED    INTENTIONAL    0 each    Please choose reason not prescribed, below    Controlled type 2 diabetes mellitus with chronic kidney disease, without long-term current use of insulin, unspecified CKD stage (H)       atorvastatin 40 MG tablet    LIPITOR    90 tablet     TAKE 1 TABLET AT BEDTIME    Hyperlipidemia LDL goal <130       COENZYME Q-10 PO      Take 200 mg by mouth daily        ferrous sulfate 325 (65 Fe) MG tablet    IRON     Take 325 mg by mouth daily        furosemide 20 MG tablet    LASIX    45 tablet    Take 0.5 tablets (10 mg) by mouth daily    Chronic systolic heart failure (H)       hydrALAZINE 10 MG tablet    APRESOLINE    270 tablet    Take 1 tablet (10 mg) by mouth 3 times daily    Chronic systolic heart failure (H), Cardiomyopathy (H)       isosorbide mononitrate 30 MG 24 hr tablet    IMDUR    45 tablet    Take 0.5 tablets (15 mg) by mouth daily    Chronic systolic heart failure (H), Cardiomyopathy (H)       metoprolol succinate 50 MG 24 hr tablet    TOPROL-XL    90 tablet    TAKE 1 TABLET (50 MG) DAILY    Cardiomyopathy (H)       Multi-vitamin Tabs tablet      Take 1 tablet by mouth daily        venlafaxine 75 MG tablet    EFFEXOR    180 tablet    TAKE 1 TABLET TWICE DAILY    Anxiety and depression       vitamin D 40797 UNIT capsule    ERGOCALCIFEROL    12 capsule    TAKE 1 CAPSULE ONCE A WEEK    Vitamin D deficiency

## 2018-09-14 LAB
ANION GAP SERPL CALCULATED.3IONS-SCNC: 11 MMOL/L (ref 3–14)
BUN SERPL-MCNC: 22 MG/DL (ref 7–30)
CALCIUM SERPL-MCNC: 9.6 MG/DL (ref 8.5–10.1)
CHLORIDE SERPL-SCNC: 104 MMOL/L (ref 94–109)
CO2 SERPL-SCNC: 25 MMOL/L (ref 20–32)
CREAT SERPL-MCNC: 1.49 MG/DL (ref 0.66–1.25)
GFR SERPL CREATININE-BSD FRML MDRD: 46 ML/MIN/1.7M2
GLUCOSE SERPL-MCNC: 100 MG/DL (ref 70–99)
POTASSIUM SERPL-SCNC: 4.2 MMOL/L (ref 3.4–5.3)
SODIUM SERPL-SCNC: 140 MMOL/L (ref 133–144)

## 2018-09-17 NOTE — H&P (VIEW-ONLY)
[place] [address] [address] [facility] [address]  [phone]  Dept: [phone]    PRE-OP EVALUATION:  Today's date: 2018    Gibson Villalta (: 1940) presents for pre-operative evaluation assessment as requested by Dr. Camarillo.  He requires evaluation and anesthesia risk assessment prior to undergoing surgery/procedure for treatment of Bladder Tumor .    Proposed Surgery/ Procedure: Cystoscopy, transurethral resection bladder tumor  Date of Surgery/ Procedure: 18  Time of Surgery/ Procedure: 730   Hospital/Surgical Facility:  OR  Fax number for surgical facility:   Primary Physician: Flori Reyes  Type of Anesthesia Anticipated: General    Patient has a Health Care Directive or Living Will:  YES     1. YES - DO YOU HAVE A HISTORY OF HEART ATTACK, STROKE, STENT, BYPASS OR SURGERY ON AN ARTERY IN THE HEAD, NECK, HEART OR LEG? MI with Stents   2. NO - DO YOU EVER HAVE ANY PAIN OR DISCOMFORT IN YOUR CHEST? Not recently   3. YES - DO YOU HAVE A HISTORY OF HEART FAILURE? cardiomyopathy, HRrEF 25-30%, Pacer  4. NO - Are you troubled by shortness of breath when: walking on the level, up a slight hill or at night?  5. NO - Do you currently have a cold, bronchitis or other respiratory infection?  6. NO - Do you have a cough, shortness of breath or wheezing?  7. NO - Do you sometimes get pains in the calves of your legs when you walk?  8. NO - Do you or anyone in your family have previous history of blood clots?  9. NO - Do you or does anyone in your family have a serious bleeding problem such as prolonged bleeding following surgeries or cuts?  10. NO - Have you ever had problems with anemia or been told to take iron pills?  11. NO - Have you had any abnormal blood loss such as black, tarry or bloody stools, or abnormal vaginal bleeding?  12. NO - Have you ever had a blood transfusion?  13. NO - Have you or any of your relatives ever had problems with anesthesia?  14. NO -  Do you have sleep apnea, excessive snoring or daytime drowsiness?  15. NO - Do you have any prosthetic heart valves?  16. NO - Do you have prosthetic joints?  17. NO - Is there any chance that you may be pregnant?      HPI:     HPI related to upcoming procedure: All history is gathered from pt's wife d/t dementia. Pt is very pleasant and quite forgetful. He has a very complex cardiac history with last echo stable with EF of 25-30%. Pt has pacer. He has a newly diagnosed Bladder tumor. He was admitted 2 months ago for GI bleed and this was incidentally found. Pt's wife states that he has not had any complaints lately and has been doing well. He is not very active at baseline and has no changes from baseline that she has noticed. He was able to walk into clinic today without any difficulty.       See problem list for active medical problems.  Problems all longstanding and stable, except as noted/documented.  See ROS for pertinent symptoms related to these conditions.                                                                                                                                                          .    MEDICAL HISTORY:     Patient Active Problem List    Diagnosis Date Noted     Gastric mass 09/09/2018     Priority: Medium     Bladder mass 09/09/2018     Priority: Medium     Prepatellar bursitis of left knee 12/11/2017     Priority: Medium     Cardiomyopathy (H) 03/14/2017     Priority: Medium     Controlled type 2 diabetes mellitus with chronic kidney disease, without long-term current use of insulin, unspecified CKD stage (H) 02/24/2017     Priority: Medium     New Dx Feb 2017       HFrEF (heart failure with reduced ejection fraction) (H) 08/16/2016     Priority: Medium     Chronic kidney disease (CKD), stage 3 (moderate)      Priority: Medium     Anxiety and depression      Priority: Medium     BPH (benign prostatic hypertrophy)      Priority: Medium     LBBB (left bundle branch block)       Priority: Medium     Mixed cardiomyopathy 07/22/2016     Priority: Medium     RV (right ventricular) mural thrombus without MI 07/22/2016     Priority: Medium     Dementia without behavioral disturbance, unspecified dementia type 07/22/2016     Priority: Medium     History of gastric bypass 07/22/2016     Priority: Medium      Past Medical History:   Diagnosis Date     Acute kidney injury (H) 2012     Anemia      Anxiety      BPH (benign prostatic hypertrophy)      Carpal tunnel syndrome     Right     Chronic kidney disease (CKD), stage 3 (moderate)      Dementia      Gout      History of depression      LBBB (left bundle branch block) 2013     Lumbar herniated disc     L5-S1     Mixed cardiomyopathy 7/22/2016     Myocardial infarction 1995 and 2010     Nonischemic cardiomyopathy (H) 7/22/2016     Obesity      Rosacea      Rotator cuff disorder     Tear x 2     RV (right ventricular) mural thrombus 7/22/2016     Past Surgical History:   Procedure Laterality Date     ANGIOPLASTY  1995 and 2010 with stent     ESOPHAGOSCOPY, GASTROSCOPY, DUODENOSCOPY (EGD), COMBINED N/A 7/30/2018    Procedure: COMBINED ESOPHAGOSCOPY, GASTROSCOPY, DUODENOSCOPY (EGD), BIOPSY SINGLE OR MULTIPLE;  gastroscopy;  Surgeon: Parish Xiong MD;  Location:  GI     ESOPHAGOSCOPY, GASTROSCOPY, DUODENOSCOPY (EGD), COMBINED N/A 7/30/2018    Procedure: COMBINED ESOPHAGOSCOPY, GASTROSCOPY, DUODENOSCOPY (EGD);  EGD;  Surgeon: Parish Xiong MD;  Location:  GI     ESOPHAGOSCOPY, GASTROSCOPY, DUODENOSCOPY (EGD), COMBINED N/A 8/2/2018    Procedure: COMBINED ESOPHAGOSCOPY, GASTROSCOPY, DUODENOSCOPY (EGD), BIOPSY SINGLE OR MULTIPLE;  gastroscopy BIOPSYSHOULD BE SENT TO LAB IN NS NOT FORMALIN PER ;  Surgeon: Jonathon Bush MD;  Location:  GI     GASTRIC BYPASS  2001     HEART CATH LEFT HEART CATH  7/19/16    Non ischemic cardiomyopathy; Non functionally significant LAD and RCA disease      OTHER SURGICAL HISTORY  2006    Spinal surgery      OTHER SURGICAL HISTORY  Nov 2016    CRT-D implantation     Current Outpatient Prescriptions   Medication Sig Dispense Refill     ACE/ARB NOT PRESCRIBED, INTENTIONAL, ACE & ARB not prescribed due to Symptomatic hypotension not due to excessive diuresis       ASPIRIN NOT PRESCRIBED (INTENTIONAL) Please choose reason not prescribed, below 0 each 0     atorvastatin (LIPITOR) 40 MG tablet TAKE 1 TABLET AT BEDTIME 90 tablet 3     COENZYME Q-10 PO Take 200 mg by mouth daily        ferrous sulfate (IRON) 325 (65 FE) MG tablet Take 325 mg by mouth daily       furosemide (LASIX) 20 MG tablet Take 0.5 tablets (10 mg) by mouth daily 45 tablet 1     hydrALAZINE (APRESOLINE) 10 MG tablet Take 1 tablet (10 mg) by mouth 3 times daily 270 tablet 1     isosorbide mononitrate (IMDUR) 30 MG 24 hr tablet Take 0.5 tablets (15 mg) by mouth daily 45 tablet 1     metoprolol succinate (TOPROL-XL) 50 MG 24 hr tablet TAKE 1 TABLET (50 MG) DAILY 90 tablet 3     multivitamin, therapeutic with minerals (MULTI-VITAMIN) TABS Take 1 tablet by mouth daily       venlafaxine (EFFEXOR) 75 MG tablet TAKE 1 TABLET TWICE DAILY 180 tablet 1     vitamin D (ERGOCALCIFEROL) 25399 UNIT capsule TAKE 1 CAPSULE ONCE A WEEK (Patient not taking: Reported on 9/7/2018) 12 capsule 1     OTC products: None, except as noted above    Allergies   Allergen Reactions     Wasps [Hornets]      Sand wasp --- years ago.        Latex Allergy: NO    Social History   Substance Use Topics     Smoking status: Former Smoker     Packs/day: 2.00     Years: 20.00     Types: Cigarettes     Quit date: 1/1/1980     Smokeless tobacco: Never Used      Comment: Quit in his 30s - 2 ppd for 20 years     Alcohol use 0.0 oz/week     0 Standard drinks or equivalent per week      Comment: maybe 1 beer a month     History   Drug Use No       REVIEW OF SYSTEMS:   Unable to complete d/t dementia. History gathered from wife.   She states he has been fatigued.   Denies SOB, CP     EXAM:   /72  "(BP Location: Right arm, Patient Position: Sitting, Cuff Size: Adult Large)  Pulse 92  Temp 97  F (36.1  C) (Oral)  Ht 6' 1\" (1.854 m)  Wt 223 lb (101.2 kg)  SpO2 96%  BMI 29.42 kg/m2    GENERAL APPEARANCE: healthy, alert and no distress     EYES: EOMI,  PERRL     HENT: ear canals and TM's normal and nose and mouth without ulcers or lesions     NECK: no adenopathy, no asymmetry, masses, or scars and thyroid normal to palpation     RESP: lungs clear to auscultation - no rales, rhonchi or wheezes     CV: regular rates and rhythm, normal S1 S2, no S3 or S4 and no murmur, click or rub     MS: extremities normal- no gross deformities noted, no evidence of inflammation in joints, FROM in all extremities.     SKIN: no suspicious lesions or rashes     NEURO: Normal strength and tone, sensory exam grossly normal, mentation intact and speech normal     PSYCH: mentation appears normal. and affect normal/bright     LYMPHATICS: No cervical adenopathy    DIAGNOSTICS:     Labs Resulted Today:   Results for orders placed or performed in visit on 09/13/18   Basic metabolic panel  (Ca, Cl, CO2, Creat, Gluc, K, Na, BUN)   Result Value Ref Range    Sodium 140 133 - 144 mmol/L    Potassium 4.2 3.4 - 5.3 mmol/L    Chloride 104 94 - 109 mmol/L    Carbon Dioxide 25 20 - 32 mmol/L    Anion Gap 11 3 - 14 mmol/L    Glucose 100 (H) 70 - 99 mg/dL    Urea Nitrogen 22 7 - 30 mg/dL    Creatinine 1.49 (H) 0.66 - 1.25 mg/dL    GFR Estimate 46 (L) >60 mL/min/1.7m2    GFR Estimate If Black 55 (L) >60 mL/min/1.7m2    Calcium 9.6 8.5 - 10.1 mg/dL   CBC with platelets   Result Value Ref Range    WBC 10.2 4.0 - 11.0 10e9/L    RBC Count 3.99 (L) 4.4 - 5.9 10e12/L    Hemoglobin 12.6 (L) 13.3 - 17.7 g/dL    Hematocrit 39.8 (L) 40.0 - 53.0 %     78 - 100 fl    MCH 31.6 26.5 - 33.0 pg    MCHC 31.7 31.5 - 36.5 g/dL    RDW 13.1 10.0 - 15.0 %    Platelet Count 283 150 - 450 10e9/L       Recent Labs   Lab Test  08/09/18   1256  08/02/18   0845   " 07/31/18   1035  07/31/18   0627   07/30/18   0750   08/25/17   1416   HGB  12.1*  11.4*   < >   --   11.3*   < >  13.9   --   15.1   PLT  288  185   --    --   180   --   218   --   203   INR   --    --    --   1.21*   --    --   3.56*   < >   --    NA   --   138   --    --   139   --   138   --   139   POTASSIUM   --   3.7   --    --   4.0   --   3.7   --   4.0   CR   --   1.23   --    --   1.29*   --   1.23   --   1.34*   A1C   --    --    --    --    --    --   7.1*   --   6.5*    < > = values in this interval not displayed.        IMPRESSION:   Reason for surgery/procedure: bladder tumor  Diagnosis/reason for consult: medical preop    The proposed surgical procedure is considered INTERMEDIATE risk.    REVISED CARDIAC RISK INDEX  The patient has the following serious cardiovascular risks for perioperative complications such as (MI, PE, VFib and 3  AV Block):  Coronary Artery Disease (MI, positive stress test, angina, Qs on EKG)  Congestive Heart Failure (pulmonary edema, PND, s3 danay, CXR with pulmonary congestion, basilar rales)  INTERPRETATION: 2 risks: Class III (moderate risk - 6.6% complication rate)    The patient has the following additional risks for perioperative complications:  Delirium or Dementia      ICD-10-CM    1. Preop general physical exam Z01.818 Basic metabolic panel  (Ca, Cl, CO2, Creat, Gluc, K, Na, BUN)     CBC with platelets   2. Bladder mass N32.89    3. Chronic systolic heart failure (H) I50.22        RECOMMENDATIONS:       Cardiovascular Risk  Patient is already on a Beta Blocker. Continue Betablocker therapy after surgery, using Beta blocker order set as necessary for NPO status.  Echo on 5/29/18 revealed similar LVEF and worsened LV dilation when compared to 6/20/17. Symptomatically has has not had any changes.   He is at very high risk for general anesthesia.       Anemia  Anemia and does not require treatment prior to surgery.  Monitor Hemoglobin postoperatively.         I had  discussion with pt and wife that he is at high risk d/t his PMH and current cardiac function. With his advanced dementia as well, this may be a difficult recovery. I recommended that they have a conversation with the urologist to see if this is a necessary procedure.   They know that risks of surgery include MI or even death.       --Patient is to take all scheduled medications on the day of surgery EXCEPT for modifications listed below.        APPROVAL GIVEN to proceed with proposed procedure, without further diagnostic evaluation       Signed Electronically by: ALLEN Colón CNP    Copy of this evaluation report is provided to requesting physician.    [facility] Preop Guidelines    Revised Cardiac Risk Index

## 2018-09-18 NOTE — OR NURSING
"Cardiologist called, spoke with device nurse - Jamia Joshua, \"this patient is not pacemaker dependent, but he does have a Bi-ventricular device and is paced all of the time.\"  Dr. Kamara updated.  "

## 2018-09-18 NOTE — OR NURSING
"Called and updated [employer], Kory Centeno, that Mr Villalta has a [employer] PM/ICD implanted and that he is scheduled at [facility] for the above procedure. Instructions given based on whether patient is pacemaker dependent or not.      1.  If not PM dependent, magnet over device - \"long steady tone\" - short intermittent bursts of cautery, if HR decreases - surgeon to hold cautery and pacer would return in 1-2 seconds. Remove magnet, device will return to normal function.    2.  If the patient is PM dependent, have the [employer] rep here pre and post procedure to reprogram device.    Recent device interrogation in chart, will update MDA.    "

## 2018-09-19 ENCOUNTER — SURGERY (OUTPATIENT)
Age: 78
End: 2018-09-19

## 2018-09-19 ENCOUNTER — HOSPITAL ENCOUNTER (OUTPATIENT)
Facility: CLINIC | Age: 78
Discharge: HOME | End: 2018-09-19
Attending: UROLOGY | Admitting: UROLOGY
Payer: MEDICARE

## 2018-09-19 ENCOUNTER — ANESTHESIA EVENT (OUTPATIENT)
Dept: SURGERY | Facility: CLINIC | Age: 78
End: 2018-09-19
Payer: MEDICARE

## 2018-09-19 ENCOUNTER — ANESTHESIA (OUTPATIENT)
Dept: SURGERY | Facility: CLINIC | Age: 78
End: 2018-09-19
Payer: MEDICARE

## 2018-09-19 VITALS
RESPIRATION RATE: 18 BRPM | SYSTOLIC BLOOD PRESSURE: 109 MMHG | HEIGHT: 72 IN | DIASTOLIC BLOOD PRESSURE: 68 MMHG | HEART RATE: 80 BPM | TEMPERATURE: 98.4 F | WEIGHT: 222 LBS | OXYGEN SATURATION: 96 % | BODY MASS INDEX: 30.07 KG/M2

## 2018-09-19 DIAGNOSIS — N32.89 BLADDER MASS: Primary | ICD-10-CM

## 2018-09-19 PROCEDURE — 00000159 ZZHCL STATISTIC H-SEND OUTS PREP: Performed by: UROLOGY

## 2018-09-19 PROCEDURE — 25000128 H RX IP 250 OP 636: Performed by: NURSE ANESTHETIST, CERTIFIED REGISTERED

## 2018-09-19 PROCEDURE — 25000128 H RX IP 250 OP 636: Performed by: UROLOGY

## 2018-09-19 PROCEDURE — 36000058 ZZH SURGERY LEVEL 3 EA 15 ADDTL MIN: Performed by: UROLOGY

## 2018-09-19 PROCEDURE — 71000012 ZZH RECOVERY PHASE 1 LEVEL 1 FIRST HR: Performed by: UROLOGY

## 2018-09-19 PROCEDURE — 88307 TISSUE EXAM BY PATHOLOGIST: CPT | Mod: 26 | Performed by: UROLOGY

## 2018-09-19 PROCEDURE — 88307 TISSUE EXAM BY PATHOLOGIST: CPT | Performed by: UROLOGY

## 2018-09-19 PROCEDURE — 71000027 ZZH RECOVERY PHASE 2 EACH 15 MINS: Performed by: UROLOGY

## 2018-09-19 PROCEDURE — 37000008 ZZH ANESTHESIA TECHNICAL FEE, 1ST 30 MIN: Performed by: UROLOGY

## 2018-09-19 PROCEDURE — 40000170 ZZH STATISTIC PRE-PROCEDURE ASSESSMENT II: Performed by: UROLOGY

## 2018-09-19 PROCEDURE — 27210794 ZZH OR GENERAL SUPPLY STERILE: Performed by: UROLOGY

## 2018-09-19 PROCEDURE — 52235 CYSTOSCOPY AND TREATMENT: CPT | Performed by: UROLOGY

## 2018-09-19 PROCEDURE — 25000566 ZZH SEVOFLURANE, EA 15 MIN: Performed by: UROLOGY

## 2018-09-19 PROCEDURE — 25800025 ZZH RX 258: Performed by: UROLOGY

## 2018-09-19 PROCEDURE — 36000056 ZZH SURGERY LEVEL 3 1ST 30 MIN: Performed by: UROLOGY

## 2018-09-19 PROCEDURE — 25000125 ZZHC RX 250: Performed by: NURSE ANESTHETIST, CERTIFIED REGISTERED

## 2018-09-19 PROCEDURE — 25000125 ZZHC RX 250: Performed by: UROLOGY

## 2018-09-19 PROCEDURE — 37000009 ZZH ANESTHESIA TECHNICAL FEE, EACH ADDTL 15 MIN: Performed by: UROLOGY

## 2018-09-19 RX ORDER — SODIUM CHLORIDE, SODIUM LACTATE, POTASSIUM CHLORIDE, CALCIUM CHLORIDE 600; 310; 30; 20 MG/100ML; MG/100ML; MG/100ML; MG/100ML
INJECTION, SOLUTION INTRAVENOUS CONTINUOUS PRN
Status: DISCONTINUED | OUTPATIENT
Start: 2018-09-19 | End: 2018-09-19

## 2018-09-19 RX ORDER — CEFAZOLIN SODIUM 1 G/3ML
1 INJECTION, POWDER, FOR SOLUTION INTRAMUSCULAR; INTRAVENOUS SEE ADMIN INSTRUCTIONS
Status: DISCONTINUED | OUTPATIENT
Start: 2018-09-19 | End: 2018-09-19 | Stop reason: HOSPADM

## 2018-09-19 RX ORDER — ONDANSETRON 4 MG/1
4 TABLET, ORALLY DISINTEGRATING ORAL EVERY 30 MIN PRN
Status: DISCONTINUED | OUTPATIENT
Start: 2018-09-19 | End: 2018-09-19 | Stop reason: HOSPADM

## 2018-09-19 RX ORDER — MAGNESIUM HYDROXIDE 1200 MG/15ML
LIQUID ORAL PRN
Status: DISCONTINUED | OUTPATIENT
Start: 2018-09-19 | End: 2018-09-19 | Stop reason: HOSPADM

## 2018-09-19 RX ORDER — MEPERIDINE HYDROCHLORIDE 25 MG/ML
12.5 INJECTION INTRAMUSCULAR; INTRAVENOUS; SUBCUTANEOUS
Status: DISCONTINUED | OUTPATIENT
Start: 2018-09-19 | End: 2018-09-19 | Stop reason: HOSPADM

## 2018-09-19 RX ORDER — NALOXONE HYDROCHLORIDE 0.4 MG/ML
.1-.4 INJECTION, SOLUTION INTRAMUSCULAR; INTRAVENOUS; SUBCUTANEOUS
Status: DISCONTINUED | OUTPATIENT
Start: 2018-09-19 | End: 2018-09-19 | Stop reason: HOSPADM

## 2018-09-19 RX ORDER — FENTANYL CITRATE 50 UG/ML
INJECTION, SOLUTION INTRAMUSCULAR; INTRAVENOUS PRN
Status: DISCONTINUED | OUTPATIENT
Start: 2018-09-19 | End: 2018-09-19

## 2018-09-19 RX ORDER — EPHEDRINE SULFATE 50 MG/ML
INJECTION, SOLUTION INTRAMUSCULAR; INTRAVENOUS; SUBCUTANEOUS PRN
Status: DISCONTINUED | OUTPATIENT
Start: 2018-09-19 | End: 2018-09-19

## 2018-09-19 RX ORDER — ONDANSETRON 2 MG/ML
4 INJECTION INTRAMUSCULAR; INTRAVENOUS EVERY 30 MIN PRN
Status: DISCONTINUED | OUTPATIENT
Start: 2018-09-19 | End: 2018-09-19 | Stop reason: HOSPADM

## 2018-09-19 RX ORDER — LIDOCAINE HYDROCHLORIDE 20 MG/ML
INJECTION, SOLUTION INFILTRATION; PERINEURAL PRN
Status: DISCONTINUED | OUTPATIENT
Start: 2018-09-19 | End: 2018-09-19

## 2018-09-19 RX ORDER — SODIUM CHLORIDE, SODIUM LACTATE, POTASSIUM CHLORIDE, CALCIUM CHLORIDE 600; 310; 30; 20 MG/100ML; MG/100ML; MG/100ML; MG/100ML
INJECTION, SOLUTION INTRAVENOUS CONTINUOUS
Status: DISCONTINUED | OUTPATIENT
Start: 2018-09-19 | End: 2018-09-19 | Stop reason: HOSPADM

## 2018-09-19 RX ORDER — FENTANYL CITRATE 50 UG/ML
25-50 INJECTION, SOLUTION INTRAMUSCULAR; INTRAVENOUS
Status: DISCONTINUED | OUTPATIENT
Start: 2018-09-19 | End: 2018-09-19 | Stop reason: HOSPADM

## 2018-09-19 RX ORDER — HYDROMORPHONE HYDROCHLORIDE 1 MG/ML
.3-.5 INJECTION, SOLUTION INTRAMUSCULAR; INTRAVENOUS; SUBCUTANEOUS EVERY 10 MIN PRN
Status: DISCONTINUED | OUTPATIENT
Start: 2018-09-19 | End: 2018-09-19 | Stop reason: HOSPADM

## 2018-09-19 RX ORDER — ONDANSETRON 2 MG/ML
INJECTION INTRAMUSCULAR; INTRAVENOUS PRN
Status: DISCONTINUED | OUTPATIENT
Start: 2018-09-19 | End: 2018-09-19

## 2018-09-19 RX ORDER — PROPOFOL 10 MG/ML
INJECTION, EMULSION INTRAVENOUS PRN
Status: DISCONTINUED | OUTPATIENT
Start: 2018-09-19 | End: 2018-09-19

## 2018-09-19 RX ORDER — CIPROFLOXACIN 500 MG/1
500 TABLET, FILM COATED ORAL 2 TIMES DAILY
Qty: 6 TABLET | Refills: 0 | Status: SHIPPED | OUTPATIENT
Start: 2018-09-19 | End: 2018-09-22

## 2018-09-19 RX ORDER — DEXAMETHASONE SODIUM PHOSPHATE 4 MG/ML
INJECTION, SOLUTION INTRA-ARTICULAR; INTRALESIONAL; INTRAMUSCULAR; INTRAVENOUS; SOFT TISSUE PRN
Status: DISCONTINUED | OUTPATIENT
Start: 2018-09-19 | End: 2018-09-19

## 2018-09-19 RX ORDER — CEFAZOLIN SODIUM 2 G/100ML
2 INJECTION, SOLUTION INTRAVENOUS
Status: COMPLETED | OUTPATIENT
Start: 2018-09-19 | End: 2018-09-19

## 2018-09-19 RX ADMIN — PHENYLEPHRINE HYDROCHLORIDE 100 MCG: 10 INJECTION, SOLUTION INTRAMUSCULAR; INTRAVENOUS; SUBCUTANEOUS at 08:27

## 2018-09-19 RX ADMIN — PHENYLEPHRINE HYDROCHLORIDE 100 MCG: 10 INJECTION, SOLUTION INTRAMUSCULAR; INTRAVENOUS; SUBCUTANEOUS at 07:49

## 2018-09-19 RX ADMIN — CEFAZOLIN SODIUM 2 G: 2 INJECTION, SOLUTION INTRAVENOUS at 07:48

## 2018-09-19 RX ADMIN — FENTANYL CITRATE 50 MCG: 50 INJECTION, SOLUTION INTRAMUSCULAR; INTRAVENOUS at 07:45

## 2018-09-19 RX ADMIN — SODIUM CHLORIDE 3000 ML: 900 IRRIGANT IRRIGATION at 07:50

## 2018-09-19 RX ADMIN — SUCCINYLCHOLINE CHLORIDE 20 MG: 20 INJECTION, SOLUTION INTRAMUSCULAR; INTRAVENOUS; PARENTERAL at 08:11

## 2018-09-19 RX ADMIN — SODIUM CHLORIDE, POTASSIUM CHLORIDE, SODIUM LACTATE AND CALCIUM CHLORIDE: 600; 310; 30; 20 INJECTION, SOLUTION INTRAVENOUS at 07:45

## 2018-09-19 RX ADMIN — SUCCINYLCHOLINE CHLORIDE 60 MG: 20 INJECTION, SOLUTION INTRAMUSCULAR; INTRAVENOUS; PARENTERAL at 08:06

## 2018-09-19 RX ADMIN — ATROPA BELLADONNA AND OPIUM 30 MG: 16.2; 3 SUPPOSITORY RECTAL at 08:30

## 2018-09-19 RX ADMIN — FENTANYL CITRATE 25 MCG: 50 INJECTION, SOLUTION INTRAMUSCULAR; INTRAVENOUS at 07:53

## 2018-09-19 RX ADMIN — PROPOFOL 110 MG: 10 INJECTION, EMULSION INTRAVENOUS at 07:45

## 2018-09-19 RX ADMIN — ONDANSETRON 4 MG: 2 INJECTION INTRAMUSCULAR; INTRAVENOUS at 07:56

## 2018-09-19 RX ADMIN — PHENYLEPHRINE HYDROCHLORIDE 200 MCG: 10 INJECTION, SOLUTION INTRAMUSCULAR; INTRAVENOUS; SUBCUTANEOUS at 08:12

## 2018-09-19 RX ADMIN — LIDOCAINE HYDROCHLORIDE 100 MG: 20 INJECTION, SOLUTION INFILTRATION; PERINEURAL at 07:45

## 2018-09-19 RX ADMIN — DEXAMETHASONE SODIUM PHOSPHATE 4 MG: 4 INJECTION, SOLUTION INTRA-ARTICULAR; INTRALESIONAL; INTRAMUSCULAR; INTRAVENOUS; SOFT TISSUE at 07:56

## 2018-09-19 RX ADMIN — PHENYLEPHRINE HYDROCHLORIDE 200 MCG: 10 INJECTION, SOLUTION INTRAMUSCULAR; INTRAVENOUS; SUBCUTANEOUS at 08:07

## 2018-09-19 RX ADMIN — PHENYLEPHRINE HYDROCHLORIDE 100 MCG: 10 INJECTION, SOLUTION INTRAMUSCULAR; INTRAVENOUS; SUBCUTANEOUS at 07:53

## 2018-09-19 RX ADMIN — SODIUM CHLORIDE 1000 ML: 900 IRRIGANT IRRIGATION at 07:50

## 2018-09-19 RX ADMIN — Medication 10 MG: at 07:58

## 2018-09-19 RX ADMIN — PHENYLEPHRINE HYDROCHLORIDE 100 MCG: 10 INJECTION, SOLUTION INTRAMUSCULAR; INTRAVENOUS; SUBCUTANEOUS at 07:57

## 2018-09-19 RX ADMIN — SUCCINYLCHOLINE CHLORIDE 20 MG: 20 INJECTION, SOLUTION INTRAMUSCULAR; INTRAVENOUS; PARENTERAL at 08:08

## 2018-09-19 RX ADMIN — SODIUM CHLORIDE 3000 ML: 900 IRRIGANT IRRIGATION at 07:51

## 2018-09-19 RX ADMIN — SODIUM CHLORIDE 3000 ML: 900 IRRIGANT IRRIGATION at 08:10

## 2018-09-19 RX ADMIN — FENTANYL CITRATE 25 MCG: 50 INJECTION, SOLUTION INTRAMUSCULAR; INTRAVENOUS at 08:31

## 2018-09-19 RX ADMIN — PHENYLEPHRINE HYDROCHLORIDE 200 MCG: 10 INJECTION, SOLUTION INTRAMUSCULAR; INTRAVENOUS; SUBCUTANEOUS at 08:03

## 2018-09-19 ASSESSMENT — COPD QUESTIONNAIRES: COPD: 0

## 2018-09-19 NOTE — ANESTHESIA PREPROCEDURE EVALUATION
Procedure: Procedure(s):  COMBINED CYSTOSCOPY, TRANSURETHRAL RESECTION (TUR) TUMOR BLADDER  Preop diagnosis: bladder tumor     Allergies   Allergen Reactions     Wasps [Hornets]      Sand wasp --- years ago.       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     Myocardial infarction 1995 and 2010     Nonischemic cardiomyopathy (H) 7/22/2016     Obesity      Pacemaker     ICD/PM     Rosacea      Rotator cuff disorder     Tear x 2     RV (right ventricular) mural thrombus 7/22/2016     Past Surgical History:   Procedure Laterality Date     ANGIOPLASTY  1995 and 2010 with stent     BACK SURGERY       CARDIAC SURGERY      ICD/PM     ESOPHAGOSCOPY, GASTROSCOPY, DUODENOSCOPY (EGD), COMBINED N/A 7/30/2018    Procedure: COMBINED ESOPHAGOSCOPY, GASTROSCOPY, DUODENOSCOPY (EGD), BIOPSY SINGLE OR MULTIPLE;  gastroscopy;  Surgeon: Parish Xiong MD;  Location:  GI     ESOPHAGOSCOPY, GASTROSCOPY, DUODENOSCOPY (EGD), COMBINED N/A 7/30/2018    Procedure: COMBINED ESOPHAGOSCOPY, GASTROSCOPY, DUODENOSCOPY (EGD);  EGD;  Surgeon: Parish Xiong MD;  Location:  GI     ESOPHAGOSCOPY, GASTROSCOPY, DUODENOSCOPY (EGD), COMBINED N/A 8/2/2018    Procedure: COMBINED ESOPHAGOSCOPY, GASTROSCOPY, DUODENOSCOPY (EGD), BIOPSY SINGLE OR MULTIPLE;  gastroscopy BIOPSYSHOULD BE SENT TO LAB IN NS NOT FORMALIN PER ;  Surgeon: Jonathon Bush MD;  Location:  GI     GASTRIC BYPASS  2001     HEART CATH LEFT HEART CATH  7/19/16    Non ischemic cardiomyopathy; Non functionally significant LAD and RCA disease      OTHER SURGICAL HISTORY  2006     Spinal surgery     OTHER SURGICAL HISTORY  Nov 2016    CRT-D implantation     Social History   Substance Use Topics     Smoking status: Former Smoker     Packs/day: 2.00     Years: 20.00     Types: Cigarettes     Quit date: 1/1/1980     Smokeless tobacco: Never Used      Comment: Quit in his 30s - 2 ppd for 20 years     Alcohol use 0.0 oz/week     0 Standard drinks or equivalent per week      Comment: maybe 1 beer a month     Prior to Admission medications    Medication Sig Start Date End Date Taking? Authorizing Provider   atorvastatin (LIPITOR) 40 MG tablet TAKE 1 TABLET AT BEDTIME 8/30/18  Yes Flori Reyes DO   COENZYME Q-10 PO Take 200 mg by mouth every morning    Yes Reported, Patient   ferrous sulfate (IRON) 325 (65 FE) MG tablet Take 325 mg by mouth every morning    Yes Unknown, Entered By History   furosemide (LASIX) 20 MG tablet Take 0.5 tablets (10 mg) by mouth daily 2/15/18  Yes Flori Reyes DO   hydrALAZINE (APRESOLINE) 10 MG tablet Take 1 tablet (10 mg) by mouth 3 times daily 9/5/18  Yes Josias Hernandez MD   isosorbide mononitrate (IMDUR) 30 MG 24 hr tablet Take 0.5 tablets (15 mg) by mouth daily 7/19/18  Yes Josias Hernandez MD   metoprolol succinate (TOPROL-XL) 50 MG 24 hr tablet TAKE 1 TABLET (50 MG) DAILY 8/22/18  Yes Flori Reyes DO   multivitamin, therapeutic with minerals (MULTI-VITAMIN) TABS Take 1 tablet by mouth every morning    Yes Reported, Patient   venlafaxine (EFFEXOR) 75 MG tablet TAKE 1 TABLET TWICE DAILY 6/11/18  Yes Flori Reyes DO   vitamin D (ERGOCALCIFEROL) 16531 UNIT capsule TAKE 1 CAPSULE ONCE A WEEK 6/19/18  Yes Katelyn Akhtar APRN CNP   ACE/ARB NOT PRESCRIBED, INTENTIONAL, ACE & ARB not prescribed due to Symptomatic hypotension not due to excessive diuresis 9/29/16   Flori Reyes DO   ASPIRIN NOT PRESCRIBED (INTENTIONAL) Please choose reason not prescribed, below 9/9/18   Flori Reyes DO     Current Facility-Administered  Medications Ordered in Epic   Medication Dose Route Frequency Last Rate Last Dose     ceFAZolin (ANCEF) 1 g vial to attach to  ml bag for ADULT or 50 ml bag for PEDS  1 g Intravenous See Admin Instructions         ceFAZolin (ANCEF) intermittent infusion 2 g in 100 mL dextrose PRE-MIX  2 g Intravenous Pre-Op/Pre-procedure x 1 dose         No current [facility]-ordered outpatient prescriptions on file.       Wt Readings from Last 1 Encounters:   09/19/18 100.7 kg (222 lb)     Temp Readings from Last 1 Encounters:   09/19/18 36.3  C (97.4  F) (Temporal)     BP Readings from Last 6 Encounters:   09/19/18 115/84   09/13/18 116/72   09/07/18 118/80   08/09/18 118/80   08/04/18 118/72   08/02/18 123/76     Pulse Readings from Last 4 Encounters:   09/13/18 92   08/09/18 104   08/04/18 90   08/01/18 71     Resp Readings from Last 1 Encounters:   09/19/18 20     SpO2 Readings from Last 1 Encounters:   09/19/18 96%     Recent Labs   Lab Test  09/13/18   1103  08/02/18   0845   NA  140  138   POTASSIUM  4.2  3.7   CHLORIDE  104  103   CO2  25  26   ANIONGAP  11  9   GLC  100*  91   BUN  22  29   CR  1.49*  1.23   SHELLEY  9.6  8.3*     Recent Labs   Lab Test  07/30/18   0750  08/25/17   1416   AST  27  32   ALT  40  51   ALKPHOS  143  128   BILITOTAL  0.4  0.4   LIPASE  105   --      Recent Labs   Lab Test  09/13/18   1103  08/09/18   1256   WBC  10.2  9.1   HGB  12.6*  12.1*   PLT  283  288     Recent Labs   Lab Test  07/30/18   1800   ABO  A   RH  Pos     Recent Labs   Lab Test  07/31/18   1035  07/30/18   0750   INR  1.21*  3.56*      Recent Labs   Lab Test  07/23/16   2105  07/23/16   1712  07/23/16   1312   TROPI  0.355*  0.338*  0.407*     Recent Labs   Lab Test  08/25/16   1622   PH  7.38   PCO2  31*   PO2  85   HCO3  18*         Anesthesia Evaluation     . Pt has had prior anesthetic.     No history of anesthetic complications          ROS/MED HX    ENT/Pulmonary:      (-) asthma and COPD   Neurologic:     (+)dementia,      Cardiovascular: Comment: LBBB    (+) Dyslipidemia, hypertension----. : . CHF (25%) . . pacemaker :ICD . .       METS/Exercise Tolerance:     Hematologic:         Musculoskeletal:         GI/Hepatic:        (-) GERD   Renal/Genitourinary:     (+) chronic renal disease, type: CRI, BPH,       Endo:     (+) type II DM Obesity, .      Psychiatric:     (+) psychiatric history anxiety and depression      Infectious Disease:         Malignancy:         Other:                     Physical Exam      Airway   Mallampati: III  TM distance: >3 FB  Neck ROM: full    Dental   (+) upper dentures and lower dentures    Cardiovascular   Rhythm and rate: regular      Pulmonary    breath sounds clear to auscultation                    Anesthesia Plan      History & Physical Review  History and physical reviewed and following examination; no interval change.    ASA Status:  3 .    NPO Status:  > 8 hours    Plan for General and LMA   PONV prophylaxis:  Ondansetron (or other 5HT-3) and Dexamethasone or Solumedrol       Postoperative Care      Consents  Anesthetic plan, risks, benefits and alternatives discussed with:  Patient..                          .

## 2018-09-19 NOTE — PROGRESS NOTES
Admission medication history interview status for the 9/19/2018  admission is complete. See EPIC admission navigator for prior to admission medications     Medication history source reliability:Good    Medication history interview source(s):Patient    Medication history resources (including written lists, pill bottles, clinic record):Patient brought in a med list on day of procedure.    Primary pharmacy.[facility], [facility], [place]    Additional medication history information not noted on PTA med list :None    Time spent in this activity: 30 minutes    Prior to Admission medications    Medication Sig Last Dose Taking? Auth Provider   atorvastatin (LIPITOR) 40 MG tablet TAKE 1 TABLET AT BEDTIME 9/18/2018 at 2100 Yes Flori Reyes DO   COENZYME Q-10 PO Take 200 mg by mouth every morning  9/19/2018 at 0430 Yes Reported, Patient   ferrous sulfate (IRON) 325 (65 FE) MG tablet Take 325 mg by mouth every morning  9/19/2018 at 0430 Yes Unknown, Entered By History   furosemide (LASIX) 20 MG tablet Take 0.5 tablets (10 mg) by mouth daily 9/19/2018 at 0430 Yes Flori Reyes DO   hydrALAZINE (APRESOLINE) 10 MG tablet Take 1 tablet (10 mg) by mouth 3 times daily 9/19/2018 at 0430 Yes Josias Hernandez MD   isosorbide mononitrate (IMDUR) 30 MG 24 hr tablet Take 0.5 tablets (15 mg) by mouth daily 9/19/2018 at 0430 Yes Josias Hernandez MD   metoprolol succinate (TOPROL-XL) 50 MG 24 hr tablet TAKE 1 TABLET (50 MG) DAILY 9/19/2018 at 0430 Yes Flori Reyes DO   multivitamin, therapeutic with minerals (MULTI-VITAMIN) TABS Take 1 tablet by mouth every morning  9/19/2018 at 0430 Yes Reported, Patient   venlafaxine (EFFEXOR) 75 MG tablet TAKE 1 TABLET TWICE DAILY 9/19/2018 at 0430 Yes Flori Reyes DO   vitamin D (ERGOCALCIFEROL) 77021 UNIT capsule TAKE 1 CAPSULE ONCE A WEEK 9/18/2018 at 2100 Yes Katelyn Akhtar APRN CNP   ACE/ARB NOT PRESCRIBED, INTENTIONAL, ACE & ARB not prescribed due  to Symptomatic hypotension not due to excessive diuresis   Flori Reyes, DO   ASPIRIN NOT PRESCRIBED (INTENTIONAL) Please choose reason not prescribed, below   Flori Reyes, DO

## 2018-09-19 NOTE — BRIEF OP NOTE
[facility] Brief Operative Note    Pre-operative diagnosis: bladder tumor    Post-operative diagnosis Bladder neoplasm   Procedure: Procedure(s):  CYSTOSCOPY, TRANSURETHRAL RESECTION BLADDER TUMOR  - Wound Class: II-Clean Contaminated   Surgeon(s): Surgeon(s) and Role:     * Ryan Camarillo MD - Primary   Estimated blood loss: 5 mL    Specimens:   ID Type Source Tests Collected by Time Destination   A : BLADDER NEOPLASM Tissue Bladder SURGICAL PATHOLOGY EXAM Ryan Camarillo MD 9/19/2018  8:14 AM       Findings: Approximately 4cm RIGHT lateral wall mass with papillary a sessile components. Right UO identified and mass just lateral to this with no clear involvement of the UO. Mass resected in entirety with excellent hemostasis ensured.    Ryan Camarillo M.D.

## 2018-09-19 NOTE — IP AVS SNAPSHOT
MRN:[id number]                      After Visit Summary   9/19/2018    Gibson Villalta    MRN: [id number]           Thank you!     Thank you for choosing [facility] for your care. Our goal is always to provide you with excellent care. Hearing back from our patients is one way we can continue to improve our services. Please take a few minutes to complete the written survey that you may receive in the mail after you visit with us. Thank you!        Patient Information     Date Of Birth          1940        Designated Caregiver       Most Recent Value    Caregiver    Will someone help with your care after discharge? yes    Name of designated caregiver Izabella Marie    Phone number of caregiver     Caregiver address [address] [place] [address] [address] [address]      About your hospital stay     You were admitted on:  September 19, 2018 You last received care in the:  [facility] PACU    You were discharged on:  September 19, 2018       Who to Call     For medical emergencies, please call 911.  For non-urgent questions about your medical care, please call your primary care provider or clinic, [phone]  For questions related to your surgery, please call your surgery clinic        Attending Provider     Provider Specialty    Ryan Camarillo MD Urology       Primary Care Provider Office Phone # Fax #    Flori Reyes,  [phone]      Your next 10 appointments already scheduled     Sep 26, 2018  4:30 PM CDT   Return Visit with Ryan Camarillo MD   [facility] [place] ([facility])    [address]  [address]  [facility] [id number] [phone]            Dec 10, 2018  4:30 PM CST   Remote ICD Check with ALEJANDRO DCR2   [facility]   [place] ([facility])    [address]  [facility] [address] [phone] OPT 2           This appointment is for a remote check of your  keciator.  This is not an appointment at the office.              Future tests that were ordered for you     URINARY LEG BAG                 Further instructions from your care team         Same Day Surgery Discharge Instructions for  Sedation and General Anesthesia       It's not unusual to feel dizzy, light-headed or faint for up to 24 hours after surgery or while taking pain medication.  If you have these symptoms: sit for a few minutes before standing and have someone assist you when you get up to walk or use the bathroom.      You should rest and relax for the next 24 hours. We recommend you make arrangements to have an adult stay with you for at least 24 hours after your discharge.  Avoid hazardous and strenuous activity.      DO NOT DRIVE any vehicle or operate mechanical equipment for 24 hours following the end of your surgery.  Even though you may feel normal, your reactions may be affected by the medication you have received.      Do not drink alcoholic beverages for 24 hours following surgery.       Slowly progress to your regular diet as you feel able. It's not unusual to feel nauseated and/or vomit after receiving anesthesia.  If you develop these symptoms, drink clear liquids (apple juice, ginger ale, broth, 7-up, etc. ) until you feel better.  If your nausea and vomiting persists for 24 hours, please notify your surgeon.        All narcotic pain medications, along with inactivity and anesthesia, can cause constipation. Drinking plenty of liquids and increasing fiber intake will help.      For any questions of a medical nature, call your surgeon.      Do not make important decisions for 24 hours.      If you had general anesthesia, you may have a sore throat for a couple of days related to the breathing tube used during surgery.  You may use Cepacol lozenges to help with this discomfort.  If it worsens or if you develop a fever, contact your surgeon.       If you feel your pain is not well managed  with the pain medications prescribed by your surgeon, please contact your surgeon's office to let them know so they can address your concerns.         Discharge Instructions for Transurethral Resection of Bladder Tumor (TURBT)  You had a procedure called a resection of bladder tumor (surgery to remove a bladder tumor). During the surgery, a surgeon inserted a thin, lighted tube (cystoscope) into the bladder through the urethra (the part of your body that carries urine from the bladder to the outside of the body). The surgeon used a tool to either remove the cancer or burn it away with high-energy electricity.  Home care    Take care of your catheter the way you were shown in the hospital. You will need to wash the tubing at least twice a day.    Don t be alarmed by brownish or reddish blood or clots in your urine. This is a result of the procedure. However, call your doctor if the blood does not start to go away within 72 hours after you go home.    Drink plenty of fluids during the day (enough to keep your urine very light colored). This will help keep a healthy flow of urine.    Don t drive until the doctor says it s OK.     Don t return to work until the doctor says it s OK.    Don t do any heavy lifting for 3 weeks after the procedure.  ? Don t lift anything heavier than 8 pounds.  ? Don t lift weights.  ? Don t  infants or children.    Don t mow the lawn or use a vacuum .    Avoid constipation.  ? Use a laxative or stool softener as directed by your doctor.  ? Eat more high-fiber foods.    Be sure to finish the antibiotics that your doctor prescribed.    Once your catheter is removed, expect some blood in your urine and some burning when you urinate.  Follow-up care  You will return on Friday for starkey catheter removal.   When to call your healthcare provider  Call your healthcare provider right away if you have any of the following:    Heavy bleeding or large blood clots in the urine    Blood in  the urine after 3 days    Catheter falls out or stops draining    Fever above 100.4 F (38 C) or shaking chills    Trouble urinating    Pain or cramping in the abdomen that won t go away    Dr. Camarillo's office number: [phone]   Date Last Reviewed: 1/1/2017 2000-2017 [employer]. [address] [place] [address]. All rights reserved. This information is not intended as a substitute for professional medical care. Always follow your healthcare professional's instructions.              DISCHARGE INSTRUCTIONS FOR   CATHETER CARE AT HOME      .    Basic Catheter Care  1. Always wash hands before and after handling your catheter.  2. Use soap and water to wash the area around your catheter.  3. Do this procedure twice a day.  4. Proper cleansing will help keep the area from becoming irritated or infected.    Leg Bag  This is a small plastic bag that collects urine draining from your catheter and then strapped around your thigh. It will need to be emptied when the bag is 1/2 to 3/4 full.     Large Drainage Bag  1. This bag is larger than the leg bag and holds more urine.  It is to be used while at home, especially at night.    2. Before you go to bed, change the leg bag to the large drainage bag.  3. Pinch off the catheter with your fingers and swab the connection between the catheter and leg bag with alcohol sponge.  4. Disconnect the leg bag and connect the large drainage bag to your catheter.  5. When you get into bed, arrange the drainage tubing so that it doesn t kink.  6. Be sure to keep the bag below the level of your bladder and allow enough slack for turning.    Cleaning Your Drainage Bags    1. Wash hands.  2. Using funnel or syringe, fill the bag half full with a solution of 1/2  vinegar and 1/2 water.  3. Shake bag, allowing mixture to cleanse inside of bag.  4. Empty out all vinegar and water mixture from your bag.  5. Hang bag to dry when not in use.  6. Clean your bags anytime you  change them.      Helpful Hints  1. Always keep drainage bags below bladder level to insure adequate drainage.  2. Drink 4-6 glasses of water daily along with other fluids you normally drink to keep urine free of infection and / or clots.  3. If you notice no urine in your bag for 2 to 4 hours or you develop extreme discomfort in bladder area, your catheter maybe plugged.  Notify your doctor.  4. If you notice your urine becomes foul smelling and cloudy, notify your doctor.  Also notify your doctor if you develop fever or chills.  5. If you notice urine leaking around the outside of the catheter, check to be sure catheter or tubing is not kinked.  6. Don t use leg bag while in bed.                      Pending Results     No orders found from 9/17/2018 to 9/20/2018.            Admission Information     Date & Time Provider Department Dept. Phone    9/19/2018 Ryan Camarillo MD [facility] PACU [phone]      Your Vitals Were     Blood Pressure Temperature Respirations Height Weight Pulse Oximetry    136/78 98.4  F (36.9  C) 24 1.829 m (6') 100.7 kg (222 lb) 100%    BMI (Body Mass Index)                   30.11 kg/m2           [employer] Information     [employer] gives you secure access to your electronic health record. If you see a primary care provider, you can also send messages to your care team and make appointments. If you have questions, please call your primary care clinic.  If you do not have a primary care provider, please call [phone] and they will assist you.        Care EveryWhere ID     This is your Care EveryWhere ID. This could be used by other organizations to access your [facility] medical records  [id number]        Equal Access to Services     [facility] Center[facility] : Mark Lance, bam logan, rusty valderrama. So [facility] [phone].    ATENCIÓN: Si habla español, tiene a de luna disposición servicios gratuitos de asistencia  lingüística. Yvette al [phone].    We comply with applicable federal civil rights laws and [place] laws. We do not discriminate on the basis of race, color, national origin, age, disability, sex, sexual orientation, or gender identity.               Review of your medicines      START taking        Dose / Directions    ciprofloxacin 500 MG tablet   Commonly known as:  CIPRO   Used for:  Bladder mass        Dose:  500 mg   Take 1 tablet (500 mg) by mouth 2 times daily for 3 days   Quantity:  6 tablet   Refills:  0         CONTINUE these medicines which have NOT CHANGED        Dose / Directions    ACE/ARB/ARNI NOT PRESCRIBED (INTENTIONAL)   Used for:  HFrEF (heart failure with reduced ejection fraction) (H)        ACE & ARB not prescribed due to Symptomatic hypotension not due to excessive diuresis   Refills:  0       ASPIRIN NOT PRESCRIBED   Commonly known as:  INTENTIONAL   Used for:  Controlled type 2 diabetes mellitus with chronic kidney disease, without long-term current use of insulin, unspecified CKD stage (H)        Please choose reason not prescribed, below   Quantity:  0 each   Refills:  0       atorvastatin 40 MG tablet   Commonly known as:  LIPITOR   Used for:  Hyperlipidemia LDL goal <130        TAKE 1 TABLET AT BEDTIME   Quantity:  90 tablet   Refills:  3       COENZYME Q-10 PO        Dose:  200 mg   Take 200 mg by mouth every morning   Refills:  0       ferrous sulfate 325 (65 Fe) MG tablet   Commonly known as:  IRON        Dose:  325 mg   Take 325 mg by mouth every morning   Refills:  0       furosemide 20 MG tablet   Commonly known as:  LASIX   Used for:  Chronic systolic heart failure (H)        Dose:  10 mg   Take 0.5 tablets (10 mg) by mouth daily   Quantity:  45 tablet   Refills:  1       hydrALAZINE 10 MG tablet   Commonly known as:  APRESOLINE   Used for:  Chronic systolic heart failure (H), Cardiomyopathy (H)        Dose:  10 mg   Take 1 tablet (10 mg) by mouth 3 times daily   Quantity:   270 tablet   Refills:  1       isosorbide mononitrate 30 MG 24 hr tablet   Commonly known as:  IMDUR   Used for:  Chronic systolic heart failure (H), Cardiomyopathy (H)        Dose:  15 mg   Take 0.5 tablets (15 mg) by mouth daily   Quantity:  45 tablet   Refills:  1       metoprolol succinate 50 MG 24 hr tablet   Commonly known as:  TOPROL-XL   Used for:  Cardiomyopathy (H)        TAKE 1 TABLET (50 MG) DAILY   Quantity:  90 tablet   Refills:  3       Multi-vitamin Tabs tablet        Dose:  1 tablet   Take 1 tablet by mouth every morning   Refills:  0       venlafaxine 75 MG tablet   Commonly known as:  EFFEXOR   Used for:  Anxiety and depression        TAKE 1 TABLET TWICE DAILY   Quantity:  180 tablet   Refills:  1       vitamin D 66474 UNIT capsule   Commonly known as:  ERGOCALCIFEROL   Used for:  Vitamin D deficiency        TAKE 1 CAPSULE ONCE A WEEK   Quantity:  12 capsule   Refills:  1            Where to get your medicines      These medications were sent to [facility] - [address]  [address], [place] [address]     Phone:  [phone]     ciprofloxacin 500 MG tablet                Protect others around you: Learn how to safely use, store and throw away your medicines at www.disposemymeds.org.        ANTIBIOTIC INSTRUCTION     You've Been Prescribed an Antibiotic - Now What?  Your healthcare team thinks that you or your loved one might have an infection. Some infections can be treated with antibiotics, which are powerful, life-saving drugs. Like all medications, antibiotics have side effects and should only be used when necessary. There are some important things you should know about your antibiotic treatment.      Your healthcare team may run tests before you start taking an antibiotic.    Your team may take samples (e.g., from your blood, urine or other areas) to run tests to look for bacteria. These test can be important to determine if you need an  antibiotic at all and, if you do, which antibiotic will work best.      Within a few days, your healthcare team might change or even stop your antibiotic.    Your team may start you on an antibiotic while they are working to find out what is making you sick.    Your team might change your antibiotic because test results show that a different antibiotic would be better to treat your infection.    In some cases, once your team has more information, they learn that you do not need an antibiotic at all. They may find out that you don't have an infection, or that the antibiotic you're taking won't work against your infection. For example, an infection caused by a virus can't be treated with antibiotics. Staying on an antibiotic when you don't need it is more likely to be harmful than helpful.      You may experience side effects from your antibiotic.    Like all medications, antibiotics have side effects. Some of these can be serious.    Let you healthcare team know if you have any known allergies when you are admitted to the hospital.    One significant side effect of nearly all antibiotics is the risk of severe and sometimes deadly diarrhea caused by Clostridium difficile (C. Difficile). This occurs when a person takes antibiotics because some good germs are destroyed. Antibiotic use allows C. diificile to take over, putting patients at high risk for this serious infection.    As a patient or caregiver, it is important to understand your or your loved one's antibiotic treatment. It is especially important for caregivers to speak up when patients can't speak for themselves. Here are some important questions to ask your healthcare team.    What infection is this antibiotic treating and how do you know I have that infection?    What side effects might occur from this antibiotic?    How long will I need to take this antibiotic?    Is it safe to take this antibiotic with other medications or supplements (e.g., vitamins)  that I am taking?     Are there any special directions I need to know about taking this antibiotic? For example, should I take it with food?    How will I be monitored to know whether my infection is responding to the antibiotic?    What tests may help to make sure the right antibiotic is prescribed for me?      Information provided by:  www.cdc.gov/getsmart  U.S. Department of Health and Human Services  Centers for disease Control and Prevention  National Center for Emerging and Zoonotic Infectious Diseases  Division of Healthcare Quality Promotion             Medication List: This is a list of all your medications and when to take them. Check marks below indicate your daily home schedule. Keep this list as a reference.      Medications           Morning Afternoon Evening Bedtime As Needed    ACE/ARB/ARNI NOT PRESCRIBED (INTENTIONAL)   ACE & ARB not prescribed due to Symptomatic hypotension not due to excessive diuresis                                ASPIRIN NOT PRESCRIBED   Commonly known as:  INTENTIONAL   Please choose reason not prescribed, below                                atorvastatin 40 MG tablet   Commonly known as:  LIPITOR   TAKE 1 TABLET AT BEDTIME                                ciprofloxacin 500 MG tablet   Commonly known as:  CIPRO   Take 1 tablet (500 mg) by mouth 2 times daily for 3 days                                COENZYME Q-10 PO   Take 200 mg by mouth every morning                                ferrous sulfate 325 (65 Fe) MG tablet   Commonly known as:  IRON   Take 325 mg by mouth every morning                                furosemide 20 MG tablet   Commonly known as:  LASIX   Take 0.5 tablets (10 mg) by mouth daily                                hydrALAZINE 10 MG tablet   Commonly known as:  APRESOLINE   Take 1 tablet (10 mg) by mouth 3 times daily                                isosorbide mononitrate 30 MG 24 hr tablet   Commonly known as:  IMDUR   Take 0.5 tablets (15 mg) by mouth daily                                 metoprolol succinate 50 MG 24 hr tablet   Commonly known as:  TOPROL-XL   TAKE 1 TABLET (50 MG) DAILY                                Multi-vitamin Tabs tablet   Take 1 tablet by mouth every morning                                venlafaxine 75 MG tablet   Commonly known as:  EFFEXOR   TAKE 1 TABLET TWICE DAILY                                vitamin D 67769 UNIT capsule   Commonly known as:  ERGOCALCIFEROL   TAKE 1 CAPSULE ONCE A WEEK

## 2018-09-19 NOTE — OP NOTE
OPERATIVE REPORT  DATE OF SURGERY: 09/19/18  PREOPERATIVE DIAGNOSIS:  Bladder mass  POSTOPERATIVE DIAGNOSIS: Bladder neoplasm   START TIME: 7:51 AM  END TIME: 8:30 AM  PROCEDURE PERFORMED:   1. TURBT - medium (2-5cm)     STAFF SURGEON: Ryan Camarillo MD  ANESTHESIA: General.   ESTIMATED BLOOD LOSS: 5 mL.   DRAINS AND TUBES: 22fr 3 way starkey with the irrigation port capped - 25cc in the balloon  COMPLICATIONS: None.   DISPOSITION: PACU.   SPECIMENS OBTAINED:   ID Type Source Tests Collected by Time Destination   A : BLADDER NEOPLASM Tissue Bladder SURGICAL PATHOLOGY EXAM Ryan Camarillo MD 9/19/2018  8:14 AM       SIGNIFICANT FINDINGS:   Approximately 4cm RIGHT lateral wall mass with papillary a sessile components. Right UO identified and mass just lateral to this with no clear involvement of the UO. Mass resected in entirety with excellent hemostasis ensured.     HISTORY OF PRESENT ILLNESS:   76 y/o man with complex medical history significant for dementia, RV and LV mural thrombus on chronic anticoagulation with Coumadin, s/p Smita-en-Y gastric bypass, hypertension, chronic kidney disease, CAD, cardiomyopathy with EF 25-30%, diabetes who presents with rectal bleeding/melena and now found to have gastric mass and bladder lesions.     OPERATION PERFORMED:   Informed consent was obtained and the patient was brought to the operating room where general anesthesia was induced. The patient was given appropriate preoperative antibiotics and positioned supine. The patient was then repositioned in dorsal lithotomy with all pressure points padded. We then performed a timeout, verifying the correct patient's site and procedure to be performed.    The 26fr continuous flow resectoscope was inserted atraumatically through the urethra with the visual obturator. Complete cystoscopy was performed with evidence of moderate trabeculation and a ~4cm RIGHT lateral wall bladder mass. There was evidence of necrotic debris within the  bladder. The UOs were identified bilaterally. The RIGHT UO was noted to be just medial to the mass with no clear involvement. The mass was resected in entirety with care taken to ensure some deeper resection with no indication of perforation. The mass was both papillary and sessile. The tumor chips were irrigated and removed. Care was taken to ensure excellent hemostasis on a low pressure system. There was some oozing from a prostatic median lobe which was partially resected and cauterized. Again hemostasis was ensured and noted to be excellent. The scope was removed and a 22fr three-way starkey was placed with 25cc in the balloon and the irrigation port capped.   He emerged from anesthesia and was transferred to the PACU.     Ryan Camarillo MD   Urology  [facility]  Clinic Phone [phone]

## 2018-09-19 NOTE — ANESTHESIA CARE TRANSFER NOTE
Patient: Gibson Villalta    Procedure(s):  CYSTOSCOPY, TRANSURETHRAL RESECTION BLADDER TUMOR  - Wound Class: II-Clean Contaminated    Diagnosis: bladder tumor   Diagnosis Additional Information: No value filed.    Anesthesia Type:   General, LMA     Note:  Airway :Face Mask  Patient transferred to:PACU  Comments: Orientation as pre-op.  C/o bladder spasms. Denies N&V.  Report to RN.      Vitals: (Last set prior to Anesthesia Care Transfer)    CRNA VITALS  9/19/2018 0809 - 9/19/2018 0845      9/19/2018             Pulse: 84    SpO2: 98 %    Resp Rate (set): 10                Electronically Signed By: Mayte Perry  September 19, 2018  8:45 AM

## 2018-09-19 NOTE — IP AVS SNAPSHOT
[place] [address]    [place] [address]    Phone:  [phone]                                       After Visit Summary   9/19/2018    Gibson Villalta    MRN: [id number]           After Visit Summary Signature Page     I have received my discharge instructions, and my questions have been answered. I have discussed any challenges I see with this plan with the nurse or doctor.    ..........................................................................................................................................  Patient/Patient Representative Signature      ..........................................................................................................................................  Patient Representative Print Name and Relationship to Patient    ..................................................               ................................................  Date                                   Time    ..........................................................................................................................................  Reviewed by Signature/Title    ...................................................              ..............................................  Date                                               Time          22EPIC Rev 08/18

## 2018-09-19 NOTE — ANESTHESIA POSTPROCEDURE EVALUATION
Patient: Gibson Villalta    Procedure(s):  CYSTOSCOPY, TRANSURETHRAL RESECTION BLADDER TUMOR  - Wound Class: II-Clean Contaminated    Diagnosis:bladder tumor   Diagnosis Additional Information: No value filed.    Anesthesia Type:  General, LMA    Note:  Anesthesia Post Evaluation    Patient location during evaluation: PACU  Patient participation: Able to fully participate in evaluation  Level of consciousness: awake, awake and alert and responsive to verbal stimuli  Pain management: adequate  Airway patency: patent  Cardiovascular status: acceptable  Respiratory status: acceptable  Hydration status: acceptable  PONV: none     Anesthetic complications: None          Last vitals:  Vitals:    09/19/18 0930 09/19/18 0945 09/19/18 1030   BP: 123/73 121/73 109/68   Pulse:   80   Resp: 15 16 18   Temp: 36.8  C (98.2  F) 36.9  C (98.4  F)    SpO2: 95% 96%          Electronically Signed By: Lesia Platt  September 19, 2018  1:11 PM

## 2018-09-19 NOTE — DISCHARGE INSTRUCTIONS
Same Day Surgery Discharge Instructions for  Sedation and General Anesthesia       It's not unusual to feel dizzy, light-headed or faint for up to 24 hours after surgery or while taking pain medication.  If you have these symptoms: sit for a few minutes before standing and have someone assist you when you get up to walk or use the bathroom.      You should rest and relax for the next 24 hours. We recommend you make arrangements to have an adult stay with you for at least 24 hours after your discharge.  Avoid hazardous and strenuous activity.      DO NOT DRIVE any vehicle or operate mechanical equipment for 24 hours following the end of your surgery.  Even though you may feel normal, your reactions may be affected by the medication you have received.      Do not drink alcoholic beverages for 24 hours following surgery.       Slowly progress to your regular diet as you feel able. It's not unusual to feel nauseated and/or vomit after receiving anesthesia.  If you develop these symptoms, drink clear liquids (apple juice, ginger ale, broth, 7-up, etc. ) until you feel better.  If your nausea and vomiting persists for 24 hours, please notify your surgeon.        All narcotic pain medications, along with inactivity and anesthesia, can cause constipation. Drinking plenty of liquids and increasing fiber intake will help.      For any questions of a medical nature, call your surgeon.      Do not make important decisions for 24 hours.      If you had general anesthesia, you may have a sore throat for a couple of days related to the breathing tube used during surgery.  You may use Cepacol lozenges to help with this discomfort.  If it worsens or if you develop a fever, contact your surgeon.       If you feel your pain is not well managed with the pain medications prescribed by your surgeon, please contact your surgeon's office to let them know so they can address your concerns.         Discharge Instructions for Transurethral  Resection of Bladder Tumor (TURBT)  You had a procedure called a resection of bladder tumor (surgery to remove a bladder tumor). During the surgery, a surgeon inserted a thin, lighted tube (cystoscope) into the bladder through the urethra (the part of your body that carries urine from the bladder to the outside of the body). The surgeon used a tool to either remove the cancer or burn it away with high-energy electricity.  Home care    Take care of your catheter the way you were shown in the hospital. You will need to wash the tubing at least twice a day.    Don t be alarmed by brownish or reddish blood or clots in your urine. This is a result of the procedure. However, call your doctor if the blood does not start to go away within 72 hours after you go home.    Drink plenty of fluids during the day (enough to keep your urine very light colored). This will help keep a healthy flow of urine.    Don t drive until the doctor says it s OK.     Don t return to work until the doctor says it s OK.    Don t do any heavy lifting for 3 weeks after the procedure.  ? Don t lift anything heavier than 8 pounds.  ? Don t lift weights.  ? Don t  infants or children.    Don t mow the lawn or use a vacuum .    Avoid constipation.  ? Use a laxative or stool softener as directed by your doctor.  ? Eat more high-fiber foods.    Be sure to finish the antibiotics that your doctor prescribed.    Once your catheter is removed, expect some blood in your urine and some burning when you urinate.  Follow-up care  You will return on Friday for starkey catheter removal.   When to call your healthcare provider  Call your healthcare provider right away if you have any of the following:    Heavy bleeding or large blood clots in the urine    Blood in the urine after 3 days    Catheter falls out or stops draining    Fever above 100.4 F (38 C) or shaking chills    Trouble urinating    Pain or cramping in the abdomen that won t go away      [facility]'s office number: [phone]   Date Last Reviewed: 1/1/2017 2000-2017 [employer]. [address] [place] [address], [place] [address]. All rights reserved. This information is not intended as a substitute for professional medical care. Always follow your healthcare professional's instructions.              DISCHARGE INSTRUCTIONS FOR   CATHETER CARE AT HOME      .    Basic Catheter Care  1. Always wash hands before and after handling your catheter.  2. Use soap and water to wash the area around your catheter.  3. Do this procedure twice a day.  4. Proper cleansing will help keep the area from becoming irritated or infected.    Leg Bag  This is a small plastic bag that collects urine draining from your catheter and then strapped around your thigh. It will need to be emptied when the bag is 1/2 to 3/4 full.     Large Drainage Bag  1. This bag is larger than the leg bag and holds more urine.  It is to be used while at home, especially at night.    2. Before you go to bed, change the leg bag to the large drainage bag.  3. Pinch off the catheter with your fingers and swab the connection between the catheter and leg bag with alcohol sponge.  4. Disconnect the leg bag and connect the large drainage bag to your catheter.  5. When you get into bed, arrange the drainage tubing so that it doesn t kink.  6. Be sure to keep the bag below the level of your bladder and allow enough slack for turning.    Cleaning Your Drainage Bags    1. Wash hands.  2. Using funnel or syringe, fill the bag half full with a solution of 1/2  vinegar and 1/2 water.  3. Shake bag, allowing mixture to cleanse inside of bag.  4. Empty out all vinegar and water mixture from your bag.  5. Hang bag to dry when not in use.  6. Clean your bags anytime you change them.      Helpful Hints  1. Always keep drainage bags below bladder level to insure adequate drainage.  2. Drink 4-6 glasses of water daily along with other fluids you normally drink  to keep urine free of infection and / or clots.  3. If you notice no urine in your bag for 2 to 4 hours or you develop extreme discomfort in bladder area, your catheter maybe plugged.  Notify your doctor.  4. If you notice your urine becomes foul smelling and cloudy, notify your doctor.  Also notify your doctor if you develop fever or chills.  5. If you notice urine leaking around the outside of the catheter, check to be sure catheter or tubing is not kinked.  6. Don t use leg bag while in bed.

## 2018-09-19 NOTE — OR NURSING
Renner catheter care discussed and demonstrated with Mr. Villalta and his wife.  Renner drainage system converted to leg bag.  Will discharge to home with standard bag and catheter care supplies.  PNDS met, po per I&O sheet. Pt dressed, up in recliner and transported to Phase 2.

## 2018-09-20 LAB — COPATH REPORT: NORMAL

## 2018-09-21 ENCOUNTER — ALLIED HEALTH/NURSE VISIT (OUTPATIENT)
Dept: UROLOGY | Facility: CLINIC | Age: 78
End: 2018-09-21
Payer: COMMERCIAL

## 2018-09-21 DIAGNOSIS — N32.89 BLADDER MASS: Primary | ICD-10-CM

## 2018-09-21 PROCEDURE — 51700 IRRIGATION OF BLADDER: CPT | Performed by: UROLOGY

## 2018-09-21 ASSESSMENT — PAIN SCALES - GENERAL: PAINLEVEL: NO PAIN (0)

## 2018-09-21 NOTE — PATIENT INSTRUCTIONS
I removed your catheter.  You may have burning with urination and may see some blood.  Call with any questions or concerns.  Debbie Hackett LPN

## 2018-09-21 NOTE — PROGRESS NOTES
Gibson Villalta comes into clinic today with his wife at the request of Dr. Camarillo for trial of void and possible catheter removal.  This service provided today was under the supervising provider of the day, Dr. Pretty, who was available if needed.  Patient had TURBT.  Here for TOV and UCO.  Leg bag detached from stakrey catheter.  200 ml sterile water instilled into bladder through catheter via gravity.  Balloon deflated.  Starkey catheter removed.  Patient was able to void.  PVR = 18 ml.  Okay for patient to leave without a catheter.  He will call with any questions or concerns.    Debbie Hackett LPN

## 2018-09-21 NOTE — MR AVS SNAPSHOT
After Visit Summary   9/21/2018    Gibson Villalta    MRN: [id number]           Patient Information     Date Of Birth          1940        Visit Information        Provider Department      9/21/2018 11:00 AM UA NURSE [facility] Urology [facility]        Care Instructions    I removed your catheter.  You may have burning with urination and may see some blood.  Call with any questions or concerns.  Debbie Hackett LPN              Follow-ups after your visit        Your next 10 appointments already scheduled     Sep 26, 2018  4:30 PM CDT   Return Visit with Ryan Camarillo MD   [facility] ([facility] [place])    [address]  [address]  [facility] [id number] [phone]            Dec 10, 2018  4:30 PM CST   Remote ICD Check with ALEJANDRO DCR2   [facility] ([facility])    [address]  [facility] [id number] [phone] OPT 2           This appointment is for a remote check of your debrillator.  This is not an appointment at the office.              Who to contact     If you have questions or need follow up information about today's clinic visit or your schedule please contact [facility] directly at [phone].  Normal or non-critical lab and imaging results will be communicated to you by MyChart, letter or phone within 4 business days after the clinic has received the results. If you do not hear from us within 7 days, please contact the clinic through MyChart or phone. If you have a critical or abnormal lab result, we will notify you by phone as soon as possible.  Submit refill requests through [employer] or call your pharmacy and they will forward the refill request to us. Please allow 3 business days for your refill to be completed.          Additional Information About Your Visit        MyChart Information     [employer]  gives you secure access to your electronic health record. If you see a primary care provider, you can also send messages to your care team and make appointments. If you have questions, please call your primary care clinic.  If you do not have a primary care provider, please call [phone] and they will assist you.        Care EveryWhere ID     This is your Care EveryWhere ID. This could be used by other organizations to access your [facility] medical records  [id number]         Blood Pressure from Last 3 Encounters:   09/19/18 109/68   09/13/18 116/72   09/07/18 118/80    Weight from Last 3 Encounters:   09/19/18 100.7 kg (222 lb)   09/13/18 101.2 kg (223 lb)   09/07/18 101.6 kg (224 lb)              Today, you had the following     No orders found for display       Primary Care Provider Office Phone # Fax #    Flori Reyes,  [phone] [address] [address]        Equal Access to Services     [facility] Center[facility] : Hadii aad ku hadasho Soomaali, waaxda luqadaha, qaybta kaalmada adeegyada, waxay idiin hayjohnn aparna doty . So [facility] [phone].    ATENCIÓN: Si habla español, tiene a de luna disposición servicios gratuitos de asistencia lingüística. Llame al [phone].    We comply with applicable federal civil rights laws and [place] laws. We do not discriminate on the basis of race, color, national origin, age, disability, sex, sexual orientation, or gender identity.            Thank you!     Thank you for choosing [facility] [place]  for your care. Our goal is always to provide you with excellent care. Hearing back from our patients is one way we can continue to improve our services. Please take a few minutes to complete the written survey that you may receive in the mail after your visit with us. Thank you!             Your Updated Medication List - Protect others around you: Learn how to safely use, store and throw away your  medicines at www.disposemymeds.org.          This list is accurate as of 9/21/18 11:41 AM.  Always use your most recent med list.                   Brand Name Dispense Instructions for use Diagnosis    ACE/ARB/ARNI NOT PRESCRIBED (INTENTIONAL)      ACE & ARB not prescribed due to Symptomatic hypotension not due to excessive diuresis    HFrEF (heart failure with reduced ejection fraction) (H)       ASPIRIN NOT PRESCRIBED    INTENTIONAL    0 each    Please choose reason not prescribed, below    Controlled type 2 diabetes mellitus with chronic kidney disease, without long-term current use of insulin, unspecified CKD stage (H)       atorvastatin 40 MG tablet    LIPITOR    90 tablet    TAKE 1 TABLET AT BEDTIME    Hyperlipidemia LDL goal <130       ciprofloxacin 500 MG tablet    CIPRO    6 tablet    Take 1 tablet (500 mg) by mouth 2 times daily for 3 days    Bladder mass       COENZYME Q-10 PO      Take 200 mg by mouth every morning        ferrous sulfate 325 (65 Fe) MG tablet    IRON     Take 325 mg by mouth every morning        furosemide 20 MG tablet    LASIX    45 tablet    Take 0.5 tablets (10 mg) by mouth daily    Chronic systolic heart failure (H)       hydrALAZINE 10 MG tablet    APRESOLINE    270 tablet    Take 1 tablet (10 mg) by mouth 3 times daily    Chronic systolic heart failure (H), Cardiomyopathy (H)       isosorbide mononitrate 30 MG 24 hr tablet    IMDUR    45 tablet    Take 0.5 tablets (15 mg) by mouth daily    Chronic systolic heart failure (H), Cardiomyopathy (H)       metoprolol succinate 50 MG 24 hr tablet    TOPROL-XL    90 tablet    TAKE 1 TABLET (50 MG) DAILY    Cardiomyopathy (H)       Multi-vitamin Tabs tablet      Take 1 tablet by mouth every morning        venlafaxine 75 MG tablet    EFFEXOR    180 tablet    TAKE 1 TABLET TWICE DAILY    Anxiety and depression       vitamin D 40580 UNIT capsule    ERGOCALCIFEROL    12 capsule    TAKE 1 CAPSULE ONCE A WEEK    Vitamin D deficiency

## 2018-09-26 ENCOUNTER — OFFICE VISIT (OUTPATIENT)
Dept: UROLOGY | Facility: CLINIC | Age: 78
End: 2018-09-26
Payer: COMMERCIAL

## 2018-09-26 VITALS
BODY MASS INDEX: 30.07 KG/M2 | HEIGHT: 72 IN | SYSTOLIC BLOOD PRESSURE: 124 MMHG | WEIGHT: 222 LBS | DIASTOLIC BLOOD PRESSURE: 79 MMHG

## 2018-09-26 DIAGNOSIS — F03.90 DEMENTIA WITHOUT BEHAVIORAL DISTURBANCE, UNSPECIFIED DEMENTIA TYPE: Chronic | ICD-10-CM

## 2018-09-26 DIAGNOSIS — E11.22 CONTROLLED TYPE 2 DIABETES MELLITUS WITH CHRONIC KIDNEY DISEASE, WITHOUT LONG-TERM CURRENT USE OF INSULIN, UNSPECIFIED CKD STAGE (H): Chronic | ICD-10-CM

## 2018-09-26 DIAGNOSIS — C67.2 MALIGNANT NEOPLASM OF LATERAL WALL OF URINARY BLADDER (H): Primary | ICD-10-CM

## 2018-09-26 PROCEDURE — 99214 OFFICE O/P EST MOD 30 MIN: CPT | Performed by: UROLOGY

## 2018-09-26 RX ORDER — WARFARIN SODIUM 2 MG/1
TABLET ORAL
COMMUNITY
Start: 2018-06-11 | End: 2018-09-26

## 2018-09-26 ASSESSMENT — PAIN SCALES - GENERAL: PAINLEVEL: NO PAIN (0)

## 2018-09-26 NOTE — LETTER
9/26/2018       RE: Gibson Villalta  [address]  [place] [address]     Dear Colleague,    Thank you for referring your patient, Gibson Villalta, to the [facility] [place] at [facility]. Please see a copy of my visit note below.      CHIEF COMPLAINT   It was my pleasure to see Gibson Villalta who is a 77 year old male for follow-up of bladder cancer.      HPI   Gibson Villalta is a very pleasant 77 year old male who presents with a history of newly diagnosed Bladder cancer. He is s/p TURBT on 9/21. 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 He is doing well with no issues following starkey removal.    PHYSICAL EXAM  Patient is a 77 year old  male   Vitals: Blood pressure 124/79, height 1.829 m (6'), weight 100.7 kg (222 lb).  General Appearance Adult: Body mass index is 30.11 kg/(m^2).  Alert, no acute distress, oriented  HENT: throat/mouth:normal, good dentition  Lungs: no respiratory distress, or pursed lip breathing  Heart: No obvious jugular venous distension present  Abdomen: soft, nontender, no organomegaly or masses  Musculoskeltal: extremities normal, no peripheral edema  Skin: no suspicious lesions or rashes  Neuro: Alert, oriented, speech and mentation normal. Some question repeating  Psych: affect and mood normal  Gait: Normal    Pathology 9/21:  FINAL DIAGNOSIS:   Bladder, transurethral resection of tumor   - High-grade papillary transitional cell carcinoma, invasive into lamina   propria (see description) The associated muscularis propria is uninvolved by tumor.    All pertinent imaging reviewed:    All imaging studies reviewed by me.    CT 7/30:  IMPRESSION:   1. Ill-defined soft tissue thickening adjacent to the gastric bypass  postoperative changes in the stomach have increased in prominence  since the previous exam. This finding could be a normal  variant  postoperative appearance; however, development of gastric neoplasm  cannot be excluded.  2. There is fluid and higher density material within the excluded  portion of the stomach, of greater volume than would typically be seen  in the setting of a gastric bypass. This higher density material  within the stomach is of uncertain etiology and could represent  debris, ingested food, or possibly blood products. No definite  communication between the gastric remnant and the excluded portion of  the stomach is visualized.  3. Two bladder wall lesions appear to enhance, and the larger of these  on the right posteriorly is new since 8/9/2016, suspicious for bladder  neoplasm such as transitional cell carcinoma. Urologic consultation is  recommended. Cystoscopy should be considered for further evaluation.    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     - We discussed at length the pathology results and the different grades and stages of bladder cancer.  - We discussed that the standard of care for T1 HG is to perform a repeat TURBT given that ~30% of patient are up-staged on repeat TURBT  - We discussed that based on the results of the re-staging TUR we would discuss treatment options. If T1 is confirmed this would be BCG  - Re-staging TURBT in 3-5 weeks    I spent over 30 minutes with the patient.  Over half this time was spent on counseling regarding the above surgery.    Ryan Camarillo   Urology  [facility]  Clinic Phone [phone]

## 2018-09-26 NOTE — MR AVS SNAPSHOT
After Visit Summary   9/26/2018    Gibson Villalta    MRN: [id number]           Patient Information     Date Of Birth          1940        Visit Information        Provider Department      9/26/2018 2:30 PM Ryan Camarillo MD [facility] [place]        Today's Diagnoses     Malignant neoplasm of lateral wall of urinary bladder (H)    -  1    Dementia without behavioral disturbance, unspecified dementia type        Controlled type 2 diabetes mellitus with chronic kidney disease, without long-term current use of insulin, unspecified CKD stage (H)           Follow-ups after your visit        Your next 10 appointments already scheduled     Oct 17, 2018   Procedure with Ryan Camarillo MD   [facility] (--)    [address]  [facility] [address] [phone]            Oct 24, 2018  2:00 PM CDT   Return Visit with Ryan Camarillo MD   [facility] ([facility] [place])    [address]  [address]  [facility] [id number] [phone]            Dec 10, 2018  4:30 PM CST   Remote ICD Check with ALEJANDRO DCR2   [facility] ([facility])    [address]  [facility] [place] [id number] [phone] OPT 2           This appointment is for a remote check of your debrillator.  This is not an appointment at the office.              Who to contact     If you have questions or need follow up information about today's clinic visit or your schedule please contact [facility] directly at [phone].  Normal or non-critical lab and imaging results will be communicated to you by MyChart, letter or phone within 4 business days after the clinic has received the results. If you do not hear from us within 7 days, please contact the clinic through MyChart or phone. If you have a critical or abnormal lab result,  we will notify you by phone as soon as possible.  Submit refill requests through [employer] or call your pharmacy and they will forward the refill request to us. Please allow 3 business days for your refill to be completed.          Additional Information About Your Visit        Dragon Insidehart Information     [employer] gives you secure access to your electronic health record. If you see a primary care provider, you can also send messages to your care team and make appointments. If you have questions, please call your primary care clinic.  If you do not have a primary care provider, please call [phone] and they will assist you.        Care EveryWhere ID     This is your Care EveryWhere ID. This could be used by other organizations to access your [facility] medical records  [id number]        Your Vitals Were     Height BMI (Body Mass Index)                1.829 m (6') 30.11 kg/m2           Blood Pressure from Last 3 Encounters:   09/26/18 124/79   09/19/18 109/68   09/13/18 116/72    Weight from Last 3 Encounters:   09/26/18 100.7 kg (222 lb)   09/19/18 100.7 kg (222 lb)   09/13/18 101.2 kg (223 lb)              We Performed the Following     [facility]-Operative Worksheet (Urology)          Today's Medication Changes          These changes are accurate as of 9/26/18  4:55 PM.  If you have any questions, ask your nurse or doctor.               Stop taking these medicines if you haven't already. Please contact your care team if you have questions.     warfarin 2 MG tablet   Commonly known as:  COUMADIN   Stopped by:  Ryan Camarillo MD                    Primary Care Provider Office Phone # Fax #    Flori Joshua Reyes,  [phone] [address]  [place] [address]        Equal Access to Services     [facility] : Hadii swetha Lance, waaxda luqadaha, qaybta kaalmada meeta, rusty thomas. So [facility] [phone].    ATENCIÓN: Si chinola espnadia, terry a de luna  disposición servicios gratuitos de asistencia lingüística. Yvette vogel [phone].    We comply with applicable federal civil rights laws and [place] laws. We do not discriminate on the basis of race, color, national origin, age, disability, sex, sexual orientation, or gender identity.            Thank you!     Thank you for choosing [facility] [place]  for your care. Our goal is always to provide you with excellent care. Hearing back from our patients is one way we can continue to improve our services. Please take a few minutes to complete the written survey that you may receive in the mail after your visit with us. Thank you!             Your Updated Medication List - Protect others around you: Learn how to safely use, store and throw away your medicines at www.disposemymeds.org.          This list is accurate as of 9/26/18  4:55 PM.  Always use your most recent med list.                   Brand Name Dispense Instructions for use Diagnosis    ACE/ARB/ARNI NOT PRESCRIBED (INTENTIONAL)      ACE & ARB not prescribed due to Symptomatic hypotension not due to excessive diuresis    HFrEF (heart failure with reduced ejection fraction) (H)       ASPIRIN NOT PRESCRIBED    INTENTIONAL    0 each    Please choose reason not prescribed, below    Controlled type 2 diabetes mellitus with chronic kidney disease, without long-term current use of insulin, unspecified CKD stage (H)       atorvastatin 40 MG tablet    LIPITOR    90 tablet    TAKE 1 TABLET AT BEDTIME    Hyperlipidemia LDL goal <130       COENZYME Q-10 PO      Take 200 mg by mouth every morning        ferrous sulfate 325 (65 Fe) MG tablet    IRON     Take 325 mg by mouth every morning        furosemide 20 MG tablet    LASIX    45 tablet    Take 0.5 tablets (10 mg) by mouth daily    Chronic systolic heart failure (H)       hydrALAZINE 10 MG tablet    APRESOLINE    270 tablet    Take 1 tablet (10 mg) by mouth 3 times daily    Chronic  systolic heart failure (H), Cardiomyopathy (H)       isosorbide mononitrate 30 MG 24 hr tablet    IMDUR    45 tablet    Take 0.5 tablets (15 mg) by mouth daily    Chronic systolic heart failure (H), Cardiomyopathy (H)       metoprolol succinate 50 MG 24 hr tablet    TOPROL-XL    90 tablet    TAKE 1 TABLET (50 MG) DAILY    Cardiomyopathy (H)       Multi-vitamin Tabs tablet      Take 1 tablet by mouth every morning        venlafaxine 75 MG tablet    EFFEXOR    180 tablet    TAKE 1 TABLET TWICE DAILY    Anxiety and depression       vitamin D 53768 UNIT capsule    ERGOCALCIFEROL    12 capsule    TAKE 1 CAPSULE ONCE A WEEK    Vitamin D deficiency

## 2018-09-26 NOTE — PROGRESS NOTES
CHIEF COMPLAINT   It was my pleasure to see Gibson Villalta who is a 77 year old male for follow-up of bladder cancer.      HPI   Gibson Villalta is a very pleasant 77 year old male who presents with a history of newly diagnosed Bladder cancer. He is s/p TURBT on 9/21. 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 He is doing well with no issues following starkey removal.    PHYSICAL EXAM  Patient is a 77 year old  male   Vitals: Blood pressure 124/79, height 1.829 m (6'), weight 100.7 kg (222 lb).  General Appearance Adult: Body mass index is 30.11 kg/(m^2).  Alert, no acute distress, oriented  HENT: throat/mouth:normal, good dentition  Lungs: no respiratory distress, or pursed lip breathing  Heart: No obvious jugular venous distension present  Abdomen: soft, nontender, no organomegaly or masses  Musculoskeltal: extremities normal, no peripheral edema  Skin: no suspicious lesions or rashes  Neuro: Alert, oriented, speech and mentation normal. Some question repeating  Psych: affect and mood normal  Gait: Normal    Pathology 9/21:  FINAL DIAGNOSIS:   Bladder, transurethral resection of tumor   - High-grade papillary transitional cell carcinoma, invasive into lamina   propria (see description) The associated muscularis propria is uninvolved by tumor.    All pertinent imaging reviewed:    All imaging studies reviewed by me.    CT 7/30:  IMPRESSION:   1. Ill-defined soft tissue thickening adjacent to the gastric bypass  postoperative changes in the stomach have increased in prominence  since the previous exam. This finding could be a normal variant  postoperative appearance; however, development of gastric neoplasm  cannot be excluded.  2. There is fluid and higher density material within the excluded  portion of the stomach, of greater volume than would typically be seen  in the setting of a gastric bypass. This higher density material  within the stomach is of uncertain etiology and  could represent  debris, ingested food, or possibly blood products. No definite  communication between the gastric remnant and the excluded portion of  the stomach is visualized.  3. Two bladder wall lesions appear to enhance, and the larger of these  on the right posteriorly is new since 8/9/2016, suspicious for bladder  neoplasm such as transitional cell carcinoma. Urologic consultation is  recommended. Cystoscopy should be considered for further evaluation.    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     - We discussed at length the pathology results and the different grades and stages of bladder cancer.  - We discussed that the standard of care for T1 HG is to perform a repeat TURBT given that ~30% of patient are up-staged on repeat TURBT  - We discussed that based on the results of the re-staging TUR we would discuss treatment options. If T1 is confirmed this would be BCG  - Re-staging TURBT in 3-5 weeks    I spent over 30 minutes with the patient.  Over half this time was spent on counseling regarding the above surgery.    Ryan Camarillo   Urology  [facility]  Clinic Phone [phone]

## 2018-09-27 ENCOUNTER — TELEPHONE (OUTPATIENT)
Dept: FAMILY MEDICINE | Facility: CLINIC | Age: 78
End: 2018-09-27

## 2018-09-27 DIAGNOSIS — M10.071 ACUTE IDIOPATHIC GOUT OF RIGHT FOOT: Primary | ICD-10-CM

## 2018-09-27 RX ORDER — PREDNISONE 20 MG/1
20 TABLET ORAL DAILY
Qty: 5 TABLET | Refills: 0 | Status: SHIPPED | OUTPATIENT
Start: 2018-09-27 | End: 2018-10-02

## 2018-09-27 NOTE — TELEPHONE ENCOUNTER
Reason for call:  Patient reporting a symptom    Symptom or request: Gout in R big toe    Duration (how long have symptoms been present): 1 day    Have you been treated for this before? No    Additional comments: Pt's spouse is requesting a medication for this, pt is supposed to go fishing tomorrow and can hardly walk         [facility] [address] [place] [address] [place] - [address] AT [facility] [address]      Phone Number patient can be reached at:  Cell number on file:    Telephone Information:   Mobile [phone]       Best Time:  any    Can we leave a detailed message on this number:  YES    Call taken on 9/27/2018 at 12:01 PM by Meena Jauregui

## 2018-09-27 NOTE — TELEPHONE ENCOUNTER
Called pt suggested he come in to be assessed by same day providers. Pt refused saying he can't walk. Advised eVisit as an alternative, again suggesting an OV as it would be the best way to assess. Pt will submit eVisit.    Thank you,  Cholo BURLESON RN

## 2018-09-27 NOTE — TELEPHONE ENCOUNTER
Nelson has dementia and wife Izabella is my patient too. This is second episode of gout in life per Izabella - first was of knee. See my office visit note from last month when there was concern of gout with prepatellar bursitis and Rx prednisone at that time which really helped. Was limited with NSAIDs b/c of his recent GI bleed. Izabella says Nelson's toe is red, hot, swollen. Rx Prednisone for 5 days and let me know if not improving. If another episode of gout occurs, discussed would be prudent to consider Allopurinol.

## 2018-10-06 ENCOUNTER — TRANSFERRED RECORDS (OUTPATIENT)
Dept: HEALTH INFORMATION MANAGEMENT | Facility: CLINIC | Age: 78
End: 2018-10-06

## 2018-10-08 ENCOUNTER — DOCUMENTATION ONLY (OUTPATIENT)
Dept: OTHER | Facility: CLINIC | Age: 78
End: 2018-10-08

## 2018-10-09 ENCOUNTER — OFFICE VISIT (OUTPATIENT)
Dept: FAMILY MEDICINE | Facility: CLINIC | Age: 78
End: 2018-10-09
Payer: COMMERCIAL

## 2018-10-09 VITALS
WEIGHT: 223 LBS | HEIGHT: 72 IN | HEART RATE: 108 BPM | BODY MASS INDEX: 30.2 KG/M2 | OXYGEN SATURATION: 96 % | DIASTOLIC BLOOD PRESSURE: 68 MMHG | SYSTOLIC BLOOD PRESSURE: 98 MMHG | TEMPERATURE: 98.1 F

## 2018-10-09 DIAGNOSIS — Z01.818 PREOP GENERAL PHYSICAL EXAM: Primary | ICD-10-CM

## 2018-10-09 DIAGNOSIS — N32.89 BLADDER MASS: ICD-10-CM

## 2018-10-09 LAB — HGB BLD-MCNC: 11.2 G/DL (ref 13.3–17.7)

## 2018-10-09 PROCEDURE — 36415 COLL VENOUS BLD VENIPUNCTURE: CPT | Performed by: NURSE PRACTITIONER

## 2018-10-09 PROCEDURE — 82565 ASSAY OF CREATININE: CPT | Performed by: NURSE PRACTITIONER

## 2018-10-09 PROCEDURE — 85018 HEMOGLOBIN: CPT | Performed by: NURSE PRACTITIONER

## 2018-10-09 PROCEDURE — 99215 OFFICE O/P EST HI 40 MIN: CPT | Performed by: NURSE PRACTITIONER

## 2018-10-09 ASSESSMENT — ANXIETY QUESTIONNAIRES
5. BEING SO RESTLESS THAT IT IS HARD TO SIT STILL: NOT AT ALL
2. NOT BEING ABLE TO STOP OR CONTROL WORRYING: NOT AT ALL
6. BECOMING EASILY ANNOYED OR IRRITABLE: NOT AT ALL
IF YOU CHECKED OFF ANY PROBLEMS ON THIS QUESTIONNAIRE, HOW DIFFICULT HAVE THESE PROBLEMS MADE IT FOR YOU TO DO YOUR WORK, TAKE CARE OF THINGS AT HOME, OR GET ALONG WITH OTHER PEOPLE: NOT DIFFICULT AT ALL
3. WORRYING TOO MUCH ABOUT DIFFERENT THINGS: NOT AT ALL
7. FEELING AFRAID AS IF SOMETHING AWFUL MIGHT HAPPEN: SEVERAL DAYS
GAD7 TOTAL SCORE: 1
1. FEELING NERVOUS, ANXIOUS, OR ON EDGE: NOT AT ALL

## 2018-10-09 ASSESSMENT — PATIENT HEALTH QUESTIONNAIRE - PHQ9: 5. POOR APPETITE OR OVEREATING: NOT AT ALL

## 2018-10-09 NOTE — PROGRESS NOTES
[facility]  [address] [facility] [address]  [phone]  Dept: [phone]    PRE-OP EVALUATION:  Today's date: 10/9/2018    Gibson Villalta (: 1940) presents for pre-operative evaluation assessment as requested by Ryan Olmos.  He requires evaluation and anesthesia risk assessment prior to undergoing surgery/procedure for treatment of bladder cancer .    Proposed Surgery/ Procedure: COMBINED CYSTOSCOPY, TRANSURETHRAL RESECTION (TUR) TUMOR BLADDER  Date of Surgery/ Procedure: 10/17/2018  Time of Surgery/ Procedure: 9:00 AM  Hospital/Surgical Facility: [facility]    Primary Physician: Flori Reyes  Type of Anesthesia Anticipated: General    Patient has a Health Care Directive or Living Will:  NO  --INVALID DOCUMENT    1. YES - DO YOU HAVE A HISTORY OF HEART ATTACK, STROKE, STENT, BYPASS OR SURGERY ON AN ARTERY IN THE HEAD, NECK, HEART OR LEG? MI with stents  2. YES - DO YOU EVER HAVE ANY PAIN OR DISCOMFORT IN YOUR CHEST? Not recently  3. YES - DO YOU HAVE A HISTORY OF HEART FAILURE cardiomyopathy, HRrEF 25-30%, Pacer  4. NO - Are you troubled by shortness of breath when: walking on the level, up a slight hill or at night?  5. NO - Do you currently have a cold, bronchitis or other respiratory infection?  6. NO - Do you have a cough, shortness of breath or wheezing?  7. NO - Do you sometimes get pains in the calves of your legs when you walk?  8. NO - Do you or anyone in your family have previous history of blood clots?  9. NO - Do you or does anyone in your family have a serious bleeding problem such as prolonged bleeding following surgeries or cuts?  10. YES - HAVE YOU EVER HAD PROBLEMS WITH ANEMIA OR BEEN TOLD TO TAKE IRON PILLS? Recently with GI bleed and now bladder cancer   11. YES - HAVE YOU HAD ANY ABNORMAL BLOOD LOSS SUCH AS BLACK, TARRY OR BLOODY STOOLS, OR ABNORMAL VAGINAL BLEEDING? Recent GI bleed and hospitalization  12. NO - Have you ever had a blood  transfusion?  13. NO - Have you or any of your relatives ever had problems with anesthesia?  14. NO - Do you have sleep apnea, excessive snoring or daytime drowsiness?  15. NO - Do you have any prosthetic heart valves?  16. NO - Do you have prosthetic joints?  17. NO - Is there any chance that you may be pregnant?      HPI:     HPI related to upcoming procedure: Recently had cystoscopy and bladder tumor removal. Going for repeat for staging.  History is gathered from wife d/t dementia.  Feeling ok today. Only had a piece of toast today and no water.  Wife states he has been more tired this week    Had gout last week and was treated with prednisone        See problem list for active medical problems.  Problems all longstanding and stable, except as noted/documented.  See ROS for pertinent symptoms related to these conditions.                                                                                                                                                          .    MEDICAL HISTORY:     Patient Active Problem List    Diagnosis Date Noted     Gastric mass 09/09/2018     Priority: Medium     Bladder mass 09/09/2018     Priority: Medium     Prepatellar bursitis of left knee 12/11/2017     Priority: Medium     Cardiomyopathy (H) 03/14/2017     Priority: Medium     Controlled type 2 diabetes mellitus with chronic kidney disease, without long-term current use of insulin, unspecified CKD stage (H) 02/24/2017     Priority: Medium     New Dx Feb 2017       HFrEF (heart failure with reduced ejection fraction) (H) 08/16/2016     Priority: Medium     Chronic kidney disease (CKD), stage 3 (moderate)      Priority: Medium     Anxiety and depression      Priority: Medium     BPH (benign prostatic hypertrophy)      Priority: Medium     LBBB (left bundle branch block)      Priority: Medium     Mixed cardiomyopathy 07/22/2016     Priority: Medium     RV (right ventricular) mural thrombus without MI 07/22/2016      Priority: Medium     Dementia without behavioral disturbance, unspecified dementia type 07/22/2016     Priority: Medium     History of gastric bypass 07/22/2016     Priority: Medium      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     Myocardial infarction 1995 and 2010     Nonischemic cardiomyopathy (H) 7/22/2016     Obesity      Pacemaker     ICD/PM     Rosacea      Rotator cuff disorder     Tear x 2     RV (right ventricular) mural thrombus 7/22/2016     Past Surgical History:   Procedure Laterality Date     ANGIOPLASTY  1995 and 2010 with stent     BACK SURGERY       CARDIAC SURGERY      ICD/PM     CYSTOSCOPY, TRANSURETHRAL RESECTION (TUR) TUMOR BLADDER, COMBINED N/A 9/19/2018    Procedure: COMBINED CYSTOSCOPY, TRANSURETHRAL RESECTION (TUR) TUMOR BLADDER;  CYSTOSCOPY, TRANSURETHRAL RESECTION BLADDER TUMOR ;  Surgeon: Ryan Camarillo MD;  [facility]     ESOPHAGOSCOPY, GASTROSCOPY, DUODENOSCOPY (EGD), COMBINED N/A 7/30/2018    Procedure: COMBINED ESOPHAGOSCOPY, GASTROSCOPY, DUODENOSCOPY (EGD), BIOPSY SINGLE OR MULTIPLE;  gastroscopy;  Surgeon: Parish Xiong MD;  Location:  GI     ESOPHAGOSCOPY, GASTROSCOPY, DUODENOSCOPY (EGD), COMBINED N/A 7/30/2018    Procedure: COMBINED ESOPHAGOSCOPY, GASTROSCOPY, DUODENOSCOPY (EGD);  EGD;  Surgeon: Parish Xiong MD;  Location:  GI     ESOPHAGOSCOPY, GASTROSCOPY, DUODENOSCOPY (EGD), COMBINED N/A 8/2/2018    Procedure: COMBINED ESOPHAGOSCOPY, GASTROSCOPY, DUODENOSCOPY (EGD), BIOPSY SINGLE OR MULTIPLE;  gastroscopy BIOPSYSHOULD BE SENT TO LAB IN NS NOT FORMALIN PER ;   Surgeon: Jonathon Bush MD;  Location:  GI     GASTRIC BYPASS  2001     HEART CATH LEFT HEART CATH  7/19/16    Non ischemic cardiomyopathy; Non functionally significant LAD and RCA disease      OTHER SURGICAL HISTORY  2006    Spinal surgery     OTHER SURGICAL HISTORY  Nov 2016    CRT-D implantation     Current Outpatient Prescriptions   Medication Sig Dispense Refill     ACE/ARB NOT PRESCRIBED, INTENTIONAL, ACE & ARB not prescribed due to Symptomatic hypotension not due to excessive diuresis (Patient not taking: Reported on 9/21/2018)       ASPIRIN NOT PRESCRIBED (INTENTIONAL) Please choose reason not prescribed, below (Patient not taking: Reported on 9/21/2018) 0 each 0     atorvastatin (LIPITOR) 40 MG tablet TAKE 1 TABLET AT BEDTIME 90 tablet 3     COENZYME Q-10 PO Take 200 mg by mouth every morning        ferrous sulfate (IRON) 325 (65 FE) MG tablet Take 325 mg by mouth every morning        furosemide (LASIX) 20 MG tablet Take 0.5 tablets (10 mg) by mouth daily 45 tablet 1     hydrALAZINE (APRESOLINE) 10 MG tablet Take 1 tablet (10 mg) by mouth 3 times daily 270 tablet 1     isosorbide mononitrate (IMDUR) 30 MG 24 hr tablet Take 0.5 tablets (15 mg) by mouth daily 45 tablet 1     metoprolol succinate (TOPROL-XL) 50 MG 24 hr tablet TAKE 1 TABLET (50 MG) DAILY 90 tablet 3     multivitamin, therapeutic with minerals (MULTI-VITAMIN) TABS Take 1 tablet by mouth every morning        venlafaxine (EFFEXOR) 75 MG tablet TAKE 1 TABLET TWICE DAILY 180 tablet 1     vitamin D (ERGOCALCIFEROL) 00285 UNIT capsule TAKE 1 CAPSULE ONCE A WEEK 12 capsule 1     OTC products: None, except as noted above    Allergies   Allergen Reactions     Wasps [Hornets]      Sand wasp --- years ago.        Latex Allergy: NO    Social History   Substance Use Topics     Smoking status: Former Smoker     Packs/day: 2.00     Years: 20.00     Types: Cigarettes     Quit date: 1/1/1980     Smokeless tobacco: Never Used      Comment: Quit in his  30s - 2 ppd for 20 years     Alcohol use 0.0 oz/week     0 Standard drinks or equivalent per week      Comment: maybe 1 beer a month     History   Drug Use No       REVIEW OF SYSTEMS:   Constitutional, neuro, ENT, endocrine, pulmonary, cardiac, gastrointestinal, genitourinary, musculoskeletal, integument and psychiatric systems are negative, except as otherwise noted.    EXAM:   BP 98/68 (BP Location: Right arm, Patient Position: Chair, Cuff Size: Adult Regular)  Pulse 108  Temp 98.1  F (36.7  C) (Oral)  Ht 6' (1.829 m)  Wt 223 lb (101.2 kg)  SpO2 96%  BMI 30.24 kg/m2    GENERAL APPEARANCE: healthy, alert and no distress     EYES: EOMI,  PERRL     HENT:  mouth without ulcers or lesions     NECK: no adenopathy, no asymmetry, masses, or scars and thyroid normal to palpation     RESP: lungs clear to auscultation - no rales, rhonchi or wheezes     CV: Tachycardia, regular rhythm, normal S1 S2, no S3 or S4 and no murmur, click or rub     MS: extremities normal- no gross deformities noted, no evidence of inflammation in joints, FROM in all extremities.     SKIN: no suspicious lesions or rashes. Pale     NEURO: Normal strength and tone, sensory exam grossly normal, mentation intact and speech normal     PSYCH: mentation appears normal. and affect normal/bright     LYMPHATICS: No cervical adenopathy     DIAGNOSTICS:     Labs Resulted Today:   Results for orders placed or performed in visit on 10/09/18   Hemoglobin   Result Value Ref Range    Hemoglobin 11.2 (L) 13.3 - 17.7 g/dL   Creatinine   Result Value Ref Range    Creatinine 1.34 (H) 0.66 - 1.25 mg/dL    GFR Estimate 52 (L) >60 mL/min/1.7m2    GFR Estimate If Black 63 >60 mL/min/1.7m2         Recent Labs   Lab Test  09/13/18   1103  08/09/18   1256  08/02/18   0845   07/31/18   1035   07/30/18   0750   08/25/17   1416   HGB  12.6*  12.1*  11.4*   < >   --    < >  13.9   --   15.1   PLT  283  288  185   --    --    < >  218   --   203   INR   --    --    --    --    1.21*   --   3.56*   < >   --    NA  140   --   138   --    --    < >  138   --   139   POTASSIUM  4.2   --   3.7   --    --    < >  3.7   --   4.0   CR  1.49*   --   1.23   --    --    < >  1.23   --   1.34*   A1C   --    --    --    --    --    --   7.1*   --   6.5*    < > = values in this interval not displayed.        IMPRESSION:   Reason for surgery/procedure: bladder cancer   Diagnosis/reason for consult: medical preop    The proposed surgical procedure is considered INTERMEDIATE risk.    REVISED CARDIAC RISK INDEX  The patient has the following serious cardiovascular risks for perioperative complications such as (MI, PE, VFib and 3  AV Block):  Coronary Artery Disease (MI, positive stress test, angina, Qs on EKG)  Congestive Heart Failure (pulmonary edema, PND, s3 danay, CXR with pulmonary congestion, basilar rales)  INTERPRETATION: 2 risks: Class III (moderate risk - 6.6% complication rate)    The patient has the following additional risks for perioperative complications:  Delirium or Dementia      ICD-10-CM    1. Preop general physical exam Z01.818 Hemoglobin     Creatinine   2. Bladder mass N32.89        RECOMMENDATIONS:       Cardiovascular Risk  Patient is already on a Beta Blocker. Continue Betablocker therapy after surgery, using Beta blocker order set as necessary for NPO status.  Echo on 5/29/18 revealed similar LVEF and worsened LV dilation when compared to 6/20/17. Wife denies recent chest symptoms but is a lot more tired recently     Anemia  Anemia and does not require treatment prior to surgery.  Monitor Hemoglobin postoperatively. He does look more pale today and has had a drop in his hgb from 12.6 to 11.2 in less than 1 month   He is clearly instructed to get evaluated right away if he does develop any new symptoms or his fatigue gets significantly worse.   May consider a repeat hgb the morning of surgery       --Patient is to take all scheduled medications on the day of surgery EXCEPT for  modifications listed below.--Patient is to take all scheduled medications on the day of surgery EXCEPT for modifications listed below.      Pt is at high risk for general anesthesia d/t his significant cardiac history. He now has worsened anemia and feels more fatigued than normal. His BP is lower than normal today but reports to be feeling ok. He also only ate a single piece of toast and did not drink anything today when vitals were completed at 2pm which was likely playing a role.       APPROVAL GIVEN to proceed with proposed procedure, without further diagnostic evaluation       Signed Electronically by: ALLEN Colón CNP    Copy of this evaluation report is provided to requesting physician.    [place] Preop Guidelines    Revised Cardiac Risk Index

## 2018-10-09 NOTE — MR AVS SNAPSHOT
After Visit Summary   10/9/2018    Gibson Villalta    MRN: [id number]           Patient Information     Date Of Birth          1940        Visit Information        Provider Department      10/9/2018 1:00 PM Katelyn Akhtar APRN [facility]        Today's Diagnoses     Preop general physical exam    -  1    Bladder mass          Care Instructions      Before Your Surgery      Call your surgeon if there is any change in your health. This includes signs of a cold or flu (such as a sore throat, runny nose, cough, rash or fever).    Do not smoke, drink alcohol or take over the counter medicine (unless your surgeon or primary care doctor tells you to) for the 24 hours before and after surgery.    If you take prescribed drugs: Follow your doctor s orders about which medicines to take and which to stop until after surgery.    Eating and drinking prior to surgery: follow the instructions from your surgeon    Take a shower or bath the night before surgery. Use the soap your surgeon gave you to gently clean your skin. If you do not have soap from your surgeon, use your regular soap. Do not shave or scrub the surgery site.  Wear clean pajamas and have clean sheets on your bed.           Follow-ups after your visit        Your next 10 appointments already scheduled     Oct 17, 2018   Procedure with Ryan Camarillo MD   [facility] Per[facility] (--)    [address]  [facility] [address]   [phone]            Oct 24, 2018  2:00 PM CDT   Return Visit with Ryan Camarillo MD   [facility] ([facility])    [address]  [address]  [facility] [address]   [phone]            Dec 10, 2018  4:30 PM CST   Remote ICD Check with ALEJANDRO DCR2   [facility] ([facility])    [address]  [facility] [address] [phone] OPT 2           This  appointment is for a remote check of your debrillator.  This is not an appointment at the office.              Who to contact     If you have questions or need follow up information about today's clinic visit or your schedule please contact [facility] directly at [phone].  Normal or non-critical lab and imaging results will be communicated to you by MyChart, letter or phone within 4 business days after the clinic has received the results. If you do not hear from us within 7 days, please contact the clinic through Invarium[employer] or phone. If you have a critical or abnormal lab result, we will notify you by phone as soon as possible.  Submit refill requests through Loopport or call your pharmacy and they will forward the refill request to us. Please allow 3 business days for your refill to be completed.          Additional Information About Your Visit        MyChart Information     Loo[employer]ort gives you secure access to your electronic health record. If you see a primary care provider, you can also send messages to your care team and make appointments. If you have questions, please call your primary care clinic.  If you do not have a primary care provider, please call [phone] and they will assist you.        Care EveryWhere ID     This is your Care EveryWhere ID. This could be used by other organizations to access your [place] medical records  [id number]        Your Vitals Were     Pulse Temperature Height Pulse Oximetry BMI (Body Mass Index)       108 98.1  F (36.7  C) (Oral) 6' (1.829 m) 96% 30.24 kg/m2        Blood Pressure from Last 3 Encounters:   10/09/18 98/68   09/26/18 124/79   09/19/18 109/68    Weight from Last 3 Encounters:   10/09/18 223 lb (101.2 kg)   09/26/18 222 lb (100.7 kg)   09/19/18 222 lb (100.7 kg)              We Performed the Following     Creatinine     Hemoglobin        Primary Care Provider Office Phone # Fax #    Flori Reyes [phone] [address] [facility] [address]  [facility] [address]        Equal Access to Services     BEL MCLAIN : Hadii aad ku hadkarissagrupo Lance, waalisada doreen, qaursulaloni christinekyedelon tim, rusty bishopsergioravindra doty . So [facility] [phone].    ATENCIÓN: Si habla español, tiene a de luna disposición servicios gratuitos de asistencia lingüística. Llame al [phone].    We comply with applicable federal civil rights laws and [place] laws. We do not discriminate on the basis of race, color, national origin, age, disability, sex, sexual orientation, or gender identity.            Thank you!     Thank you for choosing [facility]  for your care. Our goal is always to provide you with excellent care. Hearing back from our patients is one way we can continue to improve our services. Please take a few minutes to complete the written survey that you may receive in the mail after your visit with us. Thank you!             Your Updated Medication List - Protect others around you: Learn how to safely use, store and throw away your medicines at www.disposemymeds.org.          This list is accurate as of 10/9/18 11:59 PM.  Always use your most recent med list.                   Brand Name Dispense Instructions for use Diagnosis    ACE/ARB/ARNI NOT PRESCRIBED (INTENTIONAL)      ACE & ARB not prescribed due to Symptomatic hypotension not due to excessive diuresis    HFrEF (heart failure with reduced ejection fraction) (H)       ASPIRIN NOT PRESCRIBED    INTENTIONAL    0 each    Please choose reason not prescribed, below    Controlled type 2 diabetes mellitus with chronic kidney disease, without long-term current use of insulin, unspecified CKD stage (H)       atorvastatin 40 MG tablet    LIPITOR    90 tablet    TAKE 1 TABLET AT BEDTIME    Hyperlipidemia LDL goal <130       COENZYME Q-10 PO      Take 200 mg by mouth every morning        ferrous sulfate 325 (65 Fe) MG tablet    IRON     Take 325 mg by mouth every morning        furosemide 20  MG tablet    LASIX    45 tablet    Take 0.5 tablets (10 mg) by mouth daily    Chronic systolic heart failure (H)       hydrALAZINE 10 MG tablet    APRESOLINE    270 tablet    Take 1 tablet (10 mg) by mouth 3 times daily    Chronic systolic heart failure (H), Cardiomyopathy (H)       isosorbide mononitrate 30 MG 24 hr tablet    IMDUR    45 tablet    Take 0.5 tablets (15 mg) by mouth daily    Chronic systolic heart failure (H), Cardiomyopathy (H)       metoprolol succinate 50 MG 24 hr tablet    TOPROL-XL    90 tablet    TAKE 1 TABLET (50 MG) DAILY    Cardiomyopathy (H)       Multi-vitamin Tabs tablet      Take 1 tablet by mouth every morning        venlafaxine 75 MG tablet    EFFEXOR    180 tablet    TAKE 1 TABLET TWICE DAILY    Anxiety and depression       vitamin D 97844 UNIT capsule    ERGOCALCIFEROL    12 capsule    TAKE 1 CAPSULE ONCE A WEEK    Vitamin D deficiency

## 2018-10-10 LAB
CREAT SERPL-MCNC: 1.34 MG/DL (ref 0.66–1.25)
GFR SERPL CREATININE-BSD FRML MDRD: 52 ML/MIN/1.7M2

## 2018-10-10 ASSESSMENT — ANXIETY QUESTIONNAIRES: GAD7 TOTAL SCORE: 1

## 2018-10-10 ASSESSMENT — PATIENT HEALTH QUESTIONNAIRE - PHQ9: SUM OF ALL RESPONSES TO PHQ QUESTIONS 1-9: 2

## 2018-10-11 NOTE — PROGRESS NOTES
Gibson and Izabella  The hemoglobin is a little lower than last month. If there are any new symptoms at all that develop or the fatigue gets any worse, please get seen right away. Call me if you have any questions   Katelyn Akhtar NP

## 2018-10-12 ENCOUNTER — TELEPHONE (OUTPATIENT)
Dept: FAMILY MEDICINE | Facility: CLINIC | Age: 78
End: 2018-10-12

## 2018-10-12 ENCOUNTER — RADIANT APPOINTMENT (OUTPATIENT)
Dept: GENERAL RADIOLOGY | Facility: CLINIC | Age: 78
End: 2018-10-12
Attending: NURSE PRACTITIONER
Payer: COMMERCIAL

## 2018-10-12 ENCOUNTER — OFFICE VISIT (OUTPATIENT)
Dept: FAMILY MEDICINE | Facility: CLINIC | Age: 78
End: 2018-10-12
Payer: COMMERCIAL

## 2018-10-12 VITALS
WEIGHT: 222 LBS | SYSTOLIC BLOOD PRESSURE: 106 MMHG | RESPIRATION RATE: 16 BRPM | DIASTOLIC BLOOD PRESSURE: 69 MMHG | HEART RATE: 91 BPM | BODY MASS INDEX: 30.07 KG/M2 | HEIGHT: 72 IN | OXYGEN SATURATION: 96 % | TEMPERATURE: 96.4 F

## 2018-10-12 DIAGNOSIS — D64.9 ANEMIA, UNSPECIFIED TYPE: ICD-10-CM

## 2018-10-12 DIAGNOSIS — R53.83 FATIGUE, UNSPECIFIED TYPE: ICD-10-CM

## 2018-10-12 DIAGNOSIS — R53.83 FATIGUE, UNSPECIFIED TYPE: Primary | ICD-10-CM

## 2018-10-12 LAB
ALBUMIN SERPL-MCNC: 3.2 G/DL (ref 3.4–5)
ALP SERPL-CCNC: 188 U/L (ref 40–150)
ALT SERPL W P-5'-P-CCNC: 20 U/L (ref 0–70)
ANION GAP SERPL CALCULATED.3IONS-SCNC: 8 MMOL/L (ref 3–14)
AST SERPL W P-5'-P-CCNC: 21 U/L (ref 0–45)
BILIRUB SERPL-MCNC: 0.2 MG/DL (ref 0.2–1.3)
BUN SERPL-MCNC: 24 MG/DL (ref 7–30)
CALCIUM SERPL-MCNC: 9.1 MG/DL (ref 8.5–10.1)
CHLORIDE SERPL-SCNC: 106 MMOL/L (ref 94–109)
CO2 SERPL-SCNC: 26 MMOL/L (ref 20–32)
CREAT SERPL-MCNC: 1.45 MG/DL (ref 0.66–1.25)
ERYTHROCYTE [DISTWIDTH] IN BLOOD BY AUTOMATED COUNT: 14 % (ref 10–15)
GFR SERPL CREATININE-BSD FRML MDRD: 47 ML/MIN/1.7M2
GLUCOSE SERPL-MCNC: 131 MG/DL (ref 70–99)
HCT VFR BLD AUTO: 34.2 % (ref 40–53)
HGB BLD-MCNC: 10.7 G/DL (ref 13.3–17.7)
MCH RBC QN AUTO: 30.8 PG (ref 26.5–33)
MCHC RBC AUTO-ENTMCNC: 31.3 G/DL (ref 31.5–36.5)
MCV RBC AUTO: 99 FL (ref 78–100)
PLATELET # BLD AUTO: 283 10E9/L (ref 150–450)
POTASSIUM SERPL-SCNC: 4.3 MMOL/L (ref 3.4–5.3)
PROT SERPL-MCNC: 7.3 G/DL (ref 6.8–8.8)
RBC # BLD AUTO: 3.47 10E12/L (ref 4.4–5.9)
SODIUM SERPL-SCNC: 140 MMOL/L (ref 133–144)
TROPONIN I SERPL-MCNC: 0.02 UG/L (ref 0–0.04)
WBC # BLD AUTO: 9.9 10E9/L (ref 4–11)

## 2018-10-12 PROCEDURE — 80053 COMPREHEN METABOLIC PANEL: CPT | Performed by: NURSE PRACTITIONER

## 2018-10-12 PROCEDURE — 71046 X-RAY EXAM CHEST 2 VIEWS: CPT

## 2018-10-12 PROCEDURE — 36415 COLL VENOUS BLD VENIPUNCTURE: CPT | Performed by: NURSE PRACTITIONER

## 2018-10-12 PROCEDURE — 99214 OFFICE O/P EST MOD 30 MIN: CPT | Performed by: NURSE PRACTITIONER

## 2018-10-12 PROCEDURE — 83540 ASSAY OF IRON: CPT | Performed by: NURSE PRACTITIONER

## 2018-10-12 PROCEDURE — 82728 ASSAY OF FERRITIN: CPT | Performed by: NURSE PRACTITIONER

## 2018-10-12 PROCEDURE — 83550 IRON BINDING TEST: CPT | Performed by: NURSE PRACTITIONER

## 2018-10-12 PROCEDURE — 85027 COMPLETE CBC AUTOMATED: CPT | Performed by: NURSE PRACTITIONER

## 2018-10-12 PROCEDURE — 84484 ASSAY OF TROPONIN QUANT: CPT | Performed by: NURSE PRACTITIONER

## 2018-10-12 ASSESSMENT — ENCOUNTER SYMPTOMS
HEADACHES: 0
DIARRHEA: 0
INSOMNIA: 0
COUGH: 0
FEVER: 0
CONSTIPATION: 0
DIZZINESS: 0
MYALGIAS: 0
NAUSEA: 0
ABDOMINAL PAIN: 0

## 2018-10-12 NOTE — TELEPHONE ENCOUNTER
Reason for call:  Patient reporting a symptom    Symptom or request: Weak and low /54 today     Duration (how long have symptoms been present):   Over a week   Have you been treated for this before? No    Additional comments: Patient has surgery next Wednesday and his wife said he may just be nervous  About this    Phone Number patient can be reached at:  Cell number on file:    Telephone Information:   Mobile [phone]       Best Time:  anytime    Can we leave a detailed message on this number:  YES    Call taken on 10/12/2018 at 9:44 AM by Theresa Perez

## 2018-10-12 NOTE — TELEPHONE ENCOUNTER
"I reached Gibson wife and she said he has been having unusual symptoms of fatigue. She offered to take him to a movie today to get his mind off the surgery and he said \"I don't think I can walk that far\".   His BP was low today compared to his usual- 111/54. I did note it was even lower at Pre op.  She thinks he is really nervous about the upcoming surgery.  He said \"I wonder if my pacemaker wire is broken\". She said this has never happened before but he just feels something is \"off\".      I did note in Katelyn's pre op note that his hgb had dropped recently.  I scheduled him with Katelyn today at 12:30- Katelyn if this isn't an appropriate visit let me know your advice.    Marya Aponte RN- Triage [employer]    "

## 2018-10-12 NOTE — MR AVS SNAPSHOT
After Visit Summary   10/12/2018    Gibson Villalta    MRN: [id number]           Patient Information     Date Of Birth          1940        Visit Information        Provider Department      10/12/2018 12:30 PM Katelyn Akhtar APRN CNP [facility]        Today's Diagnoses     Fatigue, unspecified type    -  1      Care Instructions    Follow up with Gastroenterology (GI doctor)     Follow up with oncology     Follow up on Monday for a recheck of your BP and your hemoglobin               Follow-ups after your visit        Your next 10 appointments already scheduled     Oct 17, 2018   Procedure with Ryan Camarillo MD   [facility] (--)    [address]  [facility] [id number] [phone]            Oct 24, 2018  2:00 PM CDT   Return Visit with Ryan Camarillo MD   [facility] [place] ([facility])    [address]  [address]  [facility] [id number] [phone]            Dec 10, 2018  4:30 PM CST   Remote ICD Check with ALEJANDRO DCR2   [facility] ([email or web address])    [address]  [facility] [id number] [phone] OPT 2           This appointment is for a remote check of your debrillator.  This is not an appointment at the office.              Who to contact     If you have questions or need follow up information about today's clinic visit or your schedule please contact [facility] directly at [phone].  Normal or non-critical lab and imaging results will be communicated to you by MyChart, letter or phone within 4 business days after the clinic has received the results. If you do not hear from us within 7 days, please contact the clinic through MyChart or phone. If you have a critical or abnormal lab result, we will notify you by phone as soon as possible.  Submit refill requests through AlphaSights or call your pharmacy and  they will forward the refill request to us. Please allow 3 business days for your refill to be completed.          Additional Information About Your Visit        [employer] Information     Ret[employer] gives you secure access to your electronic health record. If you see a primary care provider, you can also send messages to your care team and make appointments. If you have questions, please call your primary care clinic.  If you do not have a primary care provider, please call [phone] and they will assist you.        Care EveryWhere ID     This is your Care EveryWhere ID. This could be used by other organizations to access your [place] medical records  [id number]        Your Vitals Were     Pulse Temperature Respirations Height Pulse Oximetry BMI (Body Mass Index)    91 96.4  F (35.8  C) (Tympanic) 16 6' (1.829 m) 96% 30.11 kg/m2       Blood Pressure from Last 3 Encounters:   10/12/18 106/69   10/09/18 98/68   09/26/18 124/79    Weight from Last 3 Encounters:   10/12/18 222 lb (100.7 kg)   10/09/18 223 lb (101.2 kg)   09/26/18 222 lb (100.7 kg)              We Performed the Following     *UA reflex to Microscopic and Culture ([facility] (except [place] and [place])     CBC with platelets     Comprehensive metabolic panel     Troponin I        Primary Care Provider Office Phone # Fax #    Flori Reyes [phone] [address]  [place] [address]        Equal Access to Services     [facility] Center[facility] : Hadii aad ku hadasho Soomaali, waaxda luqadaha, qaybta kaalmada adeegyada, waxay irlanda doty . So [facility] [phone].    ATENCIÓN: Si habla español, tiene a de luna disposición servicios gratuitos de asistencia lingüística. Llame al [phone].    We comply with applicable federal civil rights laws and [place] laws. We do not discriminate on the basis of race, color, national origin, age, disability, sex, sexual orientation, or gender  identity.            Thank you!     Thank you for choosing [facility]  for your care. Our goal is always to provide you with excellent care. Hearing back from our patients is one way we can continue to improve our services. Please take a few minutes to complete the written survey that you may receive in the mail after your visit with us. Thank you!             Your Updated Medication List - Protect others around you: Learn how to safely use, store and throw away your medicines at www.disposemymeds.org.          This list is accurate as of 10/12/18  2:16 PM.  Always use your most recent med list.                   Brand Name Dispense Instructions for use Diagnosis    ACE/ARB/ARNI NOT PRESCRIBED (INTENTIONAL)      ACE & ARB not prescribed due to Symptomatic hypotension not due to excessive diuresis    HFrEF (heart failure with reduced ejection fraction) (H)       ASPIRIN NOT PRESCRIBED    INTENTIONAL    0 each    Please choose reason not prescribed, below    Controlled type 2 diabetes mellitus with chronic kidney disease, without long-term current use of insulin, unspecified CKD stage (H)       atorvastatin 40 MG tablet    LIPITOR    90 tablet    TAKE 1 TABLET AT BEDTIME    Hyperlipidemia LDL goal <130       COENZYME Q-10 PO      Take 200 mg by mouth every morning        ferrous sulfate 325 (65 Fe) MG tablet    IRON     Take 325 mg by mouth every morning        furosemide 20 MG tablet    LASIX    45 tablet    Take 0.5 tablets (10 mg) by mouth daily    Chronic systolic heart failure (H)       hydrALAZINE 10 MG tablet    APRESOLINE    270 tablet    Take 1 tablet (10 mg) by mouth 3 times daily    Chronic systolic heart failure (H), Cardiomyopathy (H)       isosorbide mononitrate 30 MG 24 hr tablet    IMDUR    45 tablet    Take 0.5 tablets (15 mg) by mouth daily    Chronic systolic heart failure (H), Cardiomyopathy (H)       metoprolol succinate 50 MG 24 hr tablet    TOPROL-XL    90 tablet    TAKE 1 TABLET (50  MG) DAILY    Cardiomyopathy (H)       Multi-vitamin Tabs tablet      Take 1 tablet by mouth every morning        venlafaxine 75 MG tablet    EFFEXOR    180 tablet    TAKE 1 TABLET TWICE DAILY    Anxiety and depression       vitamin D 67119 UNIT capsule    ERGOCALCIFEROL    12 capsule    TAKE 1 CAPSULE ONCE A WEEK    Vitamin D deficiency

## 2018-10-12 NOTE — H&P (VIEW-ONLY)
[facility]  [address] [facility] [address]  [phone]  Dept: [phone]    PRE-OP EVALUATION:  Today's date: 10/9/2018    Gibson Villalta (: 1940) presents for pre-operative evaluation assessment as requested by Ryan Olmos.  He requires evaluation and anesthesia risk assessment prior to undergoing surgery/procedure for treatment of bladder cancer .    Proposed Surgery/ Procedure: COMBINED CYSTOSCOPY, TRANSURETHRAL RESECTION (TUR) TUMOR BLADDER  Date of Surgery/ Procedure: 10/17/2018  Time of Surgery/ Procedure: 9:00 AM  Hospital/Surgical Facility: [facility]    Primary Physician: Flori Reyes  Type of Anesthesia Anticipated: General    Patient has a Health Care Directive or Living Will:  NO  --INVALID DOCUMENT    1. YES - DO YOU HAVE A HISTORY OF HEART ATTACK, STROKE, STENT, BYPASS OR SURGERY ON AN ARTERY IN THE HEAD, NECK, HEART OR LEG? MI with stents  2. YES - DO YOU EVER HAVE ANY PAIN OR DISCOMFORT IN YOUR CHEST? Not recently  3. YES - DO YOU HAVE A HISTORY OF HEART FAILURE cardiomyopathy, HRrEF 25-30%, Pacer  4. NO - Are you troubled by shortness of breath when: walking on the level, up a slight hill or at night?  5. NO - Do you currently have a cold, bronchitis or other respiratory infection?  6. NO - Do you have a cough, shortness of breath or wheezing?  7. NO - Do you sometimes get pains in the calves of your legs when you walk?  8. NO - Do you or anyone in your family have previous history of blood clots?  9. NO - Do you or does anyone in your family have a serious bleeding problem such as prolonged bleeding following surgeries or cuts?  10. YES - HAVE YOU EVER HAD PROBLEMS WITH ANEMIA OR BEEN TOLD TO TAKE IRON PILLS? Recently with GI bleed and now bladder cancer   11. YES - HAVE YOU HAD ANY ABNORMAL BLOOD LOSS SUCH AS BLACK, TARRY OR BLOODY STOOLS, OR ABNORMAL VAGINAL BLEEDING? Recent GI bleed and hospitalization  12. NO - Have you ever had a blood  transfusion?  13. NO - Have you or any of your relatives ever had problems with anesthesia?  14. NO - Do you have sleep apnea, excessive snoring or daytime drowsiness?  15. NO - Do you have any prosthetic heart valves?  16. NO - Do you have prosthetic joints?  17. NO - Is there any chance that you may be pregnant?      HPI:     HPI related to upcoming procedure: Recently had cystoscopy and bladder tumor removal. Going for repeat for staging.  History is gathered from wife d/t dementia.  Feeling ok today. Only had a piece of toast today and no water.  Wife states he has been more tired this week    Had gout last week and was treated with prednisone        See problem list for active medical problems.  Problems all longstanding and stable, except as noted/documented.  See ROS for pertinent symptoms related to these conditions.                                                                                                                                                          .    MEDICAL HISTORY:     Patient Active Problem List    Diagnosis Date Noted     Gastric mass 09/09/2018     Priority: Medium     Bladder mass 09/09/2018     Priority: Medium     Prepatellar bursitis of left knee 12/11/2017     Priority: Medium     Cardiomyopathy (H) 03/14/2017     Priority: Medium     Controlled type 2 diabetes mellitus with chronic kidney disease, without long-term current use of insulin, unspecified CKD stage (H) 02/24/2017     Priority: Medium     New Dx Feb 2017       HFrEF (heart failure with reduced ejection fraction) (H) 08/16/2016     Priority: Medium     Chronic kidney disease (CKD), stage 3 (moderate)      Priority: Medium     Anxiety and depression      Priority: Medium     BPH (benign prostatic hypertrophy)      Priority: Medium     LBBB (left bundle branch block)      Priority: Medium     Mixed cardiomyopathy 07/22/2016     Priority: Medium     RV (right ventricular) mural thrombus without MI 07/22/2016      Priority: Medium     Dementia without behavioral disturbance, unspecified dementia type 07/22/2016     Priority: Medium     History of gastric bypass 07/22/2016     Priority: Medium      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     Myocardial infarction 1995 and 2010     Nonischemic cardiomyopathy (H) 7/22/2016     Obesity      Pacemaker     ICD/PM     Rosacea      Rotator cuff disorder     Tear x 2     RV (right ventricular) mural thrombus 7/22/2016     Past Surgical History:   Procedure Laterality Date     ANGIOPLASTY  1995 and 2010 with stent     BACK SURGERY       CARDIAC SURGERY      ICD/PM     CYSTOSCOPY, TRANSURETHRAL RESECTION (TUR) TUMOR BLADDER, COMBINED N/A 9/19/2018    Procedure: COMBINED CYSTOSCOPY, TRANSURETHRAL RESECTION (TUR) TUMOR BLADDER;  CYSTOSCOPY, TRANSURETHRAL RESECTION BLADDER TUMOR ;  Surgeon: Ryan Camarillo MD;  Location: [facility]     ESOPHAGOSCOPY, GASTROSCOPY, DUODENOSCOPY (EGD), COMBINED N/A 7/30/2018    Procedure: COMBINED ESOPHAGOSCOPY, GASTROSCOPY, DUODENOSCOPY (EGD), BIOPSY SINGLE OR MULTIPLE;  gastroscopy;  Surgeon: Parish Xiong MD;  Location:  GI     ESOPHAGOSCOPY, GASTROSCOPY, DUODENOSCOPY (EGD), COMBINED N/A 7/30/2018    Procedure: COMBINED ESOPHAGOSCOPY, GASTROSCOPY, DUODENOSCOPY (EGD);  EGD;  Surgeon: Parish Xiong MD;  Location:  GI     ESOPHAGOSCOPY, GASTROSCOPY, DUODENOSCOPY (EGD), COMBINED N/A 8/2/2018    Procedure: COMBINED ESOPHAGOSCOPY, GASTROSCOPY, DUODENOSCOPY (EGD), BIOPSY SINGLE OR MULTIPLE;  gastroscopy BIOPSYSHOULD BE SENT TO LAB IN NS NOT FORMALIN PER ;   Surgeon: Jonathon Bush MD;  Location:  GI     GASTRIC BYPASS  2001     HEART CATH LEFT HEART CATH  7/19/16    Non ischemic cardiomyopathy; Non functionally significant LAD and RCA disease      OTHER SURGICAL HISTORY  2006    Spinal surgery     OTHER SURGICAL HISTORY  Nov 2016    CRT-D implantation     Current Outpatient Prescriptions   Medication Sig Dispense Refill     ACE/ARB NOT PRESCRIBED, INTENTIONAL, ACE & ARB not prescribed due to Symptomatic hypotension not due to excessive diuresis (Patient not taking: Reported on 9/21/2018)       ASPIRIN NOT PRESCRIBED (INTENTIONAL) Please choose reason not prescribed, below (Patient not taking: Reported on 9/21/2018) 0 each 0     atorvastatin (LIPITOR) 40 MG tablet TAKE 1 TABLET AT BEDTIME 90 tablet 3     COENZYME Q-10 PO Take 200 mg by mouth every morning        ferrous sulfate (IRON) 325 (65 FE) MG tablet Take 325 mg by mouth every morning        furosemide (LASIX) 20 MG tablet Take 0.5 tablets (10 mg) by mouth daily 45 tablet 1     hydrALAZINE (APRESOLINE) 10 MG tablet Take 1 tablet (10 mg) by mouth 3 times daily 270 tablet 1     isosorbide mononitrate (IMDUR) 30 MG 24 hr tablet Take 0.5 tablets (15 mg) by mouth daily 45 tablet 1     metoprolol succinate (TOPROL-XL) 50 MG 24 hr tablet TAKE 1 TABLET (50 MG) DAILY 90 tablet 3     multivitamin, therapeutic with minerals (MULTI-VITAMIN) TABS Take 1 tablet by mouth every morning        venlafaxine (EFFEXOR) 75 MG tablet TAKE 1 TABLET TWICE DAILY 180 tablet 1     vitamin D (ERGOCALCIFEROL) 58361 UNIT capsule TAKE 1 CAPSULE ONCE A WEEK 12 capsule 1     OTC products: None, except as noted above    Allergies   Allergen Reactions     Wasps [Hornets]      Sand wasp --- years ago.        Latex Allergy: NO    Social History   Substance Use Topics     Smoking status: Former Smoker     Packs/day: 2.00     Years: 20.00     Types: Cigarettes     Quit date: 1/1/1980     Smokeless tobacco: Never Used      Comment: Quit in his  30s - 2 ppd for 20 years     Alcohol use 0.0 oz/week     0 Standard drinks or equivalent per week      Comment: maybe 1 beer a month     History   Drug Use No       REVIEW OF SYSTEMS:   Constitutional, neuro, ENT, endocrine, pulmonary, cardiac, gastrointestinal, genitourinary, musculoskeletal, integument and psychiatric systems are negative, except as otherwise noted.    EXAM:   BP 98/68 (BP Location: Right arm, Patient Position: Chair, Cuff Size: Adult Regular)  Pulse 108  Temp 98.1  F (36.7  C) (Oral)  Ht 6' (1.829 m)  Wt 223 lb (101.2 kg)  SpO2 96%  BMI 30.24 kg/m2    GENERAL APPEARANCE: healthy, alert and no distress     EYES: EOMI,  PERRL     HENT:  mouth without ulcers or lesions     NECK: no adenopathy, no asymmetry, masses, or scars and thyroid normal to palpation     RESP: lungs clear to auscultation - no rales, rhonchi or wheezes     CV: Tachycardia, regular rhythm, normal S1 S2, no S3 or S4 and no murmur, click or rub     MS: extremities normal- no gross deformities noted, no evidence of inflammation in joints, FROM in all extremities.     SKIN: no suspicious lesions or rashes. Pale     NEURO: Normal strength and tone, sensory exam grossly normal, mentation intact and speech normal     PSYCH: mentation appears normal. and affect normal/bright     LYMPHATICS: No cervical adenopathy     DIAGNOSTICS:     Labs Resulted Today:   Results for orders placed or performed in visit on 10/09/18   Hemoglobin   Result Value Ref Range    Hemoglobin 11.2 (L) 13.3 - 17.7 g/dL   Creatinine   Result Value Ref Range    Creatinine 1.34 (H) 0.66 - 1.25 mg/dL    GFR Estimate 52 (L) >60 mL/min/1.7m2    GFR Estimate If Black 63 >60 mL/min/1.7m2         Recent Labs   Lab Test  09/13/18   1103  08/09/18   1256  08/02/18   0845   07/31/18   1035   07/30/18   0750   08/25/17   1416   HGB  12.6*  12.1*  11.4*   < >   --    < >  13.9   --   15.1   PLT  283  288  185   --    --    < >  218   --   203   INR   --    --    --    --    1.21*   --   3.56*   < >   --    NA  140   --   138   --    --    < >  138   --   139   POTASSIUM  4.2   --   3.7   --    --    < >  3.7   --   4.0   CR  1.49*   --   1.23   --    --    < >  1.23   --   1.34*   A1C   --    --    --    --    --    --   7.1*   --   6.5*    < > = values in this interval not displayed.        IMPRESSION:   Reason for surgery/procedure: bladder cancer   Diagnosis/reason for consult: medical preop    The proposed surgical procedure is considered INTERMEDIATE risk.    REVISED CARDIAC RISK INDEX  The patient has the following serious cardiovascular risks for perioperative complications such as (MI, PE, VFib and 3  AV Block):  Coronary Artery Disease (MI, positive stress test, angina, Qs on EKG)  Congestive Heart Failure (pulmonary edema, PND, s3 danay, CXR with pulmonary congestion, basilar rales)  INTERPRETATION: 2 risks: Class III (moderate risk - 6.6% complication rate)    The patient has the following additional risks for perioperative complications:  Delirium or Dementia      ICD-10-CM    1. Preop general physical exam Z01.818 Hemoglobin     Creatinine   2. Bladder mass N32.89        RECOMMENDATIONS:       Cardiovascular Risk  Patient is already on a Beta Blocker. Continue Betablocker therapy after surgery, using Beta blocker order set as necessary for NPO status.  Echo on 5/29/18 revealed similar LVEF and worsened LV dilation when compared to 6/20/17. Wife denies recent chest symptoms but is a lot more tired recently     Anemia  Anemia and does not require treatment prior to surgery.  Monitor Hemoglobin postoperatively. He does look more pale today and has had a drop in his hgb from 12.6 to 11.2 in less than 1 month   He is clearly instructed to get evaluated right away if he does develop any new symptoms or his fatigue gets significantly worse.   May consider a repeat hgb the morning of surgery       --Patient is to take all scheduled medications on the day of surgery EXCEPT for  modifications listed below.--Patient is to take all scheduled medications on the day of surgery EXCEPT for modifications listed below.      Pt is at high risk for general anesthesia d/t his significant cardiac history. He now has worsened anemia and feels more fatigued than normal. His BP is lower than normal today but reports to be feeling ok. He also only ate a single piece of toast and did not drink anything today when vitals were completed at 2pm which was likely playing a role.       APPROVAL GIVEN to proceed with proposed procedure, without further diagnostic evaluation       Signed Electronically by: ALLEN Colón CNP    Copy of this evaluation report is provided to requesting physician.    [facility] Preop Guidelines    Revised Cardiac Risk Index

## 2018-10-12 NOTE — PATIENT INSTRUCTIONS
Follow up with Gastroenterology (GI doctor)  Fax# [phone]    Follow up with oncology     Follow up on Monday for a recheck of your BP and your hemoglobin

## 2018-10-12 NOTE — PROGRESS NOTES
"HPI      SUBJECTIVE:   Gibson Villalta is a 77 year old male who presents to clinic today for the following health issues:      Chief Complaint   Patient presents with     Fatigue     for 4-5 days       Here with wife who provides most of history d/t dementia.    Feeling extremely tired for almost a week and getting worse. \"not really out of breath but extremely tired.\"  No CP, cough  Sleeping ok, usually from 2-7am. That is his normal routine   Not eating much. Gets full really fast. Only had 1 bite of lasagna for lunch.   Does not drink much water at all during the day. A couple glasses at most.   Denies hematuria or blood in stool (but not completely sure d/t dementia)       Works with specialists:  GI: Dr Bush   Oncology: Dr Eli salazar       Past Medical History:   Diagnosis Date     Acute kidney injury (H) 2012     Anemia      Anxiety      Bladder mass      BPH (benign prostatic hypertrophy)      Carpal tunnel syndrome     Right     Chronic kidney disease (CKD), stage 3 (moderate)      Dementia      Diabetes (H)      Gastric mass      Gout      History of depression      LBBB (left bundle branch block) 2013     Lumbar herniated disc     L5-S1     Mixed cardiomyopathy 2016     Myocardial infarction (H)  and      Nonischemic cardiomyopathy (H) 2016     Obesity      Pacemaker     ICD/PM     Rosacea      Rotator cuff disorder     Tear x 2     RV (right ventricular) mural thrombus 2016     Family History   Problem Relation Age of Onset     Coronary Artery Disease Father 39     Alzheimer Disease Mother      Coronary Artery Disease Son      Two sons have  from MIs (ages 39 and 51)     Past Surgical History:   Procedure Laterality Date     ANGIOPLASTY   and  with stent     BACK SURGERY       CARDIAC SURGERY      ICD/PM     CYSTOSCOPY, TRANSURETHRAL RESECTION (TUR) TUMOR BLADDER, COMBINED N/A 2018    Procedure: COMBINED CYSTOSCOPY, TRANSURETHRAL RESECTION (TUR) TUMOR " BLADDER;  CYSTOSCOPY, TRANSURETHRAL RESECTION BLADDER TUMOR ;  Surgeon: Ryan Camarillo MD;  Location:  OR     ESOPHAGOSCOPY, GASTROSCOPY, DUODENOSCOPY (EGD), COMBINED N/A 7/30/2018    Procedure: COMBINED ESOPHAGOSCOPY, GASTROSCOPY, DUODENOSCOPY (EGD), BIOPSY SINGLE OR MULTIPLE;  gastroscopy;  Surgeon: Parish Xiong MD;  Location:  GI     ESOPHAGOSCOPY, GASTROSCOPY, DUODENOSCOPY (EGD), COMBINED N/A 7/30/2018    Procedure: COMBINED ESOPHAGOSCOPY, GASTROSCOPY, DUODENOSCOPY (EGD);  EGD;  Surgeon: Parish Xiong MD;  Location:  GI     ESOPHAGOSCOPY, GASTROSCOPY, DUODENOSCOPY (EGD), COMBINED N/A 8/2/2018    Procedure: COMBINED ESOPHAGOSCOPY, GASTROSCOPY, DUODENOSCOPY (EGD), BIOPSY SINGLE OR MULTIPLE;  gastroscopy BIOPSYSHOULD BE SENT TO LAB IN NS NOT FORMALIN PER ;  Surgeon: Jonathon Bush MD;  Location:  GI     GASTRIC BYPASS  2001     HEART CATH LEFT HEART CATH  7/19/16    Non ischemic cardiomyopathy; Non functionally significant LAD and RCA disease      OTHER SURGICAL HISTORY  2006    Spinal surgery     OTHER SURGICAL HISTORY  Nov 2016    CRT-D implantation     Social History   Substance Use Topics     Smoking status: Former Smoker     Packs/day: 2.00     Years: 20.00     Types: Cigarettes     Quit date: 1/1/1980     Smokeless tobacco: Never Used      Comment: Quit in his 30s - 2 ppd for 20 years     Alcohol use 0.0 oz/week     0 Standard drinks or equivalent per week      Comment: maybe 1 beer a month     Current Outpatient Prescriptions   Medication Sig Dispense Refill     atorvastatin (LIPITOR) 40 MG tablet TAKE 1 TABLET AT BEDTIME 90 tablet 3     COENZYME Q-10 PO Take 200 mg by mouth every morning        ferrous sulfate (IRON) 325 (65 FE) MG tablet Take 325 mg by mouth every morning        furosemide (LASIX) 20 MG tablet Take 0.5 tablets (10 mg) by mouth daily 45 tablet 1     hydrALAZINE (APRESOLINE) 10 MG tablet Take 1 tablet (10 mg) by mouth 3 times daily 270 tablet 1     isosorbide  mononitrate (IMDUR) 30 MG 24 hr tablet Take 0.5 tablets (15 mg) by mouth daily 45 tablet 1     metoprolol succinate (TOPROL-XL) 50 MG 24 hr tablet TAKE 1 TABLET (50 MG) DAILY 90 tablet 3     multivitamin, therapeutic with minerals (MULTI-VITAMIN) TABS Take 1 tablet by mouth every morning        venlafaxine (EFFEXOR) 75 MG tablet TAKE 1 TABLET TWICE DAILY 180 tablet 1     vitamin D (ERGOCALCIFEROL) 90198 UNIT capsule TAKE 1 CAPSULE ONCE A WEEK 12 capsule 1     ACE/ARB NOT PRESCRIBED, INTENTIONAL, ACE & ARB not prescribed due to Symptomatic hypotension not due to excessive diuresis (Patient not taking: Reported on 10/12/2018)       ASPIRIN NOT PRESCRIBED (INTENTIONAL) Please choose reason not prescribed, below (Patient not taking: Reported on 10/12/2018) 0 each 0     Allergies   Allergen Reactions     Wasps [Hornets]      Sand wasp --- years ago.         Reviewed and updated as needed this visit by clinical staff and provider        Review of Systems   Unable to perform ROS: Dementia   Constitutional: Positive for malaise/fatigue. Negative for fever.   HENT: Negative for nosebleeds.    Respiratory: Negative for cough.    Cardiovascular: Negative for chest pain.   Gastrointestinal: Negative for abdominal pain, constipation, diarrhea, melena and nausea.   Genitourinary: Negative.    Musculoskeletal: Negative for joint pain and myalgias.   Neurological: Negative for dizziness and headaches.   Psychiatric/Behavioral: The patient does not have insomnia.    (Partially provided by pt, but clarified with wife d/t pt's dementia)       /82 (BP Location: Right arm, Cuff Size: Adult Regular)  Pulse 89  Temp 96.4  F (35.8  C) (Oral)  Ht 6' (1.829 m)  Wt 222 lb (100.7 kg)  SpO2 97%  BMI 30.11 kg/m2      Physical Exam   Constitutional: He is well-developed, well-nourished, and in no distress.   HENT:   Head: Normocephalic.   Right Ear: Tympanic membrane, external ear and ear canal normal.   Left Ear: Tympanic membrane,  external ear and ear canal normal.   Mouth/Throat: Oropharynx is clear and moist. No oropharyngeal exudate.   Eyes: Conjunctivae are normal.   Neck: Normal range of motion.   Cardiovascular: Normal rate, regular rhythm and normal heart sounds.    No murmur heard.  Pulmonary/Chest: Effort normal and breath sounds normal. No respiratory distress.   Lymphadenopathy:     He has no cervical adenopathy.   Neurological: He is alert.   Skin: Skin is warm and dry. There is pallor.   Psychiatric: Mood and affect normal.   Vitals reviewed.        Assessment and Plan:       ICD-10-CM    1. Fatigue, unspecified type R53.83 CBC with platelets     Comprehensive metabolic panel     XR Chest 2 Views     Troponin I     Iron and iron binding capacity     Ferritin     CANCELED: *UA reflex to Microscopic and Culture ([facility] (except [place] and [place])   2. Anemia, unspecified type D64.9 CANCELED: Iron and iron binding capacity     CANCELED: Ferritin       Worsening fatigue over the last week. I saw him last week and he had a lower BP than normal and slight drop in hgb. Today hgb is down again and continues to be somewhat hypotensive. He is scheduled for surgery next week and recommend that he cancels as he is quite high risk with persistent hypotension.   Labs today are otherwise unremarkable including CXR.   He has known gastric and bladder cancer. He had a recent GI bleed, so I suspect that is where he currently has a slow bleed. Recommended they get in with GI asap. We called and were able to get him in on the 23rd for their earliest appt. Him and his wife know to go to the ED with any new or worsening symptoms.   We will have him f/u on Monday for recheck of BP and hgb.       Katelyn Akhtar, APRN, CNP  [facility]

## 2018-10-13 LAB
FERRITIN SERPL-MCNC: 21 NG/ML (ref 26–388)
IRON SATN MFR SERPL: 21 % (ref 15–46)
IRON SERPL-MCNC: 68 UG/DL (ref 35–180)
TIBC SERPL-MCNC: 321 UG/DL (ref 240–430)

## 2018-10-14 ENCOUNTER — TELEPHONE (OUTPATIENT)
Dept: FAMILY MEDICINE | Facility: CLINIC | Age: 78
End: 2018-10-14

## 2018-10-14 NOTE — TELEPHONE ENCOUNTER
Triage, please pend orders for IV Injectafer and offer this to Gibson (but call his wife Izabella d/t his dementia) due to falling hemoglobin and iron levels. He can continue his oral iron for now but needs a boost with IV.

## 2018-10-15 ENCOUNTER — OFFICE VISIT (OUTPATIENT)
Dept: FAMILY MEDICINE | Facility: CLINIC | Age: 78
End: 2018-10-15
Payer: COMMERCIAL

## 2018-10-15 ENCOUNTER — DOCUMENTATION ONLY (OUTPATIENT)
Dept: LAB | Facility: CLINIC | Age: 78
End: 2018-10-15

## 2018-10-15 ENCOUNTER — HOSPITAL ENCOUNTER (OUTPATIENT)
Facility: CLINIC | Age: 78
Setting detail: OBSERVATION
LOS: 1 days | Discharge: HOME | End: 2018-10-16
Attending: EMERGENCY MEDICINE | Admitting: INTERNAL MEDICINE
Payer: MEDICARE

## 2018-10-15 ENCOUNTER — APPOINTMENT (OUTPATIENT)
Dept: PHYSICAL THERAPY | Facility: CLINIC | Age: 78
End: 2018-10-15
Attending: PHYSICIAN ASSISTANT
Payer: MEDICARE

## 2018-10-15 VITALS
TEMPERATURE: 97.1 F | HEART RATE: 98 BPM | SYSTOLIC BLOOD PRESSURE: 79 MMHG | OXYGEN SATURATION: 97 % | DIASTOLIC BLOOD PRESSURE: 55 MMHG

## 2018-10-15 DIAGNOSIS — R42 LIGHTHEADEDNESS: ICD-10-CM

## 2018-10-15 DIAGNOSIS — D50.9 IRON DEFICIENCY ANEMIA, UNSPECIFIED IRON DEFICIENCY ANEMIA TYPE: Primary | ICD-10-CM

## 2018-10-15 DIAGNOSIS — D64.9 ANEMIA, UNSPECIFIED TYPE: ICD-10-CM

## 2018-10-15 DIAGNOSIS — K31.89 GASTRIC MASS: ICD-10-CM

## 2018-10-15 DIAGNOSIS — E86.0 DEHYDRATION: ICD-10-CM

## 2018-10-15 DIAGNOSIS — I95.1 ORTHOSTATIC HYPOTENSION: Primary | ICD-10-CM

## 2018-10-15 DIAGNOSIS — R53.83 FATIGUE, UNSPECIFIED TYPE: Primary | ICD-10-CM

## 2018-10-15 DIAGNOSIS — N32.89 BLADDER MASS: ICD-10-CM

## 2018-10-15 DIAGNOSIS — K21.00 GASTROESOPHAGEAL REFLUX DISEASE WITH ESOPHAGITIS: ICD-10-CM

## 2018-10-15 DIAGNOSIS — M62.81 GENERALIZED MUSCLE WEAKNESS: ICD-10-CM

## 2018-10-15 DIAGNOSIS — R19.5 POSITIVE OCCULT STOOL BLOOD TEST: ICD-10-CM

## 2018-10-15 PROBLEM — R53.1 WEAKNESS: Status: ACTIVE | Noted: 2018-10-15

## 2018-10-15 LAB
ABO + RH BLD: NORMAL
ABO + RH BLD: NORMAL
ALBUMIN SERPL-MCNC: 3.2 G/DL (ref 3.4–5)
ALBUMIN UR-MCNC: 30 MG/DL
ALP SERPL-CCNC: 170 U/L (ref 40–150)
ALT SERPL W P-5'-P-CCNC: 19 U/L (ref 0–70)
ANION GAP SERPL CALCULATED.3IONS-SCNC: 8 MMOL/L (ref 3–14)
APPEARANCE UR: ABNORMAL
AST SERPL W P-5'-P-CCNC: 21 U/L (ref 0–45)
BACTERIA #/AREA URNS HPF: ABNORMAL /HPF
BASOPHILS # BLD AUTO: 0 10E9/L (ref 0–0.2)
BASOPHILS NFR BLD AUTO: 0.3 %
BILIRUB SERPL-MCNC: 0.2 MG/DL (ref 0.2–1.3)
BILIRUB UR QL STRIP: NEGATIVE
BLD GP AB SCN SERPL QL: NORMAL
BLOOD BANK CMNT PATIENT-IMP: NORMAL
BUN SERPL-MCNC: 23 MG/DL (ref 7–30)
CALCIUM SERPL-MCNC: 8.9 MG/DL (ref 8.5–10.1)
CHLORIDE SERPL-SCNC: 105 MMOL/L (ref 94–109)
CO2 SERPL-SCNC: 26 MMOL/L (ref 20–32)
COLOR UR AUTO: YELLOW
CREAT SERPL-MCNC: 1.57 MG/DL (ref 0.66–1.25)
DIFFERENTIAL METHOD BLD: ABNORMAL
EOSINOPHIL # BLD AUTO: 0.3 10E9/L (ref 0–0.7)
EOSINOPHIL NFR BLD AUTO: 2.4 %
ERYTHROCYTE [DISTWIDTH] IN BLOOD BY AUTOMATED COUNT: 13.9 % (ref 10–15)
GFR SERPL CREATININE-BSD FRML MDRD: 43 ML/MIN/1.7M2
GLUCOSE SERPL-MCNC: 123 MG/DL (ref 70–99)
GLUCOSE UR STRIP-MCNC: NEGATIVE MG/DL
HCT VFR BLD AUTO: 32.5 % (ref 40–53)
HEMOCCULT STL QL: POSITIVE
HGB BLD-MCNC: 10.5 G/DL (ref 13.3–17.7)
HGB BLD-MCNC: 8.8 G/DL (ref 13.3–17.7)
HGB BLD-MCNC: 9.1 G/DL (ref 13.3–17.7)
HGB BLD-MCNC: 9.4 G/DL (ref 13.3–17.7)
HGB UR QL STRIP: ABNORMAL
HYALINE CASTS #/AREA URNS LPF: 33 /LPF (ref 0–2)
IMM GRANULOCYTES # BLD: 0.1 10E9/L (ref 0–0.4)
IMM GRANULOCYTES NFR BLD: 0.4 %
INTERPRETATION ECG - MUSE: NORMAL
KETONES UR STRIP-MCNC: NEGATIVE MG/DL
LEUKOCYTE ESTERASE UR QL STRIP: ABNORMAL
LYMPHOCYTES # BLD AUTO: 1.8 10E9/L (ref 0.8–5.3)
LYMPHOCYTES NFR BLD AUTO: 14.4 %
MCH RBC QN AUTO: 30.6 PG (ref 26.5–33)
MCHC RBC AUTO-ENTMCNC: 32.3 G/DL (ref 31.5–36.5)
MCV RBC AUTO: 95 FL (ref 78–100)
MONOCYTES # BLD AUTO: 0.5 10E9/L (ref 0–1.3)
MONOCYTES NFR BLD AUTO: 4 %
MUCOUS THREADS #/AREA URNS LPF: PRESENT /LPF
NEUTROPHILS # BLD AUTO: 10 10E9/L (ref 1.6–8.3)
NEUTROPHILS NFR BLD AUTO: 78.5 %
NITRATE UR QL: NEGATIVE
NRBC # BLD AUTO: 0 10*3/UL
NRBC BLD AUTO-RTO: 0 /100
PH UR STRIP: 5 PH (ref 5–7)
PLATELET # BLD AUTO: 257 10E9/L (ref 150–450)
POTASSIUM SERPL-SCNC: 3.6 MMOL/L (ref 3.4–5.3)
PROT SERPL-MCNC: 7 G/DL (ref 6.8–8.8)
RBC # BLD AUTO: 3.43 10E12/L (ref 4.4–5.9)
RBC #/AREA URNS AUTO: 22 /HPF (ref 0–2)
SODIUM SERPL-SCNC: 139 MMOL/L (ref 133–144)
SOURCE: ABNORMAL
SP GR UR STRIP: 1.02 (ref 1–1.03)
SPECIMEN EXP DATE BLD: NORMAL
SPERM #/AREA URNS HPF: PRESENT /HPF
UROBILINOGEN UR STRIP-MCNC: NORMAL MG/DL (ref 0–2)
WBC # BLD AUTO: 12.7 10E9/L (ref 4–11)
WBC #/AREA URNS AUTO: 120 /HPF (ref 0–5)

## 2018-10-15 PROCEDURE — 96374 THER/PROPH/DIAG INJ IV PUSH: CPT

## 2018-10-15 PROCEDURE — 86901 BLOOD TYPING SEROLOGIC RH(D): CPT | Performed by: PHYSICIAN ASSISTANT

## 2018-10-15 PROCEDURE — 36415 COLL VENOUS BLD VENIPUNCTURE: CPT | Performed by: HOSPITALIST

## 2018-10-15 PROCEDURE — 82272 OCCULT BLD FECES 1-3 TESTS: CPT | Performed by: PHYSICIAN ASSISTANT

## 2018-10-15 PROCEDURE — 97112 NEUROMUSCULAR REEDUCATION: CPT | Mod: GP | Performed by: PHYSICAL THERAPIST

## 2018-10-15 PROCEDURE — 99220 ZZC INITIAL OBSERVATION CARE,LEVL III: CPT | Performed by: PHYSICIAN ASSISTANT

## 2018-10-15 PROCEDURE — 87040 BLOOD CULTURE FOR BACTERIA: CPT | Performed by: HOSPITALIST

## 2018-10-15 PROCEDURE — 86900 BLOOD TYPING SEROLOGIC ABO: CPT | Performed by: PHYSICIAN ASSISTANT

## 2018-10-15 PROCEDURE — 25000128 H RX IP 250 OP 636: Performed by: PHYSICIAN ASSISTANT

## 2018-10-15 PROCEDURE — 99213 OFFICE O/P EST LOW 20 MIN: CPT | Performed by: INTERNAL MEDICINE

## 2018-10-15 PROCEDURE — 85025 COMPLETE CBC W/AUTO DIFF WBC: CPT | Performed by: PHYSICIAN ASSISTANT

## 2018-10-15 PROCEDURE — G0378 HOSPITAL OBSERVATION PER HR: HCPCS

## 2018-10-15 PROCEDURE — 93005 ELECTROCARDIOGRAM TRACING: CPT

## 2018-10-15 PROCEDURE — 85018 HEMOGLOBIN: CPT | Performed by: PHYSICIAN ASSISTANT

## 2018-10-15 PROCEDURE — 99285 EMERGENCY DEPT VISIT HI MDM: CPT | Mod: 25

## 2018-10-15 PROCEDURE — 81001 URINALYSIS AUTO W/SCOPE: CPT | Performed by: PHYSICIAN ASSISTANT

## 2018-10-15 PROCEDURE — 87086 URINE CULTURE/COLONY COUNT: CPT | Performed by: INTERNAL MEDICINE

## 2018-10-15 PROCEDURE — 97162 PT EVAL MOD COMPLEX 30 MIN: CPT | Mod: GP | Performed by: PHYSICAL THERAPIST

## 2018-10-15 PROCEDURE — 96361 HYDRATE IV INFUSION ADD-ON: CPT

## 2018-10-15 PROCEDURE — 86850 RBC ANTIBODY SCREEN: CPT | Performed by: PHYSICIAN ASSISTANT

## 2018-10-15 PROCEDURE — 36415 COLL VENOUS BLD VENIPUNCTURE: CPT | Performed by: PHYSICIAN ASSISTANT

## 2018-10-15 PROCEDURE — C9113 INJ PANTOPRAZOLE SODIUM, VIA: HCPCS | Performed by: PHYSICIAN ASSISTANT

## 2018-10-15 PROCEDURE — 80053 COMPREHEN METABOLIC PANEL: CPT | Performed by: PHYSICIAN ASSISTANT

## 2018-10-15 PROCEDURE — 40000193 ZZH STATISTIC PT WARD VISIT: Performed by: PHYSICAL THERAPIST

## 2018-10-15 RX ORDER — ACETAMINOPHEN 325 MG/1
650 TABLET ORAL EVERY 4 HOURS PRN
Status: DISCONTINUED | OUTPATIENT
Start: 2018-10-15 | End: 2018-10-16 | Stop reason: HOSPADM

## 2018-10-15 RX ORDER — ONDANSETRON 2 MG/ML
4 INJECTION INTRAMUSCULAR; INTRAVENOUS EVERY 6 HOURS PRN
Status: DISCONTINUED | OUTPATIENT
Start: 2018-10-15 | End: 2018-10-16 | Stop reason: HOSPADM

## 2018-10-15 RX ORDER — SODIUM CHLORIDE 9 MG/ML
1000 INJECTION, SOLUTION INTRAVENOUS CONTINUOUS
Status: DISCONTINUED | OUTPATIENT
Start: 2018-10-15 | End: 2018-10-15

## 2018-10-15 RX ORDER — BISACODYL 10 MG
10 SUPPOSITORY, RECTAL RECTAL DAILY PRN
Status: DISCONTINUED | OUTPATIENT
Start: 2018-10-15 | End: 2018-10-16 | Stop reason: HOSPADM

## 2018-10-15 RX ORDER — AMOXICILLIN 250 MG
2 CAPSULE ORAL 2 TIMES DAILY PRN
Status: DISCONTINUED | OUTPATIENT
Start: 2018-10-15 | End: 2018-10-16 | Stop reason: HOSPADM

## 2018-10-15 RX ORDER — NALOXONE HYDROCHLORIDE 0.4 MG/ML
.1-.4 INJECTION, SOLUTION INTRAMUSCULAR; INTRAVENOUS; SUBCUTANEOUS
Status: DISCONTINUED | OUTPATIENT
Start: 2018-10-15 | End: 2018-10-16 | Stop reason: HOSPADM

## 2018-10-15 RX ORDER — POLYETHYLENE GLYCOL 3350 17 G/17G
17 POWDER, FOR SOLUTION ORAL DAILY PRN
Status: DISCONTINUED | OUTPATIENT
Start: 2018-10-15 | End: 2018-10-16 | Stop reason: HOSPADM

## 2018-10-15 RX ORDER — METOPROLOL SUCCINATE 50 MG/1
50 TABLET, EXTENDED RELEASE ORAL DAILY
Status: DISCONTINUED | OUTPATIENT
Start: 2018-10-16 | End: 2018-10-16 | Stop reason: HOSPADM

## 2018-10-15 RX ORDER — ACETAMINOPHEN 650 MG/1
650 SUPPOSITORY RECTAL EVERY 4 HOURS PRN
Status: DISCONTINUED | OUTPATIENT
Start: 2018-10-15 | End: 2018-10-16 | Stop reason: HOSPADM

## 2018-10-15 RX ORDER — ONDANSETRON 4 MG/1
4 TABLET, ORALLY DISINTEGRATING ORAL EVERY 6 HOURS PRN
Status: DISCONTINUED | OUTPATIENT
Start: 2018-10-15 | End: 2018-10-16 | Stop reason: HOSPADM

## 2018-10-15 RX ORDER — AMOXICILLIN 250 MG
1 CAPSULE ORAL 2 TIMES DAILY PRN
Status: DISCONTINUED | OUTPATIENT
Start: 2018-10-15 | End: 2018-10-16 | Stop reason: HOSPADM

## 2018-10-15 RX ORDER — SODIUM CHLORIDE 9 MG/ML
INJECTION, SOLUTION INTRAVENOUS CONTINUOUS
Status: DISCONTINUED | OUTPATIENT
Start: 2018-10-15 | End: 2018-10-16

## 2018-10-15 RX ORDER — LIDOCAINE 40 MG/G
CREAM TOPICAL
Status: DISCONTINUED | OUTPATIENT
Start: 2018-10-15 | End: 2018-10-16 | Stop reason: HOSPADM

## 2018-10-15 RX ORDER — PROCHLORPERAZINE 25 MG
12.5 SUPPOSITORY, RECTAL RECTAL EVERY 12 HOURS PRN
Status: DISCONTINUED | OUTPATIENT
Start: 2018-10-15 | End: 2018-10-16 | Stop reason: HOSPADM

## 2018-10-15 RX ORDER — PROCHLORPERAZINE MALEATE 5 MG
5 TABLET ORAL EVERY 6 HOURS PRN
Status: DISCONTINUED | OUTPATIENT
Start: 2018-10-15 | End: 2018-10-16 | Stop reason: HOSPADM

## 2018-10-15 RX ORDER — CEFTRIAXONE 1 G/1
1 INJECTION, POWDER, FOR SOLUTION INTRAMUSCULAR; INTRAVENOUS EVERY 24 HOURS
Status: DISCONTINUED | OUTPATIENT
Start: 2018-10-15 | End: 2018-10-16

## 2018-10-15 RX ADMIN — SODIUM CHLORIDE: 9 INJECTION, SOLUTION INTRAVENOUS at 23:01

## 2018-10-15 RX ADMIN — PANTOPRAZOLE SODIUM 40 MG: 40 INJECTION, POWDER, FOR SOLUTION INTRAVENOUS at 11:17

## 2018-10-15 RX ADMIN — SODIUM CHLORIDE 1000 ML: 9 INJECTION, SOLUTION INTRAVENOUS at 11:17

## 2018-10-15 RX ADMIN — SODIUM CHLORIDE 1000 ML: 9 INJECTION, SOLUTION INTRAVENOUS at 09:25

## 2018-10-15 ASSESSMENT — ENCOUNTER SYMPTOMS
NAUSEA: 0
DYSURIA: 0
HEMATURIA: 0
BLOOD IN STOOL: 1
FEVER: 0
ABDOMINAL PAIN: 0
VOMITING: 0
DIZZINESS: 1

## 2018-10-15 NOTE — PROGRESS NOTES
Care Transition Initial Assessment -   Reason For Consult: discharge planning  Met with: Patient and wife Izabella     Active Problems:    Weakness       DATA  Lives With: spouse  Living Arrangements: apartment  Description of Support System: Supportive, Involved  Who is your support system?: Wife, Children  Support Assessment: Adequate family and caregiver support, Adequate social supports.   Identified issues/concerns regarding health management: Denies needs at this time.       Resources List: DME     Quality Of Family Relationships: supportive, helpful, involved  Transportation Available: family or friend will provide    ASSESSMENT  Cognitive Status:  awake, alert and oriented per [place] assmt, although noted in patient chart a diagnosis of dementia. Patient quick to decline services offered, stating they are doing fine. Pt lives in an apt with his wife. Pt's wife does the driving, managing finances, errands. Pt stated, 'We grocery shop together'. Patient states he is independent in dressing, grooming, bathing and has no unmet needs.  Concerns to be addressed: Patient's wife inquired about subsidized housing resources and financial assistance options, although expressing embarrassment over asking.    [place] consulted for discharge planning.  Pt is a 77 yr old male who admitted on 10/15 for dizziness and decrease of blood pressure. Pt has a history of gastric mass and dementia.   PLAN  Financial costs for the patient includes : Not discussed .  Patient given options and choices for discharge : Patient plans to discharge home with wife .  Patient/family is agreeable to the plan?  Yes  Patient Goals and Preferences: Home with wife .  Patient anticipates discharging to:  Home .  [place] provided;  - Senior Housing Directory includingSubsidized Housing options   - [place] [employer] Assist phone number  - Utility Assistance info  - [employer] Linkage Line Brochure  [place] provided contact info should patient have other questions or if they decide  they would like to meet with [facility] Financial Counselor prior to discharge.    SW has identified no other social service needs at this time. SW will remain available should other needs arise.

## 2018-10-15 NOTE — PHARMACY-ADMISSION MEDICATION HISTORY
Admission medication history interview status for the 10/15/2018  admission is complete. See EPIC admission navigator for prior to admission medications     Medication history source reliability:Good    Actions taken by pharmacist (provider contacted, etc): [facility] notes, just seen at Dr. Vallecillo today, interviewed patient and wife     Additional medication history information not noted on PTA med list :None    Medication reconciliation/reorder completed by provider prior to medication history? No    Time spent in this activity: 15 min    Prior to Admission medications    Medication Sig Last Dose Taking? Auth Provider   atorvastatin (LIPITOR) 40 MG tablet TAKE 1 TABLET AT BEDTIME 10/14/2018 at hs Yes Flori Reyes DO   COENZYME Q-10 PO Take 200 mg by mouth every morning  10/15/2018 at am Yes Reported, Patient   ferrous sulfate (IRON) 325 (65 FE) MG tablet Take 325 mg by mouth every morning  10/15/2018 at am Yes Unknown, Entered By History   furosemide (LASIX) 20 MG tablet Take 0.5 tablets (10 mg) by mouth daily 10/15/2018 at am Yes Flori Reyes DO   hydrALAZINE (APRESOLINE) 10 MG tablet Take 1 tablet (10 mg) by mouth 3 times daily 10/15/2018 at am Yes Josias Hernandez MD   isosorbide mononitrate (IMDUR) 30 MG 24 hr tablet Take 0.5 tablets (15 mg) by mouth daily 10/15/2018 at am Yes Josias Hernandez MD   metoprolol succinate (TOPROL-XL) 50 MG 24 hr tablet TAKE 1 TABLET (50 MG) DAILY 10/15/2018 at am Yes Flori Reyes DO   multivitamin, therapeutic with minerals (MULTI-VITAMIN) TABS Take 1 tablet by mouth every morning  10/15/2018 at am Yes Reported, Patient   venlafaxine (EFFEXOR) 75 MG tablet TAKE 1 TABLET TWICE DAILY 10/15/2018 at am Yes Flori Reyes DO   vitamin D (ERGOCALCIFEROL) 37765 UNIT capsule TAKE 1 CAPSULE ONCE A WEEK 10/9/2018 at am Yes Katelyn Akhtar APRN CNP   ACE/ARB NOT PRESCRIBED, INTENTIONAL, ACE & ARB not prescribed due to Symptomatic hypotension not due to  excessive diuresis  Patient not taking: Reported on 10/12/2018   Flori Reyes,    ASPIRIN NOT PRESCRIBED (INTENTIONAL) Please choose reason not prescribed, below  Patient not taking: Reported on 10/12/2018   Flori Reyes, DO

## 2018-10-15 NOTE — PROGRESS NOTES
Observation goals PRIOR TO DISCHARGE      -diagnostic tests and consults completed and resulted:  Not met.  Recent admit to observation.  New consult orders for GI, PT and SW in place.   -vital signs normal or at patient baseline: Not met.  Orthostatic blood pressure positive.    -tolerating oral intake to maintain hydration:  Partially met.  IV fluids infusing.  Patient has not eaten yet.  Denies nausea.   -safe disposition plan has been identified:  Not met.  In progress.   -GI consult:  Not met.  GI eval not completed at this time.

## 2018-10-15 NOTE — H&P
Admitted:     10/15/2018      HOSPITALIST ADMISSION      DATE OF ADMISSION:  10/15/2018.      PRIMARY CARE PROVIDER:  Flori Reyes DO.      CHIEF COMPLAINT:  Weakness.      History is provided by the patient and his wife.  Note patient has chart review diagnosis of dementia and demonstrates forgetfulness during the interview.      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  The patient describes having lightheadedness for approximately 1 week with progressive weakness, fatigue and dyspnea on exertion.  He was seen in primary clinic 3 times last week, the initial time for preoperative evaluation and subsequently for ongoing weakness and fatigue.  His hemoglobins were checked and have remained stable in the 10.5 - 11.5 range.      The patient was scheduled for followup cystoscopy on 10/17/2018.  He describes having frequent urination since his TURBT on 09/21.  He denies fevers, abdominal pain, nausea and vomiting.      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  He describes having intermittent melena approximately 5-7 times per week since hospital discharge in July.  His appetite has been depressed and he endorses early satiety.  He has met with Dr. Bender with [facility] and the focus was going to be on the bladder cancer and address the gastric cancer later.  Additionally, the patient has been noted to have soft blood pressures recently with a value in clinic today in the upper 70s.  Since arrival to the Emergency Department he has had  blood pressures mid 90s - low 100s.      In the ED, the patient was found to have a mildly elevated alkaline phosphatase at 170 and mild leukocytosis of 12.7.  His EKG shows a ventricularly paced rhythm with normal heart rate.  His hemoglobin is 10.5.  Notably, there are no other overt electrolyte abnormalities and his kidney functioning appears near his baseline.      PAST MEDICAL HISTORY:   1.  Chart review diagnosis of dementia with 24-hour supervision and does not drive.   2.  History RV and LV mural thrombus diagnosed 07/2016 at cardiac MRI with resolution a few months later.  Recurrent thrombus in 05/2017 and was recommended indefinite anticoagulation.  He was discontinued on anticoagulation in 07/2018 after a GI bleed.   3.  History of Smita-en-Y surgery 2000 at [facility].   4.  Hypertension.   5.  CKD, baseline creatinine 1.3 - 1.5.   6.  CAD with history of MI without stenting intervention.   7.  Cardiomyopathy status post ICD.  Last EF 25 - 30% in 05/2018.   8.  Diet-controlled type 2 diabetes.      9.  BPH.   10.  Left bundle branch block.      PAST SURGICAL HISTORY:    Past Surgical History:   Procedure Laterality Date     ANGIOPLASTY  1995 and 2010 with stent     BACK SURGERY       CARDIAC SURGERY      ICD/PM     CYSTOSCOPY, TRANSURETHRAL RESECTION (TUR) TUMOR BLADDER, COMBINED N/A 9/19/2018    Procedure: COMBINED CYSTOSCOPY, TRANSURETHRAL RESECTION (TUR) TUMOR BLADDER;  CYSTOSCOPY, TRANSURETHRAL RESECTION BLADDER TUMOR ;  Surgeon: Ryan Camarillo MD;  Location:  OR     ESOPHAGOSCOPY, GASTROSCOPY, DUODENOSCOPY (EGD), COMBINED N/A 7/30/2018    Procedure: COMBINED ESOPHAGOSCOPY, GASTROSCOPY, DUODENOSCOPY (EGD), BIOPSY SINGLE OR MULTIPLE;  gastroscopy;  Surgeon: Parish Xiong MD;  Location:  GI     ESOPHAGOSCOPY, GASTROSCOPY, DUODENOSCOPY (EGD), COMBINED N/A 7/30/2018    Procedure: COMBINED ESOPHAGOSCOPY, GASTROSCOPY, DUODENOSCOPY (EGD);  EGD;  Surgeon: Parish Xiong MD;  Location:  GI      ESOPHAGOSCOPY, GASTROSCOPY, DUODENOSCOPY (EGD), COMBINED N/A 2018    Procedure: COMBINED ESOPHAGOSCOPY, GASTROSCOPY, DUODENOSCOPY (EGD), BIOPSY SINGLE OR MULTIPLE;  gastroscopy BIOPSYSHOULD BE SENT TO LAB IN NS NOT FORMALIN PER ;  Surgeon: Jonathon Bush MD;  Location:  GI     GASTRIC BYPASS       HEART CATH LEFT HEART CATH  16    Non ischemic cardiomyopathy; Non functionally significant LAD and RCA disease      OTHER SURGICAL HISTORY      Spinal surgery     OTHER SURGICAL HISTORY  2016    CRT-D implantation      ALLERGIES:  WASPS.      SOCIAL HISTORY:  The patient is a former smoker with estimated 20-pack-year history and quit in .  Rarely uses alcohol.  Lives with his wife in [place].      FAMILY HISTORY:   1.  Father:  MI,  at age 54.     2.  Two children who passed away from MI at the ages of 39 and 51.      REVIEW OF SYSTEMS:  A complete review of systems was performed and is negative except for that noted here and in the HPI.  The patient is uncertain whether he has gained or lost any weight.      PHYSICAL EXAMINATION:   VITAL SIGNS:  Blood pressure 108/69, temperature 95.7, respirations 16, oxygen saturations 98%.  Weight is 101.6 kg.   GENERAL:  Pleasant elderly male who appears stated age.  He looks comfortable lying flat in bed.   SKIN:  Warm, dry.  Pale.   HEENT:  Normocephalic, atraumatic.  EOMs grossly intact and PERRLA.  Conjunctivae are pale.  Dry mucous membranes.   NECK:  Supple.  No cervical lymphadenopathy or JVD.   CHEST:  Breath sounds clear to auscultation.  No increased work of breathing on room air.   CARDIOVASCULAR:  Regular rate and rhythm.  No rub or murmur appreciated.  No peripheral edema.   ABDOMEN:  Soft, overweight, nontender, nondistended.  Bowel sounds are present.   MUSCULOSKELETAL:  Moves all 4 extremities.   NEUROLOGIC:  Cranial nerves II-XII grossly intact.  The patient is alert.  He is oriented x 3, but does initially get  the date and hospital wrong.  Eventually is oriented x 3 after several seconds of thinking.      LABORATORY DATA:     1.  Sodium 139, potassium 3.6, creatinine 1.57, GFR 43, albumin 3.2, alkaline phosphatase 170, glucose 123.   2.  WBC 12.7, hemoglobin 10.5.   3.  FOBT was positive.      IMAGING:  EKG shows a ventricularly paced rhythm.      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    Weakness, lightheadedness.     Likely multifactorial secondary to ongoing use of hypertensive medications in the setting of reduced p.o. intake and underlying cardiomyopathy, bladder cancer, and slow bleeding suspected gastric cancer.  Mild hypotension in 95-low 100s systolic, but hemodynamically stable without tachycardia or hypoxia. In clinic today his systolic was measured at 79/55.  Anemic, but stable over the last week.  No overt signs or symptoms for infection.  CXR has not been obtained and he denies fever, cough and does not have other evidence to suggest a pulmonary infection.  He does complain of frequent urination and mild dysuria since his TURBT 09/21/2018.  No electrolyte abnormalities that would account for progressive weakness.  Renal function appears stable.  Mild leukocytosis is noted and non-specific in his clinic setting.  Denies palpitations and cardiac symptoms making acute coronary syndrome and arrhythmia less likely.   -- Admit to observation status.   -- Check urinalysis.   -- The patient is  consented for blood products and will transfuse if hemoglobin drops below 8 for symptomatic anemia.   -- Continue gentle IV fluids at 75 mL per hour.   -- PT, Social Work evaluations.   -- Appreciate GI consultation for consideration of EGD, though his anemia does appear stable.   -- Appreciate oncology consultation for prognostic input and guiding further evaluation if warranted. As above suspect underlying malignancies contributing to presentation.  -- Check orthostatics.     ADDENDUM: Discussed with GI and will plan repeat EGD as early as later this week. [facility] to contact patient. Patient reportedly feeling improved after IVF. Would benefit from additional monitoring as with positive orthostatics and soft bp on adm, IVF hydration, and monitoring for significant blood loss.    Blood loss anemia 2/2 upper GI bleed.   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  Hgb 12.6 approx 1 month ago.  Plan was to follow up with GI in early November for CT imaging and repeat EGD.  Hemoglobin has remained stable in the 10.5 - 11.5 range for the last week, though is down approximately 2 grams from 1 month ago.  The patient endorses intermittent melanotic symptoms, POLLARD, and lightheadedness  -- Appreciate [facility] evaluation and recommendations for further management.   -- Continue Protonix 40 mg IV daily.   -- N.p.o. in event of possible EGD.        Aggressive transitional cell bladder cancer, status post TURBT 9/2018.     He denies hematuria.  As above, does endorse urinary frequency and mild dysuria symptoms for approximately 1 month since TURBT.   -- Check urinalysis.   -- Hold off on formal Urology consultation at this time.   -- The patient is scheduled for a cystoscopy 10/17/2018 with [facility].    Relative hypotension in setting treated hypertension and below.  Mixed cardiomyopathy. EF 25 - 30% in 05/2018.  Appears euvolemic.     CAD with history of myocardial  infarction. No stenting.  The patient is not on aspirin due to a gastrointestinal bleed and not on ACE or ARB due to symptomatic hypotension.  It appears this medication was discontinued on 10/12/2018.   -- Resume prior to admission atorvastatin upon observation discharge.   -- Hold prior to admission Lasix and Imdur medication given soft blood pressure.   -- Continue prior to admission Toprol with hold parameters for SBP < 110.      History right and left ventricular mural thrombi, recurrent.   Initially diagnosed on cardiac MRI in 2016.  He had recurrence in 2017 and had been on chronic anticoagulation up until 08/2018 when he was hospitalized for gastrointestinal bleed.   -- Noted. Unable to anticoagulate given GI bleed. At present, evaluate above and hold off on obtaining echo as unlikely to .     Diet-controlled type 2 diabetes.  A1c 7.1 on 07/30/2018.  Will hold off on blood sugar monitoring.  Recheck sugar with a.m. labs.     Chronic kidney disease, stage III.  Baseline creatinine appears 1.3 - 1.5.  Near baseline on admission.   -- Avoid nephrotoxins.  Gentle intravenous fluids as above.         Dementia.   Requires 24-hour supervision at home and no longer drives.  Per his wife, his orientation waxes and wanes.  The patient shows that he is oriented, but he is certainly easily forgetful on exam and generally does not retain details of conversations.   -- Reorient as needed.      Deep venous thrombosis prophylaxis:  Ambulation, short stay anticipated.      CODE STATUS:  Full code, discussed with the patient and wife at time of admission.      DISPOSITION:  Anticipate discharge to home 10/16 versus 10/17 if hemoglobin remains stable and pending GI evaluation and recommendations.  May need a rehab facility if unable to safely maneuver independently.      ATTESTATION:  This patient was discussed with Dr. Bhumi Benavidez of the Hospitalist Service who is in agreement with my assessment and plan of  care as indicated above.         BRENNA POLLACK MD       As dictated by JOANNA ULMEN BARTHELL, PA-C            D: 10/15/2018   T: 10/15/2018   MT: HARVEY      Name:     ISAIAS DUMONT   MRN:      [id number]        Account:      [id number]   :      1940        Admitted:     10/15/2018                   Document: [id number]

## 2018-10-15 NOTE — PROGRESS NOTES
10/15/18 1314   Quick Adds   Type of Visit Initial PT Evaluation   Living Environment   Lives With spouse   Living Arrangements apartment   Home Accessibility bed and bath on same level;stairs to enter home;tub/shower is not walk in   Number of Stairs to Enter Home 6   Number of Stairs Within Home 0   Stair Railings at Home inside, present on left side   Transportation Available family or friend will provide   Self-Care   Usual Activity Tolerance moderate   Current Activity Tolerance moderate   Regular Exercise no   Equipment Currently Used at Home none   Activity/Exercise/Self-Care Comment independent with ADLs, no AD   Functional Level Prior   Ambulation 0-->independent   Transferring 0-->independent   Toileting 0-->independent   Bathing 0-->independent   Dressing 0-->independent   Eating 0-->independent   Communication 0-->understands/communicates without difficulty   Swallowing 0-->swallows foods/liquids without difficulty   Cognition 0 - no cognition issues reported   Fall history within last six months no   General Information   Onset of Illness/Injury or Date of Surgery - Date 10/15/18   Referring Physician Dr. Benavidez   Patient/Family Goals Statement Pt plans on going home   Pertinent History of Current Problem (include personal factors and/or comorbidities that impact the POC) Pt is a 77 year old male with gastric mass and bladder CA, with weakness, orthostatic dizziness.    Precautions/Limitations no known precautions/limitations   Weight-Bearing Status - LLE full weight-bearing   Weight-Bearing Status - RLE full weight-bearing   General Observations 108/68, 103/65   General Info Comments BP in standing 90/58, 140/76 after gait   Cognitive Status Examination   Orientation orientation to person, place and time   Level of Consciousness alert   Follows Commands and Answers Questions 100% of the time   Personal Safety and Judgment intact   Memory intact   Pain Assessment   Patient Currently in Pain No   Range of  "Motion (ROM)   ROM Comment WFL   Strength   Strength Comments WFL but grossly weak due to inactivity for past week   Bed Mobility   Bed Mobility Comments independent   Transfer Skills   Transfer Comments independent See flowsheet for BP   Gait   Gait Comments Pt ambulated in currie without AD with SBA. No deviation or dizziness   Balance   Balance Comments [place]/56   Sensory Examination   Sensory Perception no deficits were identified   Coordination   Coordination no deficits were identified   General Therapy Interventions   Planned Therapy Interventions home program guidelines;progressive activity/exercise   Clinical Impression   Criteria for Skilled Therapeutic Intervention yes, treatment indicated   PT Diagnosis Difficulty with position changes   Influenced by the following impairments decreased activity level, decreased strength, decreased balance   Functional limitations due to impairments Pt mobility fluctuates   Clinical Presentation Stable/Uncomplicated   Clinical Presentation Rationale clinical judgement   Clinical Decision Making (Complexity) Low complexity   Therapy Frequency` (one eval and treat)   Predicted Duration of Therapy Intervention (days/wks) 1 day   Anticipated Equipment Needs at Discharge (pt has cane and walker)   Anticipated Discharge Disposition Home with Assist   Risk & Benefits of therapy have been explained Yes   Patient, Family & other staff in agreement with plan of care Yes   [facility] [employer] TM \"6 Clicks\"   2016, Trustees of [facility], under license to [employer]  All rights reserved.   6 Clicks Short Forms Basic Mobility Inpatient Short Form   [facility] [employer]  \"6 Clicks\" V.2 Basic Mobility Inpatient Short Form   1. Turning from your back to your side while in a flat bed without using bedrails? 4 - None   2. Moving from lying on your back to sitting on the side of a flat bed without using bedrails? 4 - None   3. Moving to and from a bed to a chair " (including a wheelchair)? 4 - None   4. Standing up from a chair using your arms (e.g., wheelchair, or bedside chair)? 4 - None   5. To walk in hospital room? 4 - None   6. Climbing 3-5 steps with a railing? 4 - None   Basic Mobility Raw Score (Score out of 24.Lower scores equate to lower levels of function) 24   Total Evaluation Time   Total Evaluation Time (Minutes) 25

## 2018-10-15 NOTE — CONSULTS
[facility]  Gastroenterology Consultation    Gibson Villalta  [address]   [place] [facility] [address]  77 year old male    Admission Date/Time: 10/15/2018  Primary Care Provider: Flori Reyes    We were asked to see the patient in consultation by Joanna Barthell for evaluation of gastric mass.        HPI:  Gibson Villalta is a 77 year old male who has a history of anemia, gastric mass, and bladder mass who presents with dizziness, hypotension, and fatigue.  The patient reports that when he stands or changes position he develops significant lightheadedness and feels as though he is going to pass out.  He also reports low blood pressure over the last week.  The patient is supposed to have bladder surgery in 2 days.  He reports that his stools are dark but not black or tarry.  He denies hematochezia.  Denies abdominal pain, nausea, or vomiting.  He does complain of early satiety which has been present for 1 year.      In July, the patient was hospitalized with bloody stools.  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  Biopsies showed atypical lymphoid infiltrate suspicious for large B-cell lymphoma.  Path however was hesitant to make definitive call.  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  The biopsies showed active inflammation and atypical cells present but was otherwise unrevealing.    Hemoglobin 10.5 on admission, down from 12.6 in September.    ROS: A comprehensive ten point review of systems was negative aside from those in mentioned in the HPI.      MEDICATIONS:   No current facility-administered medications on file prior to encounter.   Current Outpatient Prescriptions on File Prior to Encounter:  atorvastatin (LIPITOR) 40 MG tablet TAKE 1 TABLET AT BEDTIME   COENZYME Q-10 PO Take 200 mg by mouth every morning    ferrous sulfate  (IRON) 325 (65 FE) MG tablet Take 325 mg by mouth every morning    furosemide (LASIX) 20 MG tablet Take 0.5 tablets (10 mg) by mouth daily   hydrALAZINE (APRESOLINE) 10 MG tablet Take 1 tablet (10 mg) by mouth 3 times daily   isosorbide mononitrate (IMDUR) 30 MG 24 hr tablet Take 0.5 tablets (15 mg) by mouth daily   metoprolol succinate (TOPROL-XL) 50 MG 24 hr tablet TAKE 1 TABLET (50 MG) DAILY   multivitamin, therapeutic with minerals (MULTI-VITAMIN) TABS Take 1 tablet by mouth every morning    venlafaxine (EFFEXOR) 75 MG tablet TAKE 1 TABLET TWICE DAILY   vitamin D (ERGOCALCIFEROL) 91779 UNIT capsule TAKE 1 CAPSULE ONCE A WEEK   ACE/ARB NOT PRESCRIBED, INTENTIONAL, ACE & ARB not prescribed due to Symptomatic hypotension not due to excessive diuresis (Patient not taking: Reported on 10/12/2018)   ASPIRIN NOT PRESCRIBED (INTENTIONAL) Please choose reason not prescribed, below (Patient not taking: Reported on 10/12/2018)       ALLERGIES:   Allergies   Allergen Reactions     Wasps [Hornets]      Sand wasp --- years ago.         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     Myocardial infarction (H) 1995 and 2010     Nonischemic cardiomyopathy (H) 7/22/2016     Obesity      Pacemaker     ICD/PM     Rosacea      Rotator cuff disorder     Tear x 2     RV (right ventricular) mural thrombus 7/22/2016       Past Surgical History:   Procedure Laterality Date     ANGIOPLASTY  1995 and 2010 with stent     BACK SURGERY       CARDIAC SURGERY      ICD/PM     CYSTOSCOPY, TRANSURETHRAL RESECTION (TUR) TUMOR BLADDER, COMBINED N/A  "2018    Procedure: COMBINED CYSTOSCOPY, TRANSURETHRAL RESECTION (TUR) TUMOR BLADDER;  CYSTOSCOPY, TRANSURETHRAL RESECTION BLADDER TUMOR ;  Surgeon: Ryan Camarillo MD;  Location:  OR     ESOPHAGOSCOPY, GASTROSCOPY, DUODENOSCOPY (EGD), COMBINED N/A 2018    Procedure: COMBINED ESOPHAGOSCOPY, GASTROSCOPY, DUODENOSCOPY (EGD), BIOPSY SINGLE OR MULTIPLE;  gastroscopy;  Surgeon: Parish Xiong MD;  Location:  GI     ESOPHAGOSCOPY, GASTROSCOPY, DUODENOSCOPY (EGD), COMBINED N/A 2018    Procedure: COMBINED ESOPHAGOSCOPY, GASTROSCOPY, DUODENOSCOPY (EGD);  EGD;  Surgeon: Parish Xiong MD;  Location:  GI     ESOPHAGOSCOPY, GASTROSCOPY, DUODENOSCOPY (EGD), COMBINED N/A 2018    Procedure: COMBINED ESOPHAGOSCOPY, GASTROSCOPY, DUODENOSCOPY (EGD), BIOPSY SINGLE OR MULTIPLE;  gastroscopy BIOPSYSHOULD BE SENT TO LAB IN NS NOT FORMALIN PER ;  Surgeon: Jonathon Bush MD;  Location:  GI     GASTRIC BYPASS       HEART CATH LEFT HEART CATH  16    Non ischemic cardiomyopathy; Non functionally significant LAD and RCA disease      OTHER SURGICAL HISTORY      Spinal surgery     OTHER SURGICAL HISTORY  2016    CRT-D implantation         SOCIAL HISTORY:  Social History   Substance Use Topics     Smoking status: Former Smoker     Packs/day: 2.00     Years: 20.00     Types: Cigarettes     Quit date: 1980     Smokeless tobacco: Never Used      Comment: Quit in his 30s - 2 ppd for 20 years     Alcohol use 0.0 oz/week     0 Standard drinks or equivalent per week      Comment: maybe 1 beer a month       FAMILY HISTORY:  Family History   Problem Relation Age of Onset     Coronary Artery Disease Father 39     Alzheimer Disease Mother      Coronary Artery Disease Son      Two sons have  from MIs (ages 39 and 51)       PHYSICAL EXAM:   /69  Temp 95.7  F (35.4  C)  Resp 16  Ht 1.854 m (6' 1\")  Wt 101.6 kg (224 lb)  SpO2 98%  BMI 29.55 kg/m2    Constitutional: NAD, " comfortable  Cardiovascular: RRR, normal S1 and S2, no r/c/g/m  Respiratory: CTAB  Psychiatric: mentation appears normal and affect normal  Head: Normocephalic. Atraumatic.    Neck: Neck supple. No adenopathy. Thyroid symmetric, normal size, trachea midline  Eyes:  PERRL, no icterus  ENT: Hearing adequate, pharynx normal without erythema or exudate  Abdomen:   Auscultation: + BS  Appearance: non-distended  Palpation: non-tender  NEURO: grossly negative  SKIN: no suspicious lesions or rashes  LYMPH:   anterior cervical: no adenopathy  posterior cervical: no adenopathy  supraclavicular: no adenopathy          ADDITIONAL COMMENTS:   I reviewed the patient's new clinical lab test results.   Recent Labs   Lab Test  10/15/18   0910  10/12/18   1301  10/09/18   1333  09/13/18   1103   07/31/18   1035   07/30/18   0750 07/11/18   WBC  12.7*  9.9   --   10.2   < >   --    < >  11.5*   --    HGB  10.5*  10.7*  11.2*  12.6*   < >   --    < >  13.9   --    MCV  95  99   --   100   < >   --    < >  95   --    PLT  257  283   --   283   < >   --    < >  218   --    INR   --    --    --    --    --   1.21*   --   3.56*  3.0*    < > = values in this interval not displayed.     Recent Labs   Lab Test  10/15/18   0910  10/12/18   1301  10/09/18   1333  09/13/18   1103   NA  139  140   --   140   POTASSIUM  3.6  4.3   --   4.2   CHLORIDE  105  106   --   104   CO2  26  26   --   25   BUN  23  24   --   22   CR  1.57*  1.45*  1.34*  1.49*   ANIONGAP  8  8   --   11   SHELLEY  8.9  9.1   --   9.6   GLC  123*  131*   --   100*     Recent Labs   Lab Test  10/15/18   0910  10/12/18   1301  09/07/18   1012  08/04/18   1356  07/31/18   1325  07/30/18   0750   07/17/16   1247   ALBUMIN  3.2*  3.2*   --    --    --   3.4   < >  3.1*   BILITOTAL  0.2  0.2   --    --    --   0.4   < >  0.9   DBIL   --    --    --    --    --    --    --   0.3*   ALT  19  20   --    --    --   40   < >  24   AST  21  21   --    --    --   27   < >  27   ALKPHOS  170*   188*   --    --    --   143   < >  139   PROTEIN   --    --   100*  >=300*  Negative   --    < >   --    LIPASE   --    --    --    --    --   105   --    --     < > = values in this interval not displayed.             .    CONSULTATION ASSESSMENT AND PLAN:      75 yo male who presented to the ED with fatigue.  Recent finding of gastric mass.  Initial biopsies 7/2018 suspicious for B-cell lymphoma.  Pathology unwilling to make definitive call due to lack of tissue.  Re-scoped in early August.  Path showed only active inflammation.  Currently undergoing treatment for bladder carcinoma.  Hemoglobin down from baseline at 10.5.  Report of dark stools.    -  Will need repeat EGD for more tissue, ? Inpatient vs outpatient.  Will likely depend on clinical course here.  -  Monitor H/H; transfuse prn.  -  PPI.      I discussed the patient's findings and plan with Dr. Talavera.          Dunia Diop, [facility] Gastroenterology  Office:  [phone] call if needed after 5PM  Cell:  [phone], not available after 5PM at this number

## 2018-10-15 NOTE — PROGRESS NOTES
Hospitalist admission note dictated. Confirmation #: [id number].    JoAnna Barthell, PA-C  10/15/2018   12:32 PM    Addendum -- Discussed with Dr. Talavera. Plan for outpatient spoke as early as later this week after urology follow up/cystoscopy on 10/17. She will enter orders for [facility] to contact for scheduling. Continue plan to observe serial hgb, blood pressure overnight (hold BP meds and with positive orthostatics) and continue hydration.

## 2018-10-15 NOTE — PROGRESS NOTES
[facility]  CLINIC PROGRESS NOTE    Subjective:  Anemia   Gibson Villalta was recently seen last Friday for worsening fatigue.  He was seen by Katelyn in our clinic and was accompanied by his wife who provided the majority of the history.  At that appointment he was noted to be sleeping okay and not eating too much.  He also has not been hydrating as well as desirable.  Neither he nor his wife could recall an episode of any blood in the urine or the stool.  He had previously been scheduled for a cystoscopy however this was recommended to be canceled due to continuing decline in hemoglobin.  He does have known gastric and bladder cancer and a recent gastrointestinal bleed.  Due to his falling hemoglobin in urgent consult and gastroenterology was recommended.  Over the weekend his primary care provider, Dr. Reyes, had recommended that he be scheduled for IV iron infusion.  He is notably on oral iron as an outpatient for his previous history of gastric bypass.  But he is not on a proton pump inhibitor.     This morning he notes that he does have a slight stomachache.  His wife had requested a wheelchair to transport him into the clinic as he was unable to walk from the car to the clinic.  He feels a bit more fatigued.  His complexion is more ashen.  He is not noted any bright red blood in the stool or the urine but did say his last stool was dark.  Bladder mass   He did have a cystoscopy scheduled for Wednesday.  But this has been recommended to be discussed with urology.      Past medical history, medications, allergies, social history, family history reviewed and updated in [facility] as of 10/15/2018 .    ROS  CONSTITUTIONAL: + fatigue, no unexpected change in weight  SKIN: pallor  EYES: no acute vision problems or changes  ENT: no ear problems, no mouth problems, no throat problems  RESP: no significant cough, no shortness of breath  CV: no chest pain, no palpitations, no new or worsening peripheral  edema  GI: as above   : no frequency, no dysuria, no hematuria  MS: no claudication, no myalgias, no joint aches  PSYCHIATRIC: no changes in mood or affect      Objective:  Vitals  BP (!) 79/55 (BP Location: Right arm, Patient Position: Chair, Cuff Size: Adult Large)  Pulse 98  Temp 97.1  F (36.2  C) (Tympanic)  SpO2 97%  GEN: Alert Oriented x3 NAD  HEENT: Atraumatic, normocephalic, neck supple, no thyromegaly, negative cervical adenopathy  TM: TM bilaterally pearly and grey with normal light reflex  CV: tachycardia, no murmurs or rubs  PULM: CTA no wheezes or crackles  ABD: Soft, nontender nondistended, no hepatosplenomegally  SKIN: pallor  EXT:   no edema bilateral lower extremities  NEURO: Generalized weakness, gait and station deferred - wheelchair, dementia  PSYCH: Mood good, affect mood congruent    No images are attached to the encounter.    No results found for this or any previous visit (from the past 24 hour(s)).    Assessment/Plan:  (D50.9) Iron deficiency anemia, unspecified iron deficiency anemia type  (primary encounter diagnosis)  Comment: Given suspected profound anemia we will transfer patient to the emergency room.  Plan:     (K31.9) Gastric mass  Comment: We will need workup through gastroenterology possibly through the hospital  Plan:     (N32.89) Bladder mass  Comment: This will also need to be coordinated through urology  Plan:     Disclaimer: This note consists of symbols derived from keyboarding, dictation and/or voice recognition software. As a result, there may be errors in the script that have gone undetected. Please consider this when interpreting information found in this chart.    Howard Vallecillo MD  [phone]

## 2018-10-15 NOTE — TELEPHONE ENCOUNTER
FYI PCP:     Pt was scheduled with Dr. Vallecillo this morning and was transported to the Emergency Room.     Thank you,   Catie ROMERO RN

## 2018-10-15 NOTE — PROGRESS NOTES
RECEIVING UNIT ED HANDOFF REVIEW    ED Nurse Handoff Report was reviewed by: Jamia Perdue on October 15, 2018 at 11:31 AM

## 2018-10-15 NOTE — PROVIDER NOTIFICATION
MD Notification    Notified Person: MD    Notified Person Name: Joanna Barthell    Notification Date/Time: 10/15/18 1023    Notification Interaction: text page    Purpose of Notification: hgb drop to 8.8.     Orders Received: ok to monitor and wait for next hgb. Notify md if becomes symptomatic.     Comments:

## 2018-10-15 NOTE — PLAN OF CARE
Problem: Patient Care Overview  Goal: Plan of Care/Patient Progress Review  Outcome: No Change  A&O, forgetful. VSS, orthos mildly positive. Denies pain, dizziness, or lightheadedness. hgb 10.5 to 8.8, MD notified, not symptomatic. Tolerating reg diet. SBA. Need UA. NS 75/hr. PT, GI, heme/onc, and SW all met with patient, see notes. Plan to cont to monitor hgb overnight, potential discharge in the am.  Will cont to monitor.

## 2018-10-15 NOTE — PROGRESS NOTES
10/15/18 1342   Signing Clinician's Name / Credentials   Signing clinician's name / credentials Smiley Terrazas PT   [place] Balance Scale (TIANA FRIEND, STEPHIE S, DEYANIRA RAYMUNDO, DORCAS B: MEASURING BALANCE IN THE ELDERLY: VALIDATION OF AN INSTRUMENT. CAN. J. PUB. HEALTH, JULY/AUGUST SUPPLEMENT 2:S7-11, 1992.)   Sit To Stand 4   Standing Unsupported 4   Sitting Unsupported 4   Stand to Sit 4   Transfers 4   Standing with Eyes Closed 4   Standing Unsupported, Feet Together 4   Reach Forward With Outstretched Arm 3   Retrieve Object From Floor 4   Turning to Look Behind 4   Turn 360 Degrees 4   Placing Alternate Foot on Stool (4-6 inches) 3   Unsupported Tandem Stand (Demonstrate to Subject) 3   One Leg Stand 1   Total Score (A score of 45 or less has been correlated with an increased risk of falls)   Total Score (out of 56) 50

## 2018-10-15 NOTE — ED NOTES
[facility]  ED Nurse Handoff Report    ED Chief complaint: Dizziness (Started 2 weeks ago.  Got worse. Seen her 2 days ago.  ) and Fatigue      ED Diagnosis:   Final diagnoses:   Lightheadedness   Anemia, unspecified type   Positive occult stool blood test   Other fatigue   Hypotension, unspecified hypotension type       Code Status: Full Code    Allergies:   Allergies   Allergen Reactions     Wasps [Hornets]      Sand wasp --- years ago.         Activity level - Baseline/Home:  Independent    Activity Level - Current:   Stand with Assist     Needed?: No    Isolation: No  Infection: Not Applicable  Bariatric?: No    Vital Signs:   Vitals:    10/15/18 0858 10/15/18 0915 10/15/18 0930 10/15/18 1100   BP: 103/62  94/62 108/69   Resp: 16 18 11   Temp: 97.8  F (36.6  C)      TempSrc: Oral      SpO2: 96% 97% 97%    Weight: 101.6 kg (224 lb)      Height: 1.829 m (6')          Cardiac Rhythm: ,   Cardiac  Cardiac Rhythm: Ventricular paced    Pain level: 0-10 Pain Scale: 0    Is this patient confused?: No   [place] - Suicide Severity Rating Scale Completed?  Yes  If yes, what color did the patient score?  White    Patient Report: Initial Complaint: dizziness/fatigue  Focused Assessment: Patient reports feeling dizzy for the past 2 weeks.  Pt has had low hemoglobin for awhile.    Tests Performed: Labs, Occult stool  Abnormal Results: Positive occult stool,  Low hemoglobin  Treatments provided: IV protonix and IV fluids    Family Comments:     OBS brochure/video discussed/provided to patient/family: Yes              Name of person given brochure if not patient:               Relationship to patient:     ED Medications:   Medications   0.9% sodium chloride BOLUS (1,000 mLs Intravenous New Bag 10/15/18 0989)     Followed by   sodium chloride 0.9% infusion (not administered)   pantoprazole (PROTONIX) 40 mg IV push injection (not administered)       Drips infusing?:  No    For the majority of the  shift this patient was Green.   Interventions performed were IV fluids.    Severe Sepsis OR Septic Shock Diagnosis Present: No    To be done/followed up on inpatient unit:  none    ED NURSE PHONE NUMBER: [phone]

## 2018-10-15 NOTE — IP AVS SNAPSHOT
MRN:[id number]                      After Visit Summary   10/15/2018    Gibson Villalta    MRN: [id number]           Thank you!     Thank you for choosing [facility] for your care. Our goal is always to provide you with excellent care. Hearing back from our patients is one way we can continue to improve our services. Please take a few minutes to complete the written survey that you may receive in the mail after you visit with us. Thank you!        Patient Information     Date Of Birth          1940        About your hospital stay     You were admitted on:  October 15, 2018 [facility]    You were discharged on:  October 16, 2018        Reason for your hospital stay       You were hospitalized for further evaluation and treatment of dizziness and generalized weakness related to low blood pressures and dehydration. 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                  Who to Call     For medical emergencies, please call 911.  For non-urgent questions about your medical care, please call your primary care provider or clinic, [phone]          Attending Provider     Provider [facility]    Eze West MD Emergency Medicine    Bhumi Benavidez MD Internal Medicine       Primary Care Provider Office Phone # Fax #    Flori ReyesDO [phone]      After Care Instructions     Activity       Your activity upon discharge: activity as tolerated            Diet       Follow this diet upon discharge: Orders Placed This Encounter      Regular Diet Adult            Discharge Instructions       1) Follow-up with your primary care provider in the next week to discuss hospitalization   2) Hold prior to admission Imdur, Hydralazine, and Lasix at discharge until follow-up with your primary care provider where you will get further instruction on whether to  restart medication or continue to hold off   3) Continue prior to admission Metoprolol   4) Protonix 40 mg by mouth daily recommended by Gastroenterology   5) Outpatient cystoscopy scheduled for 10/17   6) Outpatient endoscopy to be set up by Gastroenterology, they will contact you  7) Follow-up with Oncology as scheduled                  Follow-up Appointments     Follow-up and recommended labs and tests        Follow up with primary care provider, Flori Reyes, within 7 days for hospital follow- up.  The following labs/tests are recommended: Hgb.                  Your next 10 appointments already scheduled     Oct 17, 2018   Procedure with Ryan Camarillo MD   [facility] (--)    [address]  [facility] [address] [phone]            Oct 24, 2018  2:00 PM CDT   Return Visit with Ryan Camarillo MD   [facility] [place] ([facility] [place] [facility])    [address]  [address]  [facility] [id number] [phone]            Dec 10, 2018  4:30 PM CST   Remote ICD Check with ALEJANDRO DCR2   [facility] ([facility])    [address]  [facility] [address] [phone] OPT 2           This appointment is for a remote check of your debrillator.  This is not an appointment at the office.              Pending Results     Date and Time Order Name Status Description    10/15/2018 2217 Blood culture Preliminary     10/15/2018 2031 Urine Culture Aerobic Bacterial Preliminary             Statement of Approval     Ordered          10/16/18 1207  I have reviewed and agree with all the recommendations and orders detailed in this document.  EFFECTIVE NOW     Approved and electronically signed by:  Gisell Mcginnis PA-C             Admission Information     Date & Time Provider Department Dept. Phone    10/15/2018 Bhumi Benavidez MD [facility] [phone]     "  Your Vitals Were     Blood Pressure Temperature Respirations Height Weight Pulse Oximetry    113/79 96.2  F (35.7  C) (Oral) 16 1.854 m (6' 1\") 100 kg (220 lb 8 oz) 97%    BMI (Body Mass Index)                   29.09 kg/m2           [employer] Information     What the [employer] gives you secure access to your electronic health record. If you see a primary care provider, you can also send messages to your care team and make appointments. If you have questions, please call your primary care clinic.  If you do not have a primary care provider, please call [phone] and they will assist you.        Care EveryWhere ID     This is your Care EveryWhere ID. This could be used by other organizations to access your [facility] medical records  [id number]        Equal Access to Services     BEL MCLAIN : Mark Lance, bam logan, louis tim, rusty thomas. So [facility] [phone].    ATENCIÓN: Si habla español, tiene a de luna disposición servicios gratuitos de asistencia lingüística. Llame al [phone].    We comply with applicable federal civil rights laws and [place] laws. We do not discriminate on the basis of race, color, national origin, age, disability, sex, sexual orientation, or gender identity.               Review of your medicines      START taking        Dose / Directions    pantoprazole 40 MG EC tablet   Commonly known as:  PROTONIX   Used for:  Gastroesophageal reflux disease with esophagitis        Dose:  40 mg   Take 1 tablet (40 mg) by mouth daily Take 30-60 minutes before a meal.   Quantity:  30 tablet   Refills:  0         CONTINUE these medicines which have NOT CHANGED        Dose / Directions    ACE/ARB/ARNI NOT PRESCRIBED (INTENTIONAL)   Used for:  HFrEF (heart failure with reduced ejection fraction) (H)        ACE & ARB not prescribed due to Symptomatic hypotension not due to excessive diuresis   Refills:  0       ASPIRIN NOT PRESCRIBED   Commonly " known as:  INTENTIONAL   Used for:  Controlled type 2 diabetes mellitus with chronic kidney disease, without long-term current use of insulin, unspecified CKD stage (H)        Please choose reason not prescribed, below   Quantity:  0 each   Refills:  0       atorvastatin 40 MG tablet   Commonly known as:  LIPITOR   Used for:  Hyperlipidemia LDL goal <130        TAKE 1 TABLET AT BEDTIME   Quantity:  90 tablet   Refills:  3       COENZYME Q-10 PO        Dose:  200 mg   Take 200 mg by mouth every morning   Refills:  0       ferrous sulfate 325 (65 Fe) MG tablet   Commonly known as:  IRON        Dose:  325 mg   Take 325 mg by mouth every morning   Refills:  0       metoprolol succinate 50 MG 24 hr tablet   Commonly known as:  TOPROL-XL   Used for:  Cardiomyopathy (H)        TAKE 1 TABLET (50 MG) DAILY   Quantity:  90 tablet   Refills:  3       Multi-vitamin Tabs tablet        Dose:  1 tablet   Take 1 tablet by mouth every morning   Refills:  0       venlafaxine 75 MG tablet   Commonly known as:  EFFEXOR   Used for:  Anxiety and depression        TAKE 1 TABLET TWICE DAILY   Quantity:  180 tablet   Refills:  1       vitamin D 70828 UNIT capsule   Commonly known as:  ERGOCALCIFEROL   Used for:  Vitamin D deficiency        TAKE 1 CAPSULE ONCE A WEEK   Quantity:  12 capsule   Refills:  1         STOP taking     furosemide 20 MG tablet   Commonly known as:  LASIX           hydrALAZINE 10 MG tablet   Commonly known as:  APRESOLINE           isosorbide mononitrate 30 MG 24 hr tablet   Commonly known as:  IMDUR                Where to get your medicines      These medications were sent to [facility] - [address]  [address] [facility] [address], [place] [address]     Phone:  [phone]     pantoprazole 40 MG EC tablet                Protect others around you: Learn how to safely use, store and throw away your medicines at www.disposemymeds.org.             Medication List: This is a list of all your  medications and when to take them. Check marks below indicate your daily home schedule. Keep this list as a reference.      Medications           Morning Afternoon Evening Bedtime As Needed    ACE/ARB/ARNI NOT PRESCRIBED (INTENTIONAL)   ACE & ARB not prescribed due to Symptomatic hypotension not due to excessive diuresis                                ASPIRIN NOT PRESCRIBED   Commonly known as:  INTENTIONAL   Please choose reason not prescribed, below                                atorvastatin 40 MG tablet   Commonly known as:  LIPITOR   TAKE 1 TABLET AT BEDTIME                                   COENZYME Q-10 PO   Take 200 mg by mouth every morning                                   ferrous sulfate 325 (65 Fe) MG tablet   Commonly known as:  IRON   Take 325 mg by mouth every morning                                   metoprolol succinate 50 MG 24 hr tablet   Commonly known as:  TOPROL-XL   TAKE 1 TABLET (50 MG) DAILY   Last time this was given:  50 mg on 10/16/2018  8:07 AM                                   Multi-vitamin Tabs tablet   Take 1 tablet by mouth every morning                                   pantoprazole 40 MG EC tablet   Commonly known as:  PROTONIX   Take 1 tablet (40 mg) by mouth daily Take 30-60 minutes before a meal.   Next Dose Due:  10/17                                   venlafaxine 75 MG tablet   Commonly known as:  EFFEXOR   TAKE 1 TABLET TWICE DAILY                                      vitamin D 03232 UNIT capsule   Commonly known as:  ERGOCALCIFEROL   TAKE 1 CAPSULE ONCE A WEEK

## 2018-10-15 NOTE — TELEPHONE ENCOUNTER
Left vm for wife Izabella to call back to triage.    Pended orders for review for PCP.  Bushra Rivera RN

## 2018-10-15 NOTE — PLAN OF CARE
Problem: Patient Care Overview  Goal: Plan of Care/Patient Progress Review  PT: Orders received. Chart reviewed. Evaluation and treatment completed. Pt is a77 year old male admitted for orthostatic hypotension and weakness. Pt lives in an apartment with his spouse with 6 steps to enter. Pt at baseline is independent with no equipment but he owns a cane and walker. The pt spends most of his time in his chair.  Discharge Planner PT   Patient plan for discharge: Home  Current status: Pt BP in supine 108/68 asymptomatic. Sitting 103/65 asymptomatic. Standin/58 asymptomatic. Pt ambulated in [place] with no walker with SBA. Pt had no LOB or deviation and had no SOB for 175ft. BP after gait: 140/76. Pt balance was tested with T[place]as and scored 50/56 not a fall risk. Educated pt on using equipment as needed at home if feeling weak or off balance or dizzy. Pt educated on walking more and staying hydrated and keeping a light on at night.  Barriers to return to prior living situation: none  Recommendations for discharge: Home with spouse and to use equipment as needed.  Rationale for recommendations: Pt does not test as a fall risk but pt's mobility may change due to medical history and pt may have days that he needs support or energy conservation. The pt was encouraged to use equipment as needed.        Entered by: Smiley Terrazas 10/15/2018 2:51 PM

## 2018-10-15 NOTE — IP AVS SNAPSHOT
[facility]    [address] [place] [address]    Phone:  [phone]                                       After Visit Summary   10/15/2018    Gibson Villalta    MRN: [id number]           After Visit Summary Signature Page     I have received my discharge instructions, and my questions have been answered. I have discussed any challenges I see with this plan with the nurse or doctor.    ..........................................................................................................................................  Patient/Patient Representative Signature      ..........................................................................................................................................  Patient Representative Print Name and Relationship to Patient    ..................................................               ................................................  Date                                   Time    ..........................................................................................................................................  Reviewed by Signature/Title    ...................................................              ..............................................  Date                                               Time          22EPIC Rev 08/18

## 2018-10-15 NOTE — PLAN OF CARE
Problem: Patient Care Overview  Goal: Plan of Care/Patient Progress Review  Outcome: No Change  Observation goals PRIOR TO DISCHARGE     Comments:   -diagnostic tests and consults completed and resulted: Not met  -vital signs normal or at patient baseline: Met  -tolerating oral intake to maintain hydration: Met  -safe disposition plan has been identified: Not met  -GI consult: Met  Nurse to notify provider when observation goals have been met and patient is ready for discharge.

## 2018-10-15 NOTE — CONSULTS
[facility] Progress Note          Assessment and Plan:   77 year-old male with history of dementia, RV and LV mural thrombus on chronic anticoagulation with Coumadin, status post Smita-en-Y gastric bypass, hypertension, chronic kidney disease, CAD, cardiomyopathy with EF 25-30%, diabetes mellitus presents with weakness, fatigue, and recurrent anemia.    1. Anemia due to blood loss  2. Gastric mass  - Likely related to gastric mass that was previously biopsied multiple times but with no definitive diagnosis (tissue was non-diagnostic) after 7/30/2018 CT abdomen/pelvis with contrast showed postop changes of prior gastric bypass procedure, ill-defined soft tissue thickening in the proximal stomach, increased since 8/9/2016, no evidence for bowel obstruction, colitis, or diverticulitis; no free fluid; 2 bladder wall lesions enhancing, largest measuring 3.9 x 1.8 cm and smaller bladder wall lesion near midline measuring 2 x 1.4 cm, several left renal cysts; liver, gallbladder, spleen, adrenal glands, pancreas, and remaining kidneys unremarkable.  - 7/30/2018: Upper endoscopy showed a medium sized, fungating, partially circumferential mass with oozing and stigmata of recent bleeding on the greater curvature of the stomach with biopsies suggesting possible lymphoma but it is negative for adenocarcinoma; esophagus and jejunum normal; gastric bypass with normal-sized pouch and intact staple line.  Patient denied any associated abdominal pain, fevers, chills, night sweats, unintentional weight loss, chest pain, dyspnea, hematuria, dysuria.  - 8/2/2018: Repeat upper endoscopy with biopsy of the stomach mass showed active inflammation and atypical cells with crush artifact.  There were insufficient number of atypical cells to render a definitive diagnosis.  - At last/prior visit on 8/14/18, he agreed to observation and repeat imaging in 3 months.  However, now, he presents with recurrent, worsening anemia and FOBT  "positive stools.  - I recommended repeat EGD -- [facility] already consulted -- await their decision on timing of EGD with repeat biopsy of gastric mass.  - Continue serial Hgb monitoring (transfuse if Hgb decreases below 7 g/dL).     3.  Bladder cancer  -These were seen on CT abdomen/pelvis on 2018 and are concerning for malignancy.  -He was further evaluated by Dr. Howard Camarillo on 2018.  Per patient's wife, he underwent TURBT with incomplete removal of high-grade bladder tumor (documentation not available to me at time of dictation) and he will need repeat TURBT again in near future.     Full code               Presenting History:   77 year-old with history of dementia, gastric mass, and bladder tumors now presents with generalized weakness, fatigue, and recurrent anemia, with Hgb decreased from 12.6 last month to 10.5 currently.  He reports feeling much better after IV fluid hydration.              Review of Systems:   As per subjective, otherwise 5 systems reviewed and negative.           Physical Exam:   Blood pressure 108/69, temperature 95.7  F (35.4  C), resp. rate 16, height 1.854 m (6' 1\"), weight 101.6 kg (224 lb), SpO2 98 %.      Vital Sign Ranges  Temperature Temp  Av.9  F (36.1  C)  Min: 95.7  F (35.4  C)  Max: 97.8  F (36.6  C)   Blood pressure Systolic (24hrs), Av , Min:79 , Max:109        Diastolic (24hrs), Av, Min:55, Max:70      Pulse Pulse  Av  Min: 98  Max: 98   Respirations Resp  Av.6  Min: 8  Max: 19   Pulse oximetry SpO2  Av %  Min: 96 %  Max: 98 %         Intake/Output Summary (Last 24 hours) at 10/15/18 1533  Last data filed at 10/15/18 1500   Gross per 24 hour   Intake              250 ml   Output                0 ml   Net              250 ml       Constitutional:   No acute distress.   Skin:   No rashes.   HEENT:   Normocephalic, atraumatic. Oropharynx clear.   Neck:   Supple.   Lungs:   Clear to auscultation bilaterally.   Cardiovascular:   Regular rate and " rhythm with no murmurs.   Abdomen:   Soft, nontender, nondistended with no palpable hepatosplenomegaly or masses.   Extremities:   No clubbing, cyanosis, or edema.   Neurological:   CN II-XII grossly intact. No gross focal motor deficits.            Medications:     No current outpatient prescriptions on file.                Data:     Results for orders placed or performed during the hospital encounter of 10/15/18 (from the past 24 hour(s))   CBC with platelets differential   Result Value Ref Range    WBC 12.7 (H) 4.0 - 11.0 10e9/L    RBC Count 3.43 (L) 4.4 - 5.9 10e12/L    Hemoglobin 10.5 (L) 13.3 - 17.7 g/dL    Hematocrit 32.5 (L) 40.0 - 53.0 %    MCV 95 78 - 100 fl    MCH 30.6 26.5 - 33.0 pg    MCHC 32.3 31.5 - 36.5 g/dL    RDW 13.9 10.0 - 15.0 %    Platelet Count 257 150 - 450 10e9/L    Diff Method Automated Method     % Neutrophils 78.5 %    % Lymphocytes 14.4 %    % Monocytes 4.0 %    % Eosinophils 2.4 %    % Basophils 0.3 %    % Immature Granulocytes 0.4 %    Nucleated RBCs 0 0 /100    Absolute Neutrophil 10.0 (H) 1.6 - 8.3 10e9/L    Absolute Lymphocytes 1.8 0.8 - 5.3 10e9/L    Absolute Monocytes 0.5 0.0 - 1.3 10e9/L    Absolute Eosinophils 0.3 0.0 - 0.7 10e9/L    Absolute Basophils 0.0 0.0 - 0.2 10e9/L    Abs Immature Granulocytes 0.1 0 - 0.4 10e9/L    Absolute Nucleated RBC 0.0    Comprehensive metabolic panel   Result Value Ref Range    Sodium 139 133 - 144 mmol/L    Potassium 3.6 3.4 - 5.3 mmol/L    Chloride 105 94 - 109 mmol/L    Carbon Dioxide 26 20 - 32 mmol/L    Anion Gap 8 3 - 14 mmol/L    Glucose 123 (H) 70 - 99 mg/dL    Urea Nitrogen 23 7 - 30 mg/dL    Creatinine 1.57 (H) 0.66 - 1.25 mg/dL    GFR Estimate 43 (L) >60 mL/min/1.7m2    GFR Estimate If Black 52 (L) >60 mL/min/1.7m2    Calcium 8.9 8.5 - 10.1 mg/dL    Bilirubin Total 0.2 0.2 - 1.3 mg/dL    Albumin 3.2 (L) 3.4 - 5.0 g/dL    Protein Total 7.0 6.8 - 8.8 g/dL    Alkaline Phosphatase 170 (H) 40 - 150 U/L    ALT 19 0 - 70 U/L    AST 21 0 - 45  U/L   ABO/Rh type and screen   Result Value Ref Range    ABO A     RH(D) Pos     Antibody Screen Neg     Test Valid Only At [facility]        Specimen Expires 10/18/2018    Occult blood stool   Result Value Ref Range    Occult Blood Positive (A) NEG^Negative   EKG 12-lead, tracing only   Result Value Ref Range    Interpretation ECG Click View Image link to view waveform and result    Gastroenterology IP Consult: known bleeding gastric mass, ?egd; Consultant may enter orders: Yes; Patient to be seen: Routine - within 24 hours; Requested Clinic/Group: Dunia Beard PA-C     10/15/2018  1:12 PM  [facility]  Gastroenterology Consultation    Gibson Villalta  [address]   [place] [facility] [address]  77 year old male    Admission Date/Time: 10/15/2018  Primary Care Provider: Flori Reyes    We were asked to see the patient in consultation by Joanna Barthell for evaluation of gastric mass.        HPI:  Gibson Villalta is a 77 year old male who has a   history of anemia, gastric mass, and bladder mass who presents   with dizziness, hypotension, and fatigue.  The patient reports   that when he stands or changes position he develops significant   lightheadedness and feels as though he is going to pass out.  He   also reports low blood pressure over the last week.  The patient   is supposed to have bladder surgery in 2 days.  He reports that   his stools are dark but not black or tarry.  He denies   hematochezia.  Denies abdominal pain, nausea, or vomiting.  He   does complain of early satiety which has been present for 1 year.        In July, the patient was hospitalized with bloody stools.  EGD   showed a likely malignant gastric tumor on the greater curvature   of the stomach.  Biopsies showed atypical lymphoid infiltrate   suspicious for large B-cell lymphoma.  Path however was hesitant   to make definitive call.  Repeat  upper endoscopy in August again   showed a likely malignant gastric tumor on the greater curvature   of the stomach.  The biopsies showed active inflammation and   atypical cells present but was otherwise unrevealing.    Hemoglobin 10.5 on admission, down from 12.6 in September.    ROS: A comprehensive ten point review of systems was negative   aside from those in mentioned in the HPI.      MEDICATIONS:   No current facility-administered medications on file prior to   encounter.   Current Outpatient Prescriptions on File Prior to Encounter:  atorvastatin (LIPITOR) 40 MG tablet TAKE 1 TABLET AT BEDTIME   COENZYME Q-10 PO Take 200 mg by mouth every morning    ferrous sulfate (IRON) 325 (65 FE) MG tablet Take 325 mg by mouth   every morning    furosemide (LASIX) 20 MG tablet Take 0.5 tablets (10 mg) by mouth   daily   hydrALAZINE (APRESOLINE) 10 MG tablet Take 1 tablet (10 mg) by   mouth 3 times daily   isosorbide mononitrate (IMDUR) 30 MG 24 hr tablet Take 0.5   tablets (15 mg) by mouth daily   metoprolol succinate (TOPROL-XL) 50 MG 24 hr tablet TAKE 1 TABLET   (50 MG) DAILY   multivitamin, therapeutic with minerals (MULTI-VITAMIN) TABS Take   1 tablet by mouth every morning    venlafaxine (EFFEXOR) 75 MG tablet TAKE 1 TABLET TWICE DAILY   vitamin D (ERGOCALCIFEROL) 96357 UNIT capsule TAKE 1 CAPSULE ONCE   A WEEK   ACE/ARB NOT PRESCRIBED, INTENTIONAL, ACE & ARB not prescribed due   to Symptomatic hypotension not due to excessive diuresis (Patient   not taking: Reported on 10/12/2018)   ASPIRIN NOT PRESCRIBED (INTENTIONAL) Please choose reason not   prescribed, below (Patient not taking: Reported on 10/12/2018)       ALLERGIES:   Allergies   Allergen Reactions     Wasps [Hornets]      Sand wasp --- years ago.         Past Medical History:   Diagnosis Date     Acute kidney injury (H) 2012     Anemia      Anxiety      Bladder mass      BPH (benign prostatic hypertrophy)      Carpal tunnel syndrome     Right     Chronic  kidney disease (CKD), stage 3 (moderate)      Dementia      Diabetes (H)      Gastric mass      Gout      History of depression      LBBB (left bundle branch block) 2013     Lumbar herniated disc     L5-S1     Mixed cardiomyopathy 7/22/2016     Myocardial infarction (H) 1995 and 2010     Nonischemic cardiomyopathy (H) 7/22/2016     Obesity      Pacemaker     ICD/PM     Rosacea      Rotator cuff disorder     Tear x 2     RV (right ventricular) mural thrombus 7/22/2016       Past Surgical History:   Procedure Laterality Date     ANGIOPLASTY  1995 and 2010 with stent     BACK SURGERY       CARDIAC SURGERY      ICD/PM     CYSTOSCOPY, TRANSURETHRAL RESECTION (TUR) TUMOR BLADDER,   COMBINED N/A 9/19/2018    Procedure: COMBINED CYSTOSCOPY, TRANSURETHRAL RESECTION (TUR)   TUMOR BLADDER;  CYSTOSCOPY, TRANSURETHRAL RESECTION BLADDER TUMOR   ;  Surgeon: Ryan Camarillo MD;  Location:  OR     ESOPHAGOSCOPY, GASTROSCOPY, DUODENOSCOPY (EGD), COMBINED N/A   7/30/2018    Procedure: COMBINED ESOPHAGOSCOPY, GASTROSCOPY, DUODENOSCOPY   (EGD), BIOPSY SINGLE OR MULTIPLE;  gastroscopy;  Surgeon: Parish Xiong MD;  Location:  GI     ESOPHAGOSCOPY, GASTROSCOPY, DUODENOSCOPY (EGD), COMBINED N/A   7/30/2018    Procedure: COMBINED ESOPHAGOSCOPY, GASTROSCOPY, DUODENOSCOPY   (EGD);  EGD;  Surgeon: Parish Xiong MD;  Location:  GI     ESOPHAGOSCOPY, GASTROSCOPY, DUODENOSCOPY (EGD), COMBINED N/A   8/2/2018    Procedure: COMBINED ESOPHAGOSCOPY, GASTROSCOPY, DUODENOSCOPY   (EGD), BIOPSY SINGLE OR MULTIPLE;  gastroscopy BIOPSYSHOULD BE   SENT TO LAB IN NS NOT FORMALIN PER ;  Surgeon: Jonathon Bush MD;  Location:  GI     GASTRIC BYPASS  2001     HEART CATH LEFT HEART CATH  7/19/16    Non ischemic cardiomyopathy; Non functionally significant LAD   and RCA disease      OTHER SURGICAL HISTORY  2006    Spinal surgery     OTHER SURGICAL HISTORY  Nov 2016    CRT-D implantation         SOCIAL HISTORY:  Social History  "  Substance Use Topics     Smoking status: Former Smoker     Packs/day: 2.00     Years: 20.00     Types: Cigarettes     Quit date: 1980     Smokeless tobacco: Never Used      Comment: Quit in his 30s - 2 ppd for 20 years     Alcohol use 0.0 oz/week     0 Standard drinks or equivalent per week      Comment: maybe 1 beer a month       FAMILY HISTORY:  Family History   Problem Relation Age of Onset     Coronary Artery Disease Father 39     Alzheimer Disease Mother      Coronary Artery Disease Son      Two sons have  from MIs (ages 39 and 51)       PHYSICAL EXAM:   /69  Temp 95.7  F (35.4  C)  Resp 16  Ht 1.854 m (6' 1\")    Wt 101.6 kg (224 lb)  SpO2 98%  BMI 29.55 kg/m2    Constitutional: NAD, comfortable  Cardiovascular: RRR, normal S1 and S2, no r/c/g/m  Respiratory: CTAB  Psychiatric: mentation appears normal and affect normal  Head: Normocephalic. Atraumatic.    Neck: Neck supple. No adenopathy. Thyroid symmetric, normal size,   trachea midline  Eyes:  PERRL, no icterus  ENT: Hearing adequate, pharynx normal without erythema or exudate  Abdomen:   Auscultation: + BS  Appearance: non-distended  Palpation: non-tender  NEURO: grossly negative  SKIN: no suspicious lesions or rashes  LYMPH:   anterior cervical: no adenopathy  posterior cervical: no adenopathy  supraclavicular: no adenopathy          ADDITIONAL COMMENTS:   I reviewed the patient's new clinical lab test results.   Recent Labs   Lab Test  10/15/18   0910  10/12/18   1301  10/09/18   1333  18   1103   18   1035   18   0750 18   WBC  12.7*  9.9   --   10.2   < >   --    < >  11.5*   --    HGB  10.5*  10.7*  11.2*  12.6*   < >   --    < >  13.9   --    MCV  95  99   --   100   < >   --    < >  95   --    PLT  257  283   --   283   < >   --    < >  218   --    INR   --    --    --    --    --   1.21*   --   3.56*  3.0*    < > = values in this interval not displayed.     Recent Labs   Lab Test  10/15/18   0910  " 10/12/18   1301  10/09/18   1333  09/13/18   1103   NA  139  140   --   140   POTASSIUM  3.6  4.3   --   4.2   CHLORIDE  105  106   --   104   CO2  26  26   --   25   BUN  23  24   --   22   CR  1.57*  1.45*  1.34*  1.49*   ANIONGAP  8  8   --   11   SHELLEY  8.9  9.1   --   9.6   GLC  123*  131*   --   100*     Recent Labs   Lab Test  10/15/18   0910  10/12/18   1301  09/07/18   1012  08/04/18   1356  07/31/18   1325  07/30/18   0750   07/17/16   1247   ALBUMIN  3.2*  3.2*   --    --    --   3.4   < >  3.1*   BILITOTAL  0.2  0.2   --    --    --   0.4   < >  0.9   DBIL   --    --    --    --    --    --    --   0.3*   ALT  19  20   --    --    --   40   < >  24   AST  21  21   --    --    --   27   < >  27   ALKPHOS  170*  188*   --    --    --   143   < >  139   PROTEIN   --    --   100*  >=300*  Negative   --    < >   --    LIPASE   --    --    --    --    --   105   --    --     < > = values in this interval not displayed.             .    CONSULTATION ASSESSMENT AND PLAN:      75 yo male who presented to the ED with fatigue.  Recent finding   of gastric mass.  Initial biopsies 7/2018 suspicious for B-cell   lymphoma.  Pathology unwilling to make definitive call due to   lack of tissue.  Re-scoped in early August.  Path showed only   active inflammation.  Currently undergoing treatment for bladder   carcinoma.  Hemoglobin down from baseline at 10.5.  Report of   dark stools.    -  Will need repeat EGD for more tissue, ? Inpatient vs   outpatient.  Will likely depend on clinical course here.  -  Monitor H/H; transfuse prn.  -  PPI.      I discussed the patient's findings and plan with Dr. Talavera.          Dunia Diop, [facility] Gastroenterology  Office:  [phone] call if needed after 5PM  Cell:  [phone], not available after 5PM at this number

## 2018-10-15 NOTE — MR AVS SNAPSHOT
After Visit Summary   10/15/2018    Gibson Villalta    MRN: [id number]           Patient Information     Date Of Birth          1940        Visit Information        Provider Department      10/15/2018 8:00 AM Howard Vallecillo MD [facility]        Today's Diagnoses     Iron deficiency anemia, unspecified iron deficiency anemia type    -  1    Gastric mass        Bladder mass           Follow-ups after your visit        Your next 10 appointments already scheduled     Oct 17, 2018   Procedure with Ryan Camarillo MD   [facility] (--)    [address]  [facility] [address]   [phone]            Oct 24, 2018  2:00 PM CDT   Return Visit with Ryan Camarillo MD   [facility] ([facility])    [address]  [address]  [facility] [id number] [phone]            Dec 10, 2018  4:30 PM CST   Remote ICD Check with ALEJANDRO DCR2   [facility] ([facility])    [address]  [facility] [address] [phone] OPT 2           This appointment is for a remote check of your debrillator.  This is not an appointment at the office.              Who to contact     If you have questions or need follow up information about today's clinic visit or your schedule please contact [facility] directly at [phone].  Normal or non-critical lab and imaging results will be communicated to you by MyChart, letter or phone within 4 business days after the clinic has received the results. If you do not hear from us within 7 days, please contact the clinic through MyChart or phone. If you have a critical or abnormal lab result, we will notify you by phone as soon as possible.  Submit refill requests through [employer] or call your pharmacy and they will forward the refill request to us. Please allow 3 business days for your refill to be  completed.          Additional Information About Your Visit        MyChart Information     [facility] gives you secure access to your electronic health record. If you see a primary care provider, you can also send messages to your care team and make appointments. If you have questions, please call your primary care clinic.  If you do not have a primary care provider, please call [phone] and they will assist you.        Care EveryWhere ID     This is your Care EveryWhere ID. This could be used by other organizations to access your [place] medical records  [id number]        Your Vitals Were     Pulse Temperature Pulse Oximetry             98 97.1  F (36.2  C) (Tympanic) 97%          Blood Pressure from Last 3 Encounters:   10/15/18 (!) 79/55   10/12/18 106/69   10/09/18 98/68    Weight from Last 3 Encounters:   10/12/18 222 lb (100.7 kg)   10/09/18 223 lb (101.2 kg)   09/26/18 222 lb (100.7 kg)              Today, you had the following     No orders found for display       Primary Care Provider Office Phone # Fax #    Flori Joshua Reyes,  [phone] [address]  [facility] [address]        Equal Access to Services     BG MCLAIN : Hadii swetha ku hadasho Soomaali, waaxda luqadaha, qaybta kaalmada adeegyada, rusty doty . So [facility] [phone].    ATENCIÓN: Si habla español, tiene a de luna disposición servicios gratuitos de asistencia lingüística. Llame al [phone].    We comply with applicable federal civil rights laws and [place] laws. We do not discriminate on the basis of race, color, national origin, age, disability, sex, sexual orientation, or gender identity.            Thank you!     Thank you for choosing [facility]  for your care. Our goal is always to provide you with excellent care. Hearing back from our patients is one way we can continue to improve our services. Please take a few minutes to complete the written survey that you may  receive in the mail after your visit with us. Thank you!             Your Updated Medication List - Protect others around you: Learn how to safely use, store and throw away your medicines at www.disposemymeds.org.          This list is accurate as of 10/15/18  8:44 AM.  Always use your most recent med list.                   Brand Name Dispense Instructions for use Diagnosis    ACE/ARB/ARNI NOT PRESCRIBED (INTENTIONAL)      ACE & ARB not prescribed due to Symptomatic hypotension not due to excessive diuresis    HFrEF (heart failure with reduced ejection fraction) (H)       ASPIRIN NOT PRESCRIBED    INTENTIONAL    0 each    Please choose reason not prescribed, below    Controlled type 2 diabetes mellitus with chronic kidney disease, without long-term current use of insulin, unspecified CKD stage (H)       atorvastatin 40 MG tablet    LIPITOR    90 tablet    TAKE 1 TABLET AT BEDTIME    Hyperlipidemia LDL goal <130       COENZYME Q-10 PO      Take 200 mg by mouth every morning        ferrous sulfate 325 (65 Fe) MG tablet    IRON     Take 325 mg by mouth every morning        furosemide 20 MG tablet    LASIX    45 tablet    Take 0.5 tablets (10 mg) by mouth daily    Chronic systolic heart failure (H)       hydrALAZINE 10 MG tablet    APRESOLINE    270 tablet    Take 1 tablet (10 mg) by mouth 3 times daily    Chronic systolic heart failure (H), Cardiomyopathy (H)       isosorbide mononitrate 30 MG 24 hr tablet    IMDUR    45 tablet    Take 0.5 tablets (15 mg) by mouth daily    Chronic systolic heart failure (H), Cardiomyopathy (H)       metoprolol succinate 50 MG 24 hr tablet    TOPROL-XL    90 tablet    TAKE 1 TABLET (50 MG) DAILY    Cardiomyopathy (H)       Multi-vitamin Tabs tablet      Take 1 tablet by mouth every morning        venlafaxine 75 MG tablet    EFFEXOR    180 tablet    TAKE 1 TABLET TWICE DAILY    Anxiety and depression       vitamin D 12600 UNIT capsule    ERGOCALCIFEROL    12 capsule    TAKE 1 CAPSULE  ONCE A WEEK    Vitamin D deficiency

## 2018-10-15 NOTE — ED PROVIDER NOTES
History     Chief Complaint:  Dizziness and low hemoglobin    HPI   Gibson Villalta is a 77 year old male with a history of anemia, gastric mass, and bladder mass who presents with his wife to the Emergency Department today for evaluation of dizziness and low blood pressure and hemoglobin. The patient reports that for the last week when he stands or changes position he feels as though he will fall and pass out. He reports dizziness on changing positions. He also reports he has had low blood pressure for the last week. He reports that his hemoglobin has been dropping for the past week as well. He had a pre-op exam appointment 6 days ago because he is supposed to have bladder surgery in two days. His blood pressure was low at that appointment and he returned to his primary 3 days ago because he still was not feeling well. He was supposed to have a clinic appointment this morning but he felt unable to walk or do anything upright so they came here today. He reports that standing is the worst, and causes him to feel as if he will pass out and he feels dizzy. He reports one dark stool and other stools that were kind of dark within the past week. No blood in his urine or pain with urination. No abdominal pain. No fever, nausea, or vomiting. He reports he has not had this low hemoglobin before. The patient and his wife report that he was in the hospital at the end of July because of bloody stools. An endoscopy was performed and a mass was found in his stomach. The procedure in two days is to remove a bladder tumor, and he has had a tumor removed there in the past as well.  The patient does admit that he has not been eating or drinking as much as his normal.    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  Biopsies were completed earlier this year.  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  He was planned to have a cystoscopy on Wednesday  this is been likely canceled due to decreasing hemoglobin levels.  In a recent visit with his PCP, they had suggested possible IV iron infusions.  The patient follows with Dr. Camarillo of urologic physicians.  His gastroenterology provider is Dr. Bush and oncology is Dr. Benitez.     Allergies:  No known drug allergies     Medications:    Lipitor  Coenzyme Q-10  Iron  Lasix  Apresoline  Imdur  Toprol  Multivitamin  Effexor  Vitamin D     Past Medical History:    Acute kidney injury  Anemia  Anxiety  Bladder mass  Prostatic hypertrophy  Carpal tunnel syndrome  Chronic kidney disease  Dementia  Gastric mass  Gout   Depression  Left bundle branch block  Lumbar herniated disc  Cardiomyopathy  Myocardial infarction  Obesity  Pacemaker  Rosacea  Rotator cuff disorder  Right ventricular mural thrombus  Dementia    Past Surgical History:    Angioplasty x2  Back surgery  Cardiac surgery - ICD/PM  Transurethral resection tumor bladder  Gastric bypass  Left heart cath  Spinal surgery  CRT-D implantation    Family History:    CAD  Alzheimer's disease    Social History:  Smoking status: Former smoker, quit date 1/1/1980  Alcohol use: Yes  Marital Status:   [2]     Review of Systems   Constitutional: Negative for fever.   Gastrointestinal: Positive for blood in stool. Negative for abdominal pain, nausea and vomiting.   Genitourinary: Negative for dysuria and hematuria.   Neurological: Positive for dizziness.   All other systems reviewed and are negative.    Physical Exam     Patient Vitals for the past 24 hrs:   BP Temp Temp src Heart Rate Resp SpO2 Height Weight   10/15/18 1100 108/69 - - 61 11 96 % - -   10/15/18 1045 94/59 - - 60 8 97 % - -   10/15/18 1030 109/70 - - 64 9 97 % - -   10/15/18 1015 95/60 - - 62 12 98 % - -   10/15/18 1000 97/62 - - 68 19 97 % - -   10/15/18 0930 94/62 - - 74 18 97 % - -   10/15/18 0915 - - - - - 97 % - -   10/15/18 0858 103/62 97.8  F (36.6  C) Oral 105 16 96 % 1.829 m (6') 101.6 kg (224  lb)       Physical Exam  General: Lying flat on the bed.  In no acute distress.  Normal mood and affect.  Skin: Good turgor, no rash, no unusual bruising or prominent lesions.  HEENT: Head: Normocephalic, atraumatic, no visible masses.   Eyes: Conjunctiva clear, sclera non-icteric, EOM intact, PERRL.  No significant pallor.   Throat/pharynx: Mucous membranes moist, no mucosal lesions. Mucosa non-inflamed, no tonsillar hypertrophy or exudate.   Cardiac: Tachycardic, regular rhythm, no murmur or gallop.   Lungs: Clear to auscultation.  Abdomen: Bowel sounds normal, no tenderness, organomegaly, masses, or hernia. No guarding or rebound tenderness.   Musculoskeletal: Normal gait and station full-strength in upper and lower extremities.. No calf tenderness or swelling.   Neurologic: Oriented x 3.   Psychiatric: Intact recent and remote memory, judgment and insight, normal mood and affect.   Rectal: Normal sphincter tone, no hemorrhoids or masses palpable.  No david melena.    Emergency Department Course     ECG (9:34:52):  Rate 69 bpm. NC interval *. QRS duration 174. QT/QTc 488/522. P-R-T axes 30 195 74. Ventricular paced rhythm. Biventricular pacemaker detected. Abnormal ECG. Rate slower today compared with ECG dated 7/30/18. Interpreted at 0943 by Eze West MD.    Laboratory:  CBC: WBC 12. 7 (H), HGB 10.5 (L) o/w WNL ()  CMP: Glucose 123 (H), Creatinine 1.57 (H), GFR 43 (L), Albumin 3.2 (L), Alkphos 170 (H) o/w WNL  ABO/Rh: A pos, antibody negative  Occult blood stool: Positive    Interventions:  0925: NS 1L IV Bolus  1117: Protonix 40 MG IV    Emergency Department Course:  Past medical records, nursing notes, and vitals reviewed.  0905: I performed an exam of the patient and obtained history, as documented above.    IV inserted and blood drawn.    1034: I rechecked the patient. Explained findings to the patient and his wife.    Findings and plan explained to the Patient and spouse who consents to  admission.     1040: Discussed the patient with Dr. Benavidez, who will admit the patient to a obs bed for further monitoring, evaluation, and treatment.     Impression & Plan      Medical Decision Making:  Gibson Villalta is a 77-year-old male who presented the ED today for evaluation of low hemoglobin, lightheaded sensation.  Details of the patient's history can be noted in the HPI. Differential diagnosis included iron deficiency anemia, GI bleed, dehydration, ACS, electrolyte abnormalities medication reaction, amongst others. Upon my exam the patient was mildly hypotensive and tachycardic. There was no david melana on rectal exam. No obvious abdominal tenderness. Due to concern for decreasing hemoglobin in light of the patient's history of upper GI bleed, laboratory analysis and stool occult were obtained. Hemoglobin appeared to be stable from last Friday at 10.5. There was elevation in the patient's creatinine, which could be due to mild dehydration status, amongst other factors. Stool occult returned positive. ECG showed no acute changes. Blood type and screen completed.  Interventions here in the ED included IV fluids and Protonix.  Upon reevaluation, the patient noted improvement in his lightheaded sensation.  His blood pressures had also increased slightly and his heart rate had decreased.  Due to symptomatic anemia for the past 2 weeks with increasingly worsening symptoms, and positive occult blood stool test today, I have concern for slow upper GI bleed.  I feel the patient would benefit from admission and likely repeat GI referral/upper endoscopy.  Due to the patient's multiple comorbidities, patient's hemoglobin needs to be stabilized prior to additional interventions for his bladder carcinoma.  All the findings as noted above were discussed in great detail with the patient and his spouse.  I also consulted with Dr. Benavidez who agreed to admit the patient in the hospital for further observation and care. all  questions were answered prior to the patient's admission.  The patient's blood pressure and heart rate improved throughout his ED stay.  He was hemodynamically stable prior to admission the patient and his spouse were in agreement with admission and the treatment plan.    Diagnosis:    ICD-10-CM   1. Lightheadedness R42   2. Anemia, unspecified type D64.9   3. Positive occult stool blood test R19.5   4. Other fatigue R53.83   5. Hypotension, unspecified hypotension type I95.9     Disposition:  Admitted - Dr. Benavidez, observation oncology     Cande Jacob  10/15/2018    EMERGENCY DEPARTMENT  Scribe Disclosure:  I Cande Cecil, am serving as a scribe at 9:05 AM on 10/15/2018 to document services personally performed by Amy Aaron PA based on my observations and the provider's statements to me.       Emergency Department Attending Supervision Note  10/15/2018  10:20 AM      I evaluated this patient in conjunction with an Advanced Practice Clinician:    APC: Galen    Briefly, the patient presented with lightheadedness, weakness, dizziness (no vertigo).  Near syncope with standing.    Examination:   General: Resting comfortably on the gurney  Head:  The scalp, face, and head appear normal  Eyes:  The pupils are normal    Conjunctivae and sclera appear normal    Is no nystagmus  ENT:    The nose is normal    Ears/pinnae are normal  Lungs: Clear  CV:  Normal    Paced cardiac rhythm at 80    Pacemaker noted to the left upper chest  GI:  There is no focal or well localized abdominal pain  Neck:  Normal range of motion  MS:  Normal  Skin:  No rash or lesions noted.  Neuro: Speech is normal and fluent  Psych:  Awake. Alert.  Normal affect.      Appropriate interactions    MDM:  This patient presents with a sensation of general weakness malaise lightheadedness and dizziness with standing for the last several weeks.  His hemoglobin is slowly declining.  He does have a known history of upper GI bleeding coming from  the stomach lining, possibly relating to a lymphoma.  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      The patient likely has recurrent bleeding coming from a lesion/mass in the stomach.  This is been termed a probable B-cell lymphoma.  Also in the differential diagnosis would be peptic ulcer disease.  The patient is given a dose of proton pump inhibitor.  He will likely need an EGD and repeat biopsy.  The patient will also, when stable, need urology consultation for repeat biopsy on the bladder to determine next steps.  The patient's blood pressures have been slightly soft, this is likely multifactorial from blood loss anemia, his current blood pressure medication regime, congestive heart failure, and diuretic therapy.              My impression/diagnosis is:    Weakness, lightheadedness, dizziness, anemia, guaiac positive stool likely from a gastric source.  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  Known transitional cell bladder cancer.    MD Galen Espinoza Moriah R, PA  10/15/18 1125       Eze West MD  10/15/18 1620

## 2018-10-15 NOTE — PLAN OF CARE
Problem: Patient Care Overview  Goal: Plan of Care/Patient Progress Review  Outcome: Improving  RN:  Patient new admit to the unit @1200.  A/O3-4 with forgetfulness noted.  Orthostatic bloodpressure positive.  Other VSS.  Denies pain.  Good po intake.  IV fluids infusing.  PT and GI consults completed.  Oncology consult in progress. Fall risk--up with SBA only with bed alarm on.  Hemoglobin stable at 10.5.  Calls approp.  Need to collect UA.  Discharge pending progress.

## 2018-10-16 VITALS
DIASTOLIC BLOOD PRESSURE: 79 MMHG | OXYGEN SATURATION: 97 % | WEIGHT: 220.5 LBS | HEIGHT: 73 IN | TEMPERATURE: 96.2 F | BODY MASS INDEX: 29.22 KG/M2 | RESPIRATION RATE: 16 BRPM | SYSTOLIC BLOOD PRESSURE: 113 MMHG

## 2018-10-16 LAB
ANION GAP SERPL CALCULATED.3IONS-SCNC: 6 MMOL/L (ref 3–14)
BACTERIA SPEC CULT: NO GROWTH
BASOPHILS # BLD AUTO: 0 10E9/L (ref 0–0.2)
BASOPHILS NFR BLD AUTO: 0.3 %
BUN SERPL-MCNC: 20 MG/DL (ref 7–30)
CALCIUM SERPL-MCNC: 8.3 MG/DL (ref 8.5–10.1)
CHLORIDE SERPL-SCNC: 110 MMOL/L (ref 94–109)
CO2 SERPL-SCNC: 26 MMOL/L (ref 20–32)
CREAT SERPL-MCNC: 1.37 MG/DL (ref 0.66–1.25)
DIFFERENTIAL METHOD BLD: ABNORMAL
EOSINOPHIL # BLD AUTO: 0.3 10E9/L (ref 0–0.7)
EOSINOPHIL NFR BLD AUTO: 3.8 %
ERYTHROCYTE [DISTWIDTH] IN BLOOD BY AUTOMATED COUNT: 13.9 % (ref 10–15)
GFR SERPL CREATININE-BSD FRML MDRD: 50 ML/MIN/1.7M2
GLUCOSE SERPL-MCNC: 92 MG/DL (ref 70–99)
HCT VFR BLD AUTO: 29 % (ref 40–53)
HGB BLD-MCNC: 9.4 G/DL (ref 13.3–17.7)
IMM GRANULOCYTES # BLD: 0 10E9/L (ref 0–0.4)
IMM GRANULOCYTES NFR BLD: 0.4 %
LYMPHOCYTES # BLD AUTO: 1.1 10E9/L (ref 0.8–5.3)
LYMPHOCYTES NFR BLD AUTO: 13.2 %
Lab: NORMAL
MCH RBC QN AUTO: 31 PG (ref 26.5–33)
MCHC RBC AUTO-ENTMCNC: 32.4 G/DL (ref 31.5–36.5)
MCV RBC AUTO: 96 FL (ref 78–100)
MONOCYTES # BLD AUTO: 1.2 10E9/L (ref 0–1.3)
MONOCYTES NFR BLD AUTO: 14.9 %
NEUTROPHILS # BLD AUTO: 5.4 10E9/L (ref 1.6–8.3)
NEUTROPHILS NFR BLD AUTO: 67.4 %
NRBC # BLD AUTO: 0 10*3/UL
NRBC BLD AUTO-RTO: 0 /100
PLATELET # BLD AUTO: 217 10E9/L (ref 150–450)
POTASSIUM SERPL-SCNC: 3.9 MMOL/L (ref 3.4–5.3)
RBC # BLD AUTO: 3.03 10E12/L (ref 4.4–5.9)
SODIUM SERPL-SCNC: 142 MMOL/L (ref 133–144)
SPECIMEN SOURCE: NORMAL
WBC # BLD AUTO: 7.9 10E9/L (ref 4–11)

## 2018-10-16 PROCEDURE — 25000128 H RX IP 250 OP 636: Performed by: HOSPITALIST

## 2018-10-16 PROCEDURE — G0378 HOSPITAL OBSERVATION PER HR: HCPCS

## 2018-10-16 PROCEDURE — 80048 BASIC METABOLIC PNL TOTAL CA: CPT | Performed by: PHYSICIAN ASSISTANT

## 2018-10-16 PROCEDURE — 96376 TX/PRO/DX INJ SAME DRUG ADON: CPT

## 2018-10-16 PROCEDURE — 36415 COLL VENOUS BLD VENIPUNCTURE: CPT | Performed by: PHYSICIAN ASSISTANT

## 2018-10-16 PROCEDURE — 96375 TX/PRO/DX INJ NEW DRUG ADDON: CPT

## 2018-10-16 PROCEDURE — 25000132 ZZH RX MED GY IP 250 OP 250 PS 637: Mod: GY | Performed by: PHYSICIAN ASSISTANT

## 2018-10-16 PROCEDURE — 25000128 H RX IP 250 OP 636: Performed by: PHYSICIAN ASSISTANT

## 2018-10-16 PROCEDURE — A9270 NON-COVERED ITEM OR SERVICE: HCPCS | Mod: GY | Performed by: PHYSICIAN ASSISTANT

## 2018-10-16 PROCEDURE — 99217 ZZC OBSERVATION CARE DISCHARGE: CPT | Performed by: PHYSICIAN ASSISTANT

## 2018-10-16 PROCEDURE — 85025 COMPLETE CBC W/AUTO DIFF WBC: CPT | Performed by: PHYSICIAN ASSISTANT

## 2018-10-16 PROCEDURE — C9113 INJ PANTOPRAZOLE SODIUM, VIA: HCPCS | Performed by: PHYSICIAN ASSISTANT

## 2018-10-16 RX ORDER — SODIUM CHLORIDE 9 MG/ML
INJECTION, SOLUTION INTRAVENOUS CONTINUOUS
Status: ACTIVE | OUTPATIENT
Start: 2018-10-16 | End: 2018-10-16

## 2018-10-16 RX ORDER — PANTOPRAZOLE SODIUM 40 MG/1
40 TABLET, DELAYED RELEASE ORAL DAILY
Qty: 30 TABLET | Refills: 0 | Status: SHIPPED | OUTPATIENT
Start: 2018-10-16 | End: 2019-09-06

## 2018-10-16 RX ADMIN — METOPROLOL SUCCINATE 50 MG: 50 TABLET, EXTENDED RELEASE ORAL at 08:07

## 2018-10-16 RX ADMIN — CEFTRIAXONE 1 G: 1 INJECTION, POWDER, FOR SOLUTION INTRAMUSCULAR; INTRAVENOUS at 00:26

## 2018-10-16 RX ADMIN — PANTOPRAZOLE SODIUM 40 MG: 40 INJECTION, POWDER, FOR SOLUTION INTRAVENOUS at 08:07

## 2018-10-16 NOTE — PLAN OF CARE
Problem: Patient Care Overview  Goal: Plan of Care/Patient Progress Review  Outcome: Improving  Pt is A+OX4 but very forgetful. UA positive for UTI. VSS on RA. Hemoglobin checks q6h- trending up (most recent 9.4 at 2315). Up with SBA in room. Voiding in urinal standing at bedside. Forgets to use call light when needing to stand up. Denies pain. Plan for possible discharge today if hemoglobin remains stable. Patient will follow-up with outpatient EGD.

## 2018-10-16 NOTE — PROGRESS NOTES
"GASTROENTEROLOGY PROGRESS NOTE     SUBJECTIVE: Patient feeling well. Had one black stool this morning. No lightheadedness or dizziness. Anxious to discharge today.     OBJECTIVE:   /79  Temp 96.2  F (35.7  C) (Oral)  Resp 16  Ht 1.854 m (6' 1\")  Wt 100 kg (220 lb 8 oz)  SpO2 97%  BMI 29.09 kg/m2   Temp (24hrs), Av.5  F (35.8  C), Min:96.2  F (35.7  C), Max:96.7  F (35.9  C)     Patient Vitals for the past 72 hrs:   Weight   10/16/18 0600 100 kg (220 lb 8 oz)   10/15/18 0858 101.6 kg (224 lb)        Intake/Output Summary (Last 24 hours) at 10/16/18 1207  Last data filed at 10/16/18 0516   Gross per 24 hour   Intake             1059 ml   Output              550 ml   Net              509 ml      PHYSICAL EXAM   Constitutional: Healthy, in bed, no acute distress. Visiting with wife   Cardiovascular: Regular rate and rhythm. No murmurs rubs or gallops  Respiratory: Clear to auscultation bilaterally  Abdomen: Soft, non-tender, non-distended, normally active bowel sounds. No masses or hepatosplenomegaly appreciated. No guarding or rebound tenderness.    Additional Comments:   ROS, FH, SH: See initial GI consult for details.     I have reviewed the patient's new clinical lab results:   Recent Labs   Lab Test  10/16/18   1135  10/15/18   2315  10/15/18   1757   10/15/18   0910  10/12/18   1301   18   1035   18   0750 18   WBC  7.9   --    --    --   12.7*  9.9   < >   --    < >  11.5*   --    HGB  9.4*  9.4*  9.1*   < >  10.5*  10.7*   < >   --    < >  13.9   --    MCV  96   --    --    --   95  99   < >   --    < >  95   --    PLT  217   --    --    --   257  283   < >   --    < >  218   --    INR   --    --    --    --    --    --    --   1.21*   --   3.56*  3.0*    < > = values in this interval not displayed.      Recent Labs   Lab Test  10/16/18   0607  10/15/18   0910  10/12/18   1301   NA  142  139  140   POTASSIUM  3.9  3.6  4.3   CHLORIDE  110*  105  106   CO2  26  26  26   BUN  20  " 23  24   CR  1.37*  1.57*  1.45*   ANIONGAP  6  8  8   SHELLEY  8.3*  8.9  9.1      Recent Labs   Lab Test  10/15/18   2031  10/15/18   0910  10/12/18   1301  09/07/18   1012  08/04/18   1356   07/30/18   0750   ALBUMIN   --   3.2*  3.2*   --    --    --   3.4   BILITOTAL   --   0.2  0.2   --    --    --   0.4   ALT   --   19  20   --    --    --   40   AST   --   21  21   --    --    --   27   ALKPHOS   --   170*  188*   --    --    --   143   PROTEIN  30*   --    --   100*  >=300*   < >   --    LIPASE   --    --    --    --    --    --   105    < > = values in this interval not displayed.          Assessment:    77 yo male who presented to the ED 10/15/2018 with fatigue and melena.  Recent finding of gastric mass.  Initial biopsies 7/2018 suspicious for B-cell lymphoma.  Pathology unwilling to make definitive call due to lack of tissue.  Re-scoped in early August.  Path showed only active inflammation.  Currently undergoing treatment for bladder carcinoma - plan for surgery tomorrow, so patient would prefer to discharge with outpatient EGD. Hemoglobin down from baseline (12.6) in July at 10.5 on admission. Hgb 10.5-8.8-->9.4 at discharge. Decrease could partially be dilutional due to IV fluids.     Plan:    -  Will need repeat EGD for more tissue. Discussed with Dr. Talavera - this can be  completed outpatient. Hemoglobin and vital signs stable.   -  Continue PPI  -  We will be contacting the patient to arrange EGD     Sara Yo CNP  [facility]  Cell: [phone]  Monday through Friday [id number]  Office: [phone]

## 2018-10-16 NOTE — PLAN OF CARE
Problem: Patient Care Overview  Goal: Plan of Care/Patient Progress Review  Outcome: Adequate for Discharge Date Met: 10/16/18  Pt A&O, ex situation, forgetful, hx of dementia. VSS. On RA. Weakness improved. Denies pain, lightheadedness, or dizziness. Tolerating reg diet. Voiding. hgb stable. SBA w/ transfers. GI and onc consults completed. Ok for discharge home per MD. All discharge paperwork, meds, and f/u recommendations reviewed with patient and spouse. All questions answered. IV removed. Escorted to exit via wheelchair by volunteer.

## 2018-10-16 NOTE — PROGRESS NOTES
[facility] Progress Note          Assessment and Plan:   77 year-old male with history of dementia, RV and LV mural thrombus on chronic anticoagulation with Coumadin, status post Smita-en-Y gastric bypass, hypertension, chronic kidney disease, CAD, cardiomyopathy with EF 25-30%, diabetes mellitus presents with weakness, fatigue, and recurrent anemia.     1. Anemia due to blood loss  2. Gastric mass  - Likely related to gastric mass that was previously biopsied multiple times but with no definitive diagnosis (tissue was non-diagnostic) after 7/30/2018 CT abdomen/pelvis with contrast showed postop changes of prior gastric bypass procedure, ill-defined soft tissue thickening in the proximal stomach, increased since 8/9/2016, no evidence for bowel obstruction, colitis, or diverticulitis; no free fluid; 2 bladder wall lesions enhancing, largest measuring 3.9 x 1.8 cm and smaller bladder wall lesion near midline measuring 2 x 1.4 cm, several left renal cysts; liver, gallbladder, spleen, adrenal glands, pancreas, and remaining kidneys unremarkable.  - 7/30/2018: Upper endoscopy showed a medium sized, fungating, partially circumferential mass with oozing and stigmata of recent bleeding on the greater curvature of the stomach with biopsies suggesting possible lymphoma but it is negative for adenocarcinoma; esophagus and jejunum normal; gastric bypass with normal-sized pouch and intact staple line.  Patient denied any associated abdominal pain, fevers, chills, night sweats, unintentional weight loss, chest pain, dyspnea, hematuria, dysuria.  - 8/2/2018: Repeat upper endoscopy with biopsy of the stomach mass showed active inflammation and atypical cells with crush artifact.  There were insufficient number of atypical cells to render a definitive diagnosis.  - At last/prior visit on 8/14/18, he agreed to observation and repeat imaging in 3 months.  However, now, he presents with recurrent, worsening anemia and FOBT  "positive stools.  - I recommended repeat EGD -- [facility] already consulted -- await their decision on timing of EGD with repeat biopsy of gastric mass.  - Continue serial Hgb monitoring (transfuse if Hgb decreases below 7 g/dL).      3.  Bladder cancer  -These were seen on CT abdomen/pelvis on 2018 and are concerning for malignancy.  -He was further evaluated by Dr. Howard Camarillo on 2018.  Per patient's wife, he underwent TURBT with incomplete removal of high-grade bladder tumor (documentation not available to me at time of dictation) and he will need repeat TURBT again in near future.      Full code               Interval History:   Pt has no new complaints.              Review of Systems:   As per subjective, otherwise 5 systems reviewed and negative.           Physical Exam:   Blood pressure 113/79, temperature 96.2  F (35.7  C), temperature source Oral, resp. rate 16, height 1.854 m (6' 1\"), weight 100 kg (220 lb 8 oz), SpO2 97 %.      Vital Sign Ranges  Temperature Temp  Av.3  F (35.7  C)  Min: 95.7  F (35.4  C)  Max: 96.7  F (35.9  C)   Blood pressure Systolic (24hrs), Av , Min:94 , Max:144        Diastolic (24hrs), Av, Min:59, Max:79      Pulse No Data Recorded   Respirations Resp  Av.1  Min: 8  Max: 19   Pulse oximetry SpO2  Av %  Min: 96 %  Max: 98 %         Intake/Output Summary (Last 24 hours) at 10/16/18 0901  Last data filed at 10/16/18 0516   Gross per 24 hour   Intake             1059 ml   Output              550 ml   Net              509 ml       Constitutional:   No acute distress.   Abdomen:   Soft, nontender, nondistended.   Extremities:   No clubbing, cyanosis, or edema.              Medications:     No current outpatient prescriptions on file.                Data:     Results for orders placed or performed during the hospital encounter of 10/15/18 (from the past 24 hour(s))   CBC with platelets differential   Result Value Ref Range    WBC 12.7 (H) 4.0 - 11.0 10e9/L    " RBC Count 3.43 (L) 4.4 - 5.9 10e12/L    Hemoglobin 10.5 (L) 13.3 - 17.7 g/dL    Hematocrit 32.5 (L) 40.0 - 53.0 %    MCV 95 78 - 100 fl    MCH 30.6 26.5 - 33.0 pg    MCHC 32.3 31.5 - 36.5 g/dL    RDW 13.9 10.0 - 15.0 %    Platelet Count 257 150 - 450 10e9/L    Diff Method Automated Method     % Neutrophils 78.5 %    % Lymphocytes 14.4 %    % Monocytes 4.0 %    % Eosinophils 2.4 %    % Basophils 0.3 %    % Immature Granulocytes 0.4 %    Nucleated RBCs 0 0 /100    Absolute Neutrophil 10.0 (H) 1.6 - 8.3 10e9/L    Absolute Lymphocytes 1.8 0.8 - 5.3 10e9/L    Absolute Monocytes 0.5 0.0 - 1.3 10e9/L    Absolute Eosinophils 0.3 0.0 - 0.7 10e9/L    Absolute Basophils 0.0 0.0 - 0.2 10e9/L    Abs Immature Granulocytes 0.1 0 - 0.4 10e9/L    Absolute Nucleated RBC 0.0    Comprehensive metabolic panel   Result Value Ref Range    Sodium 139 133 - 144 mmol/L    Potassium 3.6 3.4 - 5.3 mmol/L    Chloride 105 94 - 109 mmol/L    Carbon Dioxide 26 20 - 32 mmol/L    Anion Gap 8 3 - 14 mmol/L    Glucose 123 (H) 70 - 99 mg/dL    Urea Nitrogen 23 7 - 30 mg/dL    Creatinine 1.57 (H) 0.66 - 1.25 mg/dL    GFR Estimate 43 (L) >60 mL/min/1.7m2    GFR Estimate If Black 52 (L) >60 mL/min/1.7m2    Calcium 8.9 8.5 - 10.1 mg/dL    Bilirubin Total 0.2 0.2 - 1.3 mg/dL    Albumin 3.2 (L) 3.4 - 5.0 g/dL    Protein Total 7.0 6.8 - 8.8 g/dL    Alkaline Phosphatase 170 (H) 40 - 150 U/L    ALT 19 0 - 70 U/L    AST 21 0 - 45 U/L   ABO/Rh type and screen   Result Value Ref Range    ABO A     RH(D) Pos     Antibody Screen Neg     Test Valid Only At [facility]        Specimen Expires 10/18/2018    Occult blood stool   Result Value Ref Range    Occult Blood Positive (A) NEG^Negative   EKG 12-lead, tracing only   Result Value Ref Range    Interpretation ECG Click View Image link to view waveform and result    Gastroenterology IP Consult: known bleeding gastric mass, ?egd; Consultant may enter orders: Yes; Patient to be seen: Routine - within 24  hours; Requested Clinic/Group: [facility]    Narrative    Dunia Diop PA-C     10/15/2018  1:12 PM  [facility]  Gastroenterology Consultation    Gibson Villalta  [address]   [place] [address]  77 year old male    Admission Date/Time: 10/15/2018  Primary Care Provider: Flori Reyes    We were asked to see the patient in consultation by Joanna Barthell for evaluation of gastric mass.        HPI:  Gibson Villalta is a 77 year old male who has a   history of anemia, gastric mass, and bladder mass who presents   with dizziness, hypotension, and fatigue.  The patient reports   that when he stands or changes position he develops significant   lightheadedness and feels as though he is going to pass out.  He   also reports low blood pressure over the last week.  The patient   is supposed to have bladder surgery in 2 days.  He reports that   his stools are dark but not black or tarry.  He denies   hematochezia.  Denies abdominal pain, nausea, or vomiting.  He   does complain of early satiety which has been present for 1 year.        In July, the patient was hospitalized with bloody stools.  EGD   showed a likely malignant gastric tumor on the greater curvature   of the stomach.  Biopsies showed atypical lymphoid infiltrate   suspicious for large B-cell lymphoma.  Path however was hesitant   to make definitive call.  Repeat upper endoscopy in August again   showed a likely malignant gastric tumor on the greater curvature   of the stomach.  The biopsies showed active inflammation and   atypical cells present but was otherwise unrevealing.    Hemoglobin 10.5 on admission, down from 12.6 in September.    ROS: A comprehensive ten point review of systems was negative   aside from those in mentioned in the HPI.      MEDICATIONS:   No current facility-administered medications on file prior to   encounter.   Current Outpatient Prescriptions on File Prior to  Encounter:  atorvastatin (LIPITOR) 40 MG tablet TAKE 1 TABLET AT BEDTIME   COENZYME Q-10 PO Take 200 mg by mouth every morning    ferrous sulfate (IRON) 325 (65 FE) MG tablet Take 325 mg by mouth   every morning    furosemide (LASIX) 20 MG tablet Take 0.5 tablets (10 mg) by mouth   daily   hydrALAZINE (APRESOLINE) 10 MG tablet Take 1 tablet (10 mg) by   mouth 3 times daily   isosorbide mononitrate (IMDUR) 30 MG 24 hr tablet Take 0.5   tablets (15 mg) by mouth daily   metoprolol succinate (TOPROL-XL) 50 MG 24 hr tablet TAKE 1 TABLET   (50 MG) DAILY   multivitamin, therapeutic with minerals (MULTI-VITAMIN) TABS Take   1 tablet by mouth every morning    venlafaxine (EFFEXOR) 75 MG tablet TAKE 1 TABLET TWICE DAILY   vitamin D (ERGOCALCIFEROL) 98711 UNIT capsule TAKE 1 CAPSULE ONCE   A WEEK   ACE/ARB NOT PRESCRIBED, INTENTIONAL, ACE & ARB not prescribed due   to Symptomatic hypotension not due to excessive diuresis (Patient   not taking: Reported on 10/12/2018)   ASPIRIN NOT PRESCRIBED (INTENTIONAL) Please choose reason not   prescribed, below (Patient not taking: Reported on 10/12/2018)       ALLERGIES:   Allergies   Allergen Reactions     Wasps [Hornets]      Sand wasp --- years ago.         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     Myocardial infarction (H) 1995 and 2010     Nonischemic cardiomyopathy (H) 7/22/2016     Obesity      Pacemaker     ICD/PM     Rosacea      Rotator cuff disorder     Tear x 2     RV (right ventricular) mural thrombus 7/22/2016       Past Surgical History:   Procedure Laterality Date      ANGIOPLASTY   and  with stent     BACK SURGERY       CARDIAC SURGERY      ICD/PM     CYSTOSCOPY, TRANSURETHRAL RESECTION (TUR) TUMOR BLADDER,   COMBINED N/A 2018    Procedure: COMBINED CYSTOSCOPY, TRANSURETHRAL RESECTION (TUR)   TUMOR BLADDER;  CYSTOSCOPY, TRANSURETHRAL RESECTION BLADDER TUMOR   ;  Surgeon: Ryan Camarillo MD;  Location:  OR     ESOPHAGOSCOPY, GASTROSCOPY, DUODENOSCOPY (EGD), COMBINED N/A   2018    Procedure: COMBINED ESOPHAGOSCOPY, GASTROSCOPY, DUODENOSCOPY   (EGD), BIOPSY SINGLE OR MULTIPLE;  gastroscopy;  Surgeon: Parish Xiong MD;  Location:  GI     ESOPHAGOSCOPY, GASTROSCOPY, DUODENOSCOPY (EGD), COMBINED N/A   2018    Procedure: COMBINED ESOPHAGOSCOPY, GASTROSCOPY, DUODENOSCOPY   (EGD);  EGD;  Surgeon: Parish Xiong MD;  Location:  GI     ESOPHAGOSCOPY, GASTROSCOPY, DUODENOSCOPY (EGD), COMBINED N/A   2018    Procedure: COMBINED ESOPHAGOSCOPY, GASTROSCOPY, DUODENOSCOPY   (EGD), BIOPSY SINGLE OR MULTIPLE;  gastroscopy BIOPSYSHOULD BE   SENT TO LAB IN NS NOT FORMALIN PER ;  Surgeon: Jonathon Bush MD;  Location:  GI     GASTRIC BYPASS       HEART CATH LEFT HEART CATH  16    Non ischemic cardiomyopathy; Non functionally significant LAD   and RCA disease      OTHER SURGICAL HISTORY      Spinal surgery     OTHER SURGICAL HISTORY  2016    CRT-D implantation         SOCIAL HISTORY:  Social History   Substance Use Topics     Smoking status: Former Smoker     Packs/day: 2.00     Years: 20.00     Types: Cigarettes     Quit date: 1980     Smokeless tobacco: Never Used      Comment: Quit in his 30s - 2 ppd for 20 years     Alcohol use 0.0 oz/week     0 Standard drinks or equivalent per week      Comment: maybe 1 beer a month       FAMILY HISTORY:  Family History   Problem Relation Age of Onset     Coronary Artery Disease Father 39     Alzheimer Disease Mother      Coronary Artery Disease Son      Two sons have  from MIs (ages  "39 and 51)       PHYSICAL EXAM:   /69  Temp 95.7  F (35.4  C)  Resp 16  Ht 1.854 m (6' 1\")    Wt 101.6 kg (224 lb)  SpO2 98%  BMI 29.55 kg/m2    Constitutional: NAD, comfortable  Cardiovascular: RRR, normal S1 and S2, no r/c/g/m  Respiratory: CTAB  Psychiatric: mentation appears normal and affect normal  Head: Normocephalic. Atraumatic.    Neck: Neck supple. No adenopathy. Thyroid symmetric, normal size,   trachea midline  Eyes:  PERRL, no icterus  ENT: Hearing adequate, pharynx normal without erythema or exudate  Abdomen:   Auscultation: + BS  Appearance: non-distended  Palpation: non-tender  NEURO: grossly negative  SKIN: no suspicious lesions or rashes  LYMPH:   anterior cervical: no adenopathy  posterior cervical: no adenopathy  supraclavicular: no adenopathy          ADDITIONAL COMMENTS:   I reviewed the patient's new clinical lab test results.   Recent Labs   Lab Test  10/15/18   0910  10/12/18   1301  10/09/18   1333  09/13/18   1103   07/31/18   1035   07/30/18   0750 07/11/18   WBC  12.7*  9.9   --   10.2   < >   --    < >  11.5*   --    HGB  10.5*  10.7*  11.2*  12.6*   < >   --    < >  13.9   --    MCV  95  99   --   100   < >   --    < >  95   --    PLT  257  283   --   283   < >   --    < >  218   --    INR   --    --    --    --    --   1.21*   --   3.56*  3.0*    < > = values in this interval not displayed.     Recent Labs   Lab Test  10/15/18   0910  10/12/18   1301  10/09/18   1333  09/13/18   1103   NA  139  140   --   140   POTASSIUM  3.6  4.3   --   4.2   CHLORIDE  105  106   --   104   CO2  26  26   --   25   BUN  23  24   --   22   CR  1.57*  1.45*  1.34*  1.49*   ANIONGAP  8  8   --   11   SHELLEY  8.9  9.1   --   9.6   GLC  123*  131*   --   100*     Recent Labs   Lab Test  10/15/18   0910  10/12/18   1301  09/07/18   1012  08/04/18   1356  07/31/18   1325  07/30/18   0750   07/17/16   1247   ALBUMIN  3.2*  3.2*   --    --    --   3.4   < >  3.1*   BILITOTAL  0.2  0.2   --    --    -- "   0.4   < >  0.9   DBIL   --    --    --    --    --    --    --   0.3*   ALT  19  20   --    --    --   40   < >  24   AST  21  21   --    --    --   27   < >  27   ALKPHOS  170*  188*   --    --    --   143   < >  139   PROTEIN   --    --   100*  >=300*  Negative   --    < >   --    LIPASE   --    --    --    --    --   105   --    --     < > = values in this interval not displayed.             .    CONSULTATION ASSESSMENT AND PLAN:      77 yo male who presented to the ED with fatigue.  Recent finding   of gastric mass.  Initial biopsies 7/2018 suspicious for B-cell   lymphoma.  Pathology unwilling to make definitive call due to   lack of tissue.  Re-scoped in early August.  Path showed only   active inflammation.  Currently undergoing treatment for bladder   carcinoma.  Hemoglobin down from baseline at 10.5.  Report of   dark stools.    -  Will need repeat EGD for more tissue, ? Inpatient vs   outpatient.  Will likely depend on clinical course here.  -  Monitor H/H; transfuse prn.  -  PPI.      I discussed the patient's findings and plan with Dr. Talavera.          Dunia Diop, [facility] Gastroenterology  Office:  [phone] call if needed after 5PM  Cell:  [phone], not available after 5PM at this number     Hematology & Oncology IP Consult: weakness, no overt acute issue.  expectations/underlying malignancy prognosis; Consultant may enter orders: Yes; Patient to be seen: Routine - within 24 hours; Requested Clinic/Group: [facility] On[facility]    Narrative    Maribel Bender MD     10/15/2018  3:45 PM  [facility] Progress Note          Assessment and Plan:   77 year-old male with history of dementia, RV and LV mural   thrombus on chronic anticoagulation with Coumadin, status post   Smita-en-Y gastric bypass, hypertension, chronic kidney disease,   CAD, cardiomyopathy with EF 25-30%, diabetes mellitus presents   with weakness, fatigue, and recurrent anemia.    1. Anemia due to blood loss  2.  Gastric mass  - Likely related to gastric mass that was previously biopsied   multiple times but with no definitive diagnosis (tissue was   non-diagnostic) after 7/30/2018 CT abdomen/pelvis with contrast   showed postop changes of prior gastric bypass procedure,   ill-defined soft tissue thickening in the proximal stomach,   increased since 8/9/2016, no evidence for bowel obstruction,   colitis, or diverticulitis; no free fluid; 2 bladder wall lesions   enhancing, largest measuring 3.9 x 1.8 cm and smaller bladder   wall lesion near midline measuring 2 x 1.4 cm, several left renal   cysts; liver, gallbladder, spleen, adrenal glands, pancreas, and   remaining kidneys unremarkable.  - 7/30/2018: Upper endoscopy showed a medium sized, fungating,   partially circumferential mass with oozing and stigmata of recent   bleeding on the greater curvature of the stomach with biopsies   suggesting possible lymphoma but it is negative for   adenocarcinoma; esophagus and jejunum normal; gastric bypass with   normal-sized pouch and intact staple line.  Patient denied any   associated abdominal pain, fevers, chills, night sweats,   unintentional weight loss, chest pain, dyspnea, hematuria,   dysuria.  - 8/2/2018: Repeat upper endoscopy with biopsy of the stomach   mass showed active inflammation and atypical cells with crush   artifact.  There were insufficient number of atypical cells to   render a definitive diagnosis.  - At last/prior visit on 8/14/18, he agreed to observation and   repeat imaging in 3 months.  However, now, he presents with   recurrent, worsening anemia and FOBT positive stools.  - I recommended repeat EGD -- [facility] already consulted -- await   their decision on timing of EGD with repeat biopsy of gastric   mass.  - Continue serial Hgb monitoring (transfuse if Hgb decreases   below 7 g/dL).     3.  Bladder cancer  -These were seen on CT abdomen/pelvis on 7/30/2018 and are   concerning for malignancy.  -He was  "further evaluated by Dr. Howard Camarillo on 2018.  Per   patient's wife, he underwent TURBT with incomplete removal of   high-grade bladder tumor (documentation not available to me at   time of dictation) and he will need repeat TURBT again in near   future.     Full code               Presenting History:   77 year-old with history of dementia, gastric mass, and bladder   tumors now presents with generalized weakness, fatigue, and   recurrent anemia, with Hgb decreased from 12.6 last month to 10.5   currently.  He reports feeling much better after IV fluid   hydration.              Review of Systems:   As per subjective, otherwise 5 systems reviewed and negative.           Physical Exam:   Blood pressure 108/69, temperature 95.7  F (35.4  C), resp. rate   16, height 1.854 m (6' 1\"), weight 101.6 kg (224 lb), SpO2 98 %.      Vital Sign Ranges  Temperature Temp  Av.9  F (36.1  C)  Min: 95.7  F (35.4  C)    Max: 97.8  F (36.6  C)   Blood pressure Systolic (24hrs), Av , Min:79 , Max:109        Diastolic (24hrs), Av, Min:55, Max:70      Pulse Pulse  Av  Min: 98  Max: 98   Respirations Resp  Av.6  Min: 8  Max: 19   Pulse oximetry SpO2  Av %  Min: 96 %  Max: 98 %         Intake/Output Summary (Last 24 hours) at 10/15/18 1533  Last data filed at 10/15/18 1500   Gross per 24 hour   Intake              250 ml   Output                0 ml   Net              250 ml       Constitutional:   No acute distress.   Skin:   No rashes.   HEENT:   Normocephalic, atraumatic. Oropharynx clear.   Neck:   Supple.   Lungs:   Clear to auscultation bilaterally.   Cardiovascular:   Regular rate and rhythm with no murmurs.   Abdomen:   Soft, nontender, nondistended with no palpable   hepatosplenomegaly or masses.   Extremities:   No clubbing, cyanosis, or edema.   Neurological:   CN II-XII grossly intact. No gross focal motor deficits.            Medications:     No current outpatient prescriptions on file.         "        Data:     Results for orders placed or performed during the hospital   encounter of 10/15/18 (from the past 24 hour(s))   CBC with platelets differential   Result Value Ref Range    WBC 12.7 (H) 4.0 - 11.0 10e9/L    RBC Count 3.43 (L) 4.4 - 5.9 10e12/L    Hemoglobin 10.5 (L) 13.3 - 17.7 g/dL    Hematocrit 32.5 (L) 40.0 - 53.0 %    MCV 95 78 - 100 fl    MCH 30.6 26.5 - 33.0 pg    MCHC 32.3 31.5 - 36.5 g/dL    RDW 13.9 10.0 - 15.0 %    Platelet Count 257 150 - 450 10e9/L    Diff Method Automated Method     % Neutrophils 78.5 %    % Lymphocytes 14.4 %    % Monocytes 4.0 %    % Eosinophils 2.4 %    % Basophils 0.3 %    % Immature Granulocytes 0.4 %    Nucleated RBCs 0 0 /100    Absolute Neutrophil 10.0 (H) 1.6 - 8.3 10e9/L    Absolute Lymphocytes 1.8 0.8 - 5.3 10e9/L    Absolute Monocytes 0.5 0.0 - 1.3 10e9/L    Absolute Eosinophils 0.3 0.0 - 0.7 10e9/L    Absolute Basophils 0.0 0.0 - 0.2 10e9/L    Abs Immature Granulocytes 0.1 0 - 0.4 10e9/L    Absolute Nucleated RBC 0.0    Comprehensive metabolic panel   Result Value Ref Range    Sodium 139 133 - 144 mmol/L    Potassium 3.6 3.4 - 5.3 mmol/L    Chloride 105 94 - 109 mmol/L    Carbon Dioxide 26 20 - 32 mmol/L    Anion Gap 8 3 - 14 mmol/L    Glucose 123 (H) 70 - 99 mg/dL    Urea Nitrogen 23 7 - 30 mg/dL    Creatinine 1.57 (H) 0.66 - 1.25 mg/dL    GFR Estimate 43 (L) >60 mL/min/1.7m2    GFR Estimate If Black 52 (L) >60 mL/min/1.7m2    Calcium 8.9 8.5 - 10.1 mg/dL    Bilirubin Total 0.2 0.2 - 1.3 mg/dL    Albumin 3.2 (L) 3.4 - 5.0 g/dL    Protein Total 7.0 6.8 - 8.8 g/dL    Alkaline Phosphatase 170 (H) 40 - 150 U/L    ALT 19 0 - 70 U/L    AST 21 0 - 45 U/L   ABO/Rh type and screen   Result Value Ref Range    ABO A     RH(D) Pos     Antibody Screen Neg     Test Valid Only At [facility]        Specimen Expires 10/18/2018    Occult blood stool   Result Value Ref Range    Occult Blood Positive (A) NEG^Negative   EKG 12-lead, tracing only   Result Value  Ref Range    Interpretation ECG Click View Image link to view waveform and   result    Gastroenterology IP Consult: known bleeding gastric mass, ?egd;   Consultant may enter orders: Yes; Patient to be seen: Routine -   within 24 hours; Requested Clinic/Group: Dunia Beard PA-C     10/15/2018  1:12 PM  [facility]  Gastroenterology Consultation    Gibson Villalta  [address]   [place] [address]  77 year old male    Admission Date/Time: 10/15/2018  Primary Care Provider: Flori Reyes    We were asked to see the patient in consultation by Joanna Barthell for evaluation of gastric mass.        HPI:  Gibson Villalta is a 77 year old male who has a   history of anemia, gastric mass, and bladder mass who presents   with dizziness, hypotension, and fatigue.  The patient reports   that when he stands or changes position he develops significant   lightheadedness and feels as though he is going to pass out.  He   also reports low blood pressure over the last week.  The patient   is supposed to have bladder surgery in 2 days.  He reports that   his stools are dark but not black or tarry.  He denies   hematochezia.  Denies abdominal pain, nausea, or vomiting.  He   does complain of early satiety which has been present for 1 year.          In July, the patient was hospitalized with bloody stools.  EGD   showed a likely malignant gastric tumor on the greater curvature   of the stomach.  Biopsies showed atypical lymphoid infiltrate   suspicious for large B-cell lymphoma.  Path however was hesitant   to make definitive call.  Repeat upper endoscopy in August again   showed a likely malignant gastric tumor on the greater curvature   of the stomach.  The biopsies showed active inflammation and   atypical cells present but was otherwise unrevealing.    Hemoglobin 10.5 on admission, down from 12.6 in September.    ROS: A comprehensive ten point  review of systems was negative   aside from those in mentioned in the HPI.      MEDICATIONS:   No current facility-administered medications on file prior to   encounter.   Current Outpatient Prescriptions on File Prior to Encounter:  atorvastatin (LIPITOR) 40 MG tablet TAKE 1 TABLET AT BEDTIME   COENZYME Q-10 PO Take 200 mg by mouth every morning    ferrous sulfate (IRON) 325 (65 FE) MG tablet Take 325 mg by mouth     every morning    furosemide (LASIX) 20 MG tablet Take 0.5 tablets (10 mg) by mouth     daily   hydrALAZINE (APRESOLINE) 10 MG tablet Take 1 tablet (10 mg) by   mouth 3 times daily   isosorbide mononitrate (IMDUR) 30 MG 24 hr tablet Take 0.5   tablets (15 mg) by mouth daily   metoprolol succinate (TOPROL-XL) 50 MG 24 hr tablet TAKE 1 TABLET     (50 MG) DAILY   multivitamin, therapeutic with minerals (MULTI-VITAMIN) TABS Take     1 tablet by mouth every morning    venlafaxine (EFFEXOR) 75 MG tablet TAKE 1 TABLET TWICE DAILY   vitamin D (ERGOCALCIFEROL) 56549 UNIT capsule TAKE 1 CAPSULE ONCE     A WEEK   ACE/ARB NOT PRESCRIBED, INTENTIONAL, ACE & ARB not prescribed due     to Symptomatic hypotension not due to excessive diuresis (Patient     not taking: Reported on 10/12/2018)   ASPIRIN NOT PRESCRIBED (INTENTIONAL) Please choose reason not   prescribed, below (Patient not taking: Reported on 10/12/2018)       ALLERGIES:   Allergies   Allergen Reactions     Wasps [Hornets]      Sand wasp --- years ago.         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     Myocardial infarction (H) 1995 and 2010     Nonischemic  cardiomyopathy (H) 7/22/2016     Obesity      Pacemaker     ICD/PM     Rosacea      Rotator cuff disorder     Tear x 2     RV (right ventricular) mural thrombus 7/22/2016       Past Surgical History:   Procedure Laterality Date     ANGIOPLASTY  1995 and 2010 with stent     BACK SURGERY       CARDIAC SURGERY      ICD/PM     CYSTOSCOPY, TRANSURETHRAL RESECTION (TUR) TUMOR BLADDER,   COMBINED N/A 9/19/2018    Procedure: COMBINED CYSTOSCOPY, TRANSURETHRAL RESECTION (TUR)   TUMOR BLADDER;  CYSTOSCOPY, TRANSURETHRAL RESECTION BLADDER TUMOR     ;  Surgeon: Ryan Caamrillo MD;  Location:  OR     ESOPHAGOSCOPY, GASTROSCOPY, DUODENOSCOPY (EGD), COMBINED N/A   7/30/2018    Procedure: COMBINED ESOPHAGOSCOPY, GASTROSCOPY, DUODENOSCOPY   (EGD), BIOPSY SINGLE OR MULTIPLE;  gastroscopy;  Surgeon: Parish Xiong MD;  Location:  GI     ESOPHAGOSCOPY, GASTROSCOPY, DUODENOSCOPY (EGD), COMBINED N/A   7/30/2018    Procedure: COMBINED ESOPHAGOSCOPY, GASTROSCOPY, DUODENOSCOPY   (EGD);  EGD;  Surgeon: Parish Xiong MD;  Location:  GI     ESOPHAGOSCOPY, GASTROSCOPY, DUODENOSCOPY (EGD), COMBINED N/A   8/2/2018    Procedure: COMBINED ESOPHAGOSCOPY, GASTROSCOPY, DUODENOSCOPY   (EGD), BIOPSY SINGLE OR MULTIPLE;  gastroscopy BIOPSYSHOULD BE   SENT TO LAB IN NS NOT FORMALIN PER ;  Surgeon: Jonathon Bush MD;  Location:  GI     GASTRIC BYPASS  2001     HEART CATH LEFT HEART CATH  7/19/16    Non ischemic cardiomyopathy; Non functionally significant LAD   and RCA disease      OTHER SURGICAL HISTORY  2006    Spinal surgery     OTHER SURGICAL HISTORY  Nov 2016    CRT-D implantation         SOCIAL HISTORY:  Social History   Substance Use Topics     Smoking status: Former Smoker     Packs/day: 2.00     Years: 20.00     Types: Cigarettes     Quit date: 1/1/1980     Smokeless tobacco: Never Used      Comment: Quit in his 30s - 2 ppd for 20 years     Alcohol use 0.0 oz/week     0 Standard drinks or equivalent per week       "Comment: maybe 1 beer a month       FAMILY HISTORY:  Family History   Problem Relation Age of Onset     Coronary Artery Disease Father 39     Alzheimer Disease Mother      Coronary Artery Disease Son      Two sons have  from MIs (ages 39 and 51)       PHYSICAL EXAM:   /69  Temp 95.7  F (35.4  C)  Resp 16  Ht 1.854 m (6' 1\")      Wt 101.6 kg (224 lb)  SpO2 98%  BMI 29.55 kg/m2    Constitutional: NAD, comfortable  Cardiovascular: RRR, normal S1 and S2, no r/c/g/m  Respiratory: CTAB  Psychiatric: mentation appears normal and affect normal  Head: Normocephalic. Atraumatic.    Neck: Neck supple. No adenopathy. Thyroid symmetric, normal size,     trachea midline  Eyes:  PERRL, no icterus  ENT: Hearing adequate, pharynx normal without erythema or exudate  Abdomen:   Auscultation: + BS  Appearance: non-distended  Palpation: non-tender  NEURO: grossly negative  SKIN: no suspicious lesions or rashes  LYMPH:   anterior cervical: no adenopathy  posterior cervical: no adenopathy  supraclavicular: no adenopathy          ADDITIONAL COMMENTS:   I reviewed the patient's new clinical lab test results.   Recent Labs   Lab Test  10/15/18   0910  10/12/18   1301  10/09/18   1333  18   1103   18   1035   18   0750 18   WBC  12.7*  9.9   --   10.2   < >   --    < >  11.5*   --    HGB  10.5*  10.7*  11.2*  12.6*   < >   --    < >  13.9   --    MCV  95  99   --   100   < >   --    < >  95   --    PLT  257  283   --   283   < >   --    < >  218   --    INR   --    --    --    --    --   1.21*   --   3.56*  3.0*    < > = values in this interval not displayed.     Recent Labs   Lab Test  10/15/18   0910  10/12/18   1301  10/09/18   1333  18   1103   NA  139  140   --   140   POTASSIUM  3.6  4.3   --   4.2   CHLORIDE  105  106   --   104   CO2  26  26   --   25   BUN  23  24   --   22   CR  1.57*  1.45*  1.34*  1.49*   ANIONGAP  8  8   --   11   SHELLEY  8.9  9.1   --   9.6   GLC  123*  131*   --   100* "     Recent Labs   Lab Test  10/15/18   0910  10/12/18   1301  09/07/18   1012  08/04/18   1356  07/31/18   1325  07/30/18   0750   07/17/16   1247   ALBUMIN  3.2*  3.2*   --    --    --   3.4   < >  3.1*   BILITOTAL  0.2  0.2   --    --    --   0.4   < >  0.9   DBIL   --    --    --    --    --    --    --   0.3*   ALT  19  20   --    --    --   40   < >  24   AST  21  21   --    --    --   27   < >  27   ALKPHOS  170*  188*   --    --    --   143   < >  139   PROTEIN   --    --   100*  >=300*  Negative   --    < >   --    LIPASE   --    --    --    --    --   105   --    --     < > = values in this interval not displayed.             .    CONSULTATION ASSESSMENT AND PLAN:      77 yo male who presented to the ED with fatigue.  Recent finding   of gastric mass.  Initial biopsies 7/2018 suspicious for B-cell   lymphoma.  Pathology unwilling to make definitive call due to   lack of tissue.  Re-scoped in early August.  Path showed only   active inflammation.  Currently undergoing treatment for bladder   carcinoma.  Hemoglobin down from baseline at 10.5.  Report of   dark stools.    -  Will need repeat EGD for more tissue, ? Inpatient vs   outpatient.  Will likely depend on clinical course here.  -  Monitor H/H; transfuse prn.  -  PPI.      I discussed the patient's findings and plan with Dr. Talavera.          Dunia Diop, [facility]  Office:  [phone] call if needed after 5PM  Cell:  [phone], not available after 5PM at this number                Hemoglobin   Result Value Ref Range    Hemoglobin 8.8 (L) 13.3 - 17.7 g/dL   Hemoglobin   Result Value Ref Range    Hemoglobin 9.1 (L) 13.3 - 17.7 g/dL   UA with Microscopic reflex to Culture   Result Value Ref Range    Color Urine Yellow     Appearance Urine Slightly Cloudy     Glucose Urine Negative NEG^Negative mg/dL    Bilirubin Urine Negative NEG^Negative    Ketones Urine Negative NEG^Negative mg/dL    Specific Gravity Urine 1.019 1.003 -  1.035    Blood Urine Trace (A) NEG^Negative    pH Urine 5.0 5.0 - 7.0 pH    Protein Albumin Urine 30 (A) NEG^Negative mg/dL    Urobilinogen mg/dL Normal 0.0 - 2.0 mg/dL    Nitrite Urine Negative NEG^Negative    Leukocyte Esterase Urine Large (A) NEG^Negative    Source Midstream Urine     WBC Urine 120 (H) 0 - 5 /HPF    RBC Urine 22 (H) 0 - 2 /HPF    Bacteria Urine Few (A) NEG^Negative /HPF    Mucous Urine Present (A) NEG^Negative /LPF    sperm Present (A) NEG^Negative /HPF    Hyaline Casts 33 (H) 0 - 2 /LPF   Urine Culture Aerobic Bacterial   Result Value Ref Range    Specimen Description Midstream Urine     Special Requests Specimen received in preservative     Culture Micro PENDING    Hemoglobin   Result Value Ref Range    Hemoglobin 9.4 (L) 13.3 - 17.7 g/dL   Blood culture   Result Value Ref Range    Specimen Description Blood Right Arm     Special Requests Aerobic and anaerobic bottles received     Culture Micro No growth after 7 hours    Basic metabolic panel   Result Value Ref Range    Sodium 142 133 - 144 mmol/L    Potassium 3.9 3.4 - 5.3 mmol/L    Chloride 110 (H) 94 - 109 mmol/L    Carbon Dioxide 26 20 - 32 mmol/L    Anion Gap 6 3 - 14 mmol/L    Glucose 92 70 - 99 mg/dL    Urea Nitrogen 20 7 - 30 mg/dL    Creatinine 1.37 (H) 0.66 - 1.25 mg/dL    GFR Estimate 50 (L) >60 mL/min/1.7m2    GFR Estimate If Black 61 >60 mL/min/1.7m2    Calcium 8.3 (L) 8.5 - 10.1 mg/dL

## 2018-10-16 NOTE — PROGRESS NOTES
Observation goals PRIOR TO DISCHARGE     Comments:   -diagnostic tests and consults completed and resulted: Not met, waiting on blood cultures  -vital signs normal or at patient baseline: Met  -tolerating oral intake to maintain hydration: Met  -safe disposition plan has been identified: Not met  -GI consult: Met  Nurse to notify provider when observation goals have been met and patient is ready for discharge.

## 2018-10-16 NOTE — DISCHARGE SUMMARY
[facility]    Discharge Summary  Hospitalist    Date of Admission:  10/15/2018  Date of Discharge:  10/16/2018  Discharging Provider: Gisell Mcginnis PA-C  Date of Service (when I saw the patient): 10/16/18    Discharge Diagnoses   Generalized weakness and lightheadedness  Orthostatic hypotension   Chronic GI bleed related to GI mass     History of Present Illness   Gibson Villalta is a pleasant 77-year-old male with history of Smita-en-Y, recurrent biventricular mural thrombus no longer on anticoagulation, hypertension, chronic kidney disease III, coronary artery disease with prior stenting, mixed cardiomyopathy status post ICD, diet-controlled diabetes, known left bundle branch block, dementia and 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  Registered under observation status 10/15/18.  See H&P by Joanna Barthell, PA-C for complete details on presentation.     On my interview the patient is resting comfortably in bed.  He is accompanied by his wife at bedside.  Notes improvement in dizziness and generalized weakness. He feels back to baseline.  Denies chest pain, shortness of breath, abdominal pain, nausea, vomiting, diarrhea.  Hemoglobin stable.  Intermittent melena.  Gastroenterology followed patient throughout stay with plans for outpatient endoscopy in the next week.  Hematology/oncology also followed with the patient during his stay.  Patient is anxious to discharge as he has planned urology procedure 10/17/18.    Hospital Course   Gibson Villalta was admitted on 10/15/2018.  The following problems were addressed during his hospitalization:     Generalized weakness and lightheadedness  Orthostatic hypotension, resolved: Symptoms likely multifactorial secondary to ongoing use of  hypertensive medications in the setting of reduced p.o. Intake, underlying bladder cancer, and slow GI bleed secondary to suspected gastric cancer.  In clinic his systolic was measured at 79/55.  Anemic, but stable over the last week.  No overt signs or symptoms for infection.  CXR has not been obtained and he denies fever, cough and does not have other evidence to suggest a pulmonary infection.  He does complain of frequent urination and mild dysuria since his TURBT 09/21/2018.  No electrolyte abnormalities that would account for progressive weakness.  Renal function appears stable.  WBC 12.7>7.9.  Denies palpitations and cardiac symptoms making acute coronary syndrome and arrhythmia less likely.  Urinalysis abnormal however urine culture negative.  Patient was hydrated with IV fluids overnight.  He ambulated well with nursing staff and felt back to his baseline.  -Gastroenterology consulted during hospitalization; plan for outpatient endoscopy in the next week to reevaluate gastric mass prescribed at discharge  -Hematology/oncology consulted during hospitalization, see formal note by Dr. Bender, appreciate assistance greatly.  Recommend Protonix 40 mg p.o. daily which was  -Systolic blood pressures on day of discharge in the 1 teens.  We will continue to hold prior to admission hydralazine, Lasix, and Imdur.  Patient to continue prior to admission metoprolol.  Have instructed patient to set up close primary care provider follow-up to reevaluate symptoms, check blood pressure, and get further instruction on restarting blood pressure medications are continuing to hold.  --Return to the emergency department with worsening symptoms     Blood loss anemia 2/2 upper GI bleed.   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  Hgb 12.6 approx 1 month ago.  Plan was to follow up with GI in early November for CT imaging and repeat EGD.  Hemoglobin has remained stable in the 10.5 - 11.5  range for the last week, though is down approximately 2 grams from 1 month ago.  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  As above, does endorse urinary frequency and mild dysuria symptoms for approximately 1 month since TURBT.  Urinalysis abnormal however urine culture negative.  -- The patient is scheduled for a cystoscopy 10/17/2018 with [facility].     Relative hypotension in setting treated hypertension and below.  Mixed cardiomyopathy. EF 25 - 30% in 05/2018.  Appears euvolemic.     CAD with history of myocardial infarction. No stenting. The patient is not on aspirin due to a gastrointestinal bleed and not on ACE or ARB due to symptomatic hypotension.  It appears this medication was discontinued on 10/12/2018.   -- Resume prior to admission atorvastatin upon observation discharge.   -- Hold prior to admission Lasix, Hydralazine, and Imdur medication given soft blood pressure at discharge as above   -- Continue prior to admission Toprol      History right and left ventricular mural thrombi, recurrent: Initially diagnosed on cardiac MRI in 2016.  He had recurrence in 2017 and had been on chronic anticoagulation up until 08/2018 when he was hospitalized for gastrointestinal bleed.   -- Noted. Unable to anticoagulate given GI bleed. At present, evaluate above and hold off on obtaining echo as unlikely to .      Diet-controlled type 2 diabetes.  A1c 7.1 on 07/30/2018.      Recent Labs  Lab 10/16/18  0607 10/15/18  0910 10/12/18  1301   GLC 92 123* 131*       Chronic kidney disease, stage III.  Baseline creatinine appears 1.3 - 1.5.  Near baseline on admission.           Dementia: Requires 24-hour supervision at home and no longer drives.  Per his wife, his orientation waxes and wanes.  The patient shows that he is oriented, but he is certainly easily forgetful on exam and generally does not retain details of conversations.     Gisell Mcginnis PA-C    This patient was discussed with Dr. Benavidez of the Hospitalist Service who agrees with current plans as outlined above.     Significant Results and Procedures   See below    Pending Results   These results will be followed up by PCP  Unresulted Labs Ordered in the Past 30 Days of this Admission     Date and Time Order Name Status Description    10/15/2018 2217 Blood culture Preliminary     10/15/2018 2031 Urine Culture Aerobic Bacterial Preliminary         Code Status   Full Code       Primary Care Physician   Flori Reyes    Physical Exam   Temp: 96.2  F (35.7  C) Temp src: Oral BP: 113/79   Heart Rate: 74 Resp: 16 SpO2: 97 % O2 Device: None (Room air)    Vitals:    10/15/18 0858 10/16/18 0600   Weight: 101.6 kg (224 lb) 100 kg (220 lb 8 oz)     Vital Signs with Ranges  Temp:  [96.2  F (35.7  C)-96.7  F (35.9  C)] 96.2  F (35.7  C)  Heart Rate:  [71-74] 74  Resp:  [16] 16  BP: (109-144)/(62-79) 113/79  SpO2:  [96 %-97 %] 97 %  I/O last 3 completed shifts:  In: 1059 [P.O.:250; I.V.:809]  Out: 550 [Urine:550]    CONSTITUTIONAL: Pt laying in bed, dressed in hospital garb. Appears comfortable. Cooperative with interview. Accompanied by wife at bedside.   HEENT: Normocephalic, atraumatic. Negative for conjunctival redness or scleral icterus.  Oral mucosa pink and moist; negative for ulcerations, erythema, or exudates.  Dentition in good repair.   CARDIOVASCULAR: RRR, no murmurs, rubs, or extra heart sounds appreciated. Pulses +2/4 and regular in upper and lower extremities, bilaterally.   RESPIRATORY: No  increased work of breathing.  CTA, bilat; no wheezes, rales, or rhonchi appreciated.  GASTROINTESTINAL:  Abdomen soft, non-distended. BS auscultated in all four quadrants. Negative for tenderness to palpation.  No masses or organomegaly noted.  MUSCULOSKELETAL: Strength +5/5 in upper and lower extremities, bilaterally. No gross deformities noted. Normal muscle tone.   HEMATOLOGIC/LYMPHATIC/IMMUNOLOGIC: Negative for lower extremity edema, bilaterally.  NEUROLOGIC: No focal neuro deficits.  strength intact.   SKIN: Warm, dry, intact. No jaundice noted. Negative for suspicious lesions, rashes, bruising, open sores or abrasions.     Discharge Disposition   Discharged to home  Condition at discharge: Stable    Consultations This Hospital Stay   SOCIAL WORK IP CONSULT  PHYSICAL THERAPY ADULT IP CONSULT  GASTROENTEROLOGY IP CONSULT  HEMATOLOGY & ONCOLOGY IP CONSULT    Time Spent on this Encounter   I, Gisell Mcginnis, personally saw the patient today and spent greater than 30 minutes discharging this patient.    Discharge Orders     Reason for your hospital stay   You were hospitalized for further evaluation and treatment of dizziness and generalized weakness related to low blood pressures and dehydration. 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     Follow-up and recommended labs and tests    Follow up with primary care provider, Flori Reyes, within 7 days for hospital follow- up.  The following labs/tests are recommended: Hgb.     Activity   Your activity upon discharge: activity as tolerated     Discharge Instructions   1) Follow-up with your primary care provider in the next week to discuss hospitalization   2) Hold prior to admission Imdur, Hydralazine, and Lasix at discharge until follow-up with your primary care provider where you will get further instruction on whether to restart medication or continue to hold  off   3) Continue prior to admission Metoprolol   4) Protonix 40 mg by mouth daily recommended by Gastroenterology   5) Outpatient cystoscopy scheduled for 10/17   6) Outpatient endoscopy to be set up by Gastroenterology, they will contact you  7) Follow-up with Oncology as scheduled     Full Code   Discussed on admission     Diet   Follow this diet upon discharge: Orders Placed This Encounter     Regular Diet Adult       Discharge Medications   Discharge Medication List as of 10/16/2018 12:56 PM      START taking these medications    Details   pantoprazole (PROTONIX) 40 MG EC tablet Take 1 tablet (40 mg) by mouth daily Take 30-60 minutes before a meal., Disp-30 tablet, R-0, E-Prescribe         CONTINUE these medications which have NOT CHANGED    Details   atorvastatin (LIPITOR) 40 MG tablet TAKE 1 TABLET AT BEDTIME, Disp-90 tablet, R-3, E-Prescribe      COENZYME Q-10 PO Take 200 mg by mouth every morning , Historical      ferrous sulfate (IRON) 325 (65 FE) MG tablet Take 325 mg by mouth every morning , Historical      metoprolol succinate (TOPROL-XL) 50 MG 24 hr tablet TAKE 1 TABLET (50 MG) DAILY, Disp-90 tablet, R-3, E-Prescribe      multivitamin, therapeutic with minerals (MULTI-VITAMIN) TABS Take 1 tablet by mouth every morning , Historical      venlafaxine (EFFEXOR) 75 MG tablet TAKE 1 TABLET TWICE DAILY, Disp-180 tablet, R-1, E-Prescribe      vitamin D (ERGOCALCIFEROL) 69204 UNIT capsule TAKE 1 CAPSULE ONCE A WEEK, Disp-12 capsule, R-1, E-Prescribe      ACE/ARB NOT PRESCRIBED, INTENTIONAL,       ASPIRIN NOT PRESCRIBED (INTENTIONAL) Reason ASA was Not Prescribed: GI Issues / Ulcer Disease / Gastric mass         STOP taking these medications       furosemide (LASIX) 20 MG tablet Comments:   Reason for Stopping:         hydrALAZINE (APRESOLINE) 10 MG tablet Comments:   Reason for Stopping:         isosorbide mononitrate (IMDUR) 30 MG 24 hr tablet Comments:   Reason for Stopping:         pantoprazole (PROTONIX) 40  mg IV push injection Comments:   Reason for Stopping:             Allergies   Allergies   Allergen Reactions     Wasps [Hornets]      Sand wasp --- years ago.       Data   Most Recent 3 CBC's:  Recent Labs   Lab Test  10/16/18   1135  10/15/18   2315  10/15/18   1757   10/15/18   0910  10/12/18   1301   WBC  7.9   --    --    --   12.7*  9.9   HGB  9.4*  9.4*  9.1*   < >  10.5*  10.7*   MCV  96   --    --    --   95  99   PLT  217   --    --    --   257  283    < > = values in this interval not displayed.      Most Recent 3 BMP's:  Recent Labs   Lab Test  10/16/18   0607  10/15/18   0910  10/12/18   1301   NA  142  139  140   POTASSIUM  3.9  3.6  4.3   CHLORIDE  110*  105  106   CO2  26  26  26   BUN  20  23  24   CR  1.37*  1.57*  1.45*   ANIONGAP  6  8  8   SHELLEY  8.3*  8.9  9.1   GLC  92  123*  131*     Most Recent 2 LFT's:  Recent Labs   Lab Test  10/15/18   0910  10/12/18   1301   AST  21  21   ALT  19  20   ALKPHOS  170*  188*   BILITOTAL  0.2  0.2     Most Recent INR's and Anticoagulation Dosing History:  Anticoagulation Dose History     Recent Dosing and Labs Latest Ref Rng & Units 4/24/2018 5/8/2018 5/23/2018 6/13/2018 7/11/2018 7/30/2018 7/31/2018    INR 0.86 - 1.14 - - - - - 3.56(H) 1.21(H)    INR 0.86 - 1.14 3.8(A) 3.6(A) 2.6(A) 2.3(A) 3.0(A) - -        Most Recent 3 Troponin's:  Recent Labs   Lab Test  10/12/18   1301  07/23/16   2105  07/23/16   1712   TROPI  0.018  0.355*  0.338*     Most Recent Cholesterol Panel:  Recent Labs   Lab Test  02/07/17   0817   CHOL  176   LDL  93   HDL  42   TRIG  203*     Most Recent 6 Bacteria Isolates From Any Culture (See EPIC Reports for Culture Details):  Recent Labs   Lab Test  10/15/18   2315  10/15/18   2031  08/04/18   1356   CULT  No growth after 14 hours  Culture negative < 24 hours, reincubate  50,000 to 100,000 colonies/mL  mixed urogenital jarrod  Susceptibility testing not routinely done       Most Recent TSH, T4 and A1c Labs:  Recent Labs   Lab Test   08/09/18   1256  07/30/18   0750   TSH  0.92   --    A1C   --   7.1*     Results for orders placed or performed in visit on 10/12/18   XR Chest 2 Views    Narrative    CHEST TWO VIEWS  10/12/2018 1:29 PM     HISTORY: 77-year-old patient with fatigue.       Impression    IMPRESSION: Since November 5, 2016, heart size remains normal. Triple  lead AICD in the left hemithorax is unchanged. Elevation of the right  hemidiaphragm is slightly more pronounced on today's exam. No pleural  effusion, pneumothorax, or abnormal area of consolidation.    MONA ALCANTAR MD

## 2018-10-16 NOTE — PLAN OF CARE
Problem: Patient Care Overview  Goal: Plan of Care/Patient Progress Review  Outcome: No Change  Observation goals PRIOR TO DISCHARGE     Comments:   -diagnostic tests and consults completed and resulted: Not met  -vital signs normal or at patient baseline: Met  -tolerating oral intake to maintain hydration: Met  -safe disposition plan has been identified: Not met  -GI consult: Met  Nurse to notify provider when observation goals have been met and patient is ready for discharge.      Pt A&Ox3, disoriented to situation and forgetful at times. Denies pain/lightheadesness or dizziness. Orthostatic BP unchanged. Hgb 8.8, MD aware. Hgb Q6 ordered. Tolerating regular diet. UA sent, MD notified of results- blood cultures ordered, will start IV Rocephin after. PIV infusing NS @ 75ml/hr. PT/GI/Heme/Onc/SW consulted. Will monitor Hgb overnight, possible discharge tomorrow.

## 2018-10-16 NOTE — SIGNIFICANT EVENT
Start ceftriaxone for possible UTI  Leukocytosis present, will obtain blood culture prior  Discussed with nursing

## 2018-10-16 NOTE — PROGRESS NOTES
Reviewed GI recommendations.  Plan for outpatient EGD and possible discharge today.  Will arrange for patient to see me back in clinic after outpatient EGD.  Also needs further urology follow-up for additional bladder tumor removal.

## 2018-10-17 ENCOUNTER — ANESTHESIA EVENT (OUTPATIENT)
Dept: SURGERY | Facility: CLINIC | Age: 78
End: 2018-10-17
Payer: MEDICARE

## 2018-10-17 ENCOUNTER — HOSPITAL ENCOUNTER (OUTPATIENT)
Facility: CLINIC | Age: 78
Discharge: HOME | End: 2018-10-17
Attending: UROLOGY | Admitting: UROLOGY
Payer: MEDICARE

## 2018-10-17 ENCOUNTER — TELEPHONE (OUTPATIENT)
Dept: FAMILY MEDICINE | Facility: CLINIC | Age: 78
End: 2018-10-17

## 2018-10-17 ENCOUNTER — ANESTHESIA (OUTPATIENT)
Dept: SURGERY | Facility: CLINIC | Age: 78
End: 2018-10-17
Payer: MEDICARE

## 2018-10-17 ENCOUNTER — SURGERY (OUTPATIENT)
Age: 78
End: 2018-10-17

## 2018-10-17 VITALS
RESPIRATION RATE: 16 BRPM | HEIGHT: 72 IN | SYSTOLIC BLOOD PRESSURE: 146 MMHG | WEIGHT: 220 LBS | DIASTOLIC BLOOD PRESSURE: 87 MMHG | BODY MASS INDEX: 29.8 KG/M2 | TEMPERATURE: 96 F

## 2018-10-17 LAB
ABO + RH BLD: NORMAL
ABO + RH BLD: NORMAL
BLD GP AB SCN SERPL QL: NORMAL
BLOOD BANK CMNT PATIENT-IMP: NORMAL
HGB BLD-MCNC: 9.9 G/DL (ref 13.3–17.7)
SPECIMEN EXP DATE BLD: NORMAL

## 2018-10-17 PROCEDURE — 86900 BLOOD TYPING SEROLOGIC ABO: CPT | Performed by: ANESTHESIOLOGY

## 2018-10-17 PROCEDURE — 86901 BLOOD TYPING SEROLOGIC RH(D): CPT | Performed by: ANESTHESIOLOGY

## 2018-10-17 PROCEDURE — 36415 COLL VENOUS BLD VENIPUNCTURE: CPT | Performed by: ANESTHESIOLOGY

## 2018-10-17 PROCEDURE — 85018 HEMOGLOBIN: CPT | Performed by: ANESTHESIOLOGY

## 2018-10-17 PROCEDURE — 86850 RBC ANTIBODY SCREEN: CPT | Performed by: ANESTHESIOLOGY

## 2018-10-17 RX ORDER — CEFAZOLIN SODIUM 1 G/3ML
1 INJECTION, POWDER, FOR SOLUTION INTRAMUSCULAR; INTRAVENOUS SEE ADMIN INSTRUCTIONS
Status: DISCONTINUED | OUTPATIENT
Start: 2018-10-17 | End: 2018-10-17 | Stop reason: HOSPADM

## 2018-10-17 RX ORDER — CEFAZOLIN SODIUM 2 G/100ML
2 INJECTION, SOLUTION INTRAVENOUS
Status: DISCONTINUED | OUTPATIENT
Start: 2018-10-17 | End: 2018-10-17 | Stop reason: HOSPADM

## 2018-10-17 ASSESSMENT — LIFESTYLE VARIABLES: TOBACCO_USE: 0

## 2018-10-17 ASSESSMENT — ENCOUNTER SYMPTOMS
SEIZURES: 0
DYSRHYTHMIAS: 0

## 2018-10-17 ASSESSMENT — COPD QUESTIONNAIRES: COPD: 0

## 2018-10-17 NOTE — ANESTHESIA PREPROCEDURE EVALUATION
Procedure: Procedure(s):  CYSTOSCOPY, TRANSURETHRAL RESECTION OF BLADDER TUMOR (GYRUS)  Preop diagnosis: BLADDER CANCER    Allergies   Allergen Reactions     Wasps [Hornets]      Sand wasp --- years ago.       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     Myocardial infarction (H) 1995 and 2010     Nonischemic cardiomyopathy (H) 7/22/2016     Obesity      Pacemaker     ICD/PM     Rosacea      Rotator cuff disorder     Tear x 2     RV (right ventricular) mural thrombus 7/22/2016     Past Surgical History:   Procedure Laterality Date     ANGIOPLASTY  1995 and 2010 with stent     BACK SURGERY       CARDIAC SURGERY      ICD/PM     CYSTOSCOPY, TRANSURETHRAL RESECTION (TUR) TUMOR BLADDER, COMBINED N/A 9/19/2018    Procedure: COMBINED CYSTOSCOPY, TRANSURETHRAL RESECTION (TUR) TUMOR BLADDER;  CYSTOSCOPY, TRANSURETHRAL RESECTION BLADDER TUMOR ;  Surgeon: Ryan Camarillo MD;  Location:  OR     ESOPHAGOSCOPY, GASTROSCOPY, DUODENOSCOPY (EGD), COMBINED N/A 7/30/2018    Procedure: COMBINED ESOPHAGOSCOPY, GASTROSCOPY, DUODENOSCOPY (EGD), BIOPSY SINGLE OR MULTIPLE;  gastroscopy;  Surgeon: Parish Xiong MD;  Location:  GI     ESOPHAGOSCOPY, GASTROSCOPY, DUODENOSCOPY (EGD), COMBINED N/A 7/30/2018    Procedure: COMBINED ESOPHAGOSCOPY, GASTROSCOPY, DUODENOSCOPY (EGD);  EGD;  Surgeon: Parish Xiong MD;  Location:  GI     ESOPHAGOSCOPY, GASTROSCOPY, DUODENOSCOPY (EGD), COMBINED N/A 8/2/2018    Procedure: COMBINED ESOPHAGOSCOPY, GASTROSCOPY, DUODENOSCOPY (EGD), BIOPSY SINGLE OR MULTIPLE;  gastroscopy BIOPSYSHOULD BE SENT TO LAB IN NS  NOT FORMALIN PER ;  Surgeon: Jonathon Bush MD;  Location:  GI     GASTRIC BYPASS  2001     HEART CATH LEFT HEART CATH  7/19/16    Non ischemic cardiomyopathy; Non functionally significant LAD and RCA disease      OTHER SURGICAL HISTORY  2006    Spinal surgery     OTHER SURGICAL HISTORY  Nov 2016    CRT-D implantation     Prior to Admission medications    Medication Sig Start Date End Date Taking? Authorizing Provider   ACE/ARB NOT PRESCRIBED, INTENTIONAL, ACE & ARB not prescribed due to Symptomatic hypotension not due to excessive diuresis  Patient not taking: Reported on 10/12/2018 9/29/16   Flori Reyes DO   ASPIRIN NOT PRESCRIBED (INTENTIONAL) Please choose reason not prescribed, below  Patient not taking: Reported on 10/12/2018 9/9/18   Flori Reyes DO   atorvastatin (LIPITOR) 40 MG tablet TAKE 1 TABLET AT BEDTIME 8/30/18   Flori Reyes DO   COENZYME Q-10 PO Take 200 mg by mouth every morning     Reported, Patient   ferrous sulfate (IRON) 325 (65 FE) MG tablet Take 325 mg by mouth every morning     Unknown, Entered By History   metoprolol succinate (TOPROL-XL) 50 MG 24 hr tablet TAKE 1 TABLET (50 MG) DAILY 8/22/18   Flori Reyes DO   multivitamin, therapeutic with minerals (MULTI-VITAMIN) TABS Take 1 tablet by mouth every morning     Reported, Patient   pantoprazole (PROTONIX) 40 MG EC tablet Take 1 tablet (40 mg) by mouth daily Take 30-60 minutes before a meal. 10/16/18   Gisell Mcginnis PA-C   venlafaxine (EFFEXOR) 75 MG tablet TAKE 1 TABLET TWICE DAILY 6/11/18   Flori Reyes DO   vitamin D (ERGOCALCIFEROL) 39972 UNIT capsule TAKE 1 CAPSULE ONCE A WEEK 6/19/18   Katelyn Akhtar, APRN CNP     No current Epic-ordered facility-administered medications on file.      Current Outpatient Prescriptions Ordered in Epic   Medication     ACE/ARB NOT PRESCRIBED, INTENTIONAL,     ASPIRIN NOT PRESCRIBED (INTENTIONAL)     atorvastatin (LIPITOR) 40 MG  tablet     COENZYME Q-10 PO     ferrous sulfate (IRON) 325 (65 FE) MG tablet     metoprolol succinate (TOPROL-XL) 50 MG 24 hr tablet     multivitamin, therapeutic with minerals (MULTI-VITAMIN) TABS     pantoprazole (PROTONIX) 40 MG EC tablet     venlafaxine (EFFEXOR) 75 MG tablet     vitamin D (ERGOCALCIFEROL) 06864 UNIT capsule     Wt Readings from Last 1 Encounters:   10/16/18 100 kg (220 lb 8 oz)     Temp Readings from Last 1 Encounters:   10/16/18 35.7  C (96.2  F) (Oral)     BP Readings from Last 6 Encounters:   10/16/18 113/79   10/15/18 (!) 79/55   10/12/18 106/69   10/09/18 98/68   09/26/18 124/79   09/19/18 109/68     Pulse Readings from Last 4 Encounters:   10/15/18 98   10/12/18 91   10/09/18 108   09/19/18 80     Resp Readings from Last 1 Encounters:   10/16/18 16     SpO2 Readings from Last 1 Encounters:   10/16/18 97%     Recent Labs   Lab Test  10/16/18   0607  10/15/18   0910   NA  142  139   POTASSIUM  3.9  3.6   CHLORIDE  110*  105   CO2  26  26   ANIONGAP  6  8   GLC  92  123*   BUN  20  23   CR  1.37*  1.57*   SHELLEY  8.3*  8.9     Recent Labs   Lab Test  10/16/18   1135  10/15/18   2315   10/15/18   0910   WBC  7.9   --    --   12.7*   HGB  9.4*  9.4*   < >  10.5*   PLT  217   --    --   257    < > = values in this interval not displayed.     Recent Labs   Lab Test  07/31/18   1035  07/30/18   0750   INR  1.21*  3.56*      ECG: Ventricular-paced rhythm  Biventricular pacemaker detected  Abnormal ECG  When compared with ECG of 30-JUL-2018 19:02,  Vent. rate has decreased BY 45 BPM    ECHO: Interpretation Summary     Left ventricular systolic function is severely reduced.The visual ejection  fraction is estimated at 25-30%.The left ventricle is moderately  dilated.Apical, mid to distal septal and mid to distal anterior wall akinesia.  There is mild (1+) mitral regurgitation.There is trace aortic regurgitation.     On direct comparision to echo images dated 06/20/2017 LVEF appears similar, LV  is more  dilated in present study.  _____________________________________________________________________________  __        Left Ventricle  The left ventricle is moderately dilated. There is normal left ventricular  wall thickness. Left ventricular systolic function is severely reduced. The  visual ejection fraction is estimated at 25-30%. apical mid to distal septal  and mid to distal anterior wall akinesia. There is no thrombus seen in the  left ventricle.     Right Ventricle  The right ventricle is not well visualized. grossly normal systolic function.  There is a pacemaker lead in the right ventricle.     Atria  The left atrium is mildly dilated. Right atrial size is normal. There is no  color Doppler evidence of an atrial shunt.     Mitral Valve  There is mild (1+) mitral regurgitation.     Tricuspid Valve  Right ventricular systolic pressure could not be approximated due to  inadequate tricuspid regurgitation.        Aortic Valve  The aortic valve is trileaflet. There is trace aortic regurgitation. No aortic  stenosis is present.     Pulmonic Valve  The pulmonic valve is not well visualized.     Vessels  Inferior vena cava not well visualized for estimation of right atrial  pressure. The inferior vena cava is not dilated.     Pericardium  There is no pericardial effusion.         Anesthesia Evaluation     . Pt has had prior anesthetic.     No history of anesthetic complications          ROS/MED HX    ENT/Pulmonary:      (-) tobacco use, asthma, COPD and sleep apnea   Neurologic:     (+)dementia (severe short term mermory loss),    (-) seizures, CVA and migraines   Cardiovascular:     (+) Dyslipidemia, hypertension--CAD, --stent,. : . . POLLARD, . pacemaker :. .      (-) CHF, arrhythmias and valvular problems/murmurs   METS/Exercise Tolerance:  >4 METS   Hematologic:     (+) History of blood clots (Ventricular thrombus) Anemia, -     (-) other hematologic disorder   Musculoskeletal:         GI/Hepatic:     (+) Other  GI/Hepatic hx gastric bypass     (-) GERD and liver disease   Renal/Genitourinary:     (+) chronic renal disease, type: CRI,       Endo:     (+) type II DM Obesity, .   (-) Type I DM and thyroid disease   Psychiatric:  - neg psychiatric ROS       Infectious Disease:         Malignancy:         Other:                                    Anesthesia Plan          Plan for     Patient reports SOB, weakness and dark stools. He was discharged from the hospital yesterday for the same symptoms. He has a known gastric mass that is likely bleeding. He has been off of his anticoagulants (which were prescribed due to a history of LV and RV thrombus).  He is anemic and was hypotensive during his recent hospital stay. In addition, he has severe cardiomyopathy (EF 20%). After discussing the case with Dr. Camarillo, we believe it's in the patient's best interest to have this elective surgery rescheduled after his gastric mass and anemia have been evaluated and treated.        Postoperative Care      Consents                          .

## 2018-10-17 NOTE — OR NURSING
CASE CANCELLED DUE TO LOW HGB AND FURTHER WORKUP WITH GI SPECIALIST. PATIENT HAS SYMPTOMATIC SOB   AND ANESTHESIA  CONTACTING PRIMARY DR RICK PIZARRO TO SEE PATIENT ASAP.

## 2018-10-17 NOTE — TELEPHONE ENCOUNTER
Spoke with anesthesiologist Dr. Moreno re: cancellation of elective cystoscopy today d/t Gibson having so many other chronic health issues going on. Spoke with wife Izabella afterwards too as they were still at hospital. Please call wife Izabella to get the following solidified on the calendar (NOTE, Gibson has dementia); they are just leaving hospital about now so may want to call towards end of shift today.    PLAN:    1) I called [facility] and I was able to get his EGD moved up to this coming Friday, Oct 19th at 2:45 pm arrival time at the [place] office. 12 AM Friday clear liquids only and then NPO 3 hours prior to EGD. [facility] will call to confirm but would be nice if we also called larua Parekh too to let her know of this.    2) Needs to schedule Injectafer (orders placed on Monday but he was hospitalized in meantime)    3) Needs to have hospital follow up with me (can he come at 8 AM tomorrow)? Otherwise maybe team tomorrow with Katelyn who knows him well as I'll be at a conference tomorrow afternoon. Dr. Moreno feels his respiratory status / CHF should be evaluated before EGD which is on Friday.    Thanks!

## 2018-10-17 NOTE — TELEPHONE ENCOUNTER
Dr. Reyes-     Relayed messages below.     Izabella agreed with all of the items below.     1) Provided number/address for [facility]     2) Provided number for Infusion Center Scheduling (and have contacted infusion center to assist pt/family with scheduling- see alternative encounter)    3) They are happy to come to appointment at 8am with you. Scheduled.

## 2018-10-17 NOTE — OR NURSING
PATIENT SHORT OF BREATH UPON WALKING BACK FROM WAITING ROOM. PATIENT HAS POOR SHORT TERM MEMORY.  PATIENT DOES NOT REMEMBER ER VISIT FOR EVALUATION OF WEAKNESS AND DEHYDRATION.

## 2018-10-17 NOTE — TELEPHONE ENCOUNTER
Infusion Center Scheduling:     Please see signed Injectafer Orders       Thank you!  Catie ROMERO RN

## 2018-10-18 ENCOUNTER — OFFICE VISIT (OUTPATIENT)
Dept: FAMILY MEDICINE | Facility: CLINIC | Age: 78
End: 2018-10-18
Payer: COMMERCIAL

## 2018-10-18 ENCOUNTER — HOSPITAL ENCOUNTER (OUTPATIENT)
Dept: CT IMAGING | Facility: CLINIC | Age: 78
Discharge: HOME | End: 2018-10-18
Attending: INTERNAL MEDICINE | Admitting: INTERNAL MEDICINE
Payer: MEDICARE

## 2018-10-18 VITALS
OXYGEN SATURATION: 98 % | WEIGHT: 221 LBS | SYSTOLIC BLOOD PRESSURE: 133 MMHG | DIASTOLIC BLOOD PRESSURE: 83 MMHG | HEIGHT: 72 IN | TEMPERATURE: 97.1 F | BODY MASS INDEX: 29.93 KG/M2 | HEART RATE: 90 BPM

## 2018-10-18 DIAGNOSIS — R06.09 DYSPNEA ON EXERTION: ICD-10-CM

## 2018-10-18 DIAGNOSIS — N32.89 BLADDER MASS: ICD-10-CM

## 2018-10-18 DIAGNOSIS — K31.89 GASTRIC MASS: ICD-10-CM

## 2018-10-18 DIAGNOSIS — D50.9 IRON DEFICIENCY ANEMIA, UNSPECIFIED IRON DEFICIENCY ANEMIA TYPE: ICD-10-CM

## 2018-10-18 DIAGNOSIS — Z09 HOSPITAL DISCHARGE FOLLOW-UP: Primary | ICD-10-CM

## 2018-10-18 DIAGNOSIS — I50.22 CHRONIC SYSTOLIC HEART FAILURE (H): Chronic | ICD-10-CM

## 2018-10-18 PROBLEM — R53.1 WEAKNESS: Status: RESOLVED | Noted: 2018-10-15 | Resolved: 2018-10-18

## 2018-10-18 PROCEDURE — 25000125 ZZHC RX 250: Performed by: INTERNAL MEDICINE

## 2018-10-18 PROCEDURE — 40000556 ZZH STATISTIC PERIPHERAL IV START W US GUIDANCE

## 2018-10-18 PROCEDURE — 71275 CT ANGIOGRAPHY CHEST: CPT

## 2018-10-18 PROCEDURE — 71260 CT THORAX DX C+: CPT

## 2018-10-18 PROCEDURE — 25000128 H RX IP 250 OP 636: Performed by: INTERNAL MEDICINE

## 2018-10-18 PROCEDURE — 99496 TRANSJ CARE MGMT HIGH F2F 7D: CPT | Performed by: INTERNAL MEDICINE

## 2018-10-18 PROCEDURE — 40000257 ZZH STATISTIC CONSULT NO CHARGE VASC ACCESS

## 2018-10-18 RX ORDER — IOPAMIDOL 755 MG/ML
77 INJECTION, SOLUTION INTRAVASCULAR ONCE
Status: COMPLETED | OUTPATIENT
Start: 2018-10-18 | End: 2018-10-18

## 2018-10-18 RX ADMIN — IOPAMIDOL 77 ML: 755 INJECTION, SOLUTION INTRAVENOUS at 10:01

## 2018-10-18 RX ADMIN — SODIUM CHLORIDE, PRESERVATIVE FREE 100 ML: 5 INJECTION INTRAVENOUS at 10:01

## 2018-10-18 NOTE — MR AVS SNAPSHOT
After Visit Summary   10/18/2018    Gibson Villalta    MRN: [id number]           Patient Information     Date Of Birth          1940        Visit Information        Provider Department      10/18/2018 8:00 AM Flori Reyes,  [facility]        Today's Diagnoses     Dyspnea on exertion    -  1      Care Instructions    STOP oral iron and get IV iron scheduled  GI procedure tomorrow   CT today            Follow-ups after your visit        Your next 10 appointments already scheduled     Oct 24, 2018  2:00 PM CDT   Return Visit with Ryan Camarillo MD   [facility] ([facility])    [address]  [address]  [place] [address] [phone]            Dec 10, 2018  4:30 PM CST   Remote ICD Check with ALEJANDRO DCR2   [facility] ([facility])    [address]  [place] [address] [phone] [address]           This appointment is for a remote check of your debrillator.  This is not an appointment at the office.              Future tests that were ordered for you today     Open Future Orders        Priority Expected Expires Ordered    CTA Chest with Contrast STAT  10/18/2019 10/18/2018            Who to contact     If you have questions or need follow up information about today's clinic visit or your schedule please contact [facility] directly at [phone].  Normal or non-critical lab and imaging results will be communicated to you by MyChart, letter or phone within 4 business days after the clinic has received the results. If you do not hear from us within 7 days, please contact the clinic through MyChart or phone. If you have a critical or abnormal lab result, we will notify you by phone as soon as possible.  Submit refill requests through [employer] or call your pharmacy and they will forward the refill request to us. Please allow 3 business days for  your refill to be completed.          Additional Information About Your Visit        MyChart Information     [employer] gives you secure access to your electronic health record. If you see a primary care provider, you can also send messages to your care team and make appointments. If you have questions, please call your primary care clinic.  If you do not have a primary care provider, please call [phone] and they will assist you.        Care EveryWhere ID     This is your Care EveryWhere ID. This could be used by other organizations to access your [place] medical records  [id number]        Your Vitals Were     Pulse Temperature Height Pulse Oximetry BMI (Body Mass Index)       90 97.1  F (36.2  C) (Oral) 6' (1.829 m) 98% 29.97 kg/m2        Blood Pressure from Last 3 Encounters:   10/18/18 133/83   10/17/18 146/87   10/16/18 113/79    Weight from Last 3 Encounters:   10/18/18 221 lb (100.2 kg)   10/17/18 220 lb (99.8 kg)   10/16/18 220 lb 8 oz (100 kg)               Primary Care Provider Office Phone # Fax #    Flori ReyesDO [phone] [address]  [facility] [address]        Equal Access to Services     BEL MCLAIN : Hadii aad ku hadasho Soomaali, waaxda luqadaha, qaybta kaalmada adeegyada, waxay idiin hayjohnn aparna nance lawinsome . So [facility] [phone].    ATENCIÓN: Si habla español, tiene a de luna disposición servicios gratuitos de asistencia lingüística. Llcallie al [phone].    We comply with applicable federal civil rights laws and [place] laws. We do not discriminate on the basis of race, color, national origin, age, disability, sex, sexual orientation, or gender identity.            Thank you!     Thank you for choosing [facility]  for your care. Our goal is always to provide you with excellent care. Hearing back from our patients is one way we can continue to improve our services. Please take a few minutes to complete the written survey that you may  receive in the mail after your visit with us. Thank you!             Your Updated Medication List - Protect others around you: Learn how to safely use, store and throw away your medicines at www.disposemymeds.org.          This list is accurate as of 10/18/18  8:49 AM.  Always use your most recent med list.                   Brand Name Dispense Instructions for use Diagnosis    ACE/ARB/ARNI NOT PRESCRIBED (INTENTIONAL)      ACE & ARB not prescribed due to Symptomatic hypotension not due to excessive diuresis    HFrEF (heart failure with reduced ejection fraction) (H)       ASPIRIN NOT PRESCRIBED    INTENTIONAL    0 each    Please choose reason not prescribed, below    Controlled type 2 diabetes mellitus with chronic kidney disease, without long-term current use of insulin, unspecified CKD stage (H)       atorvastatin 40 MG tablet    LIPITOR    90 tablet    TAKE 1 TABLET AT BEDTIME    Hyperlipidemia LDL goal <130       COENZYME Q-10 PO      Take 200 mg by mouth every morning        metoprolol succinate 50 MG 24 hr tablet    TOPROL-XL    90 tablet    TAKE 1 TABLET (50 MG) DAILY    Cardiomyopathy (H)       Multi-vitamin Tabs tablet      Take 1 tablet by mouth every morning        pantoprazole 40 MG EC tablet    PROTONIX    30 tablet    Take 1 tablet (40 mg) by mouth daily Take 30-60 minutes before a meal.    Gastroesophageal reflux disease with esophagitis       venlafaxine 75 MG tablet    EFFEXOR    180 tablet    TAKE 1 TABLET TWICE DAILY    Anxiety and depression       vitamin D 62934 UNIT capsule    ERGOCALCIFEROL    12 capsule    TAKE 1 CAPSULE ONCE A WEEK    Vitamin D deficiency

## 2018-10-18 NOTE — PROGRESS NOTES
SUBJECTIVE:   Gibson Villalta is a 77 year old male who presents to clinic today for the following health issues:    Hospital Follow-up Visit:    Hospital/Nursing Home/IP Rehab Facility: [facility]  Date of Admission: 10/15/2018  Date of Discharge: 10/16/018  Reason(s) for Admission:     Generalized weakness and lightheadedness with dehydration  Orthostatic hypotension   Chronic GI bleed related to GI mass            Problems taking medications regularly:  None       Medication changes since discharge: None       Problems adhering to non-medication therapy:  None    Summary of hospitalization:  [facility] discharge summary reviewed  Diagnostic Tests/Treatments reviewed.  Follow up needed: Injectafer, EGD and cystoscopy  Other Healthcare Providers Involved in Patient s Care:         Specialist appointment - Urology, Heme/onc, [facility]  Update since discharge: stable.     Post Discharge Medication Reconciliation: discharge medications reconciled and changed, per note/orders (see AVS).  Plan of care communicated with patient and his wife Izabella.     Coding guidelines for this visit:  Type of Medical   Decision Making Face-to-Face Visit       within 7 Days of discharge Face-to-Face Visit        within 14 days of discharge   Moderate Complexity [phone]   High Complexity [phone] [phone]          Nelson is here with wife Izabella to check respiratory status after recent hospital discharge and prior to EGD scheduled for tomorrow for upper GI bleed. He admits he had dark stools prior to admission. He hasn't had a bloody or melenic stool or hematuria since he has been back home; does have dementia so history could be limited though wife Izabella watches over him quite closely. Is taking PPI as Rx at hospital. Reviewed meds with wife Izabella and they are perfect; she is holding Lasix, Hydralazine, Imdur for now as directed. No PND or orthopnea last night and he was able to lie flat to sleep. He denies dyspnea  at rest but does admit to some dyspnea with activity. No chest pain at all. Izabella thinks he was dehydrated prior to admission so that is why his BP was so low. BP excellent today. Doesn't normally feel winded so this is a change. Of note, he is off of his Coumadin since the GI bleed was detected a few months ago; used to be on  Coumadin for h/o RV mural thrombus.     Problem list and histories reviewed & adjusted, as indicated.    ROS:  Detailed as above     OBJECTIVE:     /83 (BP Location: Right arm, Cuff Size: Adult Regular)  Pulse 90  Temp 97.1  F (36.2  C) (Oral)  Ht 6' (1.829 m)  Wt 221 lb (100.2 kg)  SpO2 98%  BMI 29.97 kg/m2  Body mass index is 29.97 kg/(m^2).  Alert, talkative, making jokes per baseline  Slightly pale  HRRR without mrg, no edema, no JVD  Lungs are clear  Walking with me and monitoring sats: down to 96% with casual walk but HR speeds up to low 100s and he subjectively feels dyspneic    ASSESSMENT/PLAN:       1. Hospital discharge follow-up  Reviewed hospital notes    2. Dyspnea on exertion  Will do CTA to r/o PE given he is off of Coumadin and has past h/o RV thrombus but now concern is PE b/c of his dyspnea and also with known malignancy (bladder and likely stomach too)  Anemia and deconditioning could also be a cause  Less likely CHF at this time (see below) but is at risk for decompensation  If CTA within normal limits, will suggest he keep EGD appointment for tomorrow and have close follow up  - CTA Chest with Contrast; Future    3. Chronic systolic heart failure (H)  Seems euvolemic today but low threshold to restart Lasix b/c of his cardiomyopathy and I don't want him to go into heart failure; will see what CTA shows and then can add back in Lasix with continuing to hold Hydralazine and Imdur for now. They do have a BP cuff at home.    4. Gastric mass concerning for lymphoma  [facility] EGD tomorrow and f/u with Dr. Bender of [facility]    5. Bladder cancer  Izabella will reschedule  staging cystoscopy when other health conditions stabilize    6. Iron deficiency anemia, unspecified iron deficiency anemia type  Izabella has numbers to schedule Injectafer  Hold off on oral iron for now      Patient Instructions   STOP oral iron and get IV iron scheduled  GI procedure tomorrow   CT today      ADDENDUM: Thankfully CT for PE was negative for PE. Can't r/o ventricular thrombus but appearance of ventricles is similar to CT in 2016 when he would have been anticoagulated. The stomach lining does appear thicker on this CT but we are in the process of investigating this. I suspect his dyspnea is r/t combo of anemia and deconditioning. Will have him restart Lasix though per his regular regimen on Saturday to prevent him from going into CHF; hydrate today b/c of CT contrast. Izabella did get Injectafer scheduled for him next Tuesday. Unfortunately, she told me now that his prior to EGDs done this summer required general anesthesia when he was already hospitalized, however, he was discharged from hospital for recommendation to have outpatient EGD which [facility] only has MAC. I have paged Dr. Oliver who is scheduled to do the EGD tomorrow to discuss. Spoke with Dr. Oliver who will discuss with his team at [facility] and come up with a plan - they still may likely try for EGD tomorrow at [place] under MAC and they will update [place] either way on their decision and I let Izabella then know this to await their call. She will also reschedule appointments with urology and heme/onc.          Flori Reyes, DO  [facility]

## 2018-10-19 ENCOUNTER — TRANSFERRED RECORDS (OUTPATIENT)
Dept: HEALTH INFORMATION MANAGEMENT | Facility: CLINIC | Age: 78
End: 2018-10-19

## 2018-10-19 NOTE — TELEPHONE ENCOUNTER
ED / Discharge Outreach Protocol    Patient Contact    Attempt     ED/Discharge Protocol    Patient has already been seen by PCP for hospital follow up visit     Closing encounter    Symone MONTILLA RN

## 2018-10-21 LAB
BACTERIA SPEC CULT: NO GROWTH
Lab: NORMAL
SPECIMEN SOURCE: NORMAL

## 2018-10-23 ENCOUNTER — INFUSION THERAPY VISIT (OUTPATIENT)
Dept: INFUSION THERAPY | Facility: CLINIC | Age: 78
End: 2018-10-23
Attending: INTERNAL MEDICINE
Payer: MEDICARE

## 2018-10-23 VITALS
RESPIRATION RATE: 18 BRPM | SYSTOLIC BLOOD PRESSURE: 125 MMHG | TEMPERATURE: 97.3 F | HEART RATE: 106 BPM | DIASTOLIC BLOOD PRESSURE: 76 MMHG

## 2018-10-23 DIAGNOSIS — D50.9 IRON DEFICIENCY ANEMIA, UNSPECIFIED IRON DEFICIENCY ANEMIA TYPE: Primary | ICD-10-CM

## 2018-10-23 DIAGNOSIS — Z98.84 HISTORY OF GASTRIC BYPASS: ICD-10-CM

## 2018-10-23 PROCEDURE — 96365 THER/PROPH/DIAG IV INF INIT: CPT

## 2018-10-23 PROCEDURE — 25000128 H RX IP 250 OP 636: Performed by: INTERNAL MEDICINE

## 2018-10-23 RX ADMIN — SODIUM CHLORIDE 250 ML: 9 INJECTION, SOLUTION INTRAVENOUS at 10:54

## 2018-10-23 RX ADMIN — FERRIC CARBOXYMALTOSE INJECTION 750 MG: 50 INJECTION, SOLUTION INTRAVENOUS at 10:54

## 2018-10-23 ASSESSMENT — PAIN SCALES - GENERAL: PAINLEVEL: NO PAIN (0)

## 2018-10-23 NOTE — PROGRESS NOTES
Infusion Nursing Note:  Gibson Villalta presents today for Injectafer.    Patient seen by provider today: No   present during visit today: Not Applicable.    Note: N/A.    Intravenous Access:  Peripheral IV placed.    Treatment Conditions:  Not Applicable.    Post Infusion Assessment:  Patient tolerated infusion without incident.  Patient observed for 30 minutes post Injectafer per protocol.  Site patent and intact, free from redness, edema or discomfort.  No evidence of extravasations.  Access discontinued per protocol.    Discharge Plan:   Discharge instructions reviewed with: Patient.  Patient and/or family verbalized understanding of discharge instructions and all questions answered.  AVS to patient via Plex[employer].  Patient will return 10/30/2018 for next appointment.   Patient discharged in stable condition accompanied by: self and wife.  Departure Mode: Ambulatory.    Farida Caldwell RN

## 2018-10-23 NOTE — MR AVS SNAPSHOT
After Visit Summary   10/23/2018    Gibson Villalta    MRN: [id number]           Patient Information     Date Of Birth          1940        Visit Information        Provider Department      10/23/2018 10:30 AM  INFUSION CHAIR 10 [facility]        Today's Diagnoses     Iron deficiency anemia, unspecified iron deficiency anemia type    -  1    History of gastric bypass           Follow-ups after your visit        Your next 10 appointments already scheduled     Oct 30, 2018  2:30 PM CDT   Level 1 with  INFUSION CHAIR 1   [facility] ([facility])    [facility]  [address]  [facility] [address] [phone]            Dec 10, 2018  4:30 PM CST   Remote ICD Check with ALEJANDRO DCR2   [facility] ([facility])    [address]  [place] [address] [phone] OPT 2           This appointment is for a remote check of your debrillator.  This is not an appointment at the office.              Who to contact     If you have questions or need follow up information about today's clinic visit or your schedule please contact [facility] directly at [phone].  Normal or non-critical lab and imaging results will be communicated to you by OrthAlignhart, letter or phone within 4 business days after the clinic has received the results. If you do not hear from us within 7 days, please contact the clinic through "[employer]t or phone. If you have a critical or abnormal lab result, we will notify you by phone as soon as possible.  Submit refill requests through Numerify or call your pharmacy and they will forward the refill request to us. Please allow 3 business days for your refill to be completed.          Additional Information About Your Visit        Numerify Information     [employer] gives you secure access to  your electronic health record. If you see a primary care provider, you can also send messages to your care team and make appointments. If you have questions, please call your primary care clinic.  If you do not have a primary care provider, please call [phone] and they will assist you.        Care EveryWhere ID     This is your Care EveryWhere ID. This could be used by other organizations to access your [place] medical records  [id number]        Your Vitals Were     Pulse Temperature Respirations             106 97.3  F (36.3  C) (Oral) 18          Blood Pressure from Last 3 Encounters:   10/23/18 125/76   10/18/18 133/83   10/17/18 146/87    Weight from Last 3 Encounters:   10/18/18 100.2 kg (221 lb)   10/17/18 99.8 kg (220 lb)   10/16/18 100 kg (220 lb 8 oz)              Today, you had the following     No orders found for display       Primary Care Provider Office Phone # Fax #    Flori Joshua Reyes,  [phone] [address] [facility] [address]  [place] [address]        Equal Access to Services     [facility]: Hadii aad ku hadasho Sonathalie, waaxda luqadaha, qaybta kaallottie tim, rusty doty . So [facility] [phone].    ATENCIÓN: Si habla español, tiene a de luna disposición servicios gratuitos de asistencia lingüística. [facility] [phone].    We comply with applicable federal civil rights laws and [place] laws. We do not discriminate on the basis of race, color, national origin, age, disability, sex, sexual orientation, or gender identity.            Thank you!     Thank you for choosing [facility]  for your care. Our goal is always to provide you with excellent care. Hearing back from our patients is one way we can continue to improve our services. Please take a few minutes to complete the written survey that you may receive in the mail after your visit with us. Thank you!             Your Updated Medication List - Protect  others around you: Learn how to safely use, store and throw away your medicines at www.disposemymeds.org.          This list is accurate as of 10/23/18 11:58 AM.  Always use your most recent med list.                   Brand Name Dispense Instructions for use Diagnosis    ACE/ARB/ARNI NOT PRESCRIBED (INTENTIONAL)      ACE & ARB not prescribed due to Symptomatic hypotension not due to excessive diuresis    HFrEF (heart failure with reduced ejection fraction) (H)       ASPIRIN NOT PRESCRIBED    INTENTIONAL    0 each    Please choose reason not prescribed, below    Controlled type 2 diabetes mellitus with chronic kidney disease, without long-term current use of insulin, unspecified CKD stage (H)       atorvastatin 40 MG tablet    LIPITOR    90 tablet    TAKE 1 TABLET AT BEDTIME    Hyperlipidemia LDL goal <130       COENZYME Q-10 PO      Take 200 mg by mouth every morning        LASIX PO      Take 10 mg by mouth daily        metoprolol succinate 50 MG 24 hr tablet    TOPROL-XL    90 tablet    TAKE 1 TABLET (50 MG) DAILY    Cardiomyopathy (H)       Multi-vitamin Tabs tablet      Take 1 tablet by mouth every morning        pantoprazole 40 MG EC tablet    PROTONIX    30 tablet    Take 1 tablet (40 mg) by mouth daily Take 30-60 minutes before a meal.    Gastroesophageal reflux disease with esophagitis       venlafaxine 75 MG tablet    EFFEXOR    180 tablet    TAKE 1 TABLET TWICE DAILY    Anxiety and depression       vitamin D 26725 UNIT capsule    ERGOCALCIFEROL    12 capsule    TAKE 1 CAPSULE ONCE A WEEK    Vitamin D deficiency

## 2018-10-29 ENCOUNTER — TRANSFERRED RECORDS (OUTPATIENT)
Dept: HEALTH INFORMATION MANAGEMENT | Facility: CLINIC | Age: 78
End: 2018-10-29

## 2018-10-30 ENCOUNTER — ANESTHESIA (OUTPATIENT)
Dept: GASTROENTEROLOGY | Facility: CLINIC | Age: 78
End: 2018-10-30
Payer: MEDICARE

## 2018-10-30 ENCOUNTER — INFUSION THERAPY VISIT (OUTPATIENT)
Dept: INFUSION THERAPY | Facility: CLINIC | Age: 78
End: 2018-10-30
Attending: INTERNAL MEDICINE
Payer: MEDICARE

## 2018-10-30 ENCOUNTER — HOSPITAL ENCOUNTER (OUTPATIENT)
Facility: CLINIC | Age: 78
Discharge: HOME | End: 2018-10-30
Attending: PATHOLOGY | Admitting: PATHOLOGY
Payer: MEDICARE

## 2018-10-30 ENCOUNTER — ANESTHESIA EVENT (OUTPATIENT)
Dept: GASTROENTEROLOGY | Facility: CLINIC | Age: 78
End: 2018-10-30
Payer: MEDICARE

## 2018-10-30 VITALS
RESPIRATION RATE: 16 BRPM | DIASTOLIC BLOOD PRESSURE: 85 MMHG | TEMPERATURE: 97.6 F | SYSTOLIC BLOOD PRESSURE: 138 MMHG | HEART RATE: 76 BPM

## 2018-10-30 VITALS
WEIGHT: 220 LBS | BODY MASS INDEX: 29.16 KG/M2 | HEIGHT: 73 IN | OXYGEN SATURATION: 95 % | DIASTOLIC BLOOD PRESSURE: 73 MMHG | HEART RATE: 82 BPM | RESPIRATION RATE: 28 BRPM | SYSTOLIC BLOOD PRESSURE: 121 MMHG

## 2018-10-30 DIAGNOSIS — D50.9 IRON DEFICIENCY ANEMIA, UNSPECIFIED IRON DEFICIENCY ANEMIA TYPE: Primary | ICD-10-CM

## 2018-10-30 DIAGNOSIS — C85.80 LYMPHOMA MALIGNANT, LARGE CELL (H): Primary | ICD-10-CM

## 2018-10-30 DIAGNOSIS — Z98.84 HISTORY OF GASTRIC BYPASS: ICD-10-CM

## 2018-10-30 LAB
BASOPHILS # BLD AUTO: 0 10E9/L (ref 0–0.2)
BASOPHILS NFR BLD AUTO: 0.3 %
DIFFERENTIAL METHOD BLD: ABNORMAL
EOSINOPHIL # BLD AUTO: 0.3 10E9/L (ref 0–0.7)
EOSINOPHIL NFR BLD AUTO: 3.1 %
ERYTHROCYTE [DISTWIDTH] IN BLOOD BY AUTOMATED COUNT: 15.1 % (ref 10–15)
HCT VFR BLD AUTO: 34.7 % (ref 40–53)
HGB BLD-MCNC: 11 G/DL (ref 13.3–17.7)
IMM GRANULOCYTES # BLD: 0.1 10E9/L (ref 0–0.4)
IMM GRANULOCYTES NFR BLD: 0.7 %
LYMPHOCYTES # BLD AUTO: 1.1 10E9/L (ref 0.8–5.3)
LYMPHOCYTES NFR BLD AUTO: 10 %
MCH RBC QN AUTO: 30 PG (ref 26.5–33)
MCHC RBC AUTO-ENTMCNC: 31.7 G/DL (ref 31.5–36.5)
MCV RBC AUTO: 95 FL (ref 78–100)
MONOCYTES # BLD AUTO: 1.7 10E9/L (ref 0–1.3)
MONOCYTES NFR BLD AUTO: 15.2 %
NEUTROPHILS # BLD AUTO: 7.9 10E9/L (ref 1.6–8.3)
NEUTROPHILS NFR BLD AUTO: 70.7 %
NRBC # BLD AUTO: 0 10*3/UL
NRBC BLD AUTO-RTO: 0 /100
PLATELET # BLD AUTO: 291 10E9/L (ref 150–450)
RBC # BLD AUTO: 3.67 10E12/L (ref 4.4–5.9)
RETICS # AUTO: 182.4 10E9/L (ref 25–95)
RETICS/RBC NFR AUTO: 5 % (ref 0.5–2)
WBC # BLD AUTO: 11.1 10E9/L (ref 4–11)

## 2018-10-30 PROCEDURE — 40000847 ZZHCL STATISTIC MORPHOLOGY W/INTERP HISTOLOGY TC 85060: Performed by: INTERNAL MEDICINE

## 2018-10-30 PROCEDURE — 00000008 ZZHCL STATISTIC ADDL BM ASP PERF PF 38220: Performed by: INTERNAL MEDICINE

## 2018-10-30 PROCEDURE — 38221 DX BONE MARROW BIOPSIES: CPT | Mod: 50 | Performed by: INTERNAL MEDICINE

## 2018-10-30 PROCEDURE — 88305 TISSUE EXAM BY PATHOLOGIST: CPT | Mod: 26 | Performed by: INTERNAL MEDICINE

## 2018-10-30 PROCEDURE — 88305 TISSUE EXAM BY PATHOLOGIST: CPT | Performed by: INTERNAL MEDICINE

## 2018-10-30 PROCEDURE — 37000009 ZZH ANESTHESIA TECHNICAL FEE, EACH ADDTL 15 MIN: Performed by: PATHOLOGY

## 2018-10-30 PROCEDURE — 88237 TISSUE CULTURE BONE MARROW: CPT | Performed by: INTERNAL MEDICINE

## 2018-10-30 PROCEDURE — 85060 BLOOD SMEAR INTERPRETATION: CPT | Performed by: INTERNAL MEDICINE

## 2018-10-30 PROCEDURE — 38222 DX BONE MARROW BX & ASPIR: CPT | Performed by: INTERNAL MEDICINE

## 2018-10-30 PROCEDURE — 88280 CHROMOSOME KARYOTYPE STUDY: CPT | Performed by: INTERNAL MEDICINE

## 2018-10-30 PROCEDURE — 88313 SPECIAL STAINS GROUP 2: CPT | Mod: 26 | Performed by: INTERNAL MEDICINE

## 2018-10-30 PROCEDURE — 88342 IMHCHEM/IMCYTCHM 1ST ANTB: CPT | Mod: 26 | Performed by: INTERNAL MEDICINE

## 2018-10-30 PROCEDURE — 88342 IMHCHEM/IMCYTCHM 1ST ANTB: CPT | Performed by: INTERNAL MEDICINE

## 2018-10-30 PROCEDURE — 88311 DECALCIFY TISSUE: CPT | Mod: 26 | Performed by: INTERNAL MEDICINE

## 2018-10-30 PROCEDURE — 88311 DECALCIFY TISSUE: CPT | Performed by: INTERNAL MEDICINE

## 2018-10-30 PROCEDURE — 40000424 ZZHCL STATISTIC BONE MARROW CORE PERF TC 38221: Performed by: INTERNAL MEDICINE

## 2018-10-30 PROCEDURE — 38221 DX BONE MARROW BIOPSIES: CPT | Performed by: PATHOLOGY

## 2018-10-30 PROCEDURE — 40000425 ZZHCL STATISTIC ADDL BM COR PERF TC 38221: Performed by: INTERNAL MEDICINE

## 2018-10-30 PROCEDURE — 88185 FLOWCYTOMETRY/TC ADD-ON: CPT | Performed by: INTERNAL MEDICINE

## 2018-10-30 PROCEDURE — 36415 COLL VENOUS BLD VENIPUNCTURE: CPT | Performed by: PATHOLOGY

## 2018-10-30 PROCEDURE — 40000795 ZZHCL STATISTIC DNA PROCESS AND HOLD: Performed by: PATHOLOGY

## 2018-10-30 PROCEDURE — 85097 BONE MARROW INTERPRETATION: CPT | Performed by: INTERNAL MEDICINE

## 2018-10-30 PROCEDURE — 25000128 H RX IP 250 OP 636: Performed by: INTERNAL MEDICINE

## 2018-10-30 PROCEDURE — 88341 IMHCHEM/IMCYTCHM EA ADD ANTB: CPT | Mod: 26 | Performed by: INTERNAL MEDICINE

## 2018-10-30 PROCEDURE — 40001004 ZZHCL STATISTIC FLOW INT 9-15 ABY TC 88188: Performed by: INTERNAL MEDICINE

## 2018-10-30 PROCEDURE — 88264 CHROMOSOME ANALYSIS 20-25: CPT | Performed by: INTERNAL MEDICINE

## 2018-10-30 PROCEDURE — 88184 FLOWCYTOMETRY/ TC 1 MARKER: CPT | Performed by: INTERNAL MEDICINE

## 2018-10-30 PROCEDURE — 85025 COMPLETE CBC W/AUTO DIFF WBC: CPT | Performed by: PATHOLOGY

## 2018-10-30 PROCEDURE — 00000159 ZZHCL STATISTIC H-SEND OUTS PREP: Performed by: INTERNAL MEDICINE

## 2018-10-30 PROCEDURE — 40000948 ZZHCL STATISTIC BONE MARROW ASP TC 85097: Performed by: INTERNAL MEDICINE

## 2018-10-30 PROCEDURE — 40000010 ZZH STATISTIC ANES STAT CODE-CRNA PER MINUTE: Performed by: PATHOLOGY

## 2018-10-30 PROCEDURE — 38222 DX BONE MARROW BX & ASPIR: CPT | Performed by: PATHOLOGY

## 2018-10-30 PROCEDURE — 85045 AUTOMATED RETICULOCYTE COUNT: CPT | Performed by: PATHOLOGY

## 2018-10-30 PROCEDURE — 00000058 ZZHCL STATISTIC BONE MARROW ASP PERF TC 38220: Performed by: INTERNAL MEDICINE

## 2018-10-30 PROCEDURE — 38221 DX BONE MARROW BIOPSIES: CPT | Performed by: INTERNAL MEDICINE

## 2018-10-30 PROCEDURE — 25000125 ZZHC RX 250: Performed by: NURSE ANESTHETIST, CERTIFIED REGISTERED

## 2018-10-30 PROCEDURE — 88313 SPECIAL STAINS GROUP 2: CPT | Performed by: INTERNAL MEDICINE

## 2018-10-30 PROCEDURE — 37000008 ZZH ANESTHESIA TECHNICAL FEE, 1ST 30 MIN: Performed by: PATHOLOGY

## 2018-10-30 PROCEDURE — 25000128 H RX IP 250 OP 636: Performed by: NURSE ANESTHETIST, CERTIFIED REGISTERED

## 2018-10-30 PROCEDURE — 96365 THER/PROPH/DIAG IV INF INIT: CPT

## 2018-10-30 PROCEDURE — 88341 IMHCHEM/IMCYTCHM EA ADD ANTB: CPT | Performed by: INTERNAL MEDICINE

## 2018-10-30 RX ORDER — PROPOFOL 10 MG/ML
INJECTION, EMULSION INTRAVENOUS CONTINUOUS PRN
Status: DISCONTINUED | OUTPATIENT
Start: 2018-10-30 | End: 2018-10-30

## 2018-10-30 RX ORDER — FLUMAZENIL 0.1 MG/ML
0.2 INJECTION, SOLUTION INTRAVENOUS
Status: DISCONTINUED | OUTPATIENT
Start: 2018-10-30 | End: 2018-10-30 | Stop reason: HOSPADM

## 2018-10-30 RX ORDER — LIDOCAINE HYDROCHLORIDE 10 MG/ML
8-10 INJECTION, SOLUTION EPIDURAL; INFILTRATION; INTRACAUDAL; PERINEURAL
Status: DISCONTINUED | OUTPATIENT
Start: 2018-10-30 | End: 2018-10-30 | Stop reason: HOSPADM

## 2018-10-30 RX ORDER — NALOXONE HYDROCHLORIDE 0.4 MG/ML
.1-.4 INJECTION, SOLUTION INTRAMUSCULAR; INTRAVENOUS; SUBCUTANEOUS
Status: DISCONTINUED | OUTPATIENT
Start: 2018-10-30 | End: 2018-10-30 | Stop reason: HOSPADM

## 2018-10-30 RX ORDER — SODIUM CHLORIDE, SODIUM LACTATE, POTASSIUM CHLORIDE, CALCIUM CHLORIDE 600; 310; 30; 20 MG/100ML; MG/100ML; MG/100ML; MG/100ML
INJECTION, SOLUTION INTRAVENOUS CONTINUOUS PRN
Status: DISCONTINUED | OUTPATIENT
Start: 2018-10-30 | End: 2018-10-30

## 2018-10-30 RX ORDER — LIDOCAINE HYDROCHLORIDE 20 MG/ML
INJECTION, SOLUTION INFILTRATION; PERINEURAL PRN
Status: DISCONTINUED | OUTPATIENT
Start: 2018-10-30 | End: 2018-10-30

## 2018-10-30 RX ADMIN — LIDOCAINE HYDROCHLORIDE 100 MG: 20 INJECTION, SOLUTION INFILTRATION; PERINEURAL at 09:10

## 2018-10-30 RX ADMIN — PROPOFOL 50 MCG/KG/MIN: 10 INJECTION, EMULSION INTRAVENOUS at 09:10

## 2018-10-30 RX ADMIN — SODIUM CHLORIDE, POTASSIUM CHLORIDE, SODIUM LACTATE AND CALCIUM CHLORIDE: 600; 310; 30; 20 INJECTION, SOLUTION INTRAVENOUS at 09:08

## 2018-10-30 RX ADMIN — SODIUM CHLORIDE 250 ML: 9 INJECTION, SOLUTION INTRAVENOUS at 14:50

## 2018-10-30 RX ADMIN — FERRIC CARBOXYMALTOSE INJECTION 750 MG: 50 INJECTION, SOLUTION INTRAVENOUS at 14:49

## 2018-10-30 RX ADMIN — DEXMEDETOMIDINE HYDROCHLORIDE 20 MCG: 100 INJECTION, SOLUTION INTRAVENOUS at 09:10

## 2018-10-30 ASSESSMENT — PAIN SCALES - GENERAL: PAINLEVEL: NO PAIN (0)

## 2018-10-30 NOTE — PROGRESS NOTES
Infusion Nursing Note:  Gibson Villalta presents today for injectafer 2nd of 2 .    Patient seen by provider today: No   present during visit today: Not Applicable.    Note: N/A.    Intravenous Access:  Peripheral IV placed.    Treatment Conditions:  Not Applicable.      Post Infusion Assessment:  Patient tolerated infusion without incident.  Patient observed for 30 minutes post infusion per protocol.  Site patent and intact, free from redness, edema or discomfort.  No evidence of extravasations.  Access discontinued per protocol.    Discharge Plan:   Patient and/or family verbalized understanding of discharge instructions and all questions answered.  AVS to patient via Dental KidzHART. Patient discharged in stable condition accompanied by: self.  Departure Mode: Ambulatory.    Cande Patterson RN

## 2018-10-30 NOTE — PROCEDURES
The patient with gastric biopsies atypical large B lymphoid cells was positively identified and informed consent was obtained (see the completed Affirmation of Consent for Bone Marrow Aspiration and/or Biopsy Procedure(s) form in the patient's chart). The patient was placed in the prone position and the bony landmarks of the pelvis were identified. Medical staff reconfirmed the patient's name, date of birth and procedure. The skin over the posterior iliac crest was scrubbed and draped in a sterile fashion. The local area of the procedure was anesthetized with a total of 10 mL of 1% Lidocaine and a small incision was made.  The patient did receive conscious sedation.    Two separate trephine bone marrow core(s) were obtained from the left posterior iliac crest. Bone marrow aspirate was obtained from the left posterior iliac crest for: morphology with possible immunophenotyping and/or cytogenetics and molecular diagnostics    Direct pressure was applied to the biopsy site with sterile gauze. The biopsy site was cleaned with alcohol and a sterile dressing was placed over the biopsy incision using a pressure bandage. The patient was then placed in the supine position to maintain pressure on the biopsy site. Post-procedure wound care instructions, including routine dressing instructions and analgesia, were given to the patient and patient's caregiver:  The procedure was completed without complication.

## 2018-10-30 NOTE — ANESTHESIA POSTPROCEDURE EVALUATION
Patient: Gibson Villalta    Procedure(s):  BIOPSY BONE MARROW    Diagnosis:HIGH GRADE B-CELL LYMPHOMA  Diagnosis Additional Information: No value filed.    Anesthesia Type:  MAC    Note:  Anesthesia Post Evaluation    Patient location during evaluation: PACU  Patient participation: Able to fully participate in evaluation  Level of consciousness: awake  Pain management: adequate  Airway patency: patent  Cardiovascular status: acceptable  Respiratory status: acceptable  Hydration status: acceptable  PONV: none     Anesthetic complications: None          Last vitals:  Vitals:    10/30/18 1000 10/30/18 1010 10/30/18 1020   BP: 109/66 109/66 121/73   Pulse:      Resp: 25 26 28   SpO2: 95% 95% 95%         Electronically Signed By: TREY ALSTON MD  October 30, 2018  11:11 AM

## 2018-10-30 NOTE — ANESTHESIA PREPROCEDURE EVALUATION
Anesthesia Evaluation     .             ROS/MED HX    ENT/Pulmonary:       Neurologic:     (+)dementia,     Cardiovascular: Comment: RV/LV mural thrombus; LBBB;    (+) hypertension--CAD, --. : . CHF etiology: Nonischemic cardiomyopathy; Last EF: 25-30% . POLLARD, . :. .       METS/Exercise Tolerance:     Hematologic:     (+) Anemia, -      Musculoskeletal:         GI/Hepatic:         Renal/Genitourinary:     (+) chronic renal disease, type: CRI, BPH,       Endo:     (+) Obesity, .      Psychiatric:     (+) psychiatric history depression and anxiety      Infectious Disease:         Malignancy:   (+) Malignancy History of Lymphoma/Leukemia and Other  Other CA Bladder; status post         Other:                                    Anesthesia Plan      History & Physical Review  History and physical reviewed and following examination; no interval change.    ASA Status:  3 .        Plan for MAC   PONV prophylaxis:  Ondansetron (or other 5HT-3)       Postoperative Care      Consents  Anesthetic plan, risks, benefits and alternatives discussed with:  Patient..                          .

## 2018-10-30 NOTE — ANESTHESIA CARE TRANSFER NOTE
Patient: Gibson Villalta    Procedure(s):  BIOPSY BONE MARROW    Diagnosis: HIGH GRADE B-CELL LYMPHOMA  Diagnosis Additional Information: No value filed.    Anesthesia Type:   MAC     Note:  Airway :Room Air  Patient transferred to:Phase II  Comments: BP: 109/73  Pulse: 83     SpO2: 97 %       Transferred to endoscopy cneter RN. Patient awake and verbal. Spontaneous resp and on room air. Monitors and alarms on. VSS. Report given.Handoff Report: Identifed the Patient, Identified the Reponsible Provider, Reviewed the pertinent medical history, Discussed the surgical course, Reviewed Intra-OP anesthesia mangement and issues during anesthesia, Set expectations for post-procedure period and Allowed opportunity for questions and acknowledgement of understanding      Vitals: (Last set prior to Anesthesia Care Transfer)    CRNA VITALS  10/30/2018 0903 - 10/30/2018 0939      10/30/2018             Ht Rate: 91    Resp Rate (set): 10                Electronically Signed By: ALLEN Cheek CRNA  October 30, 2018  9:39 AM

## 2018-10-30 NOTE — MR AVS SNAPSHOT
After Visit Summary   10/30/2018    Gibson Villalta    MRN: [id number]           Patient Information     Date Of Birth          1940        Visit Information        Provider Department      10/30/2018 2:30 PM  INFUSION CHAIR 1 [facility]        Today's Diagnoses     Iron deficiency anemia, unspecified iron deficiency anemia type    -  1    History of gastric bypass           Follow-ups after your visit        Your next 10 appointments already scheduled     Nov 01, 2018  1:30 PM CDT   Pre-Op physical with ALLEN Colón CNP   [facility])    [address] [facility] [address] [phone]            Nov 12, 2018   Procedure with Ryan Camarillo MD   [facility] (--)    [address]  [facility] [address] [phone]            Nov 19, 2018  1:00 PM CST   Return Visit with Ryan Camarillo MD   [facility] ([facility])    [address]  [facility] [id number] [phone]            Dec 10, 2018  4:30 PM CST   Remote ICD Check with ALEJANDRO DCR2   [facility])    [address]  [facility] [id number] [phone] OPT 2           This appointment is for a remote check of your debrillator.  This is not an appointment at the office.              Who to contact     If you have questions or need follow up information about today's clinic visit or your schedule please contact [facility] directly at [phone].  Normal or non-critical lab and imaging results will be communicated to you by MyChart, letter or phone within 4 business days after the clinic has received the results. If you do not hear from us within 7 days, please contact the clinic through MyChart or phone. If you have a critical or abnormal  lab result, we will notify you by phone as soon as possible.  Submit refill requests through [employer] or call your pharmacy and they will forward the refill request to us. Please allow 3 business days for your refill to be completed.          Additional Information About Your Visit        Fly6hart Information     [employer] gives you secure access to your electronic health record. If you see a primary care provider, you can also send messages to your care team and make appointments. If you have questions, please call your primary care clinic.  If you do not have a primary care provider, please call [phone] and they will assist you.        Care EveryWhere ID     This is your Care EveryWhere ID. This could be used by other organizations to access your [place] medical records  [id number]        Your Vitals Were     Pulse Temperature Respirations             76 97.6  F (36.4  C) (Oral) 16          Blood Pressure from Last 3 Encounters:   10/30/18 138/85   10/30/18 121/73   10/23/18 125/76    Weight from Last 3 Encounters:   10/30/18 99.8 kg (220 lb)   10/18/18 100.2 kg (221 lb)   10/17/18 99.8 kg (220 lb)              We Performed the Following     Chromosome bone marrow        Primary Care Provider Office Phone # Fax #    Flori Joshua Reyes,  [phone] [address] [facility] [address]  [place] [address]        Equal Access to Services     [facility] : Hadii aad ku hadasho Soomaali, waaxda luqadaha, qaybta kaalmada meeta, rusty doty . So [facility] [phone].    ATENCIÓN: Si habla español, tiene a de luna disposición servicios gratuitos de asistencia lingüística. Yvette al [phone].    We comply with applicable federal civil rights laws and [place] laws. We do not discriminate on the basis of race, color, national origin, age, disability, sex, sexual orientation, or gender identity.            Thank you!     Thank you for choosing [facility] AND [facility]  for your care. Our goal is always to provide you with excellent care. Hearing back from our patients is one way we can continue to improve our services. Please take a few minutes to complete the written survey that you may receive in the mail after your visit with us. Thank you!             Your Updated Medication List - Protect others around you: Learn how to safely use, store and throw away your medicines at www.disposemymeds.org.          This list is accurate as of 10/30/18  3:46 PM.  Always use your most recent med list.                   Brand Name Dispense Instructions for use Diagnosis    ACE/ARB/ARNI NOT PRESCRIBED (INTENTIONAL)      ACE & ARB not prescribed due to Symptomatic hypotension not due to excessive diuresis    HFrEF (heart failure with reduced ejection fraction) (H)       ASPIRIN NOT PRESCRIBED    INTENTIONAL    0 each    Please choose reason not prescribed, below    Controlled type 2 diabetes mellitus with chronic kidney disease, without long-term current use of insulin, unspecified CKD stage (H)       atorvastatin 40 MG tablet    LIPITOR    90 tablet    TAKE 1 TABLET AT BEDTIME    Hyperlipidemia LDL goal <130       COENZYME Q-10 PO      Take 200 mg by mouth every morning        LASIX PO      Take 10 mg by mouth daily        metoprolol succinate 50 MG 24 hr tablet    TOPROL-XL    90 tablet    TAKE 1 TABLET (50 MG) DAILY    Cardiomyopathy (H)       Multi-vitamin Tabs tablet      Take 1 tablet by mouth every morning        pantoprazole 40 MG EC tablet    PROTONIX    30 tablet    Take 1 tablet (40 mg) by mouth daily Take 30-60 minutes before a meal.    Gastroesophageal reflux disease with esophagitis       venlafaxine 75 MG tablet    EFFEXOR    180 tablet    TAKE 1 TABLET TWICE DAILY    Anxiety and depression       vitamin D 63049 UNIT capsule    ERGOCALCIFEROL    12 capsule    TAKE 1 CAPSULE ONCE A WEEK    Vitamin D deficiency

## 2018-10-31 LAB
COPATH REPORT: NORMAL
COPATH REPORT: NORMAL

## 2018-11-01 ENCOUNTER — OFFICE VISIT (OUTPATIENT)
Dept: FAMILY MEDICINE | Facility: CLINIC | Age: 78
End: 2018-11-01
Payer: COMMERCIAL

## 2018-11-01 VITALS
BODY MASS INDEX: 29.29 KG/M2 | HEIGHT: 73 IN | OXYGEN SATURATION: 97 % | SYSTOLIC BLOOD PRESSURE: 100 MMHG | TEMPERATURE: 98.7 F | HEART RATE: 102 BPM | DIASTOLIC BLOOD PRESSURE: 70 MMHG | WEIGHT: 221 LBS

## 2018-11-01 DIAGNOSIS — N32.89 BLADDER MASS: ICD-10-CM

## 2018-11-01 DIAGNOSIS — Z01.818 PREOP GENERAL PHYSICAL EXAM: Primary | ICD-10-CM

## 2018-11-01 PROCEDURE — 99215 OFFICE O/P EST HI 40 MIN: CPT | Performed by: NURSE PRACTITIONER

## 2018-11-01 NOTE — PATIENT INSTRUCTIONS
Before Your Surgery      Call your surgeon if there is any change in your health. This includes signs of a cold or flu (such as a sore throat, runny nose, cough, rash or fever).    Do not smoke, drink alcohol or take over the counter medicine (unless your surgeon or primary care doctor tells you to) for the 24 hours before and after surgery.    If you take prescribed drugs: Follow your doctor s orders about which medicines to take and which to stop until after surgery.    Eating and drinking prior to surgery: follow the instructions from your surgeon    Take a shower or bath the night before surgery. Use the soap your surgeon gave you to gently clean your skin. If you do not have soap from your surgeon, use your regular soap. Do not shave or scrub the surgery site.  Wear clean pajamas and have clean sheets on your bed.     HOLD vitamins morning of surgery

## 2018-11-01 NOTE — PROGRESS NOTES
[place] [address] [facility] [address]  [phone]  Dept: [phone]    PRE-OP EVALUATION:  Today's date: 2018    Gibson Villalta (: 1940) presents for pre-operative evaluation assessment as requested by Dr. Camarillo.  He requires evaluation and anesthesia risk assessment prior to undergoing surgery/procedure for treatment of Bladder tumor .    Proposed Surgery/ Procedure: CYSTOSCOPY RESTAGING TRANSURETHRAL RESECTION BLADDER TUMOR  Date of Surgery/ Procedure: 18  Time of Surgery/ Procedure: 1:50pm  Hospital/Surgical Facility: [facility]  Fax number for surgical facility: in [facility]  Primary Physician: Flori Reyes  Type of Anesthesia Anticipated: General    Patient has a Health Care Directive or Living Will:  NO    1. YES - Do you have a history of heart attack, stroke, stent, bypass or surgery on an artery in the head, neck, heart or legs?  MI with stents   2. NO - Do you ever have any pain or discomfort in your chest?  3. YES - Do you have a history of  Heart Failure?  cardiomyopathy, HRrEF 25-30%, Pacer  4. YES - Are you troubled by shortness of breath when: walking on the level, up a slight hill or at night?  5. NO - Do you currently have a cold, bronchitis or other respiratory infection?  6. NO - Do you have a cough, shortness of breath or wheezing?  7. NO - Do you sometimes get pains in the calves of your legs when you walk?  8. YES - Do you or anyone in your family have previous history of blood clots? History of mural clot. Recent neg CT PE study   9. NO - Do you or does anyone in your family have a serious bleeding problem such as prolonged bleeding following surgeries or cuts?  10. YES - Have you ever had problems with anemia or been told to take iron pills? Recent GI bleed. REcently had injectafer.   11. YES - Have you had any abnormal blood loss such as black, tarry or bloody stools, or abnormal vaginal bleeding? REcent injectafer   12. NO - Have you  ever had a blood transfusion?  13. NO - Have you or any of your relatives ever had problems with anesthesia?  14. NO - Do you have sleep apnea, excessive snoring or daytime drowsiness?  15. NO - Do you have any prosthetic heart valves?  16. NO - Do you have prosthetic joints?  17. NO - Is there any chance that you may be pregnant?      HPI:     HPI related to upcoming procedure: Going for restaging of bladder cancer. History is gathered from wife as pt has dementia. He will answer questions but wife will correct him frequently.     Recently was admitted to hospital with symptomatic anemia. He had an EGD where malignancy was confirmed.  Saw Dr Bender with oncology   Also had a bone marrow biopsy   Doesn't eat much at 1 sitting as he gets full very quickly   No urinary changes recently       See problem list for active medical problems.  Problems all longstanding and stable, except as noted/documented.  See ROS for pertinent symptoms related to these conditions.                                                                                                                                                          .    MEDICAL HISTORY:     Patient Active Problem List    Diagnosis Date Noted     Iron deficiency anemia, unspecified iron deficiency anemia type 10/15/2018     Priority: Medium     Gastric mass 09/09/2018     Priority: Medium     Bladder mass 09/09/2018     Priority: Medium     Prepatellar bursitis of left knee 12/11/2017     Priority: Medium     Cardiomyopathy (H) 03/14/2017     Priority: Medium     Controlled type 2 diabetes mellitus with chronic kidney disease, without long-term current use of insulin, unspecified CKD stage (H) 02/24/2017     Priority: Medium     New Dx Feb 2017       HFrEF (heart failure with reduced ejection fraction) (H) 08/16/2016     Priority: Medium     Chronic kidney disease (CKD), stage 3 (moderate)      Priority: Medium     Anxiety and depression      Priority: Medium     BPH  (benign prostatic hypertrophy)      Priority: Medium     LBBB (left bundle branch block)      Priority: Medium     Mixed cardiomyopathy 07/22/2016     Priority: Medium     RV (right ventricular) mural thrombus without MI 07/22/2016     Priority: Medium     Dementia without behavioral disturbance, unspecified dementia type 07/22/2016     Priority: Medium     History of gastric bypass 07/22/2016     Priority: Medium      Past Medical History:   Diagnosis Date     Acute kidney injury (H) 2012     Anemia      Anxiety      Bladder mass      BPH (benign prostatic hypertrophy)      Carpal tunnel syndrome     Right     Chronic kidney disease (CKD), stage 3 (moderate)      Dementia      Depressive disorder      Diabetes (H)      Gastric mass      Gout      History of depression      LBBB (left bundle branch block) 2013     Lumbar herniated disc     L5-S1     Mixed cardiomyopathy 7/22/2016     Myocardial infarction (H) 1995 and 2010     Nonischemic cardiomyopathy (H) 7/22/2016     Obesity      Pacemaker     ICD/PM     Rosacea      Rotator cuff disorder     Tear x 2     RV (right ventricular) mural thrombus 7/22/2016     Past Surgical History:   Procedure Laterality Date     ANGIOPLASTY  1995 and 2010 with stent     BONE MARROW BIOPSY, BONE SPECIMEN, NEEDLE/TROCAR N/A 10/30/2018    Procedure: BIOPSY BONE MARROW;  Surgeon: Jeb Romero MD;  Location:  GI     CARDIAC SURGERY      ICD/PM     CYSTOSCOPY, TRANSURETHRAL RESECTION (TUR) TUMOR BLADDER, COMBINED N/A 9/19/2018    Procedure: COMBINED CYSTOSCOPY, TRANSURETHRAL RESECTION (TUR) TUMOR BLADDER;  CYSTOSCOPY, TRANSURETHRAL RESECTION BLADDER TUMOR ;  Surgeon: Ryan Camarillo MD;  Location:  OR     ESOPHAGOSCOPY, GASTROSCOPY, DUODENOSCOPY (EGD), COMBINED N/A 7/30/2018    Procedure: COMBINED ESOPHAGOSCOPY, GASTROSCOPY, DUODENOSCOPY (EGD), BIOPSY SINGLE OR MULTIPLE;  gastroscopy;  Surgeon: Parish Xiong MD;  Location:  GI     ESOPHAGOSCOPY, GASTROSCOPY,  DUODENOSCOPY (EGD), COMBINED N/A 7/30/2018    Procedure: COMBINED ESOPHAGOSCOPY, GASTROSCOPY, DUODENOSCOPY (EGD);  EGD;  Surgeon: Parish Xiong MD;  Location:  GI     ESOPHAGOSCOPY, GASTROSCOPY, DUODENOSCOPY (EGD), COMBINED N/A 8/2/2018    Procedure: COMBINED ESOPHAGOSCOPY, GASTROSCOPY, DUODENOSCOPY (EGD), BIOPSY SINGLE OR MULTIPLE;  gastroscopy BIOPSYSHOULD BE SENT TO LAB IN NS NOT FORMALIN PER ;  Surgeon: Jonathon Bush MD;  Location:  GI     GASTRIC BYPASS  2001     HEART CATH LEFT HEART CATH  7/19/16    Non ischemic cardiomyopathy; Non functionally significant LAD and RCA disease      OTHER SURGICAL HISTORY  2006    Spinal surgery     OTHER SURGICAL HISTORY  Nov 2016    CRT-D implantation     Current Outpatient Prescriptions   Medication Sig Dispense Refill     ACE/ARB NOT PRESCRIBED, INTENTIONAL, ACE & ARB not prescribed due to Symptomatic hypotension not due to excessive diuresis (Patient not taking: Reported on 10/12/2018)       ASPIRIN NOT PRESCRIBED (INTENTIONAL) Please choose reason not prescribed, below 0 each 0     atorvastatin (LIPITOR) 40 MG tablet TAKE 1 TABLET AT BEDTIME 90 tablet 3     COENZYME Q-10 PO Take 200 mg by mouth every morning        Furosemide (LASIX PO) Take 10 mg by mouth daily       metoprolol succinate (TOPROL-XL) 50 MG 24 hr tablet TAKE 1 TABLET (50 MG) DAILY 90 tablet 3     multivitamin, therapeutic with minerals (MULTI-VITAMIN) TABS Take 1 tablet by mouth every morning        pantoprazole (PROTONIX) 40 MG EC tablet Take 1 tablet (40 mg) by mouth daily Take 30-60 minutes before a meal. 30 tablet 0     venlafaxine (EFFEXOR) 75 MG tablet TAKE 1 TABLET TWICE DAILY 180 tablet 1     vitamin D (ERGOCALCIFEROL) 06081 UNIT capsule TAKE 1 CAPSULE ONCE A WEEK 12 capsule 1     OTC products: None, except as noted above    Allergies   Allergen Reactions     Wasps [Hornets]      Sand wasp --- years ago.        Latex Allergy: NO    Social History   Substance Use Topics      "Smoking status: Former Smoker     Packs/day: 2.00     Years: 20.00     Types: Cigarettes     Quit date: 1/1/1980     Smokeless tobacco: Never Used      Comment: Quit in his 30s - 2 ppd for 20 years     Alcohol use 0.0 oz/week     0 Standard drinks or equivalent per week      Comment: maybe 1 beer a month     History   Drug Use No       REVIEW OF SYSTEMS:   Unable to complete d/t dementia. History gathered from wife.    EXAM:   /70 (BP Location: Right arm, Patient Position: Chair, Cuff Size: Adult Large)  Pulse 102  Temp 98.7  F (37.1  C) (Tympanic)  Ht 6' 1\" (1.854 m)  Wt 221 lb (100.2 kg)  SpO2 97%  BMI 29.16 kg/m2    GENERAL APPEARANCE: healthy, alert and no distress     EYES: EOMI,  PERRL     HENT: mouth without ulcers or lesions     NECK: no adenopathy, no asymmetry, masses, or scars and thyroid normal to palpation     RESP: lungs clear to auscultation - no rales, rhonchi or wheezes     CV: regular rates and rhythm, normal S1 S2, no S3 or S4 and no murmur, click or rub     MS: extremities normal- no gross deformities noted, no evidence of inflammation in joints, FROM in all extremities.     SKIN: no suspicious lesions or rashes     NEURO: Normal strength and tone, sensory exam grossly normal, mentation intact and speech normal     PSYCH: mentation appears normal. and affect normal/bright     LYMPHATICS: No cervical adenopathy    DIAGNOSTICS:   Recent labs and ekg completed     Recent Labs   Lab Test  10/30/18   0847  10/17/18   1437  10/16/18   1135  10/16/18   0607   10/15/18   0910   07/31/18   1035   07/30/18   0750   08/25/17   1416   HGB  11.0*  9.9*  9.4*   --    < >  10.5*   < >   --    < >  13.9   --   15.1   PLT  291   --   217   --    --   257   < >   --    < >  218   --   203   INR   --    --    --    --    --    --    --   1.21*   --   3.56*   < >   --    NA   --    --    --   142   --   139   < >   --    < >  138   --   139   POTASSIUM   --    --    --   3.9   --   3.6   < >   --    < >  " 3.7   --   4.0   CR   --    --    --   1.37*   --   1.57*   < >   --    < >  1.23   --   1.34*   A1C   --    --    --    --    --    --    --    --    --   7.1*   --   6.5*    < > = values in this interval not displayed.        IMPRESSION:   Reason for surgery/procedure: cystoscopy  Diagnosis/reason for consult: medical preop    The proposed surgical procedure is considered INTERMEDIATE risk.    REVISED CARDIAC RISK INDEX  The patient has the following serious cardiovascular risks for perioperative complications such as (MI, PE, VFib and 3  AV Block):  Coronary Artery Disease (MI, positive stress test, angina, Qs on EKG)  Congestive Heart Failure (pulmonary edema, PND, s3 danay, CXR with pulmonary congestion, basilar rales)  INTERPRETATION: 2 risks: Class III (moderate risk - 6.6% complication rate)    The patient has the following additional risks for perioperative complications:  Delirium or Dementia      ICD-10-CM    1. Preop general physical exam Z01.818    2. Bladder mass N32.89        RECOMMENDATIONS:     Gibson has vanessa complex medical history complicated by a recent GI bleed.  He has been symptomatic with POLLARD and anemia that has been treated. He has dementia and he does not remember most of his recent history. His wife is very helpful. They understand any procedure is risky given his comorbidities.     Cardiovascular Risk  Patient is already on a Beta Blocker. Continue Betablocker therapy after surgery, using Beta blocker order set as necessary for NPO status.  Echo on 5/29/18 revealed similar LVEF and worsened LV dilation when compared to 6/20/17.  He is at very high risk for general anesthesia.     Anemia  Anemia and does not require treatment prior to surgery.  Monitor Hemoglobin postoperatively.  Recently had injectafer       --Patient is to take all scheduled medications on the day of surgery EXCEPT for modifications listed below.  Hold vitamins morning of surgery.       APPROVAL GIVEN to proceed  with proposed procedure, without further diagnostic evaluation       Signed Electronically by: ALLEN Colón CNP    Copy of this evaluation report is provided to requesting physician.    [facility] Preop Guidelines    Revised Cardiac Risk Index

## 2018-11-01 NOTE — NURSING NOTE
"Chief Complaint   Patient presents with     Pre-Op Exam        Initial /70 (BP Location: Right arm, Patient Position: Chair, Cuff Size: Adult Large)  Pulse 102  Temp 98.7  F (37.1  C) (Tympanic)  Ht 6' 1\" (1.854 m)  Wt 221 lb (100.2 kg)  SpO2 97%  BMI 29.16 kg/m2 Estimated body mass index is 29.16 kg/(m^2) as calculated from the following:    Height as of this encounter: 6' 1\" (1.854 m).    Weight as of this encounter: 221 lb (100.2 kg)..    BP completed using cuff size: large  MEDICATIONS REVIEWED  SOCIAL AND FAMILY HX REVIEWED  Shraddha Mcmillan CMA  "

## 2018-11-01 NOTE — MR AVS SNAPSHOT
After Visit Summary   11/1/2018    Gibson Villalta    MRN: [id number]           Patient Information     Date Of Birth          1940        Visit Information        Provider Department      11/1/2018 1:30 PM Katelyn Akhtar APRN [facility]        Today's Diagnoses     Preop general physical exam    -  1    Bladder mass          Care Instructions      Before Your Surgery      Call your surgeon if there is any change in your health. This includes signs of a cold or flu (such as a sore throat, runny nose, cough, rash or fever).    Do not smoke, drink alcohol or take over the counter medicine (unless your surgeon or primary care doctor tells you to) for the 24 hours before and after surgery.    If you take prescribed drugs: Follow your doctor s orders about which medicines to take and which to stop until after surgery.    Eating and drinking prior to surgery: follow the instructions from your surgeon    Take a shower or bath the night before surgery. Use the soap your surgeon gave you to gently clean your skin. If you do not have soap from your surgeon, use your regular soap. Do not shave or scrub the surgery site.  Wear clean pajamas and have clean sheets on your bed.     HOLD vitamins morning of surgery             Follow-ups after your visit        Your next 10 appointments already scheduled     Nov 12, 2018   Procedure with Ryan Camarillo MD   [facility] (--)    [address]  [facility] [address]   [phone]            Nov 19, 2018  1:00 PM CST   Return Visit with Ryan Camarillo MD   [facility] ([facility])    [address]  [address]  [facility] [address]   [phone]            Dec 10, 2018  4:30 PM CST   Remote ICD Check with ALEJANDRO DCR2   [facility] ([facility])    [address] [facility] [address]  [facility] [address]  "  [phone] OPT 2           This appointment is for a remote check of your debrillator.  This is not an appointment at the office.              Who to contact     If you have questions or need follow up information about today's clinic visit or your schedule please contact [facility] directly at [phone].  Normal or non-critical lab and imaging results will be communicated to you by MyChart, letter or phone within 4 business days after the clinic has received the results. If you do not hear from us within 7 days, please contact the clinic through [employer] or phone. If you have a critical or abnormal lab result, we will notify you by phone as soon as possible.  Submit refill requests through [email or web address] or call your pharmacy and they will forward the refill request to us. Please allow 3 business days for your refill to be completed.          Additional Information About Your Visit        MyChart Information     [email or web address] gives you secure access to your electronic health record. If you see a primary care provider, you can also send messages to your care team and make appointments. If you have questions, please call your primary care clinic.  If you do not have a primary care provider, please call [phone] and they will assist you.        Care EveryWhere ID     This is your Care EveryWhere ID. This could be used by other organizations to access your [facility] medical records  [id number]        Your Vitals Were     Pulse Temperature Height Pulse Oximetry BMI (Body Mass Index)       102 98.7  F (37.1  C) (Tympanic) 6' 1\" (1.854 m) 97% 29.16 kg/m2        Blood Pressure from Last 3 Encounters:   11/01/18 100/70   10/30/18 138/85   10/30/18 121/73    Weight from Last 3 Encounters:   11/01/18 221 lb (100.2 kg)   10/30/18 220 lb (99.8 kg)   10/18/18 221 lb (100.2 kg)              Today, you had the following     No orders found for display       Primary Care Provider Office Phone # Fax #    Flori Reyes, " [phone]       [address]  [facility] [address]        Equal Access to Services     BEL MCLAIN : Hadii aad ku hadkarissagrupo Lance, bam genoanaha, louis tim, rusty nance laMamebradley thomas. So [facility] [phone].    ATENCIÓN: Si habla español, tiene a de luna disposición servicios gratuitos de asistencia lingüística. Llame al [phone].    We comply with applicable federal civil rights laws and [place] laws. We do not discriminate on the basis of race, color, national origin, age, disability, sex, sexual orientation, or gender identity.            Thank you!     Thank you for choosing [facility]  for your care. Our goal is always to provide you with excellent care. Hearing back from our patients is one way we can continue to improve our services. Please take a few minutes to complete the written survey that you may receive in the mail after your visit with us. Thank you!             Your Updated Medication List - Protect others around you: Learn how to safely use, store and throw away your medicines at www.disposemymeds.org.          This list is accurate as of 11/1/18 11:59 PM.  Always use your most recent med list.                   Brand Name Dispense Instructions for use Diagnosis    ACE/ARB/ARNI NOT PRESCRIBED (INTENTIONAL)      ACE & ARB not prescribed due to Symptomatic hypotension not due to excessive diuresis    HFrEF (heart failure with reduced ejection fraction) (H)       ASPIRIN NOT PRESCRIBED    INTENTIONAL    0 each    Please choose reason not prescribed, below    Controlled type 2 diabetes mellitus with chronic kidney disease, without long-term current use of insulin, unspecified CKD stage (H)       atorvastatin 40 MG tablet    LIPITOR    90 tablet    TAKE 1 TABLET AT BEDTIME    Hyperlipidemia LDL goal <130       COENZYME Q-10 PO      Take 200 mg by mouth every morning        LASIX PO      Take 10 mg by mouth daily        metoprolol  succinate 50 MG 24 hr tablet    TOPROL-XL    90 tablet    TAKE 1 TABLET (50 MG) DAILY    Cardiomyopathy (H)       Multi-vitamin Tabs tablet      Take 1 tablet by mouth every morning        pantoprazole 40 MG EC tablet    PROTONIX    30 tablet    Take 1 tablet (40 mg) by mouth daily Take 30-60 minutes before a meal.    Gastroesophageal reflux disease with esophagitis       venlafaxine 75 MG tablet    EFFEXOR    180 tablet    TAKE 1 TABLET TWICE DAILY    Anxiety and depression       vitamin D2 87285 UNIT capsule    ERGOCALCIFEROL    12 capsule    TAKE 1 CAPSULE ONCE A WEEK    Vitamin D deficiency

## 2018-11-02 LAB — COPATH REPORT: NORMAL

## 2018-11-06 ENCOUNTER — TRANSFERRED RECORDS (OUTPATIENT)
Dept: HEALTH INFORMATION MANAGEMENT | Facility: CLINIC | Age: 78
End: 2018-11-06

## 2018-11-06 DIAGNOSIS — F41.9 ANXIETY AND DEPRESSION: Chronic | ICD-10-CM

## 2018-11-06 DIAGNOSIS — F32.A ANXIETY AND DEPRESSION: Chronic | ICD-10-CM

## 2018-11-07 ENCOUNTER — TELEPHONE (OUTPATIENT)
Dept: CARDIOLOGY | Facility: CLINIC | Age: 78
End: 2018-11-07

## 2018-11-07 RX ORDER — VENLAFAXINE 75 MG/1
TABLET ORAL
Qty: 180 TABLET | Refills: 1 | Status: SHIPPED | OUTPATIENT
Start: 2018-11-07 | End: 2019-10-11

## 2018-11-07 NOTE — TELEPHONE ENCOUNTER
Staff message received from Dr. Heard requesting patient get in to see him in the next week or so. Spoke with patients wife and pt scheduled to see Dr. Heard 11/13/18.

## 2018-11-08 NOTE — H&P (VIEW-ONLY)
[place] [address] [address] [facility] [address]  [phone]  Dept: [phone]    PRE-OP EVALUATION:  Today's date: 2018    Gibson Villalta (: 1940) presents for pre-operative evaluation assessment as requested by Dr. Camarillo.  He requires evaluation and anesthesia risk assessment prior to undergoing surgery/procedure for treatment of Bladder tumor .    Proposed Surgery/ Procedure: CYSTOSCOPY RESTAGING TRANSURETHRAL RESECTION BLADDER TUMOR  Date of Surgery/ Procedure: 18  Time of Surgery/ Procedure: 1:50pm  Hospital/Surgical Facility: [facility]  Fax number for surgical facility: in [facility]  Primary Physician: Flori Reyes  Type of Anesthesia Anticipated: General    Patient has a Health Care Directive or Living Will:  NO    1. YES - Do you have a history of heart attack, stroke, stent, bypass or surgery on an artery in the head, neck, heart or legs?  MI with stents   2. NO - Do you ever have any pain or discomfort in your chest?  3. YES - Do you have a history of  Heart Failure?  cardiomyopathy, HRrEF 25-30%, Pacer  4. YES - Are you troubled by shortness of breath when: walking on the level, up a slight hill or at night?  5. NO - Do you currently have a cold, bronchitis or other respiratory infection?  6. NO - Do you have a cough, shortness of breath or wheezing?  7. NO - Do you sometimes get pains in the calves of your legs when you walk?  8. YES - Do you or anyone in your family have previous history of blood clots? History of mural clot. Recent neg CT PE study   9. NO - Do you or does anyone in your family have a serious bleeding problem such as prolonged bleeding following surgeries or cuts?  10. YES - Have you ever had problems with anemia or been told to take iron pills? Recent GI bleed. REcently had injectafer.   11. YES - Have you had any abnormal blood loss such as black, tarry or bloody stools, or abnormal vaginal bleeding? REcent injectafer   12. NO - Have you  ever had a blood transfusion?  13. NO - Have you or any of your relatives ever had problems with anesthesia?  14. NO - Do you have sleep apnea, excessive snoring or daytime drowsiness?  15. NO - Do you have any prosthetic heart valves?  16. NO - Do you have prosthetic joints?  17. NO - Is there any chance that you may be pregnant?      HPI:     HPI related to upcoming procedure: Going for restaging of bladder cancer. History is gathered from wife as pt has dementia. He will answer questions but wife will correct him frequently.     Recently was admitted to hospital with symptomatic anemia. He had an EGD where malignancy was confirmed.  Saw Dr Bender with oncology   Also had a bone marrow biopsy   Doesn't eat much at 1 sitting as he gets full very quickly   No urinary changes recently       See problem list for active medical problems.  Problems all longstanding and stable, except as noted/documented.  See ROS for pertinent symptoms related to these conditions.                                                                                                                                                          .    MEDICAL HISTORY:     Patient Active Problem List    Diagnosis Date Noted     Iron deficiency anemia, unspecified iron deficiency anemia type 10/15/2018     Priority: Medium     Gastric mass 09/09/2018     Priority: Medium     Bladder mass 09/09/2018     Priority: Medium     Prepatellar bursitis of left knee 12/11/2017     Priority: Medium     Cardiomyopathy (H) 03/14/2017     Priority: Medium     Controlled type 2 diabetes mellitus with chronic kidney disease, without long-term current use of insulin, unspecified CKD stage (H) 02/24/2017     Priority: Medium     New Dx Feb 2017       HFrEF (heart failure with reduced ejection fraction) (H) 08/16/2016     Priority: Medium     Chronic kidney disease (CKD), stage 3 (moderate)      Priority: Medium     Anxiety and depression      Priority: Medium     BPH  (benign prostatic hypertrophy)      Priority: Medium     LBBB (left bundle branch block)      Priority: Medium     Mixed cardiomyopathy 07/22/2016     Priority: Medium     RV (right ventricular) mural thrombus without MI 07/22/2016     Priority: Medium     Dementia without behavioral disturbance, unspecified dementia type 07/22/2016     Priority: Medium     History of gastric bypass 07/22/2016     Priority: Medium      Past Medical History:   Diagnosis Date     Acute kidney injury (H) 2012     Anemia      Anxiety      Bladder mass      BPH (benign prostatic hypertrophy)      Carpal tunnel syndrome     Right     Chronic kidney disease (CKD), stage 3 (moderate)      Dementia      Depressive disorder      Diabetes (H)      Gastric mass      Gout      History of depression      LBBB (left bundle branch block) 2013     Lumbar herniated disc     L5-S1     Mixed cardiomyopathy 7/22/2016     Myocardial infarction (H) 1995 and 2010     Nonischemic cardiomyopathy (H) 7/22/2016     Obesity      Pacemaker     ICD/PM     Rosacea      Rotator cuff disorder     Tear x 2     RV (right ventricular) mural thrombus 7/22/2016     Past Surgical History:   Procedure Laterality Date     ANGIOPLASTY  1995 and 2010 with stent     BONE MARROW BIOPSY, BONE SPECIMEN, NEEDLE/TROCAR N/A 10/30/2018    Procedure: BIOPSY BONE MARROW;  Surgeon: Jeb Romero MD;  Location:  GI     CARDIAC SURGERY      ICD/PM     CYSTOSCOPY, TRANSURETHRAL RESECTION (TUR) TUMOR BLADDER, COMBINED N/A 9/19/2018    Procedure: COMBINED CYSTOSCOPY, TRANSURETHRAL RESECTION (TUR) TUMOR BLADDER;  CYSTOSCOPY, TRANSURETHRAL RESECTION BLADDER TUMOR ;  Surgeon: Ryan Camarillo MD;  Location:  OR     ESOPHAGOSCOPY, GASTROSCOPY, DUODENOSCOPY (EGD), COMBINED N/A 7/30/2018    Procedure: COMBINED ESOPHAGOSCOPY, GASTROSCOPY, DUODENOSCOPY (EGD), BIOPSY SINGLE OR MULTIPLE;  gastroscopy;  Surgeon: Parish Xiong MD;  Location:  GI     ESOPHAGOSCOPY, GASTROSCOPY,  DUODENOSCOPY (EGD), COMBINED N/A 7/30/2018    Procedure: COMBINED ESOPHAGOSCOPY, GASTROSCOPY, DUODENOSCOPY (EGD);  EGD;  Surgeon: Parish Xiong MD;  Location:  GI     ESOPHAGOSCOPY, GASTROSCOPY, DUODENOSCOPY (EGD), COMBINED N/A 8/2/2018    Procedure: COMBINED ESOPHAGOSCOPY, GASTROSCOPY, DUODENOSCOPY (EGD), BIOPSY SINGLE OR MULTIPLE;  gastroscopy BIOPSYSHOULD BE SENT TO LAB IN NS NOT FORMALIN PER ;  Surgeon: Jonathon Bush MD;  Location:  GI     GASTRIC BYPASS  2001     HEART CATH LEFT HEART CATH  7/19/16    Non ischemic cardiomyopathy; Non functionally significant LAD and RCA disease      OTHER SURGICAL HISTORY  2006    Spinal surgery     OTHER SURGICAL HISTORY  Nov 2016    CRT-D implantation     Current Outpatient Prescriptions   Medication Sig Dispense Refill     ACE/ARB NOT PRESCRIBED, INTENTIONAL, ACE & ARB not prescribed due to Symptomatic hypotension not due to excessive diuresis (Patient not taking: Reported on 10/12/2018)       ASPIRIN NOT PRESCRIBED (INTENTIONAL) Please choose reason not prescribed, below 0 each 0     atorvastatin (LIPITOR) 40 MG tablet TAKE 1 TABLET AT BEDTIME 90 tablet 3     COENZYME Q-10 PO Take 200 mg by mouth every morning        Furosemide (LASIX PO) Take 10 mg by mouth daily       metoprolol succinate (TOPROL-XL) 50 MG 24 hr tablet TAKE 1 TABLET (50 MG) DAILY 90 tablet 3     multivitamin, therapeutic with minerals (MULTI-VITAMIN) TABS Take 1 tablet by mouth every morning        pantoprazole (PROTONIX) 40 MG EC tablet Take 1 tablet (40 mg) by mouth daily Take 30-60 minutes before a meal. 30 tablet 0     venlafaxine (EFFEXOR) 75 MG tablet TAKE 1 TABLET TWICE DAILY 180 tablet 1     vitamin D (ERGOCALCIFEROL) 51377 UNIT capsule TAKE 1 CAPSULE ONCE A WEEK 12 capsule 1     OTC products: None, except as noted above    Allergies   Allergen Reactions     Wasps [Hornets]      Sand wasp --- years ago.        Latex Allergy: NO    Social History   Substance Use Topics      "Smoking status: Former Smoker     Packs/day: 2.00     Years: 20.00     Types: Cigarettes     Quit date: 1/1/1980     Smokeless tobacco: Never Used      Comment: Quit in his 30s - 2 ppd for 20 years     Alcohol use 0.0 oz/week     0 Standard drinks or equivalent per week      Comment: maybe 1 beer a month     History   Drug Use No       REVIEW OF SYSTEMS:   Unable to complete d/t dementia. History gathered from wife.    EXAM:   /70 (BP Location: Right arm, Patient Position: Chair, Cuff Size: Adult Large)  Pulse 102  Temp 98.7  F (37.1  C) (Tympanic)  Ht 6' 1\" (1.854 m)  Wt 221 lb (100.2 kg)  SpO2 97%  BMI 29.16 kg/m2    GENERAL APPEARANCE: healthy, alert and no distress     EYES: EOMI,  PERRL     HENT: mouth without ulcers or lesions     NECK: no adenopathy, no asymmetry, masses, or scars and thyroid normal to palpation     RESP: lungs clear to auscultation - no rales, rhonchi or wheezes     CV: regular rates and rhythm, normal S1 S2, no S3 or S4 and no murmur, click or rub     MS: extremities normal- no gross deformities noted, no evidence of inflammation in joints, FROM in all extremities.     SKIN: no suspicious lesions or rashes     NEURO: Normal strength and tone, sensory exam grossly normal, mentation intact and speech normal     PSYCH: mentation appears normal. and affect normal/bright     LYMPHATICS: No cervical adenopathy    DIAGNOSTICS:   Recent labs and ekg completed     Recent Labs   Lab Test  10/30/18   0847  10/17/18   1437  10/16/18   1135  10/16/18   0607   10/15/18   0910   07/31/18   1035   07/30/18   0750   08/25/17   1416   HGB  11.0*  9.9*  9.4*   --    < >  10.5*   < >   --    < >  13.9   --   15.1   PLT  291   --   217   --    --   257   < >   --    < >  218   --   203   INR   --    --    --    --    --    --    --   1.21*   --   3.56*   < >   --    NA   --    --    --   142   --   139   < >   --    < >  138   --   139   POTASSIUM   --    --    --   3.9   --   3.6   < >   --    < >  " 3.7   --   4.0   CR   --    --    --   1.37*   --   1.57*   < >   --    < >  1.23   --   1.34*   A1C   --    --    --    --    --    --    --    --    --   7.1*   --   6.5*    < > = values in this interval not displayed.        IMPRESSION:   Reason for surgery/procedure: cystoscopy  Diagnosis/reason for consult: medical preop    The proposed surgical procedure is considered INTERMEDIATE risk.    REVISED CARDIAC RISK INDEX  The patient has the following serious cardiovascular risks for perioperative complications such as (MI, PE, VFib and 3  AV Block):  Coronary Artery Disease (MI, positive stress test, angina, Qs on EKG)  Congestive Heart Failure (pulmonary edema, PND, s3 danay, CXR with pulmonary congestion, basilar rales)  INTERPRETATION: 2 risks: Class III (moderate risk - 6.6% complication rate)    The patient has the following additional risks for perioperative complications:  Delirium or Dementia      ICD-10-CM    1. Preop general physical exam Z01.818    2. Bladder mass N32.89        RECOMMENDATIONS:     Gibson has vanessa complex medical history complicated by a recent GI bleed.  He has been symptomatic with POLLARD and anemia that has been treated. He has dementia and he does not remember most of his recent history. His wife is very helpful. They understand any procedure is risky given his comorbidities.     Cardiovascular Risk  Patient is already on a Beta Blocker. Continue Betablocker therapy after surgery, using Beta blocker order set as necessary for NPO status.  Echo on 5/29/18 revealed similar LVEF and worsened LV dilation when compared to 6/20/17.  He is at very high risk for general anesthesia.     Anemia  Anemia and does not require treatment prior to surgery.  Monitor Hemoglobin postoperatively.  Recently had injectafer       --Patient is to take all scheduled medications on the day of surgery EXCEPT for modifications listed below.  Hold vitamins morning of surgery.       APPROVAL GIVEN to proceed  with proposed procedure, without further diagnostic evaluation       Signed Electronically by: ALLEN Colón CNP    Copy of this evaluation report is provided to requesting physician.    [facility] Preop Guidelines    Revised Cardiac Risk Index

## 2018-11-12 ENCOUNTER — HOSPITAL ENCOUNTER (OUTPATIENT)
Facility: CLINIC | Age: 78
Discharge: HOME | End: 2018-11-12
Attending: UROLOGY | Admitting: UROLOGY
Payer: MEDICARE

## 2018-11-12 ENCOUNTER — ANESTHESIA (OUTPATIENT)
Dept: SURGERY | Facility: CLINIC | Age: 78
End: 2018-11-12
Payer: MEDICARE

## 2018-11-12 ENCOUNTER — ANESTHESIA EVENT (OUTPATIENT)
Dept: SURGERY | Facility: CLINIC | Age: 78
End: 2018-11-12
Payer: MEDICARE

## 2018-11-12 ENCOUNTER — SURGERY (OUTPATIENT)
Age: 78
End: 2018-11-12

## 2018-11-12 VITALS
RESPIRATION RATE: 17 BRPM | HEART RATE: 96 BPM | BODY MASS INDEX: 29.08 KG/M2 | DIASTOLIC BLOOD PRESSURE: 81 MMHG | WEIGHT: 214.7 LBS | TEMPERATURE: 97 F | OXYGEN SATURATION: 94 % | SYSTOLIC BLOOD PRESSURE: 135 MMHG | HEIGHT: 72 IN

## 2018-11-12 LAB
COPATH REPORT: NORMAL
POTASSIUM SERPL-SCNC: 3.6 MMOL/L (ref 3.4–5.3)

## 2018-11-12 PROCEDURE — 84132 ASSAY OF SERUM POTASSIUM: CPT | Performed by: ANESTHESIOLOGY

## 2018-11-12 PROCEDURE — 88342 IMHCHEM/IMCYTCHM 1ST ANTB: CPT | Performed by: UROLOGY

## 2018-11-12 PROCEDURE — 88307 TISSUE EXAM BY PATHOLOGIST: CPT | Performed by: UROLOGY

## 2018-11-12 PROCEDURE — 37000009 ZZH ANESTHESIA TECHNICAL FEE, EACH ADDTL 15 MIN: Performed by: UROLOGY

## 2018-11-12 PROCEDURE — 36000056 ZZH SURGERY LEVEL 3 1ST 30 MIN: Performed by: UROLOGY

## 2018-11-12 PROCEDURE — 71000027 ZZH RECOVERY PHASE 2 EACH 15 MINS: Performed by: UROLOGY

## 2018-11-12 PROCEDURE — 25800025 ZZH RX 258: Performed by: UROLOGY

## 2018-11-12 PROCEDURE — 71000013 ZZH RECOVERY PHASE 1 LEVEL 1 EA ADDTL HR: Performed by: UROLOGY

## 2018-11-12 PROCEDURE — 25000128 H RX IP 250 OP 636: Performed by: NURSE ANESTHETIST, CERTIFIED REGISTERED

## 2018-11-12 PROCEDURE — 36415 COLL VENOUS BLD VENIPUNCTURE: CPT | Performed by: ANESTHESIOLOGY

## 2018-11-12 PROCEDURE — 27210794 ZZH OR GENERAL SUPPLY STERILE: Performed by: UROLOGY

## 2018-11-12 PROCEDURE — 40000170 ZZH STATISTIC PRE-PROCEDURE ASSESSMENT II: Performed by: UROLOGY

## 2018-11-12 PROCEDURE — 88341 IMHCHEM/IMCYTCHM EA ADD ANTB: CPT | Mod: 26 | Performed by: UROLOGY

## 2018-11-12 PROCEDURE — 71000012 ZZH RECOVERY PHASE 1 LEVEL 1 FIRST HR: Performed by: UROLOGY

## 2018-11-12 PROCEDURE — 25000125 ZZHC RX 250: Performed by: NURSE ANESTHETIST, CERTIFIED REGISTERED

## 2018-11-12 PROCEDURE — 88341 IMHCHEM/IMCYTCHM EA ADD ANTB: CPT | Performed by: UROLOGY

## 2018-11-12 PROCEDURE — 37000008 ZZH ANESTHESIA TECHNICAL FEE, 1ST 30 MIN: Performed by: UROLOGY

## 2018-11-12 PROCEDURE — 36000058 ZZH SURGERY LEVEL 3 EA 15 ADDTL MIN: Performed by: UROLOGY

## 2018-11-12 PROCEDURE — 52235 CYSTOSCOPY AND TREATMENT: CPT | Performed by: UROLOGY

## 2018-11-12 PROCEDURE — 88307 TISSUE EXAM BY PATHOLOGIST: CPT | Mod: 26 | Performed by: UROLOGY

## 2018-11-12 PROCEDURE — 88342 IMHCHEM/IMCYTCHM 1ST ANTB: CPT | Mod: 26 | Performed by: UROLOGY

## 2018-11-12 PROCEDURE — 25000566 ZZH SEVOFLURANE, EA 15 MIN: Performed by: UROLOGY

## 2018-11-12 PROCEDURE — 25000125 ZZHC RX 250: Performed by: UROLOGY

## 2018-11-12 RX ORDER — MEPERIDINE HYDROCHLORIDE 25 MG/ML
12.5 INJECTION INTRAMUSCULAR; INTRAVENOUS; SUBCUTANEOUS
Status: DISCONTINUED | OUTPATIENT
Start: 2018-11-12 | End: 2018-11-12 | Stop reason: HOSPADM

## 2018-11-12 RX ORDER — NEOSTIGMINE METHYLSULFATE 1 MG/ML
VIAL (ML) INJECTION PRN
Status: DISCONTINUED | OUTPATIENT
Start: 2018-11-12 | End: 2018-11-12

## 2018-11-12 RX ORDER — FENTANYL CITRATE 50 UG/ML
25-50 INJECTION, SOLUTION INTRAMUSCULAR; INTRAVENOUS
Status: DISCONTINUED | OUTPATIENT
Start: 2018-11-12 | End: 2018-11-12 | Stop reason: HOSPADM

## 2018-11-12 RX ORDER — ETOMIDATE 2 MG/ML
INJECTION INTRAVENOUS PRN
Status: DISCONTINUED | OUTPATIENT
Start: 2018-11-12 | End: 2018-11-12

## 2018-11-12 RX ORDER — HYDROMORPHONE HYDROCHLORIDE 1 MG/ML
.3-.5 INJECTION, SOLUTION INTRAMUSCULAR; INTRAVENOUS; SUBCUTANEOUS EVERY 10 MIN PRN
Status: DISCONTINUED | OUTPATIENT
Start: 2018-11-12 | End: 2018-11-12 | Stop reason: HOSPADM

## 2018-11-12 RX ORDER — MAGNESIUM HYDROXIDE 1200 MG/15ML
LIQUID ORAL PRN
Status: DISCONTINUED | OUTPATIENT
Start: 2018-11-12 | End: 2018-11-12 | Stop reason: HOSPADM

## 2018-11-12 RX ORDER — FENTANYL CITRATE 50 UG/ML
INJECTION, SOLUTION INTRAMUSCULAR; INTRAVENOUS PRN
Status: DISCONTINUED | OUTPATIENT
Start: 2018-11-12 | End: 2018-11-12

## 2018-11-12 RX ORDER — FERROUS SULFATE 325(65) MG
325 TABLET ORAL DAILY
Status: ON HOLD | COMMUNITY
End: 2021-01-01

## 2018-11-12 RX ORDER — CEFAZOLIN SODIUM 1 G/3ML
1 INJECTION, POWDER, FOR SOLUTION INTRAMUSCULAR; INTRAVENOUS SEE ADMIN INSTRUCTIONS
Status: DISCONTINUED | OUTPATIENT
Start: 2018-11-12 | End: 2018-11-12 | Stop reason: HOSPADM

## 2018-11-12 RX ORDER — ONDANSETRON 2 MG/ML
4 INJECTION INTRAMUSCULAR; INTRAVENOUS EVERY 30 MIN PRN
Status: DISCONTINUED | OUTPATIENT
Start: 2018-11-12 | End: 2018-11-12 | Stop reason: HOSPADM

## 2018-11-12 RX ORDER — LIDOCAINE HYDROCHLORIDE 20 MG/ML
INJECTION, SOLUTION INFILTRATION; PERINEURAL PRN
Status: DISCONTINUED | OUTPATIENT
Start: 2018-11-12 | End: 2018-11-12

## 2018-11-12 RX ORDER — ONDANSETRON 4 MG/1
4 TABLET, ORALLY DISINTEGRATING ORAL EVERY 30 MIN PRN
Status: DISCONTINUED | OUTPATIENT
Start: 2018-11-12 | End: 2018-11-12 | Stop reason: HOSPADM

## 2018-11-12 RX ORDER — ONDANSETRON 2 MG/ML
INJECTION INTRAMUSCULAR; INTRAVENOUS PRN
Status: DISCONTINUED | OUTPATIENT
Start: 2018-11-12 | End: 2018-11-12

## 2018-11-12 RX ORDER — SODIUM CHLORIDE, SODIUM LACTATE, POTASSIUM CHLORIDE, CALCIUM CHLORIDE 600; 310; 30; 20 MG/100ML; MG/100ML; MG/100ML; MG/100ML
INJECTION, SOLUTION INTRAVENOUS CONTINUOUS
Status: DISCONTINUED | OUTPATIENT
Start: 2018-11-12 | End: 2018-11-12 | Stop reason: HOSPADM

## 2018-11-12 RX ORDER — NALOXONE HYDROCHLORIDE 0.4 MG/ML
.1-.4 INJECTION, SOLUTION INTRAMUSCULAR; INTRAVENOUS; SUBCUTANEOUS
Status: DISCONTINUED | OUTPATIENT
Start: 2018-11-12 | End: 2018-11-12 | Stop reason: HOSPADM

## 2018-11-12 RX ORDER — SODIUM CHLORIDE, SODIUM LACTATE, POTASSIUM CHLORIDE, CALCIUM CHLORIDE 600; 310; 30; 20 MG/100ML; MG/100ML; MG/100ML; MG/100ML
INJECTION, SOLUTION INTRAVENOUS CONTINUOUS PRN
Status: DISCONTINUED | OUTPATIENT
Start: 2018-11-12 | End: 2018-11-12

## 2018-11-12 RX ORDER — GLYCOPYRROLATE 0.2 MG/ML
INJECTION, SOLUTION INTRAMUSCULAR; INTRAVENOUS PRN
Status: DISCONTINUED | OUTPATIENT
Start: 2018-11-12 | End: 2018-11-12

## 2018-11-12 RX ORDER — CEFAZOLIN SODIUM 2 G/100ML
2 INJECTION, SOLUTION INTRAVENOUS
Status: DISCONTINUED | OUTPATIENT
Start: 2018-11-12 | End: 2018-11-12 | Stop reason: HOSPADM

## 2018-11-12 RX ADMIN — SODIUM CHLORIDE 3000 ML: 900 IRRIGANT IRRIGATION at 14:35

## 2018-11-12 RX ADMIN — ETOMIDATE 16 MG: 2 INJECTION, SOLUTION INTRAVENOUS at 14:08

## 2018-11-12 RX ADMIN — FENTANYL CITRATE 50 MCG: 50 INJECTION, SOLUTION INTRAMUSCULAR; INTRAVENOUS at 14:08

## 2018-11-12 RX ADMIN — LIDOCAINE HYDROCHLORIDE 100 MG: 20 INJECTION, SOLUTION INFILTRATION; PERINEURAL at 14:08

## 2018-11-12 RX ADMIN — SUCCINYLCHOLINE CHLORIDE 100 MG: 20 INJECTION, SOLUTION INTRAMUSCULAR; INTRAVENOUS; PARENTERAL at 14:08

## 2018-11-12 RX ADMIN — SODIUM CHLORIDE 1000 ML: 900 IRRIGANT IRRIGATION at 14:21

## 2018-11-12 RX ADMIN — GLYCOPYRROLATE 1 MG: 0.2 INJECTION, SOLUTION INTRAMUSCULAR; INTRAVENOUS at 14:35

## 2018-11-12 RX ADMIN — ROCURONIUM BROMIDE 20 MG: 10 INJECTION INTRAVENOUS at 14:21

## 2018-11-12 RX ADMIN — PHENYLEPHRINE HYDROCHLORIDE 50 MCG: 10 INJECTION, SOLUTION INTRAMUSCULAR; INTRAVENOUS; SUBCUTANEOUS at 14:23

## 2018-11-12 RX ADMIN — SODIUM CHLORIDE, POTASSIUM CHLORIDE, SODIUM LACTATE AND CALCIUM CHLORIDE: 600; 310; 30; 20 INJECTION, SOLUTION INTRAVENOUS at 14:04

## 2018-11-12 RX ADMIN — FENTANYL CITRATE 25 MCG: 50 INJECTION, SOLUTION INTRAMUSCULAR; INTRAVENOUS at 14:48

## 2018-11-12 RX ADMIN — NEOSTIGMINE METHYLSULFATE 5 MG: 1 INJECTION, SOLUTION INTRAVENOUS at 14:35

## 2018-11-12 RX ADMIN — SODIUM CHLORIDE 3000 ML: 900 IRRIGANT IRRIGATION at 14:21

## 2018-11-12 RX ADMIN — PHENYLEPHRINE HYDROCHLORIDE 100 MCG: 10 INJECTION, SOLUTION INTRAMUSCULAR; INTRAVENOUS; SUBCUTANEOUS at 14:31

## 2018-11-12 RX ADMIN — ONDANSETRON 4 MG: 2 INJECTION INTRAMUSCULAR; INTRAVENOUS at 14:30

## 2018-11-12 RX ADMIN — FENTANYL CITRATE 25 MCG: 50 INJECTION, SOLUTION INTRAMUSCULAR; INTRAVENOUS at 14:21

## 2018-11-12 ASSESSMENT — LIFESTYLE VARIABLES: TOBACCO_USE: 1

## 2018-11-12 ASSESSMENT — COPD QUESTIONNAIRES: COPD: 0

## 2018-11-12 ASSESSMENT — ENCOUNTER SYMPTOMS: SEIZURES: 0

## 2018-11-12 NOTE — OR NURSING
Instructions given to pt and wife for catheter care. PNDS met, po per I&O sheet. Pt dressed, up in recliner and transported to Phase 2.

## 2018-11-12 NOTE — PROGRESS NOTES
Admission medication history interview status for the 11/12/2018  admission is complete. See EPIC admission navigator for prior to admission medications     Medication history source reliability:Moderate    Medication history interview source(s):Family    Medication history resources (including written lists, pill bottles, clinic record):Verified Allopurinol with [facility].  Verified Furosemide with [facility]- though last filled in June 2018    Primary pharmacy.[facility]    Additional medication history information not noted on PTA med list :None    Time spent in this activity: 60 minutes    Prior to Admission medications    Medication Sig Last Dose Taking? Auth Provider   ALLOPURINOL PO Take 300 mg by mouth every evening 11/11/2018 at PM Yes Reported, Patient   atorvastatin (LIPITOR) 40 MG tablet TAKE 1 TABLET AT BEDTIME  Patient taking differently: TAKE 1 TABLET (40 MG)  BY MOUTH AT BEDTIME 11/11/2018 at HS Yes Flori Reyes DO   COENZYME Q-10 PO Take 200 mg by mouth every morning  11/12/2018 at 0730 Yes Reported, Patient   Furosemide (LASIX PO) Take 10 mg by mouth daily (0.5 x 20 mg = 10 mg dose) 11/12/2018 at 0730 Yes Reported, Patient   metoprolol succinate (TOPROL-XL) 50 MG 24 hr tablet TAKE 1 TABLET (50 MG) DAILY  Patient taking differently: TAKE 1 TABLET (50 MG) BY MOUTH DAILY 11/12/2018 at 0730 Yes Flori Reyes DO   multivitamin, therapeutic with minerals (MULTI-VITAMIN) TABS Take 1 tablet by mouth every morning  11/12/2018 at 0730 Yes Reported, Patient   pantoprazole (PROTONIX) 40 MG EC tablet Take 1 tablet (40 mg) by mouth daily Take 30-60 minutes before a meal. 11/12/2018 at 0730 Yes Gisell Mcginnis PA-C   venlafaxine (EFFEXOR) 75 MG tablet TAKE 1 TABLET TWICE DAILY  Patient taking differently: TAKE 1 TABLET (75 MG) BY MOUTH TWICE DAILY 11/12/2018 at 0730 Yes Flori Reyes DO   vitamin D (ERGOCALCIFEROL) 49700 UNIT capsule TAKE 1 CAPSULE ONCE A WEEK  Patient taking  differently: TAKE 1 CAPSULE (50,000 UNIT) BY MOUTH ONCE A WEEK ON TUESDAY 11/6/2018 at AM Yes Katelyn Akhtar APRN CNP   ACE/ARB NOT PRESCRIBED, INTENTIONAL, ACE & ARB not prescribed due to Symptomatic hypotension not due to excessive diuresis   Flori Reyes,    ASPIRIN NOT PRESCRIBED (INTENTIONAL) Please choose reason not prescribed, below   Flori Reyes, DO   ferrous sulfate (IRON) 325 (65 Fe) MG tablet Take 325 mg by mouth daily 10/31/2018  Reported, Patient

## 2018-11-12 NOTE — DISCHARGE INSTRUCTIONS
Discharge Instructions for Transurethral Resection of Bladder Tumor (TURBT)  You had a procedure called a resection of bladder tumor (surgery to remove a bladder tumor). During the surgery, a surgeon inserted a thin, lighted tube (cystoscope) into the bladder through the urethra (the part of your body that carries urine from the bladder to the outside of the body). The surgeon used a tool to either remove the cancer or burn it away with high-energy electricity.  Home care    Take care of your catheter the way you were shown in the hospital. You will return for catheter removal tomorrow in the office.    Don t be alarmed by brownish or reddish blood or clots in your urine. This is a result of the procedure. However, call your doctor if the blood does not start to go away within 72 hours after you go home.    Drink plenty of fluids during the day (enough to keep your urine very light colored). This will help keep a healthy flow of urine.    Don t drive until the doctor says it s OK.     Don t return to work until the doctor says it s OK.    Don t do any heavy lifting for 3 weeks after the procedure.  ? Don t lift anything heavier than 8 pounds.  ? Don t lift weights.  ? Don t  infants or children.    Don t mow the lawn or use a vacuum .    Avoid constipation.  ? Use a laxative or stool softener as directed by your doctor.  ? Eat more high-fiber foods.    Be sure to finish the antibiotics that your doctor prescribed.    Once your catheter is removed, expect some blood in your urine and some burning when you urinate.  Follow-up care  Dr. Camarillo will call to discuss the pathology results when they are available.   When to call your healthcare provider  Call your healthcare provider right away if you have any of the following:    Heavy bleeding or large blood clots in the urine    Blood in the urine after 3 days    Catheter falls out or stops draining    Fever above 100.4 F (38 C) or shaking  chills    Trouble urinating    Pain or cramping in the abdomen that won t go away   Date Last Reviewed: 1/1/2017 2000-2018 [employer]. [address] [place] [address], [place] [address]. All rights reserved. This information is not intended as a substitute for professional medical care. Always follow your healthcare professional's instructions.    DISCHARGE INSTRUCTIONS FOR   CATHETER CARE AT HOME      .      Basic Catheter Care  1. Always wash hands before and after handling your catheter.  2. Use soap and water to wash the area around your catheter.  3. Do this procedure twice a day.  4. Proper cleansing will help keep the area from becoming irritated or infected.    Leg Bag  This is a small plastic bag that collects urine draining from your catheter and then strapped around your thigh. It will need to be emptied when the bag is 1/2 to 3/4 full.     Large Drainage Bag  1. This bag is larger than the leg bag and holds more urine.  It is to be used while at home, especially at night.    2. Before you go to bed, change the leg bag to the large drainage bag.  3. Pinch off the catheter with your fingers and swab the connection between the catheter and leg bag with alcohol sponge.  4. Disconnect the leg bag and connect the large drainage bag to your catheter.  5. When you get into bed, arrange the drainage tubing so that it doesn t kink.  6. Be sure to keep the bag below the level of your bladder and allow enough slack for turning.    Cleaning Your Drainage Bags    1. Wash hands.  2. Using funnel or syringe, fill the bag half full with a solution of 1/2  vinegar and 1/2 water.  3. Shake bag, allowing mixture to cleanse inside of bag.  4. Empty out all vinegar and water mixture from your bag.  5. Hang bag to dry when not in use.  6. Clean your bags anytime you change them.      Helpful Hints  1. Always keep drainage bags below bladder level to insure adequate drainage.  2. Drink 4-6 glasses of water daily along  with other fluids you normally drink to keep urine free of infection and / or clots.  3. If you notice no urine in your bag for 2 to 4 hours or you develop extreme discomfort in bladder area, your catheter maybe plugged.  Notify your doctor.  4. If you notice your urine becomes foul smelling and cloudy, notify your doctor.  Also notify your doctor if you develop fever or chills.  5. If you notice urine leaking around the outside of the catheter, check to be sure catheter or tubing is not kinked.  6. Don t use leg bag while in bed.    Same Day Surgery Discharge Instructions for  Sedation and General Anesthesia       It's not unusual to feel dizzy, light-headed or faint for up to 24 hours after surgery or while taking pain medication.  If you have these symptoms: sit for a few minutes before standing and have someone assist you when you get up to walk or use the bathroom.      You should rest and relax for the next 24 hours. We recommend you make arrangements to have an adult stay with you for at least 24 hours after your discharge.  Avoid hazardous and strenuous activity.      DO NOT DRIVE any vehicle or operate mechanical equipment for 24 hours following the end of your surgery.  Even though you may feel normal, your reactions may be affected by the medication you have received.      Do not drink alcoholic beverages for 24 hours following surgery.       Slowly progress to your regular diet as you feel able. It's not unusual to feel nauseated and/or vomit after receiving anesthesia.  If you develop these symptoms, drink clear liquids (apple juice, ginger ale, broth, 7-up, etc. ) until you feel better.  If your nausea and vomiting persists for 24 hours, please notify your surgeon.        All narcotic pain medications, along with inactivity and anesthesia, can cause constipation. Drinking plenty of liquids and increasing fiber intake will help.      For any questions of a medical nature, call your surgeon.      Do not  make important decisions for 24 hours.      If you had general anesthesia, you may have a sore throat for a couple of days related to the breathing tube used during surgery.  You may use Cepacol lozenges to help with this discomfort.  If it worsens or if you develop a fever, contact your surgeon.       If you feel your pain is not well managed with the pain medications prescribed by your surgeon, please contact your surgeon's office to let them know so they can address your concerns.     **If you have concerns or questions about your procedure,    please contact Dr Camarillo at  [phone]**

## 2018-11-12 NOTE — OP NOTE
OPERATIVE REPORT  DATE OF SURGERY: 11/12/18  LOCATION OF SURGERY: [facility] OR  PREOPERATIVE DIAGNOSIS:  Bladder cancer  POSTOPERATIVE DIAGNOSIS: Bladder cancer  START TIME: 2:21 PM  END TIME: 2:36 PM  PROCEDURE PERFORMED:   1. Cystoscopy  2. Re-staging TURBT - medium (2-5 cm)     STAFF SURGEON: Ryan Camarillo MD  ANESTHESIA: General.   ESTIMATED BLOOD LOSS: 2 mL.   DRAINS AND TUBES: 22fr Hotter catheter  COMPLICATIONS: None.   DISPOSITION: PACU.   SPECIMENS OBTAINED:   ID Type Source Tests Collected by Time Destination   A : BLADDER TUMOR BASE Tissue Bladder SURGICAL PATHOLOGY EXAM Ryan Camarillo MD 11/12/2018  2:26 PM       SIGNIFICANT FINDINGS: Previous area of prior resection noted just lateral to the RIGHT UO with no evidence of tumor regrowth. Area re-resected and hemostasis ensured.      HISTORY OF PRESENT ILLNESS: Gibson Villalta is a very pleasant 77 year old male who presents with a history of newly diagnosed Bladder cancer. He is s/p TURBT on 9/21. 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 He presents today for re-staging TURBT. Initially this was planned for 10/17, however due to hospital admission for anemia this was postponed.     OPERATION PERFORMED:   Informed consent was obtained and the patient was brought to the operating room where general anesthesia was induced. The patient was given appropriate preoperative antibiotics and positioned supine. The patient was then repositioned in dorsal lithotomy with all pressure points padded. We then performed a timeout, verifying the correct patient's site and procedure to be performed.    A 26fr continuous flow resectoscope was inserted atraumatically into the bladder with the visual obturator. Complete cystoscopy was performed with no evidence of new bladder lesions. The previous area of resection was noted on the right lateral wall just lateral to the RIGHT UO. There was no evidence of tumor regrowth. This area was  re-resected with care to avoid injury to the UO. The resection chips were removed. Efflux was confirmed from the RIGHT UO. Hemostasis was ensured and noted to be excellent on a low pressure system. The scope was removed and a 22fr Hotter catheter was placed with 10cc in the balloon.   He as emerged from anesthesia and taken to the recovery room.   Ryan Camarillo MD   Urology  [facility]  Clinic Phone [phone]

## 2018-11-12 NOTE — OR NURSING
Patient has an ICD/PPM. Directions for cautery per Tyrese Romero from [employer]:  If using bipolar cautery there is not intervention needed. If using unipolar cautery they need to put the grounding pad on lower extremity, can use the magnet but don't need too. Also use short bursts of cautery.

## 2018-11-12 NOTE — ANESTHESIA PREPROCEDURE EVALUATION
Anesthesia Evaluation     . Pt has had prior anesthetic.     No history of anesthetic complications          ROS/MED HX    ENT/Pulmonary:     (+)tobacco use, Past use , . .   (-) asthma, COPD, sleep apnea and recent URI   Neurologic:      (-) seizures, CVA and migraines   Cardiovascular: Comment: MI 2006    (+) Dyslipidemia, --CAD, -past MI,-. : . . . :ICD . . Previous cardiac testing Echodate:5/29/2018results:Interpretation Summary     Left ventricular systolic function is severely reduced.The visual ejection  fraction is estimated at 25-30%.The left ventricle is moderately  dilated.Apical, mid to distal septal and mid to distal anterior wall akinesia.  There is mild (1+) mitral regurgitation.There is trace aortic regurgitation.     On direct comparision to echo images dated 06/20/2017 LVEF appears similar, LV  is more dilated in present study.  _____________________________________________________________________________  __        Left Ventricle  The left ventricle is moderately dilated. There is normal left ventricular  wall thickness. Left ventricular systolic function is severely reduced. The  visual ejection fraction is estimated at 25-30%. apical mid to distal septal  and mid to distal anterior wall akinesia. There is no thrombus seen in the  left ventricle.     Right Ventricle  The right ventricle is not well visualized. grossly normal systolic function.  There is a pacemaker lead in the right ventricle.     Atria  The left atrium is mildly dilated. Right atrial size is normal. There is no  color Doppler evidence of an atrial shunt.     Mitral Valve  There is mild (1+) mitral regurgitation.     Tricuspid Valve  Right ventricular systolic pressure could not be approximated due to  inadequate tricuspid regurgitation.        Aortic Valve  The aortic valve is trileaflet. There is trace aortic regurgitation. No aortic  stenosis is present.     Pulmonic Valve  The pulmonic valve is not well  visualized.     Vessels  Inferior vena cava not well visualized for estimation of right atrial  pressure. The inferior vena cava is not dilated.     Pericardium  There is no pericardial effusion.date: results: date: results: date: results:          METS/Exercise Tolerance:     Hematologic:     (+) Anemia, -      Musculoskeletal:         GI/Hepatic:        (-) GERD and liver disease   Renal/Genitourinary:     (+) chronic renal disease, type: CRI, BPH,       Endo:     (+) type II DM Not using insulin Obesity, .   (-) thyroid disease   Psychiatric:     (+) psychiatric history anxiety and depression      Infectious Disease:         Malignancy:   (+) Malignancy History of Other  Other CA Bladder CA Active status post         Other:                     Physical Exam  Normal systems: cardiovascular and pulmonary    Airway   Mallampati: II  TM distance: >3 FB  Neck ROM: full  Comment: Small mouth opening    Dental   (+) upper dentures and lower dentures    Cardiovascular       Pulmonary                     Anesthesia Plan      History & Physical Review  History and physical reviewed and following examination; no interval change.    ASA Status:  4 .    NPO Status:  > 8 hours    Plan for General and ETT with Intravenous and Etomidate induction. Maintenance will be Balanced.    PONV prophylaxis:  Ondansetron (or other 5HT-3)  Additional equipment: Videolaryngoscope      Postoperative Care  Postoperative pain management:  Multi-modal analgesia and IV analgesics.      Consents  Anesthetic plan, risks, benefits and alternatives discussed with:  Patient..                          .

## 2018-11-12 NOTE — ANESTHESIA POSTPROCEDURE EVALUATION
Patient: Gibson Villalta    Procedure(s):  CYSTOSCOPY RESTAGING TRANSURETHRAL RESECTION BLADDER TUMOR    Diagnosis:BLADDER CANCER  Diagnosis Additional Information: No value filed.    Anesthesia Type:  General, ETT    Note:  Anesthesia Post Evaluation    Patient location during evaluation: PACU  Patient participation: Able to fully participate in evaluation  Level of consciousness: awake and alert  Pain management: adequate  Airway patency: patent  Cardiovascular status: acceptable and hemodynamically stable  Respiratory status: acceptable and unassisted  Hydration status: acceptable  PONV: none             Last vitals:  Vitals:    11/12/18 1456 11/12/18 1500 11/12/18 1515   BP: 151/89 146/82 127/83   Pulse:      Resp: 19 15 15   Temp: 36.7  C (98.1  F)     SpO2: 100% 99% 98%         Electronically Signed By: Luis M Guardado DO  November 12, 2018  3:22 PM

## 2018-11-12 NOTE — IP AVS SNAPSHOT
MRN:[id number]                      After Visit Summary   11/12/2018    Gibson Villalta    MRN: [id number]           Thank you!     Thank you for choosing [facility] for your care. Our goal is always to provide you with excellent care. Hearing back from our patients is one way we can continue to improve our services. Please take a few minutes to complete the written survey that you may receive in the mail after you visit with us. Thank you!        Patient Information     Date Of Birth          1940        About your hospital stay     You were admitted on:  November 12, 2018 You last received care in the:  [facility] PACU    You were discharged on:  November 12, 2018       Who to Call     For medical emergencies, please call 911.  For non-urgent questions about your medical care, please call your primary care provider or clinic, [phone]  For questions related to your surgery, please call your surgery clinic        Attending Provider     Provider Specialty    Ryan Camarillo MD Urology       Primary Care Provider Office Phone # Fax #    Flori ReyesDO [phone]      Your next 10 appointments already scheduled     Nov 13, 2018  2:15 PM CST   Cardio-Oncology New with Gibson Heard MD   [facility] ([facility])    [address]  [place] [id number] [phone] OPT 2            Nov 19, 2018  1:00 PM CST   Return Visit with Ryan Camarillo MD   [facility] ([facility])    [address]  [address]  [place] [id number] [phone]            Dec 10, 2018  4:30 PM CST   Remote ICD Check with ALEJANDRO DCR2   [facility] ([facility])    [address]  [place] [id number] [phone] OPT 2           This appointment is for a remote check of your debrillator.  This is not an  appointment at the office.              Further instructions from your care team         Discharge Instructions for Transurethral Resection of Bladder Tumor (TURBT)  You had a procedure called a resection of bladder tumor (surgery to remove a bladder tumor). During the surgery, a surgeon inserted a thin, lighted tube (cystoscope) into the bladder through the urethra (the part of your body that carries urine from the bladder to the outside of the body). The surgeon used a tool to either remove the cancer or burn it away with high-energy electricity.  Home care    Take care of your catheter the way you were shown in the hospital. You will return for catheter removal tomorrow in the office.    Don t be alarmed by brownish or reddish blood or clots in your urine. This is a result of the procedure. However, call your doctor if the blood does not start to go away within 72 hours after you go home.    Drink plenty of fluids during the day (enough to keep your urine very light colored). This will help keep a healthy flow of urine.    Don t drive until the doctor says it s OK.     Don t return to work until the doctor says it s OK.    Don t do any heavy lifting for 3 weeks after the procedure.  ? Don t lift anything heavier than 8 pounds.  ? Don t lift weights.  ? Don t  infants or children.    Don t mow the lawn or use a vacuum .    Avoid constipation.  ? Use a laxative or stool softener as directed by your doctor.  ? Eat more high-fiber foods.    Be sure to finish the antibiotics that your doctor prescribed.    Once your catheter is removed, expect some blood in your urine and some burning when you urinate.  Follow-up care  Dr. Camarillo will call to discuss the pathology results when they are available.   When to call your healthcare provider  Call your healthcare provider right away if you have any of the following:    Heavy bleeding or large blood clots in the urine    Blood in the urine after 3  days    Catheter falls out or stops draining    Fever above 100.4 F (38 C) or shaking chills    Trouble urinating    Pain or cramping in the abdomen that won t go away   Date Last Reviewed: 1/1/2017 2000-2018 [employer]. [address] [place] [address]. All rights reserved. This information is not intended as a substitute for professional medical care. Always follow your healthcare professional's instructions.    DISCHARGE INSTRUCTIONS FOR   CATHETER CARE AT HOME      .      Basic Catheter Care  1. Always wash hands before and after handling your catheter.  2. Use soap and water to wash the area around your catheter.  3. Do this procedure twice a day.  4. Proper cleansing will help keep the area from becoming irritated or infected.    Leg Bag  This is a small plastic bag that collects urine draining from your catheter and then strapped around your thigh. It will need to be emptied when the bag is 1/2 to 3/4 full.     Large Drainage Bag  1. This bag is larger than the leg bag and holds more urine.  It is to be used while at home, especially at night.    2. Before you go to bed, change the leg bag to the large drainage bag.  3. Pinch off the catheter with your fingers and swab the connection between the catheter and leg bag with alcohol sponge.  4. Disconnect the leg bag and connect the large drainage bag to your catheter.  5. When you get into bed, arrange the drainage tubing so that it doesn t kink.  6. Be sure to keep the bag below the level of your bladder and allow enough slack for turning.    Cleaning Your Drainage Bags    1. Wash hands.  2. Using funnel or syringe, fill the bag half full with a solution of 1/2  vinegar and 1/2 water.  3. Shake bag, allowing mixture to cleanse inside of bag.  4. Empty out all vinegar and water mixture from your bag.  5. Hang bag to dry when not in use.  6. Clean your bags anytime you change them.      Helpful Hints  1. Always keep drainage bags below  bladder level to insure adequate drainage.  2. Drink 4-6 glasses of water daily along with other fluids you normally drink to keep urine free of infection and / or clots.  3. If you notice no urine in your bag for 2 to 4 hours or you develop extreme discomfort in bladder area, your catheter maybe plugged.  Notify your doctor.  4. If you notice your urine becomes foul smelling and cloudy, notify your doctor.  Also notify your doctor if you develop fever or chills.  5. If you notice urine leaking around the outside of the catheter, check to be sure catheter or tubing is not kinked.  6. Don t use leg bag while in bed.    Same Day Surgery Discharge Instructions for  Sedation and General Anesthesia       It's not unusual to feel dizzy, light-headed or faint for up to 24 hours after surgery or while taking pain medication.  If you have these symptoms: sit for a few minutes before standing and have someone assist you when you get up to walk or use the bathroom.      You should rest and relax for the next 24 hours. We recommend you make arrangements to have an adult stay with you for at least 24 hours after your discharge.  Avoid hazardous and strenuous activity.      DO NOT DRIVE any vehicle or operate mechanical equipment for 24 hours following the end of your surgery.  Even though you may feel normal, your reactions may be affected by the medication you have received.      Do not drink alcoholic beverages for 24 hours following surgery.       Slowly progress to your regular diet as you feel able. It's not unusual to feel nauseated and/or vomit after receiving anesthesia.  If you develop these symptoms, drink clear liquids (apple juice, ginger ale, broth, 7-up, etc. ) until you feel better.  If your nausea and vomiting persists for 24 hours, please notify your surgeon.        All narcotic pain medications, along with inactivity and anesthesia, can cause constipation. Drinking plenty of liquids and increasing fiber intake  will help.      For any questions of a medical nature, call your surgeon.      Do not make important decisions for 24 hours.      If you had general anesthesia, you may have a sore throat for a couple of days related to the breathing tube used during surgery.  You may use Cepacol lozenges to help with this discomfort.  If it worsens or if you develop a fever, contact your surgeon.       If you feel your pain is not well managed with the pain medications prescribed by your surgeon, please contact your surgeon's office to let them know so they can address your concerns.     **If you have concerns or questions about your procedure,    please contact Dr Camarillo at  [phone]**          Pending Results     Date and Time Order Name Status Description    11/12/2018 1437 Surgical pathology exam In process             Admission Information     Date & Time Provider Department Dept. Phone    11/12/2018 Ryan Camarillo MD [facility] PACU [phone]      Your Vitals Were     Blood Pressure Pulse Temperature Respirations Height Weight    135/81 96 97  F (36.1  C) (Temporal) 17 1.829 m (6') 97.4 kg (214 lb 11.2 oz)    Pulse Oximetry BMI (Body Mass Index)                94% 29.12 kg/m2          MyChart Information     [employer] gives you secure access to your electronic health record. If you see a primary care provider, you can also send messages to your care team and make appointments. If you have questions, please call your primary care clinic.  If you do not have a primary care provider, please call [phone] and they will assist you.        Care EveryWhere ID     This is your Care EveryWhere ID. This could be used by other organizations to access your [facility] medical records  [id number]        Equal Access to Services     [facility]AY[facility] : Mark Lance, bam logan, rusty valderrama. So [facility] [phone].    ATENCIÓN: terry Mandujano  de luna disposición servicios gratuitos de asistencia lingüística. Yvette vogel [phone].    We comply with applicable federal civil rights laws and [place] laws. We do not discriminate on the basis of race, color, national origin, age, disability, sex, sexual orientation, or gender identity.               Review of your medicines      CONTINUE these medicines which may have CHANGED, or have new prescriptions. If we are uncertain of the size of tablets/capsules you have at home, strength may be listed as something that might have changed.        Dose / Directions    atorvastatin 40 MG tablet   Commonly known as:  LIPITOR   This may have changed:  See the new instructions.   Used for:  Hyperlipidemia LDL goal <130        TAKE 1 TABLET AT BEDTIME   Quantity:  90 tablet   Refills:  3       metoprolol succinate 50 MG 24 hr tablet   Commonly known as:  TOPROL-XL   This may have changed:  See the new instructions.   Used for:  Cardiomyopathy (H)        TAKE 1 TABLET (50 MG) DAILY   Quantity:  90 tablet   Refills:  3       venlafaxine 75 MG tablet   Commonly known as:  EFFEXOR   This may have changed:  See the new instructions.   Used for:  Anxiety and depression        TAKE 1 TABLET TWICE DAILY   Quantity:  180 tablet   Refills:  1       vitamin D2 92729 UNIT capsule   Commonly known as:  ERGOCALCIFEROL   This may have changed:  See the new instructions.   Used for:  Vitamin D deficiency        TAKE 1 CAPSULE ONCE A WEEK   Quantity:  12 capsule   Refills:  1         CONTINUE these medicines which have NOT CHANGED        Dose / Directions    ACE/ARB/ARNI NOT PRESCRIBED (INTENTIONAL)   Used for:  HFrEF (heart failure with reduced ejection fraction) (H)        ACE & ARB not prescribed due to Symptomatic hypotension not due to excessive diuresis   Refills:  0       ALLOPURINOL PO        Dose:  300 mg   Take 300 mg by mouth every evening   Refills:  0       ASPIRIN NOT PRESCRIBED   Commonly known as:  INTENTIONAL   Used for:   Controlled type 2 diabetes mellitus with chronic kidney disease, without long-term current use of insulin, unspecified CKD stage (H)        Please choose reason not prescribed, below   Quantity:  0 each   Refills:  0       COENZYME Q-10 PO        Dose:  200 mg   Take 200 mg by mouth every morning   Refills:  0       ferrous sulfate 325 (65 Fe) MG tablet   Commonly known as:  IRON        Dose:  325 mg   Take 325 mg by mouth daily   Refills:  0       LASIX PO        Dose:  10 mg   Take 10 mg by mouth daily (0.5 x 20 mg = 10 mg dose)   Refills:  0       Multi-vitamin Tabs tablet        Dose:  1 tablet   Take 1 tablet by mouth every morning   Refills:  0       pantoprazole 40 MG EC tablet   Commonly known as:  PROTONIX   Used for:  Gastroesophageal reflux disease with esophagitis        Dose:  40 mg   Take 1 tablet (40 mg) by mouth daily Take 30-60 minutes before a meal.   Quantity:  30 tablet   Refills:  0                Protect others around you: Learn how to safely use, store and throw away your medicines at www.disposemymeds.org.             Medication List: This is a list of all your medications and when to take them. Check marks below indicate your daily home schedule. Keep this list as a reference.      Medications           Morning Afternoon Evening Bedtime As Needed    ACE/ARB/ARNI NOT PRESCRIBED (INTENTIONAL)   ACE & ARB not prescribed due to Symptomatic hypotension not due to excessive diuresis                                ALLOPURINOL PO   Take 300 mg by mouth every evening                                ASPIRIN NOT PRESCRIBED   Commonly known as:  INTENTIONAL   Please choose reason not prescribed, below                                atorvastatin 40 MG tablet   Commonly known as:  LIPITOR   TAKE 1 TABLET AT BEDTIME                                COENZYME Q-10 PO   Take 200 mg by mouth every morning                                ferrous sulfate 325 (65 Fe) MG tablet   Commonly known as:  IRON   Take 325 mg  by mouth daily                                LASIX PO   Take 10 mg by mouth daily (0.5 x 20 mg = 10 mg dose)                                metoprolol succinate 50 MG 24 hr tablet   Commonly known as:  TOPROL-XL   TAKE 1 TABLET (50 MG) DAILY                                Multi-vitamin Tabs tablet   Take 1 tablet by mouth every morning                                pantoprazole 40 MG EC tablet   Commonly known as:  PROTONIX   Take 1 tablet (40 mg) by mouth daily Take 30-60 minutes before a meal.                                venlafaxine 75 MG tablet   Commonly known as:  EFFEXOR   TAKE 1 TABLET TWICE DAILY                                vitamin D2 63849 UNIT capsule   Commonly known as:  ERGOCALCIFEROL   TAKE 1 CAPSULE ONCE A WEEK

## 2018-11-12 NOTE — IP AVS SNAPSHOT
[facility] [address]    [place] [address]    Phone:  [phone]                                       After Visit Summary   11/12/2018    Gibson Villalta    MRN: [id number]           After Visit Summary Signature Page     I have received my discharge instructions, and my questions have been answered. I have discussed any challenges I see with this plan with the nurse or doctor.    ..........................................................................................................................................  Patient/Patient Representative Signature      ..........................................................................................................................................  Patient Representative Print Name and Relationship to Patient    ..................................................               ................................................  Date                                   Time    ..........................................................................................................................................  Reviewed by Signature/Title    ...................................................              ..............................................  Date                                               Time          22EPIC Rev 08/18

## 2018-11-12 NOTE — ANESTHESIA CARE TRANSFER NOTE
Patient: Gibson Villalta    Procedure(s):  CYSTOSCOPY RESTAGING TRANSURETHRAL RESECTION BLADDER TUMOR    Diagnosis: BLADDER CANCER  Diagnosis Additional Information: No value filed.    Anesthesia Type:   General, ETT     Note:  Airway :Face Mask  Patient transferred to:PACU  Comments: Neuromuscular blockade reversed after TOF 4/4, spontaneous respirations, adequate tidal volumes, followed commands to voice, oropharynx suctioned with soft flexible catheter, extubated atraumatically, extubated with suction, airway patent after extubation.  Oxygen via facemask at 10 liters per minute to PACU. Oxygen tubing connected to wall O2 in PACU, SpO2, NiBP, and EKG monitors and alarms on and functioning, Ronit Hugger warmer connected to patient gown, report on patient's clinical status given to PACU RN, RN questions answered. Handoff Report: Identifed the Patient, Identified the Reponsible Provider, Reviewed the pertinent medical history, Discussed the surgical course, Reviewed Intra-OP anesthesia mangement and issues during anesthesia, Set expectations for post-procedure period and Allowed opportunity for questions and acknowledgement of understanding      Vitals: (Last set prior to Anesthesia Care Transfer)    CRNA VITALS  11/12/2018 1422 - 11/12/2018 1459      11/12/2018             Pulse: 103    SpO2: 98 %    Resp Rate (observed): 10    Resp Rate (set): 10                Electronically Signed By: ALLEN Bob CRNA  November 12, 2018  2:59 PM

## 2018-11-13 ENCOUNTER — ALLIED HEALTH/NURSE VISIT (OUTPATIENT)
Dept: UROLOGY | Facility: CLINIC | Age: 78
End: 2018-11-13
Payer: COMMERCIAL

## 2018-11-13 ENCOUNTER — OFFICE VISIT (OUTPATIENT)
Dept: CARDIOLOGY | Facility: CLINIC | Age: 78
End: 2018-11-13
Payer: COMMERCIAL

## 2018-11-13 VITALS
OXYGEN SATURATION: 96 % | HEART RATE: 96 BPM | DIASTOLIC BLOOD PRESSURE: 67 MMHG | HEIGHT: 73 IN | BODY MASS INDEX: 28.76 KG/M2 | WEIGHT: 217 LBS | SYSTOLIC BLOOD PRESSURE: 98 MMHG

## 2018-11-13 DIAGNOSIS — C83.398 DIFFUSE LARGE B-CELL LYMPHOMA OF EXTRANODAL SITE: ICD-10-CM

## 2018-11-13 DIAGNOSIS — I25.5 ISCHEMIC CARDIOMYOPATHY: Primary | ICD-10-CM

## 2018-11-13 DIAGNOSIS — R33.9 URINARY RETENTION: Primary | ICD-10-CM

## 2018-11-13 PROCEDURE — 99214 OFFICE O/P EST MOD 30 MIN: CPT | Performed by: INTERNAL MEDICINE

## 2018-11-13 PROCEDURE — 99207 ZZC NO CHARGE NURSE ONLY: CPT

## 2018-11-13 NOTE — MR AVS SNAPSHOT
After Visit Summary   11/13/2018    Gibson Villalta    MRN: [id number]           Patient Information     Date Of Birth          1940        Visit Information        Provider Department      11/13/2018 1:00 PM UA NURSE [facility]        Today's Diagnoses     Urinary retention    -  1       Follow-ups after your visit        Your next 10 appointments already scheduled     Nov 13, 2018  2:15 PM CST   Cardio-Oncology New with Gibson Heard MD   [facility] ([facility])    [address]  [place] [address] [phone] OPT 2            Nov 19, 2018  1:00 PM CST   Return Visit with Ryan Camarillo MD   [facility] ([facility])    [address]  [address]  [place] [address] [phone]            Dec 10, 2018  4:30 PM CST   Remote ICD Check with ALEJANDRO DCR2   [facility] ([facility])    [address]  [place] [address] [phone] OPT 2           This appointment is for a remote check of your debrillator.  This is not an appointment at the office.              Who to contact     If you have questions or need follow up information about today's clinic visit or your schedule please contact [facility]OLOG[facility] directly at [phone].  Normal or non-critical lab and imaging results will be communicated to you by MyChart, letter or phone within 4 business days after the clinic has received the results. If you do not hear from us within 7 days, please contact the clinic through MyChart or phone. If you have a critical or abnormal lab result, we will notify you by phone as soon as possible.  Submit refill requests through [employer] or call your pharmacy and they will forward the refill request to us. Please allow 3 business days for  your refill to be completed.          Additional Information About Your Visit        Mooltahart Information     [employer] gives you secure access to your electronic health record. If you see a primary care provider, you can also send messages to your care team and make appointments. If you have questions, please call your primary care clinic.  If you do not have a primary care provider, please call [phone] and they will assist you.        Care EveryWhere ID     This is your Care EveryWhere ID. This could be used by other organizations to access your [place] medical records  [id number]         Blood Pressure from Last 3 Encounters:   11/12/18 135/81   11/01/18 100/70   10/30/18 138/85    Weight from Last 3 Encounters:   11/12/18 97.4 kg (214 lb 11.2 oz)   11/01/18 100.2 kg (221 lb)   10/30/18 99.8 kg (220 lb)              Today, you had the following     No orders found for display         Today's Medication Changes          These changes are accurate as of 11/13/18  1:36 PM.  If you have any questions, ask your nurse or doctor.               These medicines have changed or have updated prescriptions.        Dose/Directions    atorvastatin 40 MG tablet   Commonly known as:  LIPITOR   This may have changed:  See the new instructions.   Used for:  Hyperlipidemia LDL goal <130        TAKE 1 TABLET AT BEDTIME   Quantity:  90 tablet   Refills:  3       metoprolol succinate 50 MG 24 hr tablet   Commonly known as:  TOPROL-XL   This may have changed:  See the new instructions.   Used for:  Cardiomyopathy (H)        TAKE 1 TABLET (50 MG) DAILY   Quantity:  90 tablet   Refills:  3       venlafaxine 75 MG tablet   Commonly known as:  EFFEXOR   This may have changed:  See the new instructions.   Used for:  Anxiety and depression        TAKE 1 TABLET TWICE DAILY   Quantity:  180 tablet   Refills:  1       vitamin D2 88483 UNIT capsule   Commonly known as:  ERGOCALCIFEROL   This may have changed:  See the new instructions.    Used for:  Vitamin D deficiency        TAKE 1 CAPSULE ONCE A WEEK   Quantity:  12 capsule   Refills:  1                Primary Care Provider Office Phone # Fax #    Flori Reyes [phone] [address]  [facility] [address]        Equal Access to Services     BEL MCLAIN : Hadii aad ku hadasho Soomaali, waaxda luqadaha, qaybta kaalmada adeegyada, waxay billyin hayaan aderenan bishopsergioravindra doty . So [facility] [phone].    ATENCIÓN: Si habla español, tiene a de luna disposición servicios gratuitos de asistencia lingüística. Yvette al [phone].    We comply with applicable federal civil rights laws and [place] laws. We do not discriminate on the basis of race, color, national origin, age, disability, sex, sexual orientation, or gender identity.            Thank you!     Thank you for choosing [facility] [place]  for your care. Our goal is always to provide you with excellent care. Hearing back from our patients is one way we can continue to improve our services. Please take a few minutes to complete the written survey that you may receive in the mail after your visit with us. Thank you!             Your Updated Medication List - Protect others around you: Learn how to safely use, store and throw away your medicines at www.disposemymeds.org.          This list is accurate as of 11/13/18  1:36 PM.  Always use your most recent med list.                   Brand Name Dispense Instructions for use Diagnosis    ACE/ARB/ARNI NOT PRESCRIBED (INTENTIONAL)      ACE & ARB not prescribed due to Symptomatic hypotension not due to excessive diuresis    HFrEF (heart failure with reduced ejection fraction) (H)       ALLOPURINOL PO      Take 300 mg by mouth every evening        ASPIRIN NOT PRESCRIBED    INTENTIONAL    0 each    Please choose reason not prescribed, below    Controlled type 2 diabetes mellitus with chronic kidney disease, without long-term current use of  insulin, unspecified CKD stage (H)       atorvastatin 40 MG tablet    LIPITOR    90 tablet    TAKE 1 TABLET AT BEDTIME    Hyperlipidemia LDL goal <130       COENZYME Q-10 PO      Take 200 mg by mouth every morning        ferrous sulfate 325 (65 Fe) MG tablet    IRON     Take 325 mg by mouth daily        LASIX PO      Take 10 mg by mouth daily (0.5 x 20 mg = 10 mg dose)        metoprolol succinate 50 MG 24 hr tablet    TOPROL-XL    90 tablet    TAKE 1 TABLET (50 MG) DAILY    Cardiomyopathy (H)       Multi-vitamin Tabs tablet      Take 1 tablet by mouth every morning        pantoprazole 40 MG EC tablet    PROTONIX    30 tablet    Take 1 tablet (40 mg) by mouth daily Take 30-60 minutes before a meal.    Gastroesophageal reflux disease with esophagitis       venlafaxine 75 MG tablet    EFFEXOR    180 tablet    TAKE 1 TABLET TWICE DAILY    Anxiety and depression       vitamin D2 03289 UNIT capsule    ERGOCALCIFEROL    12 capsule    TAKE 1 CAPSULE ONCE A WEEK    Vitamin D deficiency

## 2018-11-13 NOTE — MR AVS SNAPSHOT
After Visit Summary   11/13/2018    Gibson Villalta    MRN: [id number]           Patient Information     Date Of Birth          1940        Visit Information        Provider Department      11/13/2018 2:15 PM Gibson Heard MD [facility]        Today's Diagnoses     Ischemic cardiomyopathy    -  1    Diffuse large B-cell lymphoma of extranodal site (H)           Follow-ups after your visit        Your next 10 appointments already scheduled     Nov 19, 2018  1:00 PM CST   Return Visit with Ryan Camarillo MD   [facility] [place] ([facility])    [address]  [address]  [facility] [id number] [phone]            Dec 10, 2018  4:30 PM CST   Remote ICD Check with ALEJANDRO DCR2   [facility] ([facility])    [address]  [facility] [id number] [phone] OPT 2           This appointment is for a remote check of your debrillator.  This is not an appointment at the office.              Who to contact     If you have questions or need follow up information about today's clinic visit or your schedule please contact [facility] directly at [phone].  Normal or non-critical lab and imaging results will be communicated to you by MooBellahart, letter or phone within 4 business days after the clinic has received the results. If you do not hear from us within 7 days, please contact the clinic through Stretchrt or phone. If you have a critical or abnormal lab result, we will notify you by phone as soon as possible.  Submit refill requests through [employer] or call your pharmacy and they will forward the refill request to us. Please allow 3 business days for your refill to be completed.          Additional Information About Your Visit        MooBellaharCode Blue Information     [employer] gives you secure access to your electronic  "health record. If you see a primary care provider, you can also send messages to your care team and make appointments. If you have questions, please call your primary care clinic.  If you do not have a primary care provider, please call [phone] and they will assist you.        Care EveryWhere ID     This is your Care EveryWhere ID. This could be used by other organizations to access your [place] medical records  [id number]        Your Vitals Were     Pulse Height Pulse Oximetry BMI (Body Mass Index)          96 1.854 m (6' 1\") 96% 28.63 kg/m2         Blood Pressure from Last 3 Encounters:   11/13/18 98/67   11/12/18 135/81   11/01/18 100/70    Weight from Last 3 Encounters:   11/13/18 98.4 kg (217 lb)   11/12/18 97.4 kg (214 lb 11.2 oz)   11/01/18 100.2 kg (221 lb)              Today, you had the following     No orders found for display         Today's Medication Changes          These changes are accurate as of 11/13/18  3:15 PM.  If you have any questions, ask your nurse or doctor.               These medicines have changed or have updated prescriptions.        Dose/Directions    atorvastatin 40 MG tablet   Commonly known as:  LIPITOR   This may have changed:  See the new instructions.   Used for:  Hyperlipidemia LDL goal <130        TAKE 1 TABLET AT BEDTIME   Quantity:  90 tablet   Refills:  3       metoprolol succinate 50 MG 24 hr tablet   Commonly known as:  TOPROL-XL   This may have changed:  See the new instructions.   Used for:  Cardiomyopathy (H)        TAKE 1 TABLET (50 MG) DAILY   Quantity:  90 tablet   Refills:  3       venlafaxine 75 MG tablet   Commonly known as:  EFFEXOR   This may have changed:  See the new instructions.   Used for:  Anxiety and depression        TAKE 1 TABLET TWICE DAILY   Quantity:  180 tablet   Refills:  1       vitamin D2 68233 UNIT capsule   Commonly known as:  ERGOCALCIFEROL   This may have changed:  See the new instructions.   Used for:  Vitamin D deficiency        " TAKE 1 CAPSULE ONCE A WEEK   Quantity:  12 capsule   Refills:  1                Primary Care Provider Office Phone # Fax #    Flori Reyes [phone] [address] [address]        Equal Access to Services     BEL MCLAIN AH: Hadii aad ku hadasho Soomaali, waaxda luqadaha, qaybta kaalmada adeegyada, waxay idiin hayaan adeeg khsergioravindra lawinsome . So [facility] [phone].    ATENCIÓN: Si habla español, tiene a de luna disposición servicios gratuitos de asistencia lingüística. Llame al [phone].    We comply with applicable federal civil rights laws and [place] laws. We do not discriminate on the basis of race, color, national origin, age, disability, sex, sexual orientation, or gender identity.            Thank you!     Thank you for choosing [facility]  for your care. Our goal is always to provide you with excellent care. Hearing back from our patients is one way we can continue to improve our services. Please take a few minutes to complete the written survey that you may receive in the mail after your visit with us. Thank you!             Your Updated Medication List - Protect others around you: Learn how to safely use, store and throw away your medicines at www.disposemymeds.org.          This list is accurate as of 11/13/18  3:15 PM.  Always use your most recent med list.                   Brand Name Dispense Instructions for use Diagnosis    ACE/ARB/ARNI NOT PRESCRIBED (INTENTIONAL)      ACE & ARB not prescribed due to Symptomatic hypotension not due to excessive diuresis    HFrEF (heart failure with reduced ejection fraction) (H)       ALLOPURINOL PO      Take 300 mg by mouth every evening        ASPIRIN NOT PRESCRIBED    INTENTIONAL    0 each    Please choose reason not prescribed, below    Controlled type 2 diabetes mellitus with chronic kidney disease, without long-term current use of insulin, unspecified CKD stage (H)        atorvastatin 40 MG tablet    LIPITOR    90 tablet    TAKE 1 TABLET AT BEDTIME    Hyperlipidemia LDL goal <130       COENZYME Q-10 PO      Take 200 mg by mouth every morning        ferrous sulfate 325 (65 Fe) MG tablet    IRON     Take 325 mg by mouth daily        LASIX PO      Take 10 mg by mouth daily (0.5 x 20 mg = 10 mg dose)        metoprolol succinate 50 MG 24 hr tablet    TOPROL-XL    90 tablet    TAKE 1 TABLET (50 MG) DAILY    Cardiomyopathy (H)       Multi-vitamin Tabs tablet      Take 1 tablet by mouth every morning        pantoprazole 40 MG EC tablet    PROTONIX    30 tablet    Take 1 tablet (40 mg) by mouth daily Take 30-60 minutes before a meal.    Gastroesophageal reflux disease with esophagitis       venlafaxine 75 MG tablet    EFFEXOR    180 tablet    TAKE 1 TABLET TWICE DAILY    Anxiety and depression       vitamin D2 70058 UNIT capsule    ERGOCALCIFEROL    12 capsule    TAKE 1 CAPSULE ONCE A WEEK    Vitamin D deficiency

## 2018-11-13 NOTE — PROGRESS NOTES
Cardio-Oncology Progress Note          Assessment and Plan:     1. Stage II diffuse large B-cell lymphoma, mini R CHOP therapy planned.  Also history of bladder cancer.    We discussed that he has had elevated risk of worsening cardiomyopathy when receiving Adriamycin.  He may tolerate this okay, but would like to check echocardiogram after his first cycle of mini R CHOP.  If worsening LV function, may need to discontinue the therapy and consider palliative care.    Anticipate needing close follow-up of volume status and medications.  May reestablish with [facility] to help with medication adjustments and volume status if tolerates the first dose of many R CHOP.      2. Anemia    Patient currently anticoagulated for history of RV and LV thrombus.  If anemia is too profound on anticoagulation and with chemotherapy, may need to temporarily suspend anticoagulation.    This note was transcribed using electronic voice recognition software and there may be typographical errors present.                Interval History:   The patient is a very pleasantly demented 78 year old with diffuse large B-cell lymphoma with stage II disease followed by Dr. Maribel Bender.  It is in stomach, small bowel and mesenteric lymph nodes.  He is scheduled for mini R CHOP therapy soon.  He also has separate bladder cancer that is also being treated.  He is anticoagulated for history of RV thrombus (seen on cardiac MRI 2016), LV thrombus (echo 5/2017) and has ischemic cardiomyopathy with ejection fraction around 25-30% with stable symptoms after biventricular ICD.                     Review of Systems:   Review of Systems:  Skin:  Negative     Eyes:  Positive for glasses  ENT:  Negative    Respiratory:  Positive for dyspnea on exertion  Cardiovascular:    Positive for;palpitations;dizziness  Gastroenterology: Negative for poor appetite  Genitourinary:  Negative    Musculoskeletal:  Positive for muscular weakness  Neurologic:  Negative   "  Psychiatric:  Positive for sleep disturbances  Heme/Lymph/Imm:  Negative    Endocrine:  Negative                Physical Exam:     Vitals: BP 98/67  Pulse 96  Ht 1.854 m (6' 1\")  Wt 98.4 kg (217 lb)  SpO2 96%  BMI 28.63 kg/m2  Constitutional:    thin      Skin:  warm and dry to the touch        Head:  normocephalic   bump on right forehead    Eyes:  pupils equal and round        ENT:    pallor      Neck:  JVP normal        Chest:  clear to auscultation        Cardiac: regular rhythm;normal S1 and S2 tachycardic S4              Abdomen:      nontender    Extremities and Back:  no deformities, clubbing, cyanosis, erythema observed;no edema        Neurological:  no gross motor deficits;affect appropriate   decreased short term memory             Medications:     Current Outpatient Prescriptions   Medication Sig Dispense Refill     ACE/ARB NOT PRESCRIBED, INTENTIONAL, ACE & ARB not prescribed due to Symptomatic hypotension not due to excessive diuresis       ALLOPURINOL PO Take 300 mg by mouth every evening       ASPIRIN NOT PRESCRIBED (INTENTIONAL) Please choose reason not prescribed, below 0 each 0     atorvastatin (LIPITOR) 40 MG tablet TAKE 1 TABLET AT BEDTIME (Patient taking differently: TAKE 1 TABLET (40 MG)  BY MOUTH AT BEDTIME) 90 tablet 3     COENZYME Q-10 PO Take 200 mg by mouth every morning        ferrous sulfate (IRON) 325 (65 Fe) MG tablet Take 325 mg by mouth daily       Furosemide (LASIX PO) Take 10 mg by mouth daily (0.5 x 20 mg = 10 mg dose)       metoprolol succinate (TOPROL-XL) 50 MG 24 hr tablet TAKE 1 TABLET (50 MG) DAILY (Patient taking differently: TAKE 1 TABLET (50 MG) BY MOUTH DAILY) 90 tablet 3     multivitamin, therapeutic with minerals (MULTI-VITAMIN) TABS Take 1 tablet by mouth every morning        pantoprazole (PROTONIX) 40 MG EC tablet Take 1 tablet (40 mg) by mouth daily Take 30-60 minutes before a meal. 30 tablet 0     venlafaxine (EFFEXOR) 75 MG tablet TAKE 1 TABLET TWICE DAILY " (Patient taking differently: TAKE 1 TABLET (75 MG) BY MOUTH TWICE DAILY) 180 tablet 1     vitamin D (ERGOCALCIFEROL) 40090 UNIT capsule TAKE 1 CAPSULE ONCE A WEEK (Patient taking differently: TAKE 1 CAPSULE (50,000 UNIT) BY MOUTH ONCE A WEEK ON TUESDAY) 12 capsule 1                Data:   All laboratory data reviewed  No results found for this or any previous visit (from the past 24 hour(s)).    All laboratory data reviewed  Lab Results   Component Value Date    CHOL 176 02/07/2017     Lab Results   Component Value Date    HDL 42 02/07/2017     Lab Results   Component Value Date    LDL 93 02/07/2017     Lab Results   Component Value Date    TRIG 203 02/07/2017     No results found for: CHOLHDLRATIO  TSH   Date Value Ref Range Status   08/09/2018 0.92 0.40 - 4.00 mU/L Final     Last Basic Metabolic Panel:  Lab Results   Component Value Date     10/16/2018      Lab Results   Component Value Date    POTASSIUM 3.6 11/12/2018     Lab Results   Component Value Date    CHLORIDE 110 10/16/2018     Lab Results   Component Value Date    SHELLEY 8.3 10/16/2018     Lab Results   Component Value Date    CO2 26 10/16/2018     Lab Results   Component Value Date    BUN 20 10/16/2018     Lab Results   Component Value Date    CR 1.37 10/16/2018     Lab Results   Component Value Date    GLC 92 10/16/2018     Lab Results   Component Value Date    WBC 11.1 10/30/2018     Lab Results   Component Value Date    RBC 3.67 10/30/2018     Lab Results   Component Value Date    HGB 11.0 10/30/2018     Lab Results   Component Value Date    HCT 34.7 10/30/2018     Lab Results   Component Value Date    MCV 95 10/30/2018     Lab Results   Component Value Date    MCH 30.0 10/30/2018     Lab Results   Component Value Date    MCHC 31.7 10/30/2018     Lab Results   Component Value Date    RDW 15.1 10/30/2018     Lab Results   Component Value Date     10/30/2018

## 2018-11-13 NOTE — PROGRESS NOTES
Gibson Villalta comes into clinic today at the request of Dr. Camarillo for TOV (trial of void).    Unable to complete TOV due to pt having bladder spasms.      Catheter removal documentation on 11/13/2018:    Gibson Villalta presents to the clinic for catheter removal.  Reason for removal: scheduled removal  Order has been verified. yes  Catheter successfully removed at 1:23 PM without immediate complication.  20 cc's of urine present in the catheter bag.  Urethral meatus is free of secretions and encrustation.  The patient is afebrile.  The patient tolerated the procedure and was instructed to push fluids and if unable to void, return to ER for catheter reinsertion.    This service provided today was under the supervising provider of the day Dr. Lopez, who was available if needed.    Kriss Fisher

## 2018-11-13 NOTE — LETTER
11/13/2018    Flori Reyes, DO  [address]  [place] [address]    RE: Gibson Villalta       Dear Colleague,    I had the pleasure of seeing Gibson Villalta in the [facility].    Cardio-Oncology Progress Note          Assessment and Plan:     1. Stage II diffuse large B-cell lymphoma, mini R CHOP therapy planned.  Also history of bladder cancer.    We discussed that he has had elevated risk of worsening cardiomyopathy when receiving Adriamycin.  He may tolerate this okay, but would like to check echocardiogram after his first cycle of mini R CHOP.  If worsening LV function, may need to discontinue the therapy and consider palliative care.    Anticipate needing close follow-up of volume status and medications.  May reestablish with [facility] to help with medication adjustments and volume status if tolerates the first dose of many R CHOP.      2. Anemia    Patient currently anticoagulated for history of RV and LV thrombus.  If anemia is too profound on anticoagulation and with chemotherapy, may need to temporarily suspend anticoagulation.    This note was transcribed using electronic voice recognition software and there may be typographical errors present.                Interval History:   The patient is a very pleasantly demented 78 year old with diffuse large B-cell lymphoma with stage II disease followed by Dr. Maribel Bender.  It is in stomach, small bowel and mesenteric lymph nodes.  He is scheduled for mini R CHOP therapy soon.  He also has separate bladder cancer that is also being treated.  He is anticoagulated for history of RV thrombus (seen on cardiac MRI 2016), LV thrombus (echo 5/2017) and has ischemic cardiomyopathy with ejection fraction around 25-30% with stable symptoms after biventricular ICD.                     Review of Systems:   Review of Systems:  Skin:  Negative     Eyes:  Positive for glasses  ENT:  Negative    Respiratory:  Positive  "for dyspnea on exertion  Cardiovascular:    Positive for;palpitations;dizziness  Gastroenterology: Negative for poor appetite  Genitourinary:  Negative    Musculoskeletal:  Positive for muscular weakness  Neurologic:  Negative    Psychiatric:  Positive for sleep disturbances  Heme/Lymph/Imm:  Negative    Endocrine:  Negative                Physical Exam:     Vitals: BP 98/67  Pulse 96  Ht 1.854 m (6' 1\")  Wt 98.4 kg (217 lb)  SpO2 96%  BMI 28.63 kg/m2  Constitutional:    thin      Skin:  warm and dry to the touch        Head:  normocephalic   bump on right forehead    Eyes:  pupils equal and round        ENT:    pallor      Neck:  JVP normal        Chest:  clear to auscultation        Cardiac: regular rhythm;normal S1 and S2 tachycardic S4              Abdomen:      nontender    Extremities and Back:  no deformities, clubbing, cyanosis, erythema observed;no edema        Neurological:  no gross motor deficits;affect appropriate   decreased short term memory             Medications:     Current Outpatient Prescriptions   Medication Sig Dispense Refill     ACE/ARB NOT PRESCRIBED, INTENTIONAL, ACE & ARB not prescribed due to Symptomatic hypotension not due to excessive diuresis       ALLOPURINOL PO Take 300 mg by mouth every evening       ASPIRIN NOT PRESCRIBED (INTENTIONAL) Please choose reason not prescribed, below 0 each 0     atorvastatin (LIPITOR) 40 MG tablet TAKE 1 TABLET AT BEDTIME (Patient taking differently: TAKE 1 TABLET (40 MG)  BY MOUTH AT BEDTIME) 90 tablet 3     COENZYME Q-10 PO Take 200 mg by mouth every morning        ferrous sulfate (IRON) 325 (65 Fe) MG tablet Take 325 mg by mouth daily       Furosemide (LASIX PO) Take 10 mg by mouth daily (0.5 x 20 mg = 10 mg dose)       metoprolol succinate (TOPROL-XL) 50 MG 24 hr tablet TAKE 1 TABLET (50 MG) DAILY (Patient taking differently: TAKE 1 TABLET (50 MG) BY MOUTH DAILY) 90 tablet 3     multivitamin, therapeutic with minerals (MULTI-VITAMIN) TABS Take " 1 tablet by mouth every morning        pantoprazole (PROTONIX) 40 MG EC tablet Take 1 tablet (40 mg) by mouth daily Take 30-60 minutes before a meal. 30 tablet 0     venlafaxine (EFFEXOR) 75 MG tablet TAKE 1 TABLET TWICE DAILY (Patient taking differently: TAKE 1 TABLET (75 MG) BY MOUTH TWICE DAILY) 180 tablet 1     vitamin D (ERGOCALCIFEROL) 17278 UNIT capsule TAKE 1 CAPSULE ONCE A WEEK (Patient taking differently: TAKE 1 CAPSULE (50,000 UNIT) BY MOUTH ONCE A WEEK ON TUESDAY) 12 capsule 1                Data:   All laboratory data reviewed  No results found for this or any previous visit (from the past 24 hour(s)).    All laboratory data reviewed  Lab Results   Component Value Date    CHOL 176 02/07/2017     Lab Results   Component Value Date    HDL 42 02/07/2017     Lab Results   Component Value Date    LDL 93 02/07/2017     Lab Results   Component Value Date    TRIG 203 02/07/2017     No results found for: CHOLHDLRATIO  TSH   Date Value Ref Range Status   08/09/2018 0.92 0.40 - 4.00 mU/L Final     Last Basic Metabolic Panel:  Lab Results   Component Value Date     10/16/2018      Lab Results   Component Value Date    POTASSIUM 3.6 11/12/2018     Lab Results   Component Value Date    CHLORIDE 110 10/16/2018     Lab Results   Component Value Date    SHELLEY 8.3 10/16/2018     Lab Results   Component Value Date    CO2 26 10/16/2018     Lab Results   Component Value Date    BUN 20 10/16/2018     Lab Results   Component Value Date    CR 1.37 10/16/2018     Lab Results   Component Value Date    GLC 92 10/16/2018     Lab Results   Component Value Date    WBC 11.1 10/30/2018     Lab Results   Component Value Date    RBC 3.67 10/30/2018     Lab Results   Component Value Date    HGB 11.0 10/30/2018     Lab Results   Component Value Date    HCT 34.7 10/30/2018     Lab Results   Component Value Date    MCV 95 10/30/2018     Lab Results   Component Value Date    MCH 30.0 10/30/2018     Lab Results   Component Value Date     [facility]     Lab Results   Component Value Date    RDW 15.1 10/30/2018     Lab Results   Component Value Date     10/30/2018                 Thank you for allowing me to participate in the care of your patient.      Sincerely,     Gibson Heard MD     [facility]

## 2018-11-15 ENCOUNTER — TRANSFERRED RECORDS (OUTPATIENT)
Dept: HEALTH INFORMATION MANAGEMENT | Facility: CLINIC | Age: 78
End: 2018-11-15

## 2018-11-15 LAB — COPATH REPORT: NORMAL

## 2018-11-16 ENCOUNTER — HOSPITAL ENCOUNTER (OUTPATIENT)
Facility: CLINIC | Age: 78
End: 2018-11-16
Attending: THORACIC SURGERY (CARDIOTHORACIC VASCULAR SURGERY) | Admitting: THORACIC SURGERY (CARDIOTHORACIC VASCULAR SURGERY)
Payer: MEDICARE

## 2018-11-17 ENCOUNTER — TELEPHONE (OUTPATIENT)
Dept: UROLOGY | Facility: CLINIC | Age: 78
End: 2018-11-17

## 2018-11-17 NOTE — TELEPHONE ENCOUNTER
Gibson Villalta  [id number]  November 17, 2018  3:31 PM    I called the patient to discuss his pathology results from his re-staging TURBT. He is doing well following surgery with no issues. We discussed that his pathology confirmed his bladder cancer diagnosis of High Grade Non-Muscle Invasive bladder cancer - HG T1. As such, we discussed the standard of care to receive and induction course of BCG treatment starting in 4-6 weeks. We discussed that we will cancel the appointment scheduled for Monday and my office will be in touch regarding the BCG treatment.    Ryan Camarillo M.D.     PATHOLOGY:  FINAL DIAGNOSIS:     Bladder, tumor base, biopsies   - Focal urothelial carcinoma, high grade, with invasive into lamina   propria   - No evidence of muscularis propria invasion (muscle present for   evaluation )

## 2018-11-19 DIAGNOSIS — C67.9 BLADDER CANCER (H): ICD-10-CM

## 2018-11-19 RX ORDER — LIDOCAINE HYDROCHLORIDE 20 MG/ML
10 JELLY TOPICAL
Status: CANCELLED | OUTPATIENT
Start: 2020-02-12

## 2018-11-19 RX ORDER — LIDOCAINE HYDROCHLORIDE 20 MG/ML
10 JELLY TOPICAL
Status: CANCELLED | OUTPATIENT
Start: 2020-03-18

## 2018-11-19 RX ORDER — LIDOCAINE HYDROCHLORIDE 20 MG/ML
10 JELLY TOPICAL
Status: CANCELLED | OUTPATIENT
Start: 2020-02-26

## 2018-11-19 RX ORDER — LIDOCAINE HYDROCHLORIDE 20 MG/ML
10 JELLY TOPICAL
Status: CANCELLED | OUTPATIENT
Start: 2020-03-04

## 2018-11-19 RX ORDER — LIDOCAINE HYDROCHLORIDE 20 MG/ML
10 JELLY TOPICAL
Status: CANCELLED | OUTPATIENT
Start: 2020-03-23

## 2018-11-19 RX ORDER — LIDOCAINE HYDROCHLORIDE 20 MG/ML
10 JELLY TOPICAL
Status: CANCELLED | OUTPATIENT
Start: 2020-02-19

## 2018-11-19 RX ORDER — LEVOFLOXACIN 500 MG/1
500 TABLET, FILM COATED ORAL DAILY
Qty: 12 TABLET | Refills: 0 | Status: ON HOLD | OUTPATIENT
Start: 2018-11-19 | End: 2019-04-22

## 2018-11-19 NOTE — H&P (VIEW-ONLY)
[place] [address] [facility] [address]  [phone]  Dept: [phone]    PRE-OP EVALUATION:  Today's date: 2018    Gibson Villalta (: 1940) presents for pre-operative evaluation assessment as requested by Dr. Camarillo.  He requires evaluation and anesthesia risk assessment prior to undergoing surgery/procedure for treatment of Bladder tumor .    Proposed Surgery/ Procedure: CYSTOSCOPY RESTAGING TRANSURETHRAL RESECTION BLADDER TUMOR  Date of Surgery/ Procedure: 18  Time of Surgery/ Procedure: 1:50pm  Hospital/Surgical Facility: [facility]  Fax number for surgical facility: in [facility]  Primary Physician: Flori Reyes  Type of Anesthesia Anticipated: General    Patient has a Health Care Directive or Living Will:  NO    1. YES - Do you have a history of heart attack, stroke, stent, bypass or surgery on an artery in the head, neck, heart or legs?  MI with stents   2. NO - Do you ever have any pain or discomfort in your chest?  3. YES - Do you have a history of  Heart Failure?  cardiomyopathy, HRrEF 25-30%, Pacer  4. YES - Are you troubled by shortness of breath when: walking on the level, up a slight hill or at night?  5. NO - Do you currently have a cold, bronchitis or other respiratory infection?  6. NO - Do you have a cough, shortness of breath or wheezing?  7. NO - Do you sometimes get pains in the calves of your legs when you walk?  8. YES - Do you or anyone in your family have previous history of blood clots? History of mural clot. Recent neg CT PE study   9. NO - Do you or does anyone in your family have a serious bleeding problem such as prolonged bleeding following surgeries or cuts?  10. YES - Have you ever had problems with anemia or been told to take iron pills? Recent GI bleed. REcently had injectafer.   11. YES - Have you had any abnormal blood loss such as black, tarry or bloody stools, or abnormal vaginal bleeding? REcent injectafer   12. NO - Have you  ever had a blood transfusion?  13. NO - Have you or any of your relatives ever had problems with anesthesia?  14. NO - Do you have sleep apnea, excessive snoring or daytime drowsiness?  15. NO - Do you have any prosthetic heart valves?  16. NO - Do you have prosthetic joints?  17. NO - Is there any chance that you may be pregnant?      HPI:     HPI related to upcoming procedure: Going for restaging of bladder cancer. History is gathered from wife as pt has dementia. He will answer questions but wife will correct him frequently.     Recently was admitted to hospital with symptomatic anemia. He had an EGD where malignancy was confirmed.  Saw Dr Bender with oncology   Also had a bone marrow biopsy   Doesn't eat much at 1 sitting as he gets full very quickly   No urinary changes recently       See problem list for active medical problems.  Problems all longstanding and stable, except as noted/documented.  See ROS for pertinent symptoms related to these conditions.                                                                                                                                                          .    MEDICAL HISTORY:     Patient Active Problem List    Diagnosis Date Noted     Iron deficiency anemia, unspecified iron deficiency anemia type 10/15/2018     Priority: Medium     Gastric mass 09/09/2018     Priority: Medium     Bladder mass 09/09/2018     Priority: Medium     Prepatellar bursitis of left knee 12/11/2017     Priority: Medium     Cardiomyopathy (H) 03/14/2017     Priority: Medium     Controlled type 2 diabetes mellitus with chronic kidney disease, without long-term current use of insulin, unspecified CKD stage (H) 02/24/2017     Priority: Medium     New Dx Feb 2017       HFrEF (heart failure with reduced ejection fraction) (H) 08/16/2016     Priority: Medium     Chronic kidney disease (CKD), stage 3 (moderate)      Priority: Medium     Anxiety and depression      Priority: Medium     BPH  (benign prostatic hypertrophy)      Priority: Medium     LBBB (left bundle branch block)      Priority: Medium     Mixed cardiomyopathy 07/22/2016     Priority: Medium     RV (right ventricular) mural thrombus without MI 07/22/2016     Priority: Medium     Dementia without behavioral disturbance, unspecified dementia type 07/22/2016     Priority: Medium     History of gastric bypass 07/22/2016     Priority: Medium      Past Medical History:   Diagnosis Date     Acute kidney injury (H) 2012     Anemia      Anxiety      Bladder mass      BPH (benign prostatic hypertrophy)      Carpal tunnel syndrome     Right     Chronic kidney disease (CKD), stage 3 (moderate)      Dementia      Depressive disorder      Diabetes (H)      Gastric mass      Gout      History of depression      LBBB (left bundle branch block) 2013     Lumbar herniated disc     L5-S1     Mixed cardiomyopathy 7/22/2016     Myocardial infarction (H) 1995 and 2010     Nonischemic cardiomyopathy (H) 7/22/2016     Obesity      Pacemaker     ICD/PM     Rosacea      Rotator cuff disorder     Tear x 2     RV (right ventricular) mural thrombus 7/22/2016     Past Surgical History:   Procedure Laterality Date     ANGIOPLASTY  1995 and 2010 with stent     BONE MARROW BIOPSY, BONE SPECIMEN, NEEDLE/TROCAR N/A 10/30/2018    Procedure: BIOPSY BONE MARROW;  Surgeon: Jeb Romero MD;  Location:  GI     CARDIAC SURGERY      ICD/PM     CYSTOSCOPY, TRANSURETHRAL RESECTION (TUR) TUMOR BLADDER, COMBINED N/A 9/19/2018    Procedure: COMBINED CYSTOSCOPY, TRANSURETHRAL RESECTION (TUR) TUMOR BLADDER;  CYSTOSCOPY, TRANSURETHRAL RESECTION BLADDER TUMOR ;  Surgeon: Ryan Camarillo MD;  Location:  OR     ESOPHAGOSCOPY, GASTROSCOPY, DUODENOSCOPY (EGD), COMBINED N/A 7/30/2018    Procedure: COMBINED ESOPHAGOSCOPY, GASTROSCOPY, DUODENOSCOPY (EGD), BIOPSY SINGLE OR MULTIPLE;  gastroscopy;  Surgeon: Parish Xiong MD;  Location:  GI     ESOPHAGOSCOPY, GASTROSCOPY,  DUODENOSCOPY (EGD), COMBINED N/A 7/30/2018    Procedure: COMBINED ESOPHAGOSCOPY, GASTROSCOPY, DUODENOSCOPY (EGD);  EGD;  Surgeon: Parish Xiong MD;  Location:  GI     ESOPHAGOSCOPY, GASTROSCOPY, DUODENOSCOPY (EGD), COMBINED N/A 8/2/2018    Procedure: COMBINED ESOPHAGOSCOPY, GASTROSCOPY, DUODENOSCOPY (EGD), BIOPSY SINGLE OR MULTIPLE;  gastroscopy BIOPSYSHOULD BE SENT TO LAB IN NS NOT FORMALIN PER ;  Surgeon: Jonathon Bush MD;  Location:  GI     GASTRIC BYPASS  2001     HEART CATH LEFT HEART CATH  7/19/16    Non ischemic cardiomyopathy; Non functionally significant LAD and RCA disease      OTHER SURGICAL HISTORY  2006    Spinal surgery     OTHER SURGICAL HISTORY  Nov 2016    CRT-D implantation     Current Outpatient Prescriptions   Medication Sig Dispense Refill     ACE/ARB NOT PRESCRIBED, INTENTIONAL, ACE & ARB not prescribed due to Symptomatic hypotension not due to excessive diuresis (Patient not taking: Reported on 10/12/2018)       ASPIRIN NOT PRESCRIBED (INTENTIONAL) Please choose reason not prescribed, below 0 each 0     atorvastatin (LIPITOR) 40 MG tablet TAKE 1 TABLET AT BEDTIME 90 tablet 3     COENZYME Q-10 PO Take 200 mg by mouth every morning        Furosemide (LASIX PO) Take 10 mg by mouth daily       metoprolol succinate (TOPROL-XL) 50 MG 24 hr tablet TAKE 1 TABLET (50 MG) DAILY 90 tablet 3     multivitamin, therapeutic with minerals (MULTI-VITAMIN) TABS Take 1 tablet by mouth every morning        pantoprazole (PROTONIX) 40 MG EC tablet Take 1 tablet (40 mg) by mouth daily Take 30-60 minutes before a meal. 30 tablet 0     venlafaxine (EFFEXOR) 75 MG tablet TAKE 1 TABLET TWICE DAILY 180 tablet 1     vitamin D (ERGOCALCIFEROL) 70799 UNIT capsule TAKE 1 CAPSULE ONCE A WEEK 12 capsule 1     OTC products: None, except as noted above    Allergies   Allergen Reactions     Wasps [Hornets]      Sand wasp --- years ago.        Latex Allergy: NO    Social History   Substance Use Topics      "Smoking status: Former Smoker     Packs/day: 2.00     Years: 20.00     Types: Cigarettes     Quit date: 1/1/1980     Smokeless tobacco: Never Used      Comment: Quit in his 30s - 2 ppd for 20 years     Alcohol use 0.0 oz/week     0 Standard drinks or equivalent per week      Comment: maybe 1 beer a month     History   Drug Use No       REVIEW OF SYSTEMS:   Unable to complete d/t dementia. History gathered from wife.    EXAM:   /70 (BP Location: Right arm, Patient Position: Chair, Cuff Size: Adult Large)  Pulse 102  Temp 98.7  F (37.1  C) (Tympanic)  Ht 6' 1\" (1.854 m)  Wt 221 lb (100.2 kg)  SpO2 97%  BMI 29.16 kg/m2    GENERAL APPEARANCE: healthy, alert and no distress     EYES: EOMI,  PERRL     HENT: mouth without ulcers or lesions     NECK: no adenopathy, no asymmetry, masses, or scars and thyroid normal to palpation     RESP: lungs clear to auscultation - no rales, rhonchi or wheezes     CV: regular rates and rhythm, normal S1 S2, no S3 or S4 and no murmur, click or rub     MS: extremities normal- no gross deformities noted, no evidence of inflammation in joints, FROM in all extremities.     SKIN: no suspicious lesions or rashes     NEURO: Normal strength and tone, sensory exam grossly normal, mentation intact and speech normal     PSYCH: mentation appears normal. and affect normal/bright     LYMPHATICS: No cervical adenopathy    DIAGNOSTICS:   Recent labs and ekg completed     Recent Labs   Lab Test  10/30/18   0847  10/17/18   1437  10/16/18   1135  10/16/18   0607   10/15/18   0910   07/31/18   1035   07/30/18   0750   08/25/17   1416   HGB  11.0*  9.9*  9.4*   --    < >  10.5*   < >   --    < >  13.9   --   15.1   PLT  291   --   217   --    --   257   < >   --    < >  218   --   203   INR   --    --    --    --    --    --    --   1.21*   --   3.56*   < >   --    NA   --    --    --   142   --   139   < >   --    < >  138   --   139   POTASSIUM   --    --    --   3.9   --   3.6   < >   --    < >  " 3.7   --   4.0   CR   --    --    --   1.37*   --   1.57*   < >   --    < >  1.23   --   1.34*   A1C   --    --    --    --    --    --    --    --    --   7.1*   --   6.5*    < > = values in this interval not displayed.        IMPRESSION:   Reason for surgery/procedure: cystoscopy  Diagnosis/reason for consult: medical preop    The proposed surgical procedure is considered INTERMEDIATE risk.    REVISED CARDIAC RISK INDEX  The patient has the following serious cardiovascular risks for perioperative complications such as (MI, PE, VFib and 3  AV Block):  Coronary Artery Disease (MI, positive stress test, angina, Qs on EKG)  Congestive Heart Failure (pulmonary edema, PND, s3 danay, CXR with pulmonary congestion, basilar rales)  INTERPRETATION: 2 risks: Class III (moderate risk - 6.6% complication rate)    The patient has the following additional risks for perioperative complications:  Delirium or Dementia      ICD-10-CM    1. Preop general physical exam Z01.818    2. Bladder mass N32.89        RECOMMENDATIONS:     Gibson has vanessa complex medical history complicated by a recent GI bleed.  He has been symptomatic with POLLARD and anemia that has been treated. He has dementia and he does not remember most of his recent history. His wife is very helpful. They understand any procedure is risky given his comorbidities.     Cardiovascular Risk  Patient is already on a Beta Blocker. Continue Betablocker therapy after surgery, using Beta blocker order set as necessary for NPO status.  Echo on 5/29/18 revealed similar LVEF and worsened LV dilation when compared to 6/20/17.  He is at very high risk for general anesthesia.     Anemia  Anemia and does not require treatment prior to surgery.  Monitor Hemoglobin postoperatively.  Recently had injectafer       --Patient is to take all scheduled medications on the day of surgery EXCEPT for modifications listed below.  Hold vitamins morning of surgery.       APPROVAL GIVEN to proceed  with proposed procedure, without further diagnostic evaluation       Signed Electronically by: ALLEN Colón CNP    Copy of this evaluation report is provided to requesting physician.    [place] Preop Guidelines    Revised Cardiac Risk Index

## 2018-11-20 ENCOUNTER — TELEPHONE (OUTPATIENT)
Dept: FAMILY MEDICINE | Facility: CLINIC | Age: 78
End: 2018-11-20

## 2018-11-20 ENCOUNTER — TRANSFERRED RECORDS (OUTPATIENT)
Dept: HEALTH INFORMATION MANAGEMENT | Facility: CLINIC | Age: 78
End: 2018-11-20

## 2018-11-20 NOTE — TELEPHONE ENCOUNTER
Surgery is being done at [facility]. Pre op is view able thru Epic and does not need to be faxed.  Shraddha Mcmillan CMA

## 2018-11-20 NOTE — TELEPHONE ENCOUNTER
Reason for Call:  Other PRE OP     Detailed comments: PT IS having a procedure done on 11- at [facility]  to have port placed. He had a pre op on 11-1-2018 and needs the preop faxed. Pt wife does not know the location or fax number     Phone Number Patient can be reached at: Home number on file [phone] (home)    Best Time: anytime    Can we leave a detailed message on this number? YES    Call taken on 11/20/2018 at 11:44 AM by Jenn Gagnon

## 2018-11-23 PROBLEM — N32.89 BLADDER MASS: Status: RESOLVED | Noted: 2018-09-09 | Resolved: 2018-11-23

## 2018-11-23 PROBLEM — C83.398 DIFFUSE LARGE B-CELL LYMPHOMA OF EXTRANODAL SITE: Chronic | Status: ACTIVE | Noted: 2018-11-23

## 2018-11-23 PROBLEM — K31.89 GASTRIC MASS: Status: RESOLVED | Noted: 2018-09-09 | Resolved: 2018-11-23

## 2018-11-26 ENCOUNTER — TELEPHONE (OUTPATIENT)
Dept: FAMILY MEDICINE | Facility: CLINIC | Age: 78
End: 2018-11-26

## 2018-11-26 ENCOUNTER — TRANSFERRED RECORDS (OUTPATIENT)
Dept: HEALTH INFORMATION MANAGEMENT | Facility: CLINIC | Age: 78
End: 2018-11-26

## 2018-11-26 NOTE — TELEPHONE ENCOUNTER
To PCP: Please see message below.     S: Transferred from [facility]:  [facility]-- GRIFFIN Bosch is calling (pt is seeing ZOE Lawrence an [place])     At [facility] now for Rituximad with Mini Chop treatment- will be receiving every 21 days- This would be his first TX, and will be proceeding with treatment today.      Pt has stopped all 3 of his heart medications:   Metoprolol  Hydralazine   Isosorbide mononitrate    Per Records from [facility]/Hospital Discharge pt was to stop ONLY Hydralazine and Isosorbide Mononitrate on discharge 10/15/18 (Also Lasix, but NOT Metoprolol XL) (admitted for orthostatic hypotension):   STOP taking these medications         furosemide (LASIX) 20 MG tablet Comments:   Reason for Stopping:            hydrALAZINE (APRESOLINE) 10 MG tablet Comments:   Reason for Stopping:            isosorbide mononitrate (IMDUR) 30 MG 24 hr tablet Comments:   Reason for Stopping:            A:     Pt c/o SOB to [facility] MA. Had to take a break walking short distance from [place] to scale.     Denies pains, but a discomfort d/t SOB    Vital signs today   /64  HR 94  RR 14  O2 96%  Weight 208.8    Pain: 1/10    Attempted to call [facility] back to determine if pt is taking his lasix [phone]. On hold for a significant amount of time. The medications above were what was reported as have been stopped.     To PCP: Would you like to see patient to follow-up?     Please advise,     Thank you,   Catie ROMERO RN

## 2018-11-26 NOTE — TELEPHONE ENCOUNTER
Called patient/Izabella (spouse):     No answer, left VM asking Pt/family to return call to clinic    Catie ROMERO RN

## 2018-11-26 NOTE — TELEPHONE ENCOUNTER
He should be off of Hydralazine and Imdur. Lasix was restarted after my visit 10/18/18 and he should still be on Metoprolol. Would check though with wife Izabella as Gibson has dementia and do medication reconcillation as to what she has been giving him. Dyspnea is a chronic complaint and not new but would ask Izabella if she has noticed any changes in him as she would be best . What have been daily weight checks at home if they have them? He does have appointment with cardiology 12/10 but we can add him in sooner with me or team if need be based on what you find out from Izabella.

## 2018-11-27 ENCOUNTER — APPOINTMENT (OUTPATIENT)
Dept: INTERVENTIONAL RADIOLOGY/VASCULAR | Facility: CLINIC | Age: 78
End: 2018-11-27
Attending: THORACIC SURGERY (CARDIOTHORACIC VASCULAR SURGERY)
Payer: MEDICARE

## 2018-11-27 ENCOUNTER — TELEPHONE (OUTPATIENT)
Dept: UROLOGY | Facility: CLINIC | Age: 78
End: 2018-11-27

## 2018-11-27 ENCOUNTER — HOSPITAL ENCOUNTER (OUTPATIENT)
Facility: CLINIC | Age: 78
Discharge: HOME | End: 2018-11-27
Attending: RADIOLOGY | Admitting: RADIOLOGY
Payer: MEDICARE

## 2018-11-27 VITALS
TEMPERATURE: 96 F | SYSTOLIC BLOOD PRESSURE: 102 MMHG | DIASTOLIC BLOOD PRESSURE: 52 MMHG | RESPIRATION RATE: 16 BRPM | OXYGEN SATURATION: 93 %

## 2018-11-27 DIAGNOSIS — C67.9 BLADDER CANCER (H): ICD-10-CM

## 2018-11-27 LAB
APTT PPP: 25 SEC (ref 22–37)
INR PPP: 1.06 (ref 0.86–1.14)

## 2018-11-27 PROCEDURE — 25000125 ZZHC RX 250

## 2018-11-27 PROCEDURE — 36415 COLL VENOUS BLD VENIPUNCTURE: CPT | Performed by: RADIOLOGY

## 2018-11-27 PROCEDURE — 25000128 H RX IP 250 OP 636: Performed by: RADIOLOGY

## 2018-11-27 PROCEDURE — 36561 INSERT TUNNELED CV CATH: CPT | Mod: LT

## 2018-11-27 PROCEDURE — 27210905 ZZH KIT CR7

## 2018-11-27 PROCEDURE — 85730 THROMBOPLASTIN TIME PARTIAL: CPT | Performed by: RADIOLOGY

## 2018-11-27 PROCEDURE — 27210886 ZZH ACCESSORY CR5

## 2018-11-27 PROCEDURE — 85610 PROTHROMBIN TIME: CPT | Performed by: RADIOLOGY

## 2018-11-27 PROCEDURE — 40000863 ZZH STATISTIC RADIOLOGY XRAY, US, CT, MAR, NM

## 2018-11-27 PROCEDURE — 27211193 ZZ H WOUND GLUE CR1

## 2018-11-27 PROCEDURE — 36415 COLL VENOUS BLD VENIPUNCTURE: CPT

## 2018-11-27 PROCEDURE — C1788 PORT, INDWELLING, IMP: HCPCS

## 2018-11-27 PROCEDURE — C1769 GUIDE WIRE: HCPCS

## 2018-11-27 PROCEDURE — 25000128 H RX IP 250 OP 636

## 2018-11-27 RX ORDER — CEFAZOLIN SODIUM 2 G/100ML
2 INJECTION, SOLUTION INTRAVENOUS
Status: COMPLETED | OUTPATIENT
Start: 2018-11-27 | End: 2018-11-27

## 2018-11-27 RX ORDER — FENTANYL CITRATE 50 UG/ML
25-50 INJECTION, SOLUTION INTRAMUSCULAR; INTRAVENOUS EVERY 5 MIN PRN
Status: DISCONTINUED | OUTPATIENT
Start: 2018-11-27 | End: 2018-11-27 | Stop reason: HOSPADM

## 2018-11-27 RX ORDER — HEPARIN SODIUM (PORCINE) LOCK FLUSH IV SOLN 100 UNIT/ML 100 UNIT/ML
SOLUTION INTRAVENOUS
Status: DISCONTINUED
Start: 2018-11-27 | End: 2018-11-27 | Stop reason: HOSPADM

## 2018-11-27 RX ORDER — HEPARIN SODIUM 1000 [USP'U]/ML
INJECTION, SOLUTION INTRAVENOUS; SUBCUTANEOUS
Status: DISCONTINUED
Start: 2018-11-27 | End: 2018-11-27 | Stop reason: WASHOUT

## 2018-11-27 RX ORDER — LIDOCAINE HYDROCHLORIDE 10 MG/ML
INJECTION, SOLUTION INFILTRATION; PERINEURAL
Status: DISCONTINUED
Start: 2018-11-27 | End: 2018-11-27 | Stop reason: HOSPADM

## 2018-11-27 RX ORDER — ACETAMINOPHEN 325 MG/1
650-975 TABLET ORAL
Status: CANCELLED | OUTPATIENT
Start: 2018-11-27

## 2018-11-27 RX ORDER — DEXTROSE MONOHYDRATE 25 G/50ML
25-50 INJECTION, SOLUTION INTRAVENOUS
Status: DISCONTINUED | OUTPATIENT
Start: 2018-11-27 | End: 2018-11-27 | Stop reason: HOSPADM

## 2018-11-27 RX ORDER — LIDOCAINE 40 MG/G
CREAM TOPICAL
Status: DISCONTINUED | OUTPATIENT
Start: 2018-11-27 | End: 2018-11-27 | Stop reason: HOSPADM

## 2018-11-27 RX ORDER — FENTANYL CITRATE 50 UG/ML
INJECTION, SOLUTION INTRAMUSCULAR; INTRAVENOUS
Status: COMPLETED
Start: 2018-11-27 | End: 2018-11-27

## 2018-11-27 RX ORDER — NICOTINE POLACRILEX 4 MG
15-30 LOZENGE BUCCAL
Status: DISCONTINUED | OUTPATIENT
Start: 2018-11-27 | End: 2018-11-27 | Stop reason: HOSPADM

## 2018-11-27 RX ORDER — LIDOCAINE HYDROCHLORIDE 10 MG/ML
1-30 INJECTION, SOLUTION EPIDURAL; INFILTRATION; INTRACAUDAL; PERINEURAL
Status: COMPLETED | OUTPATIENT
Start: 2018-11-27 | End: 2018-11-27

## 2018-11-27 RX ORDER — HEPARIN SODIUM,PORCINE 10 UNIT/ML
5 VIAL (ML) INTRAVENOUS EVERY 24 HOURS
Status: CANCELLED | OUTPATIENT
Start: 2018-11-27

## 2018-11-27 RX ORDER — NALOXONE HYDROCHLORIDE 0.4 MG/ML
.1-.4 INJECTION, SOLUTION INTRAMUSCULAR; INTRAVENOUS; SUBCUTANEOUS
Status: DISCONTINUED | OUTPATIENT
Start: 2018-11-27 | End: 2018-11-27 | Stop reason: HOSPADM

## 2018-11-27 RX ORDER — HEPARIN SODIUM (PORCINE) LOCK FLUSH IV SOLN 100 UNIT/ML 100 UNIT/ML
5 SOLUTION INTRAVENOUS
Status: CANCELLED | OUTPATIENT
Start: 2018-11-27

## 2018-11-27 RX ORDER — FLUMAZENIL 0.1 MG/ML
0.2 INJECTION, SOLUTION INTRAVENOUS
Status: DISCONTINUED | OUTPATIENT
Start: 2018-11-27 | End: 2018-11-27 | Stop reason: HOSPADM

## 2018-11-27 RX ADMIN — LIDOCAINE HYDROCHLORIDE 20 ML: 10 INJECTION, SOLUTION EPIDURAL; INFILTRATION; INTRACAUDAL; PERINEURAL at 13:13

## 2018-11-27 RX ADMIN — LIDOCAINE HYDROCHLORIDE 20 ML: 10 INJECTION, SOLUTION INFILTRATION; PERINEURAL at 13:13

## 2018-11-27 RX ADMIN — FENTANYL CITRATE 25 MCG: 50 INJECTION INTRAMUSCULAR; INTRAVENOUS at 13:00

## 2018-11-27 RX ADMIN — FENTANYL CITRATE 50 MCG: 50 INJECTION INTRAMUSCULAR; INTRAVENOUS at 12:46

## 2018-11-27 RX ADMIN — CEFAZOLIN SODIUM 2 G: 2 INJECTION, SOLUTION INTRAVENOUS at 12:20

## 2018-11-27 RX ADMIN — SODIUM CHLORIDE, PRESERVATIVE FREE 500 UNITS: 5 INJECTION INTRAVENOUS at 13:13

## 2018-11-27 RX ADMIN — MIDAZOLAM HYDROCHLORIDE 1 MG: 1 INJECTION, SOLUTION INTRAMUSCULAR; INTRAVENOUS at 12:46

## 2018-11-27 RX ADMIN — MIDAZOLAM HYDROCHLORIDE 0.5 MG: 1 INJECTION, SOLUTION INTRAMUSCULAR; INTRAVENOUS at 12:59

## 2018-11-27 RX ADMIN — HEPARIN SODIUM 10000 UNITS: 10000 INJECTION, SOLUTION INTRAVENOUS; SUBCUTANEOUS at 12:37

## 2018-11-27 NOTE — PROGRESS NOTES
Interventional Radiology Pre-Procedure Sedation Assessment   Time of Assessment: 12:28 PM    Expected Level: Moderate Sedation    Indication: Sedation is required for the following type of Procedure: Port a catheter placement with IV moderate sedation    Sedation and procedural consent: Risks, benefits and alternatives were discussed with Patient and Spouse    PO Intake: Appropriately NPO for procedure    ASA Class: Class 2 - MILD SYSTEMIC DISEASE, NO ACUTE PROBLEMS, NO FUNCTIONAL LIMITATIONS.    Mallampati: Grade 2:  Soft palate, base of uvula, tonsillar pillars, and portion of posterior pharyngeal wall visible    Lungs: Lungs Clear with good breath sounds bilaterally    Heart: Normal heart sounds and rate    History and physical reviewed and no updates needed. I have reviewed the lab findings, diagnostic data, medications, and the plan for sedation. I have determined this patient to be an appropriate candidate for the planned sedation and procedure and have reassessed the patient IMMEDIATELY PRIOR to sedation and procedure.    Mabel Devine, ALLEN CNP

## 2018-11-27 NOTE — IP AVS SNAPSHOT
[facility] [address]    [place] [address]    Phone:  [phone]                                       After Visit Summary   11/27/2018    Gibson Villalta    MRN: [id number]           After Visit Summary Signature Page     I have received my discharge instructions, and my questions have been answered. I have discussed any challenges I see with this plan with the nurse or doctor.    ..........................................................................................................................................  Patient/Patient Representative Signature      ..........................................................................................................................................  Patient Representative Print Name and Relationship to Patient    ..................................................               ................................................  Date                                   Time    ..........................................................................................................................................  Reviewed by Signature/Title    ...................................................              ..............................................  Date                                               Time          22EPIC Rev 08/18

## 2018-11-27 NOTE — PROGRESS NOTES
Pt back from the procedure ~1330.  VSS.  Right upper chest site CDI.  Power port pamphlet given to pt and card.

## 2018-11-27 NOTE — IR NOTE
Interventional Radiology Intra-procedural Nursing Note    Patient Name: Gibson Villalta  Medical Record Number: [id number]  Today's Date: November 27, 2018    Start Time: 1248  End of procedure time: 1310  Procedure: port placement  Report given to: care suites  Time pt departs:  1320  : n/a    Other Notes: consent obtained in care suites. Pt brought to Ir room 1, transferred to table, connected to monitoring equipment. Patient was prepped and draped in sterile fashion. Pt tolerated procedure well. VSS. 1.5mg Versed and 75mcg Fentanyl IV. Right chest port site c/d/i with dermabond and steri-strips    Kaur Rae RN

## 2018-11-27 NOTE — PROGRESS NOTES
Pt arrived ~1100.  VSS, afebrile.  IV started, labs sent.  Discharge instructions reviewed and questions answered.  Spouse at bedside.  Lisa NP at bedside and addressing any additional questions.  Consent signed.  Pt ready for the procedure.

## 2018-11-27 NOTE — PROCEDURES
RADIOLOGY POST PROCEDURE NOTE w/ SEDATION  Patient name: Gibson Villalta  MRN: [id number]  : 1940    Pre-procedure diagnosis: Need for long term IV access  Post-procedure diagnosis: Same    Procedure Date/Time: 2018  3:02 PM  Procedure: Right Int Jug power port and catheter with tip at RA/SVC junction.  Heparinized and ready for use.  No complications.  Tolerated well.  Estimated blood loss: 5 ml  Specimen(s) collected with description: None    I determined this patient to be an appropriate candidate for the planned sedation and procedure and reassessed the patient IMMEDIATELY PRIOR to sedation and procedure.     The patient tolerated the procedure well with no immediate complications.  Significant findings:  Please see above.    See imaging dictation for procedural details.    Provider name: Howard Casey  Assistant(s):None

## 2018-11-27 NOTE — TELEPHONE ENCOUNTER
Gibson Villalta  [id number]  November 27, 2018  12:11 PM    I called and discussed Mr. Villalta's case with his Oncologist Dr. Bender. He is starting chemo/radiation treatment for his diffuse large B-cell lymphoma and stomach cancer. We discussed that given the immune effects of the chemotherapy there would likely be little benefit of BCG treatment for his T1 HG bladder cancer as this requires an intact immune response. Will plan for surveillance cystoscopy and repeat TURBT if needed for local control.     I called and discussed with his son Leonard. We discussed this change of treatment plan with regards to the bladder cancer. We will cancel the BCG treatment and plan for surveillance cystoscopy in 3-4 months. He is currently having no symptoms related to the bladder.    Ryan Camarillo M.D.

## 2018-11-27 NOTE — TELEPHONE ENCOUNTER
Great - thank you! Good to hear. I suspect the dyspnea is chronic/unchanged otherwise I would have hoped Izabella would have reported it just now. Will close encounter and await her return call on his status later this week.

## 2018-11-27 NOTE — IP AVS SNAPSHOT
MRN:[id number]                      After Visit Summary   11/27/2018    Gibson iVllalta    MRN: [id number]           Visit Information        Department      11/27/2018 10:42 AM [facility]          Review of your medicines      UNREVIEWED medicines. Ask your doctor about these medicines        Dose / Directions    ACE/ARB/ARNI NOT PRESCRIBED   Commonly known as:  INTENTIONAL   Used for:  HFrEF (heart failure with reduced ejection fraction) (H)        ACE & ARB not prescribed due to Symptomatic hypotension not due to excessive diuresis   Refills:  0       ALLOPURINOL PO        Dose:  300 mg   Take 300 mg by mouth every evening   Refills:  0       ASPIRIN NOT PRESCRIBED   Commonly known as:  INTENTIONAL   Used for:  Controlled type 2 diabetes mellitus with chronic kidney disease, without long-term current use of insulin, unspecified CKD stage (H)        Please choose reason not prescribed, below   Quantity:  0 each   Refills:  0       atorvastatin 40 MG tablet   Commonly known as:  LIPITOR   Used for:  Hyperlipidemia LDL goal <130        TAKE 1 TABLET AT BEDTIME   Quantity:  90 tablet   Refills:  3       COENZYME Q-10 PO        Dose:  200 mg   Take 200 mg by mouth every morning   Refills:  0       ferrous sulfate 325 (65 Fe) MG tablet   Commonly known as:  FEROSUL        Dose:  325 mg   Take 325 mg by mouth daily   Refills:  0       LASIX PO        Dose:  10 mg   Take 10 mg by mouth daily (0.5 x 20 mg = 10 mg dose)   Refills:  0       levofloxacin 500 MG tablet   Commonly known as:  LEVAQUIN   Used for:  Bladder cancer (H)        Dose:  500 mg   Take 1 tablet (500 mg) by mouth daily   Quantity:  12 tablet   Refills:  0       metoprolol succinate 50 MG 24 hr tablet   Commonly known as:  TOPROL-XL   Used for:  Cardiomyopathy (H)        TAKE 1 TABLET (50 MG) DAILY   Quantity:  90 tablet   Refills:  3       Multi-vitamin tablet        Dose:  1 tablet   Take 1 tablet by mouth  every morning   Refills:  0       pantoprazole 40 MG EC tablet   Commonly known as:  PROTONIX   Used for:  Gastroesophageal reflux disease with esophagitis        Dose:  40 mg   Take 1 tablet (40 mg) by mouth daily Take 30-60 minutes before a meal.   Quantity:  30 tablet   Refills:  0       PREDNISONE PO        Dose:  80 mg   Take 80 mg by mouth daily 4 20mg tabs for 5 days   Refills:  0       venlafaxine 75 MG tablet   Commonly known as:  EFFEXOR   Used for:  Anxiety and depression        TAKE 1 TABLET TWICE DAILY   Quantity:  180 tablet   Refills:  1       vitamin D2 50120 UNIT capsule   Commonly known as:  ERGOCALCIFEROL   Used for:  Vitamin D deficiency        TAKE 1 CAPSULE ONCE A WEEK   Quantity:  12 capsule   Refills:  1                Protect others around you: Learn how to safely use, store and throw away your medicines at www.disposemymeds.org.         Follow-ups after your visit        Your next 10 appointments already scheduled     Nov 27, 2018 12:00 PM CST   (Arrive by 10:30 AM)   IR CHEST PORT PLACEMENT > 5 YRS OF AGE with SHIR1   [facility] Interventional Radiology ([facility])    [address] [place] [id number] [phone]           The day before the exam:   You may eat your regular diet.   You are encouraged to drink at least 8 eight ounce glasses of clear liquids.  Please wear loose clothing, such as a sweat suit or jogging clothes. Avoid snaps, zippers and other metal. We may ask you to undress and put on a hospital gown.  Please bring any scans or X-rays taken at other hospitals, if similar tests were done. Also bring a list of your medicines, including vitamins, minerals and over-the-counter drugs. It is safest to leave personal items at home.  Someone will need to drive you to and from the hospital.  Tell your doctor in advance:   If you have allergies to x-ray contrast or iodine.   If you are or may be pregnant.   If you are taking Coumadin (or any  other blood thinners) 5 days prior to the exam for any special instructions.   If you are diabetic to determine if your insulin needs have to be adjusted for the exam.  Your doctor will:   Need to do a history and physical within 30 days before this procedure.   Obtain necessary laboratory tests prior to the exam (Basic Metabolic Panel, CBCP, PTT and INR)   (No labs needed if you are having a tunneled catheter exchange or removal)  If you were given sedation, you cannot drive for 24 hours after the procedure, and an adult must be with you until then.  If you have any questions, please call the Imaging Department where you will have your exam. Directions, parking instructions, and other information are available on our website, [email or web address].            Nov 29, 2018  1:00 PM CST   Nurse Visit with UA NURSE   [facility] ([facility])    [address]  [address]  [place] [facility] [address]   [phone]            Dec 10, 2018  4:30 PM CST   Remote ICD Check with ALEJANDRO DCR2   [facility]   [facility] ([facility])    [address]  [place] [facility] [address]   [phone] OPT 2           This appointment is for a remote check of your debrillator.  This is not an appointment at the office.               Care Instructions        Further instructions from your care team         Port Insertion Discharge Instructions     After you go home:      Have an adult stay with you for the first 6 hours    You may resume your normal diet       For 24 hours - due to the sedation you received:    Relax and take it easy    Do NOT make any important or legal decisions    Do NOT drive or operate machines at home or at work    Do NOT drink alcohol    Care of Puncture Sites:      Keep the dressings on your sites clean & dry for 3 days. Change it only if it gets wet or dirty.    You may shower after the dressing comes off in 3 days    Do not  remove the small white strips of tape, if present. Allow them to fall off on their own.     You may cover the wound with a bandaid after the dressing is removed if needed for comfort      Activity       Avoid heavy lifting (greater than 10 pounds) or the overuse of your shoulder for 3 days    Bleeding:      If you start bleeding from the incision sites in your chest or neck - or have swelling in your neck, sit down and press on the site for 5-10 minutes.     If bleeding has not stopped after 10 minutes, call your provider.        Call 911 right away if you have heavy bleeding or bleeding that does not stop.      Medicines:      You may resume all medications.    For minor pain, you may take Acetaminophen (Tylenol) or Ibuprofen (Advil)    Call the provider who ordered this procedure if:      You have swelling in your neck or over your port site    The incision area is red, swollen, hot or tender    You have chills or a fever greater than 101 F (38 C)    Any questions or concerns    Call  911 or go to the Emergency Room if you have:      Severe chest pain or trouble breathing    Bleeding that you cannot control    Additional Information:      Your port may be accessed right away.     You will need to have your port flushed every 30 days or after each use.      If you have questions call:          [facility] Radiology Dept @ [phone]      The provider who performed your procedure was Dr. Casey.       Additional Information About Your Visit        MyChart Information     [facility] gives you secure access to your electronic health record. If you see a primary care provider, you can also send messages to your care team and make appointments. If you have questions, please call your primary care clinic.  If you do not have a primary care provider, please call [phone] and they will assist you.        Care EveryWhere ID     This is your Care EveryWhere ID. This could be used by other organizations to access  your [facility] medical records  [id number]        Your Vitals Were     Blood Pressure Temperature Respirations Pulse Oximetry          114/67 (BP Location: Right arm) 96  F (35.6  C) (Oral) 18 98%         Primary Care Provider Office Phone # Fax #    Flori Reyes [phone]      Equal Access to Services     [facility]: Hadii aad ku hadasho Soomaali, waaxda luqadaha, qaybta kaalmada adeegyada, waxay idiin hayaan adeeg kharash la'aan . So [facility] [phone].    ATENCIÓN: Si habla español, tiene a de luna disposición servicios gratuitos de asistencia lingüística. Abelinoame al [phone].    We comply with applicable federal civil rights laws and [place] laws. We do not discriminate on the basis of race, color, national origin, age, disability, sex, sexual orientation, or gender identity.            Thank you!     Thank you for choosing [facility] for your care. Our goal is always to provide you with excellent care. Hearing back from our patients is one way we can continue to improve our services. Please take a few minutes to complete the written survey that you may receive in the mail after you visit with us. Thank you!             Medication List: This is a list of all your medications and when to take them. Check marks below indicate your daily home schedule. Keep this list as a reference.      Medications           Morning Afternoon Evening Bedtime As Needed    ACE/ARB/ARNI NOT PRESCRIBED   Commonly known as:  INTENTIONAL   ACE & ARB not prescribed due to Symptomatic hypotension not due to excessive diuresis                                ALLOPURINOL PO   Take 300 mg by mouth every evening                                ASPIRIN NOT PRESCRIBED   Commonly known as:  INTENTIONAL   Please choose reason not prescribed, below                                atorvastatin 40 MG tablet   Commonly known as:  LIPITOR   TAKE 1 TABLET AT BEDTIME                                COENZYME Q-10 PO   Take 200 mg by  mouth every morning                                ferrous sulfate 325 (65 Fe) MG tablet   Commonly known as:  FEROSUL   Take 325 mg by mouth daily                                LASIX PO   Take 10 mg by mouth daily (0.5 x 20 mg = 10 mg dose)                                levofloxacin 500 MG tablet   Commonly known as:  LEVAQUIN   Take 1 tablet (500 mg) by mouth daily                                metoprolol succinate 50 MG 24 hr tablet   Commonly known as:  TOPROL-XL   TAKE 1 TABLET (50 MG) DAILY                                Multi-vitamin tablet   Take 1 tablet by mouth every morning                                pantoprazole 40 MG EC tablet   Commonly known as:  PROTONIX   Take 1 tablet (40 mg) by mouth daily Take 30-60 minutes before a meal.                                PREDNISONE PO   Take 80 mg by mouth daily 4 20mg tabs for 5 days                                venlafaxine 75 MG tablet   Commonly known as:  EFFEXOR   TAKE 1 TABLET TWICE DAILY                                vitamin D2 98617 UNIT capsule   Commonly known as:  ERGOCALCIFEROL   TAKE 1 CAPSULE ONCE A WEEK

## 2018-11-27 NOTE — TELEPHONE ENCOUNTER
Spoke with Izabella- she states she's been doing it correct but forgot to take daily weights. She will get it the next few days and calls us back with an update.    Cholo BURLESON RN

## 2018-11-27 NOTE — DISCHARGE INSTRUCTIONS
Port Insertion Discharge Instructions     After you go home:      Have an adult stay with you for the first 6 hours    You may resume your normal diet       For 24 hours - due to the sedation you received:    Relax and take it easy    Do NOT make any important or legal decisions    Do NOT drive or operate machines at home or at work    Do NOT drink alcohol    Care of Puncture Sites:      Keep the dressings on your sites clean & dry for 3 days. Change it only if it gets wet or dirty.    You may shower after the dressing comes off in 3 days    Do not remove the small white strips of tape, if present. Allow them to fall off on their own.     You may cover the wound with a bandaid after the dressing is removed if needed for comfort      Activity       Avoid heavy lifting (greater than 10 pounds) or the overuse of your shoulder for 3 days    Bleeding:      If you start bleeding from the incision sites in your chest or neck - or have swelling in your neck, sit down and press on the site for 5-10 minutes.     If bleeding has not stopped after 10 minutes, call your provider.        Call 911 right away if you have heavy bleeding or bleeding that does not stop.      Medicines:      You may resume all medications.    For minor pain, you may take Acetaminophen (Tylenol) or Ibuprofen (Advil)    Call the provider who ordered this procedure if:      You have swelling in your neck or over your port site    The incision area is red, swollen, hot or tender    You have chills or a fever greater than 101 F (38 C)    Any questions or concerns    Call  911 or go to the Emergency Room if you have:      Severe chest pain or trouble breathing    Bleeding that you cannot control    Additional Information:      Your port may be accessed right away.     You will need to have your port flushed every 30 days or after each use.      If you have questions call:          [facility] Radiology Dept @ [phone]      The provider who  performed your procedure was Dr. Casey.

## 2018-11-30 ENCOUNTER — DOCUMENTATION ONLY (OUTPATIENT)
Dept: CARE COORDINATION | Facility: CLINIC | Age: 78
End: 2018-11-30

## 2018-11-30 NOTE — PROGRESS NOTES
[facility] now requests orders and shares plan of care/discharge summaries for some patients through [email or web address].  Please REPLY TO THIS MESSAGE OR ROUTE BACK TO THE AUTHOR in order to give authorization for orders when needed.  This is considered a verbal order, you will still receive a faxed copy of orders for signature.  Thank you for your assistance in improving collaboration for our patients.    Medication Reconciliation Question  Should pt be taking any anticoagulation medications? Per last cardiologist note on 11/13 it states he is being anticouagulated but he is currently not taking any and there are none listed in his current medication list. Looks like the warfarin was stopped earlier this fall. He does have some warfarin 2 mg and 1 mg tabs in the home.     Thank you,   Cami Kingsley RN  Case Shikha  [facility]  [email or web address]   Office: [phone]  Cell: [phone]

## 2018-11-30 NOTE — PROGRESS NOTES
Good question. No, should not be on anticoagulation. Coumadin was discontinued during hospitalization for GI bleeding. The cardiology note is in error.

## 2018-12-03 ENCOUNTER — TRANSFERRED RECORDS (OUTPATIENT)
Dept: HEALTH INFORMATION MANAGEMENT | Facility: CLINIC | Age: 78
End: 2018-12-03

## 2018-12-03 DIAGNOSIS — I25.9 CHRONIC ISCHEMIC HEART DISEASE: Primary | ICD-10-CM

## 2018-12-03 DIAGNOSIS — C85.90 LYMPHOMA (H): ICD-10-CM

## 2018-12-09 ENCOUNTER — ANCILLARY PROCEDURE (OUTPATIENT)
Dept: CARDIOLOGY | Facility: CLINIC | Age: 78
End: 2018-12-09
Attending: INTERNAL MEDICINE
Payer: COMMERCIAL

## 2018-12-09 DIAGNOSIS — Z95.810 ICD (IMPLANTABLE CARDIOVERTER-DEFIBRILLATOR) IN PLACE: ICD-10-CM

## 2018-12-09 PROCEDURE — 93296 REM INTERROG EVL PM/IDS: CPT | Performed by: INTERNAL MEDICINE

## 2018-12-09 PROCEDURE — 93295 DEV INTERROG REMOTE 1/2/MLT: CPT | Performed by: INTERNAL MEDICINE

## 2018-12-09 NOTE — PROGRESS NOTES
[employer] Amplia MRI/B ICD Remote Device Check  AP: <1 % BVP: 100 %  Mode: DDDR        Presenting Rhythm: AS/BVP  Heart Rate: Adequate variation per histogram. Active < 1 hour. Optivol fluid status elevated.   Sensing: stable    Pacing Threshold: stable    Impedance: stable  Battery Status: 6.5 years  Atrial Arrhythmia: none  Ventricular Arrhythmia: none  ATP: none    Shocks: none    Care Plan: f/u 3 months office ICD check. Called and reviewed results with wife. Pt feels well. He has a slight cold which maybe why the optivol is elevated. He will f/u in the clinic on 3/18/18. Kylah

## 2018-12-14 ENCOUNTER — HOSPITAL ENCOUNTER (OUTPATIENT)
Dept: CARDIOLOGY | Facility: CLINIC | Age: 78
Discharge: HOME | End: 2018-12-14
Attending: NURSE PRACTITIONER | Admitting: NURSE PRACTITIONER
Payer: MEDICARE

## 2018-12-14 DIAGNOSIS — I25.9 CHRONIC ISCHEMIC HEART DISEASE: ICD-10-CM

## 2018-12-14 DIAGNOSIS — C85.90 LYMPHOMA (H): ICD-10-CM

## 2018-12-14 PROCEDURE — 25500064 ZZH RX 255 OP 636: Performed by: NURSE PRACTITIONER

## 2018-12-14 PROCEDURE — 93306 TTE W/DOPPLER COMPLETE: CPT | Mod: 26 | Performed by: INTERNAL MEDICINE

## 2018-12-14 RX ADMIN — HUMAN ALBUMIN MICROSPHERES AND PERFLUTREN 3 ML: 10; .22 INJECTION, SOLUTION INTRAVENOUS at 09:35

## 2018-12-18 ENCOUNTER — TRANSFERRED RECORDS (OUTPATIENT)
Dept: HEALTH INFORMATION MANAGEMENT | Facility: CLINIC | Age: 78
End: 2018-12-18

## 2018-12-28 ENCOUNTER — MEDICAL CORRESPONDENCE (OUTPATIENT)
Dept: HEALTH INFORMATION MANAGEMENT | Facility: CLINIC | Age: 78
End: 2018-12-28

## 2019-01-08 ENCOUNTER — TRANSFERRED RECORDS (OUTPATIENT)
Dept: HEALTH INFORMATION MANAGEMENT | Facility: CLINIC | Age: 79
End: 2019-01-08

## 2019-01-29 ENCOUNTER — TRANSFERRED RECORDS (OUTPATIENT)
Dept: HEALTH INFORMATION MANAGEMENT | Facility: CLINIC | Age: 79
End: 2019-01-29

## 2019-01-29 DIAGNOSIS — I51.9 SYSTOLIC DYSFUNCTION: Primary | ICD-10-CM

## 2019-01-29 LAB
ALBUMIN SERPL-MCNC: 4.1 G/DL (ref 3.4–5)
ALP SERPL-CCNC: 173 U/L (ref 34–104)
ALT SERPL-CCNC: 13 U/L (ref 7–52)
ANION GAP SERPL CALCULATED.3IONS-SCNC: ABNORMAL MMOL/L
AST SERPL-CCNC: 28 U/L (ref 13–39)
BILIRUB SERPL-MCNC: 0.4 MG/DL (ref 0–1.5)
BUN SERPL-MCNC: 12 MG/DL (ref 7–25)
CALCIUM SERPL-MCNC: 9.1 MG/DL (ref 8.5–10.3)
CHLORIDE SERPLBLD-SCNC: 104 MMOL/L (ref 98–107)
CO2 SERPL-SCNC: 23 MMOL/L (ref 21–32)
CREAT SERPL-MCNC: 1.11 MG/DL (ref 0.6–1.3)
ERYTHROCYTE [DISTWIDTH] IN BLOOD BY AUTOMATED COUNT: 18.3 % (ref 11.3–15.6)
GFR SERPL CREATININE-BSD FRML MDRD: 63.6 ML/MIN/1.73M2
GLUCOSE SERPL-MCNC: 158 MG/DL (ref 70–110)
HCT VFR BLD AUTO: 33.9 % (ref 39–49)
HEMOGLOBIN: 11 G/DL (ref 12.5–16.6)
MCH RBC QN AUTO: 30.7 PG (ref 26–35)
MCHC RBC AUTO-ENTMCNC: 32.4 G/DL (ref 30–35)
MCV RBC AUTO: 94.7 FL (ref 80–104)
PLATELET # BLD AUTO: 303 10^9/L (ref 113–364)
POTASSIUM SERPL-SCNC: 3.8 MMOL/L (ref 3.5–5.1)
PROT SERPL-MCNC: 7 G/DL (ref 6.4–8)
RBC # BLD AUTO: 3.58 10^12/L (ref 4.2–5.6)
SODIUM SERPL-SCNC: 139 MMOL/L (ref 136–145)
WBC # BLD AUTO: 9.4 10^9/L (ref 3–8.9)

## 2019-02-04 ENCOUNTER — HOSPITAL ENCOUNTER (OUTPATIENT)
Dept: CARDIOLOGY | Facility: CLINIC | Age: 79
Discharge: HOME | End: 2019-02-04
Admitting: INTERNAL MEDICINE
Payer: COMMERCIAL

## 2019-02-04 DIAGNOSIS — I51.9 SYSTOLIC DYSFUNCTION: ICD-10-CM

## 2019-02-04 PROCEDURE — 93308 TTE F-UP OR LMTD: CPT | Mod: 26 | Performed by: INTERNAL MEDICINE

## 2019-02-04 PROCEDURE — 25500064 ZZH RX 255 OP 636: Performed by: INTERNAL MEDICINE

## 2019-02-04 PROCEDURE — 93325 DOPPLER ECHO COLOR FLOW MAPG: CPT | Mod: 26 | Performed by: INTERNAL MEDICINE

## 2019-02-04 PROCEDURE — 93321 DOPPLER ECHO F-UP/LMTD STD: CPT | Mod: 26 | Performed by: INTERNAL MEDICINE

## 2019-02-04 PROCEDURE — 40000264 ECHOCARDIOGRAM LIMITED

## 2019-02-04 RX ADMIN — HUMAN ALBUMIN MICROSPHERES AND PERFLUTREN 5 ML: 10; .22 INJECTION, SOLUTION INTRAVENOUS at 14:45

## 2019-02-05 ENCOUNTER — TELEPHONE (OUTPATIENT)
Dept: CARDIOLOGY | Facility: CLINIC | Age: 79
End: 2019-02-05

## 2019-02-05 DIAGNOSIS — I25.5 ISCHEMIC CARDIOMYOPATHY: Primary | ICD-10-CM

## 2019-02-05 PROBLEM — I42.9 CARDIOMYOPATHY (H): Chronic | Status: ACTIVE | Noted: 2017-03-14

## 2019-02-05 NOTE — TELEPHONE ENCOUNTER
Message received from Dr. Heard:    Gibson Heard MD  [facility] Heart Team 4 Cc: Shona Amador, ALLEN CNP; Rose Elizondo APRN CNP             I spoke with Maribel (Napoleon) who said he's a little more short of breath and his echo 2/4/19 looks a little worse with the Adriamycin.     Could we have him come in to see Shona or Rose in the next week or two and in the meantime we can try hydralazine 10mg three times a day if he's not too dizzy on it to help with forward flow, reduce MR and improve dyspnea hopefully.        Contacted patient's wife to review Dr. Heard's recommendations. Spoke with patient's wife. Patient's wife states that she believes they still have left over hydralazine from when the patient was previously on the medication. Reviewed with patient's wife that the dosing would be 10 mg three times a day. Also reviewed with patient's wife that Dr. Heard would like the patient to come in and see an ACE in the next 1-2 weeks. Patient verbalized understanding and agreed with plan of care. Patient's wife will call back if they need a refill of hydralazine. Patient's wife connected with scheduling to set up OV with Shona.

## 2019-02-07 ENCOUNTER — DOCUMENTATION ONLY (OUTPATIENT)
Dept: CARDIOLOGY | Facility: CLINIC | Age: 79
End: 2019-02-07

## 2019-02-07 LAB
MDC_IDC_EPISODE_DTM: NORMAL
MDC_IDC_EPISODE_DTM: NORMAL
MDC_IDC_EPISODE_DURATION: 23 S
MDC_IDC_EPISODE_DURATION: 4 S
MDC_IDC_EPISODE_ID: 16
MDC_IDC_EPISODE_ID: 17
MDC_IDC_EPISODE_TYPE: NORMAL
MDC_IDC_EPISODE_TYPE: NORMAL
MDC_IDC_LEAD_IMPLANT_DT: NORMAL
MDC_IDC_LEAD_LOCATION: NORMAL
MDC_IDC_LEAD_LOCATION_DETAIL_1: NORMAL
MDC_IDC_LEAD_MFG: NORMAL
MDC_IDC_LEAD_MODEL: NORMAL
MDC_IDC_LEAD_POLARITY_TYPE: NORMAL
MDC_IDC_LEAD_SERIAL: NORMAL
MDC_IDC_MSMT_BATTERY_DTM: NORMAL
MDC_IDC_MSMT_BATTERY_REMAINING_LONGEVITY: 78 MO
MDC_IDC_MSMT_BATTERY_RRT_TRIGGER: 2.73
MDC_IDC_MSMT_BATTERY_STATUS: NORMAL
MDC_IDC_MSMT_BATTERY_VOLTAGE: 2.98 V
MDC_IDC_MSMT_CAP_CHARGE_DTM: NORMAL
MDC_IDC_MSMT_CAP_CHARGE_ENERGY: 18 J
MDC_IDC_MSMT_CAP_CHARGE_TIME: 3.83
MDC_IDC_MSMT_CAP_CHARGE_TYPE: NORMAL
MDC_IDC_MSMT_LEADCHNL_LV_IMPEDANCE_VALUE: 130.29
MDC_IDC_MSMT_LEADCHNL_LV_IMPEDANCE_VALUE: 130.29
MDC_IDC_MSMT_LEADCHNL_LV_IMPEDANCE_VALUE: 136.8 OHM
MDC_IDC_MSMT_LEADCHNL_LV_IMPEDANCE_VALUE: 152 OHM
MDC_IDC_MSMT_LEADCHNL_LV_IMPEDANCE_VALUE: 160.94
MDC_IDC_MSMT_LEADCHNL_LV_IMPEDANCE_VALUE: 228 OHM
MDC_IDC_MSMT_LEADCHNL_LV_IMPEDANCE_VALUE: 304 OHM
MDC_IDC_MSMT_LEADCHNL_LV_IMPEDANCE_VALUE: 304 OHM
MDC_IDC_MSMT_LEADCHNL_LV_IMPEDANCE_VALUE: 342 OHM
MDC_IDC_MSMT_LEADCHNL_LV_IMPEDANCE_VALUE: 342 OHM
MDC_IDC_MSMT_LEADCHNL_LV_IMPEDANCE_VALUE: 456 OHM
MDC_IDC_MSMT_LEADCHNL_LV_IMPEDANCE_VALUE: 475 OHM
MDC_IDC_MSMT_LEADCHNL_LV_IMPEDANCE_VALUE: 513 OHM
MDC_IDC_MSMT_LEADCHNL_LV_IMPEDANCE_VALUE: 551 OHM
MDC_IDC_MSMT_LEADCHNL_LV_IMPEDANCE_VALUE: 551 OHM
MDC_IDC_MSMT_LEADCHNL_LV_PACING_THRESHOLD_AMPLITUDE: 0.88 V
MDC_IDC_MSMT_LEADCHNL_LV_PACING_THRESHOLD_PULSEWIDTH: 0.5 MS
MDC_IDC_MSMT_LEADCHNL_RA_IMPEDANCE_VALUE: 361 OHM
MDC_IDC_MSMT_LEADCHNL_RA_PACING_THRESHOLD_AMPLITUDE: 0.62 V
MDC_IDC_MSMT_LEADCHNL_RA_PACING_THRESHOLD_PULSEWIDTH: 0.4 MS
MDC_IDC_MSMT_LEADCHNL_RA_SENSING_INTR_AMPL: 3.62 MV
MDC_IDC_MSMT_LEADCHNL_RA_SENSING_INTR_AMPL: 3.62 MV
MDC_IDC_MSMT_LEADCHNL_RV_IMPEDANCE_VALUE: 475 OHM
MDC_IDC_MSMT_LEADCHNL_RV_IMPEDANCE_VALUE: 513 OHM
MDC_IDC_MSMT_LEADCHNL_RV_PACING_THRESHOLD_AMPLITUDE: 0.62 V
MDC_IDC_MSMT_LEADCHNL_RV_PACING_THRESHOLD_PULSEWIDTH: 0.4 MS
MDC_IDC_MSMT_LEADCHNL_RV_SENSING_INTR_AMPL: 22.12 MV
MDC_IDC_MSMT_LEADCHNL_RV_SENSING_INTR_AMPL: 22.12 MV
MDC_IDC_PG_IMPLANT_DTM: NORMAL
MDC_IDC_PG_MFG: NORMAL
MDC_IDC_PG_MODEL: NORMAL
MDC_IDC_PG_SERIAL: NORMAL
MDC_IDC_PG_TYPE: NORMAL
MDC_IDC_SESS_CLINIC_NAME: NORMAL
MDC_IDC_SESS_DTM: NORMAL
MDC_IDC_SESS_TYPE: NORMAL
MDC_IDC_SET_BRADY_AT_MODE_SWITCH_RATE: 171 {BEATS}/MIN
MDC_IDC_SET_BRADY_LOWRATE: 50 {BEATS}/MIN
MDC_IDC_SET_BRADY_MAX_SENSOR_RATE: 130 {BEATS}/MIN
MDC_IDC_SET_BRADY_MAX_TRACKING_RATE: 130 {BEATS}/MIN
MDC_IDC_SET_BRADY_MODE: NORMAL
MDC_IDC_SET_BRADY_PAV_DELAY_LOW: 170 MS
MDC_IDC_SET_BRADY_SAV_DELAY_LOW: 120 MS
MDC_IDC_SET_CRT_LVRV_DELAY: 0 MS
MDC_IDC_SET_CRT_PACED_CHAMBERS: NORMAL
MDC_IDC_SET_LEADCHNL_LV_PACING_AMPLITUDE: 1.5 V
MDC_IDC_SET_LEADCHNL_LV_PACING_ANODE_ELECTRODE_1: NORMAL
MDC_IDC_SET_LEADCHNL_LV_PACING_ANODE_LOCATION_1: NORMAL
MDC_IDC_SET_LEADCHNL_LV_PACING_CAPTURE_MODE: NORMAL
MDC_IDC_SET_LEADCHNL_LV_PACING_CATHODE_ELECTRODE_1: NORMAL
MDC_IDC_SET_LEADCHNL_LV_PACING_CATHODE_LOCATION_1: NORMAL
MDC_IDC_SET_LEADCHNL_LV_PACING_POLARITY: NORMAL
MDC_IDC_SET_LEADCHNL_LV_PACING_PULSEWIDTH: 0.5 MS
MDC_IDC_SET_LEADCHNL_RA_PACING_AMPLITUDE: 1.5 V
MDC_IDC_SET_LEADCHNL_RA_PACING_ANODE_ELECTRODE_1: NORMAL
MDC_IDC_SET_LEADCHNL_RA_PACING_ANODE_LOCATION_1: NORMAL
MDC_IDC_SET_LEADCHNL_RA_PACING_CAPTURE_MODE: NORMAL
MDC_IDC_SET_LEADCHNL_RA_PACING_CATHODE_ELECTRODE_1: NORMAL
MDC_IDC_SET_LEADCHNL_RA_PACING_CATHODE_LOCATION_1: NORMAL
MDC_IDC_SET_LEADCHNL_RA_PACING_POLARITY: NORMAL
MDC_IDC_SET_LEADCHNL_RA_PACING_PULSEWIDTH: 0.4 MS
MDC_IDC_SET_LEADCHNL_RA_SENSING_ANODE_ELECTRODE_1: NORMAL
MDC_IDC_SET_LEADCHNL_RA_SENSING_ANODE_LOCATION_1: NORMAL
MDC_IDC_SET_LEADCHNL_RA_SENSING_CATHODE_ELECTRODE_1: NORMAL
MDC_IDC_SET_LEADCHNL_RA_SENSING_CATHODE_LOCATION_1: NORMAL
MDC_IDC_SET_LEADCHNL_RA_SENSING_POLARITY: NORMAL
MDC_IDC_SET_LEADCHNL_RA_SENSING_SENSITIVITY: 0.3 MV
MDC_IDC_SET_LEADCHNL_RV_PACING_AMPLITUDE: 2 V
MDC_IDC_SET_LEADCHNL_RV_PACING_ANODE_ELECTRODE_1: NORMAL
MDC_IDC_SET_LEADCHNL_RV_PACING_ANODE_LOCATION_1: NORMAL
MDC_IDC_SET_LEADCHNL_RV_PACING_CAPTURE_MODE: NORMAL
MDC_IDC_SET_LEADCHNL_RV_PACING_CATHODE_ELECTRODE_1: NORMAL
MDC_IDC_SET_LEADCHNL_RV_PACING_CATHODE_LOCATION_1: NORMAL
MDC_IDC_SET_LEADCHNL_RV_PACING_POLARITY: NORMAL
MDC_IDC_SET_LEADCHNL_RV_PACING_PULSEWIDTH: 0.4 MS
MDC_IDC_SET_LEADCHNL_RV_SENSING_ANODE_ELECTRODE_1: NORMAL
MDC_IDC_SET_LEADCHNL_RV_SENSING_ANODE_LOCATION_1: NORMAL
MDC_IDC_SET_LEADCHNL_RV_SENSING_CATHODE_ELECTRODE_1: NORMAL
MDC_IDC_SET_LEADCHNL_RV_SENSING_CATHODE_LOCATION_1: NORMAL
MDC_IDC_SET_LEADCHNL_RV_SENSING_POLARITY: NORMAL
MDC_IDC_SET_LEADCHNL_RV_SENSING_SENSITIVITY: 0.3 MV
MDC_IDC_SET_ZONE_DETECTION_BEATS_DENOMINATOR: 40 {BEATS}
MDC_IDC_SET_ZONE_DETECTION_BEATS_NUMERATOR: 30 {BEATS}
MDC_IDC_SET_ZONE_DETECTION_INTERVAL: 300 MS
MDC_IDC_SET_ZONE_DETECTION_INTERVAL: 350 MS
MDC_IDC_SET_ZONE_DETECTION_INTERVAL: 360 MS
MDC_IDC_SET_ZONE_DETECTION_INTERVAL: 360 MS
MDC_IDC_SET_ZONE_DETECTION_INTERVAL: NORMAL
MDC_IDC_SET_ZONE_TYPE: NORMAL
MDC_IDC_STAT_AT_BURDEN_PERCENT: 0 %
MDC_IDC_STAT_AT_DTM_END: NORMAL
MDC_IDC_STAT_AT_DTM_START: NORMAL
MDC_IDC_STAT_BRADY_AP_VP_PERCENT: 2.97 %
MDC_IDC_STAT_BRADY_AP_VS_PERCENT: 0.02 %
MDC_IDC_STAT_BRADY_AS_VP_PERCENT: 93.56 %
MDC_IDC_STAT_BRADY_AS_VS_PERCENT: 3.45 %
MDC_IDC_STAT_BRADY_DTM_END: NORMAL
MDC_IDC_STAT_BRADY_DTM_START: NORMAL
MDC_IDC_STAT_BRADY_RA_PERCENT_PACED: 2.97 %
MDC_IDC_STAT_BRADY_RV_PERCENT_PACED: 95.53 %
MDC_IDC_STAT_CRT_DTM_END: NORMAL
MDC_IDC_STAT_CRT_DTM_START: NORMAL
MDC_IDC_STAT_CRT_LV_PERCENT_PACED: 95.44 %
MDC_IDC_STAT_CRT_PERCENT_PACED: 95.44 %
MDC_IDC_STAT_EPISODE_RECENT_COUNT: 0
MDC_IDC_STAT_EPISODE_RECENT_COUNT_DTM_END: NORMAL
MDC_IDC_STAT_EPISODE_RECENT_COUNT_DTM_START: NORMAL
MDC_IDC_STAT_EPISODE_TOTAL_COUNT: 0
MDC_IDC_STAT_EPISODE_TOTAL_COUNT_DTM_END: NORMAL
MDC_IDC_STAT_EPISODE_TOTAL_COUNT_DTM_START: NORMAL
MDC_IDC_STAT_EPISODE_TYPE: NORMAL
MDC_IDC_STAT_TACHYTHERAPY_ATP_DELIVERED_RECENT: 0
MDC_IDC_STAT_TACHYTHERAPY_ATP_DELIVERED_TOTAL: 0
MDC_IDC_STAT_TACHYTHERAPY_RECENT_DTM_END: NORMAL
MDC_IDC_STAT_TACHYTHERAPY_RECENT_DTM_START: NORMAL
MDC_IDC_STAT_TACHYTHERAPY_SHOCKS_ABORTED_RECENT: 0
MDC_IDC_STAT_TACHYTHERAPY_SHOCKS_ABORTED_TOTAL: 0
MDC_IDC_STAT_TACHYTHERAPY_SHOCKS_DELIVERED_RECENT: 0
MDC_IDC_STAT_TACHYTHERAPY_SHOCKS_DELIVERED_TOTAL: 0
MDC_IDC_STAT_TACHYTHERAPY_TOTAL_DTM_END: NORMAL
MDC_IDC_STAT_TACHYTHERAPY_TOTAL_DTM_START: NORMAL

## 2019-02-11 ENCOUNTER — OFFICE VISIT (OUTPATIENT)
Dept: CARDIOLOGY | Facility: CLINIC | Age: 79
End: 2019-02-11
Attending: INTERNAL MEDICINE
Payer: COMMERCIAL

## 2019-02-11 VITALS
HEIGHT: 73 IN | SYSTOLIC BLOOD PRESSURE: 100 MMHG | BODY MASS INDEX: 28.63 KG/M2 | HEART RATE: 103 BPM | DIASTOLIC BLOOD PRESSURE: 68 MMHG | OXYGEN SATURATION: 94 %

## 2019-02-11 DIAGNOSIS — I25.5 ISCHEMIC CARDIOMYOPATHY: ICD-10-CM

## 2019-02-11 DIAGNOSIS — I50.23 ACUTE ON CHRONIC SYSTOLIC HEART FAILURE (H): Primary | Chronic | ICD-10-CM

## 2019-02-11 PROCEDURE — 99214 OFFICE O/P EST MOD 30 MIN: CPT | Performed by: NURSE PRACTITIONER

## 2019-02-11 RX ORDER — FUROSEMIDE 20 MG
20 TABLET ORAL DAILY
Qty: 30 TABLET | Refills: 3 | COMMUNITY
Start: 2019-02-11 | End: 2019-03-19

## 2019-02-11 RX ORDER — HYDRALAZINE HYDROCHLORIDE 10 MG/1
10 TABLET, FILM COATED ORAL 3 TIMES DAILY
COMMUNITY
Start: 2018-09-06 | End: 2019-07-01

## 2019-02-11 NOTE — LETTER
2/11/2019    Physician No Ref-Primary  No address on file    RE: Gibson Villalta       Dear Colleague,    I had the pleasure of seeing Gibson Villalta in the [facility].        [facility] Progress Note  Gibson Villalta MRN# [id number]   YOB: 1940 Age: 78 year old          Assessment and Plan:     1. Stage II diffuse large B-cell lymphoma and history of bladder cancer    Currently undergoing mini R CHOP between his 4th and 5th cycle and his 5th cycle of Adriamycin is scheduled for 2 weeks    2. Ischemic cardiomyopathy    LVEF 25-30% and status post BiV ICD    Increasing shortness of breath and fatigue on exertion    Increase Lasix to 20 mg daily    Echocardiogram and follow up in 1 month    3. Anemia    Most recent hemoglobin 1/29/2019 was 11    4. History of RV and LV thrombus    Previously was on Coumadin, but was discontinued due to GI bleed         History of Presenting Illness:    Gibson Villalta is a very pleasant 78 year old patient of Dr. Heard who presents today for a follow up due to worsening heart failure symptoms while undergoing Adriamycin for diffuse large B-cell lymphoma with stage II disease affecting his stomach, small bowel and mesenteric lymph nodes. He is followed by Dr. Maribel Bender. He also has a history of RV (noted on cardiac MRI in 2016) and LV thrombus (seen on echocardiogram from 5/2017), ischemic cardiomyopathy with LVEF 25-30% status post biventricular ICD. He also has a history of HFrEF, anemia, dementia and LBBB.     He underwent an echocardiogram on 2/4/19 that showed LVEF 20-25% with moderate to moderate-severe MR. He was seen by his oncologist, Dr. Maribel Bender, and he was noted to be more short of breath. She requested that he follow up today. Hydralazine 10 mg TID was added on 2/5/19.     Today in clinic he presents with this wife. He does have short term memory loss. They both report that since the fall  "of 2018 he has had a decline in his exertional capacity. He previously used to walk around a large home improvement store and today he was barely unable to walk back to our exam room.            Review of Systems:   Review of Systems:  Skin:  Negative     Eyes:  Positive for glasses  ENT:  Negative postnasal drainage  Respiratory:  Positive for dyspnea on exertion  Cardiovascular:  Negative Positive for;dizziness;lightheadedness;fatigue;palpitations  Gastroenterology: Negative for poor appetite  Genitourinary:  Negative    Musculoskeletal:  Positive for muscular weakness  Neurologic:  Negative    Psychiatric:  Positive for sleep disturbances  Heme/Lymph/Imm:  Negative allergies  Endocrine:  Negative              Physical Exam:     Vitals: /68   Pulse 103   Ht 1.854 m (6' 1\")   SpO2 94%   BMI 28.63 kg/m     Constitutional:  cooperative thin      Skin:  warm and dry to the touch        Head:  normocephalic   bump on right forehead    Eyes:  pupils equal and round        ENT:    pallor      Neck:  JVP normal        Chest:  clear to auscultation        Cardiac: regular rhythm;normal S1 and S2 tachycardic S4              Abdomen:  abdomen soft   nontender    Vascular: pulses full and equal                                      Extremities and Back:  no deformities, clubbing, cyanosis, erythema observed;no edema        Neurological:  no gross motor deficits;affect appropriate   decreased short term memory           Medications:     Current Outpatient Medications   Medication Sig Dispense Refill     ALLOPURINOL PO Take 300 mg by mouth every evening       atorvastatin (LIPITOR) 40 MG tablet TAKE 1 TABLET AT BEDTIME (Patient taking differently: TAKE 1 TABLET (40 MG)  BY MOUTH AT BEDTIME) 90 tablet 3     COENZYME Q-10 PO Take 200 mg by mouth every morning        ferrous sulfate (IRON) 325 (65 Fe) MG tablet Take 325 mg by mouth daily       furosemide (LASIX) 20 MG tablet Take 1 tablet (20 mg) by mouth daily 30 tablet " 3     hydrALAZINE (APRESOLINE) 10 MG tablet 10 mg 3 times daily       metoprolol succinate (TOPROL-XL) 50 MG 24 hr tablet TAKE 1 TABLET (50 MG) DAILY (Patient taking differently: TAKE 1 TABLET (50 MG) BY MOUTH DAILY) 90 tablet 3     multivitamin, therapeutic with minerals (MULTI-VITAMIN) TABS Take 1 tablet by mouth every morning        venlafaxine (EFFEXOR) 75 MG tablet TAKE 1 TABLET TWICE DAILY (Patient taking differently: TAKE 1 TABLET (75 MG) BY MOUTH TWICE DAILY) 180 tablet 1     vitamin D (ERGOCALCIFEROL) 46326 UNIT capsule TAKE 1 CAPSULE ONCE A WEEK (Patient taking differently: TAKE 1 CAPSULE (50,000 UNIT) BY MOUTH ONCE A WEEK ON TUESDAY) 12 capsule 1     ACE/ARB NOT PRESCRIBED, INTENTIONAL, ACE & ARB not prescribed due to Symptomatic hypotension not due to excessive diuresis       ASPIRIN NOT PRESCRIBED (INTENTIONAL) Please choose reason not prescribed, below 0 each 0     levofloxacin (LEVAQUIN) 500 MG tablet Take 1 tablet (500 mg) by mouth daily (Patient not taking: Reported on 2019) 12 tablet 0     pantoprazole (PROTONIX) 40 MG EC tablet Take 1 tablet (40 mg) by mouth daily Take 30-60 minutes before a meal. (Patient not taking: Reported on 2019) 30 tablet 0     PREDNISONE PO Take 80 mg by mouth daily 4 20mg tabs for 5 days         Family History   Problem Relation Age of Onset     Coronary Artery Disease Father 39     Alzheimer Disease Mother      Coronary Artery Disease Son         Two sons have  from MIs (ages 39 and 51)       Social History     Socioeconomic History     Marital status:      Spouse name: Not on file     Number of children: Not on file     Years of education: Not on file     Highest education level: Not on file   Social Needs     Financial resource strain: Not on file     Food insecurity - worry: Not on file     Food insecurity - inability: Not on file     Transportation needs - medical: Not on file     Transportation needs - non-medical: Not on file   Occupational  History     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 No      Service Not Asked     Blood Transfusions Not Asked     Caffeine Concern Not Asked     Occupational Exposure Not Asked     Hobby Hazards Not Asked     Sleep Concern Not Asked     Stress Concern Not Asked     Weight Concern Not Asked     Special Diet Yes     Comment: low fat, low salt      Back Care Not Asked     Exercise No     Bike Helmet Not Asked     Seat Belt Not Asked     Self-Exams Not Asked   Social History Narrative     Not on file            Past Medical History:     Past Medical History:   Diagnosis Date     Acute kidney injury (H)      Anemia      Anxiety      Bladder mass      BPH (benign prostatic hypertrophy)      Cardiomyopathy (H)     ICD implanted 2016     Carpal tunnel syndrome     Right     Chronic kidney disease (CKD), stage 3 (moderate)      Dementia      Depressive disorder      Diabetes (H)      Diffuse large B-cell lymphoma of extranodal site (H) 2018     Gastric mass      Gout      History of depression      LBBB (left bundle branch block) 2013     Lumbar herniated disc     L5-S1     Mixed cardiomyopathy 2016     Myocardial infarction (H)  and      Nonischemic cardiomyopathy (H) 2016     Obesity      Pacemaker     ICD/PM     Rosacea      Rotator cuff disorder     Tear x 2     RV (right ventricular) mural thrombus  7/22/2016              Past Surgical History:     Past Surgical History:   Procedure Laterality Date     ANGIOPLASTY  1995 and 2010 with stent     BIOPSY      stomach     BONE MARROW BIOPSY, BONE SPECIMEN, NEEDLE/TROCAR N/A 10/30/2018    Procedure: BIOPSY BONE MARROW;  Surgeon: Jeb Romero MD;  Location:  GI     CARDIAC SURGERY      ICD/PM     CYSTOSCOPY, TRANSURETHRAL RESECTION (TUR) TUMOR BLADDER, COMBINED N/A 9/19/2018    Procedure: COMBINED CYSTOSCOPY, TRANSURETHRAL RESECTION (TUR) TUMOR BLADDER;  CYSTOSCOPY, TRANSURETHRAL RESECTION BLADDER TUMOR ;  Surgeon: Ryan Camarillo MD;  Location:  OR     CYSTOSCOPY, TRANSURETHRAL RESECTION (TUR) TUMOR BLADDER, COMBINED N/A 11/12/2018    Procedure: CYSTOSCOPY RESTAGING TRANSURETHRAL RESECTION BLADDER TUMOR;  Surgeon: Ryan Camarillo MD;  Location:  OR     ESOPHAGOSCOPY, GASTROSCOPY, DUODENOSCOPY (EGD), COMBINED N/A 7/30/2018    Procedure: COMBINED ESOPHAGOSCOPY, GASTROSCOPY, DUODENOSCOPY (EGD), BIOPSY SINGLE OR MULTIPLE;  gastroscopy;  Surgeon: Parish Xiong MD;  Location:  GI     ESOPHAGOSCOPY, GASTROSCOPY, DUODENOSCOPY (EGD), COMBINED N/A 7/30/2018    Procedure: COMBINED ESOPHAGOSCOPY, GASTROSCOPY, DUODENOSCOPY (EGD);  EGD;  Surgeon: Parish Xiong MD;  Location:  GI     ESOPHAGOSCOPY, GASTROSCOPY, DUODENOSCOPY (EGD), COMBINED N/A 8/2/2018    Procedure: COMBINED ESOPHAGOSCOPY, GASTROSCOPY, DUODENOSCOPY (EGD), BIOPSY SINGLE OR MULTIPLE;  gastroscopy BIOPSYSHOULD BE SENT TO LAB IN NS NOT FORMALIN PER ;  Surgeon: Jonathon Bush MD;  Location:  GI     GASTRIC BYPASS  2001     HEART CATH LEFT HEART CATH  7/19/16    Non ischemic cardiomyopathy; Non functionally significant LAD and RCA disease      OTHER SURGICAL HISTORY  2006    Spinal surgery     OTHER SURGICAL HISTORY  Nov 2016    CRT-D implantation              Allergies:   Wasps [hornets]       Data:   All laboratory data reviewed:    Recent Labs   Lab Test 10/12/18  1301  08/09/18  1256  02/07/17  0817 07/17/16  1247  06/02/16 12/22/15   LDL  --   --   --  93  --   --   --  61   HDL  --   --   --  42  --   --   --  39   NHDL  --   --   --  134*  --   --   --   --    CHOL  --   --   --  176  --   --   --  11   TRIG  --   --   --  203*  --   --   --  100   TSH  --  0.92  --   --  3.76  --  2.25 0.99   IRON 68  --   --   --  66   < >  --   --      --   --   --  273   < >  --   --    IRONSAT 21  --   --   --  24   < >  --   --    MEGA 21*  --    < >  --  108  --   --   --     < > = values in this interval not displayed.       Lab Results   Component Value Date    WBC 9.4 (A) 01/29/2019    RBC 3.58 (A) 01/29/2019    HGB 11 (A) 01/29/2019    HCT 33.9 (A) 01/29/2019    MCV 94.7 01/29/2019    MCH 30.7 01/29/2019    MCHC 32.4 01/29/2019    RDW 18.3 (A) 01/29/2019     01/29/2019       Lab Results   Component Value Date     01/29/2019    POTASSIUM 3.8 01/29/2019    CHLORIDE 104 01/29/2019    CO2 23 01/29/2019    ANIONGAP 6 10/16/2018     (A) 01/29/2019    BUN 12 01/29/2019    CR 1.11 01/29/2019    GFRESTIMATED 63.6 01/29/2019    GFRESTBLACK 61 10/16/2018    SHELLEY 9.1 01/29/2019      Lab Results   Component Value Date    AST 28 01/29/2019    ALT 13 01/29/2019       Lab Results   Component Value Date    A1C 7.1 (H) 07/30/2018       Lab Results   Component Value Date    INR 1.06 11/27/2018    INR 1.21 (H) 07/31/2018         ALLEN ANTONY, CNP  [facility] [email or web address]] [facility] Care[facility] you for allowing me to participate in the care of your patient.      Sincerely,     ALLEN ANTONY CNP     [facility]    cc:   Gibson Heard MD  [address]  [place] [id number]

## 2019-02-11 NOTE — PATIENT INSTRUCTIONS
Today's Recommendations    1. Increase Lasix to 1 tablet daily  2. Echocardiogram in 1 months  3. Continue all other medications without changes.  4. Please follow up with Shona or Dr. Heard in 1 month.    Please send a [employer] message or call [phone] with questions or concerns.     Scheduling number [phone].

## 2019-02-11 NOTE — PROGRESS NOTES
Cardiology Clinic Progress Note  Gibson Villalta MRN# [id number]   YOB: 1940 Age: 78 year old          Assessment and Plan:     1. Stage II diffuse large B-cell lymphoma and history of bladder cancer    Currently undergoing mini R CHOP between his 4th and 5th cycle and his 5th cycle of Adriamycin is scheduled for 2 weeks    2. Ischemic cardiomyopathy    LVEF 25-30% and status post BiV ICD    Increasing shortness of breath and fatigue on exertion    Increase Lasix to 20 mg daily    Echocardiogram and follow up in 1 month    3. Anemia    Most recent hemoglobin 1/29/2019 was 11    4. History of RV and LV thrombus    Previously was on Coumadin, but was discontinued due to GI bleed         History of Presenting Illness:    Gibson Villalta is a very pleasant 78 year old patient of Dr. Heard who presents today for a follow up due to worsening heart failure symptoms while undergoing Adriamycin for diffuse large B-cell lymphoma with stage II disease affecting his stomach, small bowel and mesenteric lymph nodes. He is followed by Dr. Maribel Bender. He also has a history of RV (noted on cardiac MRI in 2016) and LV thrombus (seen on echocardiogram from 5/2017), ischemic cardiomyopathy with LVEF 25-30% status post biventricular ICD. He also has a history of HFrEF, anemia, dementia and LBBB.     He underwent an echocardiogram on 2/4/19 that showed LVEF 20-25% with moderate to moderate-severe MR. He was seen by his oncologist, Dr. Maribel Bender, and he was noted to be more short of breath. She requested that he follow up today. Hydralazine 10 mg TID was added on 2/5/19.     Today in clinic he presents with this wife. He does have short term memory loss. They both report that since the fall of 2018 he has had a decline in his exertional capacity. He previously used to walk around a large home improvement store and today he was barely unable to walk back to our exam room.            Review of Systems:  "  Review of Systems:  Skin:  Negative     Eyes:  Positive for glasses  ENT:  Negative postnasal drainage  Respiratory:  Positive for dyspnea on exertion  Cardiovascular:  Negative Positive for;dizziness;lightheadedness;fatigue;palpitations  Gastroenterology: Negative for poor appetite  Genitourinary:  Negative    Musculoskeletal:  Positive for muscular weakness  Neurologic:  Negative    Psychiatric:  Positive for sleep disturbances  Heme/Lymph/Imm:  Negative allergies  Endocrine:  Negative              Physical Exam:     Vitals: /68   Pulse 103   Ht 1.854 m (6' 1\")   SpO2 94%   BMI 28.63 kg/m    Constitutional:  cooperative thin      Skin:  warm and dry to the touch        Head:  normocephalic   bump on right forehead    Eyes:  pupils equal and round        ENT:    pallor      Neck:  JVP normal        Chest:  clear to auscultation        Cardiac: regular rhythm;normal S1 and S2 tachycardic S4              Abdomen:  abdomen soft   nontender    Vascular: pulses full and equal                                      Extremities and Back:  no deformities, clubbing, cyanosis, erythema observed;no edema        Neurological:  no gross motor deficits;affect appropriate   decreased short term memory           Medications:     Current Outpatient Medications   Medication Sig Dispense Refill     ALLOPURINOL PO Take 300 mg by mouth every evening       atorvastatin (LIPITOR) 40 MG tablet TAKE 1 TABLET AT BEDTIME (Patient taking differently: TAKE 1 TABLET (40 MG)  BY MOUTH AT BEDTIME) 90 tablet 3     COENZYME Q-10 PO Take 200 mg by mouth every morning        ferrous sulfate (IRON) 325 (65 Fe) MG tablet Take 325 mg by mouth daily       furosemide (LASIX) 20 MG tablet Take 1 tablet (20 mg) by mouth daily 30 tablet 3     hydrALAZINE (APRESOLINE) 10 MG tablet 10 mg 3 times daily       metoprolol succinate (TOPROL-XL) 50 MG 24 hr tablet TAKE 1 TABLET (50 MG) DAILY (Patient taking differently: TAKE 1 TABLET (50 MG) BY MOUTH " DAILY) 90 tablet 3     multivitamin, therapeutic with minerals (MULTI-VITAMIN) TABS Take 1 tablet by mouth every morning        venlafaxine (EFFEXOR) 75 MG tablet TAKE 1 TABLET TWICE DAILY (Patient taking differently: TAKE 1 TABLET (75 MG) BY MOUTH TWICE DAILY) 180 tablet 1     vitamin D (ERGOCALCIFEROL) 54747 UNIT capsule TAKE 1 CAPSULE ONCE A WEEK (Patient taking differently: TAKE 1 CAPSULE (50,000 UNIT) BY MOUTH ONCE A WEEK ON TUESDAY) 12 capsule 1     ACE/ARB NOT PRESCRIBED, INTENTIONAL, ACE & ARB not prescribed due to Symptomatic hypotension not due to excessive diuresis       ASPIRIN NOT PRESCRIBED (INTENTIONAL) Please choose reason not prescribed, below 0 each 0     levofloxacin (LEVAQUIN) 500 MG tablet Take 1 tablet (500 mg) by mouth daily (Patient not taking: Reported on 2019) 12 tablet 0     pantoprazole (PROTONIX) 40 MG EC tablet Take 1 tablet (40 mg) by mouth daily Take 30-60 minutes before a meal. (Patient not taking: Reported on 2019) 30 tablet 0     PREDNISONE PO Take 80 mg by mouth daily 4 20mg tabs for 5 days         Family History   Problem Relation Age of Onset     Coronary Artery Disease Father 39     Alzheimer Disease Mother      Coronary Artery Disease Son         Two sons have  from MIs (ages 39 and 51)       Social History     Socioeconomic History     Marital status:      Spouse name: Not on file     Number of children: Not on file     Years of education: Not on file     Highest education level: Not on file   Social Needs     Financial resource strain: Not on file     Food insecurity - worry: Not on file     Food insecurity - inability: Not on file     Transportation needs - medical: Not on file     Transportation needs - non-medical: Not on file   Occupational History     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 No      Service Not Asked     Blood Transfusions Not Asked     Caffeine Concern Not Asked     Occupational Exposure Not Asked     Hobby Hazards Not Asked     Sleep Concern Not Asked     Stress Concern Not Asked     Weight Concern Not Asked     Special Diet Yes     Comment: low fat, low salt      Back Care Not Asked     Exercise No     Bike Helmet Not Asked     Seat Belt Not Asked     Self-Exams Not Asked   Social History Narrative     Not on file            Past Medical History:     Past Medical History:   Diagnosis Date     Acute kidney injury (H)      Anemia      Anxiety      Bladder mass      BPH (benign prostatic hypertrophy)      Cardiomyopathy (H)     ICD implanted 2016     Carpal tunnel syndrome     Right     Chronic kidney disease (CKD), stage 3 (moderate)      Dementia      Depressive disorder      Diabetes (H)      Diffuse large B-cell lymphoma of extranodal site (H) 2018     Gastric mass      Gout      History of depression      LBBB (left bundle branch block) 2013     Lumbar herniated disc     L5-S1     Mixed cardiomyopathy 2016     Myocardial infarction (H)  and      Nonischemic cardiomyopathy (H) 2016     Obesity      Pacemaker     ICD/PM     Rosacea      Rotator cuff disorder     Tear x 2     RV (right ventricular) mural thrombus 2016              Past Surgical History:     Past Surgical History:   Procedure Laterality Date     ANGIOPLASTY   and  with stent     BIOPSY      stomach     BONE MARROW BIOPSY, BONE SPECIMEN, NEEDLE/TROCAR  N/A 10/30/2018    Procedure: BIOPSY BONE MARROW;  Surgeon: Jeb Romero MD;  Location:  GI     CARDIAC SURGERY      ICD/PM     CYSTOSCOPY, TRANSURETHRAL RESECTION (TUR) TUMOR BLADDER, COMBINED N/A 9/19/2018    Procedure: COMBINED CYSTOSCOPY, TRANSURETHRAL RESECTION (TUR) TUMOR BLADDER;  CYSTOSCOPY, TRANSURETHRAL RESECTION BLADDER TUMOR ;  Surgeon: Ryan Camarillo MD;  Location:  OR     CYSTOSCOPY, TRANSURETHRAL RESECTION (TUR) TUMOR BLADDER, COMBINED N/A 11/12/2018    Procedure: CYSTOSCOPY RESTAGING TRANSURETHRAL RESECTION BLADDER TUMOR;  Surgeon: Ryan Camarillo MD;  Location:  OR     ESOPHAGOSCOPY, GASTROSCOPY, DUODENOSCOPY (EGD), COMBINED N/A 7/30/2018    Procedure: COMBINED ESOPHAGOSCOPY, GASTROSCOPY, DUODENOSCOPY (EGD), BIOPSY SINGLE OR MULTIPLE;  gastroscopy;  Surgeon: Parish Xiong MD;  Location:  GI     ESOPHAGOSCOPY, GASTROSCOPY, DUODENOSCOPY (EGD), COMBINED N/A 7/30/2018    Procedure: COMBINED ESOPHAGOSCOPY, GASTROSCOPY, DUODENOSCOPY (EGD);  EGD;  Surgeon: Parish Xiong MD;  Location:  GI     ESOPHAGOSCOPY, GASTROSCOPY, DUODENOSCOPY (EGD), COMBINED N/A 8/2/2018    Procedure: COMBINED ESOPHAGOSCOPY, GASTROSCOPY, DUODENOSCOPY (EGD), BIOPSY SINGLE OR MULTIPLE;  gastroscopy BIOPSYSHOULD BE SENT TO LAB IN NS NOT FORMALIN PER ;  Surgeon: Jonathon Bush MD;  Location:  GI     GASTRIC BYPASS  2001     HEART CATH LEFT HEART CATH  7/19/16    Non ischemic cardiomyopathy; Non functionally significant LAD and RCA disease      OTHER SURGICAL HISTORY  2006    Spinal surgery     OTHER SURGICAL HISTORY  Nov 2016    CRT-D implantation              Allergies:   Wasps [hornets]       Data:   All laboratory data reviewed:    Recent Labs   Lab Test 10/12/18  1301 08/09/18  1256  02/07/17  0817 07/17/16  1247  06/02/16 12/22/15   LDL  --   --   --  93  --   --   --  61   HDL  --   --   --  42  --   --   --  39   NHDL  --   --   --  134*  --   --   --   --    CHOL  --   --   --  176  --    --   --  11   TRIG  --   --   --  203*  --   --   --  100   TSH  --  0.92  --   --  3.76  --  2.25 0.99   IRON 68  --   --   --  66   < >  --   --      --   --   --  273   < >  --   --    IRONSAT 21  --   --   --  24   < >  --   --    MEGA 21*  --    < >  --  108  --   --   --     < > = values in this interval not displayed.       Lab Results   Component Value Date    WBC 9.4 (A) 01/29/2019    RBC 3.58 (A) 01/29/2019    HGB 11 (A) 01/29/2019    HCT 33.9 (A) 01/29/2019    MCV 94.7 01/29/2019    MCH 30.7 01/29/2019    MCHC 32.4 01/29/2019    RDW 18.3 (A) 01/29/2019     01/29/2019       Lab Results   Component Value Date     01/29/2019    POTASSIUM 3.8 01/29/2019    CHLORIDE 104 01/29/2019    CO2 23 01/29/2019    ANIONGAP 6 10/16/2018     (A) 01/29/2019    BUN 12 01/29/2019    CR 1.11 01/29/2019    GFRESTIMATED 63.6 01/29/2019    GFRESTBLACK 61 10/16/2018    SHELLEY 9.1 01/29/2019      Lab Results   Component Value Date    AST 28 01/29/2019    ALT 13 01/29/2019       Lab Results   Component Value Date    A1C 7.1 (H) 07/30/2018       Lab Results   Component Value Date    INR 1.06 11/27/2018    INR 1.21 (H) 07/31/2018         DARWIN RASMUSSEN, APRN, CNP  [facility]

## 2019-02-19 ENCOUNTER — TRANSFERRED RECORDS (OUTPATIENT)
Dept: HEALTH INFORMATION MANAGEMENT | Facility: CLINIC | Age: 79
End: 2019-02-19

## 2019-02-21 ENCOUNTER — TELEPHONE (OUTPATIENT)
Dept: OTHER | Facility: CLINIC | Age: 79
End: 2019-02-21

## 2019-02-28 PROBLEM — C67.9 BLADDER CANCER (H): Chronic | Status: ACTIVE | Noted: 2018-11-19

## 2019-03-07 ENCOUNTER — HOSPITAL ENCOUNTER (OUTPATIENT)
Dept: CARDIOLOGY | Facility: CLINIC | Age: 79
Discharge: HOME | End: 2019-03-07
Attending: NURSE PRACTITIONER | Admitting: NURSE PRACTITIONER
Payer: COMMERCIAL

## 2019-03-07 DIAGNOSIS — I50.23 ACUTE ON CHRONIC SYSTOLIC HEART FAILURE (H): Chronic | ICD-10-CM

## 2019-03-07 DIAGNOSIS — I25.5 ISCHEMIC CARDIOMYOPATHY: ICD-10-CM

## 2019-03-07 PROCEDURE — 93325 DOPPLER ECHO COLOR FLOW MAPG: CPT | Mod: 26 | Performed by: INTERNAL MEDICINE

## 2019-03-07 PROCEDURE — 93308 TTE F-UP OR LMTD: CPT | Mod: 26 | Performed by: INTERNAL MEDICINE

## 2019-03-07 PROCEDURE — 93321 DOPPLER ECHO F-UP/LMTD STD: CPT | Mod: 26 | Performed by: INTERNAL MEDICINE

## 2019-03-07 PROCEDURE — 93308 TTE F-UP OR LMTD: CPT

## 2019-03-11 ENCOUNTER — ANCILLARY PROCEDURE (OUTPATIENT)
Dept: CARDIOLOGY | Facility: CLINIC | Age: 79
End: 2019-03-11
Attending: INTERNAL MEDICINE
Payer: COMMERCIAL

## 2019-03-11 ENCOUNTER — OFFICE VISIT (OUTPATIENT)
Dept: CARDIOLOGY | Facility: CLINIC | Age: 79
End: 2019-03-11
Attending: NURSE PRACTITIONER
Payer: COMMERCIAL

## 2019-03-11 VITALS
HEIGHT: 73 IN | HEART RATE: 106 BPM | WEIGHT: 209.8 LBS | DIASTOLIC BLOOD PRESSURE: 63 MMHG | SYSTOLIC BLOOD PRESSURE: 87 MMHG | BODY MASS INDEX: 27.8 KG/M2

## 2019-03-11 DIAGNOSIS — I50.23 ACUTE ON CHRONIC SYSTOLIC HEART FAILURE (H): Chronic | ICD-10-CM

## 2019-03-11 DIAGNOSIS — Z95.810 ICD (IMPLANTABLE CARDIOVERTER-DEFIBRILLATOR) IN PLACE: ICD-10-CM

## 2019-03-11 DIAGNOSIS — I25.5 ISCHEMIC CARDIOMYOPATHY: ICD-10-CM

## 2019-03-11 PROCEDURE — 93284 PRGRMG EVAL IMPLANTABLE DFB: CPT | Performed by: INTERNAL MEDICINE

## 2019-03-11 PROCEDURE — 99214 OFFICE O/P EST MOD 30 MIN: CPT | Performed by: NURSE PRACTITIONER

## 2019-03-11 ASSESSMENT — MIFFLIN-ST. JEOR: SCORE: 1730.53

## 2019-03-11 NOTE — LETTER
3/11/2019    Physician No Ref-Primary  No address on file    RE: Gibson Villalta       Dear Colleague,    I had the pleasure of seeing Gibson Villalta in the [facility].    Cardiology Clinic Progress Note  Gibson Villalta MRN# [id number]   YOB: 1940 Age: 78 year old          Assessment and Plan:     1. Stage II diffuse large B-cell lymphoma and history of bladder cancer    Tolerating chemotherapy with his largest complaint being shortness of breath on exertion likely secondary to his cardiomyopathy.    Currently undergoing mini R CHOP with last cycle excluding doxorubicin (R-CVP) and is scheduled for his 5th tomorrow 3/12/19 with follow up PET/CT scan post his 6th cycle.     2. Ischemic cardiomyopathy    LVEF 25-30% and status post BiV ICD    Repeat echocardiogram showed LVEF remained at 25-30%.     Continues to be short of breath on exertion, but somewhat improved since our last visit with increase of Lasix.     Titration of cardiomyopathy medications and diuretics is limited at this time by the patient's blood pressure    3. Anemia    Most recent hemoglobin was 10.8    4. History of RV and LV thrombus    Previously was on Coumadin, but was discontinued due to GI bleed         History of Presenting Illness:    Gibson Villalta is a very pleasant 78 year old patient of Dr. Heard who presents today for a follow up due to worsening heart failure symptoms while undergoing Adriamycin for diffuse large B-cell lymphoma with stage II disease affecting his stomach, small bowel and mesenteric lymph nodes. He is followed by Dr. Maribel Bender. He is accompanied by his wife Flori.     His past medical history is notable for a RV thrombus (noted on cardiac MRI in 2016) and LV thrombus (seen on echocardiogram from 5/2017), ischemic cardiomyopathy with LVEF 25-30% (dating back at least until 2016) status post biventricular ICD. He also has a history of HFrEF, anemia,  "CKD, dementia with short term memory loss and LBBB.     Echocardiogram from 12/14/18 showed LVEF estimated at 24% by biplane with apical akinesis, severe global hypokinesis and basal and inferior and inferolateral walls james the best. There was mild to moderate MR and mild AR. The findings were overall similar to the echocardiogram from 5/29/18, 6/20/2017.     He underwent an echocardiogram on 2/4/19 that showed LVEF 20-25% with moderate to moderate-severe MR. He was seen by his oncologist, Dr. Maribel Bender, and he was noted to be more short of breath. She requested that he follow up today. Hydralazine 10 mg TID was added on 2/5/19. At our last visit on 2/11/19 furosemide was increased to 20 mg daily and a repeat echocardiogram showed no change in LVEF (25-30%) with mild MR and mild AR. The dilation of the LV was mildly versus severely dilated as noted on study from 2/4/18).     Today in clinic his biggest complaint is shortness of breath on exertion. Although, at our last visit he required a wheelchair at our last visit and was able to walk into the clinic today. His weight is stable and denies PND, orthopnea or lower extremity edema. He is hypotensive today, but denies lightheadedness and dizziness. His wife confirms his symptoms.           Review of Systems:   Review of Systems:  Skin:  Negative     Eyes:  Positive for glasses  ENT:  Negative postnasal drainage  Respiratory:  Positive for dyspnea on exertion;shortness of breath  Cardiovascular:  Negative Positive for;lightheadedness;fatigue;palpitations  Gastroenterology: Negative for poor appetite  Genitourinary:  Negative    Musculoskeletal:  Negative    Neurologic:  Negative    Psychiatric:  Negative    Heme/Lymph/Imm:  Negative allergies  Endocrine:  Negative              Physical Exam:     Vitals: BP (!) 87/63 (BP Location: Left arm, Patient Position: Sitting)   Pulse 106   Ht 1.854 m (6' 1\")   Wt 95.2 kg (209 lb 12.8 oz)   BMI 27.68 kg/m   "   Constitutional:  cooperative thin      Skin:  warm and dry to the touch        Head:  normocephalic   bump on right forehead    Eyes:  pupils equal and round        ENT:    pallor      Neck:  JVP normal        Chest:  clear to auscultation        Cardiac: regular rhythm;normal S1 and S2 tachycardic S4              Abdomen:  abdomen soft   nontender    Vascular: pulses full and equal                                      Extremities and Back:  no deformities, clubbing, cyanosis, erythema observed;no edema        Neurological:  no gross motor deficits;affect appropriate   decreased short term memory           Medications:     Current Outpatient Medications   Medication Sig Dispense Refill     ALLOPURINOL PO Take 300 mg by mouth every evening       atorvastatin (LIPITOR) 40 MG tablet TAKE 1 TABLET AT BEDTIME (Patient taking differently: TAKE 1 TABLET (40 MG)  BY MOUTH AT BEDTIME) 90 tablet 3     COENZYME Q-10 PO Take 200 mg by mouth every morning        ferrous sulfate (IRON) 325 (65 Fe) MG tablet Take 325 mg by mouth daily       furosemide (LASIX) 20 MG tablet Take 1 tablet (20 mg) by mouth daily 30 tablet 3     hydrALAZINE (APRESOLINE) 10 MG tablet 10 mg 3 times daily       metoprolol succinate (TOPROL-XL) 50 MG 24 hr tablet TAKE 1 TABLET (50 MG) DAILY (Patient taking differently: TAKE 1 TABLET (50 MG) BY MOUTH DAILY) 90 tablet 3     multivitamin, therapeutic with minerals (MULTI-VITAMIN) TABS Take 1 tablet by mouth every morning        pantoprazole (PROTONIX) 40 MG EC tablet Take 1 tablet (40 mg) by mouth daily Take 30-60 minutes before a meal. 30 tablet 0     PREDNISONE PO Take 80 mg by mouth daily 4 20mg tabs for 5 days       venlafaxine (EFFEXOR) 75 MG tablet TAKE 1 TABLET TWICE DAILY (Patient taking differently: TAKE 1 TABLET (75 MG) BY MOUTH TWICE DAILY) 180 tablet 1     vitamin D (ERGOCALCIFEROL) 59125 UNIT capsule TAKE 1 CAPSULE ONCE A WEEK (Patient taking differently: TAKE 1 CAPSULE (50,000 UNIT) BY MOUTH  ONCE A WEEK ON TUESDAY) 12 capsule 1     ACE/ARB NOT PRESCRIBED, INTENTIONAL, ACE & ARB not prescribed due to Symptomatic hypotension not due to excessive diuresis       ASPIRIN NOT PRESCRIBED (INTENTIONAL) Please choose reason not prescribed, below (Patient not taking: Reported on 3/11/2019) 0 each 0     levofloxacin (LEVAQUIN) 500 MG tablet Take 1 tablet (500 mg) by mouth daily (Patient not taking: Reported on 2019) 12 tablet 0       Family History   Problem Relation Age of Onset     Coronary Artery Disease Father 39     Alzheimer Disease Mother      Coronary Artery Disease Son         Two sons have  from MIs (ages 39 and 51)       Social History     Socioeconomic History     Marital status:      Spouse name: Not on file     Number of children: Not on file     Years of education: Not on file     Highest education level: Not on file   Occupational History     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     Comment: [employer]; retired   Social Needs     Financial resource strain: Not on file     Food insecurity:     Worry: Not on file     Inability: Not on file     Transportation needs:     Medical: Not on file     Non-medical: Not on file   Tobacco Use     Smoking status: Former Smoker     Packs/day: 2.00     Years: 20.00     Pack years: 40.00     Types: Cigarettes     Last attempt to quit: 1980     Years since quittin.2     Smokeless tobacco: Never Used     Tobacco comment: Quit in his 30s - 2 ppd for 20 years   Substance and Sexual Activity     Alcohol use: Yes     Alcohol/week: 0.0 oz     Comment: rarely     Drug use: No     Sexual activity: Not Currently     Partners: Female   Lifestyle     Physical activity:     Days per week: Not on file     Minutes per session: Not on file     Stress: Not on file   Relationships     Social connections:     Talks on phone: Not on file     Gets together: Not on file     Attends Tenriism service: Not on file     Active member of club or organization: Not on  file     Attends meetings of clubs or organizations: Not on file     Relationship status: Not on file     Intimate partner violence:     Fear of current or ex partner: Not on file     Emotionally abused: Not on file     Physically abused: Not on file     Forced sexual activity: Not on file   Other Topics Concern     Parent/sibling w/ CABG, MI or angioplasty before 65F 55M? No      Service Not Asked     Blood Transfusions Not Asked     Caffeine Concern Not Asked     Occupational Exposure Not Asked     Hobby Hazards Not Asked     Sleep Concern Not Asked     Stress Concern Not Asked     Weight Concern Not Asked     Special Diet Yes     Comment: low fat, low salt      Back Care Not Asked     Exercise No     Bike Helmet Not Asked     Seat Belt Not Asked     Self-Exams Not Asked   Social History Narrative     Not on file            Past Medical History:     Past Medical History:   Diagnosis Date     Acute kidney injury (H) 2012     Anemia      Anxiety      Bladder mass      BPH (benign prostatic hypertrophy)      Cardiomyopathy (H)     ICD implanted 11/2016     Carpal tunnel syndrome     Right     Chronic kidney disease (CKD), stage 3 (moderate)      Dementia      Depressive disorder      Diabetes (H)      Diffuse large B-cell lymphoma of extranodal site (H) 11/23/2018     Gastric mass      Gout      History of depression      LBBB (left bundle branch block) 2013     Lumbar herniated disc     L5-S1     Mixed cardiomyopathy 7/22/2016     Myocardial infarction (H) 1995 and 2010     Nonischemic cardiomyopathy (H) 7/22/2016     Obesity      Pacemaker     ICD/PM     Rosacea      Rotator cuff disorder     Tear x 2     RV (right ventricular) mural thrombus 7/22/2016              Past Surgical History:     Past Surgical History:   Procedure Laterality Date     ANGIOPLASTY  1995 and 2010 with stent     BIOPSY      stomach     BONE MARROW BIOPSY, BONE SPECIMEN, NEEDLE/TROCAR N/A 10/30/2018    Procedure: BIOPSY BONE MARROW;   Surgeon: Jeb Romero MD;  Location:  GI     CARDIAC SURGERY      ICD/PM     CYSTOSCOPY, TRANSURETHRAL RESECTION (TUR) TUMOR BLADDER, COMBINED N/A 9/19/2018    Procedure: COMBINED CYSTOSCOPY, TRANSURETHRAL RESECTION (TUR) TUMOR BLADDER;  CYSTOSCOPY, TRANSURETHRAL RESECTION BLADDER TUMOR ;  Surgeon: Ryan Camarillo MD;  Location:  OR     CYSTOSCOPY, TRANSURETHRAL RESECTION (TUR) TUMOR BLADDER, COMBINED N/A 11/12/2018    Procedure: CYSTOSCOPY RESTAGING TRANSURETHRAL RESECTION BLADDER TUMOR;  Surgeon: Ryan Camarillo MD;  Location:  OR     ESOPHAGOSCOPY, GASTROSCOPY, DUODENOSCOPY (EGD), COMBINED N/A 7/30/2018    Procedure: COMBINED ESOPHAGOSCOPY, GASTROSCOPY, DUODENOSCOPY (EGD), BIOPSY SINGLE OR MULTIPLE;  gastroscopy;  Surgeon: Parish Xiong MD;  Location:  GI     ESOPHAGOSCOPY, GASTROSCOPY, DUODENOSCOPY (EGD), COMBINED N/A 7/30/2018    Procedure: COMBINED ESOPHAGOSCOPY, GASTROSCOPY, DUODENOSCOPY (EGD);  EGD;  Surgeon: Parish Xiong MD;  Location:  GI     ESOPHAGOSCOPY, GASTROSCOPY, DUODENOSCOPY (EGD), COMBINED N/A 8/2/2018    Procedure: COMBINED ESOPHAGOSCOPY, GASTROSCOPY, DUODENOSCOPY (EGD), BIOPSY SINGLE OR MULTIPLE;  gastroscopy BIOPSYSHOULD BE SENT TO LAB IN NS NOT FORMALIN PER ;  Surgeon: Jonathon Bush MD;  Location:  GI     GASTRIC BYPASS  2001     HEART CATH LEFT HEART CATH  7/19/16    Non ischemic cardiomyopathy; Non functionally significant LAD and RCA disease      OTHER SURGICAL HISTORY  2006    Spinal surgery     OTHER SURGICAL HISTORY  Nov 2016    CRT-D implantation              Allergies:   Wasps [hornets]       Data:   All laboratory data reviewed:    Recent Labs   Lab Test 10/12/18  1301 08/09/18  1256  02/07/17  0817 07/17/16  1247  06/02/16 12/22/15   LDL  --   --   --  93  --   --   --  61   HDL  --   --   --  42  --   --   --  39   NHDL  --   --   --  134*  --   --   --   --    CHOL  --   --   --  176  --   --   --  11   TRIG  --   --   --  203*  --   --    --  100   TSH  --  0.92  --   --  3.76  --  2.25 0.99   IRON 68  --   --   --  66   < >  --   --      --   --   --  273   < >  --   --    IRONSAT 21  --   --   --  24   < >  --   --    MEGA 21*  --    < >  --  108  --   --   --     < > = values in this interval not displayed.       Lab Results   Component Value Date    WBC 9.4 (A) 01/29/2019    RBC 3.58 (A) 01/29/2019    HGB 11 (A) 01/29/2019    HCT 33.9 (A) 01/29/2019    MCV 94.7 01/29/2019    MCH 30.7 01/29/2019    MCHC 32.4 01/29/2019    RDW 18.3 (A) 01/29/2019     01/29/2019       Lab Results   Component Value Date     01/29/2019    POTASSIUM 3.8 01/29/2019    CHLORIDE 104 01/29/2019    CO2 23 01/29/2019    ANIONGAP 6 10/16/2018     (A) 01/29/2019    BUN 12 01/29/2019    CR 1.11 01/29/2019    GFRESTIMATED 63.6 01/29/2019    GFRESTBLACK 61 10/16/2018    SHELLEY 9.1 01/29/2019      Lab Results   Component Value Date    AST 28 01/29/2019    ALT 13 01/29/2019       Lab Results   Component Value Date    A1C 7.1 (H) 07/30/2018       Lab Results   Component Value Date    INR 1.06 11/27/2018    INR 1.21 (H) 07/31/2018         ALLEN ANTONY, CNP  [facility]    Thank you for allowing me to participate in the care of your patient.      Sincerely,     ALLEN ANTONY CNP     [facility]    cc:   ALLEN Perry CNP  [facility]  [address]  [place] [id number]

## 2019-03-12 ENCOUNTER — TRANSFERRED RECORDS (OUTPATIENT)
Dept: HEALTH INFORMATION MANAGEMENT | Facility: CLINIC | Age: 79
End: 2019-03-12

## 2019-03-12 ENCOUNTER — TELEPHONE (OUTPATIENT)
Dept: CARDIOLOGY | Facility: CLINIC | Age: 79
End: 2019-03-12

## 2019-03-12 NOTE — TELEPHONE ENCOUNTER
Staff message received:     Please call the patient's wife and let her know that we should see him back in about a 1 week post his current chemo. If I don't have anything, he can see Prema or Rose.     Thanks     Shona       Spoke with patients wife. Pt had chemo cycle today. OV scheduled with Prema for 3/20/19.

## 2019-03-15 DIAGNOSIS — E55.9 VITAMIN D DEFICIENCY: ICD-10-CM

## 2019-03-15 RX ORDER — ERGOCALCIFEROL 1.25 MG/1
CAPSULE, LIQUID FILLED ORAL
Qty: 12 CAPSULE | Refills: 1 | Status: CANCELLED | OUTPATIENT
Start: 2019-03-15

## 2019-03-15 RX ORDER — ERGOCALCIFEROL 1.25 MG/1
CAPSULE, LIQUID FILLED ORAL
Qty: 12 CAPSULE | Refills: 1 | Status: SHIPPED | OUTPATIENT
Start: 2019-03-15 | End: 2019-08-31

## 2019-03-15 NOTE — TELEPHONE ENCOUNTER
Reason for Call:  Medication or medication refill:    Do you use a [facility]?  Name of the pharmacy and phone number for the current request:     [id number] - [place], [place] - [address]    Name of the medication requested: vit D 50,000 mg    Other request:     Can we leave a detailed message on this number? YES    Phone number patient can be reached at: Cell number on file:    Telephone Information:   Mobile [phone]       Best Time: any    Call taken on 3/15/2019 at 10:39 AM by Kassandra Walker

## 2019-03-15 NOTE — TELEPHONE ENCOUNTER
Last Written Prescription Date:  Vitamin D 50,000 Unit Capsule   Last Fill Quantity: 12,  # refills: 1   Last office visit: 11/1/2018 with prescribing provider:  Amy   Future Office Visit:   Next 5 appointments (look out 90 days)    Mar 20, 2019 10:30 AM CDT  Return Visit with ALLEN Magallon CNP  [facility] ([facility]) [address] [place] [id number] [phone] OPT 2         Please review and authorize if appropriate,     Thank you,   Catie PARKER RN

## 2019-03-19 LAB
MDC_IDC_EPISODE_DTM: NORMAL
MDC_IDC_EPISODE_DURATION: 14 S
MDC_IDC_EPISODE_DURATION: 186 S
MDC_IDC_EPISODE_DURATION: 5 S
MDC_IDC_EPISODE_DURATION: 8 S
MDC_IDC_EPISODE_DURATION: 9 S
MDC_IDC_EPISODE_ID: 1
MDC_IDC_EPISODE_ID: 18
MDC_IDC_EPISODE_ID: 19
MDC_IDC_EPISODE_ID: 20
MDC_IDC_EPISODE_ID: 21
MDC_IDC_EPISODE_TYPE: NORMAL
MDC_IDC_LEAD_IMPLANT_DT: NORMAL
MDC_IDC_LEAD_LOCATION: NORMAL
MDC_IDC_LEAD_LOCATION_DETAIL_1: NORMAL
MDC_IDC_LEAD_MFG: NORMAL
MDC_IDC_LEAD_MODEL: NORMAL
MDC_IDC_LEAD_POLARITY_TYPE: NORMAL
MDC_IDC_LEAD_SERIAL: NORMAL
MDC_IDC_MSMT_BATTERY_DTM: NORMAL
MDC_IDC_MSMT_BATTERY_REMAINING_LONGEVITY: 74 MO
MDC_IDC_MSMT_BATTERY_RRT_TRIGGER: 2.73
MDC_IDC_MSMT_BATTERY_STATUS: NORMAL
MDC_IDC_MSMT_BATTERY_VOLTAGE: 2.98 V
MDC_IDC_MSMT_CAP_CHARGE_DTM: NORMAL
MDC_IDC_MSMT_CAP_CHARGE_ENERGY: 18 J
MDC_IDC_MSMT_CAP_CHARGE_TIME: 3.81
MDC_IDC_MSMT_CAP_CHARGE_TYPE: NORMAL
MDC_IDC_MSMT_LEADCHNL_LV_IMPEDANCE_VALUE: 146.66
MDC_IDC_MSMT_LEADCHNL_LV_IMPEDANCE_VALUE: 180.5 OHM
MDC_IDC_MSMT_LEADCHNL_LV_IMPEDANCE_VALUE: 180.5 OHM
MDC_IDC_MSMT_LEADCHNL_LV_IMPEDANCE_VALUE: 247 OHM
MDC_IDC_MSMT_LEADCHNL_LV_IMPEDANCE_VALUE: 361 OHM
MDC_IDC_MSMT_LEADCHNL_LV_IMPEDANCE_VALUE: 418 OHM
MDC_IDC_MSMT_LEADCHNL_LV_IMPEDANCE_VALUE: 532 OHM
MDC_IDC_MSMT_LEADCHNL_LV_IMPEDANCE_VALUE: 608 OHM
MDC_IDC_MSMT_LEADCHNL_LV_IMPEDANCE_VALUE: 646 OHM
MDC_IDC_MSMT_LEADCHNL_LV_PACING_THRESHOLD_AMPLITUDE: 0.88 V
MDC_IDC_MSMT_LEADCHNL_LV_PACING_THRESHOLD_PULSEWIDTH: 0.5 MS
MDC_IDC_MSMT_LEADCHNL_RA_IMPEDANCE_VALUE: 342 OHM
MDC_IDC_MSMT_LEADCHNL_RA_PACING_THRESHOLD_AMPLITUDE: 0.62 V
MDC_IDC_MSMT_LEADCHNL_RA_PACING_THRESHOLD_PULSEWIDTH: 0.4 MS
MDC_IDC_MSMT_LEADCHNL_RA_SENSING_INTR_AMPL: 2 MV
MDC_IDC_MSMT_LEADCHNL_RA_SENSING_INTR_AMPL: 2 MV
MDC_IDC_MSMT_LEADCHNL_RV_IMPEDANCE_VALUE: 551 OHM
MDC_IDC_MSMT_LEADCHNL_RV_IMPEDANCE_VALUE: 589 OHM
MDC_IDC_MSMT_LEADCHNL_RV_PACING_THRESHOLD_AMPLITUDE: 0.38 V
MDC_IDC_MSMT_LEADCHNL_RV_PACING_THRESHOLD_PULSEWIDTH: 0.4 MS
MDC_IDC_MSMT_LEADCHNL_RV_SENSING_INTR_AMPL: 22.12 MV
MDC_IDC_MSMT_LEADCHNL_RV_SENSING_INTR_AMPL: 8.5 MV
MDC_IDC_PG_IMPLANT_DTM: NORMAL
MDC_IDC_PG_MFG: NORMAL
MDC_IDC_PG_MODEL: NORMAL
MDC_IDC_PG_SERIAL: NORMAL
MDC_IDC_PG_TYPE: NORMAL
MDC_IDC_SESS_CLINIC_NAME: NORMAL
MDC_IDC_SESS_DTM: NORMAL
MDC_IDC_SESS_TYPE: NORMAL
MDC_IDC_SET_BRADY_AT_MODE_SWITCH_RATE: 171 {BEATS}/MIN
MDC_IDC_SET_BRADY_LOWRATE: 50 {BEATS}/MIN
MDC_IDC_SET_BRADY_MAX_SENSOR_RATE: 130 {BEATS}/MIN
MDC_IDC_SET_BRADY_MAX_TRACKING_RATE: 130 {BEATS}/MIN
MDC_IDC_SET_BRADY_MODE: NORMAL
MDC_IDC_SET_BRADY_PAV_DELAY_LOW: 170 MS
MDC_IDC_SET_BRADY_SAV_DELAY_LOW: 130 MS
MDC_IDC_SET_CRT_LVRV_DELAY: 0 MS
MDC_IDC_SET_CRT_PACED_CHAMBERS: NORMAL
MDC_IDC_SET_LEADCHNL_LV_PACING_AMPLITUDE: 1.5 V
MDC_IDC_SET_LEADCHNL_LV_PACING_ANODE_ELECTRODE_1: NORMAL
MDC_IDC_SET_LEADCHNL_LV_PACING_ANODE_LOCATION_1: NORMAL
MDC_IDC_SET_LEADCHNL_LV_PACING_CAPTURE_MODE: NORMAL
MDC_IDC_SET_LEADCHNL_LV_PACING_CATHODE_ELECTRODE_1: NORMAL
MDC_IDC_SET_LEADCHNL_LV_PACING_CATHODE_LOCATION_1: NORMAL
MDC_IDC_SET_LEADCHNL_LV_PACING_POLARITY: NORMAL
MDC_IDC_SET_LEADCHNL_LV_PACING_PULSEWIDTH: 0.5 MS
MDC_IDC_SET_LEADCHNL_RA_PACING_AMPLITUDE: 1.5 V
MDC_IDC_SET_LEADCHNL_RA_PACING_ANODE_ELECTRODE_1: NORMAL
MDC_IDC_SET_LEADCHNL_RA_PACING_ANODE_LOCATION_1: NORMAL
MDC_IDC_SET_LEADCHNL_RA_PACING_CAPTURE_MODE: NORMAL
MDC_IDC_SET_LEADCHNL_RA_PACING_CATHODE_ELECTRODE_1: NORMAL
MDC_IDC_SET_LEADCHNL_RA_PACING_CATHODE_LOCATION_1: NORMAL
MDC_IDC_SET_LEADCHNL_RA_PACING_POLARITY: NORMAL
MDC_IDC_SET_LEADCHNL_RA_PACING_PULSEWIDTH: 0.4 MS
MDC_IDC_SET_LEADCHNL_RA_SENSING_ANODE_ELECTRODE_1: NORMAL
MDC_IDC_SET_LEADCHNL_RA_SENSING_ANODE_LOCATION_1: NORMAL
MDC_IDC_SET_LEADCHNL_RA_SENSING_CATHODE_ELECTRODE_1: NORMAL
MDC_IDC_SET_LEADCHNL_RA_SENSING_CATHODE_LOCATION_1: NORMAL
MDC_IDC_SET_LEADCHNL_RA_SENSING_POLARITY: NORMAL
MDC_IDC_SET_LEADCHNL_RA_SENSING_SENSITIVITY: 0.3 MV
MDC_IDC_SET_LEADCHNL_RV_PACING_AMPLITUDE: 2 V
MDC_IDC_SET_LEADCHNL_RV_PACING_ANODE_ELECTRODE_1: NORMAL
MDC_IDC_SET_LEADCHNL_RV_PACING_ANODE_LOCATION_1: NORMAL
MDC_IDC_SET_LEADCHNL_RV_PACING_CAPTURE_MODE: NORMAL
MDC_IDC_SET_LEADCHNL_RV_PACING_CATHODE_ELECTRODE_1: NORMAL
MDC_IDC_SET_LEADCHNL_RV_PACING_CATHODE_LOCATION_1: NORMAL
MDC_IDC_SET_LEADCHNL_RV_PACING_POLARITY: NORMAL
MDC_IDC_SET_LEADCHNL_RV_PACING_PULSEWIDTH: 0.4 MS
MDC_IDC_SET_LEADCHNL_RV_SENSING_ANODE_ELECTRODE_1: NORMAL
MDC_IDC_SET_LEADCHNL_RV_SENSING_ANODE_LOCATION_1: NORMAL
MDC_IDC_SET_LEADCHNL_RV_SENSING_CATHODE_ELECTRODE_1: NORMAL
MDC_IDC_SET_LEADCHNL_RV_SENSING_CATHODE_LOCATION_1: NORMAL
MDC_IDC_SET_LEADCHNL_RV_SENSING_POLARITY: NORMAL
MDC_IDC_SET_LEADCHNL_RV_SENSING_SENSITIVITY: 0.3 MV
MDC_IDC_SET_ZONE_DETECTION_BEATS_DENOMINATOR: 40 {BEATS}
MDC_IDC_SET_ZONE_DETECTION_BEATS_NUMERATOR: 30 {BEATS}
MDC_IDC_SET_ZONE_DETECTION_INTERVAL: 300 MS
MDC_IDC_SET_ZONE_DETECTION_INTERVAL: 350 MS
MDC_IDC_SET_ZONE_DETECTION_INTERVAL: 360 MS
MDC_IDC_SET_ZONE_DETECTION_INTERVAL: 360 MS
MDC_IDC_SET_ZONE_DETECTION_INTERVAL: NORMAL
MDC_IDC_SET_ZONE_TYPE: NORMAL
MDC_IDC_STAT_AT_BURDEN_PERCENT: 0 %
MDC_IDC_STAT_AT_DTM_END: NORMAL
MDC_IDC_STAT_AT_DTM_START: NORMAL
MDC_IDC_STAT_BRADY_AP_VP_PERCENT: 3.71 %
MDC_IDC_STAT_BRADY_AP_VS_PERCENT: 0.02 %
MDC_IDC_STAT_BRADY_AS_VP_PERCENT: 94.29 %
MDC_IDC_STAT_BRADY_AS_VS_PERCENT: 1.98 %
MDC_IDC_STAT_BRADY_DTM_END: NORMAL
MDC_IDC_STAT_BRADY_DTM_START: NORMAL
MDC_IDC_STAT_BRADY_RA_PERCENT_PACED: 3.7 %
MDC_IDC_STAT_BRADY_RV_PERCENT_PACED: 96.84 %
MDC_IDC_STAT_CRT_DTM_END: NORMAL
MDC_IDC_STAT_CRT_DTM_START: NORMAL
MDC_IDC_STAT_CRT_LV_PERCENT_PACED: 96.76 %
MDC_IDC_STAT_CRT_PERCENT_PACED: 96.76 %
MDC_IDC_STAT_EPISODE_RECENT_COUNT: 0
MDC_IDC_STAT_EPISODE_RECENT_COUNT: 1
MDC_IDC_STAT_EPISODE_RECENT_COUNT_DTM_END: NORMAL
MDC_IDC_STAT_EPISODE_RECENT_COUNT_DTM_START: NORMAL
MDC_IDC_STAT_EPISODE_TOTAL_COUNT: 0
MDC_IDC_STAT_EPISODE_TOTAL_COUNT: 1
MDC_IDC_STAT_EPISODE_TOTAL_COUNT_DTM_END: NORMAL
MDC_IDC_STAT_EPISODE_TOTAL_COUNT_DTM_START: NORMAL
MDC_IDC_STAT_EPISODE_TYPE: NORMAL
MDC_IDC_STAT_TACHYTHERAPY_ATP_DELIVERED_RECENT: 0
MDC_IDC_STAT_TACHYTHERAPY_ATP_DELIVERED_TOTAL: 0
MDC_IDC_STAT_TACHYTHERAPY_RECENT_DTM_END: NORMAL
MDC_IDC_STAT_TACHYTHERAPY_RECENT_DTM_START: NORMAL
MDC_IDC_STAT_TACHYTHERAPY_SHOCKS_ABORTED_RECENT: 0
MDC_IDC_STAT_TACHYTHERAPY_SHOCKS_ABORTED_TOTAL: 0
MDC_IDC_STAT_TACHYTHERAPY_SHOCKS_DELIVERED_RECENT: 0
MDC_IDC_STAT_TACHYTHERAPY_SHOCKS_DELIVERED_TOTAL: 0
MDC_IDC_STAT_TACHYTHERAPY_TOTAL_DTM_END: NORMAL
MDC_IDC_STAT_TACHYTHERAPY_TOTAL_DTM_START: NORMAL

## 2019-03-20 ENCOUNTER — OFFICE VISIT (OUTPATIENT)
Dept: CARDIOLOGY | Facility: CLINIC | Age: 79
End: 2019-03-20
Payer: COMMERCIAL

## 2019-03-20 VITALS
HEIGHT: 73 IN | WEIGHT: 211.2 LBS | SYSTOLIC BLOOD PRESSURE: 92 MMHG | DIASTOLIC BLOOD PRESSURE: 66 MMHG | HEART RATE: 98 BPM | BODY MASS INDEX: 27.99 KG/M2

## 2019-03-20 DIAGNOSIS — I25.5 ISCHEMIC CARDIOMYOPATHY: ICD-10-CM

## 2019-03-20 DIAGNOSIS — C83.398 DIFFUSE LARGE B-CELL LYMPHOMA OF EXTRANODAL SITE: ICD-10-CM

## 2019-03-20 DIAGNOSIS — I50.22 CHRONIC SYSTOLIC HEART FAILURE (H): Primary | Chronic | ICD-10-CM

## 2019-03-20 PROCEDURE — 99214 OFFICE O/P EST MOD 30 MIN: CPT | Performed by: NURSE PRACTITIONER

## 2019-03-20 ASSESSMENT — MIFFLIN-ST. JEOR: SCORE: 1736.88

## 2019-03-20 NOTE — PROGRESS NOTES
HPI and Plan:   I had the pleasure of seeing Gibson Villalta and his wife Flori today in cardiology clinic follow up after his last dose of R CHOP chemo. He is a pleasant 78 year old patient of Dr. Fong.    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 He is followed by Dr. Maribel Bender.  His symptoms have been better since being placed on a diuretic, though he continues to have some dyspnea.  He denies any orthopnea or PND.     His past medical history is notable for a RV thrombus (noted on cardiac MRI in 2016) and LV thrombus (seen on echocardiogram from 5/2017), he was on Coumadin which was subsequently discontinued for GI bleed.  He also has a history of a stable ischemic cardiomyopathy with LVEF 25-30% with (dating back at least until 2016) status post biventricular ICD. He has CKD, dementia with short term memory loss and a known LBBB.      His last echo on echocardiogram on 2/4/19 that showed LVEF 20-25% with moderate to moderate-severe MR. He was seen by his oncologist, Dr. Maribel Bender, and he was noted to be more short of breath. She requested that he follow up today. Hydralazine 10 mg TID was added on 2/5/19 followed by and increase in furosemide was increased to 20 mg daily.     Shona asked him to come and be seen today following his last dose of chemo.  He is doing well.  He appears euvolemic on exam and his weight is up, but only by 2 pounds.  He says his shortness of breath is stable.  He clearly has short-term memory loss and I had to repeat myself on more than one occasion during her visit.  His wife is in obvious support to him.  For now the plan regarding his chemo is to discontinue the R CHOP.  He will start a bladder chemo treatment with Dr. Camarillo.  He has a follow-up PET/CT scan in early April.    Physical Exam  Please see  Below     Assessment and Plan    1. Stage II diffuse large B-cell lymphoma and history of bladder cancer    Completed chemotherapy. Exertional dyspnea could be secondary to his cardiomyopathy though his EF has been stable over time.,     Completed last mini R CHOP with last cycle excluding doxorubicin (R-CVP) with follow up PET/CT scan in April.      2. Ischemic cardiomyopathy    LVEF 25-30% and status post BiV ICD    Repeat echocardiogram showed LVEF remained at 25-30%.     Continues to be short of breath on exertion, stable since Shona increased of Lasix.     Titration of cardiomyopathy medications and diuretics is limited at this time by the patient's blood pressure     3. Anemia    Most recent hemoglobin was 10.8     4. History of RV and LV thrombus    Previously was on Coumadin, but was discontinued due to GI bleed        Thank you for allowing me to care for Gibson Villalta today.  He should follow-up with Shona in 1-2 months.    ALLEN Carrasco, CNP  Cardiology    Voice recognition software was used for this note, I have reviewed this note, but errors may have been missed.    No orders of the defined types were placed in this encounter.    No orders of the defined types were placed in this encounter.    There are no discontinued medications.      CURRENT MEDICATIONS:  Current Outpatient Medications   Medication Sig Dispense Refill     ACE/ARB NOT PRESCRIBED, INTENTIONAL, ACE & ARB not prescribed due to Symptomatic hypotension not due to excessive diuresis       ALLOPURINOL PO Take 300 mg by mouth every evening       ASPIRIN NOT PRESCRIBED (INTENTIONAL) Please choose reason not prescribed, below 0 each 0     atorvastatin (LIPITOR) 40 MG tablet TAKE 1 TABLET AT BEDTIME (Patient taking differently: TAKE 1 TABLET (40 MG)  BY MOUTH AT BEDTIME) 90 tablet 3     COENZYME Q-10 PO Take 200 mg by mouth every morning        ferrous sulfate (IRON) 325 (65 Fe) MG tablet Take 325 mg by mouth daily        furosemide (LASIX) 20 MG tablet Take 1 tablet (20 mg) by mouth daily 30 tablet 0     hydrALAZINE (APRESOLINE) 10 MG tablet 10 mg 3 times daily       metoprolol succinate (TOPROL-XL) 50 MG 24 hr tablet TAKE 1 TABLET (50 MG) DAILY (Patient taking differently: TAKE 1 TABLET (50 MG) BY MOUTH DAILY) 90 tablet 3     multivitamin, therapeutic with minerals (MULTI-VITAMIN) TABS Take 1 tablet by mouth every morning        pantoprazole (PROTONIX) 40 MG EC tablet Take 1 tablet (40 mg) by mouth daily Take 30-60 minutes before a meal. 30 tablet 0     venlafaxine (EFFEXOR) 75 MG tablet TAKE 1 TABLET TWICE DAILY (Patient taking differently: TAKE 1 TABLET (75 MG) BY MOUTH TWICE DAILY) 180 tablet 1     vitamin D2 (ERGOCALCIFEROL) 08829 units (1250 mcg) capsule TAKE 1 CAPSULE (50,000 UNIT) BY MOUTH ONCE A WEEK ON TUESDAY 12 capsule 1     levofloxacin (LEVAQUIN) 500 MG tablet Take 1 tablet (500 mg) by mouth daily (Patient not taking: Reported on 2/11/2019) 12 tablet 0     PREDNISONE PO Take 80 mg by mouth daily 4 20mg tabs for 5 days         ALLERGIES     Allergies   Allergen Reactions     Wasps [Hornets]      Sand wasp --- years ago.         PAST MEDICAL HISTORY:  Past Medical History:   Diagnosis Date     Acute kidney injury (H) 2012     Anemia      Anxiety      Bladder mass      BPH (benign prostatic hypertrophy)      Cardiomyopathy (H)     ICD implanted 11/2016     Carpal tunnel syndrome     Right     Chronic kidney disease (CKD), stage 3 (moderate)      Dementia      Depressive disorder      Diabetes (H)      Diffuse large B-cell lymphoma of extranodal site (H) 11/23/2018     Gastric mass      Gout      History of depression      LBBB (left bundle branch block) 2013     Lumbar herniated disc     L5-S1     Mixed cardiomyopathy 7/22/2016     Myocardial infarction (H) 1995 and 2010     Nonischemic cardiomyopathy (H) 7/22/2016     Obesity      Pacemaker     ICD/PM     Rosacea      Rotator cuff disorder     Tear x 2     RV (right  ventricular) mural thrombus 7/22/2016       PAST SURGICAL HISTORY:  Past Surgical History:   Procedure Laterality Date     ANGIOPLASTY  1995 and 2010 with stent     BIOPSY      stomach     BONE MARROW BIOPSY, BONE SPECIMEN, NEEDLE/TROCAR N/A 10/30/2018    Procedure: BIOPSY BONE MARROW;  Surgeon: Jeb Romero MD;  Location:  GI     CARDIAC SURGERY      ICD/PM     CYSTOSCOPY, TRANSURETHRAL RESECTION (TUR) TUMOR BLADDER, COMBINED N/A 9/19/2018    Procedure: COMBINED CYSTOSCOPY, TRANSURETHRAL RESECTION (TUR) TUMOR BLADDER;  CYSTOSCOPY, TRANSURETHRAL RESECTION BLADDER TUMOR ;  Surgeon: Ryan Camarillo MD;  Location:  OR     CYSTOSCOPY, TRANSURETHRAL RESECTION (TUR) TUMOR BLADDER, COMBINED N/A 11/12/2018    Procedure: CYSTOSCOPY RESTAGING TRANSURETHRAL RESECTION BLADDER TUMOR;  Surgeon: Ryan Camarillo MD;  Location:  OR     ESOPHAGOSCOPY, GASTROSCOPY, DUODENOSCOPY (EGD), COMBINED N/A 7/30/2018    Procedure: COMBINED ESOPHAGOSCOPY, GASTROSCOPY, DUODENOSCOPY (EGD), BIOPSY SINGLE OR MULTIPLE;  gastroscopy;  Surgeon: Parish Xiong MD;  Location:  GI     ESOPHAGOSCOPY, GASTROSCOPY, DUODENOSCOPY (EGD), COMBINED N/A 7/30/2018    Procedure: COMBINED ESOPHAGOSCOPY, GASTROSCOPY, DUODENOSCOPY (EGD);  EGD;  Surgeon: Parish Xiong MD;  Location:  GI     ESOPHAGOSCOPY, GASTROSCOPY, DUODENOSCOPY (EGD), COMBINED N/A 8/2/2018    Procedure: COMBINED ESOPHAGOSCOPY, GASTROSCOPY, DUODENOSCOPY (EGD), BIOPSY SINGLE OR MULTIPLE;  gastroscopy BIOPSYSHOULD BE SENT TO LAB IN NS NOT FORMALIN PER ;  Surgeon: Jonathon Bush MD;  Location:  GI     GASTRIC BYPASS  2001     HEART CATH LEFT HEART CATH  7/19/16    Non ischemic cardiomyopathy; Non functionally significant LAD and RCA disease      OTHER SURGICAL HISTORY  2006    Spinal surgery     OTHER SURGICAL HISTORY  Nov 2016    CRT-D implantation       FAMILY HISTORY:  Family History   Problem Relation Age of Onset     Coronary Artery Disease Father 39      Alzheimer Disease Mother      Coronary Artery Disease Son         Two sons have  from MIs (ages 39 and 51)       SOCIAL HISTORY:  Social History     Socioeconomic History     Marital status:      Spouse name: None     Number of children: None     Years of education: None     Highest education level: None   Occupational History     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     Comment: [employer]; retired   Social Needs     Financial resource strain: None     Food insecurity:     Worry: None     Inability: None     Transportation needs:     Medical: None     Non-medical: None   Tobacco Use     Smoking status: Former Smoker     Packs/day: 2.00     Years: 20.00     Pack years: 40.00     Types: Cigarettes     Last attempt to quit: 1980     Years since quittin.2     Smokeless tobacco: Never Used     Tobacco comment: Quit in his 30s - 2 ppd for 20 years   Substance and Sexual Activity     Alcohol use: Yes     Alcohol/week: 0.0 oz     Comment: rarely     Drug use: No     Sexual activity: Not Currently     Partners: Female   Lifestyle     Physical activity:     Days per week: None     Minutes per session: None     Stress: None   Relationships     Social connections:     Talks on phone: None     Gets together: None     Attends Denominational service: None     Active member of club or organization: None     Attends meetings of clubs or organizations: None     Relationship status: None     Intimate partner violence:     Fear of current or ex partner: None     Emotionally abused: None     Physically abused: None     Forced sexual activity: None   Other Topics Concern     Parent/sibling w/ CABG, MI or angioplasty before 65F 55M? No      Service Not Asked     Blood Transfusions Not Asked     Caffeine Concern Not Asked     Occupational Exposure Not Asked     Hobby Hazards Not Asked     Sleep Concern Not Asked     Stress Concern Not Asked     Weight Concern Not Asked     Special Diet Yes     Comment: low fat, low  "salt      Back Care Not Asked     Exercise No     Bike Helmet Not Asked     Seat Belt Not Asked     Self-Exams Not Asked   Social History Narrative     None       Review of Systems:  Skin:  Negative       Eyes:  Positive for glasses    ENT:  Negative      Respiratory:  Positive for dyspnea on exertion;shortness of breath     Cardiovascular:  Negative      Gastroenterology: Negative      Genitourinary:  Negative      Musculoskeletal:  Negative      Neurologic:  Negative      Psychiatric:  Negative      Heme/Lymph/Imm:  Negative      Endocrine:  Negative        Physical Exam:  Vitals: BP 92/66   Pulse 98   Ht 1.854 m (6' 1\")   Wt 95.8 kg (211 lb 3.2 oz)   BMI 27.86 kg/m      Constitutional:  cooperative        Skin:  warm and dry to the touch          Head:  normocephalic   bump on right forehead    Eyes:  pupils equal and round        Lymph:      ENT:    pallor      Neck:  JVP normal        Respiratory:  clear to auscultation;normal symmetry         Cardiac: regular rhythm;normal S1 and S2                pulses full and equal                                        GI:      nontender    Extremities and Muscular Skeletal:  no deformities, clubbing, cyanosis, erythema observed;no edema              Neurological:  no gross motor deficits;affect appropriate   decreased short term memory    Psych:  Alert and Oriented x 3    No diagnosis found.    Recent Lab Results:  LIPID RESULTS:  Lab Results   Component Value Date    CHOL 176 02/07/2017    HDL 42 02/07/2017    LDL 93 02/07/2017    TRIG 203 (H) 02/07/2017       LIVER ENZYME RESULTS:  Lab Results   Component Value Date    AST 28 01/29/2019    ALT 13 01/29/2019       CBC RESULTS:  Lab Results   Component Value Date    WBC 9.4 (A) 01/29/2019    RBC 3.58 (A) 01/29/2019    HGB 11 (A) 01/29/2019    HCT 33.9 (A) 01/29/2019    MCV 94.7 01/29/2019    MCH 30.7 01/29/2019    MCHC 32.4 01/29/2019    RDW 18.3 (A) 01/29/2019     01/29/2019       BMP RESULTS:  Lab Results "   Component Value Date     01/29/2019    POTASSIUM 3.8 01/29/2019    CHLORIDE 104 01/29/2019    CO2 23 01/29/2019    ANIONGAP 6 10/16/2018     (A) 01/29/2019    BUN 12 01/29/2019    CR 1.11 01/29/2019    GFRESTIMATED 63.6 01/29/2019    GFRESTBLACK 61 10/16/2018    SHELLEY 9.1 01/29/2019        A1C RESULTS:  Lab Results   Component Value Date    A1C 7.1 (H) 07/30/2018       INR RESULTS:  Lab Results   Component Value Date    INR 1.06 11/27/2018    INR 1.21 (H) 07/31/2018           CC  No referring provider defined for this encounter.

## 2019-03-20 NOTE — LETTER
3/20/2019    Physician No Ref-Primary  No address on file    RE: Gibson Villalta       Dear Colleague,    I had the pleasure of seeing Gibson Villalta in the [facility].    HPI and Plan:   I had the pleasure of seeing Gibson Villalta and his wife Flori today in cardiology clinic follow up after his last dose of R CHOP chemo. He is a pleasant 78 year old patient of Dr. Fong.    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 He is followed by Dr. Mariebl Bender.  His symptoms have been better since being placed on a diuretic, though he continues to have some dyspnea.  He denies any orthopnea or PND.     His past medical history is notable for a RV thrombus (noted on cardiac MRI in 2016) and LV thrombus (seen on echocardiogram from 5/2017), he was on Coumadin which was subsequently discontinued for GI bleed.  He also has a history of a stable ischemic cardiomyopathy with LVEF 25-30% with (dating back at least until 2016) status post biventricular ICD. He has CKD, dementia with short term memory loss and  a known LBBB.      His last echo on echocardiogram on 2/4/19 that showed LVEF 20-25% with moderate to moderate-severe MR. He was seen by his oncologist, Dr. Maribel Bender, and he was noted to be more short of breath. She requested that he follow up today. Hydralazine 10 mg TID was added on 2/5/19 followed by and increase in furosemide was increased to 20 mg daily.     Shona asked him to come and be seen today following his last dose of chemo.  He is doing well.  He appears euvolemic on exam and his weight is up, but only by 2 pounds.  He says his shortness of breath is stable.  He clearly has short-term memory loss and I had to repeat myself on more than one occasion during her visit.  His wife is in obvious  support to him.  For now the plan regarding his chemo is to discontinue the R CHOP.  He will start a bladder chemo treatment with Dr. Camarillo.  He has a follow-up PET/CT scan in early April.    Physical Exam  Please see Below     Assessment and Plan    1. Stage II diffuse large B-cell lymphoma and history of bladder cancer    Completed chemotherapy. Exertional dyspnea could be secondary to his cardiomyopathy though his EF has been stable over time.,     Completed last mini R CHOP with last cycle excluding doxorubicin (R-CVP) with follow up PET/CT scan in April.      2. Ischemic cardiomyopathy    LVEF 25-30% and status post BiV ICD    Repeat echocardiogram showed LVEF remained at 25-30%.     Continues to be short of breath on exertion, stable since Shona increased of Lasix.     Titration of cardiomyopathy medications and diuretics is limited at this time by the patient's blood pressure     3. Anemia    Most recent hemoglobin was 10.8     4. History of RV and LV thrombus    Previously was on Coumadin, but was discontinued due to GI bleed        Thank you for allowing me to care for Gibson Villalta today.  He should follow-up with Shona in 1-2 months.    ALLEN Carrasco, CNP  Cardiology    Voice recognition software was used for this note, I have reviewed this note, but errors may have been missed.    No orders of the defined types were placed in this encounter.    No orders of the defined types were placed in this encounter.    There are no discontinued medications.      CURRENT MEDICATIONS:  Current Outpatient Medications   Medication Sig Dispense Refill     ACE/ARB NOT PRESCRIBED, INTENTIONAL, ACE & ARB not prescribed due to Symptomatic hypotension not due to excessive diuresis       ALLOPURINOL PO Take 300 mg by mouth every evening       ASPIRIN NOT PRESCRIBED (INTENTIONAL) Please choose reason not prescribed, below 0 each 0     atorvastatin (LIPITOR) 40 MG tablet TAKE 1 TABLET AT BEDTIME (Patient taking  differently: TAKE 1 TABLET (40 MG)  BY MOUTH AT BEDTIME) 90 tablet 3     COENZYME Q-10 PO Take 200 mg by mouth every morning        ferrous sulfate (IRON) 325 (65 Fe) MG tablet Take 325 mg by mouth daily       furosemide (LASIX) 20 MG tablet Take 1 tablet (20 mg) by mouth daily 30 tablet 0     hydrALAZINE (APRESOLINE) 10 MG tablet 10 mg 3 times daily       metoprolol succinate (TOPROL-XL) 50 MG 24 hr tablet TAKE 1 TABLET (50 MG) DAILY (Patient taking differently: TAKE 1 TABLET (50 MG) BY MOUTH DAILY) 90 tablet 3     multivitamin, therapeutic with minerals (MULTI-VITAMIN) TABS Take 1 tablet by mouth every morning        pantoprazole (PROTONIX) 40 MG EC tablet Take 1 tablet (40 mg) by mouth daily Take 30-60 minutes before a meal. 30 tablet 0     venlafaxine (EFFEXOR) 75 MG tablet TAKE 1 TABLET TWICE DAILY (Patient taking differently: TAKE 1 TABLET (75 MG) BY MOUTH TWICE DAILY) 180 tablet 1     vitamin D2 (ERGOCALCIFEROL) 05635 units (1250 mcg) capsule TAKE 1 CAPSULE (50,000 UNIT) BY MOUTH ONCE A WEEK ON TUESDAY 12 capsule 1     levofloxacin (LEVAQUIN) 500 MG tablet Take 1 tablet (500 mg) by mouth daily (Patient not taking: Reported on 2/11/2019) 12 tablet 0     PREDNISONE PO Take 80 mg by mouth daily 4 20mg tabs for 5 days         ALLERGIES     Allergies   Allergen Reactions     Wasps [Hornets]      Sand wasp --- years ago.         PAST MEDICAL HISTORY:  Past Medical History:   Diagnosis Date     Acute kidney injury (H) 2012     Anemia      Anxiety      Bladder mass      BPH (benign prostatic hypertrophy)      Cardiomyopathy (H)     ICD implanted 11/2016     Carpal tunnel syndrome     Right     Chronic kidney disease (CKD), stage 3 (moderate)      Dementia      Depressive disorder      Diabetes (H)      Diffuse large B-cell lymphoma of extranodal site (H) 11/23/2018     Gastric mass      Gout      History of depression      LBBB (left bundle branch block) 2013     Lumbar herniated disc     L5-S1     Mixed  cardiomyopathy 7/22/2016     Myocardial infarction (H) 1995 and 2010     Nonischemic cardiomyopathy (H) 7/22/2016     Obesity      Pacemaker     ICD/PM     Rosacea      Rotator cuff disorder     Tear x 2     RV (right ventricular) mural thrombus 7/22/2016       PAST SURGICAL HISTORY:  Past Surgical History:   Procedure Laterality Date     ANGIOPLASTY  1995 and 2010 with stent     BIOPSY      stomach     BONE MARROW BIOPSY, BONE SPECIMEN, NEEDLE/TROCAR N/A 10/30/2018    Procedure: BIOPSY BONE MARROW;  Surgeon: Jeb Romero MD;  Location:  GI     CARDIAC SURGERY      ICD/PM     CYSTOSCOPY, TRANSURETHRAL RESECTION (TUR) TUMOR BLADDER, COMBINED N/A 9/19/2018    Procedure: COMBINED CYSTOSCOPY, TRANSURETHRAL RESECTION (TUR) TUMOR BLADDER;  CYSTOSCOPY, TRANSURETHRAL RESECTION BLADDER TUMOR ;  Surgeon: Ryan Camarillo MD;  Location:  OR     CYSTOSCOPY, TRANSURETHRAL RESECTION (TUR) TUMOR BLADDER, COMBINED N/A 11/12/2018    Procedure: CYSTOSCOPY RESTAGING TRANSURETHRAL RESECTION BLADDER TUMOR;  Surgeon: Ryan Camarillo MD;  Location: [facility]     ESOPHAGOSCOPY, GASTROSCOPY, DUODENOSCOPY (EGD), COMBINED N/A 7/30/2018    Procedure: COMBINED ESOPHAGOSCOPY, GASTROSCOPY, DUODENOSCOPY (EGD), BIOPSY SINGLE OR MULTIPLE;  gastroscopy;  Surgeon: Parish Xiong MD;  Location: [facility]     ESOPHAGOSCOPY, GASTROSCOPY, DUODENOSCOPY (EGD), COMBINED N/A 7/30/2018    Procedure: COMBINED ESOPHAGOSCOPY, GASTROSCOPY, DUODENOSCOPY (EGD);  EGD;  Surgeon: Parish Xiong MD;  Location:  GI     ESOPHAGOSCOPY, GASTROSCOPY, DUODENOSCOPY (EGD), COMBINED N/A 8/2/2018    Procedure: COMBINED ESOPHAGOSCOPY, GASTROSCOPY, DUODENOSCOPY (EGD), BIOPSY SINGLE OR MULTIPLE;  gastroscopy BIOPSYSHOULD BE SENT TO LAB IN NS NOT FORMALIN PER ;  Surgeon: Jonathon Bush MD;  Location:  GI     GASTRIC BYPASS  2001     HEART CATH LEFT HEART CATH  7/19/16    Non ischemic cardiomyopathy; Non functionally significant LAD and RCA disease       OTHER SURGICAL HISTORY      Spinal surgery     OTHER SURGICAL HISTORY  2016    CRT-D implantation       FAMILY HISTORY:  Family History   Problem Relation Age of Onset     Coronary Artery Disease Father 39     Alzheimer Disease Mother      Coronary Artery Disease Son         Two sons have  from MIs (ages 39 and 51)       SOCIAL HISTORY:  Social History     Socioeconomic History     Marital status:      Spouse name: None     Number of children: None     Years of education: None     Highest education level: None   Occupational History     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     Comment: [employer]; retired   Social Needs     Financial resource strain: None     Food insecurity:     Worry: None     Inability: None     Transportation needs:     Medical: None     Non-medical: None   Tobacco Use     Smoking status: Former Smoker     Packs/day: 2.00     Years: 20.00     Pack years: 40.00     Types: Cigarettes     Last attempt to quit: 1980     Years since quittin.2     Smokeless tobacco: Never Used     Tobacco comment: Quit in his 30s - 2 ppd for 20 years   Substance and Sexual Activity     Alcohol use: Yes     Alcohol/week: 0.0 oz     Comment: rarely     Drug use: No     Sexual activity: Not Currently     Partners: Female   Lifestyle     Physical activity:     Days per week: None     Minutes per session: None     Stress: None   Relationships     Social connections:     Talks on phone: None     Gets together: None     Attends Jew service: None     Active member of club or organization: None     Attends meetings of clubs or organizations: None     Relationship status: None     Intimate partner violence:     Fear of current or ex partner: None     Emotionally abused: None     Physically abused: None     Forced sexual activity: None   Other Topics Concern     Parent/sibling w/ CABG, MI or angioplasty before 65F 55M? No      Service Not Asked     Blood Transfusions Not Asked      "Caffeine Concern Not Asked     Occupational Exposure Not Asked     Hobby Hazards Not Asked     Sleep Concern Not Asked     Stress Concern Not Asked     Weight Concern Not Asked     Special Diet Yes     Comment: low fat, low salt      Back Care Not Asked     Exercise No     Bike Helmet Not Asked     Seat Belt Not Asked     Self-Exams Not Asked   Social History Narrative     None       Review of Systems:  Skin:  Negative       Eyes:  Positive for glasses    ENT:  Negative      Respiratory:  Positive for dyspnea on exertion;shortness of breath     Cardiovascular:  Negative      Gastroenterology: Negative      Genitourinary:  Negative      Musculoskeletal:  Negative      Neurologic:  Negative      Psychiatric:  Negative      Heme/Lymph/Imm:  Negative      Endocrine:  Negative        Physical Exam:  Vitals: BP 92/66   Pulse 98   Ht 1.854 m (6' 1\")   Wt 95.8 kg (211 lb 3.2 oz)   BMI 27.86 kg/m       Constitutional:  cooperative        Skin:  warm and dry to the touch          Head:  normocephalic   bump on right forehead    Eyes:  pupils equal and round        Lymph:      ENT:    pallor      Neck:  JVP normal        Respiratory:  clear to auscultation;normal symmetry         Cardiac: regular rhythm;normal S1 and S2                pulses full and equal                                        GI:      nontender    Extremities and Muscular Skeletal:  no deformities, clubbing, cyanosis, erythema observed;no edema              Neurological:  no gross motor deficits;affect appropriate   decreased short term memory    Psych:  Alert and Oriented x 3    No diagnosis found.    Recent Lab Results:  LIPID RESULTS:  Lab Results   Component Value Date    CHOL 176 02/07/2017    HDL 42 02/07/2017    LDL 93 02/07/2017    TRIG 203 (H) 02/07/2017       LIVER ENZYME RESULTS:  Lab Results   Component Value Date    AST 28 01/29/2019    ALT 13 01/29/2019       CBC RESULTS:  Lab Results   Component Value Date    WBC 9.4 (A) 01/29/2019    RBC " 3.58 (A) 01/29/2019    HGB 11 (A) 01/29/2019    HCT 33.9 (A) 01/29/2019    MCV 94.7 01/29/2019    MCH 30.7 01/29/2019    MCHC 32.4 01/29/2019    RDW 18.3 (A) 01/29/2019     01/29/2019       BMP RESULTS:  Lab Results   Component Value Date     01/29/2019    POTASSIUM 3.8 01/29/2019    CHLORIDE 104 01/29/2019    CO2 23 01/29/2019    ANIONGAP 6 10/16/2018     (A) 01/29/2019    BUN 12 01/29/2019    CR 1.11 01/29/2019    GFRESTIMATED 63.6 01/29/2019    GFRESTBLACK 61 10/16/2018    SHELLEY 9.1 01/29/2019        A1C RESULTS:  Lab Results   Component Value Date    A1C 7.1 (H) 07/30/2018       INR RESULTS:  Lab Results   Component Value Date    INR 1.06 11/27/2018    INR 1.21 (H) 07/31/2018           CC  No referring provider defined for this encounter.                    Thank you for allowing me to participate in the care of your patient.      Sincerely,     ALLEN Wagner [facility]    cc:   No referring provider defined for this encounter.

## 2019-04-01 ENCOUNTER — OFFICE VISIT (OUTPATIENT)
Dept: UROLOGY | Facility: CLINIC | Age: 79
End: 2019-04-01
Payer: COMMERCIAL

## 2019-04-01 VITALS
BODY MASS INDEX: 27.96 KG/M2 | WEIGHT: 211 LBS | HEIGHT: 73 IN | DIASTOLIC BLOOD PRESSURE: 66 MMHG | SYSTOLIC BLOOD PRESSURE: 132 MMHG | HEART RATE: 80 BPM

## 2019-04-01 DIAGNOSIS — R39.9 LOWER URINARY TRACT SYMPTOMS (LUTS): ICD-10-CM

## 2019-04-01 DIAGNOSIS — N21.0 BLADDER STONE: ICD-10-CM

## 2019-04-01 DIAGNOSIS — C67.2 MALIGNANT NEOPLASM OF LATERAL WALL OF URINARY BLADDER (H): Primary | Chronic | ICD-10-CM

## 2019-04-01 DIAGNOSIS — N32.0 BLADDER OUTLET OBSTRUCTION: ICD-10-CM

## 2019-04-01 PROCEDURE — 51798 US URINE CAPACITY MEASURE: CPT | Performed by: UROLOGY

## 2019-04-01 PROCEDURE — 52000 CYSTOURETHROSCOPY: CPT | Performed by: UROLOGY

## 2019-04-01 ASSESSMENT — PAIN SCALES - GENERAL: PAINLEVEL: NO PAIN (0)

## 2019-04-01 ASSESSMENT — MIFFLIN-ST. JEOR: SCORE: 1735.97

## 2019-04-01 NOTE — LETTER
4/1/2019       RE: Gibson Villalta  [address]  [place] [address]     Dear Colleague,    Thank you for referring your patient, Gibson Villalta, to the [facility] [place] at [facility]. Please see a copy of my visit note below.    CYSTOSCOPY PROCEDURE NOTE:    Gibson Villalta is a 78 year old male  who presents with history of HG T1 bladder cancer in addition to diffuse large B-cell lymphoma and stomach cacner for surveillance cystoscopy.    Pt ID verified with patient: Yes     Procedure verified with patient: Yes     Procedure confirmed with physician and support staff: Yes     Consent form confirmed with physician and support staff.    Sign In  History and Physical Exam reviewed .  Informed Consent Discussed: Yes   Sign in Communication: Yes   Time Out:  Team Confirms the Correct Patient, Correct Procedure; Yes , Correct Site and Site Marking, Correct Position (if applicable).    Affirmation of Time Out: Yes   Sign Out:  Sign Out Discussion: Yes   Physician: Ryan Camarillo MD    A urinalysis was performed revealing no evidence of infection.    The benefits, risks, alternatives of the cystoscopy procedure and personnel were discussed with the patient. The verbal consent was obtained and the patient agrees to proceed.      Description of procedure:   After fully informed, voluntary consent was obtained, the patient was brought into the procedure room, identified and placed in a supine position on the cystoscopy table.  The groin/scrotum were prepped with betadine and draped in a sterile fashion.  Urojet lidocaine gel was introduced.  A 15F flexible cystoscope was inserted into the urethra, and the bladder and urethra wereexamined in a systematic manner.  The patient tolerated the procedure well and there were no complications.      Cystoscopic findings:  The urethra was normal without strictures.  The  prostate was 4-5cm long and demonstrated significant bilobar hypertrophy.  There was a large median lobe.  The external sphincter coapted normally and the bladder neck was distorted due to the median lobe. The bladder was  entered and careful pan endoscopy was carried out. The posterior, superior and lateral walls and dome of the bladder were all well visualized and the scope was retroflexed upon itself..  There was severe trabeculation.  There was a ~2-3cm bladder stone. The area on the RIGHT lateral wall from the prior resection was without evidence of recurrent malignancy.  The ureteric orifices were normal in position and number and effluxing clear urine.    PVR = 125 ml     Assessment/Plan:   Gibson Villalta is a 78 year old male with a history of HG T1 bladder cancer with diffuse large B-cell lymphoma and stomach cancer on chemo/radiation. Given this treatment he is not a candidate for BCG.     HG T1 bladder cancer  - No evidence of recurrent disease at this time  - will plan for continued surveillance cystoscopy    Bladder outlet obstruction   - Evidence of a bladder stone, with significant bladder outlet obstruction from tri-lobar prostatic hypertrophy  - We discussed the treatment options of laser cystolitholapaxy with a bipolar Transurethral resection of the prostate. We discussed the risks and the benefits and need for 1-2 night hospital stay with a starkey catheter   - Orders for surgery placed      Ryan Camarillo MD

## 2019-04-01 NOTE — PATIENT INSTRUCTIONS

## 2019-04-01 NOTE — NURSING NOTE
Chief Complaint   Patient presents with     Cystoscopy     History of Bladder Cancer      Prior to the start of the procedure and with procedural staff participation, I verbally confirmed the patient s identity using two indicators, relevant allergies, that the procedure was appropriate and matched the consent or emergent situation, and that the correct equipment/implants were available. Immediately prior to starting the procedure I conducted the Time Out with the procedural staff and re-confirmed the patient s name, procedure, and site/side. (The [facility] universal protocol was followed.)  Yes    Sedation (Moderate or Deep): None    Halima Slaughter LPN

## 2019-04-01 NOTE — PROGRESS NOTES
CYSTOSCOPY PROCEDURE NOTE:    Gibson Villalta is a 78 year old male  who presents with history of HG T1 bladder cancer in addition to diffuse large B-cell lymphoma and stomach cacner for surveillance cystoscopy.    Pt ID verified with patient: Yes     Procedure verified with patient: Yes     Procedure confirmed with physician and support staff: Yes     Consent form confirmed with physician and support staff.    Sign In  History and Physical Exam reviewed .  Informed Consent Discussed: Yes   Sign in Communication: Yes   Time Out:  Team Confirms the Correct Patient, Correct Procedure; Yes , Correct Site and Site Marking, Correct Position (if applicable).    Affirmation of Time Out: Yes   Sign Out:  Sign Out Discussion: Yes   Physician: Ryan Camarillo MD    A urinalysis was performed revealing no evidence of infection.    The benefits, risks, alternatives of the cystoscopy procedure and personnel were discussed with the patient. The verbal consent was obtained and the patient agrees to proceed.      Description of procedure:   After fully informed, voluntary consent was obtained, the patient was brought into the procedure room, identified and placed in a supine position on the cystoscopy table.  The groin/scrotum were prepped with betadine and draped in a sterile fashion.  Urojet lidocaine gel was introduced.  A 15F flexible cystoscope was inserted into the urethra, and the bladder and urethra wereexamined in a systematic manner.  The patient tolerated the procedure well and there were no complications.      Cystoscopic findings:  The urethra was normal without strictures.  The prostate was 4-5cm long and demonstrated significant bilobar hypertrophy.  There was a large median lobe.  The external sphincter coapted normally and the bladder neck was distorted due to the median lobe. The bladder was  entered and careful pan endoscopy was carried out. The posterior, superior and lateral walls and dome of the bladder  were all well visualized and the scope was retroflexed upon itself..  There was severe trabeculation.  There was a ~2-3cm bladder stone. The area on the RIGHT lateral wall from the prior resection was without evidence of recurrent malignancy.  The ureteric orifices were normal in position and number and effluxing clear urine.    PVR = 125 ml     Assessment/Plan:   Gibson Villalta is a 78 year old male with a history of HG T1 bladder cancer with diffuse large B-cell lymphoma and stomach cancer on chemo/radiation. Given this treatment he is not a candidate for BCG.     HG T1 bladder cancer  - No evidence of recurrent disease at this time  - will plan for continued surveillance cystoscopy    Bladder outlet obstruction   - Evidence of a bladder stone, with significant bladder outlet obstruction from tri-lobar prostatic hypertrophy  - We discussed the treatment options of laser cystolitholapaxy with a bipolar Transurethral resection of the prostate. We discussed the risks and the benefits and need for 1-2 night hospital stay with a starkey catheter   - Orders for surgery placed      Ryan Camarillo MD

## 2019-04-04 ENCOUNTER — OFFICE VISIT (OUTPATIENT)
Dept: FAMILY MEDICINE | Facility: CLINIC | Age: 79
End: 2019-04-04
Payer: COMMERCIAL

## 2019-04-04 VITALS
HEART RATE: 96 BPM | TEMPERATURE: 97.1 F | BODY MASS INDEX: 28.1 KG/M2 | DIASTOLIC BLOOD PRESSURE: 65 MMHG | WEIGHT: 212 LBS | OXYGEN SATURATION: 95 % | HEIGHT: 73 IN | SYSTOLIC BLOOD PRESSURE: 93 MMHG

## 2019-04-04 DIAGNOSIS — D64.9 ANEMIA, UNSPECIFIED TYPE: ICD-10-CM

## 2019-04-04 DIAGNOSIS — R74.8 ELEVATED ALKALINE PHOSPHATASE LEVEL: ICD-10-CM

## 2019-04-04 DIAGNOSIS — Z01.818 PREOP GENERAL PHYSICAL EXAM: Primary | ICD-10-CM

## 2019-04-04 DIAGNOSIS — N40.1 BENIGN PROSTATIC HYPERPLASIA WITH URINARY FREQUENCY: ICD-10-CM

## 2019-04-04 DIAGNOSIS — E11.22 CONTROLLED TYPE 2 DIABETES MELLITUS WITH CHRONIC KIDNEY DISEASE, WITHOUT LONG-TERM CURRENT USE OF INSULIN, UNSPECIFIED CKD STAGE (H): ICD-10-CM

## 2019-04-04 DIAGNOSIS — R35.0 BENIGN PROSTATIC HYPERPLASIA WITH URINARY FREQUENCY: ICD-10-CM

## 2019-04-04 LAB
BASOPHILS # BLD AUTO: 0 10E9/L (ref 0–0.2)
BASOPHILS NFR BLD AUTO: 0.3 %
DIFFERENTIAL METHOD BLD: ABNORMAL
EOSINOPHIL # BLD AUTO: 0.1 10E9/L (ref 0–0.7)
EOSINOPHIL NFR BLD AUTO: 1.5 %
ERYTHROCYTE [DISTWIDTH] IN BLOOD BY AUTOMATED COUNT: 17 % (ref 10–15)
HBA1C MFR BLD: 6.8 % (ref 0–5.6)
HCT VFR BLD AUTO: 39 % (ref 40–53)
HGB BLD-MCNC: 12.4 G/DL (ref 13.3–17.7)
LYMPHOCYTES # BLD AUTO: 0.7 10E9/L (ref 0.8–5.3)
LYMPHOCYTES NFR BLD AUTO: 11.5 %
MCH RBC QN AUTO: 33.1 PG (ref 26.5–33)
MCHC RBC AUTO-ENTMCNC: 31.8 G/DL (ref 31.5–36.5)
MCV RBC AUTO: 104 FL (ref 78–100)
MONOCYTES # BLD AUTO: 0.8 10E9/L (ref 0–1.3)
MONOCYTES NFR BLD AUTO: 13 %
NEUTROPHILS # BLD AUTO: 4.5 10E9/L (ref 1.6–8.3)
NEUTROPHILS NFR BLD AUTO: 73.7 %
PLATELET # BLD AUTO: 218 10E9/L (ref 150–450)
RBC # BLD AUTO: 3.75 10E12/L (ref 4.4–5.9)
WBC # BLD AUTO: 6.1 10E9/L (ref 4–11)

## 2019-04-04 PROCEDURE — 83036 HEMOGLOBIN GLYCOSYLATED A1C: CPT | Performed by: INTERNAL MEDICINE

## 2019-04-04 PROCEDURE — 36415 COLL VENOUS BLD VENIPUNCTURE: CPT | Performed by: INTERNAL MEDICINE

## 2019-04-04 PROCEDURE — 83721 ASSAY OF BLOOD LIPOPROTEIN: CPT | Performed by: INTERNAL MEDICINE

## 2019-04-04 PROCEDURE — 85025 COMPLETE CBC W/AUTO DIFF WBC: CPT | Performed by: INTERNAL MEDICINE

## 2019-04-04 PROCEDURE — 80053 COMPREHEN METABOLIC PANEL: CPT | Performed by: INTERNAL MEDICINE

## 2019-04-04 PROCEDURE — 99214 OFFICE O/P EST MOD 30 MIN: CPT | Performed by: INTERNAL MEDICINE

## 2019-04-04 ASSESSMENT — MIFFLIN-ST. JEOR: SCORE: 1740.51

## 2019-04-04 NOTE — PROGRESS NOTES
[address] [address]  [phone]  Dept: [phone]    PRE-OP EVALUATION:  Today's date: 2019    Gibson Villalta (: 1940) presents for pre-operative evaluation assessment as requested by Dr. Camarillo.  He requires evaluation and anesthesia risk assessment prior to undergoing surgery/procedure for treatment of BPH.    Proposed Surgery/ Procedure: CYSTOSCOPY, HOLMIUM LASER LITHOLAPAXY ,  AND BIPOLAR TRANSURETHRAL RESECTION (TUR) PROSTATE;CYSTOSCOPY AND BIPOLAR TRANSURETHRAL RESECTION (TUR) PROSTATE    Date of Surgery/ Procedure: 2019  Time of Surgery/ Procedure: 2:05pm  Hospital/Surgical Facility:  OR  Fax number for surgical facility:   Primary Physician: No Ref-Primary, Physician  Type of Anesthesia Anticipated: General    Patient has a Health Care Directive or Living Will:  NO    1. Yes  - Do you have a history of heart attack, stroke, stent, bypass or surgery on an artery in the head, neck, heart or legs?  2. No - Do you ever have any pain or discomfort in your chest?  3. Yes  - Do you have a history of  Heart Failure?  4. yes - Are you troubled by shortness of breath when: walking on the level, up a slight hill or at night?  5. NO - Do you currently have a cold, bronchitis or other respiratory infection?  6. Yes  - Do you have a cough, shortness of breath or wheezing?  7. NO - Do you sometimes get pains in the calves of your legs when you walk?  8. NO - Do you or anyone in your family have previous history of blood clots?  9. NO - Do you or does anyone in your family have a serious bleeding problem such as prolonged bleeding following surgeries or cuts?  10. yes - Have you ever had problems with anemia or been told to take iron pills?  11. NO - Have you had any abnormal blood loss such as black, tarry or bloody stools, or abnormal vaginal bleeding?  12. NO - Have you ever had a blood transfusion?  13. NO - Have you or any of your relatives  ever had problems with anesthesia?  14. NO - Do you have sleep apnea, excessive snoring or daytime drowsiness?  15. NO - Do you have any prosthetic heart valves?  16. NO - Do you have prosthetic joints?  17. NO - Is there any chance that you may be pregnant?      HPI:     HPI related to upcoming procedure:     In July of last year, the patient was admitted to the hospital secondary to melena.  Upper endoscopy showed a gastric mass.  Patient was subsequently diagnosed with non-Hodgkin's lymphoma.  The CT scan of the abdomen/pelvis also incidentally revealed 2 enhancing bladder masses.  Patient proceeded with treatment for the non-Hodgkin's lymphoma.  A cystoscopy was performed after chemotherapy and there were reportedly no bladder lesions suspicious for cancer visualized.  Patient was found to have an enlarged prostate and surgical prostate resection was advised.  Patient denies hematuria, dysuria, urgency.  Has noted increased urination frequency but it has not been disruptive of his daily activities or sleep.    Patient follows up closely with cardiology due to congestive heart failure.  Recent echocardiogram has revealed severe reduction of left ventricular systolic function with an EF of 25-30%.  He has a pacemaker in place.  Patient denies shortness of breath at rest but easily gets dyspneic after walking for a few feet.  Denies chest pain.      MEDICAL HISTORY:     Patient Active Problem List    Diagnosis Date Noted     Diffuse large B-cell lymphoma of extranodal site (H) 11/23/2018     Priority: Medium     Bladder cancer (H) 11/19/2018     Priority: Medium     SCP completed, ready for delivery at 4/1/19 [facility]       Iron deficiency anemia, unspecified iron deficiency anemia type 10/15/2018     Priority: Medium     Prepatellar bursitis of left knee 12/11/2017     Priority: Medium     Cardiomyopathy (H) 03/14/2017     Priority: Medium     ICD implanted 11/2016       Controlled type 2 diabetes mellitus with  chronic kidney disease, without long-term current use of insulin, unspecified CKD stage (H) 02/24/2017     Priority: Medium     New Dx Feb 2017       HFrEF (heart failure with reduced ejection fraction) (H) 08/16/2016     Priority: Medium     Chronic kidney disease (CKD), stage 3 (moderate)      Priority: Medium     Anxiety and depression      Priority: Medium     BPH (benign prostatic hypertrophy)      Priority: Medium     LBBB (left bundle branch block)      Priority: Medium     Mixed cardiomyopathy 07/22/2016     Priority: Medium     RV (right ventricular) mural thrombus without MI 07/22/2016     Priority: Medium     Dementia without behavioral disturbance, unspecified dementia type 07/22/2016     Priority: Medium     History of gastric bypass 07/22/2016     Priority: Medium      Past Medical History:   Diagnosis Date     Acute kidney injury (H) 2012     Anemia      Anxiety      Bladder mass      BPH (benign prostatic hypertrophy)      Cardiomyopathy (H)     ICD implanted 11/2016     Carpal tunnel syndrome     Right     Chronic kidney disease (CKD), stage 3 (moderate)      Dementia      Depressive disorder      Diabetes (H)      Diffuse large B-cell lymphoma of extranodal site (H) 11/23/2018     Gastric mass      Gout      History of depression      LBBB (left bundle branch block) 2013     Lumbar herniated disc     L5-S1     Mixed cardiomyopathy 7/22/2016     Myocardial infarction (H) 1995 and 2010     Nonischemic cardiomyopathy (H) 7/22/2016     Obesity      Pacemaker     ICD/PM     Rosacea      Rotator cuff disorder     Tear x 2     RV (right ventricular) mural thrombus 7/22/2016     Past Surgical History:   Procedure Laterality Date     ANGIOPLASTY  1995 and 2010 with stent     BIOPSY      stomach     BONE MARROW BIOPSY, BONE SPECIMEN, NEEDLE/TROCAR N/A 10/30/2018    Procedure: BIOPSY BONE MARROW;  Surgeon: Jeb Romero MD;  Location:  GI     CARDIAC SURGERY      ICD/PM     CYSTOSCOPY,  TRANSURETHRAL RESECTION (TUR) TUMOR BLADDER, COMBINED N/A 9/19/2018    Procedure: COMBINED CYSTOSCOPY, TRANSURETHRAL RESECTION (TUR) TUMOR BLADDER;  CYSTOSCOPY, TRANSURETHRAL RESECTION BLADDER TUMOR ;  Surgeon: Ryan Camarillo MD;  Location:  OR     CYSTOSCOPY, TRANSURETHRAL RESECTION (TUR) TUMOR BLADDER, COMBINED N/A 11/12/2018    Procedure: CYSTOSCOPY RESTAGING TRANSURETHRAL RESECTION BLADDER TUMOR;  Surgeon: Ryan Camarillo MD;  Location:  OR     ESOPHAGOSCOPY, GASTROSCOPY, DUODENOSCOPY (EGD), COMBINED N/A 7/30/2018    Procedure: COMBINED ESOPHAGOSCOPY, GASTROSCOPY, DUODENOSCOPY (EGD), BIOPSY SINGLE OR MULTIPLE;  gastroscopy;  Surgeon: Parish Xiong MD;  Location:  GI     ESOPHAGOSCOPY, GASTROSCOPY, DUODENOSCOPY (EGD), COMBINED N/A 7/30/2018    Procedure: COMBINED ESOPHAGOSCOPY, GASTROSCOPY, DUODENOSCOPY (EGD);  EGD;  Surgeon: Parish Xiong MD;  Location:  GI     ESOPHAGOSCOPY, GASTROSCOPY, DUODENOSCOPY (EGD), COMBINED N/A 8/2/2018    Procedure: COMBINED ESOPHAGOSCOPY, GASTROSCOPY, DUODENOSCOPY (EGD), BIOPSY SINGLE OR MULTIPLE;  gastroscopy BIOPSYSHOULD BE SENT TO LAB IN NS NOT FORMALIN PER ;  Surgeon: Jonathon Bush MD;  Location:  GI     GASTRIC BYPASS  2001     HEART CATH LEFT HEART CATH  7/19/16    Non ischemic cardiomyopathy; Non functionally significant LAD and RCA disease      OTHER SURGICAL HISTORY  2006    Spinal surgery     OTHER SURGICAL HISTORY  Nov 2016    CRT-D implantation     Current Outpatient Medications   Medication Sig Dispense Refill     ACE/ARB NOT PRESCRIBED, INTENTIONAL, ACE & ARB not prescribed due to Symptomatic hypotension not due to excessive diuresis       ALLOPURINOL PO Take 300 mg by mouth every evening       ASPIRIN NOT PRESCRIBED (INTENTIONAL) Please choose reason not prescribed, below 0 each 0     atorvastatin (LIPITOR) 40 MG tablet TAKE 1 TABLET AT BEDTIME (Patient taking differently: TAKE 1 TABLET (40 MG)  BY MOUTH AT BEDTIME) 90 tablet 3      COENZYME Q-10 PO Take 200 mg by mouth every morning        ferrous sulfate (IRON) 325 (65 Fe) MG tablet Take 325 mg by mouth daily       furosemide (LASIX) 20 MG tablet Take 1 tablet (20 mg) by mouth daily 30 tablet 0     hydrALAZINE (APRESOLINE) 10 MG tablet 10 mg 3 times daily       levofloxacin (LEVAQUIN) 500 MG tablet Take 1 tablet (500 mg) by mouth daily 12 tablet 0     metoprolol succinate (TOPROL-XL) 50 MG 24 hr tablet TAKE 1 TABLET (50 MG) DAILY (Patient taking differently: TAKE 1 TABLET (50 MG) BY MOUTH DAILY) 90 tablet 3     multivitamin, therapeutic with minerals (MULTI-VITAMIN) TABS Take 1 tablet by mouth every morning        pantoprazole (PROTONIX) 40 MG EC tablet Take 1 tablet (40 mg) by mouth daily Take 30-60 minutes before a meal. 30 tablet 0     PREDNISONE PO Take 80 mg by mouth daily 4 20mg tabs for 5 days       venlafaxine (EFFEXOR) 75 MG tablet TAKE 1 TABLET TWICE DAILY (Patient taking differently: TAKE 1 TABLET (75 MG) BY MOUTH TWICE DAILY) 180 tablet 1     vitamin D2 (ERGOCALCIFEROL) 48538 units (1250 mcg) capsule TAKE 1 CAPSULE (50,000 UNIT) BY MOUTH ONCE A WEEK ON TUESDAY 12 capsule 1     OTC products: no recent use of OTC ASA, NSAIDS or Steroids    Allergies   Allergen Reactions     Wasps [Hornets]      Sand wasp --- years ago.        Latex Allergy: NO    Social History     Tobacco Use     Smoking status: Former Smoker     Packs/day: 2.00     Years: 20.00     Pack years: 40.00     Types: Cigarettes     Last attempt to quit: 1980     Years since quittin.2     Smokeless tobacco: Never Used     Tobacco comment: Quit in his 30s - 2 ppd for 20 years   Substance Use Topics     Alcohol use: Yes     Alcohol/week: 0.0 oz     Comment: rarely     History   Drug Use No       REVIEW OF SYSTEMS:   C: NEGATIVE for fever, chills, change in weight  E/M: NEGATIVE for ear, mouth and throat problems  R: NEGATIVE for significant cough  CV: NEGATIVE for chest pain, palpitations or peripheral edema  GI:  "NEGATIVE for nausea, abdominal pain, heartburn, or change in bowel habits  : NEGATIVE for dysuria, or hematuria  N: NEGATIVE for weakness, dizziness or paresthesias  H: NEGATIVE for bleeding problems      EXAM:   BP 93/65 (BP Location: Right arm, Cuff Size: Adult Large)   Pulse 96   Temp 97.1  F (36.2  C) (Oral)   Ht 1.854 m (6' 1\")   Wt 96.2 kg (212 lb)   SpO2 95%   BMI 27.97 kg/m      GENERAL APPEARANCE: healthy, alert and no distress    NECK: no adenopathy, no masses, and thyroid normal to palpation    RESP: no rhonchi or wheezes; mild left basal rales    CV: regular rates and rhythm, normal S1 S2, no S3 or S4 and no murmur, click or rub -    ABDOMEN:  soft, nontender, no HSM or masses and bowel sounds normal    NEURO: Normal strength and tone, sensory exam grossly normal, mentation intact and speech normal    PSYCH: mentation appears normal. and affect normal/bright    LYMPHATICS: No cervical or supraclavicular nodes      DIAGNOSTICS:     Labs Drawn and in Process:   Unresulted Labs Ordered in the Past 30 Days of this Admission     Date and Time Order Name Status Description    4/4/2019 1509 LDL CHOLESTEROL DIRECT In process     4/4/2019 1509 CBC WITH PLATELETS DIFFERENTIAL In process     4/4/2019 1509 COMPREHENSIVE METABOLIC PANEL In process     4/4/2019 1509 HEMOGLOBIN A1C In process           Recent Labs   Lab Test 01/29/19 11/27/18  1125 11/12/18  1305 10/30/18  0847  10/16/18  0607  07/31/18  1035  07/30/18  0750  08/25/17  1416   HGB 11*  --   --  11.0*   < >  --    < >  --    < > 13.9  --  15.1     --   --  291   < >  --    < >  --    < > 218  --  203   INR  --  1.06  --   --   --   --   --  1.21*  --  3.56*   < >  --      --   --   --   --  142   < >  --    < > 138  --  139   POTASSIUM 3.8  --  3.6  --   --  3.9   < >  --    < > 3.7  --  4.0   CR 1.11  --   --   --   --  1.37*   < >  --    < > 1.23  --  1.34*   A1C  --   --   --   --   --   --   --   --   --  7.1*  --  6.5*    < > = " values in this interval not displayed.        IMPRESSION:   Diagnosis/reason for consult: BPH    The proposed surgical procedure is considered INTERMEDIATE risk.    REVISED CARDIAC RISK INDEX  The patient has the following serious cardiovascular risks for perioperative complications such as (MI, PE, VFib and 3  AV Block):  Congestive Heart Failure    INTERPRETATION: 1 risks: Class II (low risk - 0.9% complication rate)    The patient has the following additional risks for perioperative complications:  No identified additional risks        ICD-10-CM    1. Preop general physical exam Z01.818 Comprehensive metabolic panel     CBC with platelets differential   2. Benign prostatic hyperplasia with urinary frequency N40.1     R35.0        RECOMMENDATIONS:     --Consult hospital rounder / IM to assist post-op medical management    APPROVAL GIVEN to proceed with proposed procedure, without further diagnostic evaluation       Signed Electronically by: Akira Currie MD    Copy of this evaluation report is provided to requesting physician.    [facility] Preop Guidelines    Revised Cardiac Risk Index

## 2019-04-04 NOTE — PATIENT INSTRUCTIONS
Take your Hydralazine and Metoprolol on the morning of your surgery with a small sip of water.      Before Your Surgery      Call your surgeon if there is any change in your health. This includes signs of a cold or flu (such as a sore throat, runny nose, cough, rash or fever).    Do not smoke, drink alcohol or take over the counter medicine (unless your surgeon or primary care doctor tells you to) for the 24 hours before and after surgery.    If you take prescribed drugs: Follow your doctor s orders about which medicines to take and which to stop until after surgery.    Eating and drinking prior to surgery: follow the instructions from your surgeon    Take a shower or bath the night before surgery. Use the soap your surgeon gave you to gently clean your skin. If you do not have soap from your surgeon, use your regular soap. Do not shave or scrub the surgery site.  Wear clean pajamas and have clean sheets on your bed.

## 2019-04-05 LAB
ALBUMIN SERPL-MCNC: 3.6 G/DL (ref 3.4–5)
ALP SERPL-CCNC: 156 U/L (ref 40–150)
ALT SERPL W P-5'-P-CCNC: 27 U/L (ref 0–70)
ANION GAP SERPL CALCULATED.3IONS-SCNC: 10 MMOL/L (ref 3–14)
AST SERPL W P-5'-P-CCNC: 24 U/L (ref 0–45)
BILIRUB SERPL-MCNC: 0.2 MG/DL (ref 0.2–1.3)
BUN SERPL-MCNC: 17 MG/DL (ref 7–30)
CALCIUM SERPL-MCNC: 9.3 MG/DL (ref 8.5–10.1)
CHLORIDE SERPL-SCNC: 109 MMOL/L (ref 94–109)
CO2 SERPL-SCNC: 24 MMOL/L (ref 20–32)
CREAT SERPL-MCNC: 1.53 MG/DL (ref 0.66–1.25)
GFR SERPL CREATININE-BSD FRML MDRD: 43 ML/MIN/{1.73_M2}
GLUCOSE SERPL-MCNC: 153 MG/DL (ref 70–99)
LDLC SERPL DIRECT ASSAY-MCNC: 127 MG/DL
POTASSIUM SERPL-SCNC: 4.9 MMOL/L (ref 3.4–5.3)
PROT SERPL-MCNC: 7.6 G/DL (ref 6.8–8.8)
SODIUM SERPL-SCNC: 143 MMOL/L (ref 133–144)

## 2019-04-09 ENCOUNTER — TRANSFERRED RECORDS (OUTPATIENT)
Dept: HEALTH INFORMATION MANAGEMENT | Facility: CLINIC | Age: 79
End: 2019-04-09

## 2019-04-15 DIAGNOSIS — I50.23 ACUTE ON CHRONIC SYSTOLIC HEART FAILURE (H): Chronic | ICD-10-CM

## 2019-04-16 NOTE — TELEPHONE ENCOUNTER
"furosemide (LASIX) 20 MG tablet  Last Written Prescription Date:  3/19/2019  Last Fill Quantity: 30,  # refills: 0   Last office visit: 4/4/2019 with prescribing provider:  4/4/2019   Future Office Visit:      Requested Prescriptions   Pending Prescriptions Disp Refills     furosemide (LASIX) 20 MG tablet [Pharmacy Med Name: Furosemide Oral Tablet 20 MG] 30 tablet 0     Sig: TAKE ONE TABLET BY MOUTH ONE TIME DAILY       Diuretics (Including Combos) Protocol Failed - 4/15/2019 11:49 AM        Failed - Normal serum creatinine on file in past 12 months     Recent Labs   Lab Test 04/04/19  1514   CR 1.53*              Passed - Blood pressure under 140/90 in past 12 months     BP Readings from Last 3 Encounters:   04/04/19 93/65   04/01/19 132/66   03/20/19 92/66                 Passed - Recent (12 mo) or future (30 days) visit within the authorizing provider's specialty     Patient had office visit in the last 12 months or has a visit in the next 30 days with authorizing provider or within the authorizing provider's specialty.  See \"Patient Info\" tab in inbasket, or \"Choose Columns\" in Meds & Orders section of the refill encounter.              Passed - Medication is active on med list        Passed - Patient is age 18 or older        Passed - Normal serum potassium on file in past 12 months     Recent Labs   Lab Test 04/04/19  1514   POTASSIUM 4.9                    Passed - Normal serum sodium on file in past 12 months     Recent Labs   Lab Test 04/04/19  1514                   "

## 2019-04-17 NOTE — TELEPHONE ENCOUNTER
,  Former patient of Dr. Reyes  Saw you 4/4/19 for pre-op    Routing refill request to provider for review/approval because:  Labs out of range:  Creatinine = 1.53 (4/4/19)  Thanks,  Gay Merritt, RN

## 2019-04-18 ENCOUNTER — TELEPHONE (OUTPATIENT)
Dept: SURGERY | Facility: AMBULATORY SURGERY CENTER | Age: 79
End: 2019-04-18

## 2019-04-18 RX ORDER — FUROSEMIDE 20 MG
TABLET ORAL
Qty: 90 TABLET | Refills: 1 | Status: SHIPPED | OUTPATIENT
Start: 2019-04-18 | End: 2019-10-11

## 2019-04-18 NOTE — TELEPHONE ENCOUNTER
Gibson Villalta  [id number]  April 18, 2019  9:22 AM    Return to call from Izabella Villalta, Gibson's wife, regarding his upcoming surgery scheduled for Monday, April 22.  She was calling to discuss further the risks and benefits of surgery, especially given his CHF and baseline dementia.  We discussed that the options would be observation versus proceeding with surgery as planned.  For observation we discussed the risks of UTI and hematuria from his bladder outlet obstruction and the bladder stone.  For surgery, we discussed the anesthesia risks with his EF of 20 to 25%, and the need for 1 night hospital stay.  Following a thorough discussion, she would like to proceed with surgery as scheduled.  She reports that Gibson is very much in favor of proceeding with surgery.    Ryan Camarillo M.D.

## 2019-04-22 ENCOUNTER — HOSPITAL ENCOUNTER (OUTPATIENT)
Facility: CLINIC | Age: 79
Discharge: HOME | End: 2019-04-23
Attending: UROLOGY | Admitting: UROLOGY
Payer: COMMERCIAL

## 2019-04-22 ENCOUNTER — ANESTHESIA EVENT (OUTPATIENT)
Dept: SURGERY | Facility: CLINIC | Age: 79
End: 2019-04-22
Payer: COMMERCIAL

## 2019-04-22 ENCOUNTER — ANESTHESIA (OUTPATIENT)
Dept: SURGERY | Facility: CLINIC | Age: 79
End: 2019-04-22
Payer: COMMERCIAL

## 2019-04-22 DIAGNOSIS — N21.0 BLADDER STONE: Primary | ICD-10-CM

## 2019-04-22 DIAGNOSIS — N40.1 BPH WITH OBSTRUCTION/LOWER URINARY TRACT SYMPTOMS: ICD-10-CM

## 2019-04-22 DIAGNOSIS — N13.8 BPH WITH OBSTRUCTION/LOWER URINARY TRACT SYMPTOMS: ICD-10-CM

## 2019-04-22 LAB
GLUCOSE BLDC GLUCOMTR-MCNC: 126 MG/DL (ref 70–99)
GLUCOSE BLDC GLUCOMTR-MCNC: 149 MG/DL (ref 70–99)

## 2019-04-22 PROCEDURE — 36000063 ZZH SURGERY LEVEL 4 EA 15 ADDTL MIN: Performed by: UROLOGY

## 2019-04-22 PROCEDURE — 40000169 ZZH STATISTIC PRE-PROCEDURE ASSESSMENT I: Performed by: UROLOGY

## 2019-04-22 PROCEDURE — 88305 TISSUE EXAM BY PATHOLOGIST: CPT | Performed by: UROLOGY

## 2019-04-22 PROCEDURE — 82365 CALCULUS SPECTROSCOPY: CPT | Performed by: UROLOGY

## 2019-04-22 PROCEDURE — 37000008 ZZH ANESTHESIA TECHNICAL FEE, 1ST 30 MIN: Performed by: UROLOGY

## 2019-04-22 PROCEDURE — 36000093 ZZH SURGERY LEVEL 4 1ST 30 MIN: Performed by: UROLOGY

## 2019-04-22 PROCEDURE — 25000128 H RX IP 250 OP 636: Performed by: NURSE ANESTHETIST, CERTIFIED REGISTERED

## 2019-04-22 PROCEDURE — 52318 REMOVE BLADDER STONE: CPT | Mod: 51 | Performed by: UROLOGY

## 2019-04-22 PROCEDURE — 25000132 ZZH RX MED GY IP 250 OP 250 PS 637: Performed by: UROLOGY

## 2019-04-22 PROCEDURE — 25800030 ZZH RX IP 258 OP 636: Performed by: UROLOGY

## 2019-04-22 PROCEDURE — 37000009 ZZH ANESTHESIA TECHNICAL FEE, EACH ADDTL 15 MIN: Performed by: UROLOGY

## 2019-04-22 PROCEDURE — 27210794 ZZH OR GENERAL SUPPLY STERILE: Performed by: UROLOGY

## 2019-04-22 PROCEDURE — 25000566 ZZH SEVOFLURANE, EA 15 MIN: Performed by: UROLOGY

## 2019-04-22 PROCEDURE — 25000125 ZZHC RX 250: Performed by: NURSE ANESTHETIST, CERTIFIED REGISTERED

## 2019-04-22 PROCEDURE — 88305 TISSUE EXAM BY PATHOLOGIST: CPT | Mod: 26 | Performed by: UROLOGY

## 2019-04-22 PROCEDURE — 82962 GLUCOSE BLOOD TEST: CPT

## 2019-04-22 PROCEDURE — 52601 PROSTATECTOMY (TURP): CPT | Performed by: UROLOGY

## 2019-04-22 PROCEDURE — 88300 SURGICAL PATH GROSS: CPT | Performed by: UROLOGY

## 2019-04-22 PROCEDURE — 71000012 ZZH RECOVERY PHASE 1 LEVEL 1 FIRST HR: Performed by: UROLOGY

## 2019-04-22 PROCEDURE — 25800025 ZZH RX 258: Performed by: UROLOGY

## 2019-04-22 PROCEDURE — 25800030 ZZH RX IP 258 OP 636: Performed by: NURSE ANESTHETIST, CERTIFIED REGISTERED

## 2019-04-22 PROCEDURE — 25000128 H RX IP 250 OP 636: Performed by: UROLOGY

## 2019-04-22 PROCEDURE — 25000125 ZZHC RX 250: Performed by: UROLOGY

## 2019-04-22 RX ORDER — ATROPA BELLADONNA AND OPIUM 16.2; 3 MG/1; MG/1
SUPPOSITORY RECTAL PRN
Status: DISCONTINUED | OUTPATIENT
Start: 2019-04-22 | End: 2019-04-22 | Stop reason: HOSPADM

## 2019-04-22 RX ORDER — SODIUM CHLORIDE, SODIUM LACTATE, POTASSIUM CHLORIDE, CALCIUM CHLORIDE 600; 310; 30; 20 MG/100ML; MG/100ML; MG/100ML; MG/100ML
INJECTION, SOLUTION INTRAVENOUS CONTINUOUS PRN
Status: DISCONTINUED | OUTPATIENT
Start: 2019-04-22 | End: 2019-04-22

## 2019-04-22 RX ORDER — FUROSEMIDE 20 MG
20 TABLET ORAL DAILY
Status: DISCONTINUED | OUTPATIENT
Start: 2019-04-22 | End: 2019-04-23 | Stop reason: HOSPADM

## 2019-04-22 RX ORDER — METOPROLOL SUCCINATE 25 MG/1
50 TABLET, EXTENDED RELEASE ORAL DAILY
Status: DISCONTINUED | OUTPATIENT
Start: 2019-04-22 | End: 2019-04-23 | Stop reason: HOSPADM

## 2019-04-22 RX ORDER — NEOSTIGMINE METHYLSULFATE 1 MG/ML
VIAL (ML) INJECTION PRN
Status: DISCONTINUED | OUTPATIENT
Start: 2019-04-22 | End: 2019-04-22

## 2019-04-22 RX ORDER — NALOXONE HYDROCHLORIDE 0.4 MG/ML
.1-.4 INJECTION, SOLUTION INTRAMUSCULAR; INTRAVENOUS; SUBCUTANEOUS
Status: DISCONTINUED | OUTPATIENT
Start: 2019-04-22 | End: 2019-04-23 | Stop reason: HOSPADM

## 2019-04-22 RX ORDER — HYDROMORPHONE HYDROCHLORIDE 1 MG/ML
.3-.5 INJECTION, SOLUTION INTRAMUSCULAR; INTRAVENOUS; SUBCUTANEOUS EVERY 10 MIN PRN
Status: DISCONTINUED | OUTPATIENT
Start: 2019-04-22 | End: 2019-04-22

## 2019-04-22 RX ORDER — ONDANSETRON 2 MG/ML
INJECTION INTRAMUSCULAR; INTRAVENOUS PRN
Status: DISCONTINUED | OUTPATIENT
Start: 2019-04-22 | End: 2019-04-22

## 2019-04-22 RX ORDER — ATORVASTATIN CALCIUM 40 MG/1
40 TABLET, FILM COATED ORAL AT BEDTIME
Status: DISCONTINUED | OUTPATIENT
Start: 2019-04-22 | End: 2019-04-23 | Stop reason: HOSPADM

## 2019-04-22 RX ORDER — OXYCODONE HYDROCHLORIDE 5 MG/1
5 TABLET ORAL
Status: DISCONTINUED | OUTPATIENT
Start: 2019-04-22 | End: 2019-04-22

## 2019-04-22 RX ORDER — GLYCOPYRROLATE 0.2 MG/ML
INJECTION, SOLUTION INTRAMUSCULAR; INTRAVENOUS PRN
Status: DISCONTINUED | OUTPATIENT
Start: 2019-04-22 | End: 2019-04-22

## 2019-04-22 RX ORDER — ONDANSETRON 2 MG/ML
4 INJECTION INTRAMUSCULAR; INTRAVENOUS EVERY 6 HOURS PRN
Status: DISCONTINUED | OUTPATIENT
Start: 2019-04-22 | End: 2019-04-23 | Stop reason: HOSPADM

## 2019-04-22 RX ORDER — ONDANSETRON 4 MG/1
4 TABLET, ORALLY DISINTEGRATING ORAL EVERY 6 HOURS PRN
Status: DISCONTINUED | OUTPATIENT
Start: 2019-04-22 | End: 2019-04-23 | Stop reason: HOSPADM

## 2019-04-22 RX ORDER — SODIUM CHLORIDE, SODIUM LACTATE, POTASSIUM CHLORIDE, CALCIUM CHLORIDE 600; 310; 30; 20 MG/100ML; MG/100ML; MG/100ML; MG/100ML
INJECTION, SOLUTION INTRAVENOUS CONTINUOUS
Status: DISCONTINUED | OUTPATIENT
Start: 2019-04-22 | End: 2019-04-22

## 2019-04-22 RX ORDER — MEPERIDINE HYDROCHLORIDE 25 MG/ML
12.5 INJECTION INTRAMUSCULAR; INTRAVENOUS; SUBCUTANEOUS
Status: DISCONTINUED | OUTPATIENT
Start: 2019-04-22 | End: 2019-04-22

## 2019-04-22 RX ORDER — FENTANYL CITRATE 50 UG/ML
25-50 INJECTION, SOLUTION INTRAMUSCULAR; INTRAVENOUS
Status: DISCONTINUED | OUTPATIENT
Start: 2019-04-22 | End: 2019-04-22

## 2019-04-22 RX ORDER — CEFAZOLIN SODIUM 2 G/100ML
2 INJECTION, SOLUTION INTRAVENOUS
Status: COMPLETED | OUTPATIENT
Start: 2019-04-22 | End: 2019-04-22

## 2019-04-22 RX ORDER — PANTOPRAZOLE SODIUM 40 MG/1
40 TABLET, DELAYED RELEASE ORAL DAILY
Status: DISCONTINUED | OUTPATIENT
Start: 2019-04-22 | End: 2019-04-23 | Stop reason: HOSPADM

## 2019-04-22 RX ORDER — FENTANYL CITRATE 50 UG/ML
25-50 INJECTION, SOLUTION INTRAMUSCULAR; INTRAVENOUS
Status: DISCONTINUED | OUTPATIENT
Start: 2019-04-22 | End: 2019-04-22 | Stop reason: HOSPADM

## 2019-04-22 RX ORDER — ETOMIDATE 2 MG/ML
INJECTION INTRAVENOUS PRN
Status: DISCONTINUED | OUTPATIENT
Start: 2019-04-22 | End: 2019-04-22

## 2019-04-22 RX ORDER — FENTANYL CITRATE 50 UG/ML
INJECTION, SOLUTION INTRAMUSCULAR; INTRAVENOUS PRN
Status: DISCONTINUED | OUTPATIENT
Start: 2019-04-22 | End: 2019-04-22

## 2019-04-22 RX ORDER — HYDROMORPHONE HYDROCHLORIDE 1 MG/ML
0.2 INJECTION, SOLUTION INTRAMUSCULAR; INTRAVENOUS; SUBCUTANEOUS
Status: DISCONTINUED | OUTPATIENT
Start: 2019-04-22 | End: 2019-04-23 | Stop reason: HOSPADM

## 2019-04-22 RX ORDER — ALLOPURINOL 300 MG/1
300 TABLET ORAL EVERY EVENING
Status: DISCONTINUED | OUTPATIENT
Start: 2019-04-22 | End: 2019-04-23 | Stop reason: HOSPADM

## 2019-04-22 RX ORDER — ONDANSETRON 4 MG/1
4 TABLET, ORALLY DISINTEGRATING ORAL EVERY 30 MIN PRN
Status: DISCONTINUED | OUTPATIENT
Start: 2019-04-22 | End: 2019-04-22

## 2019-04-22 RX ORDER — OXYCODONE HYDROCHLORIDE 5 MG/1
5 TABLET ORAL EVERY 4 HOURS PRN
Status: DISCONTINUED | OUTPATIENT
Start: 2019-04-22 | End: 2019-04-23 | Stop reason: HOSPADM

## 2019-04-22 RX ORDER — HYDRALAZINE HYDROCHLORIDE 10 MG/1
10 TABLET, FILM COATED ORAL 3 TIMES DAILY
Status: DISCONTINUED | OUTPATIENT
Start: 2019-04-22 | End: 2019-04-23 | Stop reason: HOSPADM

## 2019-04-22 RX ORDER — CEFAZOLIN SODIUM 1 G/3ML
1 INJECTION, POWDER, FOR SOLUTION INTRAMUSCULAR; INTRAVENOUS SEE ADMIN INSTRUCTIONS
Status: DISCONTINUED | OUTPATIENT
Start: 2019-04-22 | End: 2019-04-22 | Stop reason: HOSPADM

## 2019-04-22 RX ORDER — ONDANSETRON 2 MG/ML
4 INJECTION INTRAMUSCULAR; INTRAVENOUS EVERY 30 MIN PRN
Status: DISCONTINUED | OUTPATIENT
Start: 2019-04-22 | End: 2019-04-22

## 2019-04-22 RX ORDER — SODIUM CHLORIDE 9 MG/ML
INJECTION, SOLUTION INTRAVENOUS CONTINUOUS
Status: DISCONTINUED | OUTPATIENT
Start: 2019-04-22 | End: 2019-04-23 | Stop reason: HOSPADM

## 2019-04-22 RX ORDER — NALOXONE HYDROCHLORIDE 0.4 MG/ML
.1-.4 INJECTION, SOLUTION INTRAMUSCULAR; INTRAVENOUS; SUBCUTANEOUS
Status: DISCONTINUED | OUTPATIENT
Start: 2019-04-22 | End: 2019-04-22

## 2019-04-22 RX ORDER — LIDOCAINE 40 MG/G
CREAM TOPICAL
Status: DISCONTINUED | OUTPATIENT
Start: 2019-04-22 | End: 2019-04-23 | Stop reason: HOSPADM

## 2019-04-22 RX ORDER — VENLAFAXINE 75 MG/1
75 TABLET ORAL 2 TIMES DAILY
Status: DISCONTINUED | OUTPATIENT
Start: 2019-04-22 | End: 2019-04-23 | Stop reason: HOSPADM

## 2019-04-22 RX ORDER — ACETAMINOPHEN 325 MG/1
975 TABLET ORAL EVERY 6 HOURS
Status: DISCONTINUED | OUTPATIENT
Start: 2019-04-22 | End: 2019-04-23 | Stop reason: HOSPADM

## 2019-04-22 RX ORDER — LIDOCAINE HYDROCHLORIDE 20 MG/ML
INJECTION, SOLUTION INFILTRATION; PERINEURAL PRN
Status: DISCONTINUED | OUTPATIENT
Start: 2019-04-22 | End: 2019-04-22

## 2019-04-22 RX ADMIN — ROCURONIUM BROMIDE 10 MG: 10 INJECTION INTRAVENOUS at 14:35

## 2019-04-22 RX ADMIN — SODIUM CHLORIDE, POTASSIUM CHLORIDE, SODIUM LACTATE AND CALCIUM CHLORIDE: 600; 310; 30; 20 INJECTION, SOLUTION INTRAVENOUS at 14:15

## 2019-04-22 RX ADMIN — SODIUM CHLORIDE 6000 ML: 900 IRRIGANT IRRIGATION at 17:50

## 2019-04-22 RX ADMIN — HYDRALAZINE HYDROCHLORIDE 10 MG: 10 TABLET ORAL at 18:48

## 2019-04-22 RX ADMIN — SUCCINYLCHOLINE CHLORIDE 100 MG: 20 INJECTION, SOLUTION INTRAMUSCULAR; INTRAVENOUS; PARENTERAL at 14:23

## 2019-04-22 RX ADMIN — ALLOPURINOL 300 MG: 300 TABLET ORAL at 20:45

## 2019-04-22 RX ADMIN — SODIUM CHLORIDE: 9 INJECTION, SOLUTION INTRAVENOUS at 17:51

## 2019-04-22 RX ADMIN — SODIUM CHLORIDE 6000 ML: 900 IRRIGANT IRRIGATION at 22:35

## 2019-04-22 RX ADMIN — FUROSEMIDE 20 MG: 20 TABLET ORAL at 18:48

## 2019-04-22 RX ADMIN — ACETAMINOPHEN 975 MG: 325 TABLET, FILM COATED ORAL at 22:33

## 2019-04-22 RX ADMIN — ACETAMINOPHEN 975 MG: 325 TABLET, FILM COATED ORAL at 18:46

## 2019-04-22 RX ADMIN — LIDOCAINE HYDROCHLORIDE 100 MG: 20 INJECTION, SOLUTION INFILTRATION; PERINEURAL at 14:23

## 2019-04-22 RX ADMIN — PHENYLEPHRINE HYDROCHLORIDE 100 MCG: 10 INJECTION, SOLUTION INTRAMUSCULAR; INTRAVENOUS; SUBCUTANEOUS at 14:37

## 2019-04-22 RX ADMIN — PHENYLEPHRINE HYDROCHLORIDE 100 MCG: 10 INJECTION, SOLUTION INTRAMUSCULAR; INTRAVENOUS; SUBCUTANEOUS at 15:00

## 2019-04-22 RX ADMIN — PHENYLEPHRINE HYDROCHLORIDE 100 MCG: 10 INJECTION, SOLUTION INTRAMUSCULAR; INTRAVENOUS; SUBCUTANEOUS at 14:41

## 2019-04-22 RX ADMIN — ROCURONIUM BROMIDE 20 MG: 10 INJECTION INTRAVENOUS at 14:31

## 2019-04-22 RX ADMIN — PHENYLEPHRINE HYDROCHLORIDE 100 MCG: 10 INJECTION, SOLUTION INTRAMUSCULAR; INTRAVENOUS; SUBCUTANEOUS at 15:08

## 2019-04-22 RX ADMIN — CEFAZOLIN SODIUM 2 G: 2 INJECTION, SOLUTION INTRAVENOUS at 14:30

## 2019-04-22 RX ADMIN — VENLAFAXINE 75 MG: 75 TABLET ORAL at 20:45

## 2019-04-22 RX ADMIN — NEOSTIGMINE METHYLSULFATE 5 MG: 1 INJECTION, SOLUTION INTRAVENOUS at 15:13

## 2019-04-22 RX ADMIN — ATORVASTATIN CALCIUM 40 MG: 40 TABLET, FILM COATED ORAL at 22:33

## 2019-04-22 RX ADMIN — ONDANSETRON 4 MG: 2 INJECTION INTRAMUSCULAR; INTRAVENOUS at 15:00

## 2019-04-22 RX ADMIN — FENTANYL CITRATE 100 MCG: 50 INJECTION, SOLUTION INTRAMUSCULAR; INTRAVENOUS at 14:23

## 2019-04-22 RX ADMIN — ETOMIDATE 14 MG: 2 INJECTION, SOLUTION INTRAVENOUS at 14:23

## 2019-04-22 RX ADMIN — HYDRALAZINE HYDROCHLORIDE 10 MG: 10 TABLET ORAL at 22:33

## 2019-04-22 RX ADMIN — GLYCOPYRROLATE 0.8 MG: 0.2 INJECTION, SOLUTION INTRAMUSCULAR; INTRAVENOUS at 15:13

## 2019-04-22 ASSESSMENT — MIFFLIN-ST. JEOR: SCORE: 1751.85

## 2019-04-22 NOTE — ANESTHESIA PREPROCEDURE EVALUATION
Anesthesia Pre-Procedure Evaluation    Patient: Gibson Villalta   MRN: [id number] : 1940          Preoperative Diagnosis: BLADDER STONE AND BLADDER OUTLET OBSTRUCTION    Procedure(s):  CYSTOSCOPY, HOLMIUM LASER LITHOLAPAXY ,  AND BIPOLAR TRANSURETHRAL RESECTION (TUR) PROSTATE  CYSTOSCOPY AND BIPOLAR TRANSURETHRAL RESECTION (TUR) PROSTATE    Past Medical History:   Diagnosis Date     Acute kidney injury (H)      Anemia      Anxiety      Bladder mass      BPH (benign prostatic hypertrophy)      Cardiomyopathy (H)     ICD implanted 2016     Carpal tunnel syndrome     Right     Chronic kidney disease (CKD), stage 3 (moderate)      Dementia      Depressive disorder      Diabetes (H)      Diffuse large B-cell lymphoma of extranodal site (H) 2018     Gastric mass      Gout      History of depression      LBBB (left bundle branch block)      Lumbar herniated disc     L5-S1     Mixed cardiomyopathy 2016     Myocardial infarction (H)  and      Nonischemic cardiomyopathy (H) 2016     Obesity      Pacemaker     ICD/PM     Rosacea      Rotator cuff disorder     Tear x 2     RV (right ventricular) mural thrombus 2016     Past Surgical History:   Procedure Laterality Date     ANGIOPLASTY   and  with stent     BIOPSY      stomach     BONE MARROW BIOPSY, BONE SPECIMEN, NEEDLE/TROCAR N/A 10/30/2018    Procedure: BIOPSY BONE MARROW;  Surgeon: Jeb Romero MD;  Location:  GI     CARDIAC SURGERY      ICD/PM     CYSTOSCOPY, TRANSURETHRAL RESECTION (TUR) TUMOR BLADDER, COMBINED N/A 2018    Procedure: COMBINED CYSTOSCOPY, TRANSURETHRAL RESECTION (TUR) TUMOR BLADDER;  CYSTOSCOPY, TRANSURETHRAL RESECTION BLADDER TUMOR ;  Surgeon: Ryan Camarillo MD;  Location:  OR     CYSTOSCOPY, TRANSURETHRAL RESECTION (TUR) TUMOR BLADDER, COMBINED N/A 2018    Procedure: CYSTOSCOPY RESTAGING TRANSURETHRAL RESECTION BLADDER TUMOR;  Surgeon: Ryan Camarillo MD;  Location:   OR     ESOPHAGOSCOPY, GASTROSCOPY, DUODENOSCOPY (EGD), COMBINED N/A 7/30/2018    Procedure: COMBINED ESOPHAGOSCOPY, GASTROSCOPY, DUODENOSCOPY (EGD), BIOPSY SINGLE OR MULTIPLE;  gastroscopy;  Surgeon: Parish Xiong MD;  Location:  GI     ESOPHAGOSCOPY, GASTROSCOPY, DUODENOSCOPY (EGD), COMBINED N/A 7/30/2018    Procedure: COMBINED ESOPHAGOSCOPY, GASTROSCOPY, DUODENOSCOPY (EGD);  EGD;  Surgeon: Parish Xiong MD;  Location:  GI     ESOPHAGOSCOPY, GASTROSCOPY, DUODENOSCOPY (EGD), COMBINED N/A 8/2/2018    Procedure: COMBINED ESOPHAGOSCOPY, GASTROSCOPY, DUODENOSCOPY (EGD), BIOPSY SINGLE OR MULTIPLE;  gastroscopy BIOPSYSHOULD BE SENT TO LAB IN NS NOT FORMALIN PER ;  Surgeon: Jonathon Bush MD;  Location:  GI     GASTRIC BYPASS  2001     HEART CATH LEFT HEART CATH  7/19/16    Non ischemic cardiomyopathy; Non functionally significant LAD and RCA disease      OTHER SURGICAL HISTORY  2006    Spinal surgery     OTHER SURGICAL HISTORY  Nov 2016    CRT-D implantation       Anesthesia Evaluation     . Pt has had prior anesthetic.     No history of anesthetic complications          ROS/MED HX    ENT/Pulmonary:       Neurologic:     (+)dementia,     Cardiovascular:     (+) --CAD, --. : . CHF Last EF: 25-30% . . :. .       METS/Exercise Tolerance:     Hematologic:     (+) Anemia, -      Musculoskeletal:         GI/Hepatic:     (+) Other GI/Hepatic s/p gastric bypass     (-) liver disease   Renal/Genitourinary:     (+) chronic renal disease, type: CRI,       Endo:     (+) type II DM .   (-) thyroid disease   Psychiatric:         Infectious Disease:         Malignancy:   (+) Malignancy (bladder)           Other:                          Physical Exam      Airway   Mallampati: III  TM distance: >3 FB  Neck ROM: full    Dental     Cardiovascular   Rhythm and rate: regular      Pulmonary    breath sounds clear to auscultation            Lab Results   Component Value Date    WBC 6.1 04/04/2019    HGB 12.4 (L)  "04/04/2019    HCT 39.0 (L) 04/04/2019     04/04/2019    CRP <2.9 07/22/2016    SED 10 07/22/2016     04/04/2019    POTASSIUM 4.9 04/04/2019    CHLORIDE 109 04/04/2019    CO2 24 04/04/2019    BUN 17 04/04/2019    CR 1.53 (H) 04/04/2019     (H) 04/04/2019    SHELLEY 9.3 04/04/2019    PHOS 3.5 02/07/2017    MAG 2.4 (H) 02/21/2017    ALBUMIN 3.6 04/04/2019    PROTTOTAL 7.6 04/04/2019    ALT 27 04/04/2019    AST 24 04/04/2019    ALKPHOS 156 (H) 04/04/2019    BILITOTAL 0.2 04/04/2019    LIPASE 105 07/30/2018    PTT 25 11/27/2018    INR 1.06 11/27/2018    TSH 0.92 08/09/2018       Preop Vitals  BP Readings from Last 3 Encounters:   04/22/19 138/66   04/04/19 93/65   04/01/19 132/66    Pulse Readings from Last 3 Encounters:   04/04/19 96   04/01/19 80   03/20/19 98      Resp Readings from Last 3 Encounters:   04/22/19 16   11/27/18 16   11/12/18 17    SpO2 Readings from Last 3 Encounters:   04/22/19 94%   04/04/19 95%   02/11/19 94%      Temp Readings from Last 1 Encounters:   04/22/19 35.6  C (96  F) (Temporal)    Ht Readings from Last 1 Encounters:   04/22/19 1.854 m (6' 1\")      Wt Readings from Last 1 Encounters:   04/22/19 97.3 kg (214 lb 8 oz)    Estimated body mass index is 28.3 kg/m  as calculated from the following:    Height as of this encounter: 1.854 m (6' 1\").    Weight as of this encounter: 97.3 kg (214 lb 8 oz).       Anesthesia Plan      History & Physical Review  History and physical reviewed and following examination; no interval change.    ASA Status:  3 .    NPO Status:  > 8 hours    Plan for General, RSI and ETT with Propofol induction.   PONV prophylaxis:  Ondansetron (or other 5HT-3)  Additional equipment: Videolaryngoscope      Postoperative Care  Postoperative pain management:  Multi-modal analgesia.      Consents  Anesthetic plan, risks, benefits and alternatives discussed with:  Patient..                 Catarino Moody MD  "

## 2019-04-22 NOTE — OR NURSING
Report called to Delia SILVERIO On the 8th floor. Pt transported per cart with all belongings per hunter Heard to [address] bed 1. Pt reinserted upper and lower dentures per self.

## 2019-04-22 NOTE — ANESTHESIA CARE TRANSFER NOTE
Patient: Gibson Villalta    Procedure(s):  CYSTOSCOPY, HOLMIUM LASER LITHOLAPAXY ,  AND BIPOLAR TRANSURETHRAL RESECTION (TUR) PROSTATE  CYSTOSCOPY AND BIPOLAR TRANSURETHRAL RESECTION (TUR) PROSTATE    Diagnosis: BLADDER STONE AND BLADDER OUTLET OBSTRUCTION  Diagnosis Additional Information: No value filed.    Anesthesia Type:   General, RSI, ETT     Note:  Airway :Face Mask  Patient transferred to:PACU  Comments: Patient breathing spontaneously.  Follows commands.  Suctioned and extubated.  Exchanging air well.  Transferred to PACU with 10L O2 via mask.  Monitors on.  VSS.  Patent IV.  Report and transfer of care to RN.  Handoff Report: Identifed the Patient, Identified the Reponsible Provider, Reviewed the pertinent medical history, Discussed the surgical course, Reviewed Intra-OP anesthesia mangement and issues during anesthesia, Set expectations for post-procedure period and Allowed opportunity for questions and acknowledgement of understanding      Vitals: (Last set prior to Anesthesia Care Transfer)    CRNA VITALS  4/22/2019 1452 - 4/22/2019 1533      4/22/2019             Resp Rate (set):  10                Electronically Signed By: ALLEN Lopez CRNA  April 22, 2019  3:33 PM

## 2019-04-22 NOTE — ANESTHESIA POSTPROCEDURE EVALUATION
Patient: Gibson Villalta    Procedure(s):  CYSTOSCOPY, HOLMIUM LASER LITHOLAPAXY ,  AND BIPOLAR TRANSURETHRAL RESECTION (TUR) PROSTATE  CYSTOSCOPY AND BIPOLAR TRANSURETHRAL RESECTION (TUR) PROSTATE    Diagnosis:BLADDER STONE AND BLADDER OUTLET OBSTRUCTION  Diagnosis Additional Information: No value filed.    Anesthesia Type:  General, RSI, ETT    Note:  Anesthesia Post Evaluation    Patient location during evaluation: PACU  Patient participation: Able to fully participate in evaluation  Level of consciousness: awake and alert  Pain management: adequate  Airway patency: patent  Cardiovascular status: acceptable and hemodynamically stable  Respiratory status: acceptable  Hydration status: euvolemic  PONV: none     Anesthetic complications: None    Comments: Pain well controlled in PACU. HDS.        Last vitals:  Vitals:    04/22/19 1530 04/22/19 1545 04/22/19 1615   BP: (!) 137/92 134/82 120/68   Pulse: 93 76    Resp: 16 20 14   Temp:      SpO2: 93% 99% 95%         Electronically Signed By: Catarino Moody MD  April 22, 2019  4:25 PM

## 2019-04-22 NOTE — DISCHARGE INSTRUCTIONS
POSTOPERATIVE INSTRUCTIONS    Diagnosis-------------------------------   BPH with LUTS    Procedure-------------------------------  Procedure(s) (LRB):  CYSTOSCOPY, HOLMIUM LASER LITHOLAPAXY ,  AND BIPOLAR TRANSURETHRAL RESECTION (TUR) PROSTATE (N/A)  CYSTOSCOPY AND BIPOLAR TRANSURETHRAL RESECTION (TUR) PROSTATE      Findings--------------------------------  Bladder stone fragmented and removed. Transurethral resection of the prostate completed.    Home-going instructions-----------------         Activity Limitation:     - No driving or operating heavy machinery while on narcotic pain medication.     FOLLOW THESE INSTRUCTIONS AS INDICATED BELOW:  - Observe operative area for signs of excessive bleeding.  - You may shower.  - Increase fluid intake to promote clear urine.  - Resume usual diet as tolerated    What to expect while recovering-----------    For the first few weeks after your procedure, you may notice that your urine is cloudy. Or you may have blood or blood clots in your urine. This is normal while your body rids itself of the treated tissue. These symptoms may begin to get better during the first few weeks. But it may take a few months before they go away. Your provider can tell you when you can have sex again and when you can go back to work.    You also may be told to avoid lifting anything over 10 pounds or bending over to lift things from the ground.    You should drink plenty of fluids to help flush out your bladder.    You may experience some intermittent bleeding that makes your urine pink or cherry colored. This is normal.    However, if you are unable to urinate, passing large amount of clots, have david blood in your urine, or have a temperature >101 degrees, call the urology nurse on call, or present to your nearest emergency department.      When to call your healthcare provider  Contact your healthcare provider right away if:    You have a fever of 100.4 F (38 C) or higher, or as directed by  your provider    You have excessive bleeding    You have pain not relieved by medicines    You notice that no urine is draining from the catheter or the catheter falls out    You have a frequent or very strong urge to urinate    You re not able to urinate, or notice a decrease in urine flow    Discharge Medications/instructions:     - Take Tylenol 1000mg every 6 hours for pain    - Take an over the counter stool softener to promote soft bowel movements      Questions/concerns------------------------  [facility]: [phone]    Future appointments  You will follow up with Dr. Camarillo in 3 months at his [facility] office.      Ryan Camarillo MD

## 2019-04-22 NOTE — PROGRESS NOTES
Admission medication history interview status for the 4/22/2019  admission is complete. See EPIC admission navigator for prior to admission medications     Medication history source reliability:Poor    Medication history interview source(s):Patient, pharmacies    Medication history resources (including written lists, pill bottles, clinic record):None    Primary pharmacy. [facility]    Additional medication history information not noted on PTA med list :    Confirmed strength of Allopurinol with [facility]. Confirmed strength of Furosemide, Hydralazine and Pantoprazole with [facility]. Confirmed strength of Toprol-XL and Venlafaxine with [facility].     UNABLE to confirm strength of Atorvastatin with four different pharmacies. Wife states they were getting it from [facility] mail order but [facility] hasn't filled it since 11/2018.    Time spent in this activity: 90 minutes    Prior to Admission medications    Medication Sig Last Dose Taking? Auth Provider   ALLOPURINOL PO Take 300 mg by mouth every evening 4/21/2019 at PM Yes Reported, Patient   atorvastatin (LIPITOR) 40 MG tablet TAKE 1 TABLET AT BEDTIME  Patient taking differently: TAKE 1 TABLET (40 MG)  BY MOUTH AT BEDTIME 4/21/2019 at 2100 Yes Flori Reyes DO   COENZYME Q-10 PO Take 100 mg by mouth every morning  4/21/2019 at AM Yes Reported, Patient   ferrous sulfate (IRON) 325 (65 Fe) MG tablet Take 325 mg by mouth daily 4/21/2019 at AM Yes Reported, Patient   furosemide (LASIX) 20 MG tablet TAKE ONE TABLET BY MOUTH ONE TIME DAILY  4/21/2019 at AM Yes Akira Currie MD   hydrALAZINE (APRESOLINE) 10 MG tablet Take 10 mg by mouth 3 times daily  4/22/2019 at 0630 Yes Reported, Patient   metoprolol succinate (TOPROL-XL) 50 MG 24 hr tablet TAKE 1 TABLET (50 MG) DAILY  Patient taking differently: TAKE 1 TABLET (50 MG) BY MOUTH DAILY 4/21/2019 at AM Yes Flori Reyes DO   multivitamin, therapeutic with minerals (MULTI-VITAMIN) TABS Take 1 tablet by mouth  every morning  4/21/2019 at AM Yes Reported, Patient   pantoprazole (PROTONIX) 40 MG EC tablet Take 1 tablet (40 mg) by mouth daily Take 30-60 minutes before a meal. 4/21/2019 at AM Yes Gisell Mcginnis PA-C   venlafaxine (EFFEXOR) 75 MG tablet TAKE 1 TABLET TWICE DAILY  Patient taking differently: TAKE 1 TABLET (75 MG) BY MOUTH TWICE DAILY 4/21/2019 at 2100 Yes Flori Reyes,    vitamin D2 (ERGOCALCIFEROL) 71327 units (1250 mcg) capsule TAKE 1 CAPSULE (50,000 UNIT) BY MOUTH ONCE A WEEK ON TUESDAY 4/16/2019 at PM Yes Katelyn Akhtar APRN CNP   ACE/ARB NOT PRESCRIBED, INTENTIONAL, ACE & ARB not prescribed due to Symptomatic hypotension not due to excessive diuresis   Flori Reyes,    ASPIRIN NOT PRESCRIBED (INTENTIONAL) Please choose reason not prescribed, below   Flori Reyes DO

## 2019-04-22 NOTE — OP NOTE
OPERATIVE REPORT  DATE OF SURGERY: 04/22/19  LOCATION OF SURGERY: [facility] OR  PREOPERATIVE DIAGNOSIS:  (N21.0) Bladder stone  (primary encounter diagnosis)  (N40.1,  N13.8) BPH with obstruction/lower urinary tract symptoms  POSTOPERATIVE DIAGNOSIS: (N21.0) Bladder stone  (primary encounter diagnosis)  (N40.1,  N13.8) BPH with obstruction/lower urinary tract symptoms  START TIME: 2:33 PM  END TIME: 3:16 PM  PROCEDURE PERFORMED:   1. Cystoscopy with laser cystolithalopaxy  2. Transurethral resection of the prostate      STAFF SURGEON: Ryan Camarillo MD  ANESTHESIA: General.   ESTIMATED BLOOD LOSS: 5 mL.   DRAINS AND TUBES: 22fr coude tip 3-way catheter with 25cc in the balloon  COMPLICATIONS: None.   DISPOSITION: PACU.   SPECIMENS OBTAINED:   ID Type Source Tests Collected by Time Destination   1 : BLADDER STONE Calculus/Stone Bladder STONE ANALYSIS Ryan Camarillo MD 4/22/2019  2:40 PM    A : PROSTATE Tissue Prostate SURGICAL PATHOLOGY EXAM Ryan Camarillo MD 4/22/2019  2:41 PM      SIGNIFICANT FINDINGS: Bladder stone laser fragmented and removed. Transurethral resection of the prostate completed.     HISTORY OF PRESENT ILLNESS: Gibson Villalta is a 78 year old male with a history of HG T1 bladder cancer with diffuse large B-cell lymphoma and stomach cancer on chemo/radiation. Found to have evidence of bladder outlet obstruction with a bladder stone and cystoscopy with significant tri-lobar prostatic hypertrophy and obstruction.    OPERATION PERFORMED:   Informed consent was obtained and the patient was brought to the operating room where general anesthesia was induced. The patient was given appropriate preoperative antibiotics and positioned supine. The patient was then repositioned in dorsal lithotomy with all pressure points padded. We then performed a timeout, verifying the correct patient's site and procedure to be performed.    A 26fr continuous flow resectoscope was inserted atraumatically with a  visual obturator.  Cystoscopy was completed with evidence of trilobar prostatic hypertrophy, moderate bladder trabeculation, and a bladder stone.  Bladder stone was noted to be approximately 2 to 3 cm.  The stone was fragmented with a laser fiber.  Stones were irrigated and removed from the bladder.   the resectoscope was then assembled and resection was initiated at the median lobe which was taken down in entirety with care to avoid the ureteral orifices bilaterally.  Resection was then carried back to the level of the verumontanum.  All prostate chips were irrigated and removed.  Hemostasis was excellent.  The scope was removed and a 22 Citizen of the [place] coudé tipped three-way catheter was inserted with 25 cc in the balloon.  The catheter was placed on gentle traction.  A B&O suppository was administered.  Patient was emerged from anesthesia and taken to the PACU in stable condition.    Ryan Camarillo MD   Urology  [facility]  Clinic Phone [phone]

## 2019-04-22 NOTE — OR NURSING
Retraction off of penis /catheter as ordered. CBI continues to drain clear urine. Rate slowed down . Catheter secured with catheter device to inner left thigh.

## 2019-04-23 VITALS
RESPIRATION RATE: 18 BRPM | BODY MASS INDEX: 28.43 KG/M2 | WEIGHT: 214.5 LBS | OXYGEN SATURATION: 96 % | HEIGHT: 73 IN | SYSTOLIC BLOOD PRESSURE: 123 MMHG | DIASTOLIC BLOOD PRESSURE: 77 MMHG | TEMPERATURE: 96.3 F | HEART RATE: 73 BPM

## 2019-04-23 LAB
ANION GAP SERPL CALCULATED.3IONS-SCNC: 5 MMOL/L (ref 3–14)
BUN SERPL-MCNC: 12 MG/DL (ref 7–30)
CALCIUM SERPL-MCNC: 8.7 MG/DL (ref 8.5–10.1)
CHLORIDE SERPL-SCNC: 106 MMOL/L (ref 94–109)
CO2 SERPL-SCNC: 28 MMOL/L (ref 20–32)
COPATH REPORT: NORMAL
CREAT SERPL-MCNC: 1.17 MG/DL (ref 0.66–1.25)
ERYTHROCYTE [DISTWIDTH] IN BLOOD BY AUTOMATED COUNT: 15.6 % (ref 10–15)
GFR SERPL CREATININE-BSD FRML MDRD: 60 ML/MIN/{1.73_M2}
GLUCOSE SERPL-MCNC: 121 MG/DL (ref 70–99)
HCT VFR BLD AUTO: 36.4 % (ref 40–53)
HGB BLD-MCNC: 11.9 G/DL (ref 13.3–17.7)
MCH RBC QN AUTO: 33.3 PG (ref 26.5–33)
MCHC RBC AUTO-ENTMCNC: 32.7 G/DL (ref 31.5–36.5)
MCV RBC AUTO: 102 FL (ref 78–100)
PLATELET # BLD AUTO: 216 10E9/L (ref 150–450)
POTASSIUM SERPL-SCNC: 3.7 MMOL/L (ref 3.4–5.3)
RBC # BLD AUTO: 3.57 10E12/L (ref 4.4–5.9)
SODIUM SERPL-SCNC: 139 MMOL/L (ref 133–144)
WBC # BLD AUTO: 7.1 10E9/L (ref 4–11)

## 2019-04-23 PROCEDURE — 25000132 ZZH RX MED GY IP 250 OP 250 PS 637: Performed by: UROLOGY

## 2019-04-23 PROCEDURE — 80048 BASIC METABOLIC PNL TOTAL CA: CPT | Performed by: UROLOGY

## 2019-04-23 PROCEDURE — 25800030 ZZH RX IP 258 OP 636: Performed by: UROLOGY

## 2019-04-23 PROCEDURE — 85027 COMPLETE CBC AUTOMATED: CPT | Performed by: UROLOGY

## 2019-04-23 PROCEDURE — 36415 COLL VENOUS BLD VENIPUNCTURE: CPT | Performed by: UROLOGY

## 2019-04-23 RX ADMIN — METOPROLOL SUCCINATE 50 MG: 25 TABLET, EXTENDED RELEASE ORAL at 08:04

## 2019-04-23 RX ADMIN — HYDRALAZINE HYDROCHLORIDE 10 MG: 10 TABLET ORAL at 08:05

## 2019-04-23 RX ADMIN — FUROSEMIDE 20 MG: 20 TABLET ORAL at 08:04

## 2019-04-23 RX ADMIN — PANTOPRAZOLE SODIUM 40 MG: 40 TABLET, DELAYED RELEASE ORAL at 08:04

## 2019-04-23 RX ADMIN — SODIUM CHLORIDE: 9 INJECTION, SOLUTION INTRAVENOUS at 03:30

## 2019-04-23 RX ADMIN — VENLAFAXINE 75 MG: 75 TABLET ORAL at 08:04

## 2019-04-23 RX ADMIN — ACETAMINOPHEN 975 MG: 325 TABLET, FILM COATED ORAL at 05:41

## 2019-04-23 NOTE — DISCHARGE SUMMARY
[facility] Discharge Summary: Urology    Gibson Villalta MRN# [id number]   Age: 78 year old YOB: 1940     Date of Admission:  4/22/2019  Date of Discharge::  4/23/2019  Admitting Physician:  Ryan Camarillo MD  Discharge Physician:  Camille Koo PA-C, EDELMIRA           Admission Diagnoses:      Bladder stone  BPH with obstruction/lower urinary tract symptoms          Discharge Diagnosis:   Same          Procedures:   Cystoscopy with laser cystolithalopaxy and TURP          Medications Prior to Admission:     Medications Prior to Admission   Medication Sig Dispense Refill Last Dose     ALLOPURINOL PO Take 300 mg by mouth every evening   4/21/2019 at PM     atorvastatin (LIPITOR) 40 MG tablet TAKE 1 TABLET AT BEDTIME (Patient taking differently: TAKE 1 TABLET (40 MG)  BY MOUTH AT BEDTIME) 90 tablet 3 4/21/2019 at 2100     COENZYME Q-10 PO Take 100 mg by mouth every morning    4/21/2019 at AM     ferrous sulfate (IRON) 325 (65 Fe) MG tablet Take 325 mg by mouth daily   4/21/2019 at AM     furosemide (LASIX) 20 MG tablet TAKE ONE TABLET BY MOUTH ONE TIME DAILY  90 tablet 1 4/21/2019 at AM     hydrALAZINE (APRESOLINE) 10 MG tablet Take 10 mg by mouth 3 times daily    4/22/2019 at 0630     metoprolol succinate (TOPROL-XL) 50 MG 24 hr tablet TAKE 1 TABLET (50 MG) DAILY (Patient taking differently: TAKE 1 TABLET (50 MG) BY MOUTH DAILY) 90 tablet 3 4/21/2019 at AM     multivitamin, therapeutic with minerals (MULTI-VITAMIN) TABS Take 1 tablet by mouth every morning    4/21/2019 at AM     pantoprazole (PROTONIX) 40 MG EC tablet Take 1 tablet (40 mg) by mouth daily Take 30-60 minutes before a meal. 30 tablet 0 4/21/2019 at AM     venlafaxine (EFFEXOR) 75 MG tablet TAKE 1 TABLET TWICE DAILY (Patient taking differently: TAKE 1 TABLET (75 MG) BY MOUTH TWICE DAILY) 180 tablet 1 4/21/2019 at 2100     vitamin D2 (ERGOCALCIFEROL) 83895 units (1250 mcg) capsule TAKE 1 CAPSULE (50,000 UNIT) BY MOUTH ONCE A  WEEK ON TUESDAY 12 capsule 1 4/16/2019 at PM     ACE/ARB NOT PRESCRIBED, INTENTIONAL, ACE & ARB not prescribed due to Symptomatic hypotension not due to excessive diuresis   Taking     ASPIRIN NOT PRESCRIBED (INTENTIONAL) Please choose reason not prescribed, below 0 each 0 Taking             Discharge Medications:     Current Discharge Medication List      CONTINUE these medications which have NOT CHANGED    Details   ALLOPURINOL PO Take 300 mg by mouth every evening      atorvastatin (LIPITOR) 40 MG tablet TAKE 1 TABLET AT BEDTIME  Qty: 90 tablet, Refills: 3    Associated Diagnoses: Hyperlipidemia LDL goal <130      COENZYME Q-10 PO Take 100 mg by mouth every morning       ferrous sulfate (IRON) 325 (65 Fe) MG tablet Take 325 mg by mouth daily      furosemide (LASIX) 20 MG tablet TAKE ONE TABLET BY MOUTH ONE TIME DAILY   Qty: 90 tablet, Refills: 1    Associated Diagnoses: Acute on chronic systolic heart failure (H)      hydrALAZINE (APRESOLINE) 10 MG tablet Take 10 mg by mouth 3 times daily       metoprolol succinate (TOPROL-XL) 50 MG 24 hr tablet TAKE 1 TABLET (50 MG) DAILY  Qty: 90 tablet, Refills: 3    Associated Diagnoses: Cardiomyopathy (H)      multivitamin, therapeutic with minerals (MULTI-VITAMIN) TABS Take 1 tablet by mouth every morning       pantoprazole (PROTONIX) 40 MG EC tablet Take 1 tablet (40 mg) by mouth daily Take 30-60 minutes before a meal.  Qty: 30 tablet, Refills: 0    Associated Diagnoses: Gastroesophageal reflux disease with esophagitis      venlafaxine (EFFEXOR) 75 MG tablet TAKE 1 TABLET TWICE DAILY  Qty: 180 tablet, Refills: 1    Associated Diagnoses: Anxiety and depression      vitamin D2 (ERGOCALCIFEROL) 52089 units (1250 mcg) capsule TAKE 1 CAPSULE (50,000 UNIT) BY MOUTH ONCE A WEEK ON TUESDAY  Qty: 12 capsule, Refills: 1    Associated Diagnoses: Vitamin D deficiency      ACE/ARB NOT PRESCRIBED, INTENTIONAL, ACE & ARB not prescribed due to Symptomatic hypotension not due to excessive  diuresis    Associated Diagnoses: HFrEF (heart failure with reduced ejection fraction) (H)      ASPIRIN NOT PRESCRIBED (INTENTIONAL) Please choose reason not prescribed, below  Qty: 0 each, Refills: 0    Associated Diagnoses: Controlled type 2 diabetes mellitus with chronic kidney disease, without long-term current use of insulin, unspecified CKD stage (H)                   Consultations:     None          Hospital Course:     The patient underwent the above procedure and tolerated this well. Uncomplicated post operative course. Had adequate pain control, ambulating, voiding and tolerating regular diet at the time of discharge.            Discharge Instructions and Follow-Up:   Discharge diet: Regular   Discharge activity: No lifting >10lbs or strenuous exercise for 4 week(s)   Discharge follow-up: Dr. Camarillo                Discharge Disposition:   Discharged to home        Camille Koo PA-C  [facility]

## 2019-04-23 NOTE — PLAN OF CARE
Alert, disoriented to time, denies pain. VSS, weaned to RA. Desats w/ activity but quickly returns to WNL. Continuous bladder irrigation,running moderate, light pink to peach output, no clots noted. BS active, no flatus. Denies N/V, full liquid diet. Ambulated hallway x1. Plan to wean CBI tonight, probably discharge tomorrow.

## 2019-04-23 NOTE — PROGRESS NOTES
Renner removed. Patient voiding without difficulty. PVR 12cc. Adequate for discharge. Paperwork reviewed with patient and spouse. Questions answered. No new medication ordered or sent home with patient. All belongings with patient and spouse at time of departure. Patient and spouse aware of follow up appointment with urology. Contact information given. Spouse to transport patient home.

## 2019-04-23 NOTE — PLAN OF CARE
Pt alert, disoriented to time, forgetful. Denies pain. CBI running at slow rate, clamped at 0530. Full liquid diet. BS active x4. Possible discharge today pending ability to wean CBI/TOV. Will continue to monitor.

## 2019-04-23 NOTE — PROGRESS NOTES
[facility] Progress Note          Assessment and Plan:   Active Problems:    BPH with obstruction/lower urinary tract symptoms    Assessment: POD 1, Cystoscopy with laser cystolithalopaxy and TURP        Plan: Discontinue starkey, Check bladder scan after first void and call if >200cc  Discharge instructions reviewed      Camille Koo PA-C  [facility]  [phone]               Interval History:   doing well; no cp, sob, n/v/d, or abd pain. Starkey clear, CBI stopped.               Review of Systems:   The 5 point Review of Systems is negative other than noted in the HPI             Medications:     Current Facility-Administered Medications Ordered in Epic   Medication Dose Route Frequency Last Rate Last Dose     acetaminophen (TYLENOL) tablet 975 mg  975 mg Oral Q6H   975 mg at 04/23/19 0541     allopurinol (ZYLOPRIM) tablet 300 mg  300 mg Oral QPM   300 mg at 04/22/19 2045     atorvastatin (LIPITOR) tablet 40 mg  40 mg Oral At Bedtime   40 mg at 04/22/19 2233     furosemide (LASIX) tablet 20 mg  20 mg Oral Daily   20 mg at 04/23/19 0804     hydrALAZINE (APRESOLINE) tablet 10 mg  10 mg Oral TID   10 mg at 04/23/19 0805     HYDROmorphone (PF) (DILAUDID) injection 0.2 mg  0.2 mg Intravenous Q2H PRN         lidocaine (LMX4) cream   Topical Q1H PRN         lidocaine 1 % 0.1-1 mL  0.1-1 mL Other Q1H PRN         metoprolol succinate ER (TOPROL-XL) 24 hr tablet 50 mg  50 mg Oral Daily   50 mg at 04/23/19 0804     naloxone (NARCAN) injection 0.1-0.4 mg  0.1-0.4 mg Intravenous Q2 Min PRN         ondansetron (ZOFRAN-ODT) ODT tab 4 mg  4 mg Oral Q6H PRN        Or     ondansetron (ZOFRAN) injection 4 mg  4 mg Intravenous Q6H PRN         oxyCODONE (ROXICODONE) tablet 5 mg  5 mg Oral Q4H PRN         pantoprazole (PROTONIX) EC tablet 40 mg  40 mg Oral Daily   40 mg at 04/23/19 0804     sodium chloride (PF) 0.9% PF flush 3 mL  3 mL Intracatheter q1 min prn         sodium chloride (PF) 0.9% PF flush 3  mL  3 mL Intracatheter Q8H   3 mL at 04/23/19 0816     sodium chloride 0.9% (bag) irrigation   Irrigation Continuous   6,000 mL at 04/22/19 2235     sodium chloride 0.9% infusion   Intravenous Continuous   Stopped at 04/23/19 0816     venlafaxine (EFFEXOR) tablet 75 mg  75 mg Oral BID   75 mg at 04/23/19 0804     No current [facility]-ordered outpatient medications on file.                  Physical Exam:   Vitals were reviewed  Patient Vitals for the past 8 hrs:   BP Temp Temp src Pulse Resp SpO2   04/23/19 0735 123/77 96.3  F (35.7  C) Oral 73 18 96 %   04/23/19 0543 -- -- -- -- -- 97 %   04/23/19 0127 118/70 96.8  F (36  C) Oral 64 17 95 %     GEN: NAD, lying in bed  EYES: EOMI  MOUTH: MMM  NECK: Supple  RESP: Unlabored breathing  CARDIAC: No LE edema  SKIN: Warm  ABD: soft  NEURO: AAO   : Clear in tubing        Data:     Lab Results   Component Value Date    NTBNPI 10,055 (H) 07/16/2016     Lab Results   Component Value Date    WBC 7.1 04/23/2019    WBC 6.1 04/04/2019    WBC 9.4 (A) 01/29/2019    HGB 11.9 (L) 04/23/2019    HGB 12.4 (L) 04/04/2019    HGB 11 (A) 01/29/2019    HCT 36.4 (L) 04/23/2019    HCT 39.0 (L) 04/04/2019    HCT 33.9 (A) 01/29/2019     (H) 04/23/2019     (H) 04/04/2019    MCV 94.7 01/29/2019     04/23/2019     04/04/2019     01/29/2019     Lab Results   Component Value Date    INR 1.06 11/27/2018    INR 1.21 (H) 07/31/2018    INR 3.56 (H) 07/30/2018

## 2019-04-25 ENCOUNTER — MYC MEDICAL ADVICE (OUTPATIENT)
Dept: FAMILY MEDICINE | Facility: CLINIC | Age: 79
End: 2019-04-25

## 2019-04-25 DIAGNOSIS — E78.5 HYPERLIPIDEMIA LDL GOAL <100: Primary | ICD-10-CM

## 2019-04-25 DIAGNOSIS — E78.5 HYPERLIPIDEMIA LDL GOAL <130: ICD-10-CM

## 2019-04-26 LAB
APPEARANCE STONE: NORMAL
COMPN STONE: NORMAL
NUMBER STONE: NORMAL
SIZE STONE: NORMAL MM
WT STONE: 113 MG

## 2019-04-26 RX ORDER — ATORVASTATIN CALCIUM 40 MG/1
40 TABLET, FILM COATED ORAL AT BEDTIME
Qty: 90 TABLET | Refills: 0 | Status: SHIPPED | OUTPATIENT
Start: 2019-04-26 | End: 2019-08-12

## 2019-04-26 NOTE — TELEPHONE ENCOUNTER
LDL Cholesterol Calculated   Date Value Ref Range Status   02/07/2017 93 <100 mg/dL Final     Comment:     Desirable:       <100 mg/dl     LDL Cholesterol Direct   Date Value Ref Range Status   04/04/2019 127 (H) <100 mg/dL Final     Comment:     Above desirable:  100-129 mg/dl  Borderline High:  130-159 mg/dL  High:             160-189 mg/dL  Very high:       >189 mg/dl       Routing refill request to provider for review/approval because:  A break in medication - per below MyChart pt stopped taking for a while for no identified reason     Symone MONTILLA RN

## 2019-06-17 ENCOUNTER — ANCILLARY PROCEDURE (OUTPATIENT)
Dept: CARDIOLOGY | Facility: CLINIC | Age: 79
End: 2019-06-17
Attending: INTERNAL MEDICINE
Payer: COMMERCIAL

## 2019-06-17 DIAGNOSIS — I25.5 ISCHEMIC CARDIOMYOPATHY: Primary | ICD-10-CM

## 2019-06-17 DIAGNOSIS — Z95.810 ICD (IMPLANTABLE CARDIOVERTER-DEFIBRILLATOR) IN PLACE: ICD-10-CM

## 2019-06-17 LAB
MDC_IDC_EPISODE_DTM: NORMAL
MDC_IDC_EPISODE_DURATION: 10 S
MDC_IDC_EPISODE_DURATION: 15 S
MDC_IDC_EPISODE_DURATION: 17 S
MDC_IDC_EPISODE_DURATION: 5 S
MDC_IDC_EPISODE_ID: 22
MDC_IDC_EPISODE_ID: 23
MDC_IDC_EPISODE_ID: 24
MDC_IDC_EPISODE_ID: 25
MDC_IDC_EPISODE_TYPE: NORMAL
MDC_IDC_LEAD_IMPLANT_DT: NORMAL
MDC_IDC_LEAD_LOCATION: NORMAL
MDC_IDC_LEAD_LOCATION_DETAIL_1: NORMAL
MDC_IDC_LEAD_MFG: NORMAL
MDC_IDC_LEAD_MODEL: NORMAL
MDC_IDC_LEAD_POLARITY_TYPE: NORMAL
MDC_IDC_LEAD_SERIAL: NORMAL
MDC_IDC_MSMT_BATTERY_DTM: NORMAL
MDC_IDC_MSMT_BATTERY_REMAINING_LONGEVITY: 63 MO
MDC_IDC_MSMT_BATTERY_RRT_TRIGGER: 2.73
MDC_IDC_MSMT_BATTERY_STATUS: NORMAL
MDC_IDC_MSMT_BATTERY_VOLTAGE: 2.97 V
MDC_IDC_MSMT_CAP_CHARGE_DTM: NORMAL
MDC_IDC_MSMT_CAP_CHARGE_ENERGY: 18 J
MDC_IDC_MSMT_CAP_CHARGE_TIME: 3.86
MDC_IDC_MSMT_CAP_CHARGE_TYPE: NORMAL
MDC_IDC_MSMT_LEADCHNL_LV_IMPEDANCE_VALUE: 147.1
MDC_IDC_MSMT_LEADCHNL_LV_IMPEDANCE_VALUE: 155.46
MDC_IDC_MSMT_LEADCHNL_LV_IMPEDANCE_VALUE: 155.46
MDC_IDC_MSMT_LEADCHNL_LV_IMPEDANCE_VALUE: 160.94
MDC_IDC_MSMT_LEADCHNL_LV_IMPEDANCE_VALUE: 171 OHM
MDC_IDC_MSMT_LEADCHNL_LV_IMPEDANCE_VALUE: 285 OHM
MDC_IDC_MSMT_LEADCHNL_LV_IMPEDANCE_VALUE: 304 OHM
MDC_IDC_MSMT_LEADCHNL_LV_IMPEDANCE_VALUE: 342 OHM
MDC_IDC_MSMT_LEADCHNL_LV_IMPEDANCE_VALUE: 342 OHM
MDC_IDC_MSMT_LEADCHNL_LV_IMPEDANCE_VALUE: 361 OHM
MDC_IDC_MSMT_LEADCHNL_LV_IMPEDANCE_VALUE: 475 OHM
MDC_IDC_MSMT_LEADCHNL_LV_IMPEDANCE_VALUE: 475 OHM
MDC_IDC_MSMT_LEADCHNL_LV_IMPEDANCE_VALUE: 513 OHM
MDC_IDC_MSMT_LEADCHNL_LV_IMPEDANCE_VALUE: 532 OHM
MDC_IDC_MSMT_LEADCHNL_LV_IMPEDANCE_VALUE: 532 OHM
MDC_IDC_MSMT_LEADCHNL_LV_PACING_THRESHOLD_AMPLITUDE: 0.88 V
MDC_IDC_MSMT_LEADCHNL_LV_PACING_THRESHOLD_PULSEWIDTH: 0.5 MS
MDC_IDC_MSMT_LEADCHNL_RA_IMPEDANCE_VALUE: 361 OHM
MDC_IDC_MSMT_LEADCHNL_RA_PACING_THRESHOLD_AMPLITUDE: 0.62 V
MDC_IDC_MSMT_LEADCHNL_RA_PACING_THRESHOLD_PULSEWIDTH: 0.4 MS
MDC_IDC_MSMT_LEADCHNL_RA_SENSING_INTR_AMPL: 2.5 MV
MDC_IDC_MSMT_LEADCHNL_RA_SENSING_INTR_AMPL: 2.5 MV
MDC_IDC_MSMT_LEADCHNL_RV_IMPEDANCE_VALUE: 456 OHM
MDC_IDC_MSMT_LEADCHNL_RV_IMPEDANCE_VALUE: 532 OHM
MDC_IDC_MSMT_LEADCHNL_RV_PACING_THRESHOLD_AMPLITUDE: 0.5 V
MDC_IDC_MSMT_LEADCHNL_RV_PACING_THRESHOLD_PULSEWIDTH: 0.4 MS
MDC_IDC_MSMT_LEADCHNL_RV_SENSING_INTR_AMPL: 10 MV
MDC_IDC_MSMT_LEADCHNL_RV_SENSING_INTR_AMPL: 10 MV
MDC_IDC_PG_IMPLANT_DTM: NORMAL
MDC_IDC_PG_MFG: NORMAL
MDC_IDC_PG_MODEL: NORMAL
MDC_IDC_PG_SERIAL: NORMAL
MDC_IDC_PG_TYPE: NORMAL
MDC_IDC_SESS_CLINIC_NAME: NORMAL
MDC_IDC_SESS_DTM: NORMAL
MDC_IDC_SESS_TYPE: NORMAL
MDC_IDC_SET_BRADY_AT_MODE_SWITCH_RATE: 171 {BEATS}/MIN
MDC_IDC_SET_BRADY_LOWRATE: 50 {BEATS}/MIN
MDC_IDC_SET_BRADY_MAX_SENSOR_RATE: 130 {BEATS}/MIN
MDC_IDC_SET_BRADY_MAX_TRACKING_RATE: 130 {BEATS}/MIN
MDC_IDC_SET_BRADY_MODE: NORMAL
MDC_IDC_SET_BRADY_PAV_DELAY_LOW: 170 MS
MDC_IDC_SET_BRADY_SAV_DELAY_LOW: 130 MS
MDC_IDC_SET_CRT_LVRV_DELAY: 0 MS
MDC_IDC_SET_CRT_PACED_CHAMBERS: NORMAL
MDC_IDC_SET_LEADCHNL_LV_PACING_AMPLITUDE: 1.5 V
MDC_IDC_SET_LEADCHNL_LV_PACING_ANODE_ELECTRODE_1: NORMAL
MDC_IDC_SET_LEADCHNL_LV_PACING_ANODE_LOCATION_1: NORMAL
MDC_IDC_SET_LEADCHNL_LV_PACING_CAPTURE_MODE: NORMAL
MDC_IDC_SET_LEADCHNL_LV_PACING_CATHODE_ELECTRODE_1: NORMAL
MDC_IDC_SET_LEADCHNL_LV_PACING_CATHODE_LOCATION_1: NORMAL
MDC_IDC_SET_LEADCHNL_LV_PACING_POLARITY: NORMAL
MDC_IDC_SET_LEADCHNL_LV_PACING_PULSEWIDTH: 0.5 MS
MDC_IDC_SET_LEADCHNL_RA_PACING_AMPLITUDE: 1.5 V
MDC_IDC_SET_LEADCHNL_RA_PACING_ANODE_ELECTRODE_1: NORMAL
MDC_IDC_SET_LEADCHNL_RA_PACING_ANODE_LOCATION_1: NORMAL
MDC_IDC_SET_LEADCHNL_RA_PACING_CAPTURE_MODE: NORMAL
MDC_IDC_SET_LEADCHNL_RA_PACING_CATHODE_ELECTRODE_1: NORMAL
MDC_IDC_SET_LEADCHNL_RA_PACING_CATHODE_LOCATION_1: NORMAL
MDC_IDC_SET_LEADCHNL_RA_PACING_POLARITY: NORMAL
MDC_IDC_SET_LEADCHNL_RA_PACING_PULSEWIDTH: 0.4 MS
MDC_IDC_SET_LEADCHNL_RA_SENSING_ANODE_ELECTRODE_1: NORMAL
MDC_IDC_SET_LEADCHNL_RA_SENSING_ANODE_LOCATION_1: NORMAL
MDC_IDC_SET_LEADCHNL_RA_SENSING_CATHODE_ELECTRODE_1: NORMAL
MDC_IDC_SET_LEADCHNL_RA_SENSING_CATHODE_LOCATION_1: NORMAL
MDC_IDC_SET_LEADCHNL_RA_SENSING_POLARITY: NORMAL
MDC_IDC_SET_LEADCHNL_RA_SENSING_SENSITIVITY: 0.3 MV
MDC_IDC_SET_LEADCHNL_RV_PACING_AMPLITUDE: 2 V
MDC_IDC_SET_LEADCHNL_RV_PACING_ANODE_ELECTRODE_1: NORMAL
MDC_IDC_SET_LEADCHNL_RV_PACING_ANODE_LOCATION_1: NORMAL
MDC_IDC_SET_LEADCHNL_RV_PACING_CAPTURE_MODE: NORMAL
MDC_IDC_SET_LEADCHNL_RV_PACING_CATHODE_ELECTRODE_1: NORMAL
MDC_IDC_SET_LEADCHNL_RV_PACING_CATHODE_LOCATION_1: NORMAL
MDC_IDC_SET_LEADCHNL_RV_PACING_POLARITY: NORMAL
MDC_IDC_SET_LEADCHNL_RV_PACING_PULSEWIDTH: 0.4 MS
MDC_IDC_SET_LEADCHNL_RV_SENSING_ANODE_ELECTRODE_1: NORMAL
MDC_IDC_SET_LEADCHNL_RV_SENSING_ANODE_LOCATION_1: NORMAL
MDC_IDC_SET_LEADCHNL_RV_SENSING_CATHODE_ELECTRODE_1: NORMAL
MDC_IDC_SET_LEADCHNL_RV_SENSING_CATHODE_LOCATION_1: NORMAL
MDC_IDC_SET_LEADCHNL_RV_SENSING_POLARITY: NORMAL
MDC_IDC_SET_LEADCHNL_RV_SENSING_SENSITIVITY: 0.3 MV
MDC_IDC_SET_ZONE_DETECTION_BEATS_DENOMINATOR: 40 {BEATS}
MDC_IDC_SET_ZONE_DETECTION_BEATS_NUMERATOR: 30 {BEATS}
MDC_IDC_SET_ZONE_DETECTION_INTERVAL: 300 MS
MDC_IDC_SET_ZONE_DETECTION_INTERVAL: 350 MS
MDC_IDC_SET_ZONE_DETECTION_INTERVAL: 360 MS
MDC_IDC_SET_ZONE_DETECTION_INTERVAL: 360 MS
MDC_IDC_SET_ZONE_DETECTION_INTERVAL: NORMAL
MDC_IDC_SET_ZONE_TYPE: NORMAL
MDC_IDC_STAT_AT_BURDEN_PERCENT: 0 %
MDC_IDC_STAT_AT_DTM_END: NORMAL
MDC_IDC_STAT_AT_DTM_START: NORMAL
MDC_IDC_STAT_BRADY_AP_VP_PERCENT: 1.23 %
MDC_IDC_STAT_BRADY_AP_VS_PERCENT: 0.03 %
MDC_IDC_STAT_BRADY_AS_VP_PERCENT: 93.93 %
MDC_IDC_STAT_BRADY_AS_VS_PERCENT: 4.81 %
MDC_IDC_STAT_BRADY_DTM_END: NORMAL
MDC_IDC_STAT_BRADY_DTM_START: NORMAL
MDC_IDC_STAT_BRADY_RA_PERCENT_PACED: 1.25 %
MDC_IDC_STAT_BRADY_RV_PERCENT_PACED: 94.25 %
MDC_IDC_STAT_CRT_DTM_END: NORMAL
MDC_IDC_STAT_CRT_DTM_START: NORMAL
MDC_IDC_STAT_CRT_LV_PERCENT_PACED: 94.16 %
MDC_IDC_STAT_CRT_PERCENT_PACED: 94.16 %
MDC_IDC_STAT_EPISODE_RECENT_COUNT: 0
MDC_IDC_STAT_EPISODE_RECENT_COUNT_DTM_END: NORMAL
MDC_IDC_STAT_EPISODE_RECENT_COUNT_DTM_START: NORMAL
MDC_IDC_STAT_EPISODE_TOTAL_COUNT: 0
MDC_IDC_STAT_EPISODE_TOTAL_COUNT: 1
MDC_IDC_STAT_EPISODE_TOTAL_COUNT_DTM_END: NORMAL
MDC_IDC_STAT_EPISODE_TOTAL_COUNT_DTM_START: NORMAL
MDC_IDC_STAT_EPISODE_TYPE: NORMAL
MDC_IDC_STAT_TACHYTHERAPY_ATP_DELIVERED_RECENT: 0
MDC_IDC_STAT_TACHYTHERAPY_ATP_DELIVERED_TOTAL: 0
MDC_IDC_STAT_TACHYTHERAPY_RECENT_DTM_END: NORMAL
MDC_IDC_STAT_TACHYTHERAPY_RECENT_DTM_START: NORMAL
MDC_IDC_STAT_TACHYTHERAPY_SHOCKS_ABORTED_RECENT: 0
MDC_IDC_STAT_TACHYTHERAPY_SHOCKS_ABORTED_TOTAL: 0
MDC_IDC_STAT_TACHYTHERAPY_SHOCKS_DELIVERED_RECENT: 0
MDC_IDC_STAT_TACHYTHERAPY_SHOCKS_DELIVERED_TOTAL: 0
MDC_IDC_STAT_TACHYTHERAPY_TOTAL_DTM_END: NORMAL
MDC_IDC_STAT_TACHYTHERAPY_TOTAL_DTM_START: NORMAL

## 2019-06-17 PROCEDURE — 93295 DEV INTERROG REMOTE 1/2/MLT: CPT | Performed by: INTERNAL MEDICINE

## 2019-06-17 PROCEDURE — 93296 REM INTERROG EVL PM/IDS: CPT | Performed by: INTERNAL MEDICINE

## 2019-07-01 DIAGNOSIS — I42.9 CARDIOMYOPATHY, UNSPECIFIED TYPE (H): Primary | ICD-10-CM

## 2019-07-01 RX ORDER — HYDRALAZINE HYDROCHLORIDE 10 MG/1
10 TABLET, FILM COATED ORAL 3 TIMES DAILY
Qty: 270 TABLET | Refills: 0 | Status: SHIPPED | OUTPATIENT
Start: 2019-07-01 | End: 2019-09-27

## 2019-07-02 ENCOUNTER — MEDICAL CORRESPONDENCE (OUTPATIENT)
Dept: HEALTH INFORMATION MANAGEMENT | Facility: CLINIC | Age: 79
End: 2019-07-02

## 2019-07-02 ENCOUNTER — TRANSFERRED RECORDS (OUTPATIENT)
Dept: HEALTH INFORMATION MANAGEMENT | Facility: CLINIC | Age: 79
End: 2019-07-02

## 2019-07-18 ENCOUNTER — OFFICE VISIT (OUTPATIENT)
Dept: CARDIOLOGY | Facility: CLINIC | Age: 79
End: 2019-07-18
Attending: INTERNAL MEDICINE
Payer: COMMERCIAL

## 2019-07-18 VITALS
WEIGHT: 211 LBS | HEART RATE: 58 BPM | OXYGEN SATURATION: 95 % | DIASTOLIC BLOOD PRESSURE: 72 MMHG | SYSTOLIC BLOOD PRESSURE: 108 MMHG | BODY MASS INDEX: 27.96 KG/M2 | HEIGHT: 73 IN

## 2019-07-18 DIAGNOSIS — Z95.810 ICD (IMPLANTABLE CARDIOVERTER-DEFIBRILLATOR) IN PLACE: ICD-10-CM

## 2019-07-18 DIAGNOSIS — I42.8 NONISCHEMIC CARDIOMYOPATHY (H): ICD-10-CM

## 2019-07-18 PROCEDURE — 99214 OFFICE O/P EST MOD 30 MIN: CPT | Mod: 24 | Performed by: INTERNAL MEDICINE

## 2019-07-18 ASSESSMENT — MIFFLIN-ST. JEOR: SCORE: 1735.97

## 2019-07-18 NOTE — LETTER
7/18/2019    Akira Currie MD  [address] [facility] [address]  [facility] [address]    RE: Gibson Villalta       Dear Colleague,    I had the pleasure of seeing Gibson Villalta in the [facility].    Electrophysiology/ Clinic Note         H&P and Plan:     REASON FOR VISIT:  Evaluation of cardiomyopathy and biventricular ICD.      HISTORY OF PRESENT ILLNESS:  Mr. 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      He continues to do well and has no complaints during this visit.  He denies any symptoms such as chest pain, lightheadedness, near-syncope or syncopal episode.     He was previously on anticoagulation due to LV clot.  LV clot resolved and he had issues with GI bleed and anticoagulation had to be discontinued.     An echocardiogram was obtained in 03/2019 and showed EF of 25-30% and mild MR and AI.    Device was interrogated on 06/2019.  He is biventricular paced 94% of the time. Lead parameters are stable.     ASSESSMENT AND PLAN:   1.  Heart failure secondary to mixed cardiomyopathy.  He is euvolemic on physical exam.  Continue current medical therapy with Lipitor, Lasix, hydralazine, Imdur, metoprolol and Coumadin.   2.  Device care.  Continue device checks 3 months.   3.  Previous LV clot resolved.     Anticoagulation was discontinued due to history of anemia and GI bleed.  I recommend him to discuss plans of anticoagulation with the oncology and GI team.      If the issues related to anemia and GI bleeding are resolved and the team believes it is safe to resume anticoagulation, I favor restarting Coumadin.   "    4.  Hypertension.  Blood pressure is well controlled.   5.  Followup care.  He will follow up with and Cristiano in an year or earlier as needed.     Josias Hernandez MD    Physical Exam:  Vitals: /72   Pulse 58   Ht 1.854 m (6' 1\")   Wt 95.7 kg (211 lb)   BMI 27.84 kg/m       Constitutional:  AAO x3.  Pt is in NAD.  HEAD: normocephalic.  SKIN: Skin normal color, texture and turgor with no lesions or eruptions.  Eyes: PERRL, EOMI.  ENT:  Supple, normal JVP. No lymphadenopathy or thyroid enlargement.  Chest:  CTAB.  Cardiac:  RRR, normal  S1 and S2.  No murmurs rubs or gallop.   Abdomen:  Normal BS.  Soft, non-tender and non-distended.  No rebound or guarding.    Extremities:  Pedious pulses palpable B/L.  No LE edema noticed.   Neurological: Strength and sensation grossly symmetric and intact throughout.       CURRENT MEDICATIONS:  Current Outpatient Medications   Medication Sig Dispense Refill     atorvastatin (LIPITOR) 40 MG tablet Take 1 tablet (40 mg) by mouth At Bedtime Schedule a laboratory-only visit at last week of July to recheck your cholesterol. 90 tablet 0     COENZYME Q-10 PO Take 100 mg by mouth every morning        ferrous sulfate (IRON) 325 (65 Fe) MG tablet Take 325 mg by mouth daily       furosemide (LASIX) 20 MG tablet TAKE ONE TABLET BY MOUTH ONE TIME DAILY  90 tablet 1     hydrALAZINE (APRESOLINE) 10 MG tablet Take 1 tablet (10 mg) by mouth 3 times daily 270 tablet 0     metoprolol succinate (TOPROL-XL) 50 MG 24 hr tablet TAKE 1 TABLET (50 MG) DAILY (Patient taking differently: TAKE 1 TABLET (50 MG) BY MOUTH DAILY) 90 tablet 3     multivitamin, therapeutic with minerals (MULTI-VITAMIN) TABS Take 1 tablet by mouth every morning        pantoprazole (PROTONIX) 40 MG EC tablet Take 1 tablet (40 mg) by mouth daily Take 30-60 minutes before a meal. 30 tablet 0     venlafaxine (EFFEXOR) 75 MG tablet TAKE 1 TABLET TWICE DAILY (Patient taking differently: TAKE 1 TABLET (75 MG) BY MOUTH TWICE DAILY) " 180 tablet 1     vitamin D2 (ERGOCALCIFEROL) 68832 units (1250 mcg) capsule TAKE 1 CAPSULE (50,000 UNIT) BY MOUTH ONCE A WEEK ON TUESDAY 12 capsule 1     ACE/ARB NOT PRESCRIBED, INTENTIONAL, ACE & ARB not prescribed due to Symptomatic hypotension not due to excessive diuresis (Patient not taking: Reported on 7/18/2019)       ALLOPURINOL PO Take 300 mg by mouth every evening       ASPIRIN NOT PRESCRIBED (INTENTIONAL) Please choose reason not prescribed, below (Patient not taking: Reported on 7/18/2019) 0 each 0       ALLERGIES     Allergies   Allergen Reactions     Wasps [Hornets]      Sand wasp --- years ago.         PAST MEDICAL HISTORY:  Past Medical History:   Diagnosis Date     Acute kidney injury (H) 2012     Anemia      Anxiety      Bladder mass      BPH (benign prostatic hypertrophy)      Cardiomyopathy (H)     ICD implanted 11/2016     Carpal tunnel syndrome     Right     Chronic kidney disease (CKD), stage 3 (moderate)      Dementia      Depressive disorder      Diabetes (H)      Diffuse large B-cell lymphoma of extranodal site (H) 11/23/2018     Gastric mass      Gout      History of depression      LBBB (left bundle branch block) 2013     Lumbar herniated disc     L5-S1     Mixed cardiomyopathy 7/22/2016     Myocardial infarction (H) 1995 and 2010     Nonischemic cardiomyopathy (H) 7/22/2016     Obesity      Pacemaker     ICD/PM     Rosacea      Rotator cuff disorder     Tear x 2     RV (right ventricular) mural thrombus 7/22/2016       PAST SURGICAL HISTORY:  Past Surgical History:   Procedure Laterality Date     ANGIOPLASTY  1995 and 2010 with stent     BIOPSY      stomach     BONE MARROW BIOPSY, BONE SPECIMEN, NEEDLE/TROCAR N/A 10/30/2018    Procedure: BIOPSY BONE MARROW;  Surgeon: Jeb Romero MD;  Location:  GI     CARDIAC SURGERY      ICD/PM     CYSTOSCOPY, TRANSURETHRAL RESECTION (TUR) PROSTATE, COMBINED  4/22/2019    Procedure: CYSTOSCOPY AND BIPOLAR TRANSURETHRAL RESECTION (TUR)  PROSTATE;  Surgeon: Ryan Camarillo MD;  Location:  OR     CYSTOSCOPY, TRANSURETHRAL RESECTION (TUR) TUMOR BLADDER, COMBINED N/A 2018    Procedure: COMBINED CYSTOSCOPY, TRANSURETHRAL RESECTION (TUR) TUMOR BLADDER;  CYSTOSCOPY, TRANSURETHRAL RESECTION BLADDER TUMOR ;  Surgeon: Ryan Camarillo MD;  Location:  OR     CYSTOSCOPY, TRANSURETHRAL RESECTION (TUR) TUMOR BLADDER, COMBINED N/A 2018    Procedure: CYSTOSCOPY RESTAGING TRANSURETHRAL RESECTION BLADDER TUMOR;  Surgeon: Ryan Camarillo MD;  Location:  OR     ESOPHAGOSCOPY, GASTROSCOPY, DUODENOSCOPY (EGD), COMBINED N/A 2018    Procedure: COMBINED ESOPHAGOSCOPY, GASTROSCOPY, DUODENOSCOPY (EGD), BIOPSY SINGLE OR MULTIPLE;  gastroscopy;  Surgeon: Parish Xiong MD;  Location:  GI     ESOPHAGOSCOPY, GASTROSCOPY, DUODENOSCOPY (EGD), COMBINED N/A 2018    Procedure: COMBINED ESOPHAGOSCOPY, GASTROSCOPY, DUODENOSCOPY (EGD);  EGD;  Surgeon: Parish Xiong MD;  Location:  GI     ESOPHAGOSCOPY, GASTROSCOPY, DUODENOSCOPY (EGD), COMBINED N/A 2018    Procedure: COMBINED ESOPHAGOSCOPY, GASTROSCOPY, DUODENOSCOPY (EGD), BIOPSY SINGLE OR MULTIPLE;  gastroscopy BIOPSYSHOULD BE SENT TO LAB IN NS NOT FORMALIN PER ;  Surgeon: Jonathon Bush MD;  Location:  GI     GASTRIC BYPASS       HEART CATH LEFT HEART CATH  16    Non ischemic cardiomyopathy; Non functionally significant LAD and RCA disease      LASER HOLMIUM LITHOTRIPSY BLADDER N/A 2019    Procedure: CYSTOSCOPY, HOLMIUM LASER LITHOLAPAXY ,  AND BIPOLAR TRANSURETHRAL RESECTION (TUR) PROSTATE;  Surgeon: Ryan Camarillo MD;  Location:  OR     OTHER SURGICAL HISTORY      Spinal surgery     OTHER SURGICAL HISTORY  2016    CRT-D implantation       FAMILY HISTORY:  Family History   Problem Relation Age of Onset     Coronary Artery Disease Father 39     Alzheimer Disease Mother      Coronary Artery Disease Son         Two sons have  from MIs (ages 39 and 51)        SOCIAL HISTORY:  Social History     Socioeconomic History     Marital status:      Spouse name: None     Number of children: None     Years of education: None     Highest education level: None   Occupational History     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[employer]     Comment: [employer]; retired   Social Needs     Financial resource strain: None     Food insecurity:     Worry: None     Inability: None     Transportation needs:     Medical: None     Non-medical: None   Tobacco Use     Smoking status: Former Smoker     Packs/day: 2.00     Years: 20.00     Pack years: 40.00     Types: Cigarettes     Last attempt to quit: 1980     Years since quittin.5     Smokeless tobacco: Never Used     Tobacco comment: Quit in his 30s - 2 ppd for 20 years   Substance and Sexual Activity     Alcohol use: Yes     Alcohol/week: 0.0 oz     Comment: rarely     Drug use: No     Sexual activity: Not Currently     Partners: Female   Lifestyle     Physical activity:     Days per week: None     Minutes per session: None     Stress: None   Relationships     Social connections:     Talks on phone: None     Gets together: None     Attends Tenriism service: None     Active member of club or organization: None     Attends meetings of clubs or organizations: None     Relationship status: None     Intimate partner violence:     Fear of current or ex partner: None     Emotionally abused: None     Physically abused: None     Forced sexual activity: None   Other Topics Concern     Parent/sibling w/ CABG, MI or angioplasty before 65F 55M? No      Service Not Asked     Blood Transfusions Not Asked     Caffeine Concern Not Asked     Occupational Exposure Not Asked     Hobby Hazards Not Asked     Sleep Concern Not Asked     Stress Concern Not Asked     Weight Concern Not Asked     Special Diet Yes     Comment: low fat, low salt      Back Care Not Asked     Exercise No     Bike Helmet Not Asked     Seat Belt Not Asked     Self-Exams  Not Asked   Social History Narrative     None       Review of Systems:  Skin:        Eyes:       ENT:       Respiratory:       Cardiovascular:       Gastroenterology:      Genitourinary:       Musculoskeletal:       Neurologic:       Psychiatric:       Heme/Lymph/Imm:       Endocrine:           Recent Lab Results:  LIPID RESULTS:  Lab Results   Component Value Date    CHOL 176 02/07/2017    HDL 42 02/07/2017     (H) 04/04/2019    LDL 93 02/07/2017    TRIG 203 (H) 02/07/2017       LIVER ENZYME RESULTS:  Lab Results   Component Value Date    AST 24 04/04/2019    ALT 27 04/04/2019       CBC RESULTS:  Lab Results   Component Value Date    WBC 7.1 04/23/2019    RBC 3.57 (L) 04/23/2019    HGB 11.9 (L) 04/23/2019    HCT 36.4 (L) 04/23/2019     (H) 04/23/2019    MCH 33.3 (H) 04/23/2019    MCHC 32.7 04/23/2019    RDW 15.6 (H) 04/23/2019     04/23/2019       BMP RESULTS:  Lab Results   Component Value Date     04/23/2019    POTASSIUM 3.7 04/23/2019    CHLORIDE 106 04/23/2019    CO2 28 04/23/2019    ANIONGAP 5 04/23/2019     (H) 04/23/2019    BUN 12 04/23/2019    CR 1.17 04/23/2019    GFRESTIMATED 60 (L) 04/23/2019    GFRESTBLACK 69 04/23/2019    SHELLEY 8.7 04/23/2019        A1C RESULTS:  Lab Results   Component Value Date    A1C 6.8 (H) 04/04/2019       INR RESULTS:  Lab Results   Component Value Date    INR 1.06 11/27/2018    INR 1.21 (H) 07/31/2018         ECHOCARDIOGRAM  No results found for this or any previous visit (from the past 8760 hour(s)).      No orders of the defined types were placed in this encounter.    No orders of the defined types were placed in this encounter.    There are no discontinued medications.      Encounter Diagnoses   Name Primary?     ICD (implantable cardioverter-defibrillator) in place      Nonischemic cardiomyopathy (H)          ABIOLA Verduzco MD  [address]  [place] [id number]            Thank you for allowing me to participate in  the care of your patient.    Sincerely,     Josias Hernandez MD     [facility]

## 2019-07-18 NOTE — PROGRESS NOTES
"Electrophysiology/ Clinic Note         H&P and Plan:     REASON FOR VISIT:  Evaluation of cardiomyopathy and biventricular ICD.      HISTORY OF PRESENT ILLNESS:  Mr. 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      He continues to do well and has no complaints during this visit.  He denies any symptoms such as chest pain, lightheadedness, near-syncope or syncopal episode.     He was previously on anticoagulation due to LV clot.  LV clot resolved and he had issues with GI bleed and anticoagulation had to be discontinued.     An echocardiogram was obtained in 03/2019 and showed EF of 25-30% and mild MR and AI.    Device was interrogated on 06/2019.  He is biventricular paced 94% of the time. Lead parameters are stable.     ASSESSMENT AND PLAN:   1.  Heart failure secondary to mixed cardiomyopathy.  He is euvolemic on physical exam.  Continue current medical therapy with Lipitor, Lasix, hydralazine, Imdur, metoprolol and Coumadin.   2.  Device care.  Continue device checks 3 months.   3.  Previous LV clot resolved.    Anticoagulation was discontinued due to history of anemia and GI bleed.  I recommend him to discuss plans of anticoagulation with the oncology and GI team.      If the issues related to anemia and GI bleeding are resolved and the team believes it is safe to resume anticoagulation, I favor restarting Coumadin.      4.  Hypertension.  Blood pressure is well controlled.   5.  Followup care.  He will follow up with and Cristiano in an year or earlier as needed.     Josias Hernandez MD    Physical Exam:  Vitals: /72   Pulse 58   Ht 1.854 m (6' 1\")   Wt " 95.7 kg (211 lb)   BMI 27.84 kg/m      Constitutional:  AAO x3.  Pt is in NAD.  HEAD: normocephalic.  SKIN: Skin normal color, texture and turgor with no lesions or eruptions.  Eyes: PERRL, EOMI.  ENT:  Supple, normal JVP. No lymphadenopathy or thyroid enlargement.  Chest:  CTAB.  Cardiac:  RRR, normal  S1 and S2.  No murmurs rubs or gallop.   Abdomen:  Normal BS.  Soft, non-tender and non-distended.  No rebound or guarding.    Extremities:  Pedious pulses palpable B/L.  No LE edema noticed.   Neurological: Strength and sensation grossly symmetric and intact throughout.       CURRENT MEDICATIONS:  Current Outpatient Medications   Medication Sig Dispense Refill     atorvastatin (LIPITOR) 40 MG tablet Take 1 tablet (40 mg) by mouth At Bedtime Schedule a laboratory-only visit at last week of July to recheck your cholesterol. 90 tablet 0     COENZYME Q-10 PO Take 100 mg by mouth every morning        ferrous sulfate (IRON) 325 (65 Fe) MG tablet Take 325 mg by mouth daily       furosemide (LASIX) 20 MG tablet TAKE ONE TABLET BY MOUTH ONE TIME DAILY  90 tablet 1     hydrALAZINE (APRESOLINE) 10 MG tablet Take 1 tablet (10 mg) by mouth 3 times daily 270 tablet 0     metoprolol succinate (TOPROL-XL) 50 MG 24 hr tablet TAKE 1 TABLET (50 MG) DAILY (Patient taking differently: TAKE 1 TABLET (50 MG) BY MOUTH DAILY) 90 tablet 3     multivitamin, therapeutic with minerals (MULTI-VITAMIN) TABS Take 1 tablet by mouth every morning        pantoprazole (PROTONIX) 40 MG EC tablet Take 1 tablet (40 mg) by mouth daily Take 30-60 minutes before a meal. 30 tablet 0     venlafaxine (EFFEXOR) 75 MG tablet TAKE 1 TABLET TWICE DAILY (Patient taking differently: TAKE 1 TABLET (75 MG) BY MOUTH TWICE DAILY) 180 tablet 1     vitamin D2 (ERGOCALCIFEROL) 69664 units (1250 mcg) capsule TAKE 1 CAPSULE (50,000 UNIT) BY MOUTH ONCE A WEEK ON TUESDAY 12 capsule 1     ACE/ARB NOT PRESCRIBED, INTENTIONAL, ACE & ARB not prescribed due to Symptomatic  hypotension not due to excessive diuresis (Patient not taking: Reported on 7/18/2019)       ALLOPURINOL PO Take 300 mg by mouth every evening       ASPIRIN NOT PRESCRIBED (INTENTIONAL) Please choose reason not prescribed, below (Patient not taking: Reported on 7/18/2019) 0 each 0       ALLERGIES     Allergies   Allergen Reactions     Wasps [Hornets]      Sand wasp --- years ago.         PAST MEDICAL HISTORY:  Past Medical History:   Diagnosis Date     Acute kidney injury (H) 2012     Anemia      Anxiety      Bladder mass      BPH (benign prostatic hypertrophy)      Cardiomyopathy (H)     ICD implanted 11/2016     Carpal tunnel syndrome     Right     Chronic kidney disease (CKD), stage 3 (moderate)      Dementia      Depressive disorder      Diabetes (H)      Diffuse large B-cell lymphoma of extranodal site (H) 11/23/2018     Gastric mass      Gout      History of depression      LBBB (left bundle branch block) 2013     Lumbar herniated disc     L5-S1     Mixed cardiomyopathy 7/22/2016     Myocardial infarction (H) 1995 and 2010     Nonischemic cardiomyopathy (H) 7/22/2016     Obesity      Pacemaker     ICD/PM     Rosacea      Rotator cuff disorder     Tear x 2     RV (right ventricular) mural thrombus 7/22/2016       PAST SURGICAL HISTORY:  Past Surgical History:   Procedure Laterality Date     ANGIOPLASTY  1995 and 2010 with stent     BIOPSY      stomach     BONE MARROW BIOPSY, BONE SPECIMEN, NEEDLE/TROCAR N/A 10/30/2018    Procedure: BIOPSY BONE MARROW;  Surgeon: Jeb Romero MD;  Location:  GI     CARDIAC SURGERY      ICD/PM     CYSTOSCOPY, TRANSURETHRAL RESECTION (TUR) PROSTATE, COMBINED  4/22/2019    Procedure: CYSTOSCOPY AND BIPOLAR TRANSURETHRAL RESECTION (TUR) PROSTATE;  Surgeon: Ryan Camarillo MD;  Location:  OR     CYSTOSCOPY, TRANSURETHRAL RESECTION (TUR) TUMOR BLADDER, COMBINED N/A 9/19/2018    Procedure: COMBINED CYSTOSCOPY, TRANSURETHRAL RESECTION (TUR) TUMOR BLADDER;  CYSTOSCOPY,  TRANSURETHRAL RESECTION BLADDER TUMOR ;  Surgeon: Ryan Camarillo MD;  Location:  OR     CYSTOSCOPY, TRANSURETHRAL RESECTION (TUR) TUMOR BLADDER, COMBINED N/A 2018    Procedure: CYSTOSCOPY RESTAGING TRANSURETHRAL RESECTION BLADDER TUMOR;  Surgeon: Ryan Camarillo MD;  Location:  OR     ESOPHAGOSCOPY, GASTROSCOPY, DUODENOSCOPY (EGD), COMBINED N/A 2018    Procedure: COMBINED ESOPHAGOSCOPY, GASTROSCOPY, DUODENOSCOPY (EGD), BIOPSY SINGLE OR MULTIPLE;  gastroscopy;  Surgeon: Parish Xiong MD;  Location:  GI     ESOPHAGOSCOPY, GASTROSCOPY, DUODENOSCOPY (EGD), COMBINED N/A 2018    Procedure: COMBINED ESOPHAGOSCOPY, GASTROSCOPY, DUODENOSCOPY (EGD);  EGD;  Surgeon: Parish Xiong MD;  Location:  GI     ESOPHAGOSCOPY, GASTROSCOPY, DUODENOSCOPY (EGD), COMBINED N/A 2018    Procedure: COMBINED ESOPHAGOSCOPY, GASTROSCOPY, DUODENOSCOPY (EGD), BIOPSY SINGLE OR MULTIPLE;  gastroscopy BIOPSYSHOULD BE SENT TO LAB IN NS NOT FORMALIN PER ;  Surgeon: Jonathon Bush MD;  Location:  GI     GASTRIC BYPASS       HEART CATH LEFT HEART CATH  16    Non ischemic cardiomyopathy; Non functionally significant LAD and RCA disease      LASER HOLMIUM LITHOTRIPSY BLADDER N/A 2019    Procedure: CYSTOSCOPY, HOLMIUM LASER LITHOLAPAXY ,  AND BIPOLAR TRANSURETHRAL RESECTION (TUR) PROSTATE;  Surgeon: Ryan Camarillo MD;  Location:  OR     OTHER SURGICAL HISTORY      Spinal surgery     OTHER SURGICAL HISTORY  2016    CRT-D implantation       FAMILY HISTORY:  Family History   Problem Relation Age of Onset     Coronary Artery Disease Father 39     Alzheimer Disease Mother      Coronary Artery Disease Son         Two sons have  from MIs (ages 39 and 51)       SOCIAL HISTORY:  Social History     Socioeconomic History     Marital status:      Spouse name: None     Number of children: None     Years of education: None     Highest education level: None   Occupational History      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[employer]     Comment: [employer]; retired   Social Needs     Financial resource strain: None     Food insecurity:     Worry: None     Inability: None     Transportation needs:     Medical: None     Non-medical: None   Tobacco Use     Smoking status: Former Smoker     Packs/day: 2.00     Years: 20.00     Pack years: 40.00     Types: Cigarettes     Last attempt to quit: 1980     Years since quittin.5     Smokeless tobacco: Never Used     Tobacco comment: Quit in his 30s - 2 ppd for 20 years   Substance and Sexual Activity     Alcohol use: Yes     Alcohol/week: 0.0 oz     Comment: rarely     Drug use: No     Sexual activity: Not Currently     Partners: Female   Lifestyle     Physical activity:     Days per week: None     Minutes per session: None     Stress: None   Relationships     Social connections:     Talks on phone: None     Gets together: None     Attends Sikh service: None     Active member of club or organization: None     Attends meetings of clubs or organizations: None     Relationship status: None     Intimate partner violence:     Fear of current or ex partner: None     Emotionally abused: None     Physically abused: None     Forced sexual activity: None   Other Topics Concern     Parent/sibling w/ CABG, MI or angioplasty before 65F 55M? No      Service Not Asked     Blood Transfusions Not Asked     Caffeine Concern Not Asked     Occupational Exposure Not Asked     Hobby Hazards Not Asked     Sleep Concern Not Asked     Stress Concern Not Asked     Weight Concern Not Asked     Special Diet Yes     Comment: low fat, low salt      Back Care Not Asked     Exercise No     Bike Helmet Not Asked     Seat Belt Not Asked     Self-Exams Not Asked   Social History Narrative     None       Review of Systems:  Skin:        Eyes:       ENT:       Respiratory:       Cardiovascular:       Gastroenterology:      Genitourinary:       Musculoskeletal:       Neurologic:        Psychiatric:       Heme/Lymph/Imm:       Endocrine:           Recent Lab Results:  LIPID RESULTS:  Lab Results   Component Value Date    CHOL 176 02/07/2017    HDL 42 02/07/2017     (H) 04/04/2019    LDL 93 02/07/2017    TRIG 203 (H) 02/07/2017       LIVER ENZYME RESULTS:  Lab Results   Component Value Date    AST 24 04/04/2019    ALT 27 04/04/2019       CBC RESULTS:  Lab Results   Component Value Date    WBC 7.1 04/23/2019    RBC 3.57 (L) 04/23/2019    HGB 11.9 (L) 04/23/2019    HCT 36.4 (L) 04/23/2019     (H) 04/23/2019    MCH 33.3 (H) 04/23/2019    MCHC 32.7 04/23/2019    RDW 15.6 (H) 04/23/2019     04/23/2019       BMP RESULTS:  Lab Results   Component Value Date     04/23/2019    POTASSIUM 3.7 04/23/2019    CHLORIDE 106 04/23/2019    CO2 28 04/23/2019    ANIONGAP 5 04/23/2019     (H) 04/23/2019    BUN 12 04/23/2019    CR 1.17 04/23/2019    GFRESTIMATED 60 (L) 04/23/2019    GFRESTBLACK 69 04/23/2019    SHELLEY 8.7 04/23/2019        A1C RESULTS:  Lab Results   Component Value Date    A1C 6.8 (H) 04/04/2019       INR RESULTS:  Lab Results   Component Value Date    INR 1.06 11/27/2018    INR 1.21 (H) 07/31/2018         ECHOCARDIOGRAM  No results found for this or any previous visit (from the past 8760 hour(s)).      No orders of the defined types were placed in this encounter.    No orders of the defined types were placed in this encounter.    There are no discontinued medications.      Encounter Diagnoses   Name Primary?     ICD (implantable cardioverter-defibrillator) in place      Nonischemic cardiomyopathy (H)          CC  Alber Verduzco MD  [address]  [place] [id number]

## 2019-07-24 ENCOUNTER — OFFICE VISIT (OUTPATIENT)
Dept: UROLOGY | Facility: CLINIC | Age: 79
End: 2019-07-24
Payer: COMMERCIAL

## 2019-07-24 VITALS
DIASTOLIC BLOOD PRESSURE: 66 MMHG | HEART RATE: 92 BPM | OXYGEN SATURATION: 91 % | SYSTOLIC BLOOD PRESSURE: 106 MMHG | BODY MASS INDEX: 27.17 KG/M2 | WEIGHT: 205 LBS | HEIGHT: 73 IN

## 2019-07-24 DIAGNOSIS — C67.2 MALIGNANT NEOPLASM OF LATERAL WALL OF URINARY BLADDER (H): Primary | ICD-10-CM

## 2019-07-24 DIAGNOSIS — Z90.79 S/P TURP: ICD-10-CM

## 2019-07-24 DIAGNOSIS — N40.1 BPH WITH OBSTRUCTION/LOWER URINARY TRACT SYMPTOMS: ICD-10-CM

## 2019-07-24 DIAGNOSIS — N13.8 BPH WITH OBSTRUCTION/LOWER URINARY TRACT SYMPTOMS: ICD-10-CM

## 2019-07-24 LAB
ALBUMIN UR-MCNC: NEGATIVE MG/DL
APPEARANCE UR: CLEAR
BILIRUB UR QL STRIP: NEGATIVE
COLOR UR AUTO: YELLOW
GLUCOSE UR STRIP-MCNC: NEGATIVE MG/DL
HGB UR QL STRIP: NEGATIVE
KETONES UR STRIP-MCNC: NEGATIVE MG/DL
LEUKOCYTE ESTERASE UR QL STRIP: ABNORMAL
NITRATE UR QL: NEGATIVE
PH UR STRIP: 5.5 PH (ref 5–7)
SOURCE: ABNORMAL
SP GR UR STRIP: 1.01 (ref 1–1.03)
UROBILINOGEN UR STRIP-ACNC: 0.2 EU/DL (ref 0.2–1)

## 2019-07-24 PROCEDURE — 52000 CYSTOURETHROSCOPY: CPT | Performed by: UROLOGY

## 2019-07-24 PROCEDURE — 99214 OFFICE O/P EST MOD 30 MIN: CPT | Mod: 25 | Performed by: UROLOGY

## 2019-07-24 PROCEDURE — 81003 URINALYSIS AUTO W/O SCOPE: CPT | Performed by: UROLOGY

## 2019-07-24 RX ORDER — LIDOCAINE HYDROCHLORIDE 20 MG/ML
JELLY TOPICAL ONCE
Status: DISCONTINUED | OUTPATIENT
Start: 2019-07-24 | End: 2019-07-24 | Stop reason: HOSPADM

## 2019-07-24 ASSESSMENT — MIFFLIN-ST. JEOR: SCORE: 1708.75

## 2019-07-24 ASSESSMENT — PAIN SCALES - GENERAL: PAINLEVEL: NO PAIN (0)

## 2019-07-24 NOTE — LETTER
7/24/2019       RE: Gibson Villalta  [address]  [place] [address]     Dear Colleague,    Thank you for referring your patient, Gibson Villalta, to the [facility] at [facility]. Please see a copy of my visit note below.    [place]  CHIEF COMPLAINT   It was my pleasure to see Gibson Villalta who is a 78 year old male for follow-up of T1 HG bladder cancer and bladder outlet obstruction s/p TURP.      HPI   Gibson Villalta is a very pleasant 78 year old male who presents with a history of mild dementia, RV and LV mural thrombus on chronic anticoagulation with Coumadin, status post Smita-en-Y gastric bypass, hypertension, CKD, CAD, cardiomyopathy with EF of 20 to 30%, diabetes mellitus.  He also has history of diffuse large B-cell lymphoma who presented with melena and soft tissue thickening of his stomach in July 2018.    9/19/18: Transurethral resection of bladder tumor (TURBT) with T1HG bladder cancer  10/19/18: Repeat upper endoscopy with biopsies confirmed diffuse large B-cell lymphoma  10/30/18: Bone marrow biopsy negative for lymphoma involvement  10/31/18: PET scan showed extensive hypermetabolic thickening of the stomach and several loops of small bowel in the left upper quadrant and right lower quadrant consistent with the diagnosis of lymphoma  11/12/18: Re-staging Transurethral resection of bladder tumor (TURBT) with confirmation of his T1 HG bladder cancer.  Following discussion with his oncologist, Dr. Bender at [facility], we discussed that treatment for his bladder cancer with BCG treatment would not be warranted at this time given his upcoming chemotherapy treatment for his lymphoma and that there would be little benefit of treatment given that this requires an intact immune response.  We will plan for management with surveillance cystoscopy  11/26/18: Started first-line therapy  "with many R-CHOP chemotherapy.  He follows with Dr. Bender at [facility]  1/28/19: After 3 cycles of mini R-CHOP, PET scan showed marked improvement consistent with a good response to treatment  2/19/19: Cycle 5 and 6 of chemotherapy, doxorubicin held due to worsening shortness of breath and worsened dilation of his left ventricle.  Completion of cycle 6 on March 12, 2019 4/1/19: Follow-up with me for surveillance cystoscopy with evidence of a bladder stone and significant prostatic hypertrophy  4/4/19: PET scan showed no residual lymphoma.  Calcification seen in the bladder.  4/22/19: Cystlolithalopaxy and Transurethral resection of the prostate with no evidence of malignancy    He returns today for 3-month follow-up following his TURP and for surveillance cystoscopy.  He is doing very well following his TURP with dramatically improved urination.  He is very happy with the outcome.  He denies any urinary incontinence.    PHYSICAL EXAM  Patient is a 78 year old  male   Vitals: Blood pressure 106/66, pulse 92, height 1.854 m (6' 1\"), weight 93 kg (205 lb), SpO2 91 %.  General Appearance Adult: Body mass index is 27.05 kg/m .  Alert, no acute distress, oriented  HENT: throat/mouth:normal, good dentition  Lungs: no respiratory distress, or pursed lip breathing  Heart: No obvious jugular venous distension present  Abdomen: soft, nontender, no organomegaly or masses  Musculoskeltal: extremities normal, no peripheral edema  Skin: no suspicious lesions or rashes  Neuro: Alert, oriented, speech and mentation normal  Psych: affect and mood normal  Gait: Normal    UA RESULTS:  Recent Labs   Lab Test 07/24/19  1333 10/15/18  2031   COLOR Yellow Yellow   APPEARANCE Clear Slightly Cloudy   URINEGLC Negative Negative   URINEBILI Negative Negative   URINEKETONE Negative Negative   SG 1.015 1.019   UBLD Negative Trace*   URINEPH 5.5 5.0   PROTEIN Negative 30*   UROBILINOGEN 0.2  --    NITRITE Negative Negative   LEUKEST Trace* " Large*   RBCU  --  22*   WBCU  --  120*     Creatinine   Date Value Ref Range Status   04/23/2019 1.17 0.66 - 1.25 mg/dL Final          ASSESSMENT and PLAN  78-year-old man with history of B-cell lymphoma, high-grade T1 bladder cancer, and LUTS status post TURP    Bladder cancer  -Surveillance cystoscopy today without any evidence of bladder tumor recurrence  -Follow-up in 3 months for surveillance cystoscopy    LUTS  -He is status post cystolitholapaxy and bipolar TURP.  He is very happy with the outcome and very pleased with the result.      I spent over 25 minutes with the patient.  Over half this time was spent on counseling regarding the above.    Ryan Camarillo   Urology  [facility]  Clinic Phone [phone]        Again, thank you for allowing me to participate in the care of your patient.      Sincerely,    Ryan Camarillo MD

## 2019-07-24 NOTE — NURSING NOTE
Chief Complaint   Patient presents with     Clinic Care Coordination - Follow-up     Pt here for 3 month follow-up   Prior to the start of the procedure and with procedural staff participation, I verbally confirmed the patient s identity using two indicators, relevant allergies, that the procedure was appropriate and matched the consent or emergent situation, and that the correct equipment/implants were available. Immediately prior to starting the procedure I conducted the Time Out with the procedural staff and re-confirmed the patient s name, procedure, and site/side. I have wiped the patient off with the povidone-Iodine solution, draped them,  used Lidocaine hydrochloride jelly, and instilled sterile water into the bladder. (The Joint Commission universal protocol was followed.)  Yes    Sedation (Moderate or Deep): None  5mL 2% lidocaine hydrochloride Urojet instilled into urethra.    NDC# [address]-59  Lot #: [id number]  Expiration Date:  12/20    Kriss Fisher

## 2019-07-24 NOTE — PROCEDURES
CYSTOSCOPY PROCEDURE NOTE:    Gibson Villalta is a 78 year old male  who presents with history of T1HG bladder cancer for surveillance cystoscopy.    Pt ID verified with patient: Yes     Procedure verified with patient: Yes     Procedure confirmed with physician and support staff: Yes     Consent form confirmed with physician and support staff.    Sign In  History and Physical Exam reviewed.  Informed Consent Discussed: Yes   Sign in Communication: Yes   Time Out:  Team Confirms the Correct Patient, Correct Procedure; Yes , Correct Site and Site Marking, Correct Position (if applicable).    Affirmation of Time Out: Yes   Sign Out:  Sign Out Discussion: Yes   Physician: Ryan Camarillo MD    A urinalysis was performed revealing no evidence of infection.    The benefits, risks, alternatives of the cystoscopy procedure and personnel were discussed with the patient. The verbal consent was obtained and the patient agrees to proceed.      Description of procedure:   After fully informed, voluntary consent was obtained, the patient was brought into the procedure room, identified and placed in a supine position on the cystoscopy table.  The groin/scrotum were prepped with betadine and draped in a sterile fashion.  Urojet lidocaine gel was introduced.  A 15F flexible cystoscope was inserted into the urethra, and the bladder and urethra wereexamined in a systematic manner.  The patient tolerated the procedure well and there were no complications.      Cystoscopic findings:  The urethra was normal without strictures.  The prostate was 3-4cm long and demonstrated evidence of TURP  The external sphincter coapted normally and the bladder neck was open following TURP. The bladder was  entered and careful pan endoscopy was carried out. The posterior, superior and lateral walls and dome of the bladder were all well visualized and the scope was retroflexed upon itself..  There was severe trabeculation.  There were no neoplasms,  stones, or diverticula identifed.  The ureteric orifices were normal in position and number and effluxing clear urine. The area of previous resection on the right lateral wall was free from evidence of recurrence.    Assessment/Plan:   Gibson Villalta is a 78 year old male with a history of HG T1 bladder cancer and s/p TURP.     - See clinic note      Ryan Camarillo MD

## 2019-07-24 NOTE — Clinical Note
Milton Currie,I saw Gibson back today for follow-up and surveillance cystoscopy.  He did well following his TURP with dramatically improved urination.  There was no evidence of bladder cancer recurrence on today's cystoscopy and I will plan for his next surveillance cystoscopy in 3 months.Thanks, Ryan Camarillo M.D.Cell: [phone]

## 2019-07-24 NOTE — PATIENT INSTRUCTIONS

## 2019-07-24 NOTE — PROGRESS NOTES
[facility]  CHIEF COMPLAINT   It was my pleasure to see Gibson Villalta who is a 78 year old male for follow-up of T1 HG bladder cancer and bladder outlet obstruction s/p TURP.      HPI   Gibson Villalta is a very pleasant 78 year old male who presents with a history of mild dementia, RV and LV mural thrombus on chronic anticoagulation with Coumadin, status post Smita-en-Y gastric bypass, hypertension, CKD, CAD, cardiomyopathy with EF of 20 to 30%, diabetes mellitus.  He also has history of diffuse large B-cell lymphoma who presented with melena and soft tissue thickening of his stomach in July 2018.    9/19/18: Transurethral resection of bladder tumor (TURBT) with T1HG bladder cancer  10/19/18: Repeat upper endoscopy with biopsies confirmed diffuse large B-cell lymphoma  10/30/18: Bone marrow biopsy negative for lymphoma involvement  10/31/18: PET scan showed extensive hypermetabolic thickening of the stomach and several loops of small bowel in the left upper quadrant and right lower quadrant consistent with the diagnosis of lymphoma  11/12/18: Re-staging Transurethral resection of bladder tumor (TURBT) with confirmation of his T1 HG bladder cancer.  Following discussion with his oncologist, Dr. Bender at [facility], we discussed that treatment for his bladder cancer with BCG treatment would not be warranted at this time given his upcoming chemotherapy treatment for his lymphoma and that there would be little benefit of treatment given that this requires an intact immune response.  We will plan for management with surveillance cystoscopy  11/26/18: Started first-line therapy with many R-CHOP chemotherapy.  He follows with Dr. Bender at [facility]  1/28/19: After 3 cycles of mini R-CHOP, PET scan showed marked improvement consistent with a good response to treatment  2/19/19: Cycle 5 and 6 of chemotherapy, doxorubicin held due to worsening shortness of breath and worsened dilation of his left ventricle.   "Completion of cycle 6 on March 12, 2019 4/1/19: Follow-up with me for surveillance cystoscopy with evidence of a bladder stone and significant prostatic hypertrophy  4/4/19: PET scan showed no residual lymphoma.  Calcification seen in the bladder.  4/22/19: Cystlolithalopaxy and Transurethral resection of the prostate with no evidence of malignancy    He returns today for 3-month follow-up following his TURP and for surveillance cystoscopy.  He is doing very well following his TURP with dramatically improved urination.  He is very happy with the outcome.  He denies any urinary incontinence.    PHYSICAL EXAM  Patient is a 78 year old  male   Vitals: Blood pressure 106/66, pulse 92, height 1.854 m (6' 1\"), weight 93 kg (205 lb), SpO2 91 %.  General Appearance Adult: Body mass index is 27.05 kg/m .  Alert, no acute distress, oriented  HENT: throat/mouth:normal, good dentition  Lungs: no respiratory distress, or pursed lip breathing  Heart: No obvious jugular venous distension present  Abdomen: soft, nontender, no organomegaly or masses  Musculoskeltal: extremities normal, no peripheral edema  Skin: no suspicious lesions or rashes  Neuro: Alert, oriented, speech and mentation normal  Psych: affect and mood normal  Gait: Normal    UA RESULTS:  Recent Labs   Lab Test 07/24/19  1333 10/15/18  2031   COLOR Yellow Yellow   APPEARANCE Clear Slightly Cloudy   URINEGLC Negative Negative   URINEBILI Negative Negative   URINEKETONE Negative Negative   SG 1.015 1.019   UBLD Negative Trace*   URINEPH 5.5 5.0   PROTEIN Negative 30*   UROBILINOGEN 0.2  --    NITRITE Negative Negative   LEUKEST Trace* Large*   RBCU  --  22*   WBCU  --  120*     Creatinine   Date Value Ref Range Status   04/23/2019 1.17 0.66 - 1.25 mg/dL Final          ASSESSMENT and PLAN  78-year-old man with history of B-cell lymphoma, high-grade T1 bladder cancer, and LUTS status post TURP    Bladder cancer  -Surveillance cystoscopy today without any evidence of " bladder tumor recurrence  -Follow-up in 3 months for surveillance cystoscopy    LUTS  -He is status post cystolitholapaxy and bipolar TURP.  He is very happy with the outcome and very pleased with the result.      I spent over 25 minutes with the patient.  Over half this time was spent on counseling regarding the above.    Ryan Camarillo   Urology  [facility]  Clinic Phone [phone]

## 2019-07-30 ENCOUNTER — NURSE TRIAGE (OUTPATIENT)
Dept: NURSING | Facility: CLINIC | Age: 79
End: 2019-07-30

## 2019-07-30 NOTE — TELEPHONE ENCOUNTER
Wife says patient was going to do fasting lab but he drank coffee with cream and sugar.  FNA advised to reschedule.  Caller verbalizes understanding.      Reason for Disposition    Health Information question, no triage required and triager able to answer question    Additional Information    Negative: [1] Caller is not with the adult (patient) AND [2] reporting urgent symptoms    Negative: Lab result questions    Negative: Medication questions    Negative: Caller can't be reached by phone    Negative: Caller has already spoken to PCP or another triager    Negative: RN needs further essential information from caller in order to complete triage    Negative: Requesting regular office appointment    Negative: [1] Caller requesting NON-URGENT health information AND [2] PCP's office is the best resource    Protocols used: INFORMATION ONLY CALL-A-

## 2019-08-02 DIAGNOSIS — E11.22 CONTROLLED TYPE 2 DIABETES MELLITUS WITH CHRONIC KIDNEY DISEASE, WITHOUT LONG-TERM CURRENT USE OF INSULIN, UNSPECIFIED CKD STAGE (H): ICD-10-CM

## 2019-08-02 DIAGNOSIS — E78.5 HYPERLIPIDEMIA LDL GOAL <100: ICD-10-CM

## 2019-08-02 LAB
CHOLEST SERPL-MCNC: 165 MG/DL
GLUCOSE SERPL-MCNC: 139 MG/DL (ref 70–99)
HDLC SERPL-MCNC: 33 MG/DL
LDLC SERPL CALC-MCNC: 82 MG/DL
NONHDLC SERPL-MCNC: 132 MG/DL
TRIGL SERPL-MCNC: 249 MG/DL

## 2019-08-02 PROCEDURE — 36415 COLL VENOUS BLD VENIPUNCTURE: CPT | Performed by: INTERNAL MEDICINE

## 2019-08-02 PROCEDURE — 82947 ASSAY GLUCOSE BLOOD QUANT: CPT | Performed by: INTERNAL MEDICINE

## 2019-08-02 PROCEDURE — 80061 LIPID PANEL: CPT | Performed by: INTERNAL MEDICINE

## 2019-08-09 DIAGNOSIS — E78.5 HYPERLIPIDEMIA LDL GOAL <100: ICD-10-CM

## 2019-08-09 NOTE — TELEPHONE ENCOUNTER
"Last Written Prescription Date:  4/26/19  Last Fill Quantity: 90 tablet,  # refills: 0   Last office visit: 4/4/2019 with prescribing provider:  Kush   Future Office Visit:      Requested Prescriptions   Pending Prescriptions Disp Refills     atorvastatin (LIPITOR) 40 MG tablet 90 tablet 0     Sig: Take 1 tablet (40 mg) by mouth At Bedtime Schedule a laboratory-only visit at last week of July to recheck your cholesterol.       [facility] Protocol Passed - 8/9/2019 10:25 AM        Passed - LDL on file in past 12 months     Recent Labs   Lab Test 08/02/19  0803   LDL 82             Passed - No abnormal creatine kinase in past 12 months     No lab results found.             Passed - Recent (12 mo) or future (30 days) visit within the authorizing provider's specialty     Patient had office visit in the last 12 months or has a visit in the next 30 days with authorizing provider or within the authorizing provider's specialty.  See \"Patient Info\" tab in inbasket, or \"Choose Columns\" in Meds & Orders section of the refill encounter.              Passed - Medication is active on med list        Passed - Patient is age 18 or older          "

## 2019-08-12 RX ORDER — ATORVASTATIN CALCIUM 40 MG/1
40 TABLET, FILM COATED ORAL AT BEDTIME
Qty: 30 TABLET | Refills: 0 | Status: SHIPPED | OUTPATIENT
Start: 2019-08-12 | End: 2019-09-06

## 2019-08-12 NOTE — TELEPHONE ENCOUNTER
Medication is being filled for 1 time refill only due to:  Patient needs to be seen because due for physical.   Jeannie FRIEND RN

## 2019-08-31 DIAGNOSIS — E55.9 VITAMIN D DEFICIENCY: ICD-10-CM

## 2019-08-31 NOTE — TELEPHONE ENCOUNTER
Requested Prescriptions   Pending Prescriptions Disp Refills     vitamin D2 (ERGOCALCIFEROL) 05718 units (1250 mcg) capsule [Pharmacy Med Name: Vitamin D (Ergocalciferol) Oral Capsule 77555 UNIT] 12 capsule 0     Sig: TAKE ONE CAPSULE BY MOUTH ONCE A WEEK ON TUESDAY       There is no refill protocol information for this order            Last Written Prescription Date:  3/15/19  Last Fill Quantity: 12 capsule,   # refills: 1  Last Office Visit: 4/4/2019 (Kush)  Future Office visit:    Next 5 appointments (look out 90 days)    Sep 16, 2019  3:00 PM CDT  Return Visit with ALLEN Perry CNP  [facility] ([facility] [address] [place] [id number] [phone] OPT 2           Routing refill request to provider for review/approval because:  Drug not on the [facility] [place] [facility] or [facility] refill protocol or controlled substance

## 2019-09-03 RX ORDER — ERGOCALCIFEROL 1.25 MG/1
CAPSULE, LIQUID FILLED ORAL
Qty: 12 CAPSULE | Refills: 1 | Status: SHIPPED | OUTPATIENT
Start: 2019-09-03 | End: 2020-02-17

## 2019-09-03 NOTE — TELEPHONE ENCOUNTER
Dr. Currie,    Routing refill request to provider for review/approval because:  Drug not on the [facility] refill protocol   Thanks,  Gay Merritt RN

## 2019-09-06 ENCOUNTER — OFFICE VISIT (OUTPATIENT)
Dept: FAMILY MEDICINE | Facility: CLINIC | Age: 79
End: 2019-09-06
Payer: COMMERCIAL

## 2019-09-06 VITALS
SYSTOLIC BLOOD PRESSURE: 107 MMHG | HEART RATE: 89 BPM | BODY MASS INDEX: 27.96 KG/M2 | HEIGHT: 73 IN | WEIGHT: 211 LBS | TEMPERATURE: 97 F | OXYGEN SATURATION: 97 % | DIASTOLIC BLOOD PRESSURE: 74 MMHG

## 2019-09-06 DIAGNOSIS — F33.42 RECURRENT MAJOR DEPRESSIVE DISORDER, IN FULL REMISSION (H): ICD-10-CM

## 2019-09-06 DIAGNOSIS — Z01.818 PREOP GENERAL PHYSICAL EXAM: Primary | ICD-10-CM

## 2019-09-06 DIAGNOSIS — E78.5 HYPERLIPIDEMIA LDL GOAL <100: ICD-10-CM

## 2019-09-06 DIAGNOSIS — E11.22 CONTROLLED TYPE 2 DIABETES MELLITUS WITH CHRONIC KIDNEY DISEASE, WITHOUT LONG-TERM CURRENT USE OF INSULIN, UNSPECIFIED CKD STAGE (H): ICD-10-CM

## 2019-09-06 DIAGNOSIS — Z45.2 ENCOUNTER FOR CARE RELATED TO VASCULAR ACCESS PORT: ICD-10-CM

## 2019-09-06 DIAGNOSIS — Z13.89 SCREENING FOR DIABETIC PERIPHERAL NEUROPATHY: ICD-10-CM

## 2019-09-06 DIAGNOSIS — I50.22 CHRONIC SYSTOLIC HEART FAILURE (H): ICD-10-CM

## 2019-09-06 LAB
BASOPHILS # BLD AUTO: 0 10E9/L (ref 0–0.2)
BASOPHILS NFR BLD AUTO: 0.6 %
DIFFERENTIAL METHOD BLD: ABNORMAL
EOSINOPHIL # BLD AUTO: 0.3 10E9/L (ref 0–0.7)
EOSINOPHIL NFR BLD AUTO: 4.8 %
ERYTHROCYTE [DISTWIDTH] IN BLOOD BY AUTOMATED COUNT: 15.4 % (ref 10–15)
HBA1C MFR BLD: 6.7 % (ref 0–5.6)
HCT VFR BLD AUTO: 41.9 % (ref 40–53)
HGB BLD-MCNC: 13.6 G/DL (ref 13.3–17.7)
LYMPHOCYTES # BLD AUTO: 1.1 10E9/L (ref 0.8–5.3)
LYMPHOCYTES NFR BLD AUTO: 15.3 %
MCH RBC QN AUTO: 32.1 PG (ref 26.5–33)
MCHC RBC AUTO-ENTMCNC: 32.5 G/DL (ref 31.5–36.5)
MCV RBC AUTO: 99 FL (ref 78–100)
MONOCYTES # BLD AUTO: 1.2 10E9/L (ref 0–1.3)
MONOCYTES NFR BLD AUTO: 16.5 %
NEUTROPHILS # BLD AUTO: 4.4 10E9/L (ref 1.6–8.3)
NEUTROPHILS NFR BLD AUTO: 62.8 %
PLATELET # BLD AUTO: 262 10E9/L (ref 150–450)
RBC # BLD AUTO: 4.24 10E12/L (ref 4.4–5.9)
WBC # BLD AUTO: 7 10E9/L (ref 4–11)

## 2019-09-06 PROCEDURE — 85025 COMPLETE CBC W/AUTO DIFF WBC: CPT | Performed by: INTERNAL MEDICINE

## 2019-09-06 PROCEDURE — 83721 ASSAY OF BLOOD LIPOPROTEIN: CPT | Performed by: INTERNAL MEDICINE

## 2019-09-06 PROCEDURE — 82043 UR ALBUMIN QUANTITATIVE: CPT | Performed by: INTERNAL MEDICINE

## 2019-09-06 PROCEDURE — 80048 BASIC METABOLIC PNL TOTAL CA: CPT | Performed by: INTERNAL MEDICINE

## 2019-09-06 PROCEDURE — 99214 OFFICE O/P EST MOD 30 MIN: CPT | Performed by: INTERNAL MEDICINE

## 2019-09-06 PROCEDURE — 83036 HEMOGLOBIN GLYCOSYLATED A1C: CPT | Performed by: INTERNAL MEDICINE

## 2019-09-06 PROCEDURE — 36415 COLL VENOUS BLD VENIPUNCTURE: CPT | Performed by: INTERNAL MEDICINE

## 2019-09-06 PROCEDURE — 99207 C PAF COMPLETED  NO CHARGE: CPT | Performed by: INTERNAL MEDICINE

## 2019-09-06 RX ORDER — ATORVASTATIN CALCIUM 40 MG/1
40 TABLET, FILM COATED ORAL AT BEDTIME
Qty: 90 TABLET | Refills: 3 | Status: SHIPPED | OUTPATIENT
Start: 2019-09-06 | End: 2020-01-01

## 2019-09-06 ASSESSMENT — MIFFLIN-ST. JEOR: SCORE: 1735.97

## 2019-09-06 ASSESSMENT — PATIENT HEALTH QUESTIONNAIRE - PHQ9: SUM OF ALL RESPONSES TO PHQ QUESTIONS 1-9: 2

## 2019-09-06 NOTE — PATIENT INSTRUCTIONS
Take your Hydralazine on the morning of your surgery with a small sip of water.      Before Your Surgery      Call your surgeon if there is any change in your health. This includes signs of a cold or flu (such as a sore throat, runny nose, cough, rash or fever).    Do not smoke, drink alcohol or take over the counter medicine (unless your surgeon or primary care doctor tells you to) for the 24 hours before and after surgery.    If you take prescribed drugs: Follow your doctor s orders about which medicines to take and which to stop until after surgery.    Eating and drinking prior to surgery: follow the instructions from your surgeon    Take a shower or bath the night before surgery. Use the soap your surgeon gave you to gently clean your skin. If you do not have soap from your surgeon, use your regular soap. Do not shave or scrub the surgery site.  Wear clean pajamas and have clean sheets on your bed.

## 2019-09-06 NOTE — PROGRESS NOTES
[facility]  [address] [facility] [address]  [phone]  Dept: [phone]    PRE-OP EVALUATION:  Today's date: 2019    Gibson Villalta (: 1940) presents for pre-operative evaluation assessment.  He requires evaluation and anesthesia risk assessment prior to undergoing surgery/procedure for treatment of IV Port Removal.    Proposed Surgery/ Procedure: port removal   Date of Surgery/ Procedure: 2019  Time of Surgery/ Procedure: 8 AM  Hospital/Surgical Facility: [facility]    Primary Physician: Akira Currie  Type of Anesthesia Anticipated: to be determined    Patient has a Health Care Directive or Living Will:  NO    1. YES - DO YOU HAVE A HISTORY OF HEART ATTACK, STROKE, STENT, BYPASS OR SURGERY ON AN ARTERY IN THE HEAD, NECK, HEART OR LEG?   2. NO - Do you ever have any pain or discomfort in your chest?  3. YES - DO YOU HAVE A HISTORY OF HEART FAILURE   4. YES - ARE YOUR TROUBLED BY SHORTNESS OF BREATH WHEN WALKING ON THE LEVEL, UP A SLIGHT HILL OR AT NIGHT?   5. NO - Do you currently have a cold, bronchitis or other respiratory infection?  6. NO - Do you have a cough, shortness of breath or wheezing?  7. NO - Do you sometimes get pains in the calves of your legs when you walk?  8. NO - Do you or anyone in your family have previous history of blood clots?  9. NO - Do you or does anyone in your family have a serious bleeding problem such as prolonged bleeding following surgeries or cuts?  10. YES - HAVE YOU EVER HAD PROBLEMS WITH ANEMIA OR BEEN TOLD TO TAKE IRON PILLS?   11. NO - Have you had any abnormal blood loss such as black, tarry or bloody stools, or abnormal vaginal bleeding?  12. NO - Have you ever had a blood transfusion?  13. NO - Have you or any of your relatives ever had problems with anesthesia?  14. NO - Do you have sleep apnea, excessive snoring or daytime drowsiness?  15. NO - Do you have any prosthetic heart valves?  16. NO -  Do you have prosthetic joints?  17. NO - Is there any chance that you may be pregnant?      HPI:     HPI related to upcoming procedure:     Patient has been diagnosed and treated for prostate cancer.  Currently has a nonfunctioning (? clotted) IV port.  Denies tenderness, swelling, redness at the affected area.  Patient was advised removal of the port.    He has a history of congestive heart failure but is currently well compensated.  Denies chest pain, shortness of breath at rest, PND/orthopnea.  No pedal edema.  He gets dyspneic on moderate exertion.    Has an ICD device which was recently checked on 6/17/2019.    Also has diabetes which has been well controlled.    Has a history of depression which is currently in remission.  PHQ9 score today is 2..      MEDICAL HISTORY:     Patient Active Problem List    Diagnosis Date Noted     BPH with obstruction/lower urinary tract symptoms 04/22/2019     Priority: Medium     Diffuse large B-cell lymphoma of extranodal site (H) 11/23/2018     Priority: Medium     Bladder cancer (H) 11/19/2018     Priority: Medium     Iron deficiency anemia, unspecified iron deficiency anemia type 10/15/2018     Priority: Medium     Prepatellar bursitis of left knee 12/11/2017     Priority: Medium     Cardiomyopathy (H) 03/14/2017     Priority: Medium     ICD implanted 11/2016       Controlled type 2 diabetes mellitus with chronic kidney disease, without long-term current use of insulin, unspecified CKD stage (H) 02/24/2017     Priority: Medium     New Dx Feb 2017       HFrEF (heart failure with reduced ejection fraction) (H) 08/16/2016     Priority: Medium     Anxiety and depression      Priority: Medium     BPH (benign prostatic hypertrophy)      Priority: Medium     LBBB (left bundle branch block)      Priority: Medium     Mixed cardiomyopathy 07/22/2016     Priority: Medium     RV (right ventricular) mural thrombus without MI 07/22/2016     Priority: Medium     Dementia without behavioral  disturbance, unspecified dementia type 07/22/2016     Priority: Medium     History of gastric bypass 07/22/2016     Priority: Medium      Past Medical History:   Diagnosis Date     Acute kidney injury (H) 2012     Anemia      Anxiety      Bladder mass      BPH (benign prostatic hypertrophy)      Cardiomyopathy (H)     ICD implanted 11/2016     Carpal tunnel syndrome     Right     Chronic kidney disease (CKD), stage 3 (moderate)      Dementia      Depressive disorder      Diabetes (H)      Diffuse large B-cell lymphoma of extranodal site (H) 11/23/2018     Gastric mass      Gout      History of depression      LBBB (left bundle branch block) 2013     Lumbar herniated disc     L5-S1     Mixed cardiomyopathy 7/22/2016     Myocardial infarction (H) 1995 and 2010     Nonischemic cardiomyopathy (H) 7/22/2016     Obesity      Pacemaker     ICD/PM     Rosacea      Rotator cuff disorder     Tear x 2     RV (right ventricular) mural thrombus 7/22/2016     Past Surgical History:   Procedure Laterality Date     ANGIOPLASTY  1995 and 2010 with stent     BIOPSY      stomach     BONE MARROW BIOPSY, BONE SPECIMEN, NEEDLE/TROCAR N/A 10/30/2018    Procedure: BIOPSY BONE MARROW;  Surgeon: Jeb Romero MD;  Location:  GI     CARDIAC SURGERY      ICD/PM     CYSTOSCOPY, TRANSURETHRAL RESECTION (TUR) PROSTATE, COMBINED  4/22/2019    Procedure: CYSTOSCOPY AND BIPOLAR TRANSURETHRAL RESECTION (TUR) PROSTATE;  Surgeon: Ryan Camarillo MD;  Location:  OR     CYSTOSCOPY, TRANSURETHRAL RESECTION (TUR) TUMOR BLADDER, COMBINED N/A 9/19/2018    Procedure: COMBINED CYSTOSCOPY, TRANSURETHRAL RESECTION (TUR) TUMOR BLADDER;  CYSTOSCOPY, TRANSURETHRAL RESECTION BLADDER TUMOR ;  Surgeon: Ryan Camarillo MD;  Location:  OR     CYSTOSCOPY, TRANSURETHRAL RESECTION (TUR) TUMOR BLADDER, COMBINED N/A 11/12/2018    Procedure: CYSTOSCOPY RESTAGING TRANSURETHRAL RESECTION BLADDER TUMOR;  Surgeon: Ryan Camarillo MD;  Location: [facility]      ESOPHAGOSCOPY, GASTROSCOPY, DUODENOSCOPY (EGD), COMBINED N/A 7/30/2018    Procedure: COMBINED ESOPHAGOSCOPY, GASTROSCOPY, DUODENOSCOPY (EGD), BIOPSY SINGLE OR MULTIPLE;  gastroscopy;  Surgeon: Parish Xiong MD;  Location:  GI     ESOPHAGOSCOPY, GASTROSCOPY, DUODENOSCOPY (EGD), COMBINED N/A 7/30/2018    Procedure: COMBINED ESOPHAGOSCOPY, GASTROSCOPY, DUODENOSCOPY (EGD);  EGD;  Surgeon: Parish Xiong MD;  Location:  GI     ESOPHAGOSCOPY, GASTROSCOPY, DUODENOSCOPY (EGD), COMBINED N/A 8/2/2018    Procedure: COMBINED ESOPHAGOSCOPY, GASTROSCOPY, DUODENOSCOPY (EGD), BIOPSY SINGLE OR MULTIPLE;  gastroscopy BIOPSYSHOULD BE SENT TO LAB IN NS NOT FORMALIN PER ;  Surgeon: Jonathon Bush MD;  Location:  GI     GASTRIC BYPASS  2001     HEART CATH LEFT HEART CATH  7/19/16    Non ischemic cardiomyopathy; Non functionally significant LAD and RCA disease      LASER HOLMIUM LITHOTRIPSY BLADDER N/A 4/22/2019    Procedure: CYSTOSCOPY, HOLMIUM LASER LITHOLAPAXY ,  AND BIPOLAR TRANSURETHRAL RESECTION (TUR) PROSTATE;  Surgeon: Ryan Camarillo MD;  Location:  OR     OTHER SURGICAL HISTORY  2006    Spinal surgery     OTHER SURGICAL HISTORY  Nov 2016    CRT-D implantation     Current Outpatient Medications   Medication Sig Dispense Refill     ACE/ARB NOT PRESCRIBED, INTENTIONAL, ACE & ARB not prescribed due to Symptomatic hypotension not due to excessive diuresis       ASPIRIN NOT PRESCRIBED (INTENTIONAL) Please choose reason not prescribed, below 0 each 0     atorvastatin (LIPITOR) 40 MG tablet Take 1 tablet (40 mg) by mouth At Bedtime Schedule a laboratory-only visit at last week of July to recheck your cholesterol. 30 tablet 0     COENZYME Q-10 PO Take 100 mg by mouth every morning        ferrous sulfate (IRON) 325 (65 Fe) MG tablet Take 325 mg by mouth daily       furosemide (LASIX) 20 MG tablet TAKE ONE TABLET BY MOUTH ONE TIME DAILY  90 tablet 1     hydrALAZINE (APRESOLINE) 10 MG tablet Take 1 tablet (10 mg) by  "mouth 3 times daily 270 tablet 0     metoprolol succinate (TOPROL-XL) 50 MG 24 hr tablet TAKE 1 TABLET (50 MG) DAILY (Patient taking differently: TAKE 1 TABLET (50 MG) BY MOUTH DAILY) 90 tablet 3     multivitamin, therapeutic with minerals (MULTI-VITAMIN) TABS Take 1 tablet by mouth every morning        venlafaxine (EFFEXOR) 75 MG tablet TAKE 1 TABLET TWICE DAILY (Patient taking differently: TAKE 1 TABLET (75 MG) BY MOUTH TWICE DAILY) 180 tablet 1     vitamin D2 (ERGOCALCIFEROL) 66440 units (1250 mcg) capsule TAKE ONE CAPSULE BY MOUTH ONCE A WEEK ON TUESDAY 12 capsule 1     ALLOPURINOL PO Take 300 mg by mouth every evening       OTC products: Aspirin    Allergies   Allergen Reactions     Wasps [Hornets]      Sand wasp --- years ago.        Latex Allergy: NO    Social History     Tobacco Use     Smoking status: Former Smoker     Packs/day: 2.00     Years: 20.00     Pack years: 40.00     Types: Cigarettes     Last attempt to quit: 1980     Years since quittin.7     Smokeless tobacco: Never Used     Tobacco comment: Quit in his 30s - 2 ppd for 20 years   Substance Use Topics     Alcohol use: Yes     Alcohol/week: 0.0 oz     Comment: rarely     History   Drug Use No       REVIEW OF SYSTEMS:   C: NEGATIVE for fever, chills, change in weight  E/M: NEGATIVE for ear, mouth and throat problems  R: NEGATIVE for significant cough or SOB  CV: NEGATIVE for chest pain, palpitations or peripheral edema  GI: NEGATIVE for nausea, abdominal pain, heartburn, or change in bowel habits  : NEGATIVE for frequency, dysuria, or hematuria  N: NEGATIVE for weakness, dizziness or paresthesias  H: NEGATIVE for bleeding problems      EXAM:   /74 (BP Location: Right arm, Patient Position: Chair, Cuff Size: Adult Regular)   Pulse 89   Temp 97  F (36.1  C) (Oral)   Ht 1.854 m (6' 1\")   Wt 95.7 kg (211 lb)   SpO2 97%   BMI 27.84 kg/m      GENERAL APPEARANCE: healthy, alert and no distress    NECK: no adenopathy, no asymmetry, " masses, or scars and thyroid normal to palpation    RESP: lungs clear to auscultation - no rales, rhonchi or wheezes    CV: regular rates and rhythm, normal S1 S2, no S3 or S4 and no murmur, click or rub    ABDOMEN:  soft, nontender, no HSM or masses and bowel sounds normal    NEURO: Normal strength and tone, sensory exam grossly normal, mentation intact and speech normal    PSYCH: mentation appears normal. and affect normal/bright    LYMPHATICS: No cervical or supraclavicular nodes      DIAGNOSTICS:     Labs Drawn and in Process:   Unresulted Labs Ordered in the Past 30 Days of this Admission     Date and Time Order Name Status Description    9/6/2019 1241 ALBUMIN RANDOM URINE QUANTITATIVE In process     9/6/2019 1241 LDL CHOLESTEROL DIRECT In process     9/6/2019 1241 HEMOGLOBIN A1C In process     9/6/2019 1241 BASIC METABOLIC PANEL In process     9/6/2019 1241 CBC WITH PLATELETS DIFFERENTIAL In process           Recent Labs   Lab Test 04/23/19  0722 04/04/19  1514  11/27/18  1125  07/31/18  1035  07/30/18  0750   HGB 11.9* 12.4*   < >  --    < >  --    < > 13.9    218   < >  --    < >  --    < > 218   INR  --   --   --  1.06  --  1.21*  --  3.56*    143   < >  --    < >  --    < > 138   POTASSIUM 3.7 4.9   < >  --    < >  --    < > 3.7   CR 1.17 1.53*   < >  --    < >  --    < > 1.23   A1C  --  6.8*  --   --   --   --   --  7.1*    < > = values in this interval not displayed.        IMPRESSION:   Diagnosis/reason for consult: Nonfunctioning IV port    The proposed surgical procedure is considered LOW risk.    REVISED CARDIAC RISK INDEX  The patient has the following serious cardiovascular risks for perioperative complications such as (MI, PE, VFib and 3  AV Block):  No serious cardiac risks  INTERPRETATION: 0 risks: Class I (very low risk - 0.4% complication rate)    The patient has the following additional risks for perioperative complications:  No identified additional risks      ICD-10-CM    1.  Preop general physical exam Z01.818 CBC with platelets differential     Basic metabolic panel   2. Care related to vascular access port Z45.2    3. Controlled type 2 diabetes mellitus with chronic kidney disease, without long-term current use of insulin, unspecified CKD stage (H) E11.22 Hemoglobin A1c     LDL cholesterol direct     Albumin Random Urine Quantitative with Creat Ratio   4. Chronic systolic heart failure (H) I50.22    5. Recurrent major depressive disorder, in full remission (H) F33.42        RECOMMENDATIONS:       APPROVAL GIVEN to proceed with proposed procedure, without further diagnostic evaluation       Signed Electronically by: Akira Currie MD    Copy of this evaluation report is provided to requesting physician.    [facility] Preop Guidelines    Revised Cardiac Risk Index

## 2019-09-06 NOTE — TELEPHONE ENCOUNTER
"Last Written Prescription Date:  8/12/19  Last Fill Quantity: 30 tablet,  # refills: 0   Last office visit: 9/6/2019 with prescribing provider:  Kush   Future Office Visit:   Next 5 appointments (look out 90 days)    Sep 16, 2019  3:00 PM CDT  Return Visit with ALLEN Perry CNP  [facility] ([facility]) [address] [place] [id number] [phone] OPT 2         Requested Prescriptions   Pending Prescriptions Disp Refills     atorvastatin (LIPITOR) 40 MG tablet 30 tablet 0     Sig: Take 1 tablet (40 mg) by mouth At Bedtime Schedule a laboratory-only visit at last week of July to recheck your cholesterol.       Statins Protocol Passed - 9/6/2019  3:05 PM        Passed - LDL on file in past 12 months     Recent Labs   Lab Test 08/02/19  0803   LDL 82             Passed - No abnormal creatine kinase in past 12 months     No lab results found.             Passed - Recent (12 mo) or future (30 days) visit within the authorizing provider's specialty     Patient had office visit in the last 12 months or has a visit in the next 30 days with authorizing provider or within the authorizing provider's specialty.  See \"Patient Info\" tab in inbasket, or \"Choose Columns\" in Meds & Orders section of the refill encounter.              Passed - Medication is active on med list        Passed - Patient is age 18 or older          "

## 2019-09-06 NOTE — PROGRESS NOTES
Diabetic Foot Screen:  Any complaints of increased pain or numbness ?  YES numbness  Is there a foot ulcer now or a history of foot ulcer? No  Does the foot have an abnormal shape? No  Are the nails thick, too long or ingrown?  YES long toenails  Are there any redness or open areas? No         Sensation Testing done at all points on the diagram with monofilament     Right Foot: Sensation Absent at the following points  and 9  Left Foot: Sensation Absent at the following points  and 1     Risk Category: 1- Loss of protective sensation with normal appearing foot (no weakness, deformity, callous, pre-ulcer or h/o ulceration)  Performed by Akira Currie MD

## 2019-09-07 ENCOUNTER — APPOINTMENT (OUTPATIENT)
Dept: LAB | Facility: CLINIC | Age: 79
End: 2019-09-07
Payer: COMMERCIAL

## 2019-09-07 LAB
ANION GAP SERPL CALCULATED.3IONS-SCNC: 2 MMOL/L (ref 3–14)
BUN SERPL-MCNC: 21 MG/DL (ref 7–30)
CALCIUM SERPL-MCNC: 9.3 MG/DL (ref 8.5–10.1)
CHLORIDE SERPL-SCNC: 109 MMOL/L (ref 94–109)
CO2 SERPL-SCNC: 29 MMOL/L (ref 20–32)
CREAT SERPL-MCNC: 1.3 MG/DL (ref 0.66–1.25)
CREAT UR-MCNC: 41 MG/DL
GFR SERPL CREATININE-BSD FRML MDRD: 52 ML/MIN/{1.73_M2}
GLUCOSE SERPL-MCNC: 112 MG/DL (ref 70–99)
LDLC SERPL DIRECT ASSAY-MCNC: 98 MG/DL
MICROALBUMIN UR-MCNC: 9 MG/L
MICROALBUMIN/CREAT UR: 21.8 MG/G CR (ref 0–17)
POTASSIUM SERPL-SCNC: 4.2 MMOL/L (ref 3.4–5.3)
SODIUM SERPL-SCNC: 140 MMOL/L (ref 133–144)

## 2019-09-09 ENCOUNTER — HOSPITAL ENCOUNTER (OUTPATIENT)
Facility: CLINIC | Age: 79
Discharge: HOME | End: 2019-09-09
Attending: RADIOLOGY | Admitting: RADIOLOGY
Payer: COMMERCIAL

## 2019-09-09 ENCOUNTER — APPOINTMENT (OUTPATIENT)
Dept: INTERVENTIONAL RADIOLOGY/VASCULAR | Facility: CLINIC | Age: 79
End: 2019-09-09
Attending: INTERNAL MEDICINE
Payer: COMMERCIAL

## 2019-09-09 VITALS
OXYGEN SATURATION: 94 % | HEART RATE: 66 BPM | SYSTOLIC BLOOD PRESSURE: 114 MMHG | TEMPERATURE: 95.1 F | HEIGHT: 73 IN | DIASTOLIC BLOOD PRESSURE: 65 MMHG | RESPIRATION RATE: 16 BRPM | WEIGHT: 211 LBS | BODY MASS INDEX: 27.96 KG/M2

## 2019-09-09 DIAGNOSIS — C67.2 MALIGNANT NEOPLASM OF LATERAL WALL OF URINARY BLADDER (H): Primary | Chronic | ICD-10-CM

## 2019-09-09 DIAGNOSIS — C67.9 MALIGNANT NEOPLASM OF URINARY BLADDER, UNSPECIFIED SITE (H): ICD-10-CM

## 2019-09-09 LAB
APTT PPP: 31 SEC (ref 22–37)
ERYTHROCYTE [DISTWIDTH] IN BLOOD BY AUTOMATED COUNT: 15.1 % (ref 10–15)
HCT VFR BLD AUTO: 40.8 % (ref 40–53)
HGB BLD-MCNC: 13.6 G/DL (ref 13.3–17.7)
INR PPP: 1.01 (ref 0.86–1.14)
MCH RBC QN AUTO: 31.8 PG (ref 26.5–33)
MCHC RBC AUTO-ENTMCNC: 33.3 G/DL (ref 31.5–36.5)
MCV RBC AUTO: 95 FL (ref 78–100)
PLATELET # BLD AUTO: 264 10E9/L (ref 150–450)
RBC # BLD AUTO: 4.28 10E12/L (ref 4.4–5.9)
WBC # BLD AUTO: 10.2 10E9/L (ref 4–11)

## 2019-09-09 PROCEDURE — 40000854 ZZH STATISTIC SIMPLE TUBE INSERTION/CHARGE, PORT, CATH, FISTULOGRAM

## 2019-09-09 PROCEDURE — 85610 PROTHROMBIN TIME: CPT | Performed by: RADIOLOGY

## 2019-09-09 PROCEDURE — 25000128 H RX IP 250 OP 636

## 2019-09-09 PROCEDURE — 85027 COMPLETE CBC AUTOMATED: CPT | Performed by: RADIOLOGY

## 2019-09-09 PROCEDURE — 27211193 ZZ H WOUND GLUE CR1

## 2019-09-09 PROCEDURE — 85730 THROMBOPLASTIN TIME PARTIAL: CPT | Performed by: RADIOLOGY

## 2019-09-09 PROCEDURE — 77001 FLUOROGUIDE FOR VEIN DEVICE: CPT

## 2019-09-09 PROCEDURE — 25000128 H RX IP 250 OP 636: Performed by: RADIOLOGY

## 2019-09-09 PROCEDURE — 25000125 ZZHC RX 250

## 2019-09-09 PROCEDURE — 36415 COLL VENOUS BLD VENIPUNCTURE: CPT

## 2019-09-09 RX ORDER — LIDOCAINE HYDROCHLORIDE 10 MG/ML
INJECTION, SOLUTION INFILTRATION; PERINEURAL
Status: COMPLETED
Start: 2019-09-09 | End: 2019-09-09

## 2019-09-09 RX ORDER — LIDOCAINE 40 MG/G
CREAM TOPICAL
Status: DISCONTINUED | OUTPATIENT
Start: 2019-09-09 | End: 2019-09-09 | Stop reason: HOSPADM

## 2019-09-09 RX ORDER — FENTANYL CITRATE 50 UG/ML
25-50 INJECTION, SOLUTION INTRAMUSCULAR; INTRAVENOUS EVERY 5 MIN PRN
Status: DISCONTINUED | OUTPATIENT
Start: 2019-09-09 | End: 2019-09-09 | Stop reason: HOSPADM

## 2019-09-09 RX ORDER — NICOTINE POLACRILEX 4 MG
15-30 LOZENGE BUCCAL
Status: DISCONTINUED | OUTPATIENT
Start: 2019-09-09 | End: 2019-09-09 | Stop reason: HOSPADM

## 2019-09-09 RX ORDER — DEXTROSE MONOHYDRATE 25 G/50ML
25-50 INJECTION, SOLUTION INTRAVENOUS
Status: DISCONTINUED | OUTPATIENT
Start: 2019-09-09 | End: 2019-09-09 | Stop reason: HOSPADM

## 2019-09-09 RX ORDER — FENTANYL CITRATE 50 UG/ML
INJECTION, SOLUTION INTRAMUSCULAR; INTRAVENOUS
Status: COMPLETED
Start: 2019-09-09 | End: 2019-09-09

## 2019-09-09 RX ORDER — FLUMAZENIL 0.1 MG/ML
0.2 INJECTION, SOLUTION INTRAVENOUS
Status: DISCONTINUED | OUTPATIENT
Start: 2019-09-09 | End: 2019-09-09 | Stop reason: HOSPADM

## 2019-09-09 RX ORDER — NALOXONE HYDROCHLORIDE 0.4 MG/ML
.1-.4 INJECTION, SOLUTION INTRAMUSCULAR; INTRAVENOUS; SUBCUTANEOUS
Status: DISCONTINUED | OUTPATIENT
Start: 2019-09-09 | End: 2019-09-09 | Stop reason: HOSPADM

## 2019-09-09 RX ADMIN — MIDAZOLAM HYDROCHLORIDE 1 MG: 1 INJECTION, SOLUTION INTRAMUSCULAR; INTRAVENOUS at 08:17

## 2019-09-09 RX ADMIN — LIDOCAINE HYDROCHLORIDE 10 ML: 10 INJECTION, SOLUTION INFILTRATION; PERINEURAL at 08:40

## 2019-09-09 RX ADMIN — FENTANYL CITRATE 50 MCG: 50 INJECTION INTRAMUSCULAR; INTRAVENOUS at 08:17

## 2019-09-09 RX ADMIN — FENTANYL CITRATE 25 MCG: 50 INJECTION INTRAMUSCULAR; INTRAVENOUS at 08:28

## 2019-09-09 RX ADMIN — MIDAZOLAM HYDROCHLORIDE 0.5 MG: 1 INJECTION, SOLUTION INTRAMUSCULAR; INTRAVENOUS at 08:28

## 2019-09-09 ASSESSMENT — MIFFLIN-ST. JEOR: SCORE: 1735.97

## 2019-09-09 NOTE — PROCEDURES
[facility]    Procedure: Port removal  Date/Time: 9/9/2019 8:35 AM  Performed by: José Miguel Burr MD  Authorized by: José Miguel Burr MD     UNIVERSAL PROTOCOL   Site Marked: Yes  Prior Images Obtained and Reviewed:  Yes  Required items: Required blood products, implants, devices and special equipment available    Patient identity confirmed:  Verbally with patient  Patient was reevaluated immediately before administering moderate or deep sedation or anesthesia  Confirmation Checklist:  Patient's identity using two indicators, relevant allergies, procedure was appropriate and matched the consent or emergent situation and correct equipment/implants were available  Time out: Immediately prior to the procedure a time out was called    Preparation: Patient was prepped and draped in usual sterile fashion       ANESTHESIA    Anesthesia: Local infiltration  Local Anesthetic:  Lidocaine 1% without epinephrine      SEDATION    Patient Sedated: Yes    Sedation Type:  Moderate (conscious) sedation  Sedation:  See MAR for details  Vital signs: Vital signs monitored during sedation    See dictated procedure note for full details.  Findings: RIJ chest port removal    PROCEDURE   Patient Tolerance:  Patient tolerated the procedure well with no immediate complications    Time of Sedation in Minutes by Physician:  15

## 2019-09-09 NOTE — PROGRESS NOTES
Care Suites Post-Procedure Note    Procedure: Port removal   CS arrival time:~0847  Accompanied by: Kaur KELLOGG   Concerns/abnormal assessment after procedure: No.  Site/dressing CDI   Plan: Discharge home when stable

## 2019-09-09 NOTE — PROGRESS NOTES
Care Suites Admission Nursing Note    Reason for admission:Port removal  CS arrival time: 07  Accompanied by: Izabella  Name/phone of DC :Izabella [phone]  Medications held: NA  Consent signed: Yes  Abnormal assessment/labs: NA  If abnormal, provider notified: JEANNE  Education/questions answered: Reviewed plan for today, questions answered. Reviewed discharge instructions. Pt accepts and understands.  Plan: Port removal

## 2019-09-09 NOTE — PRE-PROCEDURE
GENERAL PRE-PROCEDURE:   Procedure:  Chest port removal    Written consent obtained?: Yes    Risks and benefits: Risks, benefits and alternatives were discussed    Consent given by:  Patient  Patient states understanding of procedure being performed: Yes    Patient's understanding of procedure matches consent: Yes    Procedure consent matches procedure scheduled: Yes    Expected level of sedation:  Moderate  Appropriately NPO:  Yes  ASA Class:  Class 3- Severe systemic disease, definite functional limitations  Mallampati  :  Grade 2- soft palate, base of uvula, tonsillar pillars, and portion of posterior pharyngeal wall visible  Lungs:  Lungs clear with good breath sounds bilaterally  Heart:  Normal heart sounds and rate  History & Physical reviewed:  History and physical reviewed and no updates needed  Statement of review:  I have reviewed the lab findings, diagnostic data, medications, and the plan for sedation

## 2019-09-09 NOTE — IR NOTE
Interventional Radiology Intra-procedural Nursing Note    Patient Name: Gibson Villalta  Medical Record Number: [id number]  Today's Date: September 9, 2019    Procedure: Port Removal    Other Notes: Patient into IR suite 1 via cart. Awake and alert. Patient to table in supine position, prepped and draped with 2% chlorhexidine to right neck/chest. VSS, sinus rhythm with BBB. Dr. Burr in room, timeout and procedure done. Port removed without any issues. Cautery done for bleeding. Versed 1.5 mg and fentanyl 75 mcg given. Last sedation at 0828. Debrief with Dr. Burr. Patient back to care suites via cart. Bedside report given.     Jenelle Morse RN

## 2019-09-09 NOTE — PROGRESS NOTES
Care Suites Discharge Nursing Note    Education/questions answered: Yes  Patient DC location: Home   Accompanied by: Spouse  CS discharge time: ~0962

## 2019-09-09 NOTE — DISCHARGE INSTRUCTIONS
Port Removal Discharge Instructions     After you go home:      Have an adult stay with you for the first 6 hours    You may resume your normal diet       For 24 hours - due to the sedation you received:    Relax and take it easy    Do NOT make any important or legal decisions    Do NOT drive or operate machines at home or at work    Do NOT drink alcohol    Care of Puncture Site:      Keep the dressings on your site clean & dry for 3 days. Change it only if it gets wet or dirty.    You may shower after the dressing comes off in 3 days    Do not remove the small white strips of tape, if present. Allow them to fall off on their own.     You may cover the wound with a bandaid after the dressing is removed if needed for comfort      Activity       Avoid heavy lifting (greater than 10 pounds) or the overuse of your shoulder for 3 days    Bleeding:      If you start bleeding from the incision site in your chest or have swelling in your neck, sit down and press on the site for 5-10 minutes.     If bleeding has not stopped after 10 minutes, call your provider.        Call 911 right away if you have heavy bleeding or bleeding that does not stop.      Medicines:      You may resume all medications    For minor pain, you may take Acetaminophen (Tylenol) or Ibuprofen (Advil)          Call the provider who ordered this procedure if:      You have swelling in your neck or over your port site    The incision area is red, swollen, hot or tender    You have chills or a fever greater than 101 F (38 C)    Any questions or concerns    Call  911 or go to the Emergency Room if you have:      Severe chest pain or trouble breathing    Bleeding that you cannot control    If you have questions call:          [facility] @ [phone]    The provider who performed your procedure was _________________.

## 2019-09-16 ENCOUNTER — OFFICE VISIT (OUTPATIENT)
Dept: CARDIOLOGY | Facility: CLINIC | Age: 79
End: 2019-09-16
Attending: NURSE PRACTITIONER
Payer: COMMERCIAL

## 2019-09-16 VITALS
HEART RATE: 72 BPM | WEIGHT: 212 LBS | BODY MASS INDEX: 28.1 KG/M2 | DIASTOLIC BLOOD PRESSURE: 84 MMHG | SYSTOLIC BLOOD PRESSURE: 120 MMHG | HEIGHT: 73 IN

## 2019-09-16 DIAGNOSIS — I50.22 CHRONIC SYSTOLIC HEART FAILURE (H): Chronic | ICD-10-CM

## 2019-09-16 DIAGNOSIS — I42.9 CARDIOMYOPATHY, UNSPECIFIED TYPE (H): Primary | ICD-10-CM

## 2019-09-16 PROCEDURE — 99214 OFFICE O/P EST MOD 30 MIN: CPT | Performed by: NURSE PRACTITIONER

## 2019-09-16 ASSESSMENT — MIFFLIN-ST. JEOR: SCORE: 1740.51

## 2019-09-16 NOTE — PATIENT INSTRUCTIONS
Today's Recommendations    1. Continue all medications without changes.  2. Please follow up with Dr. Heard in 6 months with heart ultrasound.    Please send a [employer] message or call [phone] with questions or concerns.     Scheduling number [phone].

## 2019-09-16 NOTE — PROGRESS NOTES
Cardiology Clinic Progress Note  Gibson Villalta MRN# [id number]   YOB: 1940 Age: 78 year old          Assessment and Plan:     1. Ischemic cardiomyopathy    LVEF 25-30% and status post BiV ICD    Improvement in shortness of breath and stamina     Follow up in 6 months with Dr. Heard and echocardiogram    2. Stage II diffuse large B-cell lymphoma and history of bladder cancer    Completed chemotherapy in spring of 2019    3. History of RV and LV thrombus (resolved)    Previously was on Coumadin, but was discontinued due to GI bleed         History of Presenting Illness:    Gibson Villalta is a very pleasant 78 year old patient of Dr. Heard who presents today for a follow up due to worsening heart failure symptoms while undergoing Adriamycin for diffuse large B-cell lymphoma with stage II disease affecting his stomach, small bowel and mesenteric lymph nodes. He is again accompanied by his wife Flori.     His past medical history is notable for a RV thrombus (noted on cardiac MRI in 2016) and LV thrombus (seen on echocardiogram from 5/2017), ischemic cardiomyopathy with LVEF 25-30% (dating back at least until 2016) status post biventricular ICD. He also has a history of HFrEF, anemia, CKD, dementia with short term memory loss and LBBB.     His most recent echocardiogram was in March of 2019. It revealed an LVEF of 25-30% with a mildly dilated LV (improved from previous echocardiogram from 1 month prior that showed severe dilation).     Today in clinic he presents stating that he has improvement in his symptoms, but does endorse shortness of breath when walking up the stairs. Overall, they both report increase stamina in the last 6 months.            Review of Systems:   Review of Systems:  Skin:  Negative     Eyes:  Positive for glasses  ENT:  Negative    Respiratory:  Positive for dyspnea on exertion  Cardiovascular:    Positive for;lightheadedness  Gastroenterology: Negative    Genitourinary:    "    Musculoskeletal:  Negative    Neurologic:  Negative    Psychiatric:  Negative    Heme/Lymph/Imm:  Positive for allergies  Endocrine:  Negative              Physical Exam:     Vitals: /84   Pulse 72   Ht 1.854 m (6' 1\")   Wt 96.2 kg (212 lb)   BMI 27.97 kg/m    Constitutional:  cooperative        Skin:  warm and dry to the touch        Head:  normocephalic        Eyes:  pupils equal and round        ENT:    pallor      Neck:  JVP normal        Chest:  clear to auscultation;normal symmetry        Cardiac: regular rhythm;normal S1 and S2 tachycardic S4              Abdomen:  abdomen soft        Vascular: pulses full and equal                                      Extremities and Back:  no deformities, clubbing, cyanosis, erythema observed;no edema        Neurological:  no gross motor deficits;affect appropriate   decreased short term memory           Medications:     Current Outpatient Medications   Medication Sig Dispense Refill     atorvastatin (LIPITOR) 40 MG tablet Take 1 tablet (40 mg) by mouth At Bedtime 90 tablet 3     COENZYME Q-10 PO Take 100 mg by mouth every morning        ferrous sulfate (IRON) 325 (65 Fe) MG tablet Take 325 mg by mouth daily       furosemide (LASIX) 20 MG tablet TAKE ONE TABLET BY MOUTH ONE TIME DAILY  90 tablet 1     hydrALAZINE (APRESOLINE) 10 MG tablet Take 1 tablet (10 mg) by mouth 3 times daily 270 tablet 0     metoprolol succinate (TOPROL-XL) 50 MG 24 hr tablet TAKE 1 TABLET (50 MG) DAILY (Patient taking differently: TAKE 1 TABLET (50 MG) BY MOUTH DAILY) 90 tablet 3     multivitamin, therapeutic with minerals (MULTI-VITAMIN) TABS Take 1 tablet by mouth every morning        venlafaxine (EFFEXOR) 75 MG tablet TAKE 1 TABLET TWICE DAILY (Patient taking differently: TAKE 1 TABLET (75 MG) BY MOUTH TWICE DAILY) 180 tablet 1     vitamin D2 (ERGOCALCIFEROL) 29579 units (1250 mcg) capsule TAKE ONE CAPSULE BY MOUTH ONCE A WEEK ON TUESDAY 12 capsule 1     ACE/ARB NOT PRESCRIBED, " INTENTIONAL, ACE & ARB not prescribed due to Symptomatic hypotension not due to excessive diuresis       ASPIRIN NOT PRESCRIBED (INTENTIONAL) Please choose reason not prescribed, below 0 each 0       Family History   Problem Relation Age of Onset     Coronary Artery Disease Father 39     Alzheimer Disease Mother      Coronary Artery Disease Son         Two sons have  from MIs (ages 39 and 51)       Social History     Socioeconomic History     Marital status:      Spouse name: Not on file     Number of children: Not on file     Years of education: Not on file     Highest education level: Not on file   Occupational History     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[employer]     Comment: [employer]; retired   Social Needs     Financial resource strain: Not on file     Food insecurity:     Worry: Not on file     Inability: Not on file     Transportation needs:     Medical: Not on file     Non-medical: Not on file   Tobacco Use     Smoking status: Former Smoker     Packs/day: 2.00     Years: 20.00     Pack years: 40.00     Types: Cigarettes     Last attempt to quit: 1980     Years since quittin.7     Smokeless tobacco: Never Used     Tobacco comment: Quit in his 30s - 2 ppd for 20 years   Substance and Sexual Activity     Alcohol use: Yes     Alcohol/week: 0.0 oz     Comment: rarely     Drug use: No     Sexual activity: Yes     Partners: Female   Lifestyle     Physical activity:     Days per week: Not on file     Minutes per session: Not on file     Stress: Not on file   Relationships     Social connections:     Talks on phone: Not on file     Gets together: Not on file     Attends Zoroastrianism service: Not on file     Active member of club or organization: Not on file     Attends meetings of clubs or organizations: Not on file     Relationship status: Not on file     Intimate partner violence:     Fear of current or ex partner: Not on file     Emotionally abused: Not on file     Physically abused: Not on file      Forced sexual activity: Not on file   Other Topics Concern     Parent/sibling w/ CABG, MI or angioplasty before 65F 55M? No      Service Not Asked     Blood Transfusions Not Asked     Caffeine Concern Not Asked     Occupational Exposure Not Asked     Hobby Hazards Not Asked     Sleep Concern Not Asked     Stress Concern Not Asked     Weight Concern Not Asked     Special Diet Yes     Comment: low fat, low salt      Back Care Not Asked     Exercise No     Bike Helmet Not Asked     Seat Belt Not Asked     Self-Exams Not Asked   Social History Narrative     Not on file            Past Medical History:     Past Medical History:   Diagnosis Date     Acute kidney injury (H) 2012     Anemia      Anxiety      Anxiety and depression      Bladder mass      BPH (benign prostatic hypertrophy)      Cardiomyopathy (H)     ICD implanted 11/2016     Carpal tunnel syndrome     Right     Chronic kidney disease (CKD), stage 3 (moderate)      Dementia      Depressive disorder      Diabetes (H)      Diffuse large B-cell lymphoma of extranodal site (H) 11/23/2018     Gastric mass      Gout      History of depression      LBBB (left bundle branch block) 2013     Lumbar herniated disc     L5-S1     Mixed cardiomyopathy 7/22/2016     Myocardial infarction (H) 1995 and 2010     Nonischemic cardiomyopathy (H) 7/22/2016     Obesity      Pacemaker     ICD/PM     Rosacea      Rotator cuff disorder     Tear x 2     RV (right ventricular) mural thrombus 7/22/2016              Past Surgical History:     Past Surgical History:   Procedure Laterality Date     ANGIOPLASTY  1995 and 2010 with stent     BIOPSY      stomach     BONE MARROW BIOPSY, BONE SPECIMEN, NEEDLE/TROCAR N/A 10/30/2018    Procedure: BIOPSY BONE MARROW;  Surgeon: Jeb Romero MD;  Location:  GI     CARDIAC SURGERY      ICD/PM     CYSTOSCOPY, TRANSURETHRAL RESECTION (TUR) PROSTATE, COMBINED  4/22/2019    Procedure: CYSTOSCOPY AND BIPOLAR TRANSURETHRAL RESECTION  (TUR) PROSTATE;  Surgeon: Ryan Camarillo MD;  Location:  OR     CYSTOSCOPY, TRANSURETHRAL RESECTION (TUR) TUMOR BLADDER, COMBINED N/A 9/19/2018    Procedure: COMBINED CYSTOSCOPY, TRANSURETHRAL RESECTION (TUR) TUMOR BLADDER;  CYSTOSCOPY, TRANSURETHRAL RESECTION BLADDER TUMOR ;  Surgeon: Ryan Camarillo MD;  Location:  OR     CYSTOSCOPY, TRANSURETHRAL RESECTION (TUR) TUMOR BLADDER, COMBINED N/A 11/12/2018    Procedure: CYSTOSCOPY RESTAGING TRANSURETHRAL RESECTION BLADDER TUMOR;  Surgeon: Ryan Camarillo MD;  Location:  OR     ESOPHAGOSCOPY, GASTROSCOPY, DUODENOSCOPY (EGD), COMBINED N/A 7/30/2018    Procedure: COMBINED ESOPHAGOSCOPY, GASTROSCOPY, DUODENOSCOPY (EGD), BIOPSY SINGLE OR MULTIPLE;  gastroscopy;  Surgeon: Parish Xiong MD;  Location:  GI     ESOPHAGOSCOPY, GASTROSCOPY, DUODENOSCOPY (EGD), COMBINED N/A 7/30/2018    Procedure: COMBINED ESOPHAGOSCOPY, GASTROSCOPY, DUODENOSCOPY (EGD);  EGD;  Surgeon: Parish Xiong MD;  Location:  GI     ESOPHAGOSCOPY, GASTROSCOPY, DUODENOSCOPY (EGD), COMBINED N/A 8/2/2018    Procedure: COMBINED ESOPHAGOSCOPY, GASTROSCOPY, DUODENOSCOPY (EGD), BIOPSY SINGLE OR MULTIPLE;  gastroscopy BIOPSYSHOULD BE SENT TO LAB IN NS NOT FORMALIN PER ;  Surgeon: Jonathon Bush MD;  Location:  GI     GASTRIC BYPASS  2001     HEART CATH LEFT HEART CATH  7/19/16    Non ischemic cardiomyopathy; Non functionally significant LAD and RCA disease      IR PORT REMOVAL RIGHT  9/9/2019     LASER HOLMIUM LITHOTRIPSY BLADDER N/A 4/22/2019    Procedure: CYSTOSCOPY, HOLMIUM LASER LITHOLAPAXY ,  AND BIPOLAR TRANSURETHRAL RESECTION (TUR) PROSTATE;  Surgeon: Ryan Camarillo MD;  Location:  OR     OTHER SURGICAL HISTORY  2006    Spinal surgery     OTHER SURGICAL HISTORY  Nov 2016    CRT-D implantation              Allergies:   Wasps [hornets]       Data:   All laboratory data reviewed:    Recent Labs   Lab Test 09/06/19  1247 08/02/19  0803 04/04/19  1514 10/12/18  1301 08/09/18  1256   02/07/17  0817 07/17/16  1247  06/02/16 12/22/15   LDL 98 82 127*  --   --   --  93  --   --   --  61   HDL  --  33*  --   --   --   --  42  --   --   --  39   NHDL  --  132*  --   --   --   --  134*  --   --   --   --    CHOL  --  165  --   --   --   --  176  --   --   --  11   TRIG  --  249*  --   --   --   --  203*  --   --   --  100   TSH  --   --   --   --  0.92  --   --  3.76  --  2.25 0.99   IRON  --   --   --  68  --   --   --  66   < >  --   --    FEB  --   --   --  321  --   --   --  273   < >  --   --    IRONSAT  --   --   --  21  --   --   --  24   < >  --   --    MEGA  --   --   --  21*  --    < >  --  108  --   --   --     < > = values in this interval not displayed.       Lab Results   Component Value Date    WBC 10.2 09/09/2019    RBC 4.28 (L) 09/09/2019    HGB 13.6 09/09/2019    HCT 40.8 09/09/2019    MCV 95 09/09/2019    MCH 31.8 09/09/2019    MCHC 33.3 09/09/2019    RDW 15.1 (H) 09/09/2019     09/09/2019       Lab Results   Component Value Date     09/06/2019    POTASSIUM 4.2 09/06/2019    CHLORIDE 109 09/06/2019    CO2 29 09/06/2019    ANIONGAP 2 (L) 09/06/2019     (H) 09/06/2019    BUN 21 09/06/2019    CR 1.30 (H) 09/06/2019    GFRESTIMATED 52 (L) 09/06/2019    GFRESTBLACK 61 09/06/2019    SHELLEY 9.3 09/06/2019      Lab Results   Component Value Date    AST 24 04/04/2019    ALT 27 04/04/2019       Lab Results   Component Value Date    A1C 6.7 (H) 09/06/2019       Lab Results   Component Value Date    INR 1.01 09/09/2019    INR 1.06 11/27/2018         ALLEN ANTONY, CNP  [facility]

## 2019-09-16 NOTE — LETTER
9/16/2019    Akira Currie MD  [address]  [facility] [address]    RE: Gibson Villalta       Dear Colleague,    I had the pleasure of seeing Gibson Villalta in the [facility].    Cardiology Clinic Progress Note  Gibson Villalta MRN# [id number]   YOB: 1940 Age: 78 year old          Assessment and Plan:     1. Ischemic cardiomyopathy    LVEF 25-30% and status post BiV ICD    Improvement in shortness of breath and stamina     Follow up in 6 months with Dr. Heard and echocardiogram    2. Stage II diffuse large B-cell lymphoma and history of bladder cancer    Completed chemotherapy in spring of 2019    3. History of RV and LV thrombus (resolved)    Previously was on Coumadin, but was discontinued due to GI bleed         History of Presenting Illness:    Gibson Villalta is a very pleasant 78 year old patient of Dr. Heard who presents today for a follow up due to worsening heart failure symptoms while undergoing Adriamycin for diffuse large B-cell lymphoma with stage II disease affecting his stomach, small bowel and mesenteric lymph nodes. He is again accompanied by his wife Flori.     His past medical history is notable for a RV thrombus (noted on cardiac MRI in 2016) and LV thrombus (seen on echocardiogram from 5/2017), ischemic cardiomyopathy with LVEF 25-30% (dating back at least until 2016) status post biventricular ICD. He also has a history of HFrEF, anemia, CKD, dementia with short term memory loss and LBBB.     His most recent echocardiogram was in March of 2019. It revealed an LVEF of 25-30% with a mildly dilated LV (improved from previous echocardiogram from 1 month prior that showed severe dilation).     Today in clinic he presents stating that he has improvement in his symptoms, but does endorse shortness of breath when walking up the stairs. Overall, they both report increase stamina in the last 6 months.            Review  "of Systems:   Review of Systems:  Skin:  Negative     Eyes:  Positive for glasses  ENT:  Negative    Respiratory:  Positive for dyspnea on exertion  Cardiovascular:    Positive for;lightheadedness  Gastroenterology: Negative    Genitourinary:       Musculoskeletal:  Negative    Neurologic:  Negative    Psychiatric:  Negative    Heme/Lymph/Imm:  Positive for allergies  Endocrine:  Negative              Physical Exam:     Vitals: /84   Pulse 72   Ht 1.854 m (6' 1\")   Wt 96.2 kg (212 lb)   BMI 27.97 kg/m     Constitutional:  cooperative        Skin:  warm and dry to the touch        Head:  normocephalic        Eyes:  pupils equal and round        ENT:    pallor      Neck:  JVP normal        Chest:  clear to auscultation;normal symmetry        Cardiac: regular rhythm;normal S1 and S2 tachycardic S4              Abdomen:  abdomen soft        Vascular: pulses full and equal                                      Extremities and Back:  no deformities, clubbing, cyanosis, erythema observed;no edema        Neurological:  no gross motor deficits;affect appropriate   decreased short term memory           Medications:     Current Outpatient Medications   Medication Sig Dispense Refill     atorvastatin (LIPITOR) 40 MG tablet Take 1 tablet (40 mg) by mouth At Bedtime 90 tablet 3     COENZYME Q-10 PO Take 100 mg by mouth every morning        ferrous sulfate (IRON) 325 (65 Fe) MG tablet Take 325 mg by mouth daily       furosemide (LASIX) 20 MG tablet TAKE ONE TABLET BY MOUTH ONE TIME DAILY  90 tablet 1     hydrALAZINE (APRESOLINE) 10 MG tablet Take 1 tablet (10 mg) by mouth 3 times daily 270 tablet 0     metoprolol succinate (TOPROL-XL) 50 MG 24 hr tablet TAKE 1 TABLET (50 MG) DAILY (Patient taking differently: TAKE 1 TABLET (50 MG) BY MOUTH DAILY) 90 tablet 3     multivitamin, therapeutic with minerals (MULTI-VITAMIN) TABS Take 1 tablet by mouth every morning        venlafaxine (EFFEXOR) 75 MG tablet TAKE 1 TABLET TWICE " DAILY (Patient taking differently: TAKE 1 TABLET (75 MG) BY MOUTH TWICE DAILY) 180 tablet 1     vitamin D2 (ERGOCALCIFEROL) 28671 units (1250 mcg) capsule TAKE ONE CAPSULE BY MOUTH ONCE A WEEK ON TUESDAY 12 capsule 1     ACE/ARB NOT PRESCRIBED, INTENTIONAL, ACE & ARB not prescribed due to Symptomatic hypotension not due to excessive diuresis       ASPIRIN NOT PRESCRIBED (INTENTIONAL) Please choose reason not prescribed, below 0 each 0       Family History   Problem Relation Age of Onset     Coronary Artery Disease Father 39     Alzheimer Disease Mother      Coronary Artery Disease Son         Two sons have  from MIs (ages 39 and 51)       Social History     Socioeconomic History     Marital status:      Spouse name: Not on file     Number of children: Not on file     Years of education: Not on file     Highest education level: Not on file   Occupational History     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[employer]     Comment: [employer]; retired   Social Needs     Financial resource strain: Not on file     Food insecurity:     Worry: Not on file     Inability: Not on file     Transportation needs:     Medical: Not on file     Non-medical: Not on file   Tobacco Use     Smoking status: Former Smoker     Packs/day: 2.00     Years: 20.00     Pack years: 40.00     Types: Cigarettes     Last attempt to quit: 1980     Years since quittin.7     Smokeless tobacco: Never Used     Tobacco comment: Quit in his 30s - 2 ppd for 20 years   Substance and Sexual Activity     Alcohol use: Yes     Alcohol/week: 0.0 oz     Comment: rarely     Drug use: No     Sexual activity: Yes     Partners: Female   Lifestyle     Physical activity:     Days per week: Not on file     Minutes per session: Not on file     Stress: Not on file   Relationships     Social connections:     Talks on phone: Not on file     Gets together: Not on file     Attends Judaism service: Not on file     Active member of club or organization: Not on file      Attends meetings of clubs or organizations: Not on file     Relationship status: Not on file     Intimate partner violence:     Fear of current or ex partner: Not on file     Emotionally abused: Not on file     Physically abused: Not on file     Forced sexual activity: Not on file   Other Topics Concern     Parent/sibling w/ CABG, MI or angioplasty before 65F 55M? No      Service Not Asked     Blood Transfusions Not Asked     Caffeine Concern Not Asked     Occupational Exposure Not Asked     Hobby Hazards Not Asked     Sleep Concern Not Asked     Stress Concern Not Asked     Weight Concern Not Asked     Special Diet Yes     Comment: low fat, low salt      Back Care Not Asked     Exercise No     Bike Helmet Not Asked     Seat Belt Not Asked     Self-Exams Not Asked   Social History Narrative     Not on file            Past Medical History:     Past Medical History:   Diagnosis Date     Acute kidney injury (H) 2012     Anemia      Anxiety      Anxiety and depression      Bladder mass      BPH (benign prostatic hypertrophy)      Cardiomyopathy (H)     ICD implanted 11/2016     Carpal tunnel syndrome     Right     Chronic kidney disease (CKD), stage 3 (moderate)      Dementia      Depressive disorder      Diabetes (H)      Diffuse large B-cell lymphoma of extranodal site (H) 11/23/2018     Gastric mass      Gout      History of depression      LBBB (left bundle branch block) 2013     Lumbar herniated disc     L5-S1     Mixed cardiomyopathy 7/22/2016     Myocardial infarction (H) 1995 and 2010     Nonischemic cardiomyopathy (H) 7/22/2016     Obesity      Pacemaker     ICD/PM     Rosacea      Rotator cuff disorder     Tear x 2     RV (right ventricular) mural thrombus 7/22/2016              Past Surgical History:     Past Surgical History:   Procedure Laterality Date     ANGIOPLASTY  1995 and 2010 with stent     BIOPSY      stomach     BONE MARROW BIOPSY, BONE SPECIMEN, NEEDLE/TROCAR N/A 10/30/2018    Procedure:  BIOPSY BONE MARROW;  Surgeon: Jeb Romero MD;  Location:  GI     CARDIAC SURGERY      ICD/PM     CYSTOSCOPY, TRANSURETHRAL RESECTION (TUR) PROSTATE, COMBINED  4/22/2019    Procedure: CYSTOSCOPY AND BIPOLAR TRANSURETHRAL RESECTION (TUR) PROSTATE;  Surgeon: Ryan Camarillo MD;  Location:  OR     CYSTOSCOPY, TRANSURETHRAL RESECTION (TUR) TUMOR BLADDER, COMBINED N/A 9/19/2018    Procedure: COMBINED CYSTOSCOPY, TRANSURETHRAL RESECTION (TUR) TUMOR BLADDER;  CYSTOSCOPY, TRANSURETHRAL RESECTION BLADDER TUMOR ;  Surgeon: Ryan Camarillo MD;  Location:  OR     CYSTOSCOPY, TRANSURETHRAL RESECTION (TUR) TUMOR BLADDER, COMBINED N/A 11/12/2018    Procedure: CYSTOSCOPY RESTAGING TRANSURETHRAL RESECTION BLADDER TUMOR;  Surgeon: Ryan Camarillo MD;  Location:  OR     ESOPHAGOSCOPY, GASTROSCOPY, DUODENOSCOPY (EGD), COMBINED N/A 7/30/2018    Procedure: COMBINED ESOPHAGOSCOPY, GASTROSCOPY, DUODENOSCOPY (EGD), BIOPSY SINGLE OR MULTIPLE;  gastroscopy;  Surgeon: Parish Xiong MD;  Location:  GI     ESOPHAGOSCOPY, GASTROSCOPY, DUODENOSCOPY (EGD), COMBINED N/A 7/30/2018    Procedure: COMBINED ESOPHAGOSCOPY, GASTROSCOPY, DUODENOSCOPY (EGD);  EGD;  Surgeon: Parish Xiong MD;  Location:  GI     ESOPHAGOSCOPY, GASTROSCOPY, DUODENOSCOPY (EGD), COMBINED N/A 8/2/2018    Procedure: COMBINED ESOPHAGOSCOPY, GASTROSCOPY, DUODENOSCOPY (EGD), BIOPSY SINGLE OR MULTIPLE;  gastroscopy BIOPSYSHOULD BE SENT TO LAB IN NS NOT FORMALIN PER ;  Surgeon: Jonathon Bush MD;  Location:  GI     GASTRIC BYPASS  2001     HEART CATH LEFT HEART CATH  7/19/16    Non ischemic cardiomyopathy; Non functionally significant LAD and RCA disease      IR PORT REMOVAL RIGHT  9/9/2019     LASER HOLMIUM LITHOTRIPSY BLADDER N/A 4/22/2019    Procedure: CYSTOSCOPY, HOLMIUM LASER LITHOLAPAXY ,  AND BIPOLAR TRANSURETHRAL RESECTION (TUR) PROSTATE;  Surgeon: Ryan Camarillo MD;  Location:  OR     OTHER SURGICAL HISTORY  2006    Spinal surgery      OTHER SURGICAL HISTORY  Nov 2016    CRT-D implantation              Allergies:   Wasps [hornets]       Data:   All laboratory data reviewed:    Recent Labs   Lab Test 09/06/19  1247 08/02/19  0803 04/04/19  1514 10/12/18  1301 08/09/18  1256  02/07/17  0817 07/17/16  1247  06/02/16 12/22/15   LDL 98 82 127*  --   --   --  93  --   --   --  61   HDL  --  33*  --   --   --   --  42  --   --   --  39   NHDL  --  132*  --   --   --   --  134*  --   --   --   --    CHOL  --  165  --   --   --   --  176  --   --   --  11   TRIG  --  249*  --   --   --   --  203*  --   --   --  100   TSH  --   --   --   --  0.92  --   --  3.76  --  2.25 0.99   IRON  --   --   --  68  --   --   --  66   < >  --   --    FEB  --   --   --  321  --   --   --  273   < >  --   --    IRONSAT  --   --   --  21  --   --   --  24   < >  --   --    MEGA  --   --   --  21*  --    < >  --  108  --   --   --     < > = values in this interval not displayed.       Lab Results   Component Value Date    WBC 10.2 09/09/2019    RBC 4.28 (L) 09/09/2019    HGB 13.6 09/09/2019    HCT 40.8 09/09/2019    MCV 95 09/09/2019    MCH 31.8 09/09/2019    MCHC 33.3 09/09/2019    RDW 15.1 (H) 09/09/2019     09/09/2019       Lab Results   Component Value Date     09/06/2019    POTASSIUM 4.2 09/06/2019    CHLORIDE 109 09/06/2019    CO2 29 09/06/2019    ANIONGAP 2 (L) 09/06/2019     (H) 09/06/2019    BUN 21 09/06/2019    CR 1.30 (H) 09/06/2019    GFRESTIMATED 52 (L) 09/06/2019    GFRESTBLACK 61 09/06/2019    SHELLEY 9.3 09/06/2019      Lab Results   Component Value Date    AST 24 04/04/2019    ALT 27 04/04/2019       Lab Results   Component Value Date    A1C 6.7 (H) 09/06/2019       Lab Results   Component Value Date    INR 1.01 09/09/2019    INR 1.06 11/27/2018         Thank you for allowing me to participate in the care of your patient.    Sincerely,     ALLEN ANTONY [facility]

## 2019-09-17 ENCOUNTER — TELEPHONE (OUTPATIENT)
Dept: FAMILY MEDICINE | Facility: CLINIC | Age: 79
End: 2019-09-17
Payer: COMMERCIAL

## 2019-09-17 NOTE — TELEPHONE ENCOUNTER
Reason for Call:  Medication or medication refill:    Do you use a [facility]?  Name of the pharmacy and phone number for the current request:       [id number] - [place], [place] - [address]      Name of the medication requested: warfarin       Other request: pt states cardiologist wants him to get back on it    Can we leave a detailed message on this number? YES    Phone number patient can be reached at: Home number on file [phone] (home)   *wife not on consent to communicate*    Best Time: any      Call taken on 9/17/2019 at 12:12 PM by Kate Bronson

## 2019-09-17 NOTE — TELEPHONE ENCOUNTER
Will route to  to review, evaluate and advise. See pt office visit with cardiologist on 9/16/19. No information about restarting warfarin.

## 2019-09-18 NOTE — TELEPHONE ENCOUNTER
I read the cardiology consultation and did not read anything about having to restart warfarin.  Please verify with the patient's cardiologist/cardiology clinic nurse.

## 2019-09-18 NOTE — TELEPHONE ENCOUNTER
DAGMAR PCP:     Called [facility] - [phone]    Spoke with cardiology triage nurse (Teodora)     States cardiology did not resume warfarin - Pt was to speak with GI and oncology regarding whether he should resume taking    Called pt, he handed phone Izabella his wife. She forgot to ask about warfarin at recent cardiology visit. Discussed per cardiology she is to discuss with GI and oncology first     She will call them     Symone MONTILLA RN

## 2019-09-25 ENCOUNTER — ANCILLARY PROCEDURE (OUTPATIENT)
Dept: CARDIOLOGY | Facility: CLINIC | Age: 79
End: 2019-09-25
Attending: INTERNAL MEDICINE
Payer: COMMERCIAL

## 2019-09-25 DIAGNOSIS — I25.5 ISCHEMIC CARDIOMYOPATHY: ICD-10-CM

## 2019-09-25 PROCEDURE — 93295 DEV INTERROG REMOTE 1/2/MLT: CPT | Performed by: INTERNAL MEDICINE

## 2019-09-25 PROCEDURE — 93296 REM INTERROG EVL PM/IDS: CPT | Performed by: INTERNAL MEDICINE

## 2019-09-27 DIAGNOSIS — I42.9 CARDIOMYOPATHY, UNSPECIFIED TYPE (H): ICD-10-CM

## 2019-09-27 RX ORDER — HYDRALAZINE HYDROCHLORIDE 10 MG/1
10 TABLET, FILM COATED ORAL 3 TIMES DAILY
Qty: 270 TABLET | Refills: 1 | Status: SHIPPED | OUTPATIENT
Start: 2019-09-27 | End: 2020-03-18

## 2019-09-30 ENCOUNTER — HEALTH MAINTENANCE LETTER (OUTPATIENT)
Age: 79
End: 2019-09-30

## 2019-10-01 LAB
MDC_IDC_EPISODE_DTM: NORMAL
MDC_IDC_EPISODE_DTM: NORMAL
MDC_IDC_EPISODE_DURATION: 4 S
MDC_IDC_EPISODE_DURATION: 7 S
MDC_IDC_EPISODE_ID: 26
MDC_IDC_EPISODE_ID: 27
MDC_IDC_EPISODE_TYPE: NORMAL
MDC_IDC_EPISODE_TYPE: NORMAL
MDC_IDC_LEAD_IMPLANT_DT: NORMAL
MDC_IDC_LEAD_LOCATION: NORMAL
MDC_IDC_LEAD_LOCATION_DETAIL_1: NORMAL
MDC_IDC_LEAD_MFG: NORMAL
MDC_IDC_LEAD_MODEL: NORMAL
MDC_IDC_LEAD_POLARITY_TYPE: NORMAL
MDC_IDC_LEAD_SERIAL: NORMAL
MDC_IDC_MSMT_BATTERY_DTM: NORMAL
MDC_IDC_MSMT_BATTERY_REMAINING_LONGEVITY: 60 MO
MDC_IDC_MSMT_BATTERY_RRT_TRIGGER: 2.73
MDC_IDC_MSMT_BATTERY_STATUS: NORMAL
MDC_IDC_MSMT_BATTERY_VOLTAGE: 2.97 V
MDC_IDC_MSMT_CAP_CHARGE_DTM: NORMAL
MDC_IDC_MSMT_CAP_CHARGE_ENERGY: 18 J
MDC_IDC_MSMT_CAP_CHARGE_TIME: 3.88
MDC_IDC_MSMT_CAP_CHARGE_TYPE: NORMAL
MDC_IDC_MSMT_LEADCHNL_LV_IMPEDANCE_VALUE: 184.15
MDC_IDC_MSMT_LEADCHNL_LV_IMPEDANCE_VALUE: 188.1 OHM
MDC_IDC_MSMT_LEADCHNL_LV_IMPEDANCE_VALUE: 188.1 OHM
MDC_IDC_MSMT_LEADCHNL_LV_IMPEDANCE_VALUE: 204.14 OHM
MDC_IDC_MSMT_LEADCHNL_LV_IMPEDANCE_VALUE: 209 OHM
MDC_IDC_MSMT_LEADCHNL_LV_IMPEDANCE_VALUE: 342 OHM
MDC_IDC_MSMT_LEADCHNL_LV_IMPEDANCE_VALUE: 399 OHM
MDC_IDC_MSMT_LEADCHNL_LV_IMPEDANCE_VALUE: 399 OHM
MDC_IDC_MSMT_LEADCHNL_LV_IMPEDANCE_VALUE: 418 OHM
MDC_IDC_MSMT_LEADCHNL_LV_IMPEDANCE_VALUE: 418 OHM
MDC_IDC_MSMT_LEADCHNL_LV_IMPEDANCE_VALUE: 513 OHM
MDC_IDC_MSMT_LEADCHNL_LV_IMPEDANCE_VALUE: 532 OHM
MDC_IDC_MSMT_LEADCHNL_LV_IMPEDANCE_VALUE: 532 OHM
MDC_IDC_MSMT_LEADCHNL_LV_IMPEDANCE_VALUE: 551 OHM
MDC_IDC_MSMT_LEADCHNL_LV_IMPEDANCE_VALUE: 589 OHM
MDC_IDC_MSMT_LEADCHNL_LV_PACING_THRESHOLD_AMPLITUDE: 0.75 V
MDC_IDC_MSMT_LEADCHNL_LV_PACING_THRESHOLD_PULSEWIDTH: 0.5 MS
MDC_IDC_MSMT_LEADCHNL_RA_IMPEDANCE_VALUE: 361 OHM
MDC_IDC_MSMT_LEADCHNL_RA_PACING_THRESHOLD_AMPLITUDE: 0.5 V
MDC_IDC_MSMT_LEADCHNL_RA_PACING_THRESHOLD_PULSEWIDTH: 0.4 MS
MDC_IDC_MSMT_LEADCHNL_RA_SENSING_INTR_AMPL: 1.88 MV
MDC_IDC_MSMT_LEADCHNL_RA_SENSING_INTR_AMPL: 1.88 MV
MDC_IDC_MSMT_LEADCHNL_RV_IMPEDANCE_VALUE: 456 OHM
MDC_IDC_MSMT_LEADCHNL_RV_IMPEDANCE_VALUE: 456 OHM
MDC_IDC_MSMT_LEADCHNL_RV_PACING_THRESHOLD_AMPLITUDE: 0.5 V
MDC_IDC_MSMT_LEADCHNL_RV_PACING_THRESHOLD_PULSEWIDTH: 0.4 MS
MDC_IDC_MSMT_LEADCHNL_RV_SENSING_INTR_AMPL: 10 MV
MDC_IDC_MSMT_LEADCHNL_RV_SENSING_INTR_AMPL: 10 MV
MDC_IDC_PG_IMPLANT_DTM: NORMAL
MDC_IDC_PG_MFG: NORMAL
MDC_IDC_PG_MODEL: NORMAL
MDC_IDC_PG_SERIAL: NORMAL
MDC_IDC_PG_TYPE: NORMAL
MDC_IDC_SESS_CLINIC_NAME: NORMAL
MDC_IDC_SESS_DTM: NORMAL
MDC_IDC_SESS_TYPE: NORMAL
MDC_IDC_SET_BRADY_AT_MODE_SWITCH_RATE: 171 {BEATS}/MIN
MDC_IDC_SET_BRADY_LOWRATE: 50 {BEATS}/MIN
MDC_IDC_SET_BRADY_MAX_SENSOR_RATE: 130 {BEATS}/MIN
MDC_IDC_SET_BRADY_MAX_TRACKING_RATE: 130 {BEATS}/MIN
MDC_IDC_SET_BRADY_MODE: NORMAL
MDC_IDC_SET_BRADY_PAV_DELAY_LOW: 170 MS
MDC_IDC_SET_BRADY_SAV_DELAY_LOW: 130 MS
MDC_IDC_SET_CRT_LVRV_DELAY: 0 MS
MDC_IDC_SET_CRT_PACED_CHAMBERS: NORMAL
MDC_IDC_SET_LEADCHNL_LV_PACING_AMPLITUDE: 1.25 V
MDC_IDC_SET_LEADCHNL_LV_PACING_ANODE_ELECTRODE_1: NORMAL
MDC_IDC_SET_LEADCHNL_LV_PACING_ANODE_LOCATION_1: NORMAL
MDC_IDC_SET_LEADCHNL_LV_PACING_CAPTURE_MODE: NORMAL
MDC_IDC_SET_LEADCHNL_LV_PACING_CATHODE_ELECTRODE_1: NORMAL
MDC_IDC_SET_LEADCHNL_LV_PACING_CATHODE_LOCATION_1: NORMAL
MDC_IDC_SET_LEADCHNL_LV_PACING_POLARITY: NORMAL
MDC_IDC_SET_LEADCHNL_LV_PACING_PULSEWIDTH: 0.5 MS
MDC_IDC_SET_LEADCHNL_RA_PACING_AMPLITUDE: 1.5 V
MDC_IDC_SET_LEADCHNL_RA_PACING_ANODE_ELECTRODE_1: NORMAL
MDC_IDC_SET_LEADCHNL_RA_PACING_ANODE_LOCATION_1: NORMAL
MDC_IDC_SET_LEADCHNL_RA_PACING_CAPTURE_MODE: NORMAL
MDC_IDC_SET_LEADCHNL_RA_PACING_CATHODE_ELECTRODE_1: NORMAL
MDC_IDC_SET_LEADCHNL_RA_PACING_CATHODE_LOCATION_1: NORMAL
MDC_IDC_SET_LEADCHNL_RA_PACING_POLARITY: NORMAL
MDC_IDC_SET_LEADCHNL_RA_PACING_PULSEWIDTH: 0.4 MS
MDC_IDC_SET_LEADCHNL_RA_SENSING_ANODE_ELECTRODE_1: NORMAL
MDC_IDC_SET_LEADCHNL_RA_SENSING_ANODE_LOCATION_1: NORMAL
MDC_IDC_SET_LEADCHNL_RA_SENSING_CATHODE_ELECTRODE_1: NORMAL
MDC_IDC_SET_LEADCHNL_RA_SENSING_CATHODE_LOCATION_1: NORMAL
MDC_IDC_SET_LEADCHNL_RA_SENSING_POLARITY: NORMAL
MDC_IDC_SET_LEADCHNL_RA_SENSING_SENSITIVITY: 0.3 MV
MDC_IDC_SET_LEADCHNL_RV_PACING_AMPLITUDE: 2 V
MDC_IDC_SET_LEADCHNL_RV_PACING_ANODE_ELECTRODE_1: NORMAL
MDC_IDC_SET_LEADCHNL_RV_PACING_ANODE_LOCATION_1: NORMAL
MDC_IDC_SET_LEADCHNL_RV_PACING_CAPTURE_MODE: NORMAL
MDC_IDC_SET_LEADCHNL_RV_PACING_CATHODE_ELECTRODE_1: NORMAL
MDC_IDC_SET_LEADCHNL_RV_PACING_CATHODE_LOCATION_1: NORMAL
MDC_IDC_SET_LEADCHNL_RV_PACING_POLARITY: NORMAL
MDC_IDC_SET_LEADCHNL_RV_PACING_PULSEWIDTH: 0.4 MS
MDC_IDC_SET_LEADCHNL_RV_SENSING_ANODE_ELECTRODE_1: NORMAL
MDC_IDC_SET_LEADCHNL_RV_SENSING_ANODE_LOCATION_1: NORMAL
MDC_IDC_SET_LEADCHNL_RV_SENSING_CATHODE_ELECTRODE_1: NORMAL
MDC_IDC_SET_LEADCHNL_RV_SENSING_CATHODE_LOCATION_1: NORMAL
MDC_IDC_SET_LEADCHNL_RV_SENSING_POLARITY: NORMAL
MDC_IDC_SET_LEADCHNL_RV_SENSING_SENSITIVITY: 0.3 MV
MDC_IDC_SET_ZONE_DETECTION_BEATS_DENOMINATOR: 40 {BEATS}
MDC_IDC_SET_ZONE_DETECTION_BEATS_NUMERATOR: 30 {BEATS}
MDC_IDC_SET_ZONE_DETECTION_INTERVAL: 300 MS
MDC_IDC_SET_ZONE_DETECTION_INTERVAL: 350 MS
MDC_IDC_SET_ZONE_DETECTION_INTERVAL: 360 MS
MDC_IDC_SET_ZONE_DETECTION_INTERVAL: 360 MS
MDC_IDC_SET_ZONE_DETECTION_INTERVAL: NORMAL
MDC_IDC_SET_ZONE_TYPE: NORMAL
MDC_IDC_STAT_AT_BURDEN_PERCENT: 0 %
MDC_IDC_STAT_AT_DTM_END: NORMAL
MDC_IDC_STAT_AT_DTM_START: NORMAL
MDC_IDC_STAT_BRADY_AP_VP_PERCENT: 0.72 %
MDC_IDC_STAT_BRADY_AP_VS_PERCENT: 0.01 %
MDC_IDC_STAT_BRADY_AS_VP_PERCENT: 97.87 %
MDC_IDC_STAT_BRADY_AS_VS_PERCENT: 1.41 %
MDC_IDC_STAT_BRADY_DTM_END: NORMAL
MDC_IDC_STAT_BRADY_DTM_START: NORMAL
MDC_IDC_STAT_BRADY_RA_PERCENT_PACED: 0.73 %
MDC_IDC_STAT_BRADY_RV_PERCENT_PACED: 97.73 %
MDC_IDC_STAT_CRT_DTM_END: NORMAL
MDC_IDC_STAT_CRT_DTM_START: NORMAL
MDC_IDC_STAT_CRT_LV_PERCENT_PACED: 97.66 %
MDC_IDC_STAT_CRT_PERCENT_PACED: 97.66 %
MDC_IDC_STAT_EPISODE_RECENT_COUNT: 0
MDC_IDC_STAT_EPISODE_RECENT_COUNT_DTM_END: NORMAL
MDC_IDC_STAT_EPISODE_RECENT_COUNT_DTM_START: NORMAL
MDC_IDC_STAT_EPISODE_TOTAL_COUNT: 0
MDC_IDC_STAT_EPISODE_TOTAL_COUNT: 1
MDC_IDC_STAT_EPISODE_TOTAL_COUNT_DTM_END: NORMAL
MDC_IDC_STAT_EPISODE_TOTAL_COUNT_DTM_START: NORMAL
MDC_IDC_STAT_EPISODE_TYPE: NORMAL
MDC_IDC_STAT_TACHYTHERAPY_ATP_DELIVERED_RECENT: 0
MDC_IDC_STAT_TACHYTHERAPY_ATP_DELIVERED_TOTAL: 0
MDC_IDC_STAT_TACHYTHERAPY_RECENT_DTM_END: NORMAL
MDC_IDC_STAT_TACHYTHERAPY_RECENT_DTM_START: NORMAL
MDC_IDC_STAT_TACHYTHERAPY_SHOCKS_ABORTED_RECENT: 0
MDC_IDC_STAT_TACHYTHERAPY_SHOCKS_ABORTED_TOTAL: 0
MDC_IDC_STAT_TACHYTHERAPY_SHOCKS_DELIVERED_RECENT: 0
MDC_IDC_STAT_TACHYTHERAPY_SHOCKS_DELIVERED_TOTAL: 0
MDC_IDC_STAT_TACHYTHERAPY_TOTAL_DTM_END: NORMAL
MDC_IDC_STAT_TACHYTHERAPY_TOTAL_DTM_START: NORMAL

## 2019-10-05 NOTE — PROGRESS NOTES
[facility]    Hematology/Oncology Established Patient Follow-up Note      Today's Date: 10/09/19    Reason for Follow-up: Diffuse large B-cell lymphoma.    HISTORY OF PRESENT ILLNESS: Gibson Villalta is a 78 year old male with history of dementia, RV and LV mural thrombus on chronic anticoagulation with Coumadin, status post Smita-en-Y gastric bypass, hypertension, chronic kidney disease, CAD, cardiomyopathy with EF 25-30%, diabetes mellitus who presents with the following oncologic history:  1.  7/2018: Hospitalized for melena.  7/30/2018 CT abdomen/pelvis with contrast showed postop changes of prior gastric bypass procedure, ill-defined soft tissue thickening in the proximal stomach, increased since 8/9/2016, no evidence for bowel obstruction, colitis, or diverticulitis; no free fluid; 2 bladder wall lesions enhancing, largest measuring 3.9 x 1.8 cm and smaller bladder wall lesion near midline measuring 2 x 1.4 cm, several left renal cysts; liver, gallbladder, spleen, adrenal glands, pancreas, and remaining kidneys unremarkable.  2.  7/30/2018: Upper endoscopy showed a medium sized, fungating, partially circumferential mass with oozing and stigmata of recent bleeding on the greater curvature of the stomach with biopsies suggesting possible lymphoma but it is negative for adenocarcinoma; esophagus and jejunum normal; gastric bypass with normal-sized pouch and intact staple line.  Patient denied any associated abdominal pain, fevers, chills, night sweats, unintentional weight loss, chest pain, dyspnea, hematuria, dysuria.  3.  8/2/2018: Repeat upper endoscopy with biopsy of the stomach mass showed active inflammation and atypical cells with crush artifact.  There were insufficient number of atypical cells to render a definitive diagnosis.  4. 9/21/2018: Underwent TURBT under the care of Dr. Ryan Camarillo.  Pathology showed a T1 high-grade bladder cancer with micropapillary features.  5.   10/19/2018: Repeat upper endoscopy with biopsies confirmed diffuse large B-cell lymphoma, non-germinal center B-cell like, FISH positive for MYC.  Helicobacter pylori stain negative.  6. 10/30/2018: Bone marrow biopsy negative for lymphoma involvement.  7.  10/31/2018: PET/CT scan showed extensive hypermetabolic thickening of the stomach and several loops of small bowel in the left upper quadrant and right lower quadrant consistent with the diagnosed lymphoma, hypermetabolic mesenteric lymphadenopathy, slightly increased metabolic activity in the bone marrow, scattered small subcentimeter calcified and noncalcified pulmonary nodules with low metabolic activity.  8.  11/12/2018: Underwent cystoscopy with restaging TURBT which showed no evidence of tumor regrowth and no new bladder lesions.  Previous area of resection noted on the right lateral wall just lateral to the right UO.  9.  11/26/2018: Started first-line therapy with mini R-CHOP chemotherapy.  10. 1/28/2019: After 3 cycles of mini R-CHOP, PET/CT scan showed marked improvement in the hypermetabolic thickening of the stomach and loops of small bowel in the left upper quadrant and right lower quadrant, consistent with good response to treatment.  Previous hypermetabolic mesenteric lymphadenopathy has resolved.  Scattered tiny subcentimeter calcified and noncalcified pulmonary nodules have low metabolic activity and are unchanged.  These could represent benign calcified and noncalcified granulomata.  11.  2/19/2019: Cycle 5 and 6 of chemotherapy, doxorubicin held due to worsening shortness of breath and worsened dilation of the left ventricle.  Completion of cycle 6 on 3/12/19.  12.  4/4/2019: PET/CT scan showed no residual lymphoma.  Calcification seen in the bladder.  Benign cyst in left kidney.      INTERIM HISTORY:  Gibson Villalta reports feeling very well and denies any fevers, chills, night sweats, unintentional weight loss, bowel or bladder  dysfunction or hematochezia or melena.  He denies any fatigue or shortness of breath.      REVIEW OF SYSTEMS:   14 point ROS was reviewed and is negative other than as noted above in HPI.       HOME MEDICATIONS:  Current Outpatient Medications   Medication Sig Dispense Refill     ACE/ARB NOT PRESCRIBED, INTENTIONAL, ACE & ARB not prescribed due to Symptomatic hypotension not due to excessive diuresis       ASPIRIN NOT PRESCRIBED (INTENTIONAL) Please choose reason not prescribed, below 0 each 0     atorvastatin (LIPITOR) 40 MG tablet Take 1 tablet (40 mg) by mouth At Bedtime 90 tablet 3     COENZYME Q-10 PO Take 100 mg by mouth every morning        ferrous sulfate (IRON) 325 (65 Fe) MG tablet Take 325 mg by mouth daily       furosemide (LASIX) 20 MG tablet TAKE ONE TABLET BY MOUTH ONE TIME DAILY  90 tablet 1     hydrALAZINE (APRESOLINE) 10 MG tablet Take 1 tablet (10 mg) by mouth 3 times daily 270 tablet 1     metoprolol succinate (TOPROL-XL) 50 MG 24 hr tablet TAKE 1 TABLET (50 MG) DAILY (Patient taking differently: TAKE 1 TABLET (50 MG) BY MOUTH DAILY) 90 tablet 3     multivitamin, therapeutic with minerals (MULTI-VITAMIN) TABS Take 1 tablet by mouth every morning        venlafaxine (EFFEXOR) 75 MG tablet TAKE 1 TABLET TWICE DAILY (Patient taking differently: TAKE 1 TABLET (75 MG) BY MOUTH TWICE DAILY) 180 tablet 1     vitamin D2 (ERGOCALCIFEROL) 91054 units (1250 mcg) capsule TAKE ONE CAPSULE BY MOUTH ONCE A WEEK ON TUESDAY 12 capsule 1         ALLERGIES:  Allergies   Allergen Reactions     Wasps [Hornets]      Sand wasp --- years ago.           PAST MEDICAL HISTORY:  Past Medical History:   Diagnosis Date     Acute kidney injury (H) 2012     Anemia      Anxiety      Anxiety and depression      Bladder mass      BPH (benign prostatic hypertrophy)      Cardiomyopathy (H)     ICD implanted 11/2016     Carpal tunnel syndrome     Right     Chronic kidney disease (CKD), stage 3 (moderate)      Dementia      Depressive  disorder      Diabetes (H)      Diffuse large B-cell lymphoma of extranodal site (H) 11/23/2018     Gastric mass      Gout      History of depression      LBBB (left bundle branch block) 2013     Lumbar herniated disc     L5-S1     Mixed cardiomyopathy 7/22/2016     Myocardial infarction (H) 1995 and 2010     Nonischemic cardiomyopathy (H) 7/22/2016     Obesity      Pacemaker     ICD/PM     Rosacea      Rotator cuff disorder     Tear x 2     RV (right ventricular) mural thrombus 7/22/2016         PAST SURGICAL HISTORY:  Past Surgical History:   Procedure Laterality Date     ANGIOPLASTY  1995 and 2010 with stent     BIOPSY      stomach     BONE MARROW BIOPSY, BONE SPECIMEN, NEEDLE/TROCAR N/A 10/30/2018    Procedure: BIOPSY BONE MARROW;  Surgeon: Jeb Romero MD;  Location:  GI     CARDIAC SURGERY      ICD/PM     CYSTOSCOPY, TRANSURETHRAL RESECTION (TUR) PROSTATE, COMBINED  4/22/2019    Procedure: CYSTOSCOPY AND BIPOLAR TRANSURETHRAL RESECTION (TUR) PROSTATE;  Surgeon: Ryan Camarillo MD;  Location:  OR     CYSTOSCOPY, TRANSURETHRAL RESECTION (TUR) TUMOR BLADDER, COMBINED N/A 9/19/2018    Procedure: COMBINED CYSTOSCOPY, TRANSURETHRAL RESECTION (TUR) TUMOR BLADDER;  CYSTOSCOPY, TRANSURETHRAL RESECTION BLADDER TUMOR ;  Surgeon: Ryan Camarillo MD;  Location:  OR     CYSTOSCOPY, TRANSURETHRAL RESECTION (TUR) TUMOR BLADDER, COMBINED N/A 11/12/2018    Procedure: CYSTOSCOPY RESTAGING TRANSURETHRAL RESECTION BLADDER TUMOR;  Surgeon: Ryan Camarillo MD;  Location:  OR     ESOPHAGOSCOPY, GASTROSCOPY, DUODENOSCOPY (EGD), COMBINED N/A 7/30/2018    Procedure: COMBINED ESOPHAGOSCOPY, GASTROSCOPY, DUODENOSCOPY (EGD), BIOPSY SINGLE OR MULTIPLE;  gastroscopy;  Surgeon: Parish Xiong MD;  Location:  GI     ESOPHAGOSCOPY, GASTROSCOPY, DUODENOSCOPY (EGD), COMBINED N/A 7/30/2018    Procedure: COMBINED ESOPHAGOSCOPY, GASTROSCOPY, DUODENOSCOPY (EGD);  EGD;  Surgeon: Parish Xiong MD;  Location:  GI      ESOPHAGOSCOPY, GASTROSCOPY, DUODENOSCOPY (EGD), COMBINED N/A 2018    Procedure: COMBINED ESOPHAGOSCOPY, GASTROSCOPY, DUODENOSCOPY (EGD), BIOPSY SINGLE OR MULTIPLE;  gastroscopy BIOPSYSHOULD BE SENT TO LAB IN NS NOT FORMALIN PER ;  Surgeon: Jonathon Bush MD;  Location:  GI     GASTRIC BYPASS       HEART CATH LEFT HEART CATH  16    Non ischemic cardiomyopathy; Non functionally significant LAD and RCA disease      IR PORT REMOVAL RIGHT  2019     LASER HOLMIUM LITHOTRIPSY BLADDER N/A 2019    Procedure: CYSTOSCOPY, HOLMIUM LASER LITHOLAPAXY ,  AND BIPOLAR TRANSURETHRAL RESECTION (TUR) PROSTATE;  Surgeon: Ryan Camarillo MD;  Location:  OR     OTHER SURGICAL HISTORY      Spinal surgery     OTHER SURGICAL HISTORY  2016    CRT-D implantation         SOCIAL HISTORY:  Social History     Socioeconomic History     Marital status:      Spouse name: Not on file     Number of children: Not on file     Years of education: Not on file     Highest education level: Not on file   Occupational History     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     Comment: [employer]; retired   Social Needs     Financial resource strain: Not on file     Food insecurity:     Worry: Not on file     Inability: Not on file     Transportation needs:     Medical: Not on file     Non-medical: Not on file   Tobacco Use     Smoking status: Former Smoker     Packs/day: 2.00     Years: 20.00     Pack years: 40.00     Types: Cigarettes     Last attempt to quit: 1980     Years since quittin.7     Smokeless tobacco: Never Used     Tobacco comment: Quit in his 30s - 2 ppd for 20 years   Substance and Sexual Activity     Alcohol use: Yes     Alcohol/week: 0.0 standard drinks     Comment: rarely     Drug use: No     Sexual activity: Yes     Partners: Female   Lifestyle     Physical activity:     Days per week: Not on file     Minutes per session: Not on file     Stress: Not on file   Relationships     Social  "connections:     Talks on phone: Not on file     Gets together: Not on file     Attends Adventism service: Not on file     Active member of club or organization: Not on file     Attends meetings of clubs or organizations: Not on file     Relationship status: Not on file     Intimate partner violence:     Fear of current or ex partner: Not on file     Emotionally abused: Not on file     Physically abused: Not on file     Forced sexual activity: Not on file   Other Topics Concern     Parent/sibling w/ CABG, MI or angioplasty before 65F 55M? No      Service Not Asked     Blood Transfusions Not Asked     Caffeine Concern Not Asked     Occupational Exposure Not Asked     Hobby Hazards Not Asked     Sleep Concern Not Asked     Stress Concern Not Asked     Weight Concern Not Asked     Special Diet Yes     Comment: low fat, low salt      Back Care Not Asked     Exercise No     Bike Helmet Not Asked     Seat Belt Not Asked     Self-Exams Not Asked   Social History Narrative     Not on file         FAMILY HISTORY:  Family History   Problem Relation Age of Onset     Coronary Artery Disease Father 39     Alzheimer Disease Mother      Coronary Artery Disease Son         Two sons have  from MIs (ages 39 and 51)         PHYSICAL EXAM:  Vital signs:  /88   Pulse 91   Temp 97.9  F (36.6  C) (Oral)   Resp 16   Ht 1.854 m (6' 1\")   Wt 96.2 kg (212 lb)   SpO2 95%   BMI 27.97 kg/m     ECO  GENERAL/CONSTITUTIONAL: No acute distress.  EYES: Extraocular movements intact.  No scleral icterus.  ENT/MOUTH: Neck supple. Oropharynx clear, no mucositis.  LYMPH: No occipital, pre-or postauricular, submandibular, submental, anterior cervical, posterior cervical, supraclavicular, axillary or inguinal adenopathy.   RESPIRATORY: Clear to auscultation bilaterally. No crackles or wheezing.   CARDIOVASCULAR: Regular rate and rhythm without murmurs, gallops, or rubs.  GASTROINTESTINAL: No hepatosplenomegaly, masses, or " tenderness. No guarding.  No distention.  MUSCULOSKELETAL: Warm and well-perfused, no cyanosis, clubbing, or edema.  NEUROLOGIC: Cranial nerves II-XII are intact. Alert, oriented, answers questions appropriately.  INTEGUMENTARY: No rashes or jaundice.  Pacemaker in the upper chest region.  GAIT: Steady, does not use assistive device      LABS:  CBC RESULTS:   Recent Labs   Lab Test 09/09/19  0715   WBC 10.2   RBC 4.28*   HGB 13.6   HCT 40.8   MCV 95   MCH 31.8   MCHC 33.3   RDW 15.1*          Recent Labs   Lab Test 09/06/19  1247 08/02/19  0803 04/23/19  0722     --  139   POTASSIUM 4.2  --  3.7   CHLORIDE 109  --  106   CO2 29  --  28   ANIONGAP 2*  --  5   * 139* 121*   BUN 21  --  12   CR 1.30*  --  1.17   HSELLEY 9.3  --  8.7       PATHOLOGY:  None new.    IMAGING:  None new.    ASSESSMENT/PLAN:  Gibson Villalta is a 78 year old male with the following issues:  1.  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  -I recommended active surveillance with return in 3 months with repeat CBC, CMP, and LDH.    -I recommended repeating a PET/CT scan only if he demonstrates signs or symptoms suggestive of lymphoma recurrence.  He agrees with this plan of care.     2.  High-grade bladder cancer, T1  -These were seen on CT abdomen/pelvis on 7/30/2018 and further evaluated by Dr. Howard Camarillo on 9/07/2018.    -He is now status post TURBT x 2 in 9/2018 and 11/2018 with no residual tumor or new bladder cancers, although he does have a bladder stone.  He follows with Dr. Camarillo (last visit on 11/19/2018).  No adjuvant chemotherapy is recommended for his T1 tumor.  -He has further follow-up with Dr. Camarillo at the end of October 2019.    3. Cardiomyopathy with systolic dysfunction  -Stable.   Has no new shortness of breath.  Continue Lasix and follow-up with cardiology as needed.        Return in 3 months.      Maribel Bender MD  Hematology/Oncology  [facility]    I spent a total of 25 minutes with the patient, with greater than 50% of the time in counseling and coordination of care.

## 2019-10-07 ENCOUNTER — TELEPHONE (OUTPATIENT)
Dept: CARDIOLOGY | Facility: CLINIC | Age: 79
End: 2019-10-07

## 2019-10-07 DIAGNOSIS — Z95.810 ICD (IMPLANTABLE CARDIOVERTER-DEFIBRILLATOR) IN PLACE: Primary | ICD-10-CM

## 2019-10-07 NOTE — TELEPHONE ENCOUNTER
Patient's wife called today stating that his ICD has been beeping over the weekend.     Alert: SVC lead impedance warning on 04-Oct-2019. Returned to normal on 10/5/19. Scheduled to come to the clinic 10/8/19 to reset and turn off alert tone.

## 2019-10-08 ENCOUNTER — ANCILLARY PROCEDURE (OUTPATIENT)
Dept: CARDIOLOGY | Facility: CLINIC | Age: 79
End: 2019-10-08
Attending: INTERNAL MEDICINE
Payer: COMMERCIAL

## 2019-10-08 ENCOUNTER — DOCUMENTATION ONLY (OUTPATIENT)
Dept: CARDIOLOGY | Facility: CLINIC | Age: 79
End: 2019-10-08

## 2019-10-08 DIAGNOSIS — Z95.810 ICD (IMPLANTABLE CARDIOVERTER-DEFIBRILLATOR) IN PLACE: ICD-10-CM

## 2019-10-08 DIAGNOSIS — I25.5 ISCHEMIC CARDIOMYOPATHY: ICD-10-CM

## 2019-10-08 DIAGNOSIS — I42.8 NONISCHEMIC CARDIOMYOPATHY (H): ICD-10-CM

## 2019-10-08 DIAGNOSIS — Z95.810 ICD (IMPLANTABLE CARDIOVERTER-DEFIBRILLATOR) IN PLACE: Primary | ICD-10-CM

## 2019-10-08 PROCEDURE — 93284 PRGRMG EVAL IMPLANTABLE DFB: CPT | Performed by: INTERNAL MEDICINE

## 2019-10-08 NOTE — TELEPHONE ENCOUNTER
Pt came into clinic today due to ICD alerting for high SVC impedance value on October 5th.  On October 5th, the RV tip to coil impedance value also increased to >1000. This was sent to [employer] for review and the data strongly suggests that there is a fracture forming in the RV coil.  Pt has not received any therapy from his device to date, however, there is a chance that if therapy were needed this may not be able to be delivered.       Per [employer], some of the possible options include lead replacement, obtaining a chest xray, or programming the SVC coil to off to further evaluate RV coil integrity (through monitoring for further elevated impedance values).    Will route to Dr. Hernandez for review and recommendations.         BESS Zaragoza

## 2019-10-08 NOTE — TELEPHONE ENCOUNTER
Spoke with Dr. Hernandez who recommended patient come in to have his SVC coil turned off and to see Dr. Hernandez in clinic for an appointment to discuss options.      Called and spoke with patient regarding recommendations.  Device clinic appointment scheduled for 10/10/19 at 1345 and clinic appointment with Dr. Hernandez scheduled for 10/10/19 at 1445.      [employer] rep will be present for device check and available to speak with Dr. Hernandez prior to clinic appointment.      BESS Zaragoza

## 2019-10-09 ENCOUNTER — HOSPITAL ENCOUNTER (OUTPATIENT)
Facility: CLINIC | Age: 79
Setting detail: SPECIMEN
Discharge: HOME | End: 2019-10-09
Attending: INTERNAL MEDICINE | Admitting: INTERNAL MEDICINE
Payer: COMMERCIAL

## 2019-10-09 ENCOUNTER — ONCOLOGY VISIT (OUTPATIENT)
Dept: ONCOLOGY | Facility: CLINIC | Age: 79
End: 2019-10-09
Attending: INTERNAL MEDICINE
Payer: COMMERCIAL

## 2019-10-09 VITALS
DIASTOLIC BLOOD PRESSURE: 88 MMHG | OXYGEN SATURATION: 95 % | TEMPERATURE: 97.9 F | HEIGHT: 73 IN | WEIGHT: 212 LBS | RESPIRATION RATE: 16 BRPM | SYSTOLIC BLOOD PRESSURE: 130 MMHG | BODY MASS INDEX: 28.1 KG/M2 | HEART RATE: 91 BPM

## 2019-10-09 DIAGNOSIS — I42.0 DILATED CARDIOMYOPATHY (H): ICD-10-CM

## 2019-10-09 DIAGNOSIS — C67.9 MALIGNANT NEOPLASM OF URINARY BLADDER, UNSPECIFIED SITE (H): ICD-10-CM

## 2019-10-09 DIAGNOSIS — C83.398 DIFFUSE LARGE B-CELL LYMPHOMA OF EXTRANODAL SITE: Primary | ICD-10-CM

## 2019-10-09 LAB
ALBUMIN SERPL-MCNC: 3.9 G/DL (ref 3.4–5)
ALP SERPL-CCNC: 185 U/L (ref 40–150)
ALT SERPL W P-5'-P-CCNC: 31 U/L (ref 0–70)
ANION GAP SERPL CALCULATED.3IONS-SCNC: 5 MMOL/L (ref 3–14)
AST SERPL W P-5'-P-CCNC: 19 U/L (ref 0–45)
BILIRUB SERPL-MCNC: 0.4 MG/DL (ref 0.2–1.3)
BUN SERPL-MCNC: 22 MG/DL (ref 7–30)
CALCIUM SERPL-MCNC: 9.5 MG/DL (ref 8.5–10.1)
CHLORIDE SERPL-SCNC: 107 MMOL/L (ref 94–109)
CO2 SERPL-SCNC: 27 MMOL/L (ref 20–32)
CREAT SERPL-MCNC: 1.21 MG/DL (ref 0.66–1.25)
DIFFERENTIAL METHOD BLD: ABNORMAL
EOSINOPHIL # BLD AUTO: 0.3 10E9/L (ref 0–0.7)
EOSINOPHIL NFR BLD AUTO: 4 %
ERYTHROCYTE [DISTWIDTH] IN BLOOD BY AUTOMATED COUNT: 15.1 % (ref 10–15)
GFR SERPL CREATININE-BSD FRML MDRD: 57 ML/MIN/{1.73_M2}
GLUCOSE SERPL-MCNC: 122 MG/DL (ref 70–99)
HCT VFR BLD AUTO: 43.5 % (ref 40–53)
HGB BLD-MCNC: 14.1 G/DL (ref 13.3–17.7)
LDH SERPL L TO P-CCNC: 210 U/L (ref 85–227)
LYMPHOCYTES # BLD AUTO: 0.8 10E9/L (ref 0.8–5.3)
LYMPHOCYTES NFR BLD AUTO: 11 %
MCH RBC QN AUTO: 31.3 PG (ref 26.5–33)
MCHC RBC AUTO-ENTMCNC: 32.4 G/DL (ref 31.5–36.5)
MCV RBC AUTO: 97 FL (ref 78–100)
MONOCYTES # BLD AUTO: 1.2 10E9/L (ref 0–1.3)
MONOCYTES NFR BLD AUTO: 18 %
NEUTROPHILS # BLD AUTO: 4.6 10E9/L (ref 1.6–8.3)
NEUTROPHILS NFR BLD AUTO: 67 %
PLATELET # BLD AUTO: 227 10E9/L (ref 150–450)
POTASSIUM SERPL-SCNC: 4 MMOL/L (ref 3.4–5.3)
PROT SERPL-MCNC: 7.9 G/DL (ref 6.8–8.8)
RBC # BLD AUTO: 4.5 10E12/L (ref 4.4–5.9)
SODIUM SERPL-SCNC: 139 MMOL/L (ref 133–144)
WBC # BLD AUTO: 6.9 10E9/L (ref 4–11)

## 2019-10-09 PROCEDURE — G0463 HOSPITAL OUTPT CLINIC VISIT: HCPCS

## 2019-10-09 PROCEDURE — 85025 COMPLETE CBC W/AUTO DIFF WBC: CPT | Performed by: INTERNAL MEDICINE

## 2019-10-09 PROCEDURE — 80053 COMPREHEN METABOLIC PANEL: CPT | Performed by: INTERNAL MEDICINE

## 2019-10-09 PROCEDURE — 99214 OFFICE O/P EST MOD 30 MIN: CPT | Performed by: INTERNAL MEDICINE

## 2019-10-09 PROCEDURE — 36415 COLL VENOUS BLD VENIPUNCTURE: CPT

## 2019-10-09 PROCEDURE — 83615 LACTATE (LD) (LDH) ENZYME: CPT | Performed by: INTERNAL MEDICINE

## 2019-10-09 ASSESSMENT — PAIN SCALES - GENERAL: PAINLEVEL: NO PAIN (0)

## 2019-10-09 ASSESSMENT — MIFFLIN-ST. JEOR: SCORE: 1740.51

## 2019-10-09 NOTE — PROGRESS NOTES
Medical Assistant Note:  Gibson Villalta presents today for BLOOD DRAW.    Patient seen by provider today: Yes: MANOLO.   present during visit today: Not Applicable.    Concerns: No Concerns.    Procedure:  Lab draw site: LEFT HAND, Needle type: BF, Gauge: 23.    Post Assessment:  Labs drawn without difficulty: Yes.    Discharge Plan:  Departure Mode: Ambulatory.    Face to Face Time: 5 MIN  .    Lana Ibarra CMA

## 2019-10-09 NOTE — LETTER
10/9/2019         RE: Gibson Villalta  [address]  [place] [address]        Dear Colleague,    Thank you for referring your patient, Gibson Villalta, to the [facility]. Please see a copy of my visit note below.    [facility]    Hematology/Oncology Established Patient Follow-up Note      Today's Date: 10/09/19    Reason for Follow-up: Diffuse large B-cell lymphoma.    HISTORY OF PRESENT ILLNESS: Gibson Villalta is a 78 year old male with history of dementia, RV and LV mural thrombus on chronic anticoagulation with Coumadin, status post Smita-en-Y gastric bypass, hypertension, chronic kidney disease, CAD, cardiomyopathy with EF 25-30%, diabetes mellitus who presents with the following oncologic history:  1.  7/2018: Hospitalized for melena.  7/30/2018 CT abdomen/pelvis with contrast showed postop changes of prior gastric bypass procedure, ill-defined soft tissue thickening in the proximal stomach, increased since 8/9/2016, no evidence for bowel obstruction, colitis, or diverticulitis; no free fluid; 2 bladder wall lesions enhancing, largest measuring 3.9 x 1.8 cm and smaller bladder wall lesion near midline measuring 2 x 1.4 cm, several left renal cysts; liver, gallbladder, spleen, adrenal glands, pancreas, and remaining kidneys unremarkable.  2.  7/30/2018: Upper endoscopy showed a medium sized, fungating, partially circumferential mass with oozing and stigmata of recent bleeding on the greater curvature of the stomach with biopsies suggesting possible lymphoma but it is negative for adenocarcinoma; esophagus and jejunum normal; gastric bypass with normal-sized pouch and intact staple line.  Patient denied any associated abdominal pain, fevers, chills, night sweats, unintentional weight loss, chest pain, dyspnea, hematuria, dysuria.  3.  8/2/2018: Repeat upper endoscopy with biopsy of the stomach mass showed active inflammation and atypical  cells with crush artifact.  There were insufficient number of atypical cells to render a definitive diagnosis.  4. 9/21/2018: Underwent TURBT under the care of Dr. Ryan Camarillo.  Pathology showed a T1 high-grade bladder cancer with micropapillary features.  5.  10/19/2018: Repeat upper endoscopy with biopsies confirmed diffuse large B-cell lymphoma, non-germinal center B-cell like, FISH positive for MYC.  Helicobacter pylori stain negative.  6. 10/30/2018: Bone marrow biopsy negative for lymphoma involvement.  7.  10/31/2018: PET/CT scan showed extensive hypermetabolic thickening of the stomach and several loops of small bowel in the left upper quadrant and right lower quadrant consistent with the diagnosed lymphoma, hypermetabolic mesenteric lymphadenopathy, slightly increased metabolic activity in the bone marrow, scattered small subcentimeter calcified and noncalcified pulmonary nodules with low metabolic activity.  8.  11/12/2018: Underwent cystoscopy with restaging TURBT which showed no evidence of tumor regrowth and no new bladder lesions.  Previous area of resection noted on the right lateral wall just lateral to the right UO.  9.  11/26/2018: Started first-line therapy with mini R-CHOP chemotherapy.  10. 1/28/2019: After 3 cycles of mini R-CHOP, PET/CT scan showed marked improvement in the hypermetabolic thickening of the stomach and loops of small bowel in the left upper quadrant and right lower quadrant, consistent with good response to treatment.  Previous hypermetabolic mesenteric lymphadenopathy has resolved.  Scattered tiny subcentimeter calcified and noncalcified pulmonary nodules have low metabolic activity and are unchanged.  These could represent benign calcified and noncalcified granulomata.  11.  2/19/2019: Cycle 5 and 6 of chemotherapy, doxorubicin held due to worsening shortness of breath and worsened dilation of the left ventricle.  Completion of cycle 6 on 3/12/19.  12.  4/4/2019: PET/CT scan  showed no residual lymphoma.  Calcification seen in the bladder.  Benign cyst in left kidney.      INTERIM HISTORY:  Gibson Villalta reports feeling very well and denies any fevers, chills, night sweats, unintentional weight loss, bowel or bladder dysfunction or hematochezia or melena.  He denies any fatigue or shortness of breath.      REVIEW OF SYSTEMS:   14 point ROS was reviewed and is negative other than as noted above in HPI.       HOME MEDICATIONS:  Current Outpatient Medications   Medication Sig Dispense Refill     ACE/ARB NOT PRESCRIBED, INTENTIONAL, ACE & ARB not prescribed due to Symptomatic hypotension not due to excessive diuresis       ASPIRIN NOT PRESCRIBED (INTENTIONAL) Please choose reason not prescribed, below 0 each 0     atorvastatin (LIPITOR) 40 MG tablet Take 1 tablet (40 mg) by mouth At Bedtime 90 tablet 3     COENZYME Q-10 PO Take 100 mg by mouth every morning        ferrous sulfate (IRON) 325 (65 Fe) MG tablet Take 325 mg by mouth daily       furosemide (LASIX) 20 MG tablet TAKE ONE TABLET BY MOUTH ONE TIME DAILY  90 tablet 1     hydrALAZINE (APRESOLINE) 10 MG tablet Take 1 tablet (10 mg) by mouth 3 times daily 270 tablet 1     metoprolol succinate (TOPROL-XL) 50 MG 24 hr tablet TAKE 1 TABLET (50 MG) DAILY (Patient taking differently: TAKE 1 TABLET (50 MG) BY MOUTH DAILY) 90 tablet 3     multivitamin, therapeutic with minerals (MULTI-VITAMIN) TABS Take 1 tablet by mouth every morning        venlafaxine (EFFEXOR) 75 MG tablet TAKE 1 TABLET TWICE DAILY (Patient taking differently: TAKE 1 TABLET (75 MG) BY MOUTH TWICE DAILY) 180 tablet 1     vitamin D2 (ERGOCALCIFEROL) 16144 units (1250 mcg) capsule TAKE ONE CAPSULE BY MOUTH ONCE A WEEK ON TUESDAY 12 capsule 1         ALLERGIES:  Allergies   Allergen Reactions     Wasps [Hornets]      Sand wasp --- years ago.           PAST MEDICAL HISTORY:  Past Medical History:   Diagnosis Date     Acute kidney injury (H) 2012     Anemia      Anxiety       Anxiety and depression      Bladder mass      BPH (benign prostatic hypertrophy)      Cardiomyopathy (H)     ICD implanted 11/2016     Carpal tunnel syndrome     Right     Chronic kidney disease (CKD), stage 3 (moderate)      Dementia      Depressive disorder      Diabetes (H)      Diffuse large B-cell lymphoma of extranodal site (H) 11/23/2018     Gastric mass      Gout      History of depression      LBBB (left bundle branch block) 2013     Lumbar herniated disc     L5-S1     Mixed cardiomyopathy 7/22/2016     Myocardial infarction (H) 1995 and 2010     Nonischemic cardiomyopathy (H) 7/22/2016     Obesity      Pacemaker     ICD/PM     Rosacea      Rotator cuff disorder     Tear x 2     RV (right ventricular) mural thrombus 7/22/2016         PAST SURGICAL HISTORY:  Past Surgical History:   Procedure Laterality Date     ANGIOPLASTY  1995 and 2010 with stent     BIOPSY      stomach     BONE MARROW BIOPSY, BONE SPECIMEN, NEEDLE/TROCAR N/A 10/30/2018    Procedure: BIOPSY BONE MARROW;  Surgeon: Jeb Romero MD;  Location:  GI     CARDIAC SURGERY      ICD/PM     CYSTOSCOPY, TRANSURETHRAL RESECTION (TUR) PROSTATE, COMBINED  4/22/2019    Procedure: CYSTOSCOPY AND BIPOLAR TRANSURETHRAL RESECTION (TUR) PROSTATE;  Surgeon: Ryan Camarillo MD;  Location:  OR     CYSTOSCOPY, TRANSURETHRAL RESECTION (TUR) TUMOR BLADDER, COMBINED N/A 9/19/2018    Procedure: COMBINED CYSTOSCOPY, TRANSURETHRAL RESECTION (TUR) TUMOR BLADDER;  CYSTOSCOPY, TRANSURETHRAL RESECTION BLADDER TUMOR ;  Surgeon: Ryan Camarillo MD;  Location:  OR     CYSTOSCOPY, TRANSURETHRAL RESECTION (TUR) TUMOR BLADDER, COMBINED N/A 11/12/2018    Procedure: CYSTOSCOPY RESTAGING TRANSURETHRAL RESECTION BLADDER TUMOR;  Surgeon: Ryan Camarillo MD;  Location:  OR     ESOPHAGOSCOPY, GASTROSCOPY, DUODENOSCOPY (EGD), COMBINED N/A 7/30/2018    Procedure: COMBINED ESOPHAGOSCOPY, GASTROSCOPY, DUODENOSCOPY (EGD), BIOPSY SINGLE OR MULTIPLE;  gastroscopy;   Surgeon: Parish Xiong MD;  Location:  GI     ESOPHAGOSCOPY, GASTROSCOPY, DUODENOSCOPY (EGD), COMBINED N/A 2018    Procedure: COMBINED ESOPHAGOSCOPY, GASTROSCOPY, DUODENOSCOPY (EGD);  EGD;  Surgeon: Parish Xiong MD;  Location:  GI     ESOPHAGOSCOPY, GASTROSCOPY, DUODENOSCOPY (EGD), COMBINED N/A 2018    Procedure: COMBINED ESOPHAGOSCOPY, GASTROSCOPY, DUODENOSCOPY (EGD), BIOPSY SINGLE OR MULTIPLE;  gastroscopy BIOPSYSHOULD BE SENT TO LAB IN NS NOT FORMALIN PER ;  Surgeon: Jonathon Bush MD;  Location:  GI     GASTRIC BYPASS       HEART CATH LEFT HEART CATH  16    Non ischemic cardiomyopathy; Non functionally significant LAD and RCA disease      IR PORT REMOVAL RIGHT  2019     LASER HOLMIUM LITHOTRIPSY BLADDER N/A 2019    Procedure: CYSTOSCOPY, HOLMIUM LASER LITHOLAPAXY ,  AND BIPOLAR TRANSURETHRAL RESECTION (TUR) PROSTATE;  Surgeon: Ryan Camarillo MD;  Location:  OR     OTHER SURGICAL HISTORY      Spinal surgery     OTHER SURGICAL HISTORY  2016    CRT-D implantation         SOCIAL HISTORY:  Social History     Socioeconomic History     Marital status:      Spouse name: Not on file     Number of children: Not on file     Years of education: Not on file     Highest education level: Not on file   Occupational History     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     Comment: [employer]; retired   Social Needs     Financial resource strain: Not on file     Food insecurity:     Worry: Not on file     Inability: Not on file     Transportation needs:     Medical: Not on file     Non-medical: Not on file   Tobacco Use     Smoking status: Former Smoker     Packs/day: 2.00     Years: 20.00     Pack years: 40.00     Types: Cigarettes     Last attempt to quit: 1980     Years since quittin.7     Smokeless tobacco: Never Used     Tobacco comment: Quit in his 30s - 2 ppd for 20 years   Substance and Sexual Activity     Alcohol use: Yes     Alcohol/week: 0.0  "standard drinks     Comment: rarely     Drug use: No     Sexual activity: Yes     Partners: Female   Lifestyle     Physical activity:     Days per week: Not on file     Minutes per session: Not on file     Stress: Not on file   Relationships     Social connections:     Talks on phone: Not on file     Gets together: Not on file     Attends Zoroastrianism service: Not on file     Active member of club or organization: Not on file     Attends meetings of clubs or organizations: Not on file     Relationship status: Not on file     Intimate partner violence:     Fear of current or ex partner: Not on file     Emotionally abused: Not on file     Physically abused: Not on file     Forced sexual activity: Not on file   Other Topics Concern     Parent/sibling w/ CABG, MI or angioplasty before 65F 55M? No      Service Not Asked     Blood Transfusions Not Asked     Caffeine Concern Not Asked     Occupational Exposure Not Asked     Hobby Hazards Not Asked     Sleep Concern Not Asked     Stress Concern Not Asked     Weight Concern Not Asked     Special Diet Yes     Comment: low fat, low salt      Back Care Not Asked     Exercise No     Bike Helmet Not Asked     Seat Belt Not Asked     Self-Exams Not Asked   Social History Narrative     Not on file         FAMILY HISTORY:  Family History   Problem Relation Age of Onset     Coronary Artery Disease Father 39     Alzheimer Disease Mother      Coronary Artery Disease Son         Two sons have  from MIs (ages 39 and 51)         PHYSICAL EXAM:  Vital signs:  /88   Pulse 91   Temp 97.9  F (36.6  C) (Oral)   Resp 16   Ht 1.854 m (6' 1\")   Wt 96.2 kg (212 lb)   SpO2 95%   BMI 27.97 kg/m      ECO  GENERAL/CONSTITUTIONAL: No acute distress.  EYES: Extraocular movements intact.  No scleral icterus.  ENT/MOUTH: Neck supple. Oropharynx clear, no mucositis.  LYMPH: No occipital, pre-or postauricular, submandibular, submental, anterior cervical, posterior cervical, " supraclavicular, axillary or inguinal adenopathy.   RESPIRATORY: Clear to auscultation bilaterally. No crackles or wheezing.   CARDIOVASCULAR: Regular rate and rhythm without murmurs, gallops, or rubs.  GASTROINTESTINAL: No hepatosplenomegaly, masses, or tenderness. No guarding.  No distention.  MUSCULOSKELETAL: Warm and well-perfused, no cyanosis, clubbing, or edema.  NEUROLOGIC: Cranial nerves II-XII are intact. Alert, oriented, answers questions appropriately.  INTEGUMENTARY: No rashes or jaundice.  Pacemaker in the upper chest region.  GAIT: Steady, does not use assistive device      LABS:  CBC RESULTS:   Recent Labs   Lab Test 09/09/19  0715   WBC 10.2   RBC 4.28*   HGB 13.6   HCT 40.8   MCV 95   MCH 31.8   MCHC 33.3   RDW 15.1*          Recent Labs   Lab Test 09/06/19  1247 08/02/19  0803 04/23/19  0722     --  139   POTASSIUM 4.2  --  3.7   CHLORIDE 109  --  106   CO2 29  --  28   ANIONGAP 2*  --  5   * 139* 121*   BUN 21  --  12   CR 1.30*  --  1.17   SHELLEY 9.3  --  8.7       PATHOLOGY:  None new.    IMAGING:  None new.    ASSESSMENT/PLAN:  Gibson Villalta is a 78 year old male with the following issues:  1.  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  -I recommended active surveillance with return in 3 months with repeat CBC, CMP, and LDH.    -I recommended repeating a PET/CT scan only if he demonstrates signs or symptoms suggestive of lymphoma recurrence.  He agrees with this plan of care.     2.  High-grade bladder cancer, T1  -These were seen on CT abdomen/pelvis on 7/30/2018 and further evaluated by Dr. Howard Camarillo on 9/07/2018.    -He is now status post TURBT x 2 in 9/2018 and 11/2018 with no residual tumor or new bladder cancers, although he does have a  bladder stone.  He follows with Dr. Camarillo (last visit on 11/19/2018).  No adjuvant chemotherapy is recommended for his T1 tumor.  -He has further follow-up with Dr. Camarillo at the end of October 2019.    3. Cardiomyopathy with systolic dysfunction  -Stable.  Has no new shortness of breath.  Continue Lasix and follow-up with cardiology as needed.        Return in 3 months.      Maribel Benedr MD  Hematology/Oncology  [facility]    I spent a total of 25 minutes with the patient, with greater than 50% of the time in counseling and coordination of care.      Medical Assistant Note:  Gibson Villalta presents today for BLOOD DRAW.    Patient seen by provider today: Yes: MANOLO.   present during visit today: Not Applicable.    Concerns: No Concerns.    Procedure:  Lab draw site: LEFT HAND, Needle type: BF, Gauge: 23.    Post Assessment:  Labs drawn without difficulty: Yes.    Discharge Plan:  Departure Mode: Ambulatory.    Face to Face Time: 5 MIN  .    Lana Ibarra CMA            Again, thank you for allowing me to participate in the care of your patient.        Sincerely,        Maribel Bender MD

## 2019-10-10 ENCOUNTER — OFFICE VISIT (OUTPATIENT)
Dept: CARDIOLOGY | Facility: CLINIC | Age: 79
End: 2019-10-10
Payer: COMMERCIAL

## 2019-10-10 ENCOUNTER — ANCILLARY PROCEDURE (OUTPATIENT)
Dept: CARDIOLOGY | Facility: CLINIC | Age: 79
End: 2019-10-10
Attending: INTERNAL MEDICINE
Payer: COMMERCIAL

## 2019-10-10 ENCOUNTER — HOSPITAL ENCOUNTER (OUTPATIENT)
Dept: GENERAL RADIOLOGY | Facility: CLINIC | Age: 79
Discharge: HOME | End: 2019-10-10
Attending: INTERNAL MEDICINE | Admitting: INTERNAL MEDICINE
Payer: COMMERCIAL

## 2019-10-10 VITALS
HEART RATE: 78 BPM | DIASTOLIC BLOOD PRESSURE: 69 MMHG | SYSTOLIC BLOOD PRESSURE: 109 MMHG | HEIGHT: 73 IN | BODY MASS INDEX: 27.83 KG/M2 | WEIGHT: 210 LBS

## 2019-10-10 DIAGNOSIS — Z95.810 ICD (IMPLANTABLE CARDIOVERTER-DEFIBRILLATOR) IN PLACE: ICD-10-CM

## 2019-10-10 DIAGNOSIS — Z95.810 ICD (IMPLANTABLE CARDIOVERTER-DEFIBRILLATOR) IN PLACE: Primary | ICD-10-CM

## 2019-10-10 DIAGNOSIS — I42.8 NONISCHEMIC CARDIOMYOPATHY (H): ICD-10-CM

## 2019-10-10 PROCEDURE — 71046 X-RAY EXAM CHEST 2 VIEWS: CPT

## 2019-10-10 PROCEDURE — 99214 OFFICE O/P EST MOD 30 MIN: CPT | Mod: 25 | Performed by: INTERNAL MEDICINE

## 2019-10-10 ASSESSMENT — MIFFLIN-ST. JEOR: SCORE: 1731.43

## 2019-10-10 NOTE — LETTER
10/10/2019    Akiar Currie MD  [address]  [facility] [address]    RE: Gibson Villalta       Dear Colleague,    I had the pleasure of seeing Gibson Villalta in the [facility].    Electrophysiology/ Clinic Note         H&P and Plan:   REASON FOR VISIT:  Evaluation of cardiomyopathy and biventricular ICD.      HISTORY OF PRESENT ILLNESS:  Mr. 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      Patient experienced an ICD alert.  He was evaluated in the device clinic on October 8 and device interrogation showed a slight elevation in RV coil and SVC coil impedances (respectively 86 and 99 ohms).  No significant noise or increase in threshold was noticed.  Case was discussed with [employer] which was suggestive of possible RV coil fracture or poor contact of the lead in the Header.    The moment he is doing well.  He denies any symptoms such as chest pain, lightheadedness, near-syncope or syncopal episode. An echocardiogram was obtained in 03/2019 and showed EF of 25-30% and mild MR and AI.        ASSESSMENT AND PLAN:   1.  Device care.  The patient had an alarm on a RV coil/SVC coil impedance.  No noise in the leads or change in threshold was noticed.  This pattern is somewhat unusual however it could be related to an RV lead fracture.     I discussed the findings with patient.  This time, it is possible that he is having a lead fracture however were not confirmed yet.  If we do confirm that there is a prominent lead, options are implant a new lead and  "or extract the old lead.    At this time, I recommend turning off the SVC coil to help with diagnosis.  We will obtain an chest x-ray to look particular the head or the connection.  If were not able to determine lead reliability, we will consider lead revision.    2. Heart failure secondary to mixed cardiomyopathy.  He is euvolemic on physical exam.  Continue current medical therapy with Lipitor, Lasix, hydralazine, Imdur, metoprolol and Coumadin.   3.  Device care.  Continue device checks 3 months.    4.  Hypertension.  Blood pressure is well controlled.   5.  Followup care.  He will follow up with and Cristiano in an year or earlier as needed.    Josias Hernandez MD    Physical Exam:  Vitals: /69   Pulse 78   Ht 1.854 m (6' 1\")   Wt 95.3 kg (210 lb)   BMI 27.71 kg/m       Constitutional:  AAO x3.  Pt is in NAD.  HEAD: normocephalic.  SKIN: Skin normal color, texture and turgor with no lesions or eruptions.  Eyes: PERRL, EOMI.  ENT:  Supple, normal JVP. No lymphadenopathy or thyroid enlargement.  Chest:  CTAB.  Cardiac:   RRR, normal  S1 and S2.  No murmurs rubs or gallop.    Abdomen:  Normal BS.  Soft, non-tender and non-distended.  No rebound or guarding.    Extremities:  Pedious pulses palpable B/L.  No LE edema noticed.   Neurological: Strength and sensation grossly symmetric and intact throughout.       CURRENT MEDICATIONS:  Current Outpatient Medications   Medication Sig Dispense Refill     ACE/ARB NOT PRESCRIBED, INTENTIONAL, ACE & ARB not prescribed due to Symptomatic hypotension not due to excessive diuresis       atorvastatin (LIPITOR) 40 MG tablet Take 1 tablet (40 mg) by mouth At Bedtime 90 tablet 3     COENZYME Q-10 PO Take 100 mg by mouth every morning        ferrous sulfate (IRON) 325 (65 Fe) MG tablet Take 325 mg by mouth daily       furosemide (LASIX) 20 MG tablet TAKE ONE TABLET BY MOUTH ONE TIME DAILY  90 tablet 1     hydrALAZINE (APRESOLINE) 10 MG tablet Take 1 tablet (10 mg) by mouth 3 times " daily 270 tablet 1     metoprolol succinate (TOPROL-XL) 50 MG 24 hr tablet TAKE 1 TABLET (50 MG) DAILY (Patient taking differently: TAKE 1 TABLET (50 MG) BY MOUTH DAILY) 90 tablet 3     multivitamin, therapeutic with minerals (MULTI-VITAMIN) TABS Take 1 tablet by mouth every morning        venlafaxine (EFFEXOR) 75 MG tablet TAKE 1 TABLET TWICE DAILY (Patient taking differently: TAKE 1 TABLET (75 MG) BY MOUTH TWICE DAILY) 180 tablet 1     vitamin D2 (ERGOCALCIFEROL) 76322 units (1250 mcg) capsule TAKE ONE CAPSULE BY MOUTH ONCE A WEEK ON TUESDAY 12 capsule 1       ALLERGIES     Allergies   Allergen Reactions     Wasps [Hornets]      Sand wasp --- years ago.         PAST MEDICAL HISTORY:  Past Medical History:   Diagnosis Date     Acute kidney injury (H) 2012     Anemia      Anxiety      Anxiety and depression      Bladder mass      BPH (benign prostatic hypertrophy)      Cardiomyopathy (H)     ICD implanted 11/2016     Carpal tunnel syndrome     Right     Chronic kidney disease (CKD), stage 3 (moderate)      Dementia (H)      Depressive disorder      Diabetes (H)      Diffuse large B-cell lymphoma of extranodal site (H) 11/23/2018     Gastric mass      Gout      History of depression      LBBB (left bundle branch block) 2013     Lumbar herniated disc     L5-S1     Mixed cardiomyopathy 7/22/2016     Myocardial infarction (H) 1995 and 2010     Nonischemic cardiomyopathy (H) 7/22/2016     Obesity      Pacemaker     ICD/PM     Rosacea      Rotator cuff disorder     Tear x 2     RV (right ventricular) mural thrombus 7/22/2016       PAST SURGICAL HISTORY:  Past Surgical History:   Procedure Laterality Date     ANGIOPLASTY  1995 and 2010 with stent     BIOPSY      stomach     BONE MARROW BIOPSY, BONE SPECIMEN, NEEDLE/TROCAR N/A 10/30/2018    Procedure: BIOPSY BONE MARROW;  Surgeon: Jeb Romero MD;  Location:  GI     CARDIAC SURGERY      ICD/PM     CYSTOSCOPY, TRANSURETHRAL RESECTION (TUR) PROSTATE, COMBINED   4/22/2019    Procedure: CYSTOSCOPY AND BIPOLAR TRANSURETHRAL RESECTION (TUR) PROSTATE;  Surgeon: Ryan Camarillo MD;  Location:  OR     CYSTOSCOPY, TRANSURETHRAL RESECTION (TUR) TUMOR BLADDER, COMBINED N/A 9/19/2018    Procedure: COMBINED CYSTOSCOPY, TRANSURETHRAL RESECTION (TUR) TUMOR BLADDER;  CYSTOSCOPY, TRANSURETHRAL RESECTION BLADDER TUMOR ;  Surgeon: Ryan Camarillo MD;  Location:  OR     CYSTOSCOPY, TRANSURETHRAL RESECTION (TUR) TUMOR BLADDER, COMBINED N/A 11/12/2018    Procedure: CYSTOSCOPY RESTAGING TRANSURETHRAL RESECTION BLADDER TUMOR;  Surgeon: Ryan Camarillo MD;  Location:  OR     ESOPHAGOSCOPY, GASTROSCOPY, DUODENOSCOPY (EGD), COMBINED N/A 7/30/2018    Procedure: COMBINED ESOPHAGOSCOPY, GASTROSCOPY, DUODENOSCOPY (EGD), BIOPSY SINGLE OR MULTIPLE;  gastroscopy;  Surgeon: Parish Xiong MD;  Location:  GI     ESOPHAGOSCOPY, GASTROSCOPY, DUODENOSCOPY (EGD), COMBINED N/A 7/30/2018    Procedure: COMBINED ESOPHAGOSCOPY, GASTROSCOPY, DUODENOSCOPY (EGD);  EGD;  Surgeon: Parish Xiong MD;  Location:  GI     ESOPHAGOSCOPY, GASTROSCOPY, DUODENOSCOPY (EGD), COMBINED N/A 8/2/2018    Procedure: COMBINED ESOPHAGOSCOPY, GASTROSCOPY, DUODENOSCOPY (EGD), BIOPSY SINGLE OR MULTIPLE;  gastroscopy BIOPSYSHOULD BE SENT TO LAB IN NS NOT FORMALIN PER ;  Surgeon: Jonathon Bush MD;  Location:  GI     GASTRIC BYPASS  2001     HEART CATH LEFT HEART CATH  7/19/16    Non ischemic cardiomyopathy; Non functionally significant LAD and RCA disease      IR PORT REMOVAL RIGHT  9/9/2019     LASER HOLMIUM LITHOTRIPSY BLADDER N/A 4/22/2019    Procedure: CYSTOSCOPY, HOLMIUM LASER LITHOLAPAXY ,  AND BIPOLAR TRANSURETHRAL RESECTION (TUR) PROSTATE;  Surgeon: Ryan Camarillo MD;  Location:  OR     OTHER SURGICAL HISTORY  2006    Spinal surgery     OTHER SURGICAL HISTORY  Nov 2016    CRT-D implantation       FAMILY HISTORY:  Family History   Problem Relation Age of Onset     Coronary Artery Disease Father 39      Alzheimer Disease Mother      Coronary Artery Disease Son         Two sons have  from MIs (ages 39 and 51)       SOCIAL HISTORY:  Social History     Socioeconomic History     Marital status:      Spouse name: None     Number of children: None     Years of education: None     Highest education level: None   Occupational History     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[employer]     Comment: [employer]; retired   Social Needs     Financial resource strain: None     Food insecurity:     Worry: None     Inability: None     Transportation needs:     Medical: None     Non-medical: None   Tobacco Use     Smoking status: Former Smoker     Packs/day: 2.00     Years: 20.00     Pack years: 40.00     Types: Cigarettes     Last attempt to quit: 1980     Years since quittin.8     Smokeless tobacco: Never Used     Tobacco comment: Quit in his 30s - 2 ppd for 20 years   Substance and Sexual Activity     Alcohol use: Yes     Alcohol/week: 0.0 standard drinks     Comment: rarely     Drug use: No     Sexual activity: Yes     Partners: Female   Lifestyle     Physical activity:     Days per week: None     Minutes per session: None     Stress: None   Relationships     Social connections:     Talks on phone: None     Gets together: None     Attends Orthodoxy service: None     Active member of club or organization: None     Attends meetings of clubs or organizations: None     Relationship status: None     Intimate partner violence:     Fear of current or ex partner: None     Emotionally abused: None     Physically abused: None     Forced sexual activity: None   Other Topics Concern     Parent/sibling w/ CABG, MI or angioplasty before 65F 55M? No      Service Not Asked     Blood Transfusions Not Asked     Caffeine Concern Not Asked     Occupational Exposure Not Asked     Hobby Hazards Not Asked     Sleep Concern Not Asked     Stress Concern Not Asked     Weight Concern Not Asked     Special Diet Yes     Comment: low fat,  low salt      Back Care Not Asked     Exercise No     Bike Helmet Not Asked     Seat Belt Not Asked     Self-Exams Not Asked   Social History Narrative     None       Review of Systems:  Skin:  Negative     Eyes:  Positive for glasses  ENT:  Negative    Respiratory:  Positive for dyspnea on exertion  Cardiovascular:    Positive for;lightheadedness  Gastroenterology: Negative    Genitourinary:       Musculoskeletal:  Negative    Neurologic:  Negative    Psychiatric:  Negative    Heme/Lymph/Imm:  Positive for allergies  Endocrine:  Negative        Recent Lab Results:  LIPID RESULTS:  Lab Results   Component Value Date    CHOL 165 08/02/2019    HDL 33 (L) 08/02/2019    LDL 98 09/06/2019    LDL 82 08/02/2019    TRIG 249 (H) 08/02/2019       LIVER ENZYME RESULTS:  Lab Results   Component Value Date    AST 19 10/09/2019    ALT 31 10/09/2019       CBC RESULTS:  Lab Results   Component Value Date    WBC 6.9 10/09/2019    RBC 4.50 10/09/2019    HGB 14.1 10/09/2019    HCT 43.5 10/09/2019    MCV 97 10/09/2019    MCH 31.3 10/09/2019    MCHC 32.4 10/09/2019    RDW 15.1 (H) 10/09/2019     10/09/2019       BMP RESULTS:  Lab Results   Component Value Date     10/09/2019    POTASSIUM 4.0 10/09/2019    CHLORIDE 107 10/09/2019    CO2 27 10/09/2019    ANIONGAP 5 10/09/2019     (H) 10/09/2019    BUN 22 10/09/2019    CR 1.21 10/09/2019    GFRESTIMATED 57 (L) 10/09/2019    GFRESTBLACK 66 10/09/2019    SHELLEY 9.5 10/09/2019        A1C RESULTS:  Lab Results   Component Value Date    A1C 6.7 (H) 09/06/2019       INR RESULTS:  Lab Results   Component Value Date    INR 1.01 09/09/2019    INR 1.06 11/27/2018         ECHOCARDIOGRAM  No results found for this or any previous visit (from the past 8760 hour(s)).      Orders Placed This Encounter   Procedures     X-ray Chest 2 vws*     No orders of the defined types were placed in this encounter.    There are no discontinued medications.      Encounter Diagnosis   Name Primary?      ICD (implantable cardioverter-defibrillator) in place Yes         CC  No referring provider defined for this encounter.            Thank you for allowing me to participate in the care of your patient.    Sincerely,     Josias Hernandez MD     [facility]

## 2019-10-10 NOTE — PROGRESS NOTES
Electrophysiology/ Clinic Note         H&P and Plan:   REASON FOR VISIT:  Evaluation of cardiomyopathy and biventricular ICD.      HISTORY OF PRESENT ILLNESS:  Mr. 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      Patient experienced an ICD alert.  He was evaluated in the device clinic on October 8 and device interrogation showed a slight elevation in RV coil and SVC coil impedances (respectively 86 and 99 ohms).  No significant noise or increase in threshold was noticed.  Case was discussed with [employer] which was suggestive of possible RV coil fracture or poor contact of the lead in the Header.    The moment he is doing well.  He denies any symptoms such as chest pain, lightheadedness, near-syncope or syncopal episode. An echocardiogram was obtained in 03/2019 and showed EF of 25-30% and mild MR and AI.        ASSESSMENT AND PLAN:   1.  Device care.  The patient had an alarm on a RV coil/SVC coil impedance.  No noise in the leads or change in threshold was noticed.  This pattern is somewhat unusual however it could be related to an RV lead fracture.     I discussed the findings with patient.  This time, it is possible that he is having a lead fracture however were not confirmed yet.  If we do confirm that there is a prominent lead, options are implant a new lead and or extract the old lead.    At this time, I recommend turning off the SVC coil to help with diagnosis.  We will obtain an chest x-ray to look particular the head or the connection.  If were not able to determine lead reliability, we will  "consider lead revision.    2. Heart failure secondary to mixed cardiomyopathy.  He is euvolemic on physical exam.  Continue current medical therapy with Lipitor, Lasix, hydralazine, Imdur, metoprolol and Coumadin.   3.  Device care.  Continue device checks 3 months.    4.  Hypertension.  Blood pressure is well controlled.   5.  Followup care.  He will follow up with and Cristiano in an year or earlier as needed.    Josias Hernandez MD    Physical Exam:  Vitals: /69   Pulse 78   Ht 1.854 m (6' 1\")   Wt 95.3 kg (210 lb)   BMI 27.71 kg/m      Constitutional:  AAO x3.  Pt is in NAD.  HEAD: normocephalic.  SKIN: Skin normal color, texture and turgor with no lesions or eruptions.  Eyes: PERRL, EOMI.  ENT:  Supple, normal JVP. No lymphadenopathy or thyroid enlargement.  Chest:  CTAB.  Cardiac:   RRR, normal  S1 and S2.  No murmurs rubs or gallop.    Abdomen:  Normal BS.  Soft, non-tender and non-distended.  No rebound or guarding.    Extremities:  Pedious pulses palpable B/L.  No LE edema noticed.   Neurological: Strength and sensation grossly symmetric and intact throughout.       CURRENT MEDICATIONS:  Current Outpatient Medications   Medication Sig Dispense Refill     ACE/ARB NOT PRESCRIBED, INTENTIONAL, ACE & ARB not prescribed due to Symptomatic hypotension not due to excessive diuresis       atorvastatin (LIPITOR) 40 MG tablet Take 1 tablet (40 mg) by mouth At Bedtime 90 tablet 3     COENZYME Q-10 PO Take 100 mg by mouth every morning        ferrous sulfate (IRON) 325 (65 Fe) MG tablet Take 325 mg by mouth daily       furosemide (LASIX) 20 MG tablet TAKE ONE TABLET BY MOUTH ONE TIME DAILY  90 tablet 1     hydrALAZINE (APRESOLINE) 10 MG tablet Take 1 tablet (10 mg) by mouth 3 times daily 270 tablet 1     metoprolol succinate (TOPROL-XL) 50 MG 24 hr tablet TAKE 1 TABLET (50 MG) DAILY (Patient taking differently: TAKE 1 TABLET (50 MG) BY MOUTH DAILY) 90 tablet 3     multivitamin, therapeutic with minerals " (MULTI-VITAMIN) TABS Take 1 tablet by mouth every morning        venlafaxine (EFFEXOR) 75 MG tablet TAKE 1 TABLET TWICE DAILY (Patient taking differently: TAKE 1 TABLET (75 MG) BY MOUTH TWICE DAILY) 180 tablet 1     vitamin D2 (ERGOCALCIFEROL) 14108 units (1250 mcg) capsule TAKE ONE CAPSULE BY MOUTH ONCE A WEEK ON TUESDAY 12 capsule 1       ALLERGIES     Allergies   Allergen Reactions     Wasps [Hornets]      Sand wasp --- years ago.         PAST MEDICAL HISTORY:  Past Medical History:   Diagnosis Date     Acute kidney injury (H) 2012     Anemia      Anxiety      Anxiety and depression      Bladder mass      BPH (benign prostatic hypertrophy)      Cardiomyopathy (H)     ICD implanted 11/2016     Carpal tunnel syndrome     Right     Chronic kidney disease (CKD), stage 3 (moderate)      Dementia (H)      Depressive disorder      Diabetes (H)      Diffuse large B-cell lymphoma of extranodal site (H) 11/23/2018     Gastric mass      Gout      History of depression      LBBB (left bundle branch block) 2013     Lumbar herniated disc     L5-S1     Mixed cardiomyopathy 7/22/2016     Myocardial infarction (H) 1995 and 2010     Nonischemic cardiomyopathy (H) 7/22/2016     Obesity      Pacemaker     ICD/PM     Rosacea      Rotator cuff disorder     Tear x 2     RV (right ventricular) mural thrombus 7/22/2016       PAST SURGICAL HISTORY:  Past Surgical History:   Procedure Laterality Date     ANGIOPLASTY  1995 and 2010 with stent     BIOPSY      stomach     BONE MARROW BIOPSY, BONE SPECIMEN, NEEDLE/TROCAR N/A 10/30/2018    Procedure: BIOPSY BONE MARROW;  Surgeon: Jeb Romero MD;  Location:  GI     CARDIAC SURGERY      ICD/PM     CYSTOSCOPY, TRANSURETHRAL RESECTION (TUR) PROSTATE, COMBINED  4/22/2019    Procedure: CYSTOSCOPY AND BIPOLAR TRANSURETHRAL RESECTION (TUR) PROSTATE;  Surgeon: Ryan Camarillo MD;  Location:  OR     CYSTOSCOPY, TRANSURETHRAL RESECTION (TUR) TUMOR BLADDER, COMBINED N/A 9/19/2018     Procedure: COMBINED CYSTOSCOPY, TRANSURETHRAL RESECTION (TUR) TUMOR BLADDER;  CYSTOSCOPY, TRANSURETHRAL RESECTION BLADDER TUMOR ;  Surgeon: Ryan Camarillo MD;  Location:  OR     CYSTOSCOPY, TRANSURETHRAL RESECTION (TUR) TUMOR BLADDER, COMBINED N/A 2018    Procedure: CYSTOSCOPY RESTAGING TRANSURETHRAL RESECTION BLADDER TUMOR;  Surgeon: Ryan Camarillo MD;  Location:  OR     ESOPHAGOSCOPY, GASTROSCOPY, DUODENOSCOPY (EGD), COMBINED N/A 2018    Procedure: COMBINED ESOPHAGOSCOPY, GASTROSCOPY, DUODENOSCOPY (EGD), BIOPSY SINGLE OR MULTIPLE;  gastroscopy;  Surgeon: Parish Xiong MD;  Location:  GI     ESOPHAGOSCOPY, GASTROSCOPY, DUODENOSCOPY (EGD), COMBINED N/A 2018    Procedure: COMBINED ESOPHAGOSCOPY, GASTROSCOPY, DUODENOSCOPY (EGD);  EGD;  Surgeon: Parish Xiong MD;  Location:  GI     ESOPHAGOSCOPY, GASTROSCOPY, DUODENOSCOPY (EGD), COMBINED N/A 2018    Procedure: COMBINED ESOPHAGOSCOPY, GASTROSCOPY, DUODENOSCOPY (EGD), BIOPSY SINGLE OR MULTIPLE;  gastroscopy BIOPSYSHOULD BE SENT TO LAB IN NS NOT FORMALIN PER ;  Surgeon: Jonathon Bush MD;  Location:  GI     GASTRIC BYPASS       HEART CATH LEFT HEART CATH  16    Non ischemic cardiomyopathy; Non functionally significant LAD and RCA disease      IR PORT REMOVAL RIGHT  2019     LASER HOLMIUM LITHOTRIPSY BLADDER N/A 2019    Procedure: CYSTOSCOPY, HOLMIUM LASER LITHOLAPAXY ,  AND BIPOLAR TRANSURETHRAL RESECTION (TUR) PROSTATE;  Surgeon: Ryan Camarillo MD;  Location:  OR     OTHER SURGICAL HISTORY      Spinal surgery     OTHER SURGICAL HISTORY  2016    CRT-D implantation       FAMILY HISTORY:  Family History   Problem Relation Age of Onset     Coronary Artery Disease Father 39     Alzheimer Disease Mother      Coronary Artery Disease Son         Two sons have  from MIs (ages 39 and 51)       SOCIAL HISTORY:  Social History     Socioeconomic History     Marital status:      Spouse name: None      Number of children: None     Years of education: None     Highest education level: None   Occupational History     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[employer]     Comment: [employer]; retired   Social Needs     Financial resource strain: None     Food insecurity:     Worry: None     Inability: None     Transportation needs:     Medical: None     Non-medical: None   Tobacco Use     Smoking status: Former Smoker     Packs/day: 2.00     Years: 20.00     Pack years: 40.00     Types: Cigarettes     Last attempt to quit: 1980     Years since quittin.8     Smokeless tobacco: Never Used     Tobacco comment: Quit in his 30s - 2 ppd for 20 years   Substance and Sexual Activity     Alcohol use: Yes     Alcohol/week: 0.0 standard drinks     Comment: rarely     Drug use: No     Sexual activity: Yes     Partners: Female   Lifestyle     Physical activity:     Days per week: None     Minutes per session: None     Stress: None   Relationships     Social connections:     Talks on phone: None     Gets together: None     Attends Hoahaoism service: None     Active member of club or organization: None     Attends meetings of clubs or organizations: None     Relationship status: None     Intimate partner violence:     Fear of current or ex partner: None     Emotionally abused: None     Physically abused: None     Forced sexual activity: None   Other Topics Concern     Parent/sibling w/ CABG, MI or angioplasty before 65F 55M? No      Service Not Asked     Blood Transfusions Not Asked     Caffeine Concern Not Asked     Occupational Exposure Not Asked     Hobby Hazards Not Asked     Sleep Concern Not Asked     Stress Concern Not Asked     Weight Concern Not Asked     Special Diet Yes     Comment: low fat, low salt      Back Care Not Asked     Exercise No     Bike Helmet Not Asked     Seat Belt Not Asked     Self-Exams Not Asked   Social History Narrative     None       Review of Systems:  Skin:  Negative     Eyes:  Positive for  glasses  ENT:  Negative    Respiratory:  Positive for dyspnea on exertion  Cardiovascular:    Positive for;lightheadedness  Gastroenterology: Negative    Genitourinary:       Musculoskeletal:  Negative    Neurologic:  Negative    Psychiatric:  Negative    Heme/Lymph/Imm:  Positive for allergies  Endocrine:  Negative        Recent Lab Results:  LIPID RESULTS:  Lab Results   Component Value Date    CHOL 165 08/02/2019    HDL 33 (L) 08/02/2019    LDL 98 09/06/2019    LDL 82 08/02/2019    TRIG 249 (H) 08/02/2019       LIVER ENZYME RESULTS:  Lab Results   Component Value Date    AST 19 10/09/2019    ALT 31 10/09/2019       CBC RESULTS:  Lab Results   Component Value Date    WBC 6.9 10/09/2019    RBC 4.50 10/09/2019    HGB 14.1 10/09/2019    HCT 43.5 10/09/2019    MCV 97 10/09/2019    MCH 31.3 10/09/2019    MCHC 32.4 10/09/2019    RDW 15.1 (H) 10/09/2019     10/09/2019       BMP RESULTS:  Lab Results   Component Value Date     10/09/2019    POTASSIUM 4.0 10/09/2019    CHLORIDE 107 10/09/2019    CO2 27 10/09/2019    ANIONGAP 5 10/09/2019     (H) 10/09/2019    BUN 22 10/09/2019    CR 1.21 10/09/2019    GFRESTIMATED 57 (L) 10/09/2019    GFRESTBLACK 66 10/09/2019    SHELLEY 9.5 10/09/2019        A1C RESULTS:  Lab Results   Component Value Date    A1C 6.7 (H) 09/06/2019       INR RESULTS:  Lab Results   Component Value Date    INR 1.01 09/09/2019    INR 1.06 11/27/2018         ECHOCARDIOGRAM  No results found for this or any previous visit (from the past 8760 hour(s)).      Orders Placed This Encounter   Procedures     X-ray Chest 2 vws*     No orders of the defined types were placed in this encounter.    There are no discontinued medications.      Encounter Diagnosis   Name Primary?     ICD (implantable cardioverter-defibrillator) in place Yes         CC  No referring provider defined for this encounter.

## 2019-10-11 DIAGNOSIS — F32.A ANXIETY AND DEPRESSION: Chronic | ICD-10-CM

## 2019-10-11 DIAGNOSIS — F41.9 ANXIETY AND DEPRESSION: Chronic | ICD-10-CM

## 2019-10-11 DIAGNOSIS — I50.23 ACUTE ON CHRONIC SYSTOLIC HEART FAILURE (H): Chronic | ICD-10-CM

## 2019-10-11 RX ORDER — VENLAFAXINE 75 MG/1
TABLET ORAL
Qty: 180 TABLET | Refills: 1 | Status: SHIPPED | OUTPATIENT
Start: 2019-10-11 | End: 2020-04-09

## 2019-10-11 RX ORDER — FUROSEMIDE 20 MG
20 TABLET ORAL DAILY
Qty: 90 TABLET | Refills: 1 | Status: SHIPPED | OUTPATIENT
Start: 2019-10-11 | End: 2020-02-11

## 2019-10-11 NOTE — TELEPHONE ENCOUNTER
Pres[place] per [facility] [place].  Symone MONTILLA RN

## 2019-10-11 NOTE — TELEPHONE ENCOUNTER
Routing refill request to provider for review/approval because:  A break in medication- per  Med list, last rx would have been completed 5/17/19.  Please authorize if appropriate.  Thanks,  Eri Goddard RN

## 2019-10-11 NOTE — TELEPHONE ENCOUNTER
"furosemide (LASIX) 20 MG tablet 90 tablet 1 4/18/2019         Last Written Prescription Date:  04/18/2019  Last Fill Quantity: 90,  # refills: 1   Last office visit: 9/6/2019 with prescribing provider:     Future Office Visit:   Next 5 appointments (look out 90 days)    Jan 06, 2020  1:50 PM CST  Return Visit with  LAB DRAW 1  [facility] ([facility]) [facility]  [address]  [facility] [address]  [phone]   Jan 09, 2020  2:00 PM CST  Return Visit with Maribel Bender MD  [facility] ([facility]) [facility]  [address]  [facility] [address] [phone]           Requested Prescriptions   Pending Prescriptions Disp Refills     furosemide (LASIX) 20 MG tablet 90 tablet 1     Sig: Take 1 tablet (20 mg) by mouth daily       Diuretics (Including Combos) Protocol Passed - 10/11/2019 12:14 PM        Passed - Blood pressure under 140/90 in past 12 months     BP Readings from Last 3 Encounters:   10/10/19 109/69   10/09/19 130/88   09/16/19 120/84                 Passed - Recent (12 mo) or future (30 days) visit within the authorizing provider's specialty     Patient has had an office visit with the authorizing provider or a provider within the authorizing providers department within the previous 12 mos or has a future within next 30 days. See \"Patient Info\" tab in inbasket, or \"Choose Columns\" in Meds & Orders section of the refill encounter.              Passed - Medication is active on med list        Passed - Patient is age 18 or older        Passed - Normal serum creatinine on file in past 12 months     Recent Labs   Lab Test 10/09/19  1516   CR 1.21              Passed - Normal serum potassium on file in past 12 months     Recent Labs   Lab Test 10/09/19  1516   POTASSIUM 4.0                    Passed - Normal serum sodium on file in past 12 months     Recent " Labs   Lab Test 10/09/19  1516

## 2019-10-11 NOTE — TELEPHONE ENCOUNTER
"venlafaxine (EFFEXOR) 75 MG tablet 180 tablet 1 11/7/2018         Last Written Prescription Date:  11/07/2018  Last Fill Quantity: 180,  # refills: 1   Last office visit: 09/06/2019 with prescribing provider:     Future Office Visit:   Next 5 appointments (look out 90 days)    Jan 06, 2020  1:50 PM CST  Return Visit with  LAB DRAW 1  [facility] ([facility]) [facility]  [address]  [facility] [address]  [phone]   Jan 09, 2020  2:00 PM CST  Return Visit with Maribel Bender MD  [facility] ([facility]) [facility]  [address]  [facility] [address]  [phone]         PHQ-9 SCORE 7/27/2016 10/9/2018 9/6/2019   PHQ-9 Total Score 2 2 2     Requested Prescriptions   Pending Prescriptions Disp Refills     venlafaxine (EFFEXOR) 75 MG tablet [Pharmacy Med Name: Venlafaxine HCl Oral Tablet 75 MG] 180 tablet 0     Sig: TAKE 1 TABLET BY MOUTH TWICE DAILY       Serotonin-Norepinephrine Reuptake Inhibitors  Passed - 10/11/2019  8:33 AM        Passed - Blood pressure under 140/90 in past 12 months     BP Readings from Last 3 Encounters:   10/10/19 109/69   10/09/19 130/88   09/16/19 120/84                 Passed - PHQ-9 score of less than 5 in past 6 months     Please review last PHQ-9 score.           Passed - Medication is active on med list        Passed - Patient is age 18 or older        Passed - Normal serum creatinine on file in past 12 months     Recent Labs   Lab Test 10/09/19  1516   CR 1.21             Passed - Recent (6 mo) or future (30 days) visit within the authorizing provider's specialty     Patient had office visit in the last 6 months or has a visit in the next 30 days with authorizing provider or within the authorizing provider's specialty.  See \"Patient Info\" tab in inbasket, or \"Choose Columns\" in Meds & Orders section of the refill encounter.     "

## 2019-10-21 ENCOUNTER — TELEPHONE (OUTPATIENT)
Dept: CARDIOLOGY | Facility: CLINIC | Age: 79
End: 2019-10-21

## 2019-10-21 DIAGNOSIS — T82.198A MALFUNCTION OF IMPLANTABLE DEFIBRILLATOR VENTRICULAR (ICD) LEAD: Primary | ICD-10-CM

## 2019-10-21 RX ORDER — CEFAZOLIN SODIUM 2 G/100ML
2 INJECTION, SOLUTION INTRAVENOUS
Status: CANCELLED | OUTPATIENT
Start: 2019-10-24

## 2019-10-21 RX ORDER — LIDOCAINE 40 MG/G
CREAM TOPICAL
Status: CANCELLED | OUTPATIENT
Start: 2019-10-21

## 2019-10-21 RX ORDER — SODIUM CHLORIDE 450 MG/100ML
INJECTION, SOLUTION INTRAVENOUS CONTINUOUS
Status: CANCELLED | OUTPATIENT
Start: 2019-10-24

## 2019-10-21 NOTE — TELEPHONE ENCOUNTER
Call from pt; they saw Dr Hernandez on 10-10-19 to discuss recent abn findings re: RV/SVC coil impedances. Replacing the RV/HV lead were discussed at this appt. Mrs Villalta calls to say they want to proceed with the procedure. Of note, a CXR was also completed on 10-10-19. Will alert Dr Hernandez to review. Note routed to Dr Hernandez. Will place orders for the lead revision. If done in the next 2-3 weeks, will not be necessary for an H/P prior to. [email or web address]

## 2019-10-24 ENCOUNTER — SURGERY (OUTPATIENT)
Age: 79
End: 2019-10-24
Payer: COMMERCIAL

## 2019-10-24 ENCOUNTER — HOSPITAL ENCOUNTER (OUTPATIENT)
Facility: CLINIC | Age: 79
Discharge: HOME | End: 2019-10-25
Payer: COMMERCIAL

## 2019-10-24 DIAGNOSIS — Z95.810 ICD (IMPLANTABLE CARDIOVERTER-DEFIBRILLATOR), DUAL, IN SITU: Primary | ICD-10-CM

## 2019-10-24 DIAGNOSIS — T82.198A MALFUNCTION OF IMPLANTABLE DEFIBRILLATOR VENTRICULAR (ICD) LEAD: ICD-10-CM

## 2019-10-24 DIAGNOSIS — I42.9 CARDIOMYOPATHY, UNSPECIFIED TYPE (H): Chronic | ICD-10-CM

## 2019-10-24 LAB
ANION GAP SERPL CALCULATED.3IONS-SCNC: 8 MMOL/L (ref 3–14)
BUN SERPL-MCNC: 20 MG/DL (ref 7–30)
CALCIUM SERPL-MCNC: 8.9 MG/DL (ref 8.5–10.1)
CHLORIDE SERPL-SCNC: 106 MMOL/L (ref 94–109)
CO2 SERPL-SCNC: 24 MMOL/L (ref 20–32)
CREAT SERPL-MCNC: 1.14 MG/DL (ref 0.66–1.25)
ERYTHROCYTE [DISTWIDTH] IN BLOOD BY AUTOMATED COUNT: 15 % (ref 10–15)
GFR SERPL CREATININE-BSD FRML MDRD: 61 ML/MIN/{1.73_M2}
GLUCOSE SERPL-MCNC: 120 MG/DL (ref 70–99)
HCT VFR BLD AUTO: 43.8 % (ref 40–53)
HGB BLD-MCNC: 13.9 G/DL (ref 13.3–17.7)
MCH RBC QN AUTO: 31 PG (ref 26.5–33)
MCHC RBC AUTO-ENTMCNC: 31.7 G/DL (ref 31.5–36.5)
MCV RBC AUTO: 98 FL (ref 78–100)
PLATELET # BLD AUTO: 204 10E9/L (ref 150–450)
POTASSIUM SERPL-SCNC: 3.7 MMOL/L (ref 3.4–5.3)
RBC # BLD AUTO: 4.49 10E12/L (ref 4.4–5.9)
SODIUM SERPL-SCNC: 138 MMOL/L (ref 133–144)
WBC # BLD AUTO: 7.4 10E9/L (ref 4–11)

## 2019-10-24 PROCEDURE — 99153 MOD SED SAME PHYS/QHP EA: CPT | Performed by: INTERNAL MEDICINE

## 2019-10-24 PROCEDURE — 99152 MOD SED SAME PHYS/QHP 5/>YRS: CPT | Performed by: INTERNAL MEDICINE

## 2019-10-24 PROCEDURE — 40000852 ZZH STATISTIC HEART CATH LAB OR EP LAB

## 2019-10-24 PROCEDURE — 40000235 ZZH STATISTIC TELEMETRY

## 2019-10-24 PROCEDURE — C1777 LEAD, AICD, ENDO SINGLE COIL: HCPCS | Performed by: INTERNAL MEDICINE

## 2019-10-24 PROCEDURE — 85027 COMPLETE CBC AUTOMATED: CPT | Performed by: INTERNAL MEDICINE

## 2019-10-24 PROCEDURE — 93010 ELECTROCARDIOGRAM REPORT: CPT | Performed by: INTERNAL MEDICINE

## 2019-10-24 PROCEDURE — 40000935 ZZH STATISTIC OUTPATIENT (NON-OBS) EVE

## 2019-10-24 PROCEDURE — 33216 INSERT 1 ELECTRODE PM-DEFIB: CPT | Performed by: INTERNAL MEDICINE

## 2019-10-24 PROCEDURE — C1894 INTRO/SHEATH, NON-LASER: HCPCS | Performed by: INTERNAL MEDICINE

## 2019-10-24 PROCEDURE — 25000128 H RX IP 250 OP 636: Performed by: INTERNAL MEDICINE

## 2019-10-24 PROCEDURE — 40000065 ZZH STATISTIC EKG NON-CHARGEABLE

## 2019-10-24 PROCEDURE — 25000125 ZZHC RX 250: Performed by: INTERNAL MEDICINE

## 2019-10-24 PROCEDURE — 36415 COLL VENOUS BLD VENIPUNCTURE: CPT

## 2019-10-24 PROCEDURE — 27210794 ZZH OR GENERAL SUPPLY STERILE: Performed by: INTERNAL MEDICINE

## 2019-10-24 PROCEDURE — 40000936 ZZH STATISTIC OUTPATIENT (NON-OBS) NIGHT

## 2019-10-24 PROCEDURE — 25000128 H RX IP 250 OP 636

## 2019-10-24 PROCEDURE — 25000132 ZZH RX MED GY IP 250 OP 250 PS 637: Performed by: INTERNAL MEDICINE

## 2019-10-24 PROCEDURE — 25800029 ZZH RX IP 258 OP 250: Performed by: INTERNAL MEDICINE

## 2019-10-24 PROCEDURE — 82962 GLUCOSE BLOOD TEST: CPT

## 2019-10-24 PROCEDURE — 80048 BASIC METABOLIC PNL TOTAL CA: CPT | Performed by: INTERNAL MEDICINE

## 2019-10-24 PROCEDURE — 93005 ELECTROCARDIOGRAM TRACING: CPT

## 2019-10-24 PROCEDURE — 36415 COLL VENOUS BLD VENIPUNCTURE: CPT | Performed by: INTERNAL MEDICINE

## 2019-10-24 DEVICE — IMPLANTABLE DEVICE: Type: IMPLANTABLE DEVICE | Status: FUNCTIONAL

## 2019-10-24 RX ORDER — NALOXONE HYDROCHLORIDE 0.4 MG/ML
.1-.4 INJECTION, SOLUTION INTRAMUSCULAR; INTRAVENOUS; SUBCUTANEOUS
Status: DISCONTINUED | OUTPATIENT
Start: 2019-10-24 | End: 2019-10-25 | Stop reason: HOSPADM

## 2019-10-24 RX ORDER — OXYCODONE AND ACETAMINOPHEN 5; 325 MG/1; MG/1
1 TABLET ORAL EVERY 4 HOURS PRN
Status: DISCONTINUED | OUTPATIENT
Start: 2019-10-24 | End: 2019-10-25 | Stop reason: HOSPADM

## 2019-10-24 RX ORDER — METOPROLOL SUCCINATE 50 MG/1
50 TABLET, EXTENDED RELEASE ORAL DAILY
Status: CANCELLED | OUTPATIENT
Start: 2019-10-24

## 2019-10-24 RX ORDER — CEFAZOLIN SODIUM 2 G/100ML
2 INJECTION, SOLUTION INTRAVENOUS EVERY 8 HOURS
Status: COMPLETED | OUTPATIENT
Start: 2019-10-24 | End: 2019-10-25

## 2019-10-24 RX ORDER — CEFAZOLIN SODIUM 2 G/100ML
2 INJECTION, SOLUTION INTRAVENOUS
Status: COMPLETED | OUTPATIENT
Start: 2019-10-24 | End: 2019-10-24

## 2019-10-24 RX ORDER — HYDRALAZINE HYDROCHLORIDE 10 MG/1
10 TABLET, FILM COATED ORAL 3 TIMES DAILY
Status: CANCELLED | OUTPATIENT
Start: 2019-10-24

## 2019-10-24 RX ORDER — FENTANYL CITRATE 50 UG/ML
INJECTION, SOLUTION INTRAMUSCULAR; INTRAVENOUS
Status: DISCONTINUED | OUTPATIENT
Start: 2019-10-24 | End: 2019-10-24 | Stop reason: HOSPADM

## 2019-10-24 RX ORDER — SODIUM CHLORIDE 450 MG/100ML
INJECTION, SOLUTION INTRAVENOUS CONTINUOUS
Status: DISCONTINUED | OUTPATIENT
Start: 2019-10-24 | End: 2019-10-24 | Stop reason: HOSPADM

## 2019-10-24 RX ORDER — METOPROLOL SUCCINATE 50 MG/1
50 TABLET, EXTENDED RELEASE ORAL DAILY
COMMUNITY
End: 2019-11-09

## 2019-10-24 RX ORDER — FERROUS SULFATE 325(65) MG
325 TABLET ORAL DAILY
Status: CANCELLED | OUTPATIENT
Start: 2019-10-24

## 2019-10-24 RX ORDER — ATORVASTATIN CALCIUM 40 MG/1
40 TABLET, FILM COATED ORAL AT BEDTIME
Status: CANCELLED | OUTPATIENT
Start: 2019-10-24

## 2019-10-24 RX ORDER — LIDOCAINE 40 MG/G
CREAM TOPICAL
Status: DISCONTINUED | OUTPATIENT
Start: 2019-10-24 | End: 2019-10-24 | Stop reason: HOSPADM

## 2019-10-24 RX ORDER — BUPIVACAINE HYDROCHLORIDE 2.5 MG/ML
INJECTION, SOLUTION EPIDURAL; INFILTRATION; INTRACAUDAL
Status: DISCONTINUED | OUTPATIENT
Start: 2019-10-24 | End: 2019-10-24 | Stop reason: HOSPADM

## 2019-10-24 RX ORDER — FUROSEMIDE 20 MG
20 TABLET ORAL DAILY
Status: CANCELLED | OUTPATIENT
Start: 2019-10-24

## 2019-10-24 RX ORDER — CEFAZOLIN SODIUM 2 G/100ML
2 INJECTION, SOLUTION INTRAVENOUS EVERY 8 HOURS
Status: DISCONTINUED | OUTPATIENT
Start: 2019-10-24 | End: 2019-10-24

## 2019-10-24 RX ADMIN — MIDAZOLAM 1 MG: 1 INJECTION INTRAMUSCULAR; INTRAVENOUS at 13:46

## 2019-10-24 RX ADMIN — SODIUM CHLORIDE: 4.5 INJECTION, SOLUTION INTRAVENOUS at 11:46

## 2019-10-24 RX ADMIN — OXYCODONE HYDROCHLORIDE AND ACETAMINOPHEN 1 TABLET: 5; 325 TABLET ORAL at 18:21

## 2019-10-24 RX ADMIN — FENTANYL CITRATE 50 MCG: 50 INJECTION, SOLUTION INTRAMUSCULAR; INTRAVENOUS at 13:44

## 2019-10-24 RX ADMIN — CEFAZOLIN SODIUM 2 G: 2 INJECTION, SOLUTION INTRAVENOUS at 13:16

## 2019-10-24 RX ADMIN — CEFAZOLIN SODIUM 2 G: 2 INJECTION, SOLUTION INTRAVENOUS at 20:55

## 2019-10-24 RX ADMIN — LIDOCAINE HYDROCHLORIDE 10 ML: 10 INJECTION, SOLUTION EPIDURAL; INFILTRATION; INTRACAUDAL; PERINEURAL at 13:44

## 2019-10-24 RX ADMIN — FENTANYL CITRATE 50 MCG: 50 INJECTION, SOLUTION INTRAMUSCULAR; INTRAVENOUS at 13:29

## 2019-10-24 RX ADMIN — BUPIVACAINE HYDROCHLORIDE 10 ML: 2.5 INJECTION, SOLUTION EPIDURAL; INFILTRATION; INTRACAUDAL; PERINEURAL at 13:45

## 2019-10-24 RX ADMIN — MIDAZOLAM 1 MG: 1 INJECTION INTRAMUSCULAR; INTRAVENOUS at 13:30

## 2019-10-24 RX ADMIN — BACITRACIN 25000 UNITS: 5000 INJECTION, POWDER, FOR SOLUTION INTRAMUSCULAR at 14:16

## 2019-10-24 RX ADMIN — MIDAZOLAM 1 MG: 1 INJECTION INTRAMUSCULAR; INTRAVENOUS at 13:44

## 2019-10-24 ASSESSMENT — MIFFLIN-ST. JEOR
SCORE: 1763.63
SCORE: 1736.87

## 2019-10-24 ASSESSMENT — ACTIVITIES OF DAILY LIVING (ADL)
RETIRED_EATING: 0-->INDEPENDENT
SWALLOWING: 0-->SWALLOWS FOODS/LIQUIDS WITHOUT DIFFICULTY
BATHING: 1-->ASSISTIVE EQUIPMENT
FALL_HISTORY_WITHIN_LAST_SIX_MONTHS: NO
COGNITION: 1 - ATTENTION OR MEMORY DEFICITS
DRESS: 1-->ASSISTIVE EQUIPMENT
RETIRED_COMMUNICATION: 0-->UNDERSTANDS/COMMUNICATES WITHOUT DIFFICULTY
TOILETING: 1-->ASSISTIVE EQUIPMENT
AMBULATION: 1-->ASSISTIVE EQUIPMENT
TRANSFERRING: 1-->ASSISTIVE EQUIPMENT

## 2019-10-24 NOTE — PROGRESS NOTES
Care Suites Admission Nursing Note    Reason for admission: Lead revision  CS arrival time: 1128  Accompanied by: Spouse  Name/phone of DC : Izabella-spouse  Medications held: none  Consent signed: Yes  Abnormal assessment/labs: none  Education/questions answered: Yes  Plan: Proceed

## 2019-10-24 NOTE — PROGRESS NOTES
Mr. 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  Case was discussed with [employer] and new lead was recommended.    I had an extensive discussion with pt and wife.  Options are do nothing (if he does not care about ICD portion), pursue lead extraction and new lead placement or new lead placement only. Each option was explained in details.  Patient understands the pro/cons of each option.  He understands there is 1-2% risk of complication associated with procedure.  He would like to have a new lead placed and forgo lead extraction to minimize risk of complication. Consent was signed.      Josias Hernandez MD

## 2019-10-24 NOTE — PROGRESS NOTES
1520 Report received from Meir Salas RN.  1530 Pt alert - some confusion noted. Drsg CDI to left chest. No oozing or hematoma noted. Area soft. Pt denies pain. Pt instructed on activity restrictions with left arm. Verbal understanding received. Pt's wife at bedside.   1630 Resting quietly. No complaints.  1715 Pt taking diet & flds well. No complaints.  1800 OOB - steady on feet. Ambulated in halls to bathroom with good zoya.   1815 Pt medicated for discomfort to left chest incision site. Pt reports increased discomfort after activity.  1855 Detailed report called to Marlo KELLOGG in OBS.  1907 Pt transferred to OBS rm 21. All personal belongings sent w/ pt. Wife with pt.

## 2019-10-24 NOTE — PROGRESS NOTES
1445: Pt returned from EP lab. A/O. Drsg CDI to left chest. No oozing or hematoma noted. Area soft & flat. Pt denies pain. Pt instructed on activity restrictions with left arm. Verbal understanding received.   Pt's family at bedside. Detailed update given. Pt taking flds well. No complaints.    1520: Report to Leatha ROTHMAN for shift change.

## 2019-10-25 ENCOUNTER — APPOINTMENT (OUTPATIENT)
Dept: GENERAL RADIOLOGY | Facility: CLINIC | Age: 79
End: 2019-10-25
Attending: INTERNAL MEDICINE
Payer: COMMERCIAL

## 2019-10-25 VITALS
RESPIRATION RATE: 18 BRPM | HEART RATE: 70 BPM | TEMPERATURE: 97 F | SYSTOLIC BLOOD PRESSURE: 139 MMHG | HEIGHT: 73 IN | WEIGHT: 219.4 LBS | OXYGEN SATURATION: 95 % | BODY MASS INDEX: 29.08 KG/M2 | DIASTOLIC BLOOD PRESSURE: 76 MMHG

## 2019-10-25 DIAGNOSIS — C67.2 MALIGNANT NEOPLASM OF LATERAL WALL OF URINARY BLADDER (H): Primary | ICD-10-CM

## 2019-10-25 LAB — GLUCOSE BLDC GLUCOMTR-MCNC: 130 MG/DL (ref 70–99)

## 2019-10-25 PROCEDURE — 25000128 H RX IP 250 OP 636

## 2019-10-25 PROCEDURE — 40000934 ZZH STATISTIC OUTPATIENT (NON-OBS) DAY

## 2019-10-25 PROCEDURE — 93005 ELECTROCARDIOGRAM TRACING: CPT

## 2019-10-25 PROCEDURE — 25000132 ZZH RX MED GY IP 250 OP 250 PS 637: Performed by: INTERNAL MEDICINE

## 2019-10-25 PROCEDURE — 40000986 XR CHEST 2 VW

## 2019-10-25 RX ADMIN — OXYCODONE HYDROCHLORIDE AND ACETAMINOPHEN 1 TABLET: 5; 325 TABLET ORAL at 01:34

## 2019-10-25 RX ADMIN — CEFAZOLIN SODIUM 2 G: 2 INJECTION, SOLUTION INTRAVENOUS at 05:51

## 2019-10-25 RX ADMIN — CEFAZOLIN SODIUM 2 G: 2 INJECTION, SOLUTION INTRAVENOUS at 12:01

## 2019-10-25 ASSESSMENT — MIFFLIN-ST. JEOR: SCORE: 1769.07

## 2019-10-25 NOTE — PROGRESS NOTES
Pt alert to self, confused on date and time and place, wife at bedside.  Denies pain.  Pt up with SBA.  Pt ate well at breakfast, iv removed.  Pt discharge paperwork reviewed with pt and wife, both verbalized understanding and any questions answererd.  No new meds to send home, discharge pt via wheelchair to wife car.

## 2019-10-25 NOTE — PLAN OF CARE
Care Plan Summary Note: Pt A&Ox4, VSS/RA.  Left chest dressing CDI, area soft & dry. Left arm sling applied overnight to avoid patient extending arms per PACU RN. BG-130. Denies pain at this time. Tele-100% V-paced. Regular diet, Voiding adequately. IV-SL. Getting IV ABX. CXR and wt in the morning. SBA.

## 2019-10-25 NOTE — DISCHARGE INSTRUCTIONS
Discharge Instructions for ICD Implantation  You have had a procedure to insert an ICD. Once inside your body, this small electronic device helps keep your heart from beating too slowly. An ICD can t fix existing heart problems. But it can help you feel better and have more energy. As you recover, follow all of the instructions you are given, including those below.  Activity    Follow the instructions you are given about limiting your activity.    Do not raise your arm on the incision side above shoulder level for 3 weeks. This gives the device lead wires time to attach securely inside your heart.    Ask your doctor when you can expect to return to work.    You can still exercise. It s good for your body and your heart. Talk with your doctor about an exercise plan.  Other Precautions    Follow your doctor's directions carefully for wound care. You may remove the outer dressing in 3 - 4 days. Leave the steri-strips in place; these will be removed at your 1 week follow-up. Never put any creams, lotions, or products like peroxide on an incision unless your doctor tells you to.     You may shower once the outer dressing has been removed.     Before you receive any treatment, tell all health care providers (including your dentist) that you have an ICD.    You will be given an ID card that contains information about your ICD. Always carry this card with you. You can show this card if your ICD sets off a metal detector. You should also show it to avoid screening with a hand-held security wand.    Keep your cell phone away from your ICD. Don t carry the phone in your shirt pocket, even when it s turned off.    Avoid strong magnets. Examples are those used in MRIs or in hand-held security wands.    Avoid strong electrical fields. Examples are those made by radio transmitting towers,  ham  radios, and heavy-duty electrical equipment.    Avoid leaning over the open chavez of a running car. A running engine creates an  electrical field. Most household and yard appliances will not cause any problems. If you use any large power tools, such as an industrial , talk with your doctor.   Follow-Up    Follow up in the device clinic as scheduled. You have an appointment scheduled for Monday, 11/4/2019. Your appointment is at 1:00pm at [facility] in [place] (formerly [facility]) at [address], [facility], [address].     Make regular follow-up appointments with your device clinic. They will check the ICD to make sure it s working properly.  When to Call Your Device Clinic [phone]  Call your doctor immediately if you have any of the following:    Dizziness    Chest pain    Lack of energy    Fainting spells    Rapid pulse or pounding heartbeat    Shortness of breath    Pain around your pacemaker    Fever above 100.4 F (38 C) or other signs of infection (redness, swelling, drainage, or warmth at the incision site)     3901-7197 [employer]. [address] [place] [address], [place] [address]. All rights reserved. This information is not intended as a substitute for professional medical care. Always follow your healthcare professional's instructions.

## 2019-10-25 NOTE — PROGRESS NOTES
Device Discharge  Dressing:  Clean, dry, and intact, minimal swelling present with no hematoma  Chest XR reviewed:  Yes  Pneumo present?:  No  Lead Measurements per Device Rep:  WNL    Education Provided:  Avoid raising left arm above the level of the shoulder for 3 weeks.  Do not shower until outer occlusive dressing has been removed.  Remove outer dressing in 3 - 4 days.  Leave steri-strips in place, these will be removed at the 1 week check.   Call Device Clinic with increased swelling, drainage, or fever > 101 degrees.   Follow-up with the Device Clinic as scheduled on 11/4/19 at 1300.    BESS Zaragoza

## 2019-10-25 NOTE — PLAN OF CARE
Pt A&O x4, confused at times. VSS on RA. Tele 100% v-paced. Up with SBA. Voiding adequately. Regular diet. Dressing to pacemaker site CDI. Pt given 1 dose of Percocet overnight for incisional pain. Plan to discharge today.

## 2019-10-28 LAB — INTERPRETATION ECG - MUSE: NORMAL

## 2019-10-29 LAB — INTERPRETATION ECG - MUSE: NORMAL

## 2019-10-30 ENCOUNTER — OFFICE VISIT (OUTPATIENT)
Dept: UROLOGY | Facility: CLINIC | Age: 79
End: 2019-10-30
Payer: COMMERCIAL

## 2019-10-30 VITALS
WEIGHT: 210 LBS | BODY MASS INDEX: 27.83 KG/M2 | DIASTOLIC BLOOD PRESSURE: 64 MMHG | SYSTOLIC BLOOD PRESSURE: 110 MMHG | HEIGHT: 73 IN

## 2019-10-30 DIAGNOSIS — C67.9 MALIGNANT NEOPLASM OF URINARY BLADDER, UNSPECIFIED SITE (H): Primary | ICD-10-CM

## 2019-10-30 LAB
ALBUMIN UR-MCNC: NEGATIVE MG/DL
APPEARANCE UR: CLEAR
BILIRUB UR QL STRIP: NEGATIVE
COLOR UR AUTO: YELLOW
GLUCOSE UR STRIP-MCNC: NEGATIVE MG/DL
HGB UR QL STRIP: NEGATIVE
KETONES UR STRIP-MCNC: NEGATIVE MG/DL
LEUKOCYTE ESTERASE UR QL STRIP: NEGATIVE
NITRATE UR QL: NEGATIVE
PH UR STRIP: 5 PH (ref 5–7)
SOURCE: NORMAL
SP GR UR STRIP: 1.01 (ref 1–1.03)
UROBILINOGEN UR STRIP-ACNC: 0.2 EU/DL (ref 0.2–1)

## 2019-10-30 PROCEDURE — 81003 URINALYSIS AUTO W/O SCOPE: CPT | Performed by: UROLOGY

## 2019-10-30 PROCEDURE — 52000 CYSTOURETHROSCOPY: CPT | Performed by: UROLOGY

## 2019-10-30 PROCEDURE — 88112 CYTOPATH CELL ENHANCE TECH: CPT | Performed by: UROLOGY

## 2019-10-30 ASSESSMENT — MIFFLIN-ST. JEOR: SCORE: 1726.43

## 2019-10-30 ASSESSMENT — PAIN SCALES - GENERAL: PAINLEVEL: NO PAIN (0)

## 2019-10-30 NOTE — LETTER
10/30/2019       RE: Gibson Villalta  [address]  [place] [address]     Dear Colleague,    Thank you for referring your patient, Gibson Villalta, to the [facility]. Please see a copy of my visit note below.    CYSTOSCOPY PROCEDURE NOTE:    Gibson Villalta is a 79 year old male  who presents with history of T1HG bladder cancer for surveillance cystoscopy.    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  He also has history of diffuse large B-cell lymphoma who presented with melena and soft tissue thickening of his stomach in July 2018.     9/19/18: Transurethral resection of bladder tumor (TURBT) with T1HG bladder cancer  10/19/18: Repeat upper endoscopy with biopsies confirmed diffuse large B-cell lymphoma  10/30/18: Bone marrow biopsy negative for lymphoma involvement  10/31/18: PET scan showed extensive hypermetabolic thickening of the stomach and several loops of small bowel in the left upper quadrant and right lower quadrant consistent with the diagnosis of lymphoma  11/12/18: Re-staging Transurethral resection of bladder tumor (TURBT) with confirmation of his T1 HG bladder cancer.  Following discussion with his oncologist, Dr. Bender at [facility], we discussed that treatment for his bladder cancer with BCG treatment would not be warranted at this time given his upcoming chemotherapy treatment for his lymphoma and that there would be little benefit of treatment given that this requires an intact immune response.  We will plan for management with surveillance cystoscopy  11/26/18: Started first-line therapy with many R-CHOP chemotherapy.  He  follows with Dr. Bender at [facility]  1/28/19: After 3 cycles of mini R-CHOP, PET scan showed marked improvement consistent with a good response to treatment  2/19/19: Cycle 5 and 6 of chemotherapy, doxorubicin held due to worsening shortness of breath and worsened dilation of his left ventricle.  Completion of cycle 6 on March 12, 2019 4/1/19: Follow-up with me for surveillance cystoscopy with evidence of a bladder stone and significant prostatic hypertrophy  4/4/19: PET scan showed no residual lymphoma.  Calcification seen in the bladder.  4/22/19: Cystlolithalopaxy and Transurethral resection of the prostate with no evidence of malignancy    Pt ID verified with patient: Yes     Procedure verified with patient: Yes     Procedure confirmed with physician and support staff: Yes     Consent form confirmed with physician and support staff.    Sign In  History and Physical Exam reviewed.  Informed Consent Discussed: Yes   Sign in Communication: Yes   Time Out:  Team Confirms the Correct Patient, Correct Procedure; Yes , Correct Site and Site Marking, Correct Position (if applicable).    Affirmation of Time Out: Yes   Sign Out:  Sign Out Discussion: Yes   Physician: Ryan Camarillo MD    A urinalysis was performed revealing no evidence of infection.    UA RESULTS:  Recent Labs   Lab Test 10/30/19  1406  10/15/18  2031   COLOR Yellow   < > Yellow   APPEARANCE Clear   < > Slightly Cloudy   URINEGLC Negative   < > Negative   URINEBILI Negative   < > Negative   URINEKETONE Negative   < > Negative   SG 1.015   < > 1.019   UBLD Negative   < > Trace*   URINEPH 5.0   < > 5.0   PROTEIN Negative   < > 30*   UROBILINOGEN 0.2   < >  --    NITRITE Negative   < > Negative   LEUKEST Negative   < > Large*   RBCU  --   --  22*   WBCU  --   --  120*    < > = values in this interval not displayed.        The benefits, risks, alternatives of the cystoscopy procedure and personnel were discussed with the patient. The verbal consent was  obtained and the patient agrees to proceed.      Description of procedure:   After fully informed, voluntary consent was obtained, the patient was brought into the procedure room, identified and placed in a supine position on the cystoscopy table.  The groin/scrotum were prepped with betadine and draped in a sterile fashion.  Urojet lidocaine gel was introduced.  A 15F flexible cystoscope was inserted into the urethra, and the bladder and urethra wereexamined in a systematic manner.  The patient tolerated the procedure well and there were no complications.      Cystoscopic findings:  The urethra was normal without strictures.  The prostate was 3-4cm long and demonstrated evidence of TURP  The external sphincter coapted normally and the bladder neck was open following TURP. The bladder was  entered and careful pan endoscopy was carried out. The posterior, superior and lateral walls and dome of the bladder were all well visualized and the scope was retroflexed upon itself..  There was severe trabeculation.  There were no neoplasms, stones, or diverticula identifed.  The ureteric orifices were normal in position and number and effluxing clear urine. The area of previous resection on the right lateral wall was well healed scar with some mild erythema toward the base.     Assessment/Plan:   Gibson Villalta is a 78 year old male with a history of HG T1 bladder cancer and s/p TURP.     - Cystoscopy today with a small amount of raised erythema near the prior resection site  - Cytology today, if this is positive then will plan for clinic visit to discuss repeat Transurethral resection of bladder tumor (TURBT). If negative, then will plan for 3 months follow up for surveillance cystoscopy.    Optimal treatment plan:  Date of Transurethral resection of bladder tumor (TURBT): 9/19/18  Grade/Stage: HG T1  Date of Re-staging Transurethral resection of bladder tumor (TURBT): 11/12/18  Residual disease: Yes  Grade/Stage: HG  T1    Induction (month 0) - BCG not warranted given treatment for his lymphoma     Cystoscopy (schedule one week prior to treatment when cysto and treatment coincide)  Month 3 - 4/1/19 with no evidence of disease, however evidence of bladder stone and BPH  Month 6 - 7/24/19 with no evidence of disease and well healed after TURP on 4/22/19  Month 9 - 10/30/19 with no clear evidence of disease, some raised erythema, cytology pending   Month 12  Month 15  Month 18  Month 21  Month 24  Month 27  Month 30  Month 36  Year 4  Year 5  Year 6  Year 8  Year 10    Ryan Camarillo MD

## 2019-10-30 NOTE — PATIENT INSTRUCTIONS
"AFTER YOUR CYSTOSCOPY  ?  ?  You have just completed a cystoscopy, or \"cysto\", which allowed your physician to learn more about your bladder (or to remove a stent placed after surgery). We suggest that you continue to avoid caffeine, fruit juice, and alcohol for the next 24 hours, however, you are encouraged to return to your normal activities.  ?  ?  A few things that are considered normal after your cystoscopy:  ?  * small amount of bleeding (or spotting) that clears within the next 24 hours  ?  * slight burning sensation with urination  ?  * sensation of needing to void (urinate) more frequently  ?  * the feeling of \"air\" in your urine  ?  * mild discomfort that is relieved with Tylenol    * bladder spasms  ?  ?  ?  Please contact our office promptly if you:  ?  * develop a fever above 101 degrees  ?  * are unable to urinate  ?  * develop bright red blood that does not stop  ?  * experience severe pain or swelling  ?  ?  ?  And of course, please contact our office with any concerns or questions [phone]  ?      AFTER YOUR CYSTOSCOPY        You have just completed a cystoscopy, or \"cysto\", which allowed your physician to learn more about your bladder (or to remove a stent placed after surgery). We suggest that you continue to avoid caffeine, fruit juice, and alcohol for the next 24 hours, however, you are encouraged to return to your normal activities.         A few things that are considered normal after your cystoscopy:     * Small amount of bleeding (or spotting) that clears within the next 24 hours     * Slight burning sensation with urination     * Sensation to of needing to avoid more frequently     * The feeling of \"air\" in your urine     * Mild discomfort that is relieved with Tylenol        Please contact our office promptly if you:     * Develop a fever above 101 degrees     * Are unable to urinate     * Develop bright red blood that does not stop     * Severe pain or swelling         Please contact " our office with any concerns or questions @[facility].

## 2019-10-30 NOTE — PROGRESS NOTES
CYSTOSCOPY PROCEDURE NOTE:    Gibson Villalta is a 79 year old male  who presents with history of T1HG bladder cancer for surveillance cystoscopy.    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  He also has history of diffuse large B-cell lymphoma who presented with melena and soft tissue thickening of his stomach in July 2018.     9/19/18: Transurethral resection of bladder tumor (TURBT) with T1HG bladder cancer  10/19/18: Repeat upper endoscopy with biopsies confirmed diffuse large B-cell lymphoma  10/30/18: Bone marrow biopsy negative for lymphoma involvement  10/31/18: PET scan showed extensive hypermetabolic thickening of the stomach and several loops of small bowel in the left upper quadrant and right lower quadrant consistent with the diagnosis of lymphoma  11/12/18: Re-staging Transurethral resection of bladder tumor (TURBT) with confirmation of his T1 HG bladder cancer.  Following discussion with his oncologist, Dr. Bender at [facility], we discussed that treatment for his bladder cancer with BCG treatment would not be warranted at this time given his upcoming chemotherapy treatment for his lymphoma and that there would be little benefit of treatment given that this requires an intact immune response.  We will plan for management with surveillance cystoscopy  11/26/18: Started first-line therapy with many R-CHOP chemotherapy.  He follows with Dr. Bender at [facility]  1/28/19: After 3 cycles of mini R-CHOP, PET scan showed marked improvement consistent with a good response to treatment  2/19/19: Cycle 5 and 6 of chemotherapy, doxorubicin held due to worsening shortness of breath and worsened dilation of his left ventricle.  Completion of cycle 6 on March 12,  2019 4/1/19: Follow-up with me for surveillance cystoscopy with evidence of a bladder stone and significant prostatic hypertrophy  4/4/19: PET scan showed no residual lymphoma.  Calcification seen in the bladder.  4/22/19: Cystlolithalopaxy and Transurethral resection of the prostate with no evidence of malignancy    Pt ID verified with patient: Yes     Procedure verified with patient: Yes     Procedure confirmed with physician and support staff: Yes     Consent form confirmed with physician and support staff.    Sign In  History and Physical Exam reviewed.  Informed Consent Discussed: Yes   Sign in Communication: Yes   Time Out:  Team Confirms the Correct Patient, Correct Procedure; Yes , Correct Site and Site Marking, Correct Position (if applicable).    Affirmation of Time Out: Yes   Sign Out:  Sign Out Discussion: Yes   Physician: Ryan Camarillo MD    A urinalysis was performed revealing no evidence of infection.    UA RESULTS:  Recent Labs   Lab Test 10/30/19  1406  10/15/18  2031   COLOR Yellow   < > Yellow   APPEARANCE Clear   < > Slightly Cloudy   URINEGLC Negative   < > Negative   URINEBILI Negative   < > Negative   URINEKETONE Negative   < > Negative   SG 1.015   < > 1.019   UBLD Negative   < > Trace*   URINEPH 5.0   < > 5.0   PROTEIN Negative   < > 30*   UROBILINOGEN 0.2   < >  --    NITRITE Negative   < > Negative   LEUKEST Negative   < > Large*   RBCU  --   --  22*   WBCU  --   --  120*    < > = values in this interval not displayed.        The benefits, risks, alternatives of the cystoscopy procedure and personnel were discussed with the patient. The verbal consent was obtained and the patient agrees to proceed.      Description of procedure:   After fully informed, voluntary consent was obtained, the patient was brought into the procedure room, identified and placed in a supine position on the cystoscopy table.  The groin/scrotum were prepped with betadine and draped in a sterile fashion.  [employer]  lidocaine gel was introduced.  A 15F flexible cystoscope was inserted into the urethra, and the bladder and urethra wereexamined in a systematic manner.  The patient tolerated the procedure well and there were no complications.      Cystoscopic findings:  The urethra was normal without strictures.  The prostate was 3-4cm long and demonstrated evidence of TURP  The external sphincter coapted normally and the bladder neck was open following TURP. The bladder was  entered and careful pan endoscopy was carried out. The posterior, superior and lateral walls and dome of the bladder were all well visualized and the scope was retroflexed upon itself..  There was severe trabeculation.  There were no neoplasms, stones, or diverticula identifed.  The ureteric orifices were normal in position and number and effluxing clear urine. The area of previous resection on the right lateral wall was well healed scar with some mild erythema toward the base.     Assessment/Plan:   Gibson Villalta is a 78 year old male with a history of HG T1 bladder cancer and s/p TURP.     - Cystoscopy today with a small amount of raised erythema near the prior resection site  - Cytology today, if this is positive then will plan for clinic visit to discuss repeat Transurethral resection of bladder tumor (TURBT). If negative, then will plan for 3 months follow up for surveillance cystoscopy.      Optimal treatment plan:  Date of Transurethral resection of bladder tumor (TURBT): 9/19/18  Grade/Stage: HG T1  Date of Re-staging Transurethral resection of bladder tumor (TURBT): 11/12/18  Residual disease: Yes  Grade/Stage: HG T1    Induction (month 0) - BCG not warranted given treatment for his lymphoma       Cystoscopy (schedule one week prior to treatment when cysto and treatment coincide)  Month 3 - 4/1/19 with no evidence of disease, however evidence of bladder stone and BPH  Month 6 - 7/24/19 with no evidence of disease and well healed after TURP on  4/22/19  Month 9 - 10/30/19 with no clear evidence of disease, some raised erythema, cytology pending   Month 12  Month 15  Month 18  Month 21  Month 24  Month 27  Month 30  Month 36  Year 4  Year 5  Year 6  Year 8  Year 10        Ryan Camarillo MD

## 2019-11-04 ENCOUNTER — ANCILLARY PROCEDURE (OUTPATIENT)
Dept: CARDIOLOGY | Facility: CLINIC | Age: 79
End: 2019-11-04
Attending: INTERNAL MEDICINE
Payer: COMMERCIAL

## 2019-11-04 DIAGNOSIS — Z95.810 ICD (IMPLANTABLE CARDIOVERTER-DEFIBRILLATOR), DUAL, IN SITU: ICD-10-CM

## 2019-11-04 DIAGNOSIS — I42.9 CARDIOMYOPATHY, UNSPECIFIED TYPE (H): Chronic | ICD-10-CM

## 2019-11-04 LAB
COPATH REPORT: NORMAL
MDC_IDC_LEAD_IMPLANT_DT: NORMAL
MDC_IDC_LEAD_LOCATION: NORMAL
MDC_IDC_LEAD_LOCATION_DETAIL_1: NORMAL
MDC_IDC_LEAD_MFG: NORMAL
MDC_IDC_LEAD_MODEL: NORMAL
MDC_IDC_LEAD_POLARITY_TYPE: NORMAL
MDC_IDC_LEAD_SERIAL: NORMAL
MDC_IDC_MSMT_BATTERY_DTM: NORMAL
MDC_IDC_MSMT_BATTERY_DTM: NORMAL
MDC_IDC_MSMT_BATTERY_REMAINING_LONGEVITY: 60 MO
MDC_IDC_MSMT_BATTERY_REMAINING_LONGEVITY: 60 MO
MDC_IDC_MSMT_BATTERY_RRT_TRIGGER: 2.73
MDC_IDC_MSMT_BATTERY_RRT_TRIGGER: 2.73
MDC_IDC_MSMT_BATTERY_STATUS: NORMAL
MDC_IDC_MSMT_BATTERY_STATUS: NORMAL
MDC_IDC_MSMT_BATTERY_VOLTAGE: 2.94 V
MDC_IDC_MSMT_BATTERY_VOLTAGE: 2.94 V
MDC_IDC_MSMT_CAP_CHARGE_DTM: NORMAL
MDC_IDC_MSMT_CAP_CHARGE_DTM: NORMAL
MDC_IDC_MSMT_CAP_CHARGE_ENERGY: 18 J
MDC_IDC_MSMT_CAP_CHARGE_ENERGY: 18 J
MDC_IDC_MSMT_CAP_CHARGE_TIME: 3.88
MDC_IDC_MSMT_CAP_CHARGE_TIME: 3.88
MDC_IDC_MSMT_CAP_CHARGE_TYPE: NORMAL
MDC_IDC_MSMT_CAP_CHARGE_TYPE: NORMAL
MDC_IDC_MSMT_LEADCHNL_LV_IMPEDANCE_VALUE: 155.46
MDC_IDC_MSMT_LEADCHNL_LV_IMPEDANCE_VALUE: 155.46
MDC_IDC_MSMT_LEADCHNL_LV_IMPEDANCE_VALUE: 159.26
MDC_IDC_MSMT_LEADCHNL_LV_IMPEDANCE_VALUE: 166.25 OHM
MDC_IDC_MSMT_LEADCHNL_LV_IMPEDANCE_VALUE: 171 OHM
MDC_IDC_MSMT_LEADCHNL_LV_IMPEDANCE_VALUE: 184.15
MDC_IDC_MSMT_LEADCHNL_LV_IMPEDANCE_VALUE: 189.53
MDC_IDC_MSMT_LEADCHNL_LV_IMPEDANCE_VALUE: 199.5 OHM
MDC_IDC_MSMT_LEADCHNL_LV_IMPEDANCE_VALUE: 285 OHM
MDC_IDC_MSMT_LEADCHNL_LV_IMPEDANCE_VALUE: 285 OHM
MDC_IDC_MSMT_LEADCHNL_LV_IMPEDANCE_VALUE: 342 OHM
MDC_IDC_MSMT_LEADCHNL_LV_IMPEDANCE_VALUE: 342 OHM
MDC_IDC_MSMT_LEADCHNL_LV_IMPEDANCE_VALUE: 361 OHM
MDC_IDC_MSMT_LEADCHNL_LV_IMPEDANCE_VALUE: 399 OHM
MDC_IDC_MSMT_LEADCHNL_LV_IMPEDANCE_VALUE: 418 OHM
MDC_IDC_MSMT_LEADCHNL_LV_IMPEDANCE_VALUE: 513 OHM
MDC_IDC_MSMT_LEADCHNL_LV_IMPEDANCE_VALUE: 532 OHM
MDC_IDC_MSMT_LEADCHNL_LV_IMPEDANCE_VALUE: 551 OHM
MDC_IDC_MSMT_LEADCHNL_LV_IMPEDANCE_VALUE: 589 OHM
MDC_IDC_MSMT_LEADCHNL_LV_IMPEDANCE_VALUE: 589 OHM
MDC_IDC_MSMT_LEADCHNL_LV_IMPEDANCE_VALUE: 608 OHM
MDC_IDC_MSMT_LEADCHNL_LV_IMPEDANCE_VALUE: 608 OHM
MDC_IDC_MSMT_LEADCHNL_LV_PACING_THRESHOLD_AMPLITUDE: 0.75 V
MDC_IDC_MSMT_LEADCHNL_LV_PACING_THRESHOLD_AMPLITUDE: 1 V
MDC_IDC_MSMT_LEADCHNL_LV_PACING_THRESHOLD_PULSEWIDTH: 0.5 MS
MDC_IDC_MSMT_LEADCHNL_LV_PACING_THRESHOLD_PULSEWIDTH: 0.5 MS
MDC_IDC_MSMT_LEADCHNL_RA_IMPEDANCE_VALUE: 361 OHM
MDC_IDC_MSMT_LEADCHNL_RA_IMPEDANCE_VALUE: 361 OHM
MDC_IDC_MSMT_LEADCHNL_RA_PACING_THRESHOLD_AMPLITUDE: 0.5 V
MDC_IDC_MSMT_LEADCHNL_RA_PACING_THRESHOLD_AMPLITUDE: 0.75 V
MDC_IDC_MSMT_LEADCHNL_RA_PACING_THRESHOLD_PULSEWIDTH: 0.4 MS
MDC_IDC_MSMT_LEADCHNL_RA_PACING_THRESHOLD_PULSEWIDTH: 0.4 MS
MDC_IDC_MSMT_LEADCHNL_RA_SENSING_INTR_AMPL: 1.75 MV
MDC_IDC_MSMT_LEADCHNL_RA_SENSING_INTR_AMPL: 1.75 MV
MDC_IDC_MSMT_LEADCHNL_RA_SENSING_INTR_AMPL: 1.88 MV
MDC_IDC_MSMT_LEADCHNL_RA_SENSING_INTR_AMPL: 2 MV
MDC_IDC_MSMT_LEADCHNL_RV_IMPEDANCE_VALUE: 456 OHM
MDC_IDC_MSMT_LEADCHNL_RV_IMPEDANCE_VALUE: 456 OHM
MDC_IDC_MSMT_LEADCHNL_RV_IMPEDANCE_VALUE: 475 OHM
MDC_IDC_MSMT_LEADCHNL_RV_IMPEDANCE_VALUE: 475 OHM
MDC_IDC_MSMT_LEADCHNL_RV_PACING_THRESHOLD_AMPLITUDE: 0.5 V
MDC_IDC_MSMT_LEADCHNL_RV_PACING_THRESHOLD_AMPLITUDE: 0.5 V
MDC_IDC_MSMT_LEADCHNL_RV_PACING_THRESHOLD_PULSEWIDTH: 0.4 MS
MDC_IDC_MSMT_LEADCHNL_RV_PACING_THRESHOLD_PULSEWIDTH: 0.4 MS
MDC_IDC_MSMT_LEADCHNL_RV_SENSING_INTR_AMPL: 10 MV
MDC_IDC_MSMT_LEADCHNL_RV_SENSING_INTR_AMPL: 10.5 MV
MDC_IDC_PG_IMPLANT_DTM: NORMAL
MDC_IDC_PG_IMPLANT_DTM: NORMAL
MDC_IDC_PG_MFG: NORMAL
MDC_IDC_PG_MFG: NORMAL
MDC_IDC_PG_MODEL: NORMAL
MDC_IDC_PG_MODEL: NORMAL
MDC_IDC_PG_SERIAL: NORMAL
MDC_IDC_PG_SERIAL: NORMAL
MDC_IDC_PG_TYPE: NORMAL
MDC_IDC_PG_TYPE: NORMAL
MDC_IDC_SESS_CLINIC_NAME: NORMAL
MDC_IDC_SESS_CLINIC_NAME: NORMAL
MDC_IDC_SESS_DTM: NORMAL
MDC_IDC_SESS_DTM: NORMAL
MDC_IDC_SESS_TYPE: NORMAL
MDC_IDC_SESS_TYPE: NORMAL
MDC_IDC_SET_BRADY_AT_MODE_SWITCH_RATE: 171 {BEATS}/MIN
MDC_IDC_SET_BRADY_AT_MODE_SWITCH_RATE: 171 {BEATS}/MIN
MDC_IDC_SET_BRADY_LOWRATE: 50 {BEATS}/MIN
MDC_IDC_SET_BRADY_LOWRATE: 50 {BEATS}/MIN
MDC_IDC_SET_BRADY_MAX_SENSOR_RATE: 130 {BEATS}/MIN
MDC_IDC_SET_BRADY_MAX_SENSOR_RATE: 130 {BEATS}/MIN
MDC_IDC_SET_BRADY_MAX_TRACKING_RATE: 130 {BEATS}/MIN
MDC_IDC_SET_BRADY_MAX_TRACKING_RATE: 130 {BEATS}/MIN
MDC_IDC_SET_BRADY_MODE: NORMAL
MDC_IDC_SET_BRADY_MODE: NORMAL
MDC_IDC_SET_BRADY_PAV_DELAY_LOW: 170 MS
MDC_IDC_SET_BRADY_PAV_DELAY_LOW: 170 MS
MDC_IDC_SET_BRADY_SAV_DELAY_LOW: 130 MS
MDC_IDC_SET_BRADY_SAV_DELAY_LOW: 130 MS
MDC_IDC_SET_CRT_LVRV_DELAY: 0 MS
MDC_IDC_SET_CRT_LVRV_DELAY: 0 MS
MDC_IDC_SET_CRT_PACED_CHAMBERS: NORMAL
MDC_IDC_SET_CRT_PACED_CHAMBERS: NORMAL
MDC_IDC_SET_LEADCHNL_LV_PACING_AMPLITUDE: 1.25 V
MDC_IDC_SET_LEADCHNL_LV_PACING_AMPLITUDE: 1.25 V
MDC_IDC_SET_LEADCHNL_LV_PACING_ANODE_ELECTRODE_1: NORMAL
MDC_IDC_SET_LEADCHNL_LV_PACING_ANODE_ELECTRODE_1: NORMAL
MDC_IDC_SET_LEADCHNL_LV_PACING_ANODE_LOCATION_1: NORMAL
MDC_IDC_SET_LEADCHNL_LV_PACING_ANODE_LOCATION_1: NORMAL
MDC_IDC_SET_LEADCHNL_LV_PACING_CAPTURE_MODE: NORMAL
MDC_IDC_SET_LEADCHNL_LV_PACING_CAPTURE_MODE: NORMAL
MDC_IDC_SET_LEADCHNL_LV_PACING_CATHODE_ELECTRODE_1: NORMAL
MDC_IDC_SET_LEADCHNL_LV_PACING_CATHODE_ELECTRODE_1: NORMAL
MDC_IDC_SET_LEADCHNL_LV_PACING_CATHODE_LOCATION_1: NORMAL
MDC_IDC_SET_LEADCHNL_LV_PACING_CATHODE_LOCATION_1: NORMAL
MDC_IDC_SET_LEADCHNL_LV_PACING_POLARITY: NORMAL
MDC_IDC_SET_LEADCHNL_LV_PACING_POLARITY: NORMAL
MDC_IDC_SET_LEADCHNL_LV_PACING_PULSEWIDTH: 0.5 MS
MDC_IDC_SET_LEADCHNL_LV_PACING_PULSEWIDTH: 0.5 MS
MDC_IDC_SET_LEADCHNL_RA_PACING_AMPLITUDE: 1.5 V
MDC_IDC_SET_LEADCHNL_RA_PACING_AMPLITUDE: 1.5 V
MDC_IDC_SET_LEADCHNL_RA_PACING_ANODE_ELECTRODE_1: NORMAL
MDC_IDC_SET_LEADCHNL_RA_PACING_ANODE_ELECTRODE_1: NORMAL
MDC_IDC_SET_LEADCHNL_RA_PACING_ANODE_LOCATION_1: NORMAL
MDC_IDC_SET_LEADCHNL_RA_PACING_ANODE_LOCATION_1: NORMAL
MDC_IDC_SET_LEADCHNL_RA_PACING_CAPTURE_MODE: NORMAL
MDC_IDC_SET_LEADCHNL_RA_PACING_CAPTURE_MODE: NORMAL
MDC_IDC_SET_LEADCHNL_RA_PACING_CATHODE_ELECTRODE_1: NORMAL
MDC_IDC_SET_LEADCHNL_RA_PACING_CATHODE_ELECTRODE_1: NORMAL
MDC_IDC_SET_LEADCHNL_RA_PACING_CATHODE_LOCATION_1: NORMAL
MDC_IDC_SET_LEADCHNL_RA_PACING_CATHODE_LOCATION_1: NORMAL
MDC_IDC_SET_LEADCHNL_RA_PACING_POLARITY: NORMAL
MDC_IDC_SET_LEADCHNL_RA_PACING_POLARITY: NORMAL
MDC_IDC_SET_LEADCHNL_RA_PACING_PULSEWIDTH: 0.4 MS
MDC_IDC_SET_LEADCHNL_RA_PACING_PULSEWIDTH: 0.4 MS
MDC_IDC_SET_LEADCHNL_RA_SENSING_ANODE_ELECTRODE_1: NORMAL
MDC_IDC_SET_LEADCHNL_RA_SENSING_ANODE_ELECTRODE_1: NORMAL
MDC_IDC_SET_LEADCHNL_RA_SENSING_ANODE_LOCATION_1: NORMAL
MDC_IDC_SET_LEADCHNL_RA_SENSING_ANODE_LOCATION_1: NORMAL
MDC_IDC_SET_LEADCHNL_RA_SENSING_CATHODE_ELECTRODE_1: NORMAL
MDC_IDC_SET_LEADCHNL_RA_SENSING_CATHODE_ELECTRODE_1: NORMAL
MDC_IDC_SET_LEADCHNL_RA_SENSING_CATHODE_LOCATION_1: NORMAL
MDC_IDC_SET_LEADCHNL_RA_SENSING_CATHODE_LOCATION_1: NORMAL
MDC_IDC_SET_LEADCHNL_RA_SENSING_POLARITY: NORMAL
MDC_IDC_SET_LEADCHNL_RA_SENSING_POLARITY: NORMAL
MDC_IDC_SET_LEADCHNL_RA_SENSING_SENSITIVITY: 0.3 MV
MDC_IDC_SET_LEADCHNL_RA_SENSING_SENSITIVITY: 0.3 MV
MDC_IDC_SET_LEADCHNL_RV_PACING_AMPLITUDE: 2 V
MDC_IDC_SET_LEADCHNL_RV_PACING_AMPLITUDE: 2 V
MDC_IDC_SET_LEADCHNL_RV_PACING_ANODE_ELECTRODE_1: NORMAL
MDC_IDC_SET_LEADCHNL_RV_PACING_ANODE_ELECTRODE_1: NORMAL
MDC_IDC_SET_LEADCHNL_RV_PACING_ANODE_LOCATION_1: NORMAL
MDC_IDC_SET_LEADCHNL_RV_PACING_ANODE_LOCATION_1: NORMAL
MDC_IDC_SET_LEADCHNL_RV_PACING_CAPTURE_MODE: NORMAL
MDC_IDC_SET_LEADCHNL_RV_PACING_CAPTURE_MODE: NORMAL
MDC_IDC_SET_LEADCHNL_RV_PACING_CATHODE_ELECTRODE_1: NORMAL
MDC_IDC_SET_LEADCHNL_RV_PACING_CATHODE_ELECTRODE_1: NORMAL
MDC_IDC_SET_LEADCHNL_RV_PACING_CATHODE_LOCATION_1: NORMAL
MDC_IDC_SET_LEADCHNL_RV_PACING_CATHODE_LOCATION_1: NORMAL
MDC_IDC_SET_LEADCHNL_RV_PACING_POLARITY: NORMAL
MDC_IDC_SET_LEADCHNL_RV_PACING_POLARITY: NORMAL
MDC_IDC_SET_LEADCHNL_RV_PACING_PULSEWIDTH: 0.4 MS
MDC_IDC_SET_LEADCHNL_RV_PACING_PULSEWIDTH: 0.4 MS
MDC_IDC_SET_LEADCHNL_RV_SENSING_ANODE_ELECTRODE_1: NORMAL
MDC_IDC_SET_LEADCHNL_RV_SENSING_ANODE_ELECTRODE_1: NORMAL
MDC_IDC_SET_LEADCHNL_RV_SENSING_ANODE_LOCATION_1: NORMAL
MDC_IDC_SET_LEADCHNL_RV_SENSING_ANODE_LOCATION_1: NORMAL
MDC_IDC_SET_LEADCHNL_RV_SENSING_CATHODE_ELECTRODE_1: NORMAL
MDC_IDC_SET_LEADCHNL_RV_SENSING_CATHODE_ELECTRODE_1: NORMAL
MDC_IDC_SET_LEADCHNL_RV_SENSING_CATHODE_LOCATION_1: NORMAL
MDC_IDC_SET_LEADCHNL_RV_SENSING_CATHODE_LOCATION_1: NORMAL
MDC_IDC_SET_LEADCHNL_RV_SENSING_POLARITY: NORMAL
MDC_IDC_SET_LEADCHNL_RV_SENSING_POLARITY: NORMAL
MDC_IDC_SET_LEADCHNL_RV_SENSING_SENSITIVITY: 0.3 MV
MDC_IDC_SET_LEADCHNL_RV_SENSING_SENSITIVITY: 0.3 MV
MDC_IDC_SET_ZONE_DETECTION_BEATS_DENOMINATOR: 40 {BEATS}
MDC_IDC_SET_ZONE_DETECTION_BEATS_DENOMINATOR: 40 {BEATS}
MDC_IDC_SET_ZONE_DETECTION_BEATS_NUMERATOR: 30 {BEATS}
MDC_IDC_SET_ZONE_DETECTION_BEATS_NUMERATOR: 30 {BEATS}
MDC_IDC_SET_ZONE_DETECTION_INTERVAL: 300 MS
MDC_IDC_SET_ZONE_DETECTION_INTERVAL: 300 MS
MDC_IDC_SET_ZONE_DETECTION_INTERVAL: 350 MS
MDC_IDC_SET_ZONE_DETECTION_INTERVAL: 350 MS
MDC_IDC_SET_ZONE_DETECTION_INTERVAL: 360 MS
MDC_IDC_SET_ZONE_DETECTION_INTERVAL: NORMAL
MDC_IDC_SET_ZONE_DETECTION_INTERVAL: NORMAL
MDC_IDC_SET_ZONE_TYPE: NORMAL
MDC_IDC_STAT_AT_BURDEN_PERCENT: 0 %
MDC_IDC_STAT_AT_BURDEN_PERCENT: 0 %
MDC_IDC_STAT_AT_DTM_END: NORMAL
MDC_IDC_STAT_AT_DTM_END: NORMAL
MDC_IDC_STAT_AT_DTM_START: NORMAL
MDC_IDC_STAT_AT_DTM_START: NORMAL
MDC_IDC_STAT_BRADY_AP_VP_PERCENT: 0.45 %
MDC_IDC_STAT_BRADY_AP_VP_PERCENT: 0.9 %
MDC_IDC_STAT_BRADY_AP_VS_PERCENT: 0.01 %
MDC_IDC_STAT_BRADY_AP_VS_PERCENT: 0.02 %
MDC_IDC_STAT_BRADY_AS_VP_PERCENT: 96.05 %
MDC_IDC_STAT_BRADY_AS_VP_PERCENT: 98.27 %
MDC_IDC_STAT_BRADY_AS_VS_PERCENT: 1.27 %
MDC_IDC_STAT_BRADY_AS_VS_PERCENT: 3.03 %
MDC_IDC_STAT_BRADY_DTM_END: NORMAL
MDC_IDC_STAT_BRADY_DTM_END: NORMAL
MDC_IDC_STAT_BRADY_DTM_START: NORMAL
MDC_IDC_STAT_BRADY_DTM_START: NORMAL
MDC_IDC_STAT_BRADY_RA_PERCENT_PACED: 0.46 %
MDC_IDC_STAT_BRADY_RA_PERCENT_PACED: 0.92 %
MDC_IDC_STAT_BRADY_RV_PERCENT_PACED: 96.09 %
MDC_IDC_STAT_BRADY_RV_PERCENT_PACED: 97.95 %
MDC_IDC_STAT_CRT_DTM_END: NORMAL
MDC_IDC_STAT_CRT_DTM_END: NORMAL
MDC_IDC_STAT_CRT_DTM_START: NORMAL
MDC_IDC_STAT_CRT_DTM_START: NORMAL
MDC_IDC_STAT_CRT_LV_PERCENT_PACED: 96.01 %
MDC_IDC_STAT_CRT_LV_PERCENT_PACED: 97.9 %
MDC_IDC_STAT_CRT_PERCENT_PACED: 96.01 %
MDC_IDC_STAT_CRT_PERCENT_PACED: 97.9 %
MDC_IDC_STAT_EPISODE_RECENT_COUNT: 0
MDC_IDC_STAT_EPISODE_RECENT_COUNT_DTM_END: NORMAL
MDC_IDC_STAT_EPISODE_RECENT_COUNT_DTM_START: NORMAL
MDC_IDC_STAT_EPISODE_TOTAL_COUNT: 0
MDC_IDC_STAT_EPISODE_TOTAL_COUNT: 1
MDC_IDC_STAT_EPISODE_TOTAL_COUNT: 1
MDC_IDC_STAT_EPISODE_TOTAL_COUNT_DTM_END: NORMAL
MDC_IDC_STAT_EPISODE_TOTAL_COUNT_DTM_START: NORMAL
MDC_IDC_STAT_EPISODE_TYPE: NORMAL
MDC_IDC_STAT_TACHYTHERAPY_ATP_DELIVERED_RECENT: 0
MDC_IDC_STAT_TACHYTHERAPY_ATP_DELIVERED_RECENT: 0
MDC_IDC_STAT_TACHYTHERAPY_ATP_DELIVERED_TOTAL: 0
MDC_IDC_STAT_TACHYTHERAPY_ATP_DELIVERED_TOTAL: 0
MDC_IDC_STAT_TACHYTHERAPY_RECENT_DTM_END: NORMAL
MDC_IDC_STAT_TACHYTHERAPY_RECENT_DTM_END: NORMAL
MDC_IDC_STAT_TACHYTHERAPY_RECENT_DTM_START: NORMAL
MDC_IDC_STAT_TACHYTHERAPY_RECENT_DTM_START: NORMAL
MDC_IDC_STAT_TACHYTHERAPY_SHOCKS_ABORTED_RECENT: 0
MDC_IDC_STAT_TACHYTHERAPY_SHOCKS_ABORTED_RECENT: 0
MDC_IDC_STAT_TACHYTHERAPY_SHOCKS_ABORTED_TOTAL: 0
MDC_IDC_STAT_TACHYTHERAPY_SHOCKS_ABORTED_TOTAL: 0
MDC_IDC_STAT_TACHYTHERAPY_SHOCKS_DELIVERED_RECENT: 0
MDC_IDC_STAT_TACHYTHERAPY_SHOCKS_DELIVERED_RECENT: 0
MDC_IDC_STAT_TACHYTHERAPY_SHOCKS_DELIVERED_TOTAL: 0
MDC_IDC_STAT_TACHYTHERAPY_SHOCKS_DELIVERED_TOTAL: 0
MDC_IDC_STAT_TACHYTHERAPY_TOTAL_DTM_END: NORMAL
MDC_IDC_STAT_TACHYTHERAPY_TOTAL_DTM_END: NORMAL
MDC_IDC_STAT_TACHYTHERAPY_TOTAL_DTM_START: NORMAL
MDC_IDC_STAT_TACHYTHERAPY_TOTAL_DTM_START: NORMAL

## 2019-11-04 PROCEDURE — 93284 PRGRMG EVAL IMPLANTABLE DFB: CPT | Performed by: INTERNAL MEDICINE

## 2019-11-06 LAB
MDC_IDC_LEAD_IMPLANT_DT: NORMAL
MDC_IDC_LEAD_LOCATION: NORMAL
MDC_IDC_LEAD_LOCATION_DETAIL_1: NORMAL
MDC_IDC_LEAD_MFG: NORMAL
MDC_IDC_LEAD_MODEL: NORMAL
MDC_IDC_LEAD_POLARITY_TYPE: NORMAL
MDC_IDC_LEAD_SERIAL: NORMAL
MDC_IDC_MSMT_BATTERY_DTM: NORMAL
MDC_IDC_MSMT_BATTERY_REMAINING_LONGEVITY: 47 MO
MDC_IDC_MSMT_BATTERY_RRT_TRIGGER: 2.73
MDC_IDC_MSMT_BATTERY_STATUS: NORMAL
MDC_IDC_MSMT_BATTERY_VOLTAGE: 2.97 V
MDC_IDC_MSMT_CAP_CHARGE_DTM: NORMAL
MDC_IDC_MSMT_CAP_CHARGE_ENERGY: 18 J
MDC_IDC_MSMT_CAP_CHARGE_TIME: 3.88
MDC_IDC_MSMT_CAP_CHARGE_TYPE: NORMAL
MDC_IDC_MSMT_LEADCHNL_LV_IMPEDANCE_VALUE: 159.26
MDC_IDC_MSMT_LEADCHNL_LV_IMPEDANCE_VALUE: 180.5 OHM
MDC_IDC_MSMT_LEADCHNL_LV_IMPEDANCE_VALUE: 180.5 OHM
MDC_IDC_MSMT_LEADCHNL_LV_IMPEDANCE_VALUE: 285 OHM
MDC_IDC_MSMT_LEADCHNL_LV_IMPEDANCE_VALUE: 361 OHM
MDC_IDC_MSMT_LEADCHNL_LV_IMPEDANCE_VALUE: 399 OHM
MDC_IDC_MSMT_LEADCHNL_LV_IMPEDANCE_VALUE: 532 OHM
MDC_IDC_MSMT_LEADCHNL_LV_IMPEDANCE_VALUE: 589 OHM
MDC_IDC_MSMT_LEADCHNL_LV_IMPEDANCE_VALUE: 646 OHM
MDC_IDC_MSMT_LEADCHNL_LV_PACING_THRESHOLD_AMPLITUDE: 1 V
MDC_IDC_MSMT_LEADCHNL_LV_PACING_THRESHOLD_PULSEWIDTH: 0.5 MS
MDC_IDC_MSMT_LEADCHNL_RA_IMPEDANCE_VALUE: 399 OHM
MDC_IDC_MSMT_LEADCHNL_RA_PACING_THRESHOLD_AMPLITUDE: 0.75 V
MDC_IDC_MSMT_LEADCHNL_RA_PACING_THRESHOLD_PULSEWIDTH: 0.4 MS
MDC_IDC_MSMT_LEADCHNL_RA_SENSING_INTR_AMPL: 0.75 MV
MDC_IDC_MSMT_LEADCHNL_RA_SENSING_INTR_AMPL: 1.25 MV
MDC_IDC_MSMT_LEADCHNL_RV_IMPEDANCE_VALUE: 456 OHM
MDC_IDC_MSMT_LEADCHNL_RV_IMPEDANCE_VALUE: 532 OHM
MDC_IDC_MSMT_LEADCHNL_RV_PACING_THRESHOLD_AMPLITUDE: 0.5 V
MDC_IDC_MSMT_LEADCHNL_RV_PACING_THRESHOLD_PULSEWIDTH: 0.4 MS
MDC_IDC_MSMT_LEADCHNL_RV_SENSING_INTR_AMPL: 16.5 MV
MDC_IDC_MSMT_LEADCHNL_RV_SENSING_INTR_AMPL: 7.38 MV
MDC_IDC_PG_IMPLANT_DTM: NORMAL
MDC_IDC_PG_MFG: NORMAL
MDC_IDC_PG_MODEL: NORMAL
MDC_IDC_PG_SERIAL: NORMAL
MDC_IDC_PG_TYPE: NORMAL
MDC_IDC_SESS_CLINIC_NAME: NORMAL
MDC_IDC_SESS_DTM: NORMAL
MDC_IDC_SESS_TYPE: NORMAL
MDC_IDC_SET_BRADY_AT_MODE_SWITCH_RATE: 171 {BEATS}/MIN
MDC_IDC_SET_BRADY_LOWRATE: 50 {BEATS}/MIN
MDC_IDC_SET_BRADY_MAX_SENSOR_RATE: 130 {BEATS}/MIN
MDC_IDC_SET_BRADY_MAX_TRACKING_RATE: 130 {BEATS}/MIN
MDC_IDC_SET_BRADY_MODE: NORMAL
MDC_IDC_SET_BRADY_PAV_DELAY_LOW: 170 MS
MDC_IDC_SET_BRADY_SAV_DELAY_LOW: 120 MS
MDC_IDC_SET_CRT_LVRV_DELAY: 0 MS
MDC_IDC_SET_CRT_PACED_CHAMBERS: NORMAL
MDC_IDC_SET_LEADCHNL_LV_PACING_AMPLITUDE: 1.5 V
MDC_IDC_SET_LEADCHNL_LV_PACING_ANODE_ELECTRODE_1: NORMAL
MDC_IDC_SET_LEADCHNL_LV_PACING_ANODE_LOCATION_1: NORMAL
MDC_IDC_SET_LEADCHNL_LV_PACING_CAPTURE_MODE: NORMAL
MDC_IDC_SET_LEADCHNL_LV_PACING_CATHODE_ELECTRODE_1: NORMAL
MDC_IDC_SET_LEADCHNL_LV_PACING_CATHODE_LOCATION_1: NORMAL
MDC_IDC_SET_LEADCHNL_LV_PACING_POLARITY: NORMAL
MDC_IDC_SET_LEADCHNL_LV_PACING_PULSEWIDTH: 0.5 MS
MDC_IDC_SET_LEADCHNL_RA_PACING_AMPLITUDE: 1.5 V
MDC_IDC_SET_LEADCHNL_RA_PACING_ANODE_ELECTRODE_1: NORMAL
MDC_IDC_SET_LEADCHNL_RA_PACING_ANODE_LOCATION_1: NORMAL
MDC_IDC_SET_LEADCHNL_RA_PACING_CAPTURE_MODE: NORMAL
MDC_IDC_SET_LEADCHNL_RA_PACING_CATHODE_ELECTRODE_1: NORMAL
MDC_IDC_SET_LEADCHNL_RA_PACING_CATHODE_LOCATION_1: NORMAL
MDC_IDC_SET_LEADCHNL_RA_PACING_POLARITY: NORMAL
MDC_IDC_SET_LEADCHNL_RA_PACING_PULSEWIDTH: 0.4 MS
MDC_IDC_SET_LEADCHNL_RA_SENSING_ANODE_ELECTRODE_1: NORMAL
MDC_IDC_SET_LEADCHNL_RA_SENSING_ANODE_LOCATION_1: NORMAL
MDC_IDC_SET_LEADCHNL_RA_SENSING_CATHODE_ELECTRODE_1: NORMAL
MDC_IDC_SET_LEADCHNL_RA_SENSING_CATHODE_LOCATION_1: NORMAL
MDC_IDC_SET_LEADCHNL_RA_SENSING_POLARITY: NORMAL
MDC_IDC_SET_LEADCHNL_RA_SENSING_SENSITIVITY: 0.3 MV
MDC_IDC_SET_LEADCHNL_RV_PACING_AMPLITUDE: 2.25 V
MDC_IDC_SET_LEADCHNL_RV_PACING_ANODE_ELECTRODE_1: NORMAL
MDC_IDC_SET_LEADCHNL_RV_PACING_ANODE_LOCATION_1: NORMAL
MDC_IDC_SET_LEADCHNL_RV_PACING_CAPTURE_MODE: NORMAL
MDC_IDC_SET_LEADCHNL_RV_PACING_CATHODE_ELECTRODE_1: NORMAL
MDC_IDC_SET_LEADCHNL_RV_PACING_CATHODE_LOCATION_1: NORMAL
MDC_IDC_SET_LEADCHNL_RV_PACING_POLARITY: NORMAL
MDC_IDC_SET_LEADCHNL_RV_PACING_PULSEWIDTH: 0.4 MS
MDC_IDC_SET_LEADCHNL_RV_SENSING_ANODE_ELECTRODE_1: NORMAL
MDC_IDC_SET_LEADCHNL_RV_SENSING_ANODE_LOCATION_1: NORMAL
MDC_IDC_SET_LEADCHNL_RV_SENSING_CATHODE_ELECTRODE_1: NORMAL
MDC_IDC_SET_LEADCHNL_RV_SENSING_CATHODE_LOCATION_1: NORMAL
MDC_IDC_SET_LEADCHNL_RV_SENSING_POLARITY: NORMAL
MDC_IDC_SET_LEADCHNL_RV_SENSING_SENSITIVITY: 0.3 MV
MDC_IDC_SET_ZONE_DETECTION_BEATS_DENOMINATOR: 40 {BEATS}
MDC_IDC_SET_ZONE_DETECTION_BEATS_NUMERATOR: 30 {BEATS}
MDC_IDC_SET_ZONE_DETECTION_INTERVAL: 300 MS
MDC_IDC_SET_ZONE_DETECTION_INTERVAL: 350 MS
MDC_IDC_SET_ZONE_DETECTION_INTERVAL: 360 MS
MDC_IDC_SET_ZONE_DETECTION_INTERVAL: 360 MS
MDC_IDC_SET_ZONE_DETECTION_INTERVAL: NORMAL
MDC_IDC_SET_ZONE_TYPE: NORMAL
MDC_IDC_STAT_AT_BURDEN_PERCENT: 0 %
MDC_IDC_STAT_AT_DTM_END: NORMAL
MDC_IDC_STAT_AT_DTM_START: NORMAL
MDC_IDC_STAT_BRADY_AP_VP_PERCENT: 0.66 %
MDC_IDC_STAT_BRADY_AP_VS_PERCENT: 0.01 %
MDC_IDC_STAT_BRADY_AS_VP_PERCENT: 98.15 %
MDC_IDC_STAT_BRADY_AS_VS_PERCENT: 1.18 %
MDC_IDC_STAT_BRADY_DTM_END: NORMAL
MDC_IDC_STAT_BRADY_DTM_START: NORMAL
MDC_IDC_STAT_BRADY_RA_PERCENT_PACED: 0.66 %
MDC_IDC_STAT_BRADY_RV_PERCENT_PACED: 98.16 %
MDC_IDC_STAT_CRT_DTM_END: NORMAL
MDC_IDC_STAT_CRT_DTM_START: NORMAL
MDC_IDC_STAT_CRT_LV_PERCENT_PACED: 98.1 %
MDC_IDC_STAT_CRT_PERCENT_PACED: 98.1 %
MDC_IDC_STAT_EPISODE_RECENT_COUNT: 0
MDC_IDC_STAT_EPISODE_RECENT_COUNT_DTM_END: NORMAL
MDC_IDC_STAT_EPISODE_RECENT_COUNT_DTM_START: NORMAL
MDC_IDC_STAT_EPISODE_TOTAL_COUNT: 0
MDC_IDC_STAT_EPISODE_TOTAL_COUNT: 1
MDC_IDC_STAT_EPISODE_TOTAL_COUNT_DTM_END: NORMAL
MDC_IDC_STAT_EPISODE_TOTAL_COUNT_DTM_START: NORMAL
MDC_IDC_STAT_EPISODE_TYPE: NORMAL
MDC_IDC_STAT_TACHYTHERAPY_ATP_DELIVERED_RECENT: 0
MDC_IDC_STAT_TACHYTHERAPY_ATP_DELIVERED_TOTAL: 0
MDC_IDC_STAT_TACHYTHERAPY_RECENT_DTM_END: NORMAL
MDC_IDC_STAT_TACHYTHERAPY_RECENT_DTM_START: NORMAL
MDC_IDC_STAT_TACHYTHERAPY_SHOCKS_ABORTED_RECENT: 0
MDC_IDC_STAT_TACHYTHERAPY_SHOCKS_ABORTED_TOTAL: 0
MDC_IDC_STAT_TACHYTHERAPY_SHOCKS_DELIVERED_RECENT: 0
MDC_IDC_STAT_TACHYTHERAPY_SHOCKS_DELIVERED_TOTAL: 0
MDC_IDC_STAT_TACHYTHERAPY_TOTAL_DTM_END: NORMAL
MDC_IDC_STAT_TACHYTHERAPY_TOTAL_DTM_START: NORMAL

## 2019-11-09 DIAGNOSIS — I42.9 CARDIOMYOPATHY, UNSPECIFIED TYPE (H): Primary | Chronic | ICD-10-CM

## 2019-11-09 NOTE — TELEPHONE ENCOUNTER
"Last Written Prescription Date:  ?  Last Fill Quantity: ?,  # refills: ?   Last office visit: 9/6/2019 with prescribing provider:  Kush   Future Office Visit:   Next 5 appointments (look out 90 days)    Jan 06, 2020  1:50 PM CST  Return Visit with  LAB DRAW 1  [facility] ([facility]) [facility]  [address]  [facility] [address]  [phone]   Jan 09, 2020  2:00 PM CST  Return Visit with Maribel Bender MD  [facility] ([facility]) [facility]  [address]  [facility] [address]  [phone]        Routing refill request to provider for review/approval because:  Medication is reported/historical    Requested Prescriptions   Pending Prescriptions Disp Refills     metoprolol succinate ER (TOPROL-XL) 50 MG 24 hr tablet [Pharmacy Med Name: Metoprolol Succinate ER Oral Tablet Extended Release 24 Hour 50 MG] 90 tablet 0     Sig: TAKE ONE TABLET BY MOUTH ONE TIME DAILY       Beta-Blockers Protocol Passed - 11/9/2019 11:21 AM        Passed - Blood pressure under 140/90 in past 12 months     BP Readings from Last 3 Encounters:   10/30/19 110/64   10/25/19 139/76   10/10/19 109/69                 Passed - Patient is age 6 or older        Passed - Recent (12 mo) or future (30 days) visit within the authorizing provider's specialty     Patient has had an office visit with the authorizing provider or a provider within the authorizing providers department within the previous 12 mos or has a future within next 30 days. See \"Patient Info\" tab in inbasket, or \"Choose Columns\" in Meds & Orders section of the refill encounter.              Passed - Medication is active on med list          "

## 2019-11-11 NOTE — TELEPHONE ENCOUNTER
Historical on med list as: Metoprolol ER 50mg daily     Last Rx 8/22/18, #90 with 3 refills (50mg).     Med was discontinued then re-added as historical     Rx pended     Symone MONTILLA RN

## 2019-11-12 RX ORDER — METOPROLOL SUCCINATE 50 MG/1
TABLET, EXTENDED RELEASE ORAL
Qty: 90 TABLET | Refills: 3 | Status: SHIPPED | OUTPATIENT
Start: 2019-11-12 | End: 2020-01-01

## 2019-11-15 ENCOUNTER — OFFICE VISIT (OUTPATIENT)
Dept: UROLOGY | Facility: CLINIC | Age: 79
End: 2019-11-15
Payer: COMMERCIAL

## 2019-11-15 ENCOUNTER — HOSPITAL ENCOUNTER (OUTPATIENT)
Facility: CLINIC | Age: 79
End: 2019-11-15
Attending: UROLOGY | Admitting: UROLOGY
Payer: COMMERCIAL

## 2019-11-15 VITALS
OXYGEN SATURATION: 95 % | SYSTOLIC BLOOD PRESSURE: 120 MMHG | HEART RATE: 83 BPM | BODY MASS INDEX: 27.83 KG/M2 | WEIGHT: 210 LBS | HEIGHT: 73 IN | DIASTOLIC BLOOD PRESSURE: 70 MMHG

## 2019-11-15 DIAGNOSIS — C83.398 DIFFUSE LARGE B-CELL LYMPHOMA OF EXTRANODAL SITE: Chronic | ICD-10-CM

## 2019-11-15 DIAGNOSIS — F03.90 DEMENTIA WITHOUT BEHAVIORAL DISTURBANCE, UNSPECIFIED DEMENTIA TYPE: Chronic | ICD-10-CM

## 2019-11-15 DIAGNOSIS — E11.22 CONTROLLED TYPE 2 DIABETES MELLITUS WITH CHRONIC KIDNEY DISEASE, WITHOUT LONG-TERM CURRENT USE OF INSULIN, UNSPECIFIED CKD STAGE (H): Chronic | ICD-10-CM

## 2019-11-15 DIAGNOSIS — C67.2 MALIGNANT NEOPLASM OF LATERAL WALL OF URINARY BLADDER (H): Primary | Chronic | ICD-10-CM

## 2019-11-15 DIAGNOSIS — C67.2 MALIGNANT NEOPLASM OF LATERAL WALL OF URINARY BLADDER (H): ICD-10-CM

## 2019-11-15 PROCEDURE — 99214 OFFICE O/P EST MOD 30 MIN: CPT | Performed by: UROLOGY

## 2019-11-15 ASSESSMENT — PAIN SCALES - GENERAL: PAINLEVEL: NO PAIN (0)

## 2019-11-15 ASSESSMENT — MIFFLIN-ST. JEOR: SCORE: 1726.43

## 2019-11-15 NOTE — NURSING NOTE
Chief Complaint   Patient presents with     Follow Up     patient is here to discuss TURBT.      Alma Cleary, CMA

## 2019-11-15 NOTE — Clinical Note
Milton Currie and Dr. Bender,I saw Nelson and his wife back to discuss the results of his recent urine cytology. This did show malignant cells, and his last surveillance cystoscopy had demonstrated some raised erythema concerning for recurrence of disease. As such, we discussed the need for a repeat Transurethral resection of bladder tumor (TURBT) Pending the pathology from this, he may be a candidate for BCG treatment now that his lymphoma treatment is completed. Thanks, Ryan Camarillo M.D.Cell: [phone]

## 2019-11-15 NOTE — LETTER
11/15/2019     RE: Gibson Villalta  [address]  [place] [address]     Dear Colleague,    Thank you for referring your patient, Gibson Villalta, to the [facility]. Please see a copy of my visit note below.    [place]  CHIEF COMPLAINT   It was my pleasure to see Gibson Villalta who is a 79 year old male for follow-up of bladder cancer.      HPI  Gibson Villalta is a very pleasant 79 year old male who presents with a history of mild dementia, RV and LV mural thrombus on chronic anticoagulation with Coumadin, status post Smita-en-Y gastric bypass, hypertension, CKD, CAD, cardiomyopathy with EF of 20 to 30%, diabetes mellitus.  He also has history of diffuse large B-cell lymphoma who presented with melena and soft tissue thickening of his stomach in July 2018.     9/19/18: Transurethral resection of bladder tumor (TURBT) with T1HG bladder cancer  10/19/18: Repeat upper endoscopy with biopsies confirmed diffuse large B-cell lymphoma  10/30/18: Bone marrow biopsy negative for lymphoma involvement  10/31/18: PET scan showed extensive hypermetabolic thickening of the stomach and several loops of small bowel in the left upper quadrant and right lower quadrant consistent with the diagnosis of lymphoma  11/12/18: Re-staging Transurethral resection of bladder tumor (TURBT) with confirmation of his T1 HG bladder cancer.  Following discussion with his oncologist, Dr. Bender at [facility], we discussed that treatment for his bladder cancer with BCG treatment would not be warranted at this time given his upcoming chemotherapy treatment for his lymphoma and that there would be little benefit of treatment given that this requires an intact immune response.  We will plan for management with surveillance cystoscopy  11/26/18: Started first-line therapy with many R-CHOP chemotherapy.  He follows with  Dr. Bender at [facility]  1/28/19: After 3 cycles of mini R-CHOP, PET scan showed marked improvement consistent with a good response to treatment  2/19/19: Cycle 5 and 6 of chemotherapy, doxorubicin held due to worsening shortness of breath and worsened dilation of his left ventricle.  Completion of cycle 6 on March 12, 2019 4/1/19: Follow-up with me for surveillance cystoscopy with evidence of a bladder stone and significant prostatic hypertrophy  4/4/19: PET scan showed no residual lymphoma.  Calcification seen in the bladder.  4/22/19: Cystlolithalopaxy and Transurethral resection of the prostate with no evidence of malignancy  10/30/19: last surveillance cystoscopy with some raised erythema at the prior resection site. Cytology returned as positive for malignant cells    Given his cystology result, he and his wife return today to discuss the need for repeat Transurethral resection of bladder tumor (TURBT).     Past Medical History:   Diagnosis Date     Acute kidney injury (H) 2012     Anemia      Anxiety      Anxiety and depression      Bladder mass      BPH (benign prostatic hypertrophy)      Cardiomyopathy (H)     ICD implanted 11/2016     Carpal tunnel syndrome     Right     Chronic kidney disease (CKD), stage 3 (moderate)      Dementia (H)      Depressive disorder      Diabetes (H)      Diffuse large B-cell lymphoma of extranodal site (H) 11/23/2018     Gastric mass      Gout      History of depression      LBBB (left bundle branch block) 2013     Lumbar herniated disc     L5-S1     Mixed cardiomyopathy 7/22/2016     Myocardial infarction (H) 1995 and 2010     Nonischemic cardiomyopathy (H) 7/22/2016     Obesity      Pacemaker     ICD/PM     Rosacea      Rotator cuff disorder     Tear x 2     RV (right ventricular) mural thrombus 7/22/2016     Past Surgical History:   Procedure Laterality Date     ANGIOPLASTY  1995 and 2010 with stent     BIOPSY      stomach     BONE MARROW BIOPSY, BONE SPECIMEN,  NEEDLE/TROCAR N/A 10/30/2018    Procedure: BIOPSY BONE MARROW;  Surgeon: Jeb Romero MD;  Location:  GI     CARDIAC SURGERY      ICD/PM     CYSTOSCOPY       CYSTOSCOPY, TRANSURETHRAL RESECTION (TUR) PROSTATE, COMBINED  4/22/2019    Procedure: CYSTOSCOPY AND BIPOLAR TRANSURETHRAL RESECTION (TUR) PROSTATE;  Surgeon: Ryan Camarillo MD;  Location:  OR     CYSTOSCOPY, TRANSURETHRAL RESECTION (TUR) TUMOR BLADDER, COMBINED N/A 9/19/2018    Procedure: COMBINED CYSTOSCOPY, TRANSURETHRAL RESECTION (TUR) TUMOR BLADDER;  CYSTOSCOPY, TRANSURETHRAL RESECTION BLADDER TUMOR ;  Surgeon: Ryan Camarillo MD;  Location:  OR     CYSTOSCOPY, TRANSURETHRAL RESECTION (TUR) TUMOR BLADDER, COMBINED N/A 11/12/2018    Procedure: CYSTOSCOPY RESTAGING TRANSURETHRAL RESECTION BLADDER TUMOR;  Surgeon: Ryan Camarillo MD;  Location:  OR     EP LEAD REVISION DUAL N/A 10/24/2019    Procedure: EP Lead Revision Dual;  Surgeon: Josias Hernandez MD;  Location:  HEART CARDIAC CATH LAB     ESOPHAGOSCOPY, GASTROSCOPY, DUODENOSCOPY (EGD), COMBINED N/A 7/30/2018    Procedure: COMBINED ESOPHAGOSCOPY, GASTROSCOPY, DUODENOSCOPY (EGD), BIOPSY SINGLE OR MULTIPLE;  gastroscopy;  Surgeon: Parish Xiong MD;  Location:  GI     ESOPHAGOSCOPY, GASTROSCOPY, DUODENOSCOPY (EGD), COMBINED N/A 7/30/2018    Procedure: COMBINED ESOPHAGOSCOPY, GASTROSCOPY, DUODENOSCOPY (EGD);  EGD;  Surgeon: Parish Xiong MD;  Location:  GI     ESOPHAGOSCOPY, GASTROSCOPY, DUODENOSCOPY (EGD), COMBINED N/A 8/2/2018    Procedure: COMBINED ESOPHAGOSCOPY, GASTROSCOPY, DUODENOSCOPY (EGD), BIOPSY SINGLE OR MULTIPLE;  gastroscopy BIOPSYSHOULD BE SENT TO LAB IN NS NOT FORMALIN PER ;  Surgeon: Jonathon Bush MD;  Location:  GI     GASTRIC BYPASS  2001     HEART CATH LEFT HEART CATH  7/19/16    Non ischemic cardiomyopathy; Non functionally significant LAD and RCA disease      IR PORT REMOVAL RIGHT  9/9/2019     LASER HOLMIUM LITHOTRIPSY BLADDER N/A  "4/22/2019    Procedure: CYSTOSCOPY, HOLMIUM LASER LITHOLAPAXY ,  AND BIPOLAR TRANSURETHRAL RESECTION (TUR) PROSTATE;  Surgeon: Ryan Camarillo MD;  Location: [facility] OR     OTHER SURGICAL HISTORY  2006    Spinal surgery     OTHER SURGICAL HISTORY  Nov 2016    CRT-D implantation     Current Outpatient Medications   Medication     ACE/ARB NOT PRESCRIBED, INTENTIONAL,     atorvastatin (LIPITOR) 40 MG tablet     COENZYME Q-10 PO     ferrous sulfate (IRON) 325 (65 Fe) MG tablet     furosemide (LASIX) 20 MG tablet     hydrALAZINE (APRESOLINE) 10 MG tablet     metoprolol succinate ER (TOPROL-XL) 50 MG 24 hr tablet     multivitamin, therapeutic with minerals (MULTI-VITAMIN) TABS     venlafaxine (EFFEXOR) 75 MG tablet     vitamin D2 (ERGOCALCIFEROL) 60133 units (1250 mcg) capsule     No current facility-administered medications for this visit.         PHYSICAL EXAM  Patient is a 79 year old  male   Vitals: Blood pressure 120/70, pulse 83, height 1.854 m (6' 1\"), weight 95.3 kg (210 lb), SpO2 95 %.  General Appearance Adult: Body mass index is 27.71 kg/m .  Alert, no acute distress, oriented, mild dementia   HENT: throat/mouth:normal, good dentition  Lungs: no respiratory distress, or pursed lip breathing  Heart: No obvious jugular venous distension present  Abdomen: soft, nontender, no organomegaly or masses  Musculoskeltal: extremities normal, no peripheral edema  Skin: no suspicious lesions or rashes  Neuro: Alert, oriented, speech and mentation normal  Psych: affect and mood normal  Gait: Normal    UA RESULTS:  Recent Labs   Lab Test 10/30/19  1406  10/15/18  2031   COLOR Yellow   < > Yellow   APPEARANCE Clear   < > Slightly Cloudy   URINEGLC Negative   < > Negative   URINEBILI Negative   < > Negative   URINEKETONE Negative   < > Negative   SG 1.015   < > 1.019   UBLD Negative   < > Trace*   URINEPH 5.0   < > 5.0   PROTEIN Negative   < > 30*   UROBILINOGEN 0.2   < >  --    NITRITE Negative   < > Negative   LEUKEST Negative   < " > Large*   RBCU  --   --  22*   WBCU  --   --  120*    < > = values in this interval not displayed.      CYTOLOGIC INTERPRETATION:      Urine, voided:   Suspicious for high-grade urothelial carcinoma   Specimen Adequacy: Satisfactory for evaluation.     Optimal treatment plan:  Date of Transurethral resection of bladder tumor (TURBT): 9/19/18  Grade/Stage: HG T1  Date of Re-staging Transurethral resection of bladder tumor (TURBT): 11/12/18  Residual disease: Yes  Grade/Stage: HG T1    Induction (month 0) - BCG not warranted given treatment for his lymphoma     Cystoscopy (schedule one week prior to treatment when cysto and treatment coincide)  Month 3 - 4/1/19 with no evidence of disease, however evidence of bladder stone and BPH  Month 6 - 7/24/19 with no evidence of disease and well healed after TURP on 4/22/19  Month 9 - 10/30/19 with no clear evidence of disease, some raised erythema, cytology pending   Month 12  Month 15  Month 18  Month 21  Month 24  Month 27  Month 30  Month 36  Year 4  Year 5  Year 6  Year 8  Year 10    ASSESSMENT and PLAN  Gibson Villalta is a 79 year old male with a history of HG T1 bladder cancer and s/p TURP.     - Cystoscopy from 10/30 with a small amount of raised erythema near the prior resection site and cytology resulted as positive for malignant cells  - We discussed the need for repeat Transurethral resection of bladder tumor (TURBT)   - We will plan for out-patient procedure and likely home with starkey catheter for a few days  - We reviewed that given his prior treatment for Lymphoma, he was not a candidate for BCG last fall, however given his good response to lymphoma treatment, pending the pathology from this resection, he may be a candidate for BCG at this time  - Orders for surgery placed    I spent over 25 minutes with the patient.  Over half this time was spent on counseling regarding the above.    Ryan Camarillo MD   Urology  [facility]  Phone [phone]    Good day to you Dr. Camarillo.    Thank you for the continued communication.

## 2019-11-15 NOTE — PROGRESS NOTES
[facility]  CHIEF COMPLAINT   It was my pleasure to see Gibson Villalta who is a 79 year old male for follow-up of bladder cancer.      HPI  Gibson Villalta is a very pleasant 79 year old male who presents with a history of mild dementia, RV and LV mural thrombus on chronic anticoagulation with Coumadin, status post Smita-en-Y gastric bypass, hypertension, CKD, CAD, cardiomyopathy with EF of 20 to 30%, diabetes mellitus.  He also has history of diffuse large B-cell lymphoma who presented with melena and soft tissue thickening of his stomach in July 2018.     9/19/18: Transurethral resection of bladder tumor (TURBT) with T1HG bladder cancer  10/19/18: Repeat upper endoscopy with biopsies confirmed diffuse large B-cell lymphoma  10/30/18: Bone marrow biopsy negative for lymphoma involvement  10/31/18: PET scan showed extensive hypermetabolic thickening of the stomach and several loops of small bowel in the left upper quadrant and right lower quadrant consistent with the diagnosis of lymphoma  11/12/18: Re-staging Transurethral resection of bladder tumor (TURBT) with confirmation of his T1 HG bladder cancer.  Following discussion with his oncologist, Dr. Bender at [facility], we discussed that treatment for his bladder cancer with BCG treatment would not be warranted at this time given his upcoming chemotherapy treatment for his lymphoma and that there would be little benefit of treatment given that this requires an intact immune response.  We will plan for management with surveillance cystoscopy  11/26/18: Started first-line therapy with many R-CHOP chemotherapy.  He follows with Dr. Bender at [facility]  1/28/19: After 3 cycles of mini R-CHOP, PET scan showed marked improvement consistent with a good response to treatment  2/19/19: Cycle 5 and 6 of chemotherapy, doxorubicin held due to worsening shortness of breath and worsened dilation of his left ventricle.  Completion of cycle 6 on March 12, 2019 4/1/19:  Follow-up with me for surveillance cystoscopy with evidence of a bladder stone and significant prostatic hypertrophy  4/4/19: PET scan showed no residual lymphoma.  Calcification seen in the bladder.  4/22/19: Cystlolithalopaxy and Transurethral resection of the prostate with no evidence of malignancy  10/30/19: last surveillance cystoscopy with some raised erythema at the prior resection site. Cytology returned as positive for malignant cells    Given his cystology result, he and his wife return today to discuss the need for repeat Transurethral resection of bladder tumor (TURBT).     Past Medical History:   Diagnosis Date     Acute kidney injury (H) 2012     Anemia      Anxiety      Anxiety and depression      Bladder mass      BPH (benign prostatic hypertrophy)      Cardiomyopathy (H)     ICD implanted 11/2016     Carpal tunnel syndrome     Right     Chronic kidney disease (CKD), stage 3 (moderate)      Dementia (H)      Depressive disorder      Diabetes (H)      Diffuse large B-cell lymphoma of extranodal site (H) 11/23/2018     Gastric mass      Gout      History of depression      LBBB (left bundle branch block) 2013     Lumbar herniated disc     L5-S1     Mixed cardiomyopathy 7/22/2016     Myocardial infarction (H) 1995 and 2010     Nonischemic cardiomyopathy (H) 7/22/2016     Obesity      Pacemaker     ICD/PM     Rosacea      Rotator cuff disorder     Tear x 2     RV (right ventricular) mural thrombus 7/22/2016     Past Surgical History:   Procedure Laterality Date     ANGIOPLASTY  1995 and 2010 with stent     BIOPSY      stomach     BONE MARROW BIOPSY, BONE SPECIMEN, NEEDLE/TROCAR N/A 10/30/2018    Procedure: BIOPSY BONE MARROW;  Surgeon: Jeb Romero MD;  Location:  GI     CARDIAC SURGERY      ICD/PM     CYSTOSCOPY       CYSTOSCOPY, TRANSURETHRAL RESECTION (TUR) PROSTATE, COMBINED  4/22/2019    Procedure: CYSTOSCOPY AND BIPOLAR TRANSURETHRAL RESECTION (TUR) PROSTATE;  Surgeon: Ryan Camarillo  MD;  Location:  OR     CYSTOSCOPY, TRANSURETHRAL RESECTION (TUR) TUMOR BLADDER, COMBINED N/A 9/19/2018    Procedure: COMBINED CYSTOSCOPY, TRANSURETHRAL RESECTION (TUR) TUMOR BLADDER;  CYSTOSCOPY, TRANSURETHRAL RESECTION BLADDER TUMOR ;  Surgeon: Ryan Camarillo MD;  Location:  OR     CYSTOSCOPY, TRANSURETHRAL RESECTION (TUR) TUMOR BLADDER, COMBINED N/A 11/12/2018    Procedure: CYSTOSCOPY RESTAGING TRANSURETHRAL RESECTION BLADDER TUMOR;  Surgeon: Ryan Camarillo MD;  Location:  OR     EP LEAD REVISION DUAL N/A 10/24/2019    Procedure: EP Lead Revision Dual;  Surgeon: Josias Hernandez MD;  Location:  HEART CARDIAC CATH LAB     ESOPHAGOSCOPY, GASTROSCOPY, DUODENOSCOPY (EGD), COMBINED N/A 7/30/2018    Procedure: COMBINED ESOPHAGOSCOPY, GASTROSCOPY, DUODENOSCOPY (EGD), BIOPSY SINGLE OR MULTIPLE;  gastroscopy;  Surgeon: Parish Xiong MD;  Location:  GI     ESOPHAGOSCOPY, GASTROSCOPY, DUODENOSCOPY (EGD), COMBINED N/A 7/30/2018    Procedure: COMBINED ESOPHAGOSCOPY, GASTROSCOPY, DUODENOSCOPY (EGD);  EGD;  Surgeon: Parish Xiong MD;  Location:  GI     ESOPHAGOSCOPY, GASTROSCOPY, DUODENOSCOPY (EGD), COMBINED N/A 8/2/2018    Procedure: COMBINED ESOPHAGOSCOPY, GASTROSCOPY, DUODENOSCOPY (EGD), BIOPSY SINGLE OR MULTIPLE;  gastroscopy BIOPSYSHOULD BE SENT TO LAB IN NS NOT FORMALIN PER ;  Surgeon: Jonathon Bush MD;  Location:  GI     GASTRIC BYPASS  2001     HEART CATH LEFT HEART CATH  7/19/16    Non ischemic cardiomyopathy; Non functionally significant LAD and RCA disease      IR PORT REMOVAL RIGHT  9/9/2019     LASER HOLMIUM LITHOTRIPSY BLADDER N/A 4/22/2019    Procedure: CYSTOSCOPY, HOLMIUM LASER LITHOLAPAXY ,  AND BIPOLAR TRANSURETHRAL RESECTION (TUR) PROSTATE;  Surgeon: Ryan Camarillo MD;  Location:  OR     OTHER SURGICAL HISTORY  2006    Spinal surgery     OTHER SURGICAL HISTORY  Nov 2016    CRT-D implantation     Current Outpatient Medications   Medication     ACE/ARB NOT PRESCRIBED,  "INTENTIONAL,     atorvastatin (LIPITOR) 40 MG tablet     COENZYME Q-10 PO     ferrous sulfate (IRON) 325 (65 Fe) MG tablet     furosemide (LASIX) 20 MG tablet     hydrALAZINE (APRESOLINE) 10 MG tablet     metoprolol succinate ER (TOPROL-XL) 50 MG 24 hr tablet     multivitamin, therapeutic with minerals (MULTI-VITAMIN) TABS     venlafaxine (EFFEXOR) 75 MG tablet     vitamin D2 (ERGOCALCIFEROL) 29656 units (1250 mcg) capsule     No current facility-administered medications for this visit.         PHYSICAL EXAM  Patient is a 79 year old  male   Vitals: Blood pressure 120/70, pulse 83, height 1.854 m (6' 1\"), weight 95.3 kg (210 lb), SpO2 95 %.  General Appearance Adult: Body mass index is 27.71 kg/m .  Alert, no acute distress, oriented, mild dementia   HENT: throat/mouth:normal, good dentition  Lungs: no respiratory distress, or pursed lip breathing  Heart: No obvious jugular venous distension present  Abdomen: soft, nontender, no organomegaly or masses  Musculoskeltal: extremities normal, no peripheral edema  Skin: no suspicious lesions or rashes  Neuro: Alert, oriented, speech and mentation normal  Psych: affect and mood normal  Gait: Normal    UA RESULTS:  Recent Labs   Lab Test 10/30/19  1406  10/15/18  2031   COLOR Yellow   < > Yellow   APPEARANCE Clear   < > Slightly Cloudy   URINEGLC Negative   < > Negative   URINEBILI Negative   < > Negative   URINEKETONE Negative   < > Negative   SG 1.015   < > 1.019   UBLD Negative   < > Trace*   URINEPH 5.0   < > 5.0   PROTEIN Negative   < > 30*   UROBILINOGEN 0.2   < >  --    NITRITE Negative   < > Negative   LEUKEST Negative   < > Large*   RBCU  --   --  22*   WBCU  --   --  120*    < > = values in this interval not displayed.      CYTOLOGIC INTERPRETATION:      Urine, voided:   Suspicious for high-grade urothelial carcinoma   Specimen Adequacy: Satisfactory for evaluation.     Optimal treatment plan:  Date of Transurethral resection of bladder tumor (TURBT): " 9/19/18  Grade/Stage: HG T1  Date of Re-staging Transurethral resection of bladder tumor (TURBT): 11/12/18  Residual disease: Yes  Grade/Stage: HG T1    Induction (month 0) - BCG not warranted given treatment for his lymphoma     Cystoscopy (schedule one week prior to treatment when cysto and treatment coincide)  Month 3 - 4/1/19 with no evidence of disease, however evidence of bladder stone and BPH  Month 6 - 7/24/19 with no evidence of disease and well healed after TURP on 4/22/19  Month 9 - 10/30/19 with no clear evidence of disease, some raised erythema, cytology pending   Month 12  Month 15  Month 18  Month 21  Month 24  Month 27  Month 30  Month 36  Year 4  Year 5  Year 6  Year 8  Year 10    ASSESSMENT and PLAN  Gibson Villalta is a 79 year old male with a history of HG T1 bladder cancer and s/p TURP.     - Cystoscopy from 10/30 with a small amount of raised erythema near the prior resection site and cytology resulted as positive for malignant cells  - We discussed the need for repeat Transurethral resection of bladder tumor (TURBT)   - We will plan for out-patient procedure and likely home with starkey catheter for a few days  - We reviewed that given his prior treatment for Lymphoma, he was not a candidate for BCG last fall, however given his good response to lymphoma treatment, pending the pathology from this resection, he may be a candidate for BCG at this time  - Orders for surgery placed      I spent over 25 minutes with the patient.  Over half this time was spent on counseling regarding the above.    Ryan Camarillo MD   Urology  [facility]  Clinic Phone [phone]

## 2019-11-27 ENCOUNTER — OFFICE VISIT (OUTPATIENT)
Dept: FAMILY MEDICINE | Facility: CLINIC | Age: 79
End: 2019-11-27
Payer: COMMERCIAL

## 2019-11-27 ENCOUNTER — TELEPHONE (OUTPATIENT)
Dept: CARDIOLOGY | Facility: CLINIC | Age: 79
End: 2019-11-27

## 2019-11-27 ENCOUNTER — TELEPHONE (OUTPATIENT)
Dept: FAMILY MEDICINE | Facility: CLINIC | Age: 79
End: 2019-11-27

## 2019-11-27 VITALS
BODY MASS INDEX: 27.71 KG/M2 | HEART RATE: 86 BPM | TEMPERATURE: 94.1 F | WEIGHT: 210 LBS | OXYGEN SATURATION: 95 % | SYSTOLIC BLOOD PRESSURE: 110 MMHG | DIASTOLIC BLOOD PRESSURE: 70 MMHG

## 2019-11-27 DIAGNOSIS — C67.2 MALIGNANT NEOPLASM OF LATERAL WALL OF URINARY BLADDER (H): Chronic | ICD-10-CM

## 2019-11-27 DIAGNOSIS — I50.22 CHRONIC SYSTOLIC HEART FAILURE (H): Chronic | ICD-10-CM

## 2019-11-27 DIAGNOSIS — E11.22 CONTROLLED TYPE 2 DIABETES MELLITUS WITH CHRONIC KIDNEY DISEASE, WITHOUT LONG-TERM CURRENT USE OF INSULIN, UNSPECIFIED CKD STAGE (H): Chronic | ICD-10-CM

## 2019-11-27 DIAGNOSIS — F33.42 RECURRENT MAJOR DEPRESSIVE DISORDER, IN FULL REMISSION (H): ICD-10-CM

## 2019-11-27 DIAGNOSIS — Z01.818 PREOP GENERAL PHYSICAL EXAM: Primary | ICD-10-CM

## 2019-11-27 PROCEDURE — 99215 OFFICE O/P EST HI 40 MIN: CPT | Performed by: FAMILY MEDICINE

## 2019-11-27 NOTE — TELEPHONE ENCOUNTER
Received a voicemail from patient's wife that pt has bladder surgery next week and Dr. Mcleod requests a letter or call from Dr. Hernandez okaying surgery. Dr. Hernandez is out of the office today, and the long Thanksgiving weekend is ahead. Chart reviewed, and pt already has OV with Dr. De La Paz on 12/2/2019 for cardiac clearance and there is a note that his surgery will be delayed. I called pt's wife Izabella, no answer, so left detailed message letting her know above information.

## 2019-11-27 NOTE — TELEPHONE ENCOUNTER
Dr. Reyes received a call from Dr. Mcleod's office, regarding this patient's upcoming cystoscopy with transurethral resection of bladder tumor. Patient is a patient of Dr. Basurtos. Patient is set to have surgery on 12/2, but they are requesting cardiac clearance prior to surgery. No appointment available prior to 12/2, surgery will need to be delayed. Next available OV is 12/2 at 8:45AM with Dr. De La Paz. Contacted patient's PCP office to review. Spoke with triage RN at Dr. Mcleod's clinic and informed them that the patient can be seen here on Monday, 12/2 at 8:45AM and that the surgery will need to be postponed until cardiac clearance is obtained. Dr. Mcleod's office will work on rescheduling the patient's surgery and patient was informed that he will need cardiac clearance first prior to his surgery. Contacted patient to set up OV on 12/2/19 at 8:45AM for cardiac clearance with Dr. De La Paz. Patient verbalized understanding and agreed with plan of care.

## 2019-11-27 NOTE — PROGRESS NOTES
[facility] [place]  [address] [address]  [phone]  Dept: [phone]    PRE-OP EVALUATION:  Today's date: 2019    Gibson Villalta (: 1940) presents for pre-operative evaluation assessment as requested by Dr. Camarillo.  He requires evaluation and anesthesia risk assessment prior to undergoing surgery/procedure for treatment of Malignant neoplasm of lateral wall of urinary bladder (H) [C67.2] .    Proposed Surgery/ Procedure: Cystoscopy  Date of Surgery/ Procedure: 19  Time of Surgery/ Procedure: 10am  Hospital/Surgical Facility: [facility]    Primary Physician: Akira Currie  Type of Anesthesia Anticipated: to be determined    Patient has a Health Care Directive or Living Will:  NO    1. YES - Do you have a history of heart attack, stroke, stent, bypass or surgery on an artery in the head, neck, heart or legs?  2. NO - Do you ever have any pain or discomfort in your chest?  3. YES - Do you have a history of  Heart Failure?  4. YES - Are you troubled by shortness of breath when: walking on the level, up a slight hill or at night?  5. NO - Do you currently have a cold, bronchitis or other respiratory infection?  6. NO - Do you have a cough, shortness of breath or wheezing?  7. NO - Do you sometimes get pains in the calves of your legs when you walk?  8. NO - Do you or anyone in your family have previous history of blood clots?  9. NO - Do you or does anyone in your family have a serious bleeding problem such as prolonged bleeding following surgeries or cuts?  10. YES - Have you ever had problems with anemia or been told to take iron pills?  11. YES - Have you had any abnormal blood loss such as black, tarry or bloody stools, or abnormal vaginal bleeding?  12. NO - Have you ever had a blood transfusion?  13. NO - Have you or any of your relatives ever had problems with anesthesia?  14. NO - Do you have sleep apnea, excessive snoring or  daytime drowsiness?  15. NO - Do you have any prosthetic heart valves?  16. NO - Do you have prosthetic joints?  17. NO - Is there any chance that you may be pregnant?      HPI:     HPI related to upcoming procedure:       See problem list for active medical problems.  Problems all longstanding and stable, except as noted/documented.  See ROS for pertinent symptoms related to these conditions.      MEDICAL HISTORY:     Patient Active Problem List    Diagnosis Date Noted     Malignant neoplasm of lateral wall of urinary bladder (H) 11/15/2019     Priority: Medium     Added automatically from request for surgery [id number]       Malfunction of implantable defibrillator ventricular (ICD) lead 10/21/2019     Priority: Medium     Added automatically from request for surgery [id number]       Recurrent major depressive disorder, in full remission (H) 09/06/2019     Priority: Medium     BPH with obstruction/lower urinary tract symptoms 04/22/2019     Priority: Medium     Diffuse large B-cell lymphoma of extranodal site (H) 11/23/2018     Priority: Medium     Bladder cancer (H) 11/19/2018     Priority: Medium     Iron deficiency anemia, unspecified iron deficiency anemia type 10/15/2018     Priority: Medium     Prepatellar bursitis of left knee 12/11/2017     Priority: Medium     Cardiomyopathy (H) 03/14/2017     Priority: Medium     ICD implanted 11/2016       Controlled type 2 diabetes mellitus with chronic kidney disease, without long-term current use of insulin, unspecified CKD stage (H) 02/24/2017     Priority: Medium     New Dx Feb 2017       HFrEF (heart failure with reduced ejection fraction) (H) 08/16/2016     Priority: Medium     BPH (benign prostatic hypertrophy)      Priority: Medium     LBBB (left bundle branch block)      Priority: Medium     Mixed cardiomyopathy 07/22/2016     Priority: Medium     RV (right ventricular) mural thrombus without MI 07/22/2016     Priority: Medium     Dementia without behavioral  disturbance, unspecified dementia type (H) 07/22/2016     Priority: Medium     History of gastric bypass 07/22/2016     Priority: Medium      Past Medical History:   Diagnosis Date     Acute kidney injury (H) 2012     Anemia      Anxiety      Anxiety and depression      Bladder mass      BPH (benign prostatic hypertrophy)      Cardiomyopathy (H)     ICD implanted 11/2016     Carpal tunnel syndrome     Right     Chronic kidney disease (CKD), stage 3 (moderate)      Dementia (H)      Depressive disorder      Diabetes (H)      Diffuse large B-cell lymphoma of extranodal site (H) 11/23/2018     Gastric mass      Gout      History of depression      LBBB (left bundle branch block) 2013     Lumbar herniated disc     L5-S1     Mixed cardiomyopathy 7/22/2016     Myocardial infarction (H) 1995 and 2010     Nonischemic cardiomyopathy (H) 7/22/2016     Obesity      Pacemaker     ICD/PM     Rosacea      Rotator cuff disorder     Tear x 2     RV (right ventricular) mural thrombus 7/22/2016     Past Surgical History:   Procedure Laterality Date     ANGIOPLASTY  1995 and 2010 with stent     BIOPSY      stomach     BONE MARROW BIOPSY, BONE SPECIMEN, NEEDLE/TROCAR N/A 10/30/2018    Procedure: BIOPSY BONE MARROW;  Surgeon: Jeb Romero MD;  Location:  GI     CARDIAC SURGERY      ICD/PM     CYSTOSCOPY       CYSTOSCOPY, TRANSURETHRAL RESECTION (TUR) PROSTATE, COMBINED  4/22/2019    Procedure: CYSTOSCOPY AND BIPOLAR TRANSURETHRAL RESECTION (TUR) PROSTATE;  Surgeon: Ryan Camarillo MD;  Location:  OR     CYSTOSCOPY, TRANSURETHRAL RESECTION (TUR) TUMOR BLADDER, COMBINED N/A 9/19/2018    Procedure: COMBINED CYSTOSCOPY, TRANSURETHRAL RESECTION (TUR) TUMOR BLADDER;  CYSTOSCOPY, TRANSURETHRAL RESECTION BLADDER TUMOR ;  Surgeon: Ryan Camarillo MD;  Location:  OR     CYSTOSCOPY, TRANSURETHRAL RESECTION (TUR) TUMOR BLADDER, COMBINED N/A 11/12/2018    Procedure: CYSTOSCOPY RESTAGING TRANSURETHRAL RESECTION BLADDER TUMOR;   Surgeon: Ryan Camarillo MD;  Location:  OR     EP LEAD REVISION DUAL N/A 10/24/2019    Procedure: EP Lead Revision Dual;  Surgeon: Josias Hernandez MD;  Location:  HEART CARDIAC CATH LAB     ESOPHAGOSCOPY, GASTROSCOPY, DUODENOSCOPY (EGD), COMBINED N/A 7/30/2018    Procedure: COMBINED ESOPHAGOSCOPY, GASTROSCOPY, DUODENOSCOPY (EGD), BIOPSY SINGLE OR MULTIPLE;  gastroscopy;  Surgeon: Parish Xiong MD;  Location:  GI     ESOPHAGOSCOPY, GASTROSCOPY, DUODENOSCOPY (EGD), COMBINED N/A 7/30/2018    Procedure: COMBINED ESOPHAGOSCOPY, GASTROSCOPY, DUODENOSCOPY (EGD);  EGD;  Surgeon: Parish Xiong MD;  Location:  GI     ESOPHAGOSCOPY, GASTROSCOPY, DUODENOSCOPY (EGD), COMBINED N/A 8/2/2018    Procedure: COMBINED ESOPHAGOSCOPY, GASTROSCOPY, DUODENOSCOPY (EGD), BIOPSY SINGLE OR MULTIPLE;  gastroscopy BIOPSYSHOULD BE SENT TO LAB IN NS NOT FORMALIN PER ;  Surgeon: Jonathon Bush MD;  Location:  GI     GASTRIC BYPASS  2001     HEART CATH LEFT HEART CATH  7/19/16    Non ischemic cardiomyopathy; Non functionally significant LAD and RCA disease      IR PORT REMOVAL RIGHT  9/9/2019     LASER HOLMIUM LITHOTRIPSY BLADDER N/A 4/22/2019    Procedure: CYSTOSCOPY, HOLMIUM LASER LITHOLAPAXY ,  AND BIPOLAR TRANSURETHRAL RESECTION (TUR) PROSTATE;  Surgeon: Ryan Camarillo MD;  Location:  OR     OTHER SURGICAL HISTORY  2006    Spinal surgery     OTHER SURGICAL HISTORY  Nov 2016    CRT-D implantation     Current Outpatient Medications   Medication Sig Dispense Refill     ASPIRIN NOT PRESCRIBED (INTENTIONAL) Please choose reason not prescribed, below       atorvastatin (LIPITOR) 40 MG tablet Take 1 tablet (40 mg) by mouth At Bedtime 90 tablet 3     COENZYME Q-10 PO Take 100 mg by mouth every morning        ferrous sulfate (IRON) 325 (65 Fe) MG tablet Take 325 mg by mouth daily       furosemide (LASIX) 20 MG tablet Take 1 tablet (20 mg) by mouth daily 90 tablet 1     hydrALAZINE (APRESOLINE) 10 MG tablet Take 1  tablet (10 mg) by mouth 3 times daily 270 tablet 1     metoprolol succinate ER (TOPROL-XL) 50 MG 24 hr tablet TAKE ONE TABLET BY MOUTH ONE TIME DAILY  90 tablet 3     multivitamin, therapeutic with minerals (MULTI-VITAMIN) TABS Take 1 tablet by mouth every morning        venlafaxine (EFFEXOR) 75 MG tablet TAKE 1 TABLET BY MOUTH TWICE DAILY  180 tablet 1     vitamin D2 (ERGOCALCIFEROL) 30631 units (1250 mcg) capsule TAKE ONE CAPSULE BY MOUTH ONCE A WEEK ON TUESDAY 12 capsule 1     ACE/ARB NOT PRESCRIBED, INTENTIONAL, ACE & ARB not prescribed due to Symptomatic hypotension not due to excessive diuresis       OTC products: None, except as noted above    Allergies   Allergen Reactions     Wasps [Hornets]      Sand wasp --- years ago.        Latex Allergy: NO    Social History     Tobacco Use     Smoking status: Former Smoker     Packs/day: 2.00     Years: 20.00     Pack years: 40.00     Types: Cigarettes     Last attempt to quit: 1980     Years since quittin.9     Smokeless tobacco: Never Used     Tobacco comment: Quit in his 30s - 2 ppd for 20 years   Substance Use Topics     Alcohol use: Yes     Alcohol/week: 0.0 standard drinks     Comment: rarely     History   Drug Use No       REVIEW OF SYSTEMS:   CONSTITUTIONAL: NEGATIVE for fever, chills, change in weight  INTEGUMENTARY/SKIN: NEGATIVE for worrisome rashes, moles or lesions  EYES: NEGATIVE for vision changes or irritation  ENT/MOUTH: NEGATIVE for ear, mouth and throat problems  RESP: NEGATIVE for significant cough or SOB  BREAST: NEGATIVE for masses, tenderness or discharge  CV: NEGATIVE for chest pain, palpitations or peripheral edema  GI: NEGATIVE for nausea, abdominal pain, heartburn, or change in bowel habits  : NEGATIVE for frequency, dysuria, or hematuria  MUSCULOSKELETAL: NEGATIVE for significant arthralgias or myalgia  NEURO: NEGATIVE for weakness, dizziness or paresthesias  ENDOCRINE: NEGATIVE for temperature intolerance, skin/hair  changes  HEME: NEGATIVE for bleeding problems  PSYCHIATRIC: NEGATIVE for changes in mood or affect    EXAM:   /70   Pulse 86   Temp 94.1  F (34.5  C) (Tympanic)   Wt 95.3 kg (210 lb)   SpO2 95%   BMI 27.71 kg/m    GENERAL APPEARANCE: healthy, alert and no distress  HENT: ear canals and TM's normal and nose and mouth without ulcers or lesions  RESP: lungs clear to auscultation - no rales, rhonchi or wheezes  CV: regular rate and rhythm, normal S1 S2, no S3 or S4 and no murmur, click or rub   ABDOMEN: soft, nontender, no HSM or masses and bowel sounds normal  NEURO: Normal strength and tone, sensory exam grossly normal, mentation intact and speech normal    DIAGNOSTICS:       Recent Labs   Lab Test 10/24/19  1142 10/09/19  1516 09/09/19  0715 09/06/19  1247  04/04/19  1514  11/27/18  1125   HGB 13.9 14.1 13.6 13.6   < > 12.4*   < >  --     227 264 262   < > 218   < >  --    INR  --   --  1.01  --   --   --   --  1.06    139  --  140   < > 143   < >  --    POTASSIUM 3.7 4.0  --  4.2   < > 4.9   < >  --    CR 1.14 1.21  --  1.30*   < > 1.53*   < >  --    A1C  --   --   --  6.7*  --  6.8*  --   --     < > = values in this interval not displayed.        IMPRESSION:   Reason for surgery/procedure:   Diagnosis/reason for consult:     ICD-10-CM    1. Preop general physical exam Z01.818    2. Recurrent major depressive disorder, in full remission (H) F33.42    3. Controlled type 2 diabetes mellitus with chronic kidney disease, without long-term current use of insulin, unspecified CKD stage (H) E11.22 ASPIRIN NOT PRESCRIBED (INTENTIONAL)   4. Malignant neoplasm of lateral wall of urinary bladder (H) C67.2    5. Chronic systolic heart failure (H) I50.22          The proposed surgical procedure is considered HIGH risk.      The patient has the following additional risks for perioperative complications:  No identified additional risks      ICD-10-CM    1. Preop general physical exam Z01.818    2. Recurrent  major depressive disorder, in full remission (H) F33.42    3. Controlled type 2 diabetes mellitus with chronic kidney disease, without long-term current use of insulin, unspecified CKD stage (H) E11.22 ASPIRIN NOT PRESCRIBED (INTENTIONAL)   4. Malignant neoplasm of lateral wall of urinary bladder (H) C67.2    5. Chronic systolic heart failure (H) I50.22        RECOMMENDATIONS:     Discussed with the patient and cardiology they need to see the patient before cardiac clearance and further procedure.  I would suggest patient to see cardiology which will be scheduled in the next  few days, once cleared he can proceed with the surgery.  At this time surgery is on hold until cardiac clearance.  We will notify the surgery office.        We will addend if needed in future if cardiac clearance is obtained.       Signed Electronically by: Bryson Mcleod MD    Copy of this evaluation report is provided to requesting physician.    [facility] Preop Guidelines    More than 40 min spent with the patient >half time spent in counseling and coordinating care, discusses diagnose and treatment plan.      Revised Cardiac Risk Index

## 2019-11-27 NOTE — TELEPHONE ENCOUNTER
Ibis from [facility] [phone] direct calling states pt needs cardiac clearance for surgery on Monday and not able to see pt today.  Can see pt on Monday 12/2 at 8:45 but will have to reschedule surgery.    Ibis nurse can be reached directly at [phone].    Gave above message to Dr. Mcleod.  Dr. Mcleod s/w pt and advised cardiology will be calling to schedule an appt and has to reschedule surgery.  Dr. Mcleod would like for us to reschedule pt's surgery once he has all the notes and is able to clear for surgery. Would like for us to call place of surgery to cancel at this time.    S/w Ibis at [facility] and advised she can call pt to schedule appt.    Marielena STALLWORTH RN  EP Triage

## 2019-12-15 ENCOUNTER — HEALTH MAINTENANCE LETTER (OUTPATIENT)
Age: 79
End: 2019-12-15

## 2019-12-17 ENCOUNTER — ANCILLARY PROCEDURE (OUTPATIENT)
Dept: CARDIOLOGY | Facility: CLINIC | Age: 79
End: 2019-12-17
Attending: INTERNAL MEDICINE
Payer: COMMERCIAL

## 2019-12-17 ENCOUNTER — TRANSFERRED RECORDS (OUTPATIENT)
Dept: HEALTH INFORMATION MANAGEMENT | Facility: CLINIC | Age: 79
End: 2019-12-17

## 2019-12-17 ENCOUNTER — OFFICE VISIT (OUTPATIENT)
Dept: CARDIOLOGY | Facility: CLINIC | Age: 79
End: 2019-12-17
Payer: COMMERCIAL

## 2019-12-17 VITALS
DIASTOLIC BLOOD PRESSURE: 84 MMHG | HEART RATE: 84 BPM | SYSTOLIC BLOOD PRESSURE: 130 MMHG | BODY MASS INDEX: 28.23 KG/M2 | WEIGHT: 213 LBS | HEIGHT: 73 IN

## 2019-12-17 DIAGNOSIS — I42.9 CARDIOMYOPATHY, UNSPECIFIED TYPE (H): Primary | ICD-10-CM

## 2019-12-17 DIAGNOSIS — I44.7 LBBB (LEFT BUNDLE BRANCH BLOCK): ICD-10-CM

## 2019-12-17 DIAGNOSIS — I42.9 CARDIOMYOPATHY, UNSPECIFIED TYPE (H): Chronic | ICD-10-CM

## 2019-12-17 DIAGNOSIS — Z95.810 ICD (IMPLANTABLE CARDIOVERTER-DEFIBRILLATOR), DUAL, IN SITU: ICD-10-CM

## 2019-12-17 PROCEDURE — 93284 PRGRMG EVAL IMPLANTABLE DFB: CPT | Performed by: INTERNAL MEDICINE

## 2019-12-17 PROCEDURE — 99214 OFFICE O/P EST MOD 30 MIN: CPT | Mod: 24 | Performed by: INTERNAL MEDICINE

## 2019-12-17 ASSESSMENT — MIFFLIN-ST. JEOR: SCORE: 1740.04

## 2019-12-17 NOTE — PROGRESS NOTES
HPI:     This is a 78 year old patient of Dr. Heard who presents today for cardiac clearance. He is undergoing a Transurethral resection of bladder tumor (TURBT) and is awaiting scheduling procedure until after clearance is gained. His past medical history is notable for a RV thrombus (noted on cardiac MRI in 2016) and LV thrombus (seen on echocardiogram from 5/2017), ischemic cardiomyopathy with LVEF 25-30% (dating back at least until 2016) status post biventricular ICD. He also has a history of HFrEF, anemia, CKD, dementia with short term memory loss and LBBB. His most recent echocardiogram was in March of 2019. It revealed an LVEF of 25-30% with a mildly dilated LV (improved from previous echocardiogram from 1 month prior that showed severe dilation). He is seen frequently by [facility] and electrophysiology/device clinic and follows also with Dr. Hernandez. He had an RV lead coil fracture and lead replacement and is doing well. Has device check today. No longer on coumadin for LV/RV thrombus since it has resolved and he has h/o GI bleed.     No chest pain, sob, LE edema, PND or orthopnea. Weight is stable. No device events or firing. BP well controlled.      PAST MEDICAL HISTORY  Past Medical History:   Diagnosis Date     Acute kidney injury (H) 2012     Anemia      Anxiety      Anxiety and depression      Bladder mass      BPH (benign prostatic hypertrophy)      Cardiomyopathy (H)     ICD implanted 11/2016     Carpal tunnel syndrome     Right     Chronic kidney disease (CKD), stage 3 (moderate)      Dementia (H)      Depressive disorder      Diabetes (H)      Diffuse large B-cell lymphoma of extranodal site (H) 11/23/2018     Gastric mass      Gout      History of depression      LBBB (left bundle branch block) 2013     Lumbar herniated disc     L5-S1     Mixed cardiomyopathy 7/22/2016     Myocardial infarction (H) 1995 and 2010     Nonischemic cardiomyopathy (H) 7/22/2016     Obesity      Pacemaker     ICD/PM      Rosacea      Rotator cuff disorder     Tear x 2     RV (right ventricular) mural thrombus 7/22/2016       CURRENT MEDICATIONS  Current Outpatient Medications   Medication Sig Dispense Refill     atorvastatin (LIPITOR) 40 MG tablet Take 1 tablet (40 mg) by mouth At Bedtime 90 tablet 3     COENZYME Q-10 PO Take 100 mg by mouth every morning        ferrous sulfate (IRON) 325 (65 Fe) MG tablet Take 325 mg by mouth daily       furosemide (LASIX) 20 MG tablet Take 1 tablet (20 mg) by mouth daily 90 tablet 1     hydrALAZINE (APRESOLINE) 10 MG tablet Take 1 tablet (10 mg) by mouth 3 times daily 270 tablet 1     metoprolol succinate ER (TOPROL-XL) 50 MG 24 hr tablet TAKE ONE TABLET BY MOUTH ONE TIME DAILY  90 tablet 3     multivitamin, therapeutic with minerals (MULTI-VITAMIN) TABS Take 1 tablet by mouth every morning        venlafaxine (EFFEXOR) 75 MG tablet TAKE 1 TABLET BY MOUTH TWICE DAILY  180 tablet 1     vitamin D2 (ERGOCALCIFEROL) 22635 units (1250 mcg) capsule TAKE ONE CAPSULE BY MOUTH ONCE A WEEK ON TUESDAY 12 capsule 1     ACE/ARB NOT PRESCRIBED, INTENTIONAL, ACE & ARB not prescribed due to Symptomatic hypotension not due to excessive diuresis       ASPIRIN NOT PRESCRIBED (INTENTIONAL) Please choose reason not prescribed, below         PAST SURGICAL HISTORY:  Past Surgical History:   Procedure Laterality Date     ANGIOPLASTY  1995 and 2010 with stent     BIOPSY      stomach     BONE MARROW BIOPSY, BONE SPECIMEN, NEEDLE/TROCAR N/A 10/30/2018    Procedure: BIOPSY BONE MARROW;  Surgeon: Jeb Romero MD;  Location:  GI     CARDIAC SURGERY      ICD/PM     CYSTOSCOPY       CYSTOSCOPY, TRANSURETHRAL RESECTION (TUR) PROSTATE, COMBINED  4/22/2019    Procedure: CYSTOSCOPY AND BIPOLAR TRANSURETHRAL RESECTION (TUR) PROSTATE;  Surgeon: Ryan Camarillo MD;  Location:  OR     CYSTOSCOPY, TRANSURETHRAL RESECTION (TUR) TUMOR BLADDER, COMBINED N/A 9/19/2018    Procedure: COMBINED CYSTOSCOPY, TRANSURETHRAL  RESECTION (TUR) TUMOR BLADDER;  CYSTOSCOPY, TRANSURETHRAL RESECTION BLADDER TUMOR ;  Surgeon: Ryan Camarillo MD;  Location:  OR     CYSTOSCOPY, TRANSURETHRAL RESECTION (TUR) TUMOR BLADDER, COMBINED N/A 11/12/2018    Procedure: CYSTOSCOPY RESTAGING TRANSURETHRAL RESECTION BLADDER TUMOR;  Surgeon: Ryan Camarillo MD;  Location:  OR     EP LEAD REVISION DUAL N/A 10/24/2019    Procedure: EP Lead Revision Dual;  Surgeon: Josias Hernandez MD;  Location:  HEART CARDIAC CATH LAB     ESOPHAGOSCOPY, GASTROSCOPY, DUODENOSCOPY (EGD), COMBINED N/A 7/30/2018    Procedure: COMBINED ESOPHAGOSCOPY, GASTROSCOPY, DUODENOSCOPY (EGD), BIOPSY SINGLE OR MULTIPLE;  gastroscopy;  Surgeon: Parish Xiong MD;  Location:  GI     ESOPHAGOSCOPY, GASTROSCOPY, DUODENOSCOPY (EGD), COMBINED N/A 7/30/2018    Procedure: COMBINED ESOPHAGOSCOPY, GASTROSCOPY, DUODENOSCOPY (EGD);  EGD;  Surgeon: Parish Xiong MD;  Location:  GI     ESOPHAGOSCOPY, GASTROSCOPY, DUODENOSCOPY (EGD), COMBINED N/A 8/2/2018    Procedure: COMBINED ESOPHAGOSCOPY, GASTROSCOPY, DUODENOSCOPY (EGD), BIOPSY SINGLE OR MULTIPLE;  gastroscopy BIOPSYSHOULD BE SENT TO LAB IN NS NOT FORMALIN PER ;  Surgeon: Jonathon Bush MD;  Location:  GI     GASTRIC BYPASS  2001     HEART CATH LEFT HEART CATH  7/19/16    Non ischemic cardiomyopathy; Non functionally significant LAD and RCA disease      IR PORT REMOVAL RIGHT  9/9/2019     LASER HOLMIUM LITHOTRIPSY BLADDER N/A 4/22/2019    Procedure: CYSTOSCOPY, HOLMIUM LASER LITHOLAPAXY ,  AND BIPOLAR TRANSURETHRAL RESECTION (TUR) PROSTATE;  Surgeon: Ryan Camarillo MD;  Location:  OR     OTHER SURGICAL HISTORY  2006    Spinal surgery     OTHER SURGICAL HISTORY  Nov 2016    CRT-D implantation       ALLERGIES     Allergies   Allergen Reactions     Wasps [Hornets]      Sand wasp --- years ago.         FAMILY HISTORY  Family History   Problem Relation Age of Onset     Coronary Artery Disease Father 39     Alzheimer Disease  Mother      Coronary Artery Disease Son         Two sons have  from MIs (ages 39 and 51)         SOCIAL HISTORY  Social History     Socioeconomic History     Marital status:      Spouse name: Not on file     Number of children: Not on file     Years of education: Not on file     Highest education level: Not on file   Occupational History     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     Comment: [employer]; retired   Social Needs     Financial resource strain: Not on file     Food insecurity:     Worry: Not on file     Inability: Not on file     Transportation needs:     Medical: Not on file     Non-medical: Not on file   Tobacco Use     Smoking status: Former Smoker     Packs/day: 2.00     Years: 20.00     Pack years: 40.00     Types: Cigarettes     Last attempt to quit: 1980     Years since quittin.9     Smokeless tobacco: Never Used     Tobacco comment: Quit in his 30s - 2 ppd for 20 years   Substance and Sexual Activity     Alcohol use: Yes     Alcohol/week: 0.0 standard drinks     Comment: rarely     Drug use: No     Sexual activity: Yes     Partners: Female   Lifestyle     Physical activity:     Days per week: Not on file     Minutes per session: Not on file     Stress: Not on file   Relationships     Social connections:     Talks on phone: Not on file     Gets together: Not on file     Attends Protestant service: Not on file     Active member of club or organization: Not on file     Attends meetings of clubs or organizations: Not on file     Relationship status: Not on file     Intimate partner violence:     Fear of current or ex partner: Not on file     Emotionally abused: Not on file     Physically abused: Not on file     Forced sexual activity: Not on file   Other Topics Concern     Parent/sibling w/ CABG, MI or angioplasty before 65F 55M? No      Service Not Asked     Blood Transfusions Not Asked     Caffeine Concern Not Asked     Occupational Exposure Not Asked     Hobby Hazards Not  "Asked     Sleep Concern Not Asked     Stress Concern Not Asked     Weight Concern Not Asked     Special Diet Yes     Comment: low fat, low salt      Back Care Not Asked     Exercise No     Bike Helmet Not Asked     Seat Belt Not Asked     Self-Exams Not Asked   Social History Narrative     Not on file       ROS:   Constitutional: No fever, chills, or sweats. No weight gain/loss   ENT: No visual disturbance, ear ache, epistaxis, sore throat  Allergies/Immunologic: Negative  Respiratory: No cough, hemoptysia  Cardiovascular: As per HPI  GI: No nausea, vomiting, hematemesis, melena, or hematochezia  : No urinary frequency, dysuria, or hematuria  Integument: Negative  Psychiatric: Negative  Neuro: Negative  Endocrinology: Negative   Musculoskeletal: Negative  Vascular: No walking impairment, claudication, ischemic rest pain or nonhealing wounds    EXAM:  /84   Pulse 84   Ht 1.854 m (6' 1\")   Wt 96.6 kg (213 lb)   BMI 28.10 kg/m    In general, the patient is a pleasant male in no apparent distress.    HEENT: NC/AT.  PERRLA.  EOMI.  Sclerae white, not injected.  Nares clear.  Pharynx without erythema or exudate.  Dentition intact.    Neck: No adenopathy.  No thyromegaly. Carotids +2/2 bilaterally without bruits.  No jugular venous distension.   Heart: RRR. Normal S1, S2 splits physiologically. No murmur, rub, click, or gallop. The PMI is in the 5th ICS in the midclavicular line. There is no heave.    Lungs: CTA.  No ronchi, wheezes, rales.  No dullness to percussion.   Abdomen: Soft, nontender, nondistended. No organomegaly. No AAA.  No bruits.   Extremities: No clubbing, cyanosis, or edema.  No wounds. No varicose veins signs of chronic venous insufficiency.   Vascular: No bruits are noted.    Labs:  LIPID RESULTS:  Lab Results   Component Value Date    CHOL 165 08/02/2019    HDL 33 (L) 08/02/2019    LDL 98 09/06/2019    LDL 82 08/02/2019    TRIG 249 (H) 08/02/2019    NHDL 132 (H) 08/02/2019       LIVER " ENZYME RESULTS:  Lab Results   Component Value Date    AST 19 10/09/2019    ALT 31 10/09/2019       CBC RESULTS:  Lab Results   Component Value Date    WBC 7.4 10/24/2019    RBC 4.49 10/24/2019    HGB 13.9 10/24/2019    HCT 43.8 10/24/2019    MCV 98 10/24/2019    MCH 31.0 10/24/2019    MCHC 31.7 10/24/2019    RDW 15.0 10/24/2019     10/24/2019       BMP RESULTS:  Lab Results   Component Value Date     10/24/2019    POTASSIUM 3.7 10/24/2019    CHLORIDE 106 10/24/2019    CO2 24 10/24/2019    ANIONGAP 8 10/24/2019     (H) 10/24/2019    BUN 20 10/24/2019    CR 1.14 10/24/2019    GFRESTIMATED 61 10/24/2019    GFRESTBLACK 71 10/24/2019    SHELLEY 8.9 10/24/2019        A1C RESULTS:  Lab Results   Component Value Date    A1C 6.7 (H) 09/06/2019       Procedures:    Echocardiogram 3/2019    Interpretation Summary     The left ventricle is mildly dilated. Left ventricular systolic function is  severely reduced. The visual ejection fraction is estimated at 25-30%.  The right ventricle is grossly normal size.  The right ventricular systolic function is low normal.  The left atrium is mildly dilated.  There is at least mild mitral regurgitation with an anteriorly directed jet.  Right ventricular systolic pressure is normal.  There is mild (1+) aortic regurgitation.     Compared to the study from 2/4/2008, the left ventricle appears smaller today  (mildly dilated today versus severely dilated on the previous study). The left  ventricular systolic function remains unchanged. Mitral regurgitation appears  to be less severe today.    EKG 10/2019 - V Paced        Assessment and Plan:     79 year old with RV thrombus (noted on cardiac MRI in 2016) and LV thrombus (seen on echocardiogram from 5/2017), resolved no longer on coumadin, ischemic cardiomyopathy with LVEF 25-30% (dating back at least until 2016) status post biventricular ICD. He also has a history of HFrEF, anemia, CKD, dementia with short term memory loss and  LBBB.    Today he has no chest pain, shortness of breath and is euvolemic on examination. He has had no recent arrhythmias detected on device check. Echocardiogram is stable and LV dilation has improved as is mitral regurgitation.     Given no active ischemic concerns, arrhythmias or significant fluid overload there is no further work up or treatment from a cardiac standpoint needed prior to his TURP procedure which is overall low risk. Post-procedure patient can have follow up with Dr. Hernandez or [facility] if any concerns. Recommendations have been communicated to Dr. Camarillo in urology.    Thank you for allowing me to partake in the care of this patient.     Lin Larkin MD MSc  [facility]

## 2019-12-17 NOTE — LETTER
12/17/2019    Akira Currie MD  [address]  [facility] [address]    RE: Gibson Villalta       Dear Colleague,    I had the pleasure of seeing Gibson Villalta in the [facility].          HPI:     This is a 78 year old patient of Dr. Heard who presents today for cardiac clearance. He is undergoing a Transurethral resection of bladder tumor (TURBT) and is awaiting scheduling procedure until after clearance is gained. His past medical history is notable for a RV thrombus (noted on cardiac MRI in 2016) and LV thrombus (seen on echocardiogram from 5/2017), ischemic cardiomyopathy with LVEF 25-30% (dating back at least until 2016) status post biventricular ICD. He also has a history of HFrEF, anemia, CKD, dementia with short term memory loss and LBBB. His most recent echocardiogram was in March of 2019. It revealed an LVEF of 25-30% with a mildly dilated LV (improved from previous echocardiogram from 1 month prior that showed severe dilation). He is seen frequently by [facility] and electrophysiology/device clinic and follows also with Dr. Hernandez. He had an RV lead coil fracture and lead replacement and is doing well. Has device check today. No longer on coumadin for LV/RV thrombus since it has resolved and he has h/o GI bleed.     No chest pain, sob, LE edema, PND or orthopnea. Weight is stable. No device events or firing. BP well controlled.      PAST MEDICAL HISTORY  Past Medical History:   Diagnosis Date     Acute kidney injury (H) 2012     Anemia      Anxiety      Anxiety and depression      Bladder mass      BPH (benign prostatic hypertrophy)      Cardiomyopathy (H)     ICD implanted 11/2016     Carpal tunnel syndrome     Right     Chronic kidney disease (CKD), stage 3 (moderate)      Dementia (H)      Depressive disorder      Diabetes (H)      Diffuse large B-cell lymphoma of extranodal site (H) 11/23/2018     Gastric mass      Gout      History of  depression      LBBB (left bundle branch block) 2013     Lumbar herniated disc     L5-S1     Mixed cardiomyopathy 7/22/2016     Myocardial infarction (H) 1995 and 2010     Nonischemic cardiomyopathy (H) 7/22/2016     Obesity      Pacemaker     ICD/PM     Rosacea      Rotator cuff disorder     Tear x 2     RV (right ventricular) mural thrombus 7/22/2016       CURRENT MEDICATIONS  Current Outpatient Medications   Medication Sig Dispense Refill     atorvastatin (LIPITOR) 40 MG tablet Take 1 tablet (40 mg) by mouth At Bedtime 90 tablet 3     COENZYME Q-10 PO Take 100 mg by mouth every morning        ferrous sulfate (IRON) 325 (65 Fe) MG tablet Take 325 mg by mouth daily       furosemide (LASIX) 20 MG tablet Take 1 tablet (20 mg) by mouth daily 90 tablet 1     hydrALAZINE (APRESOLINE) 10 MG tablet Take 1 tablet (10 mg) by mouth 3 times daily 270 tablet 1     metoprolol succinate ER (TOPROL-XL) 50 MG 24 hr tablet TAKE ONE TABLET BY MOUTH ONE TIME DAILY  90 tablet 3     multivitamin, therapeutic with minerals (MULTI-VITAMIN) TABS Take 1 tablet by mouth every morning        venlafaxine (EFFEXOR) 75 MG tablet TAKE 1 TABLET BY MOUTH TWICE DAILY  180 tablet 1     vitamin D2 (ERGOCALCIFEROL) 56613 units (1250 mcg) capsule TAKE ONE CAPSULE BY MOUTH ONCE A WEEK ON TUESDAY 12 capsule 1     ACE/ARB NOT PRESCRIBED, INTENTIONAL, ACE & ARB not prescribed due to Symptomatic hypotension not due to excessive diuresis       ASPIRIN NOT PRESCRIBED (INTENTIONAL) Please choose reason not prescribed, below         PAST SURGICAL HISTORY:  Past Surgical History:   Procedure Laterality Date     ANGIOPLASTY  1995 and 2010 with stent     BIOPSY      stomach     BONE MARROW BIOPSY, BONE SPECIMEN, NEEDLE/TROCAR N/A 10/30/2018    Procedure: BIOPSY BONE MARROW;  Surgeon: Jeb Romero MD;  Location:  GI     CARDIAC SURGERY      ICD/PM     CYSTOSCOPY       CYSTOSCOPY, TRANSURETHRAL RESECTION (TUR) PROSTATE, COMBINED  4/22/2019     Procedure: CYSTOSCOPY AND BIPOLAR TRANSURETHRAL RESECTION (TUR) PROSTATE;  Surgeon: Ryan Camarillo MD;  Location:  OR     CYSTOSCOPY, TRANSURETHRAL RESECTION (TUR) TUMOR BLADDER, COMBINED N/A 9/19/2018    Procedure: COMBINED CYSTOSCOPY, TRANSURETHRAL RESECTION (TUR) TUMOR BLADDER;  CYSTOSCOPY, TRANSURETHRAL RESECTION BLADDER TUMOR ;  Surgeon: Ryan Camarillo MD;  Location:  OR     CYSTOSCOPY, TRANSURETHRAL RESECTION (TUR) TUMOR BLADDER, COMBINED N/A 11/12/2018    Procedure: CYSTOSCOPY RESTAGING TRANSURETHRAL RESECTION BLADDER TUMOR;  Surgeon: Ryan Camarillo MD;  Location:  OR     EP LEAD REVISION DUAL N/A 10/24/2019    Procedure: EP Lead Revision Dual;  Surgeon: Josias Hernandez MD;  Location:  HEART CARDIAC CATH LAB     ESOPHAGOSCOPY, GASTROSCOPY, DUODENOSCOPY (EGD), COMBINED N/A 7/30/2018    Procedure: COMBINED ESOPHAGOSCOPY, GASTROSCOPY, DUODENOSCOPY (EGD), BIOPSY SINGLE OR MULTIPLE;  gastroscopy;  Surgeon: Parish Xiong MD;  Location:  GI     ESOPHAGOSCOPY, GASTROSCOPY, DUODENOSCOPY (EGD), COMBINED N/A 7/30/2018    Procedure: COMBINED ESOPHAGOSCOPY, GASTROSCOPY, DUODENOSCOPY (EGD);  EGD;  Surgeon: Parish Xiong MD;  Location:  GI     ESOPHAGOSCOPY, GASTROSCOPY, DUODENOSCOPY (EGD), COMBINED N/A 8/2/2018    Procedure: COMBINED ESOPHAGOSCOPY, GASTROSCOPY, DUODENOSCOPY (EGD), BIOPSY SINGLE OR MULTIPLE;  gastroscopy BIOPSYSHOULD BE SENT TO LAB IN NS NOT FORMALIN PER ;  Surgeon: Jonathon Bush MD;  Location:  GI     GASTRIC BYPASS  2001     HEART CATH LEFT HEART CATH  7/19/16    Non ischemic cardiomyopathy; Non functionally significant LAD and RCA disease      IR PORT REMOVAL RIGHT  9/9/2019     LASER HOLMIUM LITHOTRIPSY BLADDER N/A 4/22/2019    Procedure: CYSTOSCOPY, HOLMIUM LASER LITHOLAPAXY ,  AND BIPOLAR TRANSURETHRAL RESECTION (TUR) PROSTATE;  Surgeon: Ryan Camarillo MD;  Location:  OR     OTHER SURGICAL HISTORY  2006    Spinal surgery     OTHER SURGICAL HISTORY  Nov 2016     CRT-D implantation       ALLERGIES     Allergies   Allergen Reactions     Wasps [Hornets]      Sand wasp --- years ago.         FAMILY HISTORY  Family History   Problem Relation Age of Onset     Coronary Artery Disease Father 39     Alzheimer Disease Mother      Coronary Artery Disease Son         Two sons have  from MIs (ages 39 and 51)         SOCIAL HISTORY  Social History     Socioeconomic History     Marital status:      Spouse name: Not on file     Number of children: Not on file     Years of education: Not on file     Highest education level: Not on file   Occupational History     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     Comment: [employer]; retired   Social Needs     Financial resource strain: Not on file     Food insecurity:     Worry: Not on file     Inability: Not on file     Transportation needs:     Medical: Not on file     Non-medical: Not on file   Tobacco Use     Smoking status: Former Smoker     Packs/day: 2.00     Years: 20.00     Pack years: 40.00     Types: Cigarettes     Last attempt to quit: 1980     Years since quittin.9     Smokeless tobacco: Never Used     Tobacco comment: Quit in his 30s - 2 ppd for 20 years   Substance and Sexual Activity     Alcohol use: Yes     Alcohol/week: 0.0 standard drinks     Comment: rarely     Drug use: No     Sexual activity: Yes     Partners: Female   Lifestyle     Physical activity:     Days per week: Not on file     Minutes per session: Not on file     Stress: Not on file   Relationships     Social connections:     Talks on phone: Not on file     Gets together: Not on file     Attends Jainism service: Not on file     Active member of club or organization: Not on file     Attends meetings of clubs or organizations: Not on file     Relationship status: Not on file     Intimate partner violence:     Fear of current or ex partner: Not on file     Emotionally abused: Not on file     Physically abused: Not on file     Forced sexual activity:  "Not on file   Other Topics Concern     Parent/sibling w/ CABG, MI or angioplasty before 65F 55M? No      Service Not Asked     Blood Transfusions Not Asked     Caffeine Concern Not Asked     Occupational Exposure Not Asked     Hobby Hazards Not Asked     Sleep Concern Not Asked     Stress Concern Not Asked     Weight Concern Not Asked     Special Diet Yes     Comment: low fat, low salt      Back Care Not Asked     Exercise No     Bike Helmet Not Asked     Seat Belt Not Asked     Self-Exams Not Asked   Social History Narrative     Not on file       ROS:   Constitutional: No fever, chills, or sweats. No weight gain/loss   ENT: No visual disturbance, ear ache, epistaxis, sore throat  Allergies/Immunologic: Negative  Respiratory: No cough, hemoptysia  Cardiovascular: As per HPI  GI: No nausea, vomiting, hematemesis, melena, or hematochezia  : No urinary frequency, dysuria, or hematuria  Integument: Negative  Psychiatric: Negative  Neuro: Negative  Endocrinology: Negative   Musculoskeletal: Negative  Vascular: No walking impairment, claudication, ischemic rest pain or nonhealing wounds    EXAM:  /84   Pulse 84   Ht 1.854 m (6' 1\")   Wt 96.6 kg (213 lb)   BMI 28.10 kg/m     In general, the patient is a pleasant male in no apparent distress.    HEENT: NC/AT.  PERRLA.  EOMI.  Sclerae white, not injected.  Nares clear.  Pharynx without erythema or exudate.  Dentition intact.    Neck: No adenopathy.  No thyromegaly. Carotids +2/2 bilaterally without bruits.  No jugular venous distension.   Heart: RRR. Normal S1, S2 splits physiologically. No murmur, rub, click, or gallop. The PMI is in the 5th ICS in the midclavicular line. There is no heave.    Lungs: CTA.  No ronchi, wheezes, rales.  No dullness to percussion.   Abdomen: Soft, nontender, nondistended. No organomegaly. No AAA.  No bruits.   Extremities: No clubbing, cyanosis, or edema.  No wounds. No varicose veins signs of chronic venous insufficiency. "   Vascular: No bruits are noted.    Labs:  LIPID RESULTS:  Lab Results   Component Value Date    CHOL 165 08/02/2019    HDL 33 (L) 08/02/2019    LDL 98 09/06/2019    LDL 82 08/02/2019    TRIG 249 (H) 08/02/2019    NHDL 132 (H) 08/02/2019       LIVER ENZYME RESULTS:  Lab Results   Component Value Date    AST 19 10/09/2019    ALT 31 10/09/2019       CBC RESULTS:  Lab Results   Component Value Date    WBC 7.4 10/24/2019    RBC 4.49 10/24/2019    HGB 13.9 10/24/2019    HCT 43.8 10/24/2019    MCV 98 10/24/2019    MCH 31.0 10/24/2019    MCHC 31.7 10/24/2019    RDW 15.0 10/24/2019     10/24/2019       BMP RESULTS:  Lab Results   Component Value Date     10/24/2019    POTASSIUM 3.7 10/24/2019    CHLORIDE 106 10/24/2019    CO2 24 10/24/2019    ANIONGAP 8 10/24/2019     (H) 10/24/2019    BUN 20 10/24/2019    CR 1.14 10/24/2019    GFRESTIMATED 61 10/24/2019    GFRESTBLACK 71 10/24/2019    SHELLEY 8.9 10/24/2019        A1C RESULTS:  Lab Results   Component Value Date    A1C 6.7 (H) 09/06/2019       Procedures:    Echocardiogram 3/2019    Interpretation Summary     The left ventricle is mildly dilated. Left ventricular systolic function is  severely reduced. The visual ejection fraction is estimated at 25-30%.  The right ventricle is grossly normal size.  The right ventricular systolic function is low normal.  The left atrium is mildly dilated.  There is at least mild mitral regurgitation with an anteriorly directed jet.  Right ventricular systolic pressure is normal.  There is mild (1+) aortic regurgitation.     Compared to the study from 2/4/2008, the left ventricle appears smaller today  (mildly dilated today versus severely dilated on the previous study). The left  ventricular systolic function remains unchanged. Mitral regurgitation appears  to be less severe today.    EKG 10/2019 - V Paced        Assessment and Plan:     79 year old with RV thrombus (noted on cardiac MRI in 2016) and LV thrombus (seen on  echocardiogram from 5/2017), resolved no longer on coumadin, ischemic cardiomyopathy with LVEF 25-30% (dating back at least until 2016) status post biventricular ICD. He also has a history of HFrEF, anemia, CKD, dementia with short term memory loss and LBBB.    Today he has no chest pain, shortness of breath and is euvolemic on examination. He has had no recent arrhythmias detected on device check. Echocardiogram is stable and LV dilation has improved as is mitral regurgitation.     Given no active ischemic concerns, arrhythmias or significant fluid overload there is no further work up or treatment from a cardiac standpoint needed prior to his TURP procedure which is overall low risk. Post-procedure patient can have follow up with Dr. Hernandez or Orquidea if any concerns. Recommendations have been communicated to Dr. Camarillo in urology.    Thank you for allowing me to partake in the care of this patient.     Lin Larkin MD [facility]       Thank you for allowing me to participate in the care of your patient.      Sincerely,     Lin Larkin MD     [facility]

## 2019-12-18 ENCOUNTER — PREP FOR PROCEDURE (OUTPATIENT)
Dept: SURGERY | Facility: CLINIC | Age: 79
End: 2019-12-18

## 2019-12-18 DIAGNOSIS — C67.9 MALIGNANT NEOPLASM OF URINARY BLADDER, UNSPECIFIED SITE (H): Primary | ICD-10-CM

## 2019-12-26 LAB
MDC_IDC_EPISODE_DTM: NORMAL
MDC_IDC_EPISODE_DURATION: 7 S
MDC_IDC_EPISODE_DURATION: 7 S
MDC_IDC_EPISODE_DURATION: 9 S
MDC_IDC_EPISODE_ID: 40
MDC_IDC_EPISODE_ID: 41
MDC_IDC_EPISODE_ID: 42
MDC_IDC_EPISODE_TYPE: NORMAL
MDC_IDC_LEAD_IMPLANT_DT: NORMAL
MDC_IDC_LEAD_LOCATION: NORMAL
MDC_IDC_LEAD_LOCATION_DETAIL_1: NORMAL
MDC_IDC_LEAD_MFG: NORMAL
MDC_IDC_LEAD_MODEL: NORMAL
MDC_IDC_LEAD_POLARITY_TYPE: NORMAL
MDC_IDC_LEAD_SERIAL: NORMAL
MDC_IDC_MSMT_BATTERY_DTM: NORMAL
MDC_IDC_MSMT_BATTERY_REMAINING_LONGEVITY: 43 MO
MDC_IDC_MSMT_BATTERY_RRT_TRIGGER: 2.73
MDC_IDC_MSMT_BATTERY_STATUS: NORMAL
MDC_IDC_MSMT_BATTERY_VOLTAGE: 2.97 V
MDC_IDC_MSMT_CAP_CHARGE_DTM: NORMAL
MDC_IDC_MSMT_CAP_CHARGE_ENERGY: 18 J
MDC_IDC_MSMT_CAP_CHARGE_TIME: 3.91
MDC_IDC_MSMT_CAP_CHARGE_TYPE: NORMAL
MDC_IDC_MSMT_LEADCHNL_LV_IMPEDANCE_VALUE: 159.26
MDC_IDC_MSMT_LEADCHNL_LV_IMPEDANCE_VALUE: 159.26
MDC_IDC_MSMT_LEADCHNL_LV_IMPEDANCE_VALUE: 166.25 OHM
MDC_IDC_MSMT_LEADCHNL_LV_IMPEDANCE_VALUE: 189.53
MDC_IDC_MSMT_LEADCHNL_LV_IMPEDANCE_VALUE: 189.53
MDC_IDC_MSMT_LEADCHNL_LV_IMPEDANCE_VALUE: 285 OHM
MDC_IDC_MSMT_LEADCHNL_LV_IMPEDANCE_VALUE: 361 OHM
MDC_IDC_MSMT_LEADCHNL_LV_IMPEDANCE_VALUE: 361 OHM
MDC_IDC_MSMT_LEADCHNL_LV_IMPEDANCE_VALUE: 399 OHM
MDC_IDC_MSMT_LEADCHNL_LV_IMPEDANCE_VALUE: 418 OHM
MDC_IDC_MSMT_LEADCHNL_LV_IMPEDANCE_VALUE: 532 OHM
MDC_IDC_MSMT_LEADCHNL_LV_IMPEDANCE_VALUE: 551 OHM
MDC_IDC_MSMT_LEADCHNL_LV_IMPEDANCE_VALUE: 551 OHM
MDC_IDC_MSMT_LEADCHNL_LV_IMPEDANCE_VALUE: 589 OHM
MDC_IDC_MSMT_LEADCHNL_LV_IMPEDANCE_VALUE: 665 OHM
MDC_IDC_MSMT_LEADCHNL_LV_PACING_THRESHOLD_AMPLITUDE: 0.88 V
MDC_IDC_MSMT_LEADCHNL_LV_PACING_THRESHOLD_PULSEWIDTH: 0.5 MS
MDC_IDC_MSMT_LEADCHNL_RA_IMPEDANCE_VALUE: 399 OHM
MDC_IDC_MSMT_LEADCHNL_RA_PACING_THRESHOLD_AMPLITUDE: 0.75 V
MDC_IDC_MSMT_LEADCHNL_RA_PACING_THRESHOLD_PULSEWIDTH: 0.4 MS
MDC_IDC_MSMT_LEADCHNL_RA_SENSING_INTR_AMPL: 1 MV
MDC_IDC_MSMT_LEADCHNL_RA_SENSING_INTR_AMPL: 1.38 MV
MDC_IDC_MSMT_LEADCHNL_RV_IMPEDANCE_VALUE: 475 OHM
MDC_IDC_MSMT_LEADCHNL_RV_IMPEDANCE_VALUE: 589 OHM
MDC_IDC_MSMT_LEADCHNL_RV_PACING_THRESHOLD_AMPLITUDE: 0.38 V
MDC_IDC_MSMT_LEADCHNL_RV_PACING_THRESHOLD_PULSEWIDTH: 0.4 MS
MDC_IDC_MSMT_LEADCHNL_RV_SENSING_INTR_AMPL: 15.88 MV
MDC_IDC_MSMT_LEADCHNL_RV_SENSING_INTR_AMPL: 7.38 MV
MDC_IDC_PG_IMPLANT_DTM: NORMAL
MDC_IDC_PG_MFG: NORMAL
MDC_IDC_PG_MODEL: NORMAL
MDC_IDC_PG_SERIAL: NORMAL
MDC_IDC_PG_TYPE: NORMAL
MDC_IDC_SESS_CLINIC_NAME: NORMAL
MDC_IDC_SESS_DTM: NORMAL
MDC_IDC_SESS_TYPE: NORMAL
MDC_IDC_SET_BRADY_AT_MODE_SWITCH_RATE: 171 {BEATS}/MIN
MDC_IDC_SET_BRADY_LOWRATE: 50 {BEATS}/MIN
MDC_IDC_SET_BRADY_MAX_SENSOR_RATE: 130 {BEATS}/MIN
MDC_IDC_SET_BRADY_MAX_TRACKING_RATE: 130 {BEATS}/MIN
MDC_IDC_SET_BRADY_MODE: NORMAL
MDC_IDC_SET_BRADY_PAV_DELAY_LOW: 170 MS
MDC_IDC_SET_BRADY_SAV_DELAY_LOW: 120 MS
MDC_IDC_SET_CRT_LVRV_DELAY: 0 MS
MDC_IDC_SET_CRT_PACED_CHAMBERS: NORMAL
MDC_IDC_SET_LEADCHNL_LV_PACING_AMPLITUDE: 1.5 V
MDC_IDC_SET_LEADCHNL_LV_PACING_ANODE_ELECTRODE_1: NORMAL
MDC_IDC_SET_LEADCHNL_LV_PACING_ANODE_LOCATION_1: NORMAL
MDC_IDC_SET_LEADCHNL_LV_PACING_CAPTURE_MODE: NORMAL
MDC_IDC_SET_LEADCHNL_LV_PACING_CATHODE_ELECTRODE_1: NORMAL
MDC_IDC_SET_LEADCHNL_LV_PACING_CATHODE_LOCATION_1: NORMAL
MDC_IDC_SET_LEADCHNL_LV_PACING_POLARITY: NORMAL
MDC_IDC_SET_LEADCHNL_LV_PACING_PULSEWIDTH: 0.5 MS
MDC_IDC_SET_LEADCHNL_RA_PACING_AMPLITUDE: 1.5 V
MDC_IDC_SET_LEADCHNL_RA_PACING_ANODE_ELECTRODE_1: NORMAL
MDC_IDC_SET_LEADCHNL_RA_PACING_ANODE_LOCATION_1: NORMAL
MDC_IDC_SET_LEADCHNL_RA_PACING_CAPTURE_MODE: NORMAL
MDC_IDC_SET_LEADCHNL_RA_PACING_CATHODE_ELECTRODE_1: NORMAL
MDC_IDC_SET_LEADCHNL_RA_PACING_CATHODE_LOCATION_1: NORMAL
MDC_IDC_SET_LEADCHNL_RA_PACING_POLARITY: NORMAL
MDC_IDC_SET_LEADCHNL_RA_PACING_PULSEWIDTH: 0.4 MS
MDC_IDC_SET_LEADCHNL_RA_SENSING_ANODE_ELECTRODE_1: NORMAL
MDC_IDC_SET_LEADCHNL_RA_SENSING_ANODE_LOCATION_1: NORMAL
MDC_IDC_SET_LEADCHNL_RA_SENSING_CATHODE_ELECTRODE_1: NORMAL
MDC_IDC_SET_LEADCHNL_RA_SENSING_CATHODE_LOCATION_1: NORMAL
MDC_IDC_SET_LEADCHNL_RA_SENSING_POLARITY: NORMAL
MDC_IDC_SET_LEADCHNL_RA_SENSING_SENSITIVITY: 0.3 MV
MDC_IDC_SET_LEADCHNL_RV_PACING_AMPLITUDE: 2 V
MDC_IDC_SET_LEADCHNL_RV_PACING_ANODE_ELECTRODE_1: NORMAL
MDC_IDC_SET_LEADCHNL_RV_PACING_ANODE_LOCATION_1: NORMAL
MDC_IDC_SET_LEADCHNL_RV_PACING_CAPTURE_MODE: NORMAL
MDC_IDC_SET_LEADCHNL_RV_PACING_CATHODE_ELECTRODE_1: NORMAL
MDC_IDC_SET_LEADCHNL_RV_PACING_CATHODE_LOCATION_1: NORMAL
MDC_IDC_SET_LEADCHNL_RV_PACING_POLARITY: NORMAL
MDC_IDC_SET_LEADCHNL_RV_PACING_PULSEWIDTH: 0.4 MS
MDC_IDC_SET_LEADCHNL_RV_SENSING_ANODE_ELECTRODE_1: NORMAL
MDC_IDC_SET_LEADCHNL_RV_SENSING_ANODE_LOCATION_1: NORMAL
MDC_IDC_SET_LEADCHNL_RV_SENSING_CATHODE_ELECTRODE_1: NORMAL
MDC_IDC_SET_LEADCHNL_RV_SENSING_CATHODE_LOCATION_1: NORMAL
MDC_IDC_SET_LEADCHNL_RV_SENSING_POLARITY: NORMAL
MDC_IDC_SET_LEADCHNL_RV_SENSING_SENSITIVITY: 0.3 MV
MDC_IDC_SET_ZONE_DETECTION_BEATS_DENOMINATOR: 40 {BEATS}
MDC_IDC_SET_ZONE_DETECTION_BEATS_NUMERATOR: 30 {BEATS}
MDC_IDC_SET_ZONE_DETECTION_INTERVAL: 300 MS
MDC_IDC_SET_ZONE_DETECTION_INTERVAL: 350 MS
MDC_IDC_SET_ZONE_DETECTION_INTERVAL: 360 MS
MDC_IDC_SET_ZONE_DETECTION_INTERVAL: 360 MS
MDC_IDC_SET_ZONE_DETECTION_INTERVAL: NORMAL
MDC_IDC_SET_ZONE_TYPE: NORMAL
MDC_IDC_STAT_AT_BURDEN_PERCENT: 0 %
MDC_IDC_STAT_AT_DTM_END: NORMAL
MDC_IDC_STAT_AT_DTM_START: NORMAL
MDC_IDC_STAT_BRADY_AP_VP_PERCENT: 1.04 %
MDC_IDC_STAT_BRADY_AP_VS_PERCENT: 0.01 %
MDC_IDC_STAT_BRADY_AS_VP_PERCENT: 97.72 %
MDC_IDC_STAT_BRADY_AS_VS_PERCENT: 1.23 %
MDC_IDC_STAT_BRADY_DTM_END: NORMAL
MDC_IDC_STAT_BRADY_DTM_START: NORMAL
MDC_IDC_STAT_BRADY_RA_PERCENT_PACED: 1.04 %
MDC_IDC_STAT_BRADY_RV_PERCENT_PACED: 97.95 %
MDC_IDC_STAT_CRT_DTM_END: NORMAL
MDC_IDC_STAT_CRT_DTM_START: NORMAL
MDC_IDC_STAT_CRT_LV_PERCENT_PACED: 97.88 %
MDC_IDC_STAT_CRT_PERCENT_PACED: 97.88 %
MDC_IDC_STAT_EPISODE_RECENT_COUNT: 0
MDC_IDC_STAT_EPISODE_RECENT_COUNT_DTM_END: NORMAL
MDC_IDC_STAT_EPISODE_RECENT_COUNT_DTM_START: NORMAL
MDC_IDC_STAT_EPISODE_TOTAL_COUNT: 0
MDC_IDC_STAT_EPISODE_TOTAL_COUNT: 1
MDC_IDC_STAT_EPISODE_TOTAL_COUNT_DTM_END: NORMAL
MDC_IDC_STAT_EPISODE_TOTAL_COUNT_DTM_START: NORMAL
MDC_IDC_STAT_EPISODE_TYPE: NORMAL
MDC_IDC_STAT_TACHYTHERAPY_ATP_DELIVERED_RECENT: 0
MDC_IDC_STAT_TACHYTHERAPY_ATP_DELIVERED_TOTAL: 0
MDC_IDC_STAT_TACHYTHERAPY_RECENT_DTM_END: NORMAL
MDC_IDC_STAT_TACHYTHERAPY_RECENT_DTM_START: NORMAL
MDC_IDC_STAT_TACHYTHERAPY_SHOCKS_ABORTED_RECENT: 0
MDC_IDC_STAT_TACHYTHERAPY_SHOCKS_ABORTED_TOTAL: 0
MDC_IDC_STAT_TACHYTHERAPY_SHOCKS_DELIVERED_RECENT: 0
MDC_IDC_STAT_TACHYTHERAPY_SHOCKS_DELIVERED_TOTAL: 0
MDC_IDC_STAT_TACHYTHERAPY_TOTAL_DTM_END: NORMAL
MDC_IDC_STAT_TACHYTHERAPY_TOTAL_DTM_START: NORMAL

## 2019-12-30 ENCOUNTER — ANESTHESIA (OUTPATIENT)
Dept: SURGERY | Facility: CLINIC | Age: 79
End: 2019-12-30
Payer: COMMERCIAL

## 2019-12-30 ENCOUNTER — HOSPITAL ENCOUNTER (OUTPATIENT)
Facility: CLINIC | Age: 79
Discharge: HOME | End: 2019-12-30
Attending: UROLOGY | Admitting: UROLOGY
Payer: COMMERCIAL

## 2019-12-30 ENCOUNTER — ANESTHESIA EVENT (OUTPATIENT)
Dept: SURGERY | Facility: CLINIC | Age: 79
End: 2019-12-30
Payer: COMMERCIAL

## 2019-12-30 VITALS
HEART RATE: 66 BPM | OXYGEN SATURATION: 92 % | TEMPERATURE: 98 F | WEIGHT: 211.5 LBS | BODY MASS INDEX: 28.03 KG/M2 | SYSTOLIC BLOOD PRESSURE: 116 MMHG | RESPIRATION RATE: 18 BRPM | HEIGHT: 73 IN | DIASTOLIC BLOOD PRESSURE: 94 MMHG

## 2019-12-30 DIAGNOSIS — C67.2 MALIGNANT NEOPLASM OF LATERAL WALL OF URINARY BLADDER (H): Primary | ICD-10-CM

## 2019-12-30 LAB
ANION GAP SERPL CALCULATED.3IONS-SCNC: 8 MMOL/L (ref 3–14)
BUN SERPL-MCNC: 21 MG/DL (ref 7–30)
CALCIUM SERPL-MCNC: 9 MG/DL (ref 8.5–10.1)
CHLORIDE SERPL-SCNC: 107 MMOL/L (ref 94–109)
CO2 SERPL-SCNC: 23 MMOL/L (ref 20–32)
CREAT SERPL-MCNC: 1.23 MG/DL (ref 0.66–1.25)
GFR SERPL CREATININE-BSD FRML MDRD: 56 ML/MIN/{1.73_M2}
GLUCOSE SERPL-MCNC: 150 MG/DL (ref 70–99)
POTASSIUM SERPL-SCNC: 3.6 MMOL/L (ref 3.4–5.3)
SODIUM SERPL-SCNC: 138 MMOL/L (ref 133–144)

## 2019-12-30 PROCEDURE — 27210794 ZZH OR GENERAL SUPPLY STERILE: Performed by: UROLOGY

## 2019-12-30 PROCEDURE — 36000058 ZZH SURGERY LEVEL 3 EA 15 ADDTL MIN: Performed by: UROLOGY

## 2019-12-30 PROCEDURE — 25000125 ZZHC RX 250: Performed by: NURSE ANESTHETIST, CERTIFIED REGISTERED

## 2019-12-30 PROCEDURE — 25800030 ZZH RX IP 258 OP 636: Performed by: NURSE ANESTHETIST, CERTIFIED REGISTERED

## 2019-12-30 PROCEDURE — 71000013 ZZH RECOVERY PHASE 1 LEVEL 1 EA ADDTL HR: Performed by: UROLOGY

## 2019-12-30 PROCEDURE — 80048 BASIC METABOLIC PNL TOTAL CA: CPT | Performed by: ANESTHESIOLOGY

## 2019-12-30 PROCEDURE — 40000170 ZZH STATISTIC PRE-PROCEDURE ASSESSMENT II: Performed by: UROLOGY

## 2019-12-30 PROCEDURE — 71000027 ZZH RECOVERY PHASE 2 EACH 15 MINS: Performed by: UROLOGY

## 2019-12-30 PROCEDURE — 88305 TISSUE EXAM BY PATHOLOGIST: CPT | Mod: 26 | Performed by: UROLOGY

## 2019-12-30 PROCEDURE — 36000056 ZZH SURGERY LEVEL 3 1ST 30 MIN: Performed by: UROLOGY

## 2019-12-30 PROCEDURE — 88305 TISSUE EXAM BY PATHOLOGIST: CPT | Performed by: UROLOGY

## 2019-12-30 PROCEDURE — 88342 IMHCHEM/IMCYTCHM 1ST ANTB: CPT | Performed by: UROLOGY

## 2019-12-30 PROCEDURE — 25000128 H RX IP 250 OP 636: Performed by: NURSE ANESTHETIST, CERTIFIED REGISTERED

## 2019-12-30 PROCEDURE — 52235 CYSTOSCOPY AND TREATMENT: CPT | Performed by: UROLOGY

## 2019-12-30 PROCEDURE — 37000008 ZZH ANESTHESIA TECHNICAL FEE, 1ST 30 MIN: Performed by: UROLOGY

## 2019-12-30 PROCEDURE — 88342 IMHCHEM/IMCYTCHM 1ST ANTB: CPT | Mod: 26 | Performed by: UROLOGY

## 2019-12-30 PROCEDURE — 25000566 ZZH SEVOFLURANE, EA 15 MIN: Performed by: UROLOGY

## 2019-12-30 PROCEDURE — 25000128 H RX IP 250 OP 636: Performed by: UROLOGY

## 2019-12-30 PROCEDURE — 71000012 ZZH RECOVERY PHASE 1 LEVEL 1 FIRST HR: Performed by: UROLOGY

## 2019-12-30 PROCEDURE — 37000009 ZZH ANESTHESIA TECHNICAL FEE, EACH ADDTL 15 MIN: Performed by: UROLOGY

## 2019-12-30 RX ORDER — LIDOCAINE HYDROCHLORIDE 20 MG/ML
INJECTION, SOLUTION INFILTRATION; PERINEURAL PRN
Status: DISCONTINUED | OUTPATIENT
Start: 2019-12-30 | End: 2019-12-30

## 2019-12-30 RX ORDER — ETOMIDATE 2 MG/ML
INJECTION INTRAVENOUS PRN
Status: DISCONTINUED | OUTPATIENT
Start: 2019-12-30 | End: 2019-12-30

## 2019-12-30 RX ORDER — SODIUM CHLORIDE, SODIUM LACTATE, POTASSIUM CHLORIDE, CALCIUM CHLORIDE 600; 310; 30; 20 MG/100ML; MG/100ML; MG/100ML; MG/100ML
INJECTION, SOLUTION INTRAVENOUS CONTINUOUS
Status: DISCONTINUED | OUTPATIENT
Start: 2019-12-30 | End: 2019-12-30 | Stop reason: HOSPADM

## 2019-12-30 RX ORDER — CEFAZOLIN SODIUM 2 G/100ML
2 INJECTION, SOLUTION INTRAVENOUS
Status: DISCONTINUED | OUTPATIENT
Start: 2019-12-30 | End: 2019-12-30 | Stop reason: HOSPADM

## 2019-12-30 RX ORDER — SODIUM CHLORIDE, SODIUM LACTATE, POTASSIUM CHLORIDE, CALCIUM CHLORIDE 600; 310; 30; 20 MG/100ML; MG/100ML; MG/100ML; MG/100ML
INJECTION, SOLUTION INTRAVENOUS CONTINUOUS PRN
Status: DISCONTINUED | OUTPATIENT
Start: 2019-12-30 | End: 2019-12-30

## 2019-12-30 RX ORDER — ONDANSETRON 4 MG/1
4 TABLET, ORALLY DISINTEGRATING ORAL EVERY 30 MIN PRN
Status: DISCONTINUED | OUTPATIENT
Start: 2019-12-30 | End: 2019-12-30 | Stop reason: HOSPADM

## 2019-12-30 RX ORDER — ONDANSETRON 2 MG/ML
4 INJECTION INTRAMUSCULAR; INTRAVENOUS EVERY 30 MIN PRN
Status: DISCONTINUED | OUTPATIENT
Start: 2019-12-30 | End: 2019-12-30 | Stop reason: HOSPADM

## 2019-12-30 RX ORDER — FENTANYL CITRATE 50 UG/ML
INJECTION, SOLUTION INTRAMUSCULAR; INTRAVENOUS PRN
Status: DISCONTINUED | OUTPATIENT
Start: 2019-12-30 | End: 2019-12-30

## 2019-12-30 RX ORDER — FENTANYL CITRATE 50 UG/ML
25-50 INJECTION, SOLUTION INTRAMUSCULAR; INTRAVENOUS EVERY 5 MIN PRN
Status: DISCONTINUED | OUTPATIENT
Start: 2019-12-30 | End: 2019-12-30 | Stop reason: HOSPADM

## 2019-12-30 RX ORDER — DEXAMETHASONE SODIUM PHOSPHATE 4 MG/ML
4 INJECTION, SOLUTION INTRA-ARTICULAR; INTRALESIONAL; INTRAMUSCULAR; INTRAVENOUS; SOFT TISSUE EVERY 10 MIN PRN
Status: DISCONTINUED | OUTPATIENT
Start: 2019-12-30 | End: 2019-12-30 | Stop reason: HOSPADM

## 2019-12-30 RX ORDER — CEFAZOLIN SODIUM 1 G/3ML
1 INJECTION, POWDER, FOR SOLUTION INTRAMUSCULAR; INTRAVENOUS SEE ADMIN INSTRUCTIONS
Status: DISCONTINUED | OUTPATIENT
Start: 2019-12-30 | End: 2019-12-30 | Stop reason: HOSPADM

## 2019-12-30 RX ORDER — NALOXONE HYDROCHLORIDE 0.4 MG/ML
.1-.4 INJECTION, SOLUTION INTRAMUSCULAR; INTRAVENOUS; SUBCUTANEOUS
Status: DISCONTINUED | OUTPATIENT
Start: 2019-12-30 | End: 2019-12-30 | Stop reason: HOSPADM

## 2019-12-30 RX ORDER — ALBUTEROL SULFATE 0.83 MG/ML
2.5 SOLUTION RESPIRATORY (INHALATION)
Status: DISCONTINUED | OUTPATIENT
Start: 2019-12-30 | End: 2019-12-30 | Stop reason: HOSPADM

## 2019-12-30 RX ORDER — ONDANSETRON 2 MG/ML
INJECTION INTRAMUSCULAR; INTRAVENOUS PRN
Status: DISCONTINUED | OUTPATIENT
Start: 2019-12-30 | End: 2019-12-30

## 2019-12-30 RX ORDER — MEPERIDINE HYDROCHLORIDE 25 MG/ML
12.5 INJECTION INTRAMUSCULAR; INTRAVENOUS; SUBCUTANEOUS
Status: DISCONTINUED | OUTPATIENT
Start: 2019-12-30 | End: 2019-12-30 | Stop reason: HOSPADM

## 2019-12-30 RX ORDER — DIMENHYDRINATE 50 MG/ML
12.5 INJECTION, SOLUTION INTRAMUSCULAR; INTRAVENOUS
Status: DISCONTINUED | OUTPATIENT
Start: 2019-12-30 | End: 2019-12-30 | Stop reason: HOSPADM

## 2019-12-30 RX ADMIN — CEFAZOLIN 2 G: 1 INJECTION, POWDER, FOR SOLUTION INTRAMUSCULAR; INTRAVENOUS at 12:11

## 2019-12-30 RX ADMIN — DEXMEDETOMIDINE HYDROCHLORIDE 0.3 MCG/KG/HR: 100 INJECTION, SOLUTION INTRAVENOUS at 12:09

## 2019-12-30 RX ADMIN — FENTANYL CITRATE 50 MCG: 50 INJECTION, SOLUTION INTRAMUSCULAR; INTRAVENOUS at 12:09

## 2019-12-30 RX ADMIN — SODIUM CHLORIDE, POTASSIUM CHLORIDE, SODIUM LACTATE AND CALCIUM CHLORIDE: 600; 310; 30; 20 INJECTION, SOLUTION INTRAVENOUS at 11:55

## 2019-12-30 RX ADMIN — ONDANSETRON 4 MG: 2 INJECTION INTRAMUSCULAR; INTRAVENOUS at 12:31

## 2019-12-30 RX ADMIN — SUGAMMADEX 200 MG: 100 INJECTION, SOLUTION INTRAVENOUS at 12:34

## 2019-12-30 RX ADMIN — ETOMIDATE 14 MG: 2 INJECTION, SOLUTION INTRAVENOUS at 12:09

## 2019-12-30 RX ADMIN — PHENYLEPHRINE HYDROCHLORIDE 50 MCG: 10 INJECTION INTRAVENOUS at 12:31

## 2019-12-30 RX ADMIN — ROCURONIUM BROMIDE 10 MG: 10 INJECTION INTRAVENOUS at 12:23

## 2019-12-30 RX ADMIN — ROCURONIUM BROMIDE 40 MG: 10 INJECTION INTRAVENOUS at 12:09

## 2019-12-30 RX ADMIN — LIDOCAINE HYDROCHLORIDE 60 MG: 20 INJECTION, SOLUTION INFILTRATION; PERINEURAL at 12:09

## 2019-12-30 RX ADMIN — PHENYLEPHRINE HYDROCHLORIDE 50 MCG: 10 INJECTION INTRAVENOUS at 12:25

## 2019-12-30 ASSESSMENT — LIFESTYLE VARIABLES: TOBACCO_USE: 1

## 2019-12-30 ASSESSMENT — COPD QUESTIONNAIRES: COPD: 0

## 2019-12-30 ASSESSMENT — ENCOUNTER SYMPTOMS: SEIZURES: 0

## 2019-12-30 ASSESSMENT — MIFFLIN-ST. JEOR: SCORE: 1733.24

## 2019-12-30 NOTE — ANESTHESIA CARE TRANSFER NOTE
Patient: Gibson Villalta    Procedure(s):  CYSTOSCOPY, WITH TRANSURETHRAL RESECTION BLADDER TUMOR    Diagnosis: Malignant neoplasm of lateral wall of urinary bladder (H) [C67.2]  Diagnosis Additional Information: No value filed.    Anesthesia Type:   General, ETT     Note:  Airway :Face Mask  Patient transferred to:PACU  Comments: Spont. Resps, pt. Responding.  Extubated, sufficient air exchange. Oxygen mask placed with 10 L O2. To PACU VSS, Monitors on. Report to RNHandoff Report: Identifed the Patient, Identified the Reponsible Provider, Reviewed the pertinent medical history, Discussed the surgical course, Reviewed Intra-OP anesthesia mangement and issues during anesthesia, Set expectations for post-procedure period and Allowed opportunity for questions and acknowledgement of understanding      Vitals: (Last set prior to Anesthesia Care Transfer)    CRNA VITALS  12/30/2019 1211 - 12/30/2019 1247      12/30/2019             NIBP:  (!) 166/116    Pulse:  107    NIBP Mean:  134    SpO2:  95 %    Resp Rate (observed):  (!) 5    Resp Rate (set):  10                Electronically Signed By: ALLEN Goodson CRNA  December 30, 2019  12:47 PM

## 2019-12-30 NOTE — DISCHARGE INSTRUCTIONS
POSTOPERATIVE INSTRUCTIONS    Diagnosis-------------------------------   Bladder cancer    Procedure-------------------------------  Procedure(s) (LRB):  CYSTOSCOPY, WITH TRANSURETHRAL RESECTION BLADDER TUMOR (N/A)      Findings--------------------------------  Area of concern resected.    Home-going instructions-----------------    FOLLOW THESE INSTRUCTIONS AS INDICATED BELOW:  - Observe operative area for signs of excessive bleeding.  - You may shower.  - Increase fluid intake to promote clear urine.  - Resume usual diet as tolerated    What to expect while recovering-----------    For the first few weeks after your procedure, you may notice that your urine is cloudy. Or you may have blood or blood clots in your urine.     You also may be told to avoid lifting anything over 10 pounds or bending over to lift things from the ground.    You should drink plenty of fluids to help flush out your bladder.    You may experience some intermittent bleeding that makes your urine pink or cherry colored. This is normal.    However, if you are unable to urinate, passing large amount of clots, have david blood in your urine, or have a temperature >101 degrees, call the urology nurse on call, or present to your nearest emergency department.      When to call your healthcare provider  Contact your healthcare provider right away if:    You have a fever of 100.4 F (38 C) or higher, or as directed by your provider    You have excessive bleeding    You have pain not relieved by medicines    You notice that no urine is draining from the catheter or the catheter falls out    You have a frequent or very strong urge to urinate    You re not able to urinate, or notice a decrease in urine flow    Discharge Medications/instructions:     - Take Tylenol 1000mg every 6 hours for pain    - Take an over the counter stool softener to promote soft bowel movements      Questions/concerns------------------------  [facility]: [phone]  [phone]    Future appointments  You are scheduled for follow up with Dr. Camarillo on 1/29/20. If needed, based on the pathology, Dr. Camarillo's office will be in contact to move the appointment earlier.       Ryan Camarillo MD       Same Day Surgery Discharge Instructions for  Sedation and General Anesthesia       It's not unusual to feel dizzy, light-headed or faint for up to 24 hours after surgery or while taking pain medication.  If you have these symptoms: sit for a few minutes before standing and have someone assist you when you get up to walk or use the bathroom.      You should rest and relax for the next 24 hours. We recommend you make arrangements to have an adult stay with you for at least 24 hours after your discharge.  Avoid hazardous and strenuous activity.      DO NOT DRIVE any vehicle or operate mechanical equipment for 24 hours following the end of your surgery.  Even though you may feel normal, your reactions may be affected by the medication you have received.      Do not drink alcoholic beverages for 24 hours following surgery.       Slowly progress to your regular diet as you feel able. It's not unusual to feel nauseated and/or vomit after receiving anesthesia.  If you develop these symptoms, drink clear liquids (apple juice, ginger ale, broth, 7-up, etc. ) until you feel better.  If your nausea and vomiting persists for 24 hours, please notify your surgeon.        All narcotic pain medications, along with inactivity and anesthesia, can cause constipation. Drinking plenty of liquids and increasing fiber intake will help.      For any questions of a medical nature, call your surgeon.      Do not make important decisions for 24 hours.      If you had general anesthesia, you may have a sore throat for a couple of days related to the breathing tube used during surgery.  You may use Cepacol lozenges to help with this discomfort.  If it worsens or if you develop a fever, contact your surgeon.       If you  feel your pain is not well managed with the pain medications prescribed by your surgeon, please contact your surgeon's office to let them know so they can address your concerns.

## 2019-12-30 NOTE — OP NOTE
OPERATIVE REPORT  DATE OF SURGERY: 12/30/19  LOCATION OF SURGERY: [facility] OR  PREOPERATIVE DIAGNOSIS:  (C67.2) Malignant neoplasm of lateral wall of urinary bladder (H)  (primary encounter diagnosis)  POSTOPERATIVE DIAGNOSIS: (C67.2) Malignant neoplasm of lateral wall of urinary bladder (H)  (primary encounter diagnosis)  START TIME: 12:18 PM  END TIME: 12:28 PM  PROCEDURE PERFORMED:   Transurethral resection of bladder tumor (TURBT) - medium (2-5cm)     STAFF SURGEON: Ryan Camarillo MD  ANESTHESIA: General.   ESTIMATED BLOOD LOSS: 2 mL.   DRAINS AND TUBES: None  COMPLICATIONS: None.   DISPOSITION: PACU.   SPECIMENS OBTAINED:   ID Type Source Tests Collected by Time Destination   A : bladder tumor Tissue Bladder SURGICAL PATHOLOGY EXAM Ryan Camarillo MD 12/30/2019 11:59 AM      SIGNIFICANT FINDINGS: Cystoscopy with evidence of prior Transurethral resection of the prostate and Transurethral resection of bladder tumor (TURBT) with severe bladder trabeculation. Area of raised erythema on the RIGHT bladder wall anterior and lateral to the RIGHT UO. Area resected and hemostasis ensured.      HISTORY OF PRESENT ILLNESS:Gibson Villalta is a 79 year old male with a history of HG T1 bladder cancer and s/p TURP. Cystoscopy from 10/30 with a small amount of raised erythema near the prior resection site and cytology resulted as positive for malignant cells. He was counseled on treatment options and elected to proceed with the above surgery.     OPERATION PERFORMED:   Informed consent was obtained and the patient was brought to the operating room where general anesthesia was induced. The patient was given appropriate preoperative antibiotics and positioned supine. The patient was then repositioned in dorsal lithotomy with all pressure points padded. We then performed a timeout, verifying the correct patient's site and procedure to be performed.    26 [address] continuous flow resectoscope was inserted atraumatically with  the visual obturator.  Complete cystoscopy was performed with evidence of his prior TURP and prior TURBTs.  His bladder was noted to have severe trabeculations.  There was a small area of raised erythema on the right lateral wall just lateral to the right ureteral orifice which had been previously resected on prior TURBTs.  This ureteral orifice was noted to have clear reflux of urine.  This area was resected in entirety, approximately 3 cm.  Hemostasis was ensured and noted to be excellent.  Bladder tumor pieces were irrigated and removed.  Decision was made not to leave a Renner catheter.  He was emerged from anesthesia and taken to the PACU in stable condition.    Ryan Camarillo MD   Urology  [facility]  Clinic Phone [phone]

## 2019-12-30 NOTE — OR NURSING
Patient incontinent of large amount of urine upon arrival from OR. Urine looks clear. No blood noted.

## 2019-12-30 NOTE — ANESTHESIA POSTPROCEDURE EVALUATION
Patient: Gibson Villalta    Procedure(s):  CYSTOSCOPY, WITH TRANSURETHRAL RESECTION BLADDER TUMOR    Diagnosis:Malignant neoplasm of lateral wall of urinary bladder (H) [C67.2]  Diagnosis Additional Information: No value filed.    Anesthesia Type:  General, ETT    Note:  Anesthesia Post Evaluation    Patient location during evaluation: Bedside  Patient participation: Able to fully participate in evaluation  Level of consciousness: awake and alert  Pain management: adequate  Airway patency: patent  Cardiovascular status: acceptable  Respiratory status: acceptable  Hydration status: acceptable  PONV: none             Last vitals:  Vitals:    12/30/19 1330 12/30/19 1345 12/30/19 1418   BP: 119/72 120/76 (!) 116/94   Pulse: 70 66    Resp: 21 20 18   Temp:  36.7  C (98  F)    SpO2: 97% 97% 92%         Electronically Signed By: Shawn Domingo MD  December 30, 2019  5:38 PM

## 2019-12-30 NOTE — ANESTHESIA PREPROCEDURE EVALUATION
Anesthesia Pre-Procedure Evaluation    Patient: Gibson Villalta   MRN: [id number] : 1940          Preoperative Diagnosis: Malignant neoplasm of lateral wall of urinary bladder (H) [C67.2]    Procedure(s):  CYSTOSCOPY, WITH TRANSURETHRAL RESECTION BLADDER TUMOR    Past Medical History:   Diagnosis Date     Acute kidney injury (H)      Anemia      Anxiety      Anxiety and depression      Bladder mass      BPH (benign prostatic hypertrophy)      Cardiomyopathy (H)     ICD implanted 2016     Carpal tunnel syndrome     Right     Chronic kidney disease (CKD), stage 3 (moderate)      Dementia (H)      Depressive disorder      Diabetes (H)      Diffuse large B-cell lymphoma of extranodal site (H) 2018     Gastric mass      Gout      History of depression      LBBB (left bundle branch block) 2013     Lumbar herniated disc     L5-S1     Mixed cardiomyopathy 2016     Myocardial infarction (H)  and      Nonischemic cardiomyopathy (H) 2016     Obesity      Pacemaker     ICD/PM     Rosacea      Rotator cuff disorder     Tear x 2     RV (right ventricular) mural thrombus 2016     Past Surgical History:   Procedure Laterality Date     ANGIOPLASTY   and  with stent     BIOPSY      stomach     BONE MARROW BIOPSY, BONE SPECIMEN, NEEDLE/TROCAR N/A 10/30/2018    Procedure: BIOPSY BONE MARROW;  Surgeon: Jeb Romero MD;  Location:  GI     CARDIAC SURGERY      ICD/PM     CYSTOSCOPY       CYSTOSCOPY, TRANSURETHRAL RESECTION (TUR) PROSTATE, COMBINED  2019    Procedure: CYSTOSCOPY AND BIPOLAR TRANSURETHRAL RESECTION (TUR) PROSTATE;  Surgeon: Ryan Camarillo MD;  Location:  OR     CYSTOSCOPY, TRANSURETHRAL RESECTION (TUR) TUMOR BLADDER, COMBINED N/A 2018    Procedure: COMBINED CYSTOSCOPY, TRANSURETHRAL RESECTION (TUR) TUMOR BLADDER;  CYSTOSCOPY, TRANSURETHRAL RESECTION BLADDER TUMOR ;  Surgeon: Ryan Camarillo MD;  Location:  OR     CYSTOSCOPY, TRANSURETHRAL  RESECTION (TUR) TUMOR BLADDER, COMBINED N/A 11/12/2018    Procedure: CYSTOSCOPY RESTAGING TRANSURETHRAL RESECTION BLADDER TUMOR;  Surgeon: Ryan Camarillo MD;  Location:  OR     EP LEAD REVISION DUAL N/A 10/24/2019    Procedure: EP Lead Revision Dual;  Surgeon: Josias Hernandez MD;  Location:  HEART CARDIAC CATH LAB     ESOPHAGOSCOPY, GASTROSCOPY, DUODENOSCOPY (EGD), COMBINED N/A 7/30/2018    Procedure: COMBINED ESOPHAGOSCOPY, GASTROSCOPY, DUODENOSCOPY (EGD), BIOPSY SINGLE OR MULTIPLE;  gastroscopy;  Surgeon: Parish Xiong MD;  Location:  GI     ESOPHAGOSCOPY, GASTROSCOPY, DUODENOSCOPY (EGD), COMBINED N/A 7/30/2018    Procedure: COMBINED ESOPHAGOSCOPY, GASTROSCOPY, DUODENOSCOPY (EGD);  EGD;  Surgeon: Parish Xiong MD;  Location:  GI     ESOPHAGOSCOPY, GASTROSCOPY, DUODENOSCOPY (EGD), COMBINED N/A 8/2/2018    Procedure: COMBINED ESOPHAGOSCOPY, GASTROSCOPY, DUODENOSCOPY (EGD), BIOPSY SINGLE OR MULTIPLE;  gastroscopy BIOPSYSHOULD BE SENT TO LAB IN NS NOT FORMALIN PER ;  Surgeon: Jonathon Bush MD;  Location:  GI     GASTRIC BYPASS  2001     HEART CATH LEFT HEART CATH  7/19/16    Non ischemic cardiomyopathy; Non functionally significant LAD and RCA disease      IR PORT REMOVAL RIGHT  9/9/2019     LASER HOLMIUM LITHOTRIPSY BLADDER N/A 4/22/2019    Procedure: CYSTOSCOPY, HOLMIUM LASER LITHOLAPAXY ,  AND BIPOLAR TRANSURETHRAL RESECTION (TUR) PROSTATE;  Surgeon: Ryan Camarillo MD;  Location:  OR     OTHER SURGICAL HISTORY  2006    Spinal surgery     OTHER SURGICAL HISTORY  Nov 2016    CRT-D implantation       Anesthesia Evaluation     . Pt has had prior anesthetic. Type: General (Glidescope)    No history of anesthetic complications          ROS/MED HX    ENT/Pulmonary:     (+)tobacco use, Past use , . .   (-) asthma, COPD and sleep apnea   Neurologic:     (+)dementia,    (-) seizures, CVA and TIA   Cardiovascular: Comment: Pacemaker checked 12/17/19  Known LBBB  Per Dr. Larkin's 12/17/19  "note: \"Given no active ischemic concerns, arrhythmias or significant fluid overload there is no further work up or treatment from a cardiac standpoint needed prior to his TURP procedure which is overall low risk.\"    (+) --CAD, -past MI,-. : . CHF etiology: Cardiomyopathy . . pacemaker :type: Medtronic settings: DDDR - Patient is dependent on pacemaker ICD . . Previous cardiac testing Echodate:3/2019results:Left Ventricle  The left ventricle is mildly dilated. There is normal left ventricular wall  thickness. Left ventricular systolic function is severely reduced. The visual  ejection fraction is estimated at 25-30%. Grade I or early diastolic  dysfunction. There is aneurysmal dilation of the apex. There is a somewhat  preserved contractility of the basal segments of the inferoseptum,  inferolateral and inferior walls. There is severe hypokinesis of the remaining  segments of the left ventricle. There is no clear evidence of clot/thrombus in  the LV apex. However this possibility cannot be completely ruled out based on  this study.     Right Ventricle  The right ventricle is grossly normal size. Mildly decreased right ventricular  systolic function.     Atria  The left atrium is mildly dilated. Right atrial size is normal. Pacer wire in  right atrium.     Mitral Valve  The mitral valve leaflets appear normal. There is no evidence of stenosis,  fluttering, or prolapse. There is at least mild mitral regurgitation with an  anteriorly directed jet.     Tricuspid Valve  Normal tricuspid valve. There is mild (1+) tricuspid regurgitation. The right  ventricular systolic pressure is approximated at 17.0 mmHg plus the right  atrial pressure. Right ventricular systolic pressure is normal.        Aortic Valve  There is mild trileaflet aortic sclerosis. No aortic regurgitation is present.  There is mild (1+) aortic regurgitation.     Pulmonic Valve  The pulmonic valve is not well visualized.     Vessels  Normal size aorta. The " IVC is normal in size and reactivity with respiration,  suggesting normal central venous pressure.     Pericardium  There is no pericardial effusion.  date: results:ECG reviewed date:10/2019 results:Ventricular pacing at 78 bpm date: results:         (-) hypertension   METS/Exercise Tolerance:     Hematologic:     (+) Anemia, -      Musculoskeletal:         GI/Hepatic:     (+) Other GI/Hepatic Gastric mass s/p gastric bypass     (-) GERD and liver disease   Renal/Genitourinary:     (+) chronic renal disease, type: CRI, BPH,       Endo:     (+) type II DM Not using insulin .      Psychiatric:     (+) psychiatric history anxiety and depression      Infectious Disease:         Malignancy:   (+) Malignancy History of Other and Lymphoma/Leukemia  Other CA Bladder CA status post         Other:                          Physical Exam      Airway   Mallampati: III  TM distance: >3 FB  Neck ROM: full    Dental   (+) lower dentures and upper dentures    Cardiovascular   Rhythm and rate: regular      Pulmonary    breath sounds clear to auscultation            Lab Results   Component Value Date    WBC 7.4 10/24/2019    HGB 13.9 10/24/2019    HCT 43.8 10/24/2019     10/24/2019    CRP <2.9 07/22/2016    SED 10 07/22/2016     10/24/2019    POTASSIUM 3.7 10/24/2019    CHLORIDE 106 10/24/2019    CO2 24 10/24/2019    BUN 20 10/24/2019    CR 1.14 10/24/2019     (H) 10/24/2019    SHELLEY 8.9 10/24/2019    PHOS 3.5 02/07/2017    MAG 2.4 (H) 02/21/2017    ALBUMIN 3.9 10/09/2019    PROTTOTAL 7.9 10/09/2019    ALT 31 10/09/2019    AST 19 10/09/2019    ALKPHOS 185 (H) 10/09/2019    BILITOTAL 0.4 10/09/2019    LIPASE 105 07/30/2018    PTT 31 09/09/2019    INR 1.01 09/09/2019    TSH 0.92 08/09/2018       Preop Vitals  BP Readings from Last 3 Encounters:   12/17/19 130/84   11/27/19 110/70   11/15/19 120/70    Pulse Readings from Last 3 Encounters:   12/17/19 84   11/27/19 86   11/15/19 83      Resp Readings from Last 3  "Encounters:   10/25/19 18   10/09/19 16   09/09/19 16    SpO2 Readings from Last 3 Encounters:   11/27/19 95%   11/15/19 95%   10/25/19 95%      Temp Readings from Last 1 Encounters:   11/27/19 34.5  C (94.1  F) (Tympanic)    Ht Readings from Last 1 Encounters:   12/17/19 1.854 m (6' 1\")      Wt Readings from Last 1 Encounters:   12/17/19 96.6 kg (213 lb)    Estimated body mass index is 28.1 kg/m  as calculated from the following:    Height as of 12/17/19: 1.854 m (6' 1\").    Weight as of 12/17/19: 96.6 kg (213 lb).       Anesthesia Plan      History & Physical Review  History and physical reviewed and following examination; no interval change.    ASA Status:  3 .    NPO Status:  > 8 hours    Plan for General and ETT with Intravenous and Etomidate induction. Maintenance will be Balanced.    PONV prophylaxis:  Ondansetron (or other 5HT-3)  Low dose dexmedetomidine infusion   Maintain MAP >75 mmHg      Postoperative Care      Consents                 Shawn Domingo MD  "

## 2020-01-01 ENCOUNTER — TELEPHONE (OUTPATIENT)
Dept: UROLOGY | Facility: CLINIC | Age: 80
End: 2020-01-01

## 2020-01-01 ENCOUNTER — INFUSION THERAPY VISIT (OUTPATIENT)
Dept: INFUSION THERAPY | Facility: CLINIC | Age: 80
End: 2020-01-01
Attending: INTERNAL MEDICINE
Payer: COMMERCIAL

## 2020-01-01 ENCOUNTER — VIRTUAL VISIT (OUTPATIENT)
Dept: CARDIOLOGY | Facility: CLINIC | Age: 80
End: 2020-01-01
Attending: INTERNAL MEDICINE
Payer: COMMERCIAL

## 2020-01-01 ENCOUNTER — VIRTUAL VISIT (OUTPATIENT)
Dept: ONCOLOGY | Facility: CLINIC | Age: 80
End: 2020-01-01
Attending: INTERNAL MEDICINE
Payer: COMMERCIAL

## 2020-01-01 ENCOUNTER — HOSPITAL ENCOUNTER (OUTPATIENT)
Dept: CARDIOLOGY | Facility: CLINIC | Age: 80
Discharge: HOME | End: 2020-04-29
Attending: NURSE PRACTITIONER | Admitting: NURSE PRACTITIONER
Payer: COMMERCIAL

## 2020-01-01 ENCOUNTER — TELEPHONE (OUTPATIENT)
Dept: CARDIOLOGY | Facility: CLINIC | Age: 80
End: 2020-01-01

## 2020-01-01 ENCOUNTER — HOSPITAL ENCOUNTER (OUTPATIENT)
Facility: CLINIC | Age: 80
Setting detail: SPECIMEN
Discharge: HOME | End: 2020-11-02
Attending: INTERNAL MEDICINE | Admitting: INTERNAL MEDICINE
Payer: COMMERCIAL

## 2020-01-01 ENCOUNTER — HOSPITAL ENCOUNTER (OUTPATIENT)
Dept: PET IMAGING | Facility: CLINIC | Age: 80
Discharge: HOME | End: 2020-08-03
Attending: INTERNAL MEDICINE | Admitting: INTERNAL MEDICINE
Payer: COMMERCIAL

## 2020-01-01 ENCOUNTER — MYC REFILL (OUTPATIENT)
Dept: FAMILY MEDICINE | Facility: CLINIC | Age: 80
End: 2020-01-01

## 2020-01-01 ENCOUNTER — OFFICE VISIT (OUTPATIENT)
Dept: UROLOGY | Facility: CLINIC | Age: 80
End: 2020-01-01
Payer: COMMERCIAL

## 2020-01-01 ENCOUNTER — VIRTUAL VISIT (OUTPATIENT)
Dept: CARDIOLOGY | Facility: CLINIC | Age: 80
End: 2020-01-01
Attending: NURSE PRACTITIONER
Payer: COMMERCIAL

## 2020-01-01 ENCOUNTER — HOSPITAL ENCOUNTER (OUTPATIENT)
Facility: CLINIC | Age: 80
Setting detail: SPECIMEN
Discharge: HOME | End: 2020-07-17
Attending: INTERNAL MEDICINE | Admitting: INTERNAL MEDICINE
Payer: COMMERCIAL

## 2020-01-01 ENCOUNTER — OFFICE VISIT (OUTPATIENT)
Dept: FAMILY MEDICINE | Facility: CLINIC | Age: 80
End: 2020-01-01
Payer: COMMERCIAL

## 2020-01-01 ENCOUNTER — TELEPHONE (OUTPATIENT)
Dept: ONCOLOGY | Facility: CLINIC | Age: 80
End: 2020-01-01

## 2020-01-01 ENCOUNTER — TRANSFERRED RECORDS (OUTPATIENT)
Dept: HEALTH INFORMATION MANAGEMENT | Facility: CLINIC | Age: 80
End: 2020-01-01

## 2020-01-01 VITALS
BODY MASS INDEX: 24.65 KG/M2 | HEART RATE: 75 BPM | DIASTOLIC BLOOD PRESSURE: 60 MMHG | SYSTOLIC BLOOD PRESSURE: 87 MMHG | HEIGHT: 73 IN | WEIGHT: 186 LBS

## 2020-01-01 VITALS
WEIGHT: 186.3 LBS | SYSTOLIC BLOOD PRESSURE: 105 MMHG | BODY MASS INDEX: 24.69 KG/M2 | OXYGEN SATURATION: 98 % | HEIGHT: 73 IN | TEMPERATURE: 96.8 F | DIASTOLIC BLOOD PRESSURE: 69 MMHG | HEART RATE: 75 BPM

## 2020-01-01 VITALS
OXYGEN SATURATION: 97 % | BODY MASS INDEX: 24.92 KG/M2 | SYSTOLIC BLOOD PRESSURE: 119 MMHG | WEIGHT: 188 LBS | TEMPERATURE: 97.1 F | HEIGHT: 73 IN | DIASTOLIC BLOOD PRESSURE: 78 MMHG | HEART RATE: 77 BPM

## 2020-01-01 VITALS
SYSTOLIC BLOOD PRESSURE: 120 MMHG | HEART RATE: 70 BPM | WEIGHT: 210 LBS | BODY MASS INDEX: 27.83 KG/M2 | DIASTOLIC BLOOD PRESSURE: 70 MMHG | OXYGEN SATURATION: 97 % | HEIGHT: 73 IN

## 2020-01-01 VITALS
OXYGEN SATURATION: 97 % | WEIGHT: 186 LBS | SYSTOLIC BLOOD PRESSURE: 112 MMHG | DIASTOLIC BLOOD PRESSURE: 78 MMHG | HEART RATE: 78 BPM | HEIGHT: 73 IN | BODY MASS INDEX: 24.65 KG/M2

## 2020-01-01 VITALS
BODY MASS INDEX: 25.58 KG/M2 | WEIGHT: 193 LBS | DIASTOLIC BLOOD PRESSURE: 70 MMHG | SYSTOLIC BLOOD PRESSURE: 115 MMHG | HEART RATE: 78 BPM | HEIGHT: 73 IN

## 2020-01-01 VITALS — BODY MASS INDEX: 23.62 KG/M2 | WEIGHT: 179 LBS

## 2020-01-01 DIAGNOSIS — F41.9 ANXIETY AND DEPRESSION: Chronic | ICD-10-CM

## 2020-01-01 DIAGNOSIS — I42.9 CARDIOMYOPATHY, UNSPECIFIED TYPE (H): ICD-10-CM

## 2020-01-01 DIAGNOSIS — E55.9 VITAMIN D DEFICIENCY: ICD-10-CM

## 2020-01-01 DIAGNOSIS — F32.A ANXIETY AND DEPRESSION: Chronic | ICD-10-CM

## 2020-01-01 DIAGNOSIS — F03.90 DEMENTIA WITHOUT BEHAVIORAL DISTURBANCE, UNSPECIFIED DEMENTIA TYPE: ICD-10-CM

## 2020-01-01 DIAGNOSIS — Z85.51 PERSONAL HISTORY OF MALIGNANT NEOPLASM OF BLADDER: Primary | ICD-10-CM

## 2020-01-01 DIAGNOSIS — I42.0 DILATED CARDIOMYOPATHY (H): ICD-10-CM

## 2020-01-01 DIAGNOSIS — C83.398 DIFFUSE LARGE B-CELL LYMPHOMA OF EXTRANODAL SITE: Chronic | ICD-10-CM

## 2020-01-01 DIAGNOSIS — D63.0 ANEMIA IN NEOPLASTIC DISEASE: ICD-10-CM

## 2020-01-01 DIAGNOSIS — D50.9 IRON DEFICIENCY ANEMIA, UNSPECIFIED IRON DEFICIENCY ANEMIA TYPE: ICD-10-CM

## 2020-01-01 DIAGNOSIS — I50.22 CHRONIC SYSTOLIC HEART FAILURE (H): Chronic | ICD-10-CM

## 2020-01-01 DIAGNOSIS — I50.23 ACUTE ON CHRONIC SYSTOLIC HEART FAILURE (H): ICD-10-CM

## 2020-01-01 DIAGNOSIS — E78.5 HYPERLIPIDEMIA LDL GOAL <70: ICD-10-CM

## 2020-01-01 DIAGNOSIS — C67.9 MALIGNANT NEOPLASM OF URINARY BLADDER, UNSPECIFIED SITE (H): ICD-10-CM

## 2020-01-01 DIAGNOSIS — E78.5 HYPERLIPIDEMIA LDL GOAL <100: ICD-10-CM

## 2020-01-01 DIAGNOSIS — I42.9 CARDIOMYOPATHY, UNSPECIFIED TYPE (H): Primary | ICD-10-CM

## 2020-01-01 DIAGNOSIS — I50.23 ACUTE ON CHRONIC SYSTOLIC HEART FAILURE (H): Chronic | ICD-10-CM

## 2020-01-01 DIAGNOSIS — C83.398 DIFFUSE LARGE B-CELL LYMPHOMA OF EXTRANODAL SITE: Primary | ICD-10-CM

## 2020-01-01 DIAGNOSIS — Z95.810 ICD (IMPLANTABLE CARDIOVERTER-DEFIBRILLATOR) IN PLACE: ICD-10-CM

## 2020-01-01 DIAGNOSIS — F33.42 RECURRENT MAJOR DEPRESSIVE DISORDER, IN FULL REMISSION (H): ICD-10-CM

## 2020-01-01 DIAGNOSIS — I50.20 HFREF (HEART FAILURE WITH REDUCED EJECTION FRACTION) (H): Primary | ICD-10-CM

## 2020-01-01 DIAGNOSIS — E56.9 VITAMIN DEFICIENCY: Primary | ICD-10-CM

## 2020-01-01 DIAGNOSIS — C83.398 DIFFUSE LARGE B-CELL LYMPHOMA OF EXTRANODAL SITE: ICD-10-CM

## 2020-01-01 DIAGNOSIS — Z95.810 ICD (IMPLANTABLE CARDIOVERTER-DEFIBRILLATOR), DUAL, IN SITU: ICD-10-CM

## 2020-01-01 DIAGNOSIS — E11.22 CONTROLLED TYPE 2 DIABETES MELLITUS WITH CHRONIC KIDNEY DISEASE, WITHOUT LONG-TERM CURRENT USE OF INSULIN, UNSPECIFIED CKD STAGE (H): Primary | ICD-10-CM

## 2020-01-01 DIAGNOSIS — I42.9 CARDIOMYOPATHY, UNSPECIFIED (H): Primary | Chronic | ICD-10-CM

## 2020-01-01 DIAGNOSIS — C83.398 DIFFUSE LARGE B-CELL LYMPHOMA OF EXTRANODAL SITE: Primary | Chronic | ICD-10-CM

## 2020-01-01 DIAGNOSIS — I50.20 HFREF (HEART FAILURE WITH REDUCED EJECTION FRACTION) (H): ICD-10-CM

## 2020-01-01 DIAGNOSIS — N40.2 PROSTATE NODULE: Primary | ICD-10-CM

## 2020-01-01 DIAGNOSIS — N18.30 CKD (CHRONIC KIDNEY DISEASE) STAGE 3, GFR 30-59 ML/MIN (H): ICD-10-CM

## 2020-01-01 LAB
ALBUMIN SERPL-MCNC: 3.4 G/DL (ref 3.4–5)
ALBUMIN SERPL-MCNC: 3.4 G/DL (ref 3.4–5)
ALBUMIN SERPL-MCNC: 3.7 G/DL (ref 3.4–5)
ALBUMIN UR-MCNC: NEGATIVE MG/DL
ALBUMIN UR-MCNC: NEGATIVE MG/DL
ALP SERPL-CCNC: 155 U/L (ref 40–150)
ALP SERPL-CCNC: 166 U/L (ref 40–150)
ALP SERPL-CCNC: 211 U/L (ref 40–150)
ALT SERPL W P-5'-P-CCNC: 23 U/L (ref 0–70)
ALT SERPL W P-5'-P-CCNC: 31 U/L (ref 0–70)
ALT SERPL W P-5'-P-CCNC: 39 U/L (ref 0–70)
ANION GAP SERPL CALCULATED.3IONS-SCNC: 6 MMOL/L (ref 3–14)
ANION GAP SERPL CALCULATED.3IONS-SCNC: 7 MMOL/L (ref 3–14)
ANION GAP SERPL CALCULATED.3IONS-SCNC: 7 MMOL/L (ref 3–14)
APPEARANCE UR: CLEAR
APPEARANCE UR: CLEAR
AST SERPL W P-5'-P-CCNC: 19 U/L (ref 0–45)
AST SERPL W P-5'-P-CCNC: 24 U/L (ref 0–45)
AST SERPL W P-5'-P-CCNC: 27 U/L (ref 0–45)
BASOPHILS # BLD AUTO: 0 10E9/L (ref 0–0.2)
BASOPHILS NFR BLD AUTO: 0.3 %
BASOPHILS NFR BLD AUTO: 0.4 %
BASOPHILS NFR BLD AUTO: 0.5 %
BILIRUB SERPL-MCNC: 0.4 MG/DL (ref 0.2–1.3)
BILIRUB SERPL-MCNC: 0.7 MG/DL (ref 0.2–1.3)
BILIRUB SERPL-MCNC: 0.8 MG/DL (ref 0.2–1.3)
BILIRUB UR QL STRIP: NEGATIVE
BILIRUB UR QL STRIP: NEGATIVE
BUN SERPL-MCNC: 17 MG/DL (ref 7–30)
BUN SERPL-MCNC: 18 MG/DL (ref 7–30)
BUN SERPL-MCNC: 18 MG/DL (ref 7–30)
CALCIUM SERPL-MCNC: 8.9 MG/DL (ref 8.5–10.1)
CALCIUM SERPL-MCNC: 9.1 MG/DL (ref 8.5–10.1)
CALCIUM SERPL-MCNC: 9.4 MG/DL (ref 8.5–10.1)
CHLORIDE SERPL-SCNC: 108 MMOL/L (ref 94–109)
CHLORIDE SERPL-SCNC: 109 MMOL/L (ref 94–109)
CHLORIDE SERPL-SCNC: 110 MMOL/L (ref 94–109)
CHOLEST SERPL-MCNC: 128 MG/DL
CO2 SERPL-SCNC: 25 MMOL/L (ref 20–32)
CO2 SERPL-SCNC: 26 MMOL/L (ref 20–32)
CO2 SERPL-SCNC: 27 MMOL/L (ref 20–32)
COLOR UR AUTO: YELLOW
COLOR UR AUTO: YELLOW
COPATH REPORT: NORMAL
COPATH REPORT: NORMAL
CREAT SERPL-MCNC: 1.08 MG/DL (ref 0.66–1.25)
CREAT SERPL-MCNC: 1.29 MG/DL (ref 0.66–1.25)
CREAT SERPL-MCNC: 1.35 MG/DL (ref 0.66–1.25)
CREAT UR-MCNC: 150 MG/DL
DEPRECATED CALCIDIOL+CALCIFEROL SERPL-MC: 50 UG/L (ref 20–75)
DEPRECATED CALCIDIOL+CALCIFEROL SERPL-MC: 66 UG/L (ref 20–75)
DIFFERENTIAL METHOD BLD: ABNORMAL
EOSINOPHIL # BLD AUTO: 0.2 10E9/L (ref 0–0.7)
EOSINOPHIL # BLD AUTO: 0.3 10E9/L (ref 0–0.7)
EOSINOPHIL # BLD AUTO: 0.4 10E9/L (ref 0–0.7)
EOSINOPHIL NFR BLD AUTO: 3.1 %
EOSINOPHIL NFR BLD AUTO: 4.3 %
EOSINOPHIL NFR BLD AUTO: 5.5 %
ERYTHROCYTE [DISTWIDTH] IN BLOOD BY AUTOMATED COUNT: 15.1 % (ref 10–15)
ERYTHROCYTE [DISTWIDTH] IN BLOOD BY AUTOMATED COUNT: 15.3 % (ref 10–15)
ERYTHROCYTE [DISTWIDTH] IN BLOOD BY AUTOMATED COUNT: 18.2 % (ref 10–15)
FERRITIN SERPL-MCNC: 78 NG/ML (ref 26–388)
GFR SERPL CREATININE-BSD FRML MDRD: 50 ML/MIN/{1.73_M2}
GFR SERPL CREATININE-BSD FRML MDRD: 52 ML/MIN/{1.73_M2}
GFR SERPL CREATININE-BSD FRML MDRD: 65 ML/MIN/{1.73_M2}
GLUCOSE SERPL-MCNC: 120 MG/DL (ref 70–99)
GLUCOSE SERPL-MCNC: 76 MG/DL (ref 70–99)
GLUCOSE SERPL-MCNC: 78 MG/DL (ref 70–99)
GLUCOSE UR STRIP-MCNC: NEGATIVE MG/DL
GLUCOSE UR STRIP-MCNC: NEGATIVE MG/DL
HBA1C MFR BLD: 6.2 % (ref 0–5.6)
HCT VFR BLD AUTO: 39.1 % (ref 40–53)
HCT VFR BLD AUTO: 40.6 % (ref 40–53)
HCT VFR BLD AUTO: 46.8 % (ref 40–53)
HDLC SERPL-MCNC: 39 MG/DL
HGB BLD-MCNC: 12.9 G/DL (ref 13.3–17.7)
HGB BLD-MCNC: 14 G/DL (ref 13.3–17.7)
HGB BLD-MCNC: 15.1 G/DL (ref 13.3–17.7)
HGB UR QL STRIP: NEGATIVE
HGB UR QL STRIP: NEGATIVE
IMM GRANULOCYTES # BLD: 0 10E9/L (ref 0–0.4)
IMM GRANULOCYTES # BLD: 0 10E9/L (ref 0–0.4)
IMM GRANULOCYTES NFR BLD: 0.2 %
IMM GRANULOCYTES NFR BLD: 0.3 %
IRON SATN MFR SERPL: 23 % (ref 15–46)
IRON SERPL-MCNC: 66 UG/DL (ref 35–180)
KETONES UR STRIP-MCNC: NEGATIVE MG/DL
KETONES UR STRIP-MCNC: NEGATIVE MG/DL
LDH SERPL L TO P-CCNC: 270 U/L (ref 85–227)
LDH SERPL L TO P-CCNC: 280 U/L (ref 85–227)
LDLC SERPL CALC-MCNC: 69 MG/DL
LEUKOCYTE ESTERASE UR QL STRIP: NEGATIVE
LEUKOCYTE ESTERASE UR QL STRIP: NEGATIVE
LYMPHOCYTES # BLD AUTO: 0.5 10E9/L (ref 0.8–5.3)
LYMPHOCYTES # BLD AUTO: 0.7 10E9/L (ref 0.8–5.3)
LYMPHOCYTES # BLD AUTO: 0.7 10E9/L (ref 0.8–5.3)
LYMPHOCYTES NFR BLD AUTO: 10.6 %
LYMPHOCYTES NFR BLD AUTO: 11.1 %
LYMPHOCYTES NFR BLD AUTO: 6.5 %
MCH RBC QN AUTO: 32 PG (ref 26.5–33)
MCH RBC QN AUTO: 32.5 PG (ref 26.5–33)
MCH RBC QN AUTO: 34.1 PG (ref 26.5–33)
MCHC RBC AUTO-ENTMCNC: 32.3 G/DL (ref 31.5–36.5)
MCHC RBC AUTO-ENTMCNC: 33 G/DL (ref 31.5–36.5)
MCHC RBC AUTO-ENTMCNC: 34.5 G/DL (ref 31.5–36.5)
MCV RBC AUTO: 101 FL (ref 78–100)
MCV RBC AUTO: 97 FL (ref 78–100)
MCV RBC AUTO: 99 FL (ref 78–100)
MDC_IDC_EPISODE_DTM: NORMAL
MDC_IDC_EPISODE_DURATION: 10 S
MDC_IDC_EPISODE_DURATION: 11 S
MDC_IDC_EPISODE_DURATION: 12 S
MDC_IDC_EPISODE_DURATION: 13 S
MDC_IDC_EPISODE_DURATION: 155 S
MDC_IDC_EPISODE_DURATION: 26 S
MDC_IDC_EPISODE_DURATION: 56 S
MDC_IDC_EPISODE_DURATION: 66 S
MDC_IDC_EPISODE_DURATION: 7 S
MDC_IDC_EPISODE_ID: 52
MDC_IDC_EPISODE_ID: 53
MDC_IDC_EPISODE_ID: 54
MDC_IDC_EPISODE_ID: 55
MDC_IDC_EPISODE_ID: 56
MDC_IDC_EPISODE_ID: 57
MDC_IDC_EPISODE_ID: 58
MDC_IDC_EPISODE_ID: 62
MDC_IDC_EPISODE_ID: 63
MDC_IDC_EPISODE_TYPE: NORMAL
MDC_IDC_LEAD_IMPLANT_DT: NORMAL
MDC_IDC_LEAD_LOCATION: NORMAL
MDC_IDC_LEAD_LOCATION_DETAIL_1: NORMAL
MDC_IDC_LEAD_MFG: NORMAL
MDC_IDC_LEAD_MODEL: NORMAL
MDC_IDC_LEAD_POLARITY_TYPE: NORMAL
MDC_IDC_LEAD_SERIAL: NORMAL
MDC_IDC_MSMT_BATTERY_DTM: NORMAL
MDC_IDC_MSMT_BATTERY_REMAINING_LONGEVITY: 33 MO
MDC_IDC_MSMT_BATTERY_REMAINING_LONGEVITY: 36 MO
MDC_IDC_MSMT_BATTERY_REMAINING_LONGEVITY: 42 MO
MDC_IDC_MSMT_BATTERY_RRT_TRIGGER: 2.73
MDC_IDC_MSMT_BATTERY_STATUS: NORMAL
MDC_IDC_MSMT_BATTERY_VOLTAGE: 2.95 V
MDC_IDC_MSMT_BATTERY_VOLTAGE: 2.96 V
MDC_IDC_MSMT_BATTERY_VOLTAGE: 2.96 V
MDC_IDC_MSMT_CAP_CHARGE_DTM: NORMAL
MDC_IDC_MSMT_CAP_CHARGE_ENERGY: 18 J
MDC_IDC_MSMT_CAP_CHARGE_TIME: 4
MDC_IDC_MSMT_CAP_CHARGE_TIME: 4.04
MDC_IDC_MSMT_CAP_CHARGE_TIME: 4.05
MDC_IDC_MSMT_CAP_CHARGE_TYPE: NORMAL
MDC_IDC_MSMT_LEADCHNL_LV_IMPEDANCE_VALUE: 132.32
MDC_IDC_MSMT_LEADCHNL_LV_IMPEDANCE_VALUE: 136.28
MDC_IDC_MSMT_LEADCHNL_LV_IMPEDANCE_VALUE: 136.28
MDC_IDC_MSMT_LEADCHNL_LV_IMPEDANCE_VALUE: 143.42
MDC_IDC_MSMT_LEADCHNL_LV_IMPEDANCE_VALUE: 155.46
MDC_IDC_MSMT_LEADCHNL_LV_IMPEDANCE_VALUE: 160.94
MDC_IDC_MSMT_LEADCHNL_LV_IMPEDANCE_VALUE: 160.94
MDC_IDC_MSMT_LEADCHNL_LV_IMPEDANCE_VALUE: 165.03
MDC_IDC_MSMT_LEADCHNL_LV_IMPEDANCE_VALUE: 165.03
MDC_IDC_MSMT_LEADCHNL_LV_IMPEDANCE_VALUE: 171 OHM
MDC_IDC_MSMT_LEADCHNL_LV_IMPEDANCE_VALUE: 172.54
MDC_IDC_MSMT_LEADCHNL_LV_IMPEDANCE_VALUE: 189.53
MDC_IDC_MSMT_LEADCHNL_LV_IMPEDANCE_VALUE: 189.53
MDC_IDC_MSMT_LEADCHNL_LV_IMPEDANCE_VALUE: 247 OHM
MDC_IDC_MSMT_LEADCHNL_LV_IMPEDANCE_VALUE: 247 OHM
MDC_IDC_MSMT_LEADCHNL_LV_IMPEDANCE_VALUE: 285 OHM
MDC_IDC_MSMT_LEADCHNL_LV_IMPEDANCE_VALUE: 304 OHM
MDC_IDC_MSMT_LEADCHNL_LV_IMPEDANCE_VALUE: 342 OHM
MDC_IDC_MSMT_LEADCHNL_LV_IMPEDANCE_VALUE: 361 OHM
MDC_IDC_MSMT_LEADCHNL_LV_IMPEDANCE_VALUE: 399 OHM
MDC_IDC_MSMT_LEADCHNL_LV_IMPEDANCE_VALUE: 456 OHM
MDC_IDC_MSMT_LEADCHNL_LV_IMPEDANCE_VALUE: 475 OHM
MDC_IDC_MSMT_LEADCHNL_LV_IMPEDANCE_VALUE: 475 OHM
MDC_IDC_MSMT_LEADCHNL_LV_IMPEDANCE_VALUE: 513 OHM
MDC_IDC_MSMT_LEADCHNL_LV_IMPEDANCE_VALUE: 532 OHM
MDC_IDC_MSMT_LEADCHNL_LV_IMPEDANCE_VALUE: 551 OHM
MDC_IDC_MSMT_LEADCHNL_LV_IMPEDANCE_VALUE: 589 OHM
MDC_IDC_MSMT_LEADCHNL_LV_IMPEDANCE_VALUE: 589 OHM
MDC_IDC_MSMT_LEADCHNL_LV_IMPEDANCE_VALUE: 608 OHM
MDC_IDC_MSMT_LEADCHNL_LV_PACING_THRESHOLD_AMPLITUDE: 0.75 V
MDC_IDC_MSMT_LEADCHNL_LV_PACING_THRESHOLD_AMPLITUDE: 0.75 V
MDC_IDC_MSMT_LEADCHNL_LV_PACING_THRESHOLD_AMPLITUDE: 1 V
MDC_IDC_MSMT_LEADCHNL_LV_PACING_THRESHOLD_PULSEWIDTH: 0.5 MS
MDC_IDC_MSMT_LEADCHNL_RA_IMPEDANCE_VALUE: 361 OHM
MDC_IDC_MSMT_LEADCHNL_RA_IMPEDANCE_VALUE: 361 OHM
MDC_IDC_MSMT_LEADCHNL_RA_IMPEDANCE_VALUE: 399 OHM
MDC_IDC_MSMT_LEADCHNL_RA_PACING_THRESHOLD_AMPLITUDE: 0.62 V
MDC_IDC_MSMT_LEADCHNL_RA_PACING_THRESHOLD_AMPLITUDE: 0.88 V
MDC_IDC_MSMT_LEADCHNL_RA_PACING_THRESHOLD_AMPLITUDE: 0.88 V
MDC_IDC_MSMT_LEADCHNL_RA_PACING_THRESHOLD_PULSEWIDTH: 0.4 MS
MDC_IDC_MSMT_LEADCHNL_RA_SENSING_INTR_AMPL: 0.38 MV
MDC_IDC_MSMT_LEADCHNL_RA_SENSING_INTR_AMPL: 0.5 MV
MDC_IDC_MSMT_LEADCHNL_RA_SENSING_INTR_AMPL: 0.5 MV
MDC_IDC_MSMT_LEADCHNL_RV_IMPEDANCE_VALUE: 399 OHM
MDC_IDC_MSMT_LEADCHNL_RV_IMPEDANCE_VALUE: 418 OHM
MDC_IDC_MSMT_LEADCHNL_RV_IMPEDANCE_VALUE: 456 OHM
MDC_IDC_MSMT_LEADCHNL_RV_IMPEDANCE_VALUE: 456 OHM
MDC_IDC_MSMT_LEADCHNL_RV_IMPEDANCE_VALUE: 513 OHM
MDC_IDC_MSMT_LEADCHNL_RV_IMPEDANCE_VALUE: 513 OHM
MDC_IDC_MSMT_LEADCHNL_RV_PACING_THRESHOLD_AMPLITUDE: 0.38 V
MDC_IDC_MSMT_LEADCHNL_RV_PACING_THRESHOLD_AMPLITUDE: 0.5 V
MDC_IDC_MSMT_LEADCHNL_RV_PACING_THRESHOLD_AMPLITUDE: 0.5 V
MDC_IDC_MSMT_LEADCHNL_RV_PACING_THRESHOLD_PULSEWIDTH: 0.4 MS
MDC_IDC_MSMT_LEADCHNL_RV_SENSING_INTR_AMPL: 14.12 MV
MDC_IDC_MSMT_LEADCHNL_RV_SENSING_INTR_AMPL: 6.12 MV
MDC_IDC_PG_IMPLANT_DTM: NORMAL
MDC_IDC_PG_MFG: NORMAL
MDC_IDC_PG_MODEL: NORMAL
MDC_IDC_PG_SERIAL: NORMAL
MDC_IDC_PG_TYPE: NORMAL
MDC_IDC_SESS_CLINIC_NAME: NORMAL
MDC_IDC_SESS_DTM: NORMAL
MDC_IDC_SESS_TYPE: NORMAL
MDC_IDC_SET_BRADY_AT_MODE_SWITCH_RATE: 171 {BEATS}/MIN
MDC_IDC_SET_BRADY_LOWRATE: 50 {BEATS}/MIN
MDC_IDC_SET_BRADY_MAX_SENSOR_RATE: 130 {BEATS}/MIN
MDC_IDC_SET_BRADY_MAX_TRACKING_RATE: 130 {BEATS}/MIN
MDC_IDC_SET_BRADY_MODE: NORMAL
MDC_IDC_SET_BRADY_PAV_DELAY_LOW: 170 MS
MDC_IDC_SET_BRADY_SAV_DELAY_LOW: 110 MS
MDC_IDC_SET_BRADY_SAV_DELAY_LOW: 120 MS
MDC_IDC_SET_BRADY_SAV_DELAY_LOW: 130 MS
MDC_IDC_SET_CRT_LVRV_DELAY: 0 MS
MDC_IDC_SET_CRT_PACED_CHAMBERS: NORMAL
MDC_IDC_SET_LEADCHNL_LV_PACING_AMPLITUDE: 1.25 V
MDC_IDC_SET_LEADCHNL_LV_PACING_AMPLITUDE: 1.25 V
MDC_IDC_SET_LEADCHNL_LV_PACING_AMPLITUDE: 1.5 V
MDC_IDC_SET_LEADCHNL_LV_PACING_ANODE_ELECTRODE_1: NORMAL
MDC_IDC_SET_LEADCHNL_LV_PACING_ANODE_LOCATION_1: NORMAL
MDC_IDC_SET_LEADCHNL_LV_PACING_CAPTURE_MODE: NORMAL
MDC_IDC_SET_LEADCHNL_LV_PACING_CATHODE_ELECTRODE_1: NORMAL
MDC_IDC_SET_LEADCHNL_LV_PACING_CATHODE_LOCATION_1: NORMAL
MDC_IDC_SET_LEADCHNL_LV_PACING_POLARITY: NORMAL
MDC_IDC_SET_LEADCHNL_LV_PACING_PULSEWIDTH: 0.5 MS
MDC_IDC_SET_LEADCHNL_RA_PACING_AMPLITUDE: 1.5 V
MDC_IDC_SET_LEADCHNL_RA_PACING_ANODE_ELECTRODE_1: NORMAL
MDC_IDC_SET_LEADCHNL_RA_PACING_ANODE_LOCATION_1: NORMAL
MDC_IDC_SET_LEADCHNL_RA_PACING_CAPTURE_MODE: NORMAL
MDC_IDC_SET_LEADCHNL_RA_PACING_CATHODE_ELECTRODE_1: NORMAL
MDC_IDC_SET_LEADCHNL_RA_PACING_CATHODE_LOCATION_1: NORMAL
MDC_IDC_SET_LEADCHNL_RA_PACING_POLARITY: NORMAL
MDC_IDC_SET_LEADCHNL_RA_PACING_PULSEWIDTH: 0.4 MS
MDC_IDC_SET_LEADCHNL_RA_SENSING_ANODE_ELECTRODE_1: NORMAL
MDC_IDC_SET_LEADCHNL_RA_SENSING_ANODE_LOCATION_1: NORMAL
MDC_IDC_SET_LEADCHNL_RA_SENSING_CATHODE_ELECTRODE_1: NORMAL
MDC_IDC_SET_LEADCHNL_RA_SENSING_CATHODE_LOCATION_1: NORMAL
MDC_IDC_SET_LEADCHNL_RA_SENSING_POLARITY: NORMAL
MDC_IDC_SET_LEADCHNL_RA_SENSING_SENSITIVITY: 0.15 MV
MDC_IDC_SET_LEADCHNL_RA_SENSING_SENSITIVITY: 0.3 MV
MDC_IDC_SET_LEADCHNL_RA_SENSING_SENSITIVITY: 0.3 MV
MDC_IDC_SET_LEADCHNL_RV_PACING_AMPLITUDE: 2 V
MDC_IDC_SET_LEADCHNL_RV_PACING_ANODE_ELECTRODE_1: NORMAL
MDC_IDC_SET_LEADCHNL_RV_PACING_ANODE_LOCATION_1: NORMAL
MDC_IDC_SET_LEADCHNL_RV_PACING_CAPTURE_MODE: NORMAL
MDC_IDC_SET_LEADCHNL_RV_PACING_CATHODE_ELECTRODE_1: NORMAL
MDC_IDC_SET_LEADCHNL_RV_PACING_CATHODE_LOCATION_1: NORMAL
MDC_IDC_SET_LEADCHNL_RV_PACING_POLARITY: NORMAL
MDC_IDC_SET_LEADCHNL_RV_PACING_PULSEWIDTH: 0.4 MS
MDC_IDC_SET_LEADCHNL_RV_SENSING_ANODE_ELECTRODE_1: NORMAL
MDC_IDC_SET_LEADCHNL_RV_SENSING_ANODE_LOCATION_1: NORMAL
MDC_IDC_SET_LEADCHNL_RV_SENSING_CATHODE_ELECTRODE_1: NORMAL
MDC_IDC_SET_LEADCHNL_RV_SENSING_CATHODE_LOCATION_1: NORMAL
MDC_IDC_SET_LEADCHNL_RV_SENSING_POLARITY: NORMAL
MDC_IDC_SET_LEADCHNL_RV_SENSING_SENSITIVITY: 0.3 MV
MDC_IDC_SET_ZONE_DETECTION_BEATS_DENOMINATOR: 40 {BEATS}
MDC_IDC_SET_ZONE_DETECTION_BEATS_NUMERATOR: 30 {BEATS}
MDC_IDC_SET_ZONE_DETECTION_INTERVAL: 300 MS
MDC_IDC_SET_ZONE_DETECTION_INTERVAL: 350 MS
MDC_IDC_SET_ZONE_DETECTION_INTERVAL: 360 MS
MDC_IDC_SET_ZONE_DETECTION_INTERVAL: NORMAL
MDC_IDC_SET_ZONE_TYPE: NORMAL
MDC_IDC_STAT_AT_BURDEN_PERCENT: 0 %
MDC_IDC_STAT_AT_DTM_END: NORMAL
MDC_IDC_STAT_AT_DTM_START: NORMAL
MDC_IDC_STAT_BRADY_AP_VP_PERCENT: 1.32 %
MDC_IDC_STAT_BRADY_AP_VP_PERCENT: 2.63 %
MDC_IDC_STAT_BRADY_AP_VP_PERCENT: 6.29 %
MDC_IDC_STAT_BRADY_AP_VS_PERCENT: 0.01 %
MDC_IDC_STAT_BRADY_AS_VP_PERCENT: 92.65 %
MDC_IDC_STAT_BRADY_AS_VP_PERCENT: 96.36 %
MDC_IDC_STAT_BRADY_AS_VP_PERCENT: 97.47 %
MDC_IDC_STAT_BRADY_AS_VS_PERCENT: 1 %
MDC_IDC_STAT_BRADY_AS_VS_PERCENT: 1.05 %
MDC_IDC_STAT_BRADY_AS_VS_PERCENT: 1.19 %
MDC_IDC_STAT_BRADY_DTM_END: NORMAL
MDC_IDC_STAT_BRADY_DTM_START: NORMAL
MDC_IDC_STAT_BRADY_RA_PERCENT_PACED: 1.33 %
MDC_IDC_STAT_BRADY_RA_PERCENT_PACED: 2.64 %
MDC_IDC_STAT_BRADY_RA_PERCENT_PACED: 6.27 %
MDC_IDC_STAT_BRADY_RV_PERCENT_PACED: 97.89 %
MDC_IDC_STAT_BRADY_RV_PERCENT_PACED: 97.9 %
MDC_IDC_STAT_BRADY_RV_PERCENT_PACED: 98.4 %
MDC_IDC_STAT_CRT_DTM_END: NORMAL
MDC_IDC_STAT_CRT_DTM_START: NORMAL
MDC_IDC_STAT_CRT_LV_PERCENT_PACED: 97.82 %
MDC_IDC_STAT_CRT_LV_PERCENT_PACED: 97.82 %
MDC_IDC_STAT_CRT_LV_PERCENT_PACED: 98.34 %
MDC_IDC_STAT_CRT_PERCENT_PACED: 97.82 %
MDC_IDC_STAT_CRT_PERCENT_PACED: 97.82 %
MDC_IDC_STAT_CRT_PERCENT_PACED: 98.34 %
MDC_IDC_STAT_EPISODE_RECENT_COUNT: 0
MDC_IDC_STAT_EPISODE_RECENT_COUNT_DTM_END: NORMAL
MDC_IDC_STAT_EPISODE_RECENT_COUNT_DTM_START: NORMAL
MDC_IDC_STAT_EPISODE_TOTAL_COUNT: 0
MDC_IDC_STAT_EPISODE_TOTAL_COUNT: 1
MDC_IDC_STAT_EPISODE_TOTAL_COUNT_DTM_END: NORMAL
MDC_IDC_STAT_EPISODE_TOTAL_COUNT_DTM_START: NORMAL
MDC_IDC_STAT_EPISODE_TYPE: NORMAL
MDC_IDC_STAT_TACHYTHERAPY_ATP_DELIVERED_RECENT: 0
MDC_IDC_STAT_TACHYTHERAPY_ATP_DELIVERED_TOTAL: 0
MDC_IDC_STAT_TACHYTHERAPY_RECENT_DTM_END: NORMAL
MDC_IDC_STAT_TACHYTHERAPY_RECENT_DTM_START: NORMAL
MDC_IDC_STAT_TACHYTHERAPY_SHOCKS_ABORTED_RECENT: 0
MDC_IDC_STAT_TACHYTHERAPY_SHOCKS_ABORTED_TOTAL: 0
MDC_IDC_STAT_TACHYTHERAPY_SHOCKS_DELIVERED_RECENT: 0
MDC_IDC_STAT_TACHYTHERAPY_SHOCKS_DELIVERED_TOTAL: 0
MDC_IDC_STAT_TACHYTHERAPY_TOTAL_DTM_END: NORMAL
MDC_IDC_STAT_TACHYTHERAPY_TOTAL_DTM_START: NORMAL
MICROALBUMIN UR-MCNC: 160 MG/L
MICROALBUMIN/CREAT UR: 106.67 MG/G CR (ref 0–17)
MONOCYTES # BLD AUTO: 0.8 10E9/L (ref 0–1.3)
MONOCYTES # BLD AUTO: 1 10E9/L (ref 0–1.3)
MONOCYTES # BLD AUTO: 1 10E9/L (ref 0–1.3)
MONOCYTES NFR BLD AUTO: 12.9 %
MONOCYTES NFR BLD AUTO: 13.8 %
MONOCYTES NFR BLD AUTO: 16.2 %
NEUTROPHILS # BLD AUTO: 4.5 10E9/L (ref 1.6–8.3)
NEUTROPHILS # BLD AUTO: 4.6 10E9/L (ref 1.6–8.3)
NEUTROPHILS # BLD AUTO: 5.6 10E9/L (ref 1.6–8.3)
NEUTROPHILS NFR BLD AUTO: 69.6 %
NEUTROPHILS NFR BLD AUTO: 70 %
NEUTROPHILS NFR BLD AUTO: 74.7 %
NITRATE UR QL: NEGATIVE
NITRATE UR QL: NEGATIVE
NONHDLC SERPL-MCNC: 89 MG/DL
NRBC # BLD AUTO: 0 10*3/UL
NRBC # BLD AUTO: 0 10*3/UL
NRBC BLD AUTO-RTO: 0 /100
NRBC BLD AUTO-RTO: 0 /100
PH UR STRIP: 5 PH (ref 5–7)
PH UR STRIP: 5.5 PH (ref 5–7)
PLATELET # BLD AUTO: 144 10E9/L (ref 150–450)
PLATELET # BLD AUTO: 180 10E9/L (ref 150–450)
PLATELET # BLD AUTO: 193 10E9/L (ref 150–450)
POTASSIUM SERPL-SCNC: 3.4 MMOL/L (ref 3.4–5.3)
POTASSIUM SERPL-SCNC: 3.5 MMOL/L (ref 3.4–5.3)
POTASSIUM SERPL-SCNC: 4.7 MMOL/L (ref 3.4–5.3)
PROT SERPL-MCNC: 7.3 G/DL (ref 6.8–8.8)
PROT SERPL-MCNC: 7.5 G/DL (ref 6.8–8.8)
PROT SERPL-MCNC: 7.7 G/DL (ref 6.8–8.8)
RBC # BLD AUTO: 4.03 10E12/L (ref 4.4–5.9)
RBC # BLD AUTO: 4.11 10E12/L (ref 4.4–5.9)
RBC # BLD AUTO: 4.64 10E12/L (ref 4.4–5.9)
RETINOPATHY: NORMAL
SODIUM SERPL-SCNC: 141 MMOL/L (ref 133–144)
SODIUM SERPL-SCNC: 142 MMOL/L (ref 133–144)
SODIUM SERPL-SCNC: 142 MMOL/L (ref 133–144)
SOURCE: NORMAL
SOURCE: NORMAL
SP GR UR STRIP: 1.02 (ref 1–1.03)
SP GR UR STRIP: 1.02 (ref 1–1.03)
TIBC SERPL-MCNC: 290 UG/DL (ref 240–430)
TRIGL SERPL-MCNC: 100 MG/DL
TSH SERPL DL<=0.005 MIU/L-ACNC: 1.69 MU/L (ref 0.4–4)
UROBILINOGEN UR STRIP-ACNC: 0.2 EU/DL (ref 0.2–1)
UROBILINOGEN UR STRIP-ACNC: 0.2 EU/DL (ref 0.2–1)
WBC # BLD AUTO: 6.4 10E9/L (ref 4–11)
WBC # BLD AUTO: 6.5 10E9/L (ref 4–11)
WBC # BLD AUTO: 7.5 10E9/L (ref 4–11)

## 2020-01-01 PROCEDURE — 93284 PRGRMG EVAL IMPLANTABLE DFB: CPT | Performed by: INTERNAL MEDICINE

## 2020-01-01 PROCEDURE — 52000 CYSTOURETHROSCOPY: CPT | Performed by: UROLOGY

## 2020-01-01 PROCEDURE — 85025 COMPLETE CBC W/AUTO DIFF WBC: CPT | Performed by: INTERNAL MEDICINE

## 2020-01-01 PROCEDURE — 80061 LIPID PANEL: CPT | Performed by: INTERNAL MEDICINE

## 2020-01-01 PROCEDURE — 83036 HEMOGLOBIN GLYCOSYLATED A1C: CPT | Performed by: INTERNAL MEDICINE

## 2020-01-01 PROCEDURE — 99214 OFFICE O/P EST MOD 30 MIN: CPT | Mod: 95 | Performed by: INTERNAL MEDICINE

## 2020-01-01 PROCEDURE — 82306 VITAMIN D 25 HYDROXY: CPT | Performed by: INTERNAL MEDICINE

## 2020-01-01 PROCEDURE — 83615 LACTATE (LD) (LDH) ENZYME: CPT | Performed by: INTERNAL MEDICINE

## 2020-01-01 PROCEDURE — 93306 TTE W/DOPPLER COMPLETE: CPT | Mod: 26 | Performed by: INTERNAL MEDICINE

## 2020-01-01 PROCEDURE — 99213 OFFICE O/P EST LOW 20 MIN: CPT | Mod: 25 | Performed by: UROLOGY

## 2020-01-01 PROCEDURE — 80053 COMPREHEN METABOLIC PANEL: CPT | Performed by: INTERNAL MEDICINE

## 2020-01-01 PROCEDURE — 83550 IRON BINDING TEST: CPT | Performed by: INTERNAL MEDICINE

## 2020-01-01 PROCEDURE — 93296 REM INTERROG EVL PM/IDS: CPT | Performed by: INTERNAL MEDICINE

## 2020-01-01 PROCEDURE — 36415 COLL VENOUS BLD VENIPUNCTURE: CPT

## 2020-01-01 PROCEDURE — 34300033 ZZH RX 343: Performed by: INTERNAL MEDICINE

## 2020-01-01 PROCEDURE — 99441 ZZC PHYSICIAN TELEPHONE EVALUATION 5-10 MIN: CPT | Mod: 95 | Performed by: INTERNAL MEDICINE

## 2020-01-01 PROCEDURE — 36415 COLL VENOUS BLD VENIPUNCTURE: CPT | Performed by: INTERNAL MEDICINE

## 2020-01-01 PROCEDURE — 99214 OFFICE O/P EST MOD 30 MIN: CPT | Mod: 95 | Performed by: NURSE PRACTITIONER

## 2020-01-01 PROCEDURE — 99214 OFFICE O/P EST MOD 30 MIN: CPT | Mod: 25 | Performed by: INTERNAL MEDICINE

## 2020-01-01 PROCEDURE — 99213 OFFICE O/P EST LOW 20 MIN: CPT | Mod: 95 | Performed by: INTERNAL MEDICINE

## 2020-01-01 PROCEDURE — 81003 URINALYSIS AUTO W/O SCOPE: CPT | Performed by: UROLOGY

## 2020-01-01 PROCEDURE — 99214 OFFICE O/P EST MOD 30 MIN: CPT | Performed by: INTERNAL MEDICINE

## 2020-01-01 PROCEDURE — 93306 TTE W/DOPPLER COMPLETE: CPT

## 2020-01-01 PROCEDURE — 93295 DEV INTERROG REMOTE 1/2/MLT: CPT | Performed by: INTERNAL MEDICINE

## 2020-01-01 PROCEDURE — 84443 ASSAY THYROID STIM HORMONE: CPT | Performed by: INTERNAL MEDICINE

## 2020-01-01 PROCEDURE — 88112 CYTOPATH CELL ENHANCE TECH: CPT | Performed by: PATHOLOGY

## 2020-01-01 PROCEDURE — 83540 ASSAY OF IRON: CPT | Performed by: INTERNAL MEDICINE

## 2020-01-01 PROCEDURE — 99207 PR NO CHARGE LOS: CPT

## 2020-01-01 PROCEDURE — 25500064 ZZH RX 255 OP 636: Performed by: NURSE PRACTITIONER

## 2020-01-01 PROCEDURE — 99213 OFFICE O/P EST LOW 20 MIN: CPT | Mod: 95 | Performed by: NURSE PRACTITIONER

## 2020-01-01 PROCEDURE — 82728 ASSAY OF FERRITIN: CPT | Performed by: INTERNAL MEDICINE

## 2020-01-01 PROCEDURE — 71260 CT THORAX DX C+: CPT

## 2020-01-01 PROCEDURE — 40001009 ZZH VIDEO/TELEPHONE VISIT; NO CHARGE

## 2020-01-01 PROCEDURE — 999N001193 HC VIDEO/TELEPHONE VISIT; NO CHARGE

## 2020-01-01 PROCEDURE — 25000128 H RX IP 250 OP 636: Performed by: INTERNAL MEDICINE

## 2020-01-01 PROCEDURE — A9552 F18 FDG: HCPCS | Performed by: INTERNAL MEDICINE

## 2020-01-01 PROCEDURE — 82043 UR ALBUMIN QUANTITATIVE: CPT | Performed by: INTERNAL MEDICINE

## 2020-01-01 PROCEDURE — 88112 CYTOPATH CELL ENHANCE TECH: CPT | Performed by: UROLOGY

## 2020-01-01 RX ORDER — ATORVASTATIN CALCIUM 40 MG/1
40 TABLET, FILM COATED ORAL AT BEDTIME
Qty: 90 TABLET | Refills: 3 | Status: ON HOLD | OUTPATIENT
Start: 2020-01-01 | End: 2021-01-01

## 2020-01-01 RX ORDER — METOPROLOL SUCCINATE 50 MG/1
75 TABLET, EXTENDED RELEASE ORAL DAILY
Qty: 120 TABLET | Refills: 3 | Status: ON HOLD | OUTPATIENT
Start: 2020-01-01 | End: 2021-01-01

## 2020-01-01 RX ORDER — HYDRALAZINE HYDROCHLORIDE 25 MG/1
25 TABLET, FILM COATED ORAL 3 TIMES DAILY
Qty: 280 TABLET | Refills: 3 | Status: ON HOLD | OUTPATIENT
Start: 2020-01-01 | End: 2021-01-01

## 2020-01-01 RX ORDER — ERGOCALCIFEROL 1.25 MG/1
CAPSULE, LIQUID FILLED ORAL
Qty: 12 CAPSULE | Refills: 0 | Status: SHIPPED | OUTPATIENT
Start: 2020-01-01 | End: 2021-01-01 | Stop reason: DRUGHIGH

## 2020-01-01 RX ORDER — LIDOCAINE HYDROCHLORIDE 20 MG/ML
JELLY TOPICAL ONCE
Status: COMPLETED | OUTPATIENT
Start: 2020-01-01 | End: 2020-01-01

## 2020-01-01 RX ORDER — MULTIVIT-MIN/IRON/FOLIC ACID/K 18-600-40
1000 CAPSULE ORAL DAILY
Status: ON HOLD | COMMUNITY
End: 2021-01-01

## 2020-01-01 RX ORDER — VENLAFAXINE 75 MG/1
75 TABLET ORAL 2 TIMES DAILY
Qty: 60 TABLET | Refills: 0 | Status: SHIPPED | OUTPATIENT
Start: 2020-01-01 | End: 2020-01-01

## 2020-01-01 RX ORDER — FUROSEMIDE 20 MG
30 TABLET ORAL DAILY
Qty: 90 TABLET | Refills: 1 | Status: SHIPPED | OUTPATIENT
Start: 2020-01-01 | End: 2020-01-01

## 2020-01-01 RX ORDER — CIPROFLOXACIN 500 MG/1
500 TABLET, FILM COATED ORAL ONCE
Qty: 1 TABLET | Refills: 0 | Status: SHIPPED | OUTPATIENT
Start: 2020-01-01 | End: 2020-01-01

## 2020-01-01 RX ORDER — FUROSEMIDE 20 MG
30 TABLET ORAL DAILY
Qty: 90 TABLET | Refills: 1 | Status: SHIPPED | OUTPATIENT
Start: 2020-01-01 | End: 2021-01-01

## 2020-01-01 RX ORDER — IOPAMIDOL 755 MG/ML
50-135 INJECTION, SOLUTION INTRAVASCULAR ONCE
Status: COMPLETED | OUTPATIENT
Start: 2020-01-01 | End: 2020-01-01

## 2020-01-01 RX ORDER — VENLAFAXINE 75 MG/1
75 TABLET ORAL 2 TIMES DAILY
Qty: 60 TABLET | Refills: 6 | Status: ON HOLD | OUTPATIENT
Start: 2020-01-01 | End: 2021-01-01

## 2020-01-01 RX ORDER — LIDOCAINE HYDROCHLORIDE 20 MG/ML
JELLY TOPICAL ONCE
Status: DISCONTINUED | OUTPATIENT
Start: 2020-01-01 | End: 2020-01-01 | Stop reason: HOSPADM

## 2020-01-01 RX ORDER — ERGOCALCIFEROL 1.25 MG/1
CAPSULE, LIQUID FILLED ORAL
Qty: 12 CAPSULE | Refills: 0 | OUTPATIENT
Start: 2020-01-01

## 2020-01-01 RX ADMIN — HUMAN ALBUMIN MICROSPHERES AND PERFLUTREN 9 ML: 10; .22 INJECTION, SOLUTION INTRAVENOUS at 12:00

## 2020-01-01 RX ADMIN — IOPAMIDOL 114 ML: 755 INJECTION, SOLUTION INTRAVENOUS at 13:22

## 2020-01-01 RX ADMIN — FLUDEOXYGLUCOSE F-18 13.51 MCI.: 500 INJECTION, SOLUTION INTRAVENOUS at 13:11

## 2020-01-01 RX ADMIN — LIDOCAINE HYDROCHLORIDE: 20 JELLY TOPICAL at 14:48

## 2020-01-01 ASSESSMENT — ENCOUNTER SYMPTOMS
POLYPHAGIA: 0
WEAKNESS: 0
NUMBNESS: 0
MYALGIAS: 0
VOMITING: 0
HEADACHES: 0
FATIGUE: 0
DIAPHORESIS: 0
CONSTIPATION: 0
TREMORS: 0
NAUSEA: 0
POLYDIPSIA: 0
DIARRHEA: 0
LIGHT-HEADEDNESS: 0
ABDOMINAL PAIN: 0
SHORTNESS OF BREATH: 0
PALPITATIONS: 0
UNEXPECTED WEIGHT CHANGE: 0

## 2020-01-01 ASSESSMENT — ANXIETY QUESTIONNAIRES
GAD7 TOTAL SCORE: 2
1. FEELING NERVOUS, ANXIOUS, OR ON EDGE: NOT AT ALL
5. BEING SO RESTLESS THAT IT IS HARD TO SIT STILL: NOT AT ALL
2. NOT BEING ABLE TO STOP OR CONTROL WORRYING: SEVERAL DAYS
IF YOU CHECKED OFF ANY PROBLEMS ON THIS QUESTIONNAIRE, HOW DIFFICULT HAVE THESE PROBLEMS MADE IT FOR YOU TO DO YOUR WORK, TAKE CARE OF THINGS AT HOME, OR GET ALONG WITH OTHER PEOPLE: SOMEWHAT DIFFICULT
6. BECOMING EASILY ANNOYED OR IRRITABLE: NOT AT ALL
7. FEELING AFRAID AS IF SOMETHING AWFUL MIGHT HAPPEN: SEVERAL DAYS
GAD7 TOTAL SCORE: 2
3. WORRYING TOO MUCH ABOUT DIFFERENT THINGS: NOT AT ALL

## 2020-01-01 ASSESSMENT — PAIN SCALES - GENERAL
PAINLEVEL: NO PAIN (0)

## 2020-01-01 ASSESSMENT — MIFFLIN-ST. JEOR
SCORE: 1721.43
SCORE: 1617.57
SCORE: 1618.93
SCORE: 1644.32
SCORE: 1621.64
SCORE: 1617.57

## 2020-01-01 ASSESSMENT — PATIENT HEALTH QUESTIONNAIRE - PHQ9
5. POOR APPETITE OR OVEREATING: NOT AT ALL
SUM OF ALL RESPONSES TO PHQ QUESTIONS 1-9: 1

## 2020-01-02 LAB — COPATH REPORT: NORMAL

## 2020-01-06 ENCOUNTER — HOSPITAL ENCOUNTER (OUTPATIENT)
Facility: CLINIC | Age: 80
Setting detail: SPECIMEN
Discharge: HOME | End: 2020-01-06
Attending: INTERNAL MEDICINE | Admitting: INTERNAL MEDICINE
Payer: COMMERCIAL

## 2020-01-06 ENCOUNTER — INFUSION THERAPY VISIT (OUTPATIENT)
Dept: INFUSION THERAPY | Facility: CLINIC | Age: 80
End: 2020-01-06
Attending: INTERNAL MEDICINE
Payer: COMMERCIAL

## 2020-01-06 DIAGNOSIS — C83.398 DIFFUSE LARGE B-CELL LYMPHOMA OF EXTRANODAL SITE: ICD-10-CM

## 2020-01-06 LAB
ALBUMIN SERPL-MCNC: 3.7 G/DL (ref 3.4–5)
ALP SERPL-CCNC: 206 U/L (ref 40–150)
ALT SERPL W P-5'-P-CCNC: 27 U/L (ref 0–70)
ANION GAP SERPL CALCULATED.3IONS-SCNC: 4 MMOL/L (ref 3–14)
AST SERPL W P-5'-P-CCNC: 26 U/L (ref 0–45)
BASOPHILS # BLD AUTO: 0 10E9/L (ref 0–0.2)
BASOPHILS NFR BLD AUTO: 0.4 %
BILIRUB SERPL-MCNC: 0.5 MG/DL (ref 0.2–1.3)
BUN SERPL-MCNC: 23 MG/DL (ref 7–30)
CALCIUM SERPL-MCNC: 9.5 MG/DL (ref 8.5–10.1)
CHLORIDE SERPL-SCNC: 105 MMOL/L (ref 94–109)
CO2 SERPL-SCNC: 29 MMOL/L (ref 20–32)
CREAT SERPL-MCNC: 1.4 MG/DL (ref 0.66–1.25)
DIFFERENTIAL METHOD BLD: NORMAL
EOSINOPHIL # BLD AUTO: 0.4 10E9/L (ref 0–0.7)
EOSINOPHIL NFR BLD AUTO: 3.7 %
ERYTHROCYTE [DISTWIDTH] IN BLOOD BY AUTOMATED COUNT: 14.3 % (ref 10–15)
GFR SERPL CREATININE-BSD FRML MDRD: 48 ML/MIN/{1.73_M2}
GLUCOSE SERPL-MCNC: 107 MG/DL (ref 70–99)
HCT VFR BLD AUTO: 45.9 % (ref 40–53)
HGB BLD-MCNC: 15.2 G/DL (ref 13.3–17.7)
IMM GRANULOCYTES # BLD: 0 10E9/L (ref 0–0.4)
IMM GRANULOCYTES NFR BLD: 0.3 %
LDH SERPL L TO P-CCNC: 306 U/L (ref 85–227)
LYMPHOCYTES # BLD AUTO: 1.7 10E9/L (ref 0.8–5.3)
LYMPHOCYTES NFR BLD AUTO: 17.3 %
MCH RBC QN AUTO: 32.3 PG (ref 26.5–33)
MCHC RBC AUTO-ENTMCNC: 33.1 G/DL (ref 31.5–36.5)
MCV RBC AUTO: 98 FL (ref 78–100)
MONOCYTES # BLD AUTO: 0.6 10E9/L (ref 0–1.3)
MONOCYTES NFR BLD AUTO: 6.3 %
NEUTROPHILS # BLD AUTO: 7 10E9/L (ref 1.6–8.3)
NEUTROPHILS NFR BLD AUTO: 72 %
NRBC # BLD AUTO: 0 10*3/UL
NRBC BLD AUTO-RTO: 0 /100
PLATELET # BLD AUTO: 210 10E9/L (ref 150–450)
POTASSIUM SERPL-SCNC: 4 MMOL/L (ref 3.4–5.3)
PROT SERPL-MCNC: 7.9 G/DL (ref 6.8–8.8)
RBC # BLD AUTO: 4.7 10E12/L (ref 4.4–5.9)
SODIUM SERPL-SCNC: 138 MMOL/L (ref 133–144)
WBC # BLD AUTO: 9.7 10E9/L (ref 4–11)

## 2020-01-06 PROCEDURE — 83615 LACTATE (LD) (LDH) ENZYME: CPT | Performed by: INTERNAL MEDICINE

## 2020-01-06 PROCEDURE — 85025 COMPLETE CBC W/AUTO DIFF WBC: CPT | Performed by: INTERNAL MEDICINE

## 2020-01-06 PROCEDURE — 36415 COLL VENOUS BLD VENIPUNCTURE: CPT

## 2020-01-06 PROCEDURE — 80053 COMPREHEN METABOLIC PANEL: CPT | Performed by: INTERNAL MEDICINE

## 2020-01-06 NOTE — PROGRESS NOTES
Medical Assistant Note:  Gibson Villalta presents today for blood draw.    Patient seen by provider today: No.   present during visit today: Not Applicable.    Concerns: No Concerns.    Procedure:  Lab draw site: Left Hand , Needle type: bf, Gauge: 23.    Post Assessment:  Labs drawn without difficulty: Yes.    Discharge Plan:  Departure Mode: Ambulatory.    Face to Face Time: 5 min  .    Lana Ibarra, CMA

## 2020-01-19 NOTE — PROGRESS NOTES
[facility]    Hematology/Oncology Established Patient Follow-up Note      Today's Date: 01/20/20    Reason for Follow-up: Diffuse large B-cell lymphoma.    HISTORY OF PRESENT ILLNESS: Gibson Villalta is a 78 year old male with history of dementia, RV and LV mural thrombus on chronic anticoagulation with Coumadin, status post Smita-en-Y gastric bypass, hypertension, chronic kidney disease, CAD, cardiomyopathy with EF 25-30%, diabetes mellitus who presents with the following oncologic history:  1.  7/2018: Hospitalized for melena.  7/30/2018 CT abdomen/pelvis with contrast showed postop changes of prior gastric bypass procedure, ill-defined soft tissue thickening in the proximal stomach, increased since 8/9/2016, no evidence for bowel obstruction, colitis, or diverticulitis; no free fluid; 2 bladder wall lesions enhancing, largest measuring 3.9 x 1.8 cm and smaller bladder wall lesion near midline measuring 2 x 1.4 cm, several left renal cysts; liver, gallbladder, spleen, adrenal glands, pancreas, and remaining kidneys unremarkable.  2.  7/30/2018: Upper endoscopy showed a medium sized, fungating, partially circumferential mass with oozing and stigmata of recent bleeding on the greater curvature of the stomach with biopsies suggesting possible lymphoma but it is negative for adenocarcinoma; esophagus and jejunum normal; gastric bypass with normal-sized pouch and intact staple line.  Patient denied any associated abdominal pain, fevers, chills, night sweats, unintentional weight loss, chest pain, dyspnea, hematuria, dysuria.  3.  8/2/2018: Repeat upper endoscopy with biopsy of the stomach mass showed active inflammation and atypical cells with crush artifact.  There were insufficient number of atypical cells to render a definitive diagnosis.  4. 9/21/2018: Underwent TURBT under the care of Dr. Ryan Camarillo.  Pathology showed a T1 high-grade bladder cancer with micropapillary features.  5.   10/19/2018: Repeat upper endoscopy with biopsies confirmed diffuse large B-cell lymphoma, non-germinal center B-cell like, FISH positive for MYC.  Helicobacter pylori stain negative.  6. 10/30/2018: Bone marrow biopsy negative for lymphoma involvement.  7.  10/31/2018: PET/CT scan showed extensive hypermetabolic thickening of the stomach and several loops of small bowel in the left upper quadrant and right lower quadrant consistent with the diagnosed lymphoma, hypermetabolic mesenteric lymphadenopathy, slightly increased metabolic activity in the bone marrow, scattered small subcentimeter calcified and noncalcified pulmonary nodules with low metabolic activity.  8.  11/12/2018: Underwent cystoscopy with restaging TURBT which showed no evidence of tumor regrowth and no new bladder lesions.  Previous area of resection noted on the right lateral wall just lateral to the right UO.  9.  11/26/2018: Started first-line therapy with mini R-CHOP chemotherapy.  10. 1/28/2019: After 3 cycles of mini R-CHOP, PET/CT scan showed marked improvement in the hypermetabolic thickening of the stomach and loops of small bowel in the left upper quadrant and right lower quadrant, consistent with good response to treatment.  Previous hypermetabolic mesenteric lymphadenopathy has resolved.  Scattered tiny subcentimeter calcified and noncalcified pulmonary nodules have low metabolic activity and are unchanged.  These could represent benign calcified and noncalcified granulomata.  11.  2/19/2019: Cycle 5 and 6 of chemotherapy, doxorubicin held due to worsening shortness of breath and worsened dilation of the left ventricle.  Completion of cycle 6 on 3/12/19.  12.  4/4/2019: PET/CT scan showed no residual lymphoma.  Calcification seen in the bladder.  Benign cyst in left kidney.      INTERIM HISTORY:  Gibson Villalta reports feeling overall well and denies any fevers, chills, night sweats, unintentional weight loss, bowel or bladder  dysfunction or hematochezia or melena.  He denies any fatigue or shortness of breath.  He recently underwent a repeat cystoscopy unders found to have urothelial carcinoma in situ that was removed.  He has subsequent follow-up with urology.      REVIEW OF SYSTEMS:   14 point ROS was reviewed and is negative other than as noted above in HPI.       HOME MEDICATIONS:  Current Outpatient Medications   Medication Sig Dispense Refill     ACE/ARB NOT PRESCRIBED, INTENTIONAL, ACE & ARB not prescribed due to Symptomatic hypotension not due to excessive diuresis       ASPIRIN NOT PRESCRIBED (INTENTIONAL) Please choose reason not prescribed, below       atorvastatin (LIPITOR) 40 MG tablet Take 1 tablet (40 mg) by mouth At Bedtime 90 tablet 3     COENZYME Q-10 PO Take 100 mg by mouth every morning        ferrous sulfate (IRON) 325 (65 Fe) MG tablet Take 325 mg by mouth daily       furosemide (LASIX) 20 MG tablet Take 1 tablet (20 mg) by mouth daily 90 tablet 1     hydrALAZINE (APRESOLINE) 10 MG tablet Take 1 tablet (10 mg) by mouth 3 times daily 270 tablet 1     metoprolol succinate ER (TOPROL-XL) 50 MG 24 hr tablet TAKE ONE TABLET BY MOUTH ONE TIME DAILY  90 tablet 3     multivitamin, therapeutic with minerals (MULTI-VITAMIN) TABS Take 1 tablet by mouth every morning        venlafaxine (EFFEXOR) 75 MG tablet TAKE 1 TABLET BY MOUTH TWICE DAILY  180 tablet 1     vitamin D2 (ERGOCALCIFEROL) 01062 units (1250 mcg) capsule TAKE ONE CAPSULE BY MOUTH ONCE A WEEK ON TUESDAY 12 capsule 1         ALLERGIES:  Allergies   Allergen Reactions     Wasps [Hornets]      Sand wasp --- years ago.           PAST MEDICAL HISTORY:  Past Medical History:   Diagnosis Date     Acute kidney injury (H) 2012     Anemia      Anxiety      Anxiety and depression      Bladder mass      BPH (benign prostatic hypertrophy)      Cardiomyopathy (H)     ICD implanted 11/2016     Carpal tunnel syndrome     Right     Chronic kidney disease (CKD), stage 3 (moderate)       Dementia (H)      Depressive disorder      Diabetes (H)      Diffuse large B-cell lymphoma of extranodal site (H) 11/23/2018     Gastric mass      Gout      History of depression      LBBB (left bundle branch block) 2013     Lumbar herniated disc     L5-S1     Mixed cardiomyopathy 7/22/2016     Myocardial infarction (H) 1995 and 2010     Nonischemic cardiomyopathy (H) 7/22/2016     Obesity      Pacemaker     ICD/PM     Rosacea      Rotator cuff disorder     Tear x 2     RV (right ventricular) mural thrombus 7/22/2016         PAST SURGICAL HISTORY:  Past Surgical History:   Procedure Laterality Date     ANGIOPLASTY  1995 and 2010 with stent     BIOPSY      stomach     BONE MARROW BIOPSY, BONE SPECIMEN, NEEDLE/TROCAR N/A 10/30/2018    Procedure: BIOPSY BONE MARROW;  Surgeon: Jeb Romero MD;  Location:  GI     CARDIAC SURGERY      ICD/PM     CYSTOSCOPY       CYSTOSCOPY, TRANSURETHRAL RESECTION (TUR) PROSTATE, COMBINED  4/22/2019    Procedure: CYSTOSCOPY AND BIPOLAR TRANSURETHRAL RESECTION (TUR) PROSTATE;  Surgeon: Ryan Camarillo MD;  Location:  OR     CYSTOSCOPY, TRANSURETHRAL RESECTION (TUR) TUMOR BLADDER, COMBINED N/A 9/19/2018    Procedure: COMBINED CYSTOSCOPY, TRANSURETHRAL RESECTION (TUR) TUMOR BLADDER;  CYSTOSCOPY, TRANSURETHRAL RESECTION BLADDER TUMOR ;  Surgeon: Ryan Camarillo MD;  Location:  OR     CYSTOSCOPY, TRANSURETHRAL RESECTION (TUR) TUMOR BLADDER, COMBINED N/A 11/12/2018    Procedure: CYSTOSCOPY RESTAGING TRANSURETHRAL RESECTION BLADDER TUMOR;  Surgeon: Ryan Camarillo MD;  Location:  OR     CYSTOSCOPY, TRANSURETHRAL RESECTION (TUR) TUMOR BLADDER, COMBINED N/A 12/30/2019    Procedure: CYSTOSCOPY, WITH TRANSURETHRAL RESECTION BLADDER TUMOR;  Surgeon: Ryan Camarillo MD;  Location:  OR     EP LEAD REVISION DUAL N/A 10/24/2019    Procedure: EP Lead Revision Dual;  Surgeon: Josias Hernandez MD;  Location:  HEART CARDIAC CATH LAB     ESOPHAGOSCOPY, GASTROSCOPY,  DUODENOSCOPY (EGD), COMBINED N/A 2018    Procedure: COMBINED ESOPHAGOSCOPY, GASTROSCOPY, DUODENOSCOPY (EGD), BIOPSY SINGLE OR MULTIPLE;  gastroscopy;  Surgeon: Parish Xiong MD;  Location:  GI     ESOPHAGOSCOPY, GASTROSCOPY, DUODENOSCOPY (EGD), COMBINED N/A 2018    Procedure: COMBINED ESOPHAGOSCOPY, GASTROSCOPY, DUODENOSCOPY (EGD);  EGD;  Surgeon: Parish Xiong MD;  Location:  GI     ESOPHAGOSCOPY, GASTROSCOPY, DUODENOSCOPY (EGD), COMBINED N/A 2018    Procedure: COMBINED ESOPHAGOSCOPY, GASTROSCOPY, DUODENOSCOPY (EGD), BIOPSY SINGLE OR MULTIPLE;  gastroscopy BIOPSYSHOULD BE SENT TO LAB IN NS NOT FORMALIN PER ;  Surgeon: Jonathon Bush MD;  Location:  GI     GASTRIC BYPASS       HEART CATH LEFT HEART CATH  16    Non ischemic cardiomyopathy; Non functionally significant LAD and RCA disease      IR PORT REMOVAL RIGHT  2019     LASER HOLMIUM LITHOTRIPSY BLADDER N/A 2019    Procedure: CYSTOSCOPY, HOLMIUM LASER LITHOLAPAXY ,  AND BIPOLAR TRANSURETHRAL RESECTION (TUR) PROSTATE;  Surgeon: Ryan Camarillo MD;  Location:  OR     OTHER SURGICAL HISTORY  2016    CRT-D implantation         SOCIAL HISTORY:  Social History     Socioeconomic History     Marital status:      Spouse name: Not on file     Number of children: Not on file     Years of education: Not on file     Highest education level: Not on file   Occupational History     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     Comment: [employer]; retired   Social Needs     Financial resource strain: Not on file     Food insecurity:     Worry: Not on file     Inability: Not on file     Transportation needs:     Medical: Not on file     Non-medical: Not on file   Tobacco Use     Smoking status: Former Smoker     Packs/day: 2.00     Years: 20.00     Pack years: 40.00     Types: Cigarettes     Last attempt to quit: 1980     Years since quittin.0     Smokeless tobacco: Never Used     Tobacco comment: Quit in his  30s - 2 ppd for 20 years   Substance and Sexual Activity     Alcohol use: Yes     Alcohol/week: 0.0 standard drinks     Comment: rarely     Drug use: No     Sexual activity: Yes     Partners: Female   Lifestyle     Physical activity:     Days per week: Not on file     Minutes per session: Not on file     Stress: Not on file   Relationships     Social connections:     Talks on phone: Not on file     Gets together: Not on file     Attends [employer] service: Not on file     Active member of club or organization: Not on file     Attends meetings of clubs or organizations: Not on file     Relationship status: Not on file     Intimate partner violence:     Fear of current or ex partner: Not on file     Emotionally abused: Not on file     Physically abused: Not on file     Forced sexual activity: Not on file   Other Topics Concern     Parent/sibling w/ CABG, MI or angioplasty before 65F 55M? No      Service Not Asked     Blood Transfusions Not Asked     Caffeine Concern Not Asked     Occupational Exposure Not Asked     Hobby Hazards Not Asked     Sleep Concern Not Asked     Stress Concern Not Asked     Weight Concern Not Asked     Special Diet Yes     Comment: low fat, low salt      Back Care Not Asked     Exercise No     Bike Helmet Not Asked     Seat Belt Not Asked     Self-Exams Not Asked   Social History Narrative     Not on file         FAMILY HISTORY:  Family History   Problem Relation Age of Onset     Coronary Artery Disease Father 39     Alzheimer Disease Mother      Coronary Artery Disease Son         Two sons have  from MIs (ages 39 and 51)         PHYSICAL EXAM:  Vital signs:  /64   Pulse 81   Resp 16   Wt 95.3 kg (210 lb)   SpO2 94%   BMI 27.71 kg/m     ECO  GENERAL/CONSTITUTIONAL: No acute distress.  EYES: No scleral icterus.  ENT/MOUTH: Neck supple. Oropharynx clear, no mucositis.  LYMPH: No occipital, pre-or postauricular, submandibular, submental, anterior cervical, posterior  cervical, supraclavicular, axillary or inguinal adenopathy.   RESPIRATORY: Clear to auscultation bilaterally. No crackles or wheezing.   CARDIOVASCULAR: Regular rate and rhythm without murmurs.  GASTROINTESTINAL: No hepatosplenomegaly, masses, or tenderness. No guarding or distention.  MUSCULOSKELETAL: Warm and well-perfused, no cyanosis, clubbing, or edema.  NEUROLOGIC: Cranial nerves II-XII are intact. Alert, oriented, answers questions appropriately.  INTEGUMENTARY: No rashes or jaundice.  Pacemaker in the upper chest region.  GAIT: Steady, does not use assistive device      LABS:  CBC RESULTS:   Recent Labs   Lab Test 01/06/20  1357   WBC 9.7   RBC 4.70   HGB 15.2   HCT 45.9   MCV 98   MCH 32.3   MCHC 33.1   RDW 14.3        Last Comprehensive Metabolic Panel:  Sodium   Date Value Ref Range Status   01/06/2020 138 133 - 144 mmol/L Final     Potassium   Date Value Ref Range Status   01/06/2020 4.0 3.4 - 5.3 mmol/L Final     Chloride   Date Value Ref Range Status   01/06/2020 105 94 - 109 mmol/L Final     Carbon Dioxide   Date Value Ref Range Status   01/06/2020 29 20 - 32 mmol/L Final     Anion Gap   Date Value Ref Range Status   01/06/2020 4 3 - 14 mmol/L Final     Glucose   Date Value Ref Range Status   01/06/2020 107 (H) 70 - 99 mg/dL Final     Urea Nitrogen   Date Value Ref Range Status   01/06/2020 23 7 - 30 mg/dL Final     Creatinine   Date Value Ref Range Status   01/06/2020 1.40 (H) 0.66 - 1.25 mg/dL Final     GFR Estimate   Date Value Ref Range Status   01/06/2020 48 (L) >60 mL/min/[1.73_m2] Final     Comment:     Non  GFR Calc  Starting 12/18/2018, serum creatinine based estimated GFR (eGFR) will be   calculated using the Chronic Kidney Disease Epidemiology Collaboration   (CKD-EPI) equation.       Calcium   Date Value Ref Range Status   01/06/2020 9.5 8.5 - 10.1 mg/dL Final     Bilirubin Total   Date Value Ref Range Status   01/06/2020 0.5 0.2 - 1.3 mg/dL Final     Alkaline  Phosphatase   Date Value Ref Range Status   01/06/2020 206 (H) 40 - 150 U/L Final     ALT   Date Value Ref Range Status   01/06/2020 27 0 - 70 U/L Final     AST   Date Value Ref Range Status   01/06/2020 26 0 - 45 U/L Final       PATHOLOGY:  12/30/2019: Bladder tumor biopsy showed urothelial carcinoma in situ and no evidence of invasion in the planes examined.  Muscularis propria present.    IMAGING:  None new.    ASSESSMENT/PLAN:  Gibson Villalta is a 78 year old male with the following issues:  1.  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  I discussed that his mildly elevated lactate dehydrogenase is likely reflective from the prior recent procedure/cystoscopy.  -I recommended active surveillance with return in 3 months with repeat CBC, CMP, and LDH.    -I recommended repeating a PET/CT scan only if he demonstrates signs or symptoms suggestive of lymphoma recurrence.  He expressed agreement with this plan of care.     2.  High-grade bladder cancer, T1  -These were seen on CT abdomen/pelvis on 7/30/2018 and further evaluated by Dr. Howard Camarillo on 9/07/2018.    -He proceeded with TURBT x 2 in 9/2018 and 11/2018 with no residual tumor or new bladder cancers.  No adjuvant chemotherapy was recommended for his T1 tumor at that time.  -However, he was found to have urothelial carcinoma in situ and a bladder tumor biopsy on 12/30/2019.  There was no clinical evidence for muscle invasive disease.  He has subsequent follow-up with Dr. Camarillo for further management.    3. Cardiomyopathy with systolic dysfunction  -Stable.  Has no new shortness of breath.  Continue Lasix and follow-up with cardiology as needed.        Return in 3 months.      Maribel Bender MD  Hematology/Oncology  [facility]    I spent a total of 25 minutes with the patient, with greater than 50% of the time in counseling and coordination of care.

## 2020-01-20 ENCOUNTER — ONCOLOGY VISIT (OUTPATIENT)
Dept: ONCOLOGY | Facility: CLINIC | Age: 80
End: 2020-01-20
Attending: INTERNAL MEDICINE
Payer: COMMERCIAL

## 2020-01-20 VITALS
DIASTOLIC BLOOD PRESSURE: 64 MMHG | HEART RATE: 81 BPM | RESPIRATION RATE: 16 BRPM | BODY MASS INDEX: 27.71 KG/M2 | WEIGHT: 210 LBS | SYSTOLIC BLOOD PRESSURE: 130 MMHG | OXYGEN SATURATION: 94 %

## 2020-01-20 DIAGNOSIS — I42.0 DILATED CARDIOMYOPATHY (H): ICD-10-CM

## 2020-01-20 DIAGNOSIS — C67.9 MALIGNANT NEOPLASM OF URINARY BLADDER, UNSPECIFIED SITE (H): ICD-10-CM

## 2020-01-20 DIAGNOSIS — C83.398 DIFFUSE LARGE B-CELL LYMPHOMA OF EXTRANODAL SITE: Primary | ICD-10-CM

## 2020-01-20 PROCEDURE — 99214 OFFICE O/P EST MOD 30 MIN: CPT | Performed by: INTERNAL MEDICINE

## 2020-01-20 PROCEDURE — G0463 HOSPITAL OUTPT CLINIC VISIT: HCPCS

## 2020-01-20 ASSESSMENT — PAIN SCALES - GENERAL: PAINLEVEL: NO PAIN (0)

## 2020-01-20 NOTE — LETTER
1/20/2020         RE: Gibson Villalta  [address]  [place] [address]        Dear Colleague,    Thank you for referring your patient, Gibson Villalta, to the [facility]. Please see a copy of my visit note below.    [facility]    Hematology/Oncology Established Patient Follow-up Note      Today's Date: 01/20/20    Reason for Follow-up: Diffuse large B-cell lymphoma.    HISTORY OF PRESENT ILLNESS: Gibson Villalta is a 78 year old male with history of dementia, RV and LV mural thrombus on chronic anticoagulation with Coumadin, status post Smita-en-Y gastric bypass, hypertension, chronic kidney disease, CAD, cardiomyopathy with EF 25-30%, diabetes mellitus who presents with the following oncologic history:  1.  7/2018: Hospitalized for melena.  7/30/2018 CT abdomen/pelvis with contrast showed postop changes of prior gastric bypass procedure, ill-defined soft tissue thickening in the proximal stomach, increased since 8/9/2016, no evidence for bowel obstruction, colitis, or diverticulitis; no free fluid; 2 bladder wall lesions enhancing, largest measuring 3.9 x 1.8 cm and smaller bladder wall lesion near midline measuring 2 x 1.4 cm, several left renal cysts; liver, gallbladder, spleen, adrenal glands, pancreas, and remaining kidneys unremarkable.  2.  7/30/2018: Upper endoscopy showed a medium sized, fungating, partially circumferential mass with oozing and stigmata of recent bleeding on the greater curvature of the stomach with biopsies suggesting possible lymphoma but it is negative for adenocarcinoma; esophagus and jejunum normal; gastric bypass with normal-sized pouch and intact staple line.  Patient denied any associated abdominal pain, fevers, chills, night sweats, unintentional weight loss, chest pain, dyspnea, hematuria, dysuria.  3.  8/2/2018: Repeat upper endoscopy with biopsy of the stomach mass showed active inflammation and atypical  cells with crush artifact.  There were insufficient number of atypical cells to render a definitive diagnosis.  4. 9/21/2018: Underwent TURBT under the care of Dr. Ryan Camarillo.  Pathology showed a T1 high-grade bladder cancer with micropapillary features.  5.  10/19/2018: Repeat upper endoscopy with biopsies confirmed diffuse large B-cell lymphoma, non-germinal center B-cell like, FISH positive for MYC.  Helicobacter pylori stain negative.  6. 10/30/2018: Bone marrow biopsy negative for lymphoma involvement.  7.  10/31/2018: PET/CT scan showed extensive hypermetabolic thickening of the stomach and several loops of small bowel in the left upper quadrant and right lower quadrant consistent with the diagnosed lymphoma, hypermetabolic mesenteric lymphadenopathy, slightly increased metabolic activity in the bone marrow, scattered small subcentimeter calcified and noncalcified pulmonary nodules with low metabolic activity.  8.  11/12/2018: Underwent cystoscopy with restaging TURBT which showed no evidence of tumor regrowth and no new bladder lesions.  Previous area of resection noted on the right lateral wall just lateral to the right UO.  9.  11/26/2018: Started first-line therapy with mini R-CHOP chemotherapy.  10. 1/28/2019: After 3 cycles of mini R-CHOP, PET/CT scan showed marked improvement in the hypermetabolic thickening of the stomach and loops of small bowel in the left upper quadrant and right lower quadrant, consistent with good response to treatment.  Previous hypermetabolic mesenteric lymphadenopathy has resolved.  Scattered tiny subcentimeter calcified and noncalcified pulmonary nodules have low metabolic activity and are unchanged.  These could represent benign calcified and noncalcified granulomata.  11.  2/19/2019: Cycle 5 and 6 of chemotherapy, doxorubicin held due to worsening shortness of breath and worsened dilation of the left ventricle.  Completion of cycle 6 on 3/12/19.  12.  4/4/2019: PET/CT scan  showed no residual lymphoma.  Calcification seen in the bladder.  Benign cyst in left kidney.      INTERIM HISTORY:  Gibson Villalta reports feeling overall well and denies any fevers, chills, night sweats, unintentional weight loss, bowel or bladder dysfunction or hematochezia or melena.  He denies any fatigue or shortness of breath.  He recently underwent a repeat cystoscopy unders found to have urothelial carcinoma in situ that was removed.  He has subsequent follow-up with urology.      REVIEW OF SYSTEMS:   14 point ROS was reviewed and is negative other than as noted above in HPI.       HOME MEDICATIONS:  Current Outpatient Medications   Medication Sig Dispense Refill     ACE/ARB NOT PRESCRIBED, INTENTIONAL, ACE & ARB not prescribed due to Symptomatic hypotension not due to excessive diuresis       ASPIRIN NOT PRESCRIBED (INTENTIONAL) Please choose reason not prescribed, below       atorvastatin (LIPITOR) 40 MG tablet Take 1 tablet (40 mg) by mouth At Bedtime 90 tablet 3     COENZYME Q-10 PO Take 100 mg by mouth every morning        ferrous sulfate (IRON) 325 (65 Fe) MG tablet Take 325 mg by mouth daily       furosemide (LASIX) 20 MG tablet Take 1 tablet (20 mg) by mouth daily 90 tablet 1     hydrALAZINE (APRESOLINE) 10 MG tablet Take 1 tablet (10 mg) by mouth 3 times daily 270 tablet 1     metoprolol succinate ER (TOPROL-XL) 50 MG 24 hr tablet TAKE ONE TABLET BY MOUTH ONE TIME DAILY  90 tablet 3     multivitamin, therapeutic with minerals (MULTI-VITAMIN) TABS Take 1 tablet by mouth every morning        venlafaxine (EFFEXOR) 75 MG tablet TAKE 1 TABLET BY MOUTH TWICE DAILY  180 tablet 1     vitamin D2 (ERGOCALCIFEROL) 37242 units (1250 mcg) capsule TAKE ONE CAPSULE BY MOUTH ONCE A WEEK ON TUESDAY 12 capsule 1         ALLERGIES:  Allergies   Allergen Reactions     Wasps [Hornets]      Sand wasp --- years ago.           PAST MEDICAL HISTORY:  Past Medical History:   Diagnosis Date     Acute kidney injury (H)  2012     Anemia      Anxiety      Anxiety and depression      Bladder mass      BPH (benign prostatic hypertrophy)      Cardiomyopathy (H)     ICD implanted 11/2016     Carpal tunnel syndrome     Right     Chronic kidney disease (CKD), stage 3 (moderate)      Dementia (H)      Depressive disorder      Diabetes (H)      Diffuse large B-cell lymphoma of extranodal site (H) 11/23/2018     Gastric mass      Gout      History of depression      LBBB (left bundle branch block) 2013     Lumbar herniated disc     L5-S1     Mixed cardiomyopathy 7/22/2016     Myocardial infarction (H) 1995 and 2010     Nonischemic cardiomyopathy (H) 7/22/2016     Obesity      Pacemaker     ICD/PM     Rosacea      Rotator cuff disorder     Tear x 2     RV (right ventricular) mural thrombus 7/22/2016         PAST SURGICAL HISTORY:  Past Surgical History:   Procedure Laterality Date     ANGIOPLASTY  1995 and 2010 with stent     BIOPSY      stomach     BONE MARROW BIOPSY, BONE SPECIMEN, NEEDLE/TROCAR N/A 10/30/2018    Procedure: BIOPSY BONE MARROW;  Surgeon: Jeb Romero MD;  Location:  GI     CARDIAC SURGERY      ICD/PM     CYSTOSCOPY       CYSTOSCOPY, TRANSURETHRAL RESECTION (TUR) PROSTATE, COMBINED  4/22/2019    Procedure: CYSTOSCOPY AND BIPOLAR TRANSURETHRAL RESECTION (TUR) PROSTATE;  Surgeon: Ryan Camarillo MD;  Location:  OR     CYSTOSCOPY, TRANSURETHRAL RESECTION (TUR) TUMOR BLADDER, COMBINED N/A 9/19/2018    Procedure: COMBINED CYSTOSCOPY, TRANSURETHRAL RESECTION (TUR) TUMOR BLADDER;  CYSTOSCOPY, TRANSURETHRAL RESECTION BLADDER TUMOR ;  Surgeon: Ryan Camarillo MD;  Location:  OR     CYSTOSCOPY, TRANSURETHRAL RESECTION (TUR) TUMOR BLADDER, COMBINED N/A 11/12/2018    Procedure: CYSTOSCOPY RESTAGING TRANSURETHRAL RESECTION BLADDER TUMOR;  Surgeon: Ryan Camarillo MD;  Location:  OR     CYSTOSCOPY, TRANSURETHRAL RESECTION (TUR) TUMOR BLADDER, COMBINED N/A 12/30/2019    Procedure: CYSTOSCOPY, WITH TRANSURETHRAL RESECTION  BLADDER TUMOR;  Surgeon: Ryan Camarillo MD;  Location:  OR     EP LEAD REVISION DUAL N/A 10/24/2019    Procedure: EP Lead Revision Dual;  Surgeon: Josias Hernandez MD;  Location:  HEART CARDIAC CATH LAB     ESOPHAGOSCOPY, GASTROSCOPY, DUODENOSCOPY (EGD), COMBINED N/A 7/30/2018    Procedure: COMBINED ESOPHAGOSCOPY, GASTROSCOPY, DUODENOSCOPY (EGD), BIOPSY SINGLE OR MULTIPLE;  gastroscopy;  Surgeon: Parish Xiong MD;  Location:  GI     ESOPHAGOSCOPY, GASTROSCOPY, DUODENOSCOPY (EGD), COMBINED N/A 7/30/2018    Procedure: COMBINED ESOPHAGOSCOPY, GASTROSCOPY, DUODENOSCOPY (EGD);  EGD;  Surgeon: Parish Xiong MD;  Location:  GI     ESOPHAGOSCOPY, GASTROSCOPY, DUODENOSCOPY (EGD), COMBINED N/A 8/2/2018    Procedure: COMBINED ESOPHAGOSCOPY, GASTROSCOPY, DUODENOSCOPY (EGD), BIOPSY SINGLE OR MULTIPLE;  gastroscopy BIOPSYSHOULD BE SENT TO LAB IN NS NOT FORMALIN PER ;  Surgeon: Jonathon Bush MD;  Location:  GI     GASTRIC BYPASS  2001     HEART CATH LEFT HEART CATH  7/19/16    Non ischemic cardiomyopathy; Non functionally significant LAD and RCA disease      IR PORT REMOVAL RIGHT  9/9/2019     LASER HOLMIUM LITHOTRIPSY BLADDER N/A 4/22/2019    Procedure: CYSTOSCOPY, HOLMIUM LASER LITHOLAPAXY ,  AND BIPOLAR TRANSURETHRAL RESECTION (TUR) PROSTATE;  Surgeon: Ryan Camarillo MD;  Location:  OR     OTHER SURGICAL HISTORY  Nov 2016    CRT-D implantation         SOCIAL HISTORY:  Social History     Socioeconomic History     Marital status:      Spouse name: Not on file     Number of children: Not on file     Years of education: Not on file     Highest education level: Not on file   Occupational History     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     Comment: [employer]; retired   Social Needs     Financial resource strain: Not on file     Food insecurity:     Worry: Not on file     Inability: Not on file     Transportation needs:     Medical: Not on file     Non-medical: Not on file   Tobacco Use      Smoking status: Former Smoker     Packs/day: 2.00     Years: 20.00     Pack years: 40.00     Types: Cigarettes     Last attempt to quit: 1980     Years since quittin.0     Smokeless tobacco: Never Used     Tobacco comment: Quit in his 30s - 2 ppd for 20 years   Substance and Sexual Activity     Alcohol use: Yes     Alcohol/week: 0.0 standard drinks     Comment: rarely     Drug use: No     Sexual activity: Yes     Partners: Female   Lifestyle     Physical activity:     Days per week: Not on file     Minutes per session: Not on file     Stress: Not on file   Relationships     Social connections:     Talks on phone: Not on file     Gets together: Not on file     Attends Spiritism service: Not on file     Active member of club or organization: Not on file     Attends meetings of clubs or organizations: Not on file     Relationship status: Not on file     Intimate partner violence:     Fear of current or ex partner: Not on file     Emotionally abused: Not on file     Physically abused: Not on file     Forced sexual activity: Not on file   Other Topics Concern     Parent/sibling w/ CABG, MI or angioplasty before 65F 55M? No      Service Not Asked     Blood Transfusions Not Asked     Caffeine Concern Not Asked     Occupational Exposure Not Asked     Hobby Hazards Not Asked     Sleep Concern Not Asked     Stress Concern Not Asked     Weight Concern Not Asked     Special Diet Yes     Comment: low fat, low salt      Back Care Not Asked     Exercise No     Bike Helmet Not Asked     Seat Belt Not Asked     Self-Exams Not Asked   Social History Narrative     Not on file         FAMILY HISTORY:  Family History   Problem Relation Age of Onset     Coronary Artery Disease Father 39     Alzheimer Disease Mother      Coronary Artery Disease Son         Two sons have  from MIs (ages 39 and 51)         PHYSICAL EXAM:  Vital signs:  /64   Pulse 81   Resp 16   Wt 95.3 kg (210 lb)   SpO2 94%   BMI 27.71  kg/m      ECO  GENERAL/CONSTITUTIONAL: No acute distress.  EYES: No scleral icterus.  ENT/MOUTH: Neck supple. Oropharynx clear, no mucositis.  LYMPH: No occipital, pre-or postauricular, submandibular, submental, anterior cervical, posterior cervical, supraclavicular, axillary or inguinal adenopathy.   RESPIRATORY: Clear to auscultation bilaterally. No crackles or wheezing.   CARDIOVASCULAR: Regular rate and rhythm without murmurs.  GASTROINTESTINAL: No hepatosplenomegaly, masses, or tenderness. No guarding or distention.  MUSCULOSKELETAL: Warm and well-perfused, no cyanosis, clubbing, or edema.  NEUROLOGIC: Cranial nerves II-XII are intact. Alert, oriented, answers questions appropriately.  INTEGUMENTARY: No rashes or jaundice.  Pacemaker in the upper chest region.  GAIT: Steady, does not use assistive device      LABS:  CBC RESULTS:   Recent Labs   Lab Test 20  1357   WBC 9.7   RBC 4.70   HGB 15.2   HCT 45.9   MCV 98   MCH 32.3   MCHC 33.1   RDW 14.3        Last Comprehensive Metabolic Panel:  Sodium   Date Value Ref Range Status   2020 138 133 - 144 mmol/L Final     Potassium   Date Value Ref Range Status   2020 4.0 3.4 - 5.3 mmol/L Final     Chloride   Date Value Ref Range Status   2020 105 94 - 109 mmol/L Final     Carbon Dioxide   Date Value Ref Range Status   2020 29 20 - 32 mmol/L Final     Anion Gap   Date Value Ref Range Status   2020 4 3 - 14 mmol/L Final     Glucose   Date Value Ref Range Status   2020 107 (H) 70 - 99 mg/dL Final     Urea Nitrogen   Date Value Ref Range Status   2020 23 7 - 30 mg/dL Final     Creatinine   Date Value Ref Range Status   2020 1.40 (H) 0.66 - 1.25 mg/dL Final     GFR Estimate   Date Value Ref Range Status   2020 48 (L) >60 mL/min/[1.73_m2] Final     Comment:     Non  GFR Calc  Starting 2018, serum creatinine based estimated GFR (eGFR) will be   calculated using the Chronic Kidney  Disease Epidemiology Collaboration   (CKD-EPI) equation.       Calcium   Date Value Ref Range Status   01/06/2020 9.5 8.5 - 10.1 mg/dL Final     Bilirubin Total   Date Value Ref Range Status   01/06/2020 0.5 0.2 - 1.3 mg/dL Final     Alkaline Phosphatase   Date Value Ref Range Status   01/06/2020 206 (H) 40 - 150 U/L Final     ALT   Date Value Ref Range Status   01/06/2020 27 0 - 70 U/L Final     AST   Date Value Ref Range Status   01/06/2020 26 0 - 45 U/L Final       PATHOLOGY:  12/30/2019: Bladder tumor biopsy showed urothelial carcinoma in situ and no evidence of invasion in the planes examined.  Muscularis propria present.    IMAGING:  None new.    ASSESSMENT/PLAN:  Gibson Villalta is a 78 year old male with the following issues:  1.  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  I discussed that his mildly elevated lactate dehydrogenase is likely reflective from the prior recent procedure/cystoscopy.  -I recommended active surveillance with return in 3 months with repeat CBC, CMP, and LDH.    -I recommended repeating a PET/CT scan only if he demonstrates signs or symptoms suggestive of lymphoma recurrence.  He expressed agreement with this plan of care.     2.  High-grade bladder cancer, T1  -These were seen on CT abdomen/pelvis on 7/30/2018 and further evaluated by Dr. Howard Camarillo on 9/07/2018.    -He proceeded with TURBT x 2 in 9/2018 and 11/2018 with no residual tumor or new bladder cancers.  No adjuvant chemotherapy was recommended for his T1 tumor at that time.  -However, he was found to have urothelial carcinoma in situ and a bladder tumor biopsy on 12/30/2019.  There was no clinical evidence for muscle invasive disease.  He has subsequent follow-up with Dr. Camarillo for further  "management.    3. Cardiomyopathy with systolic dysfunction  -Stable.  Has no new shortness of breath.  Continue Lasix and follow-up with cardiology as needed.        Return in 3 months.      Maribel Bender MD  Hematology/Oncology  [facility]    I spent a total of 25 minutes with the patient, with greater than 50% of the time in counseling and coordination of care.      Oncology Rooming Note    January 20, 2020 1:32 PM   Gibson Villalta is a 79 year old male who presents for:    Chief Complaint   Patient presents with     Oncology Clinic Visit     Initial Vitals: /64   Pulse 81   Resp 16   Wt 95.3 kg (210 lb)   SpO2 94%   BMI 27.71 kg/m    Estimated body mass index is 27.71 kg/m  as calculated from the following:    Height as of 12/30/19: 1.854 m (6' 1\").    Weight as of this encounter: 95.3 kg (210 lb). Body surface area is 2.22 meters squared.  No Pain (0) Comment: Data Unavailable   No LMP for male patient.  Allergies reviewed: Yes  Medications reviewed: Yes    Medications: Medication refills not needed today.  Pharmacy name entered into [employer]: [id number] [place], [place] - [address] [address]    Clinical concerns: no      Mayte Leal, [facility]              Again, thank you for allowing me to participate in the care of your patient.        Sincerely,        Maribel Bender MD    "

## 2020-01-20 NOTE — PROGRESS NOTES
"Oncology Rooming Note    January 20, 2020 1:32 PM   Gibson Villalta is a 79 year old male who presents for:    Chief Complaint   Patient presents with     Oncology Clinic Visit     Initial Vitals: /64   Pulse 81   Resp 16   Wt 95.3 kg (210 lb)   SpO2 94%   BMI 27.71 kg/m   Estimated body mass index is 27.71 kg/m  as calculated from the following:    Height as of 12/30/19: 1.854 m (6' 1\").    Weight as of this encounter: 95.3 kg (210 lb). Body surface area is 2.22 meters squared.  No Pain (0) Comment: Data Unavailable   No LMP for male patient.  Allergies reviewed: Yes  Medications reviewed: Yes    Medications: Medication refills not needed today.  Pharmacy name entered into [employer]: [id number] - [place] - [id number]    Clinical concerns: no      Mayte Leal CMA            "

## 2020-01-21 DIAGNOSIS — C67.2 MALIGNANT NEOPLASM OF LATERAL WALL OF URINARY BLADDER (H): Primary | ICD-10-CM

## 2020-01-29 ENCOUNTER — OFFICE VISIT (OUTPATIENT)
Dept: UROLOGY | Facility: CLINIC | Age: 80
End: 2020-01-29
Payer: COMMERCIAL

## 2020-01-29 VITALS
BODY MASS INDEX: 27.83 KG/M2 | HEIGHT: 73 IN | SYSTOLIC BLOOD PRESSURE: 136 MMHG | DIASTOLIC BLOOD PRESSURE: 70 MMHG | OXYGEN SATURATION: 98 % | WEIGHT: 210 LBS | HEART RATE: 87 BPM

## 2020-01-29 DIAGNOSIS — C67.2 MALIGNANT NEOPLASM OF LATERAL WALL OF URINARY BLADDER (H): ICD-10-CM

## 2020-01-29 LAB
ALBUMIN UR-MCNC: NEGATIVE MG/DL
APPEARANCE UR: CLEAR
BILIRUB UR QL STRIP: NEGATIVE
COLOR UR AUTO: YELLOW
GLUCOSE UR STRIP-MCNC: NEGATIVE MG/DL
HGB UR QL STRIP: NEGATIVE
KETONES UR STRIP-MCNC: NEGATIVE MG/DL
LEUKOCYTE ESTERASE UR QL STRIP: ABNORMAL
NITRATE UR QL: NEGATIVE
PH UR STRIP: 5 PH (ref 5–7)
SOURCE: ABNORMAL
SP GR UR STRIP: 1.02 (ref 1–1.03)
UROBILINOGEN UR STRIP-ACNC: 0.2 EU/DL (ref 0.2–1)

## 2020-01-29 PROCEDURE — 81003 URINALYSIS AUTO W/O SCOPE: CPT | Performed by: UROLOGY

## 2020-01-29 PROCEDURE — 99213 OFFICE O/P EST LOW 20 MIN: CPT | Performed by: UROLOGY

## 2020-01-29 RX ORDER — LEVOFLOXACIN 500 MG/1
500 TABLET, FILM COATED ORAL PRN
Qty: 12 TABLET | Refills: 0 | Status: SHIPPED | OUTPATIENT
Start: 2020-01-29 | End: 2020-01-01

## 2020-01-29 ASSESSMENT — MIFFLIN-ST. JEOR: SCORE: 1726.43

## 2020-01-29 ASSESSMENT — PAIN SCALES - GENERAL: PAINLEVEL: NO PAIN (0)

## 2020-01-29 NOTE — PROGRESS NOTES
[facility]  CHIEF COMPLAINT   It was my pleasure to see Gibson Villalta who is a 79 year old male for follow-up of bladder cancer.      HPI  Gibson Villalta is a very pleasant 79 year old male who presents with a history of mild dementia, RV and LV mural thrombus on chronic anticoagulation with Coumadin, status post Smita-en-Y gastric bypass, hypertension, CKD, CAD, cardiomyopathy with EF of 20 to 30%, diabetes mellitus.  He also has history of diffuse large B-cell lymphoma who presented with melena and soft tissue thickening of his stomach in July 2018.     9/19/18: Transurethral resection of bladder tumor (TURBT) with T1HG bladder cancer  10/19/18: Repeat upper endoscopy with biopsies confirmed diffuse large B-cell lymphoma  10/30/18: Bone marrow biopsy negative for lymphoma involvement  10/31/18: PET scan showed extensive hypermetabolic thickening of the stomach and several loops of small bowel in the left upper quadrant and right lower quadrant consistent with the diagnosis of lymphoma  11/12/18: Re-staging Transurethral resection of bladder tumor (TURBT) with confirmation of his T1 HG bladder cancer.  Following discussion with his oncologist, Dr. Bender at [facility], we discussed that treatment for his bladder cancer with BCG treatment would not be warranted at this time given his upcoming chemotherapy treatment for his lymphoma and that there would be little benefit of treatment given that this requires an intact immune response.  We will plan for management with surveillance cystoscopy  11/26/18: Started first-line therapy with many R-CHOP chemotherapy.  He follows with Dr. Bender at [facility]  1/28/19: After 3 cycles of mini R-CHOP, PET scan showed marked improvement consistent with a good response to treatment  2/19/19: Cycle 5 and 6 of chemotherapy, doxorubicin held due to worsening shortness of breath and worsened dilation of his left ventricle.  Completion of cycle 6 on March 12, 2019 4/1/19:  Follow-up with me for surveillance cystoscopy with evidence of a bladder stone and significant prostatic hypertrophy  4/4/19: PET scan showed no residual lymphoma.  Calcification seen in the bladder.  4/22/19: Cystlolithalopaxy and Transurethral resection of the prostate with no evidence of malignancy  10/30/19: last surveillance cystoscopy with some raised erythema at the prior resection site. Cytology returned as positive for malignant cells  12/30/19: Re-staging Transurethral resection of bladder tumor (TURBT) with evidence of CIS    TODAY: He returns to discuss starting BCG    Past Medical History:   Diagnosis Date     Acute kidney injury (H) 2012     Anemia      Anxiety      Anxiety and depression      Bladder mass      BPH (benign prostatic hypertrophy)      Cardiomyopathy (H)     ICD implanted 11/2016     Carpal tunnel syndrome     Right     Chronic kidney disease (CKD), stage 3 (moderate)      Dementia (H)      Depressive disorder      Diabetes (H)      Diffuse large B-cell lymphoma of extranodal site (H) 11/23/2018     Gastric mass      Gout      History of depression      LBBB (left bundle branch block) 2013     Lumbar herniated disc     L5-S1     Mixed cardiomyopathy 7/22/2016     Myocardial infarction (H) 1995 and 2010     Nonischemic cardiomyopathy (H) 7/22/2016     Obesity      Pacemaker     ICD/PM     Rosacea      Rotator cuff disorder     Tear x 2     RV (right ventricular) mural thrombus 7/22/2016     Past Surgical History:   Procedure Laterality Date     ANGIOPLASTY  1995 and 2010 with stent     BIOPSY      stomach     BONE MARROW BIOPSY, BONE SPECIMEN, NEEDLE/TROCAR N/A 10/30/2018    Procedure: BIOPSY BONE MARROW;  Surgeon: Jeb Romero MD;  Location:  GI     CARDIAC SURGERY      ICD/PM     CYSTOSCOPY       CYSTOSCOPY, TRANSURETHRAL RESECTION (TUR) PROSTATE, COMBINED  4/22/2019    Procedure: CYSTOSCOPY AND BIPOLAR TRANSURETHRAL RESECTION (TUR) PROSTATE;  Surgeon: Ryan Camarillo MD;   Location:  OR     CYSTOSCOPY, TRANSURETHRAL RESECTION (TUR) TUMOR BLADDER, COMBINED N/A 9/19/2018    Procedure: COMBINED CYSTOSCOPY, TRANSURETHRAL RESECTION (TUR) TUMOR BLADDER;  CYSTOSCOPY, TRANSURETHRAL RESECTION BLADDER TUMOR ;  Surgeon: Ryan Camarillo MD;  Location:  OR     CYSTOSCOPY, TRANSURETHRAL RESECTION (TUR) TUMOR BLADDER, COMBINED N/A 11/12/2018    Procedure: CYSTOSCOPY RESTAGING TRANSURETHRAL RESECTION BLADDER TUMOR;  Surgeon: Ryan Camarillo MD;  Location:  OR     CYSTOSCOPY, TRANSURETHRAL RESECTION (TUR) TUMOR BLADDER, COMBINED N/A 12/30/2019    Procedure: CYSTOSCOPY, WITH TRANSURETHRAL RESECTION BLADDER TUMOR;  Surgeon: Ryan Camarillo MD;  Location:  OR     EP LEAD REVISION DUAL N/A 10/24/2019    Procedure: EP Lead Revision Dual;  Surgeon: Josias Hernandez MD;  Location:  HEART CARDIAC CATH LAB     ESOPHAGOSCOPY, GASTROSCOPY, DUODENOSCOPY (EGD), COMBINED N/A 7/30/2018    Procedure: COMBINED ESOPHAGOSCOPY, GASTROSCOPY, DUODENOSCOPY (EGD), BIOPSY SINGLE OR MULTIPLE;  gastroscopy;  Surgeon: Parish Xiong MD;  Location:  GI     ESOPHAGOSCOPY, GASTROSCOPY, DUODENOSCOPY (EGD), COMBINED N/A 7/30/2018    Procedure: COMBINED ESOPHAGOSCOPY, GASTROSCOPY, DUODENOSCOPY (EGD);  EGD;  Surgeon: Parish Xiong MD;  Location:  GI     ESOPHAGOSCOPY, GASTROSCOPY, DUODENOSCOPY (EGD), COMBINED N/A 8/2/2018    Procedure: COMBINED ESOPHAGOSCOPY, GASTROSCOPY, DUODENOSCOPY (EGD), BIOPSY SINGLE OR MULTIPLE;  gastroscopy BIOPSYSHOULD BE SENT TO LAB IN NS NOT FORMALIN PER ;  Surgeon: Jonathon Bush MD;  Location:  GI     GASTRIC BYPASS  2001     HEART CATH LEFT HEART CATH  7/19/16    Non ischemic cardiomyopathy; Non functionally significant LAD and RCA disease      IR PORT REMOVAL RIGHT  9/9/2019     LASER HOLMIUM LITHOTRIPSY BLADDER N/A 4/22/2019    Procedure: CYSTOSCOPY, HOLMIUM LASER LITHOLAPAXY ,  AND BIPOLAR TRANSURETHRAL RESECTION (TUR) PROSTATE;  Surgeon: Ryan Camarillo MD;   "Location: [facility] OR     OTHER SURGICAL HISTORY  Nov 2016    CRT-D implantation     Current Outpatient Medications   Medication     atorvastatin (LIPITOR) 40 MG tablet     COENZYME Q-10 PO     ferrous sulfate (IRON) 325 (65 Fe) MG tablet     furosemide (LASIX) 20 MG tablet     hydrALAZINE (APRESOLINE) 10 MG tablet     metoprolol succinate ER (TOPROL-XL) 50 MG 24 hr tablet     multivitamin, therapeutic with minerals (MULTI-VITAMIN) TABS     venlafaxine (EFFEXOR) 75 MG tablet     vitamin D2 (ERGOCALCIFEROL) 18678 units (1250 mcg) capsule     ACE/ARB NOT PRESCRIBED, INTENTIONAL,     ASPIRIN NOT PRESCRIBED (INTENTIONAL)     No current facility-administered medications for this visit.         PHYSICAL EXAM  Patient is a 79 year old  male   Vitals: Blood pressure 136/70, pulse 87, height 1.854 m (6' 1\"), weight 95.3 kg (210 lb), SpO2 98 %.  General Appearance Adult: Body mass index is 27.71 kg/m .  Alert, no acute distress, oriented, mild dementia   HENT: throat/mouth:normal, good dentition  Lungs: no respiratory distress, or pursed lip breathing  Heart: No obvious jugular venous distension present  Abdomen: soft, nontender, no organomegaly or masses  Musculoskeltal: extremities normal, no peripheral edema  Skin: no suspicious lesions or rashes  Neuro: Alert, oriented, speech and mentation normal  Psych: affect and mood normal  Gait: Normal    UA RESULTS:  Recent Labs   Lab Test 10/30/19  1406  10/15/18  2031   COLOR Yellow   < > Yellow   APPEARANCE Clear   < > Slightly Cloudy   URINEGLC Negative   < > Negative   URINEBILI Negative   < > Negative   URINEKETONE Negative   < > Negative   SG 1.015   < > 1.019   UBLD Negative   < > Trace*   URINEPH 5.0   < > 5.0   PROTEIN Negative   < > 30*   UROBILINOGEN 0.2   < >  --    NITRITE Negative   < > Negative   LEUKEST Negative   < > Large*   RBCU  --   --  22*   WBCU  --   --  120*    < > = values in this interval not displayed.      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   - No evidence of invasion in the planes examined.   - Muscularis propria present.     Optimal treatment plan:  Date of Transurethral resection of bladder tumor (TURBT): 9/19/18  Grade/Stage: HG T1  Date of Re-staging Transurethral resection of bladder tumor (TURBT): 11/12/18  Residual disease: Yes  Grade/Stage: HG T1  Date of Re-staging Transurethral resection of bladder tumor (TURBT): 12/30/19  Residual disease: Yes  Grade/Stage: CIS    Induction (month 0) - BCG not warranted given treatment for his lymphoma. This treatment is now complete     - 2/3/2020     Cystoscopy (schedule one week prior to treatment when cysto and treatment coincide)  Month 3 - 4/1/19 with no evidence of disease, however evidence of bladder stone and BPH  Month 6 - 7/24/19 with no evidence of disease and well healed after TURP on 4/22/19  Month 9 - 10/30/19 with no clear evidence of disease, some raised erythema, cytology pending     Month 3 - Will follow up in April/May following BCG  Month 6 -   Month 9 -   Month 12  Month 15  Month 18  Month 21  Month 24  Month 27  Month 30  Month 36  Year 4  Year 5  Year 6  Year 8  Year 10    ASSESSMENT and PLAN  Gibson Villalta is a 79 year old male with a history of HG T1 bladder cancer and s/p TURP. He was not a candidate for BCG initially due to treatment for lymphoma, however this is now complete. Evidence of CIS at Transurethral resection of bladder tumor (TURBT) on 12/30/2019    - Plan for induction BCG  - BCG plan updated for start date of next week  - F/U with me in 3 months for cystoscopy      I spent over 15 minutes with the patient.  Over half this time was spent on counseling regarding the above.    Ryan Camarillo MD   Urology  [facility]  Clinic Phone [phone]

## 2020-01-29 NOTE — NURSING NOTE
Chief Complaint   Patient presents with     Cystoscopy     Pt here for cysto due to bladder cancer   Did BCG teach with patient and wife and ordered Levaquin 500mg.  Kriss Fisher, CMA

## 2020-01-29 NOTE — LETTER
1/29/2020     RE: Gibson Villalta  [address]  [place] [address]     Dear Colleague,    Thank you for referring your patient, Gibson Villalta, to the [facility] [place] at [facility]. Please see a copy of my visit note below.    [place]  CHIEF COMPLAINT   It was my pleasure to see Gibson Villalta who is a 79 year old male for follow-up of bladder cancer.      HPI  Gibson Villalta is a very pleasant 79 year old male who presents with a history of mild dementia, RV and LV mural thrombus on chronic anticoagulation with Coumadin, status post Smita-en-Y gastric bypass, hypertension, CKD, CAD, cardiomyopathy with EF of 20 to 30%, diabetes mellitus.  He also has history of diffuse large B-cell lymphoma who presented with melena and soft tissue thickening of his stomach in July 2018.     9/19/18: Transurethral resection of bladder tumor (TURBT) with T1HG bladder cancer  10/19/18: Repeat upper endoscopy with biopsies confirmed diffuse large B-cell lymphoma  10/30/18: Bone marrow biopsy negative for lymphoma involvement  10/31/18: PET scan showed extensive hypermetabolic thickening of the stomach and several loops of small bowel in the left upper quadrant and right lower quadrant consistent with the diagnosis of lymphoma  11/12/18: Re-staging Transurethral resection of bladder tumor (TURBT) with confirmation of his T1 HG bladder cancer.  Following discussion with his oncologist, Dr. Bender at [facility], we discussed that treatment for his bladder cancer with BCG treatment would not be warranted at this time given his upcoming chemotherapy treatment for his lymphoma and that there would be little benefit of treatment given that this requires an intact immune response.  We will plan for management with surveillance cystoscopy  11/26/18: Started first-line therapy with many R-CHOP chemotherapy.  He follows with  Dr. Bender at [facility]  1/28/19: After 3 cycles of mini R-CHOP, PET scan showed marked improvement consistent with a good response to treatment  2/19/19: Cycle 5 and 6 of chemotherapy, doxorubicin held due to worsening shortness of breath and worsened dilation of his left ventricle.  Completion of cycle 6 on March 12, 2019 4/1/19: Follow-up with me for surveillance cystoscopy with evidence of a bladder stone and significant prostatic hypertrophy  4/4/19: PET scan showed no residual lymphoma.  Calcification seen in the bladder.  4/22/19: Cystlolithalopaxy and Transurethral resection of the prostate with no evidence of malignancy  10/30/19: last surveillance cystoscopy with some raised erythema at the prior resection site. Cytology returned as positive for malignant cells  12/30/19: Re-staging Transurethral resection of bladder tumor (TURBT) with evidence of CIS    TODAY: He returns to discuss starting BCG    Past Medical History:   Diagnosis Date     Acute kidney injury (H) 2012     Anemia      Anxiety      Anxiety and depression      Bladder mass      BPH (benign prostatic hypertrophy)      Cardiomyopathy (H)     ICD implanted 11/2016     Carpal tunnel syndrome     Right     Chronic kidney disease (CKD), stage 3 (moderate)      Dementia (H)      Depressive disorder      Diabetes (H)      Diffuse large B-cell lymphoma of extranodal site (H) 11/23/2018     Gastric mass      Gout      History of depression      LBBB (left bundle branch block) 2013     Lumbar herniated disc     L5-S1     Mixed cardiomyopathy 7/22/2016     Myocardial infarction (H) 1995 and 2010     Nonischemic cardiomyopathy (H) 7/22/2016     Obesity      Pacemaker     ICD/PM     Rosacea      Rotator cuff disorder     Tear x 2     RV (right ventricular) mural thrombus 7/22/2016     Past Surgical History:   Procedure Laterality Date     ANGIOPLASTY  1995 and 2010 with stent     BIOPSY      stomach     BONE MARROW BIOPSY, BONE SPECIMEN, NEEDLE/TROCAR  N/A 10/30/2018    Procedure: BIOPSY BONE MARROW;  Surgeon: Jeb Romero MD;  Location:  GI     CARDIAC SURGERY      ICD/PM     CYSTOSCOPY       CYSTOSCOPY, TRANSURETHRAL RESECTION (TUR) PROSTATE, COMBINED  4/22/2019    Procedure: CYSTOSCOPY AND BIPOLAR TRANSURETHRAL RESECTION (TUR) PROSTATE;  Surgeon: Ryan Camarillo MD;  Location:  OR     CYSTOSCOPY, TRANSURETHRAL RESECTION (TUR) TUMOR BLADDER, COMBINED N/A 9/19/2018    Procedure: COMBINED CYSTOSCOPY, TRANSURETHRAL RESECTION (TUR) TUMOR BLADDER;  CYSTOSCOPY, TRANSURETHRAL RESECTION BLADDER TUMOR ;  Surgeon: Ryan Camarillo MD;  Location:  OR     CYSTOSCOPY, TRANSURETHRAL RESECTION (TUR) TUMOR BLADDER, COMBINED N/A 11/12/2018    Procedure: CYSTOSCOPY RESTAGING TRANSURETHRAL RESECTION BLADDER TUMOR;  Surgeon: Ryan Camarillo MD;  Location:  OR     CYSTOSCOPY, TRANSURETHRAL RESECTION (TUR) TUMOR BLADDER, COMBINED N/A 12/30/2019    Procedure: CYSTOSCOPY, WITH TRANSURETHRAL RESECTION BLADDER TUMOR;  Surgeon: Ryan Camarillo MD;  Location:  OR     EP LEAD REVISION DUAL N/A 10/24/2019    Procedure: EP Lead Revision Dual;  Surgeon: Josias Hernandez MD;  Location:  HEART CARDIAC CATH LAB     ESOPHAGOSCOPY, GASTROSCOPY, DUODENOSCOPY (EGD), COMBINED N/A 7/30/2018    Procedure: COMBINED ESOPHAGOSCOPY, GASTROSCOPY, DUODENOSCOPY (EGD), BIOPSY SINGLE OR MULTIPLE;  gastroscopy;  Surgeon: Parish Xiong MD;  Location:  GI     ESOPHAGOSCOPY, GASTROSCOPY, DUODENOSCOPY (EGD), COMBINED N/A 7/30/2018    Procedure: COMBINED ESOPHAGOSCOPY, GASTROSCOPY, DUODENOSCOPY (EGD);  EGD;  Surgeon: Parish Xiong MD;  Location:  GI     ESOPHAGOSCOPY, GASTROSCOPY, DUODENOSCOPY (EGD), COMBINED N/A 8/2/2018    Procedure: COMBINED ESOPHAGOSCOPY, GASTROSCOPY, DUODENOSCOPY (EGD), BIOPSY SINGLE OR MULTIPLE;  gastroscopy BIOPSYSHOULD BE SENT TO LAB IN NS NOT FORMALIN PER ;  Surgeon: Jonathon Bush MD;  Location:  GI     GASTRIC BYPASS  2001     HEART CATH  "LEFT HEART CATH  7/19/16    Non ischemic cardiomyopathy; Non functionally significant LAD and RCA disease      IR PORT REMOVAL RIGHT  9/9/2019     LASER HOLMIUM LITHOTRIPSY BLADDER N/A 4/22/2019    Procedure: CYSTOSCOPY, HOLMIUM LASER LITHOLAPAXY ,  AND BIPOLAR TRANSURETHRAL RESECTION (TUR) PROSTATE;  Surgeon: Ryan Camarillo MD;  Location: [facility] OR     OTHER SURGICAL HISTORY  Nov 2016    CRT-D implantation     Current Outpatient Medications   Medication     atorvastatin (LIPITOR) 40 MG tablet     COENZYME Q-10 PO     ferrous sulfate (IRON) 325 (65 Fe) MG tablet     furosemide (LASIX) 20 MG tablet     hydrALAZINE (APRESOLINE) 10 MG tablet     metoprolol succinate ER (TOPROL-XL) 50 MG 24 hr tablet     multivitamin, therapeutic with minerals (MULTI-VITAMIN) TABS     venlafaxine (EFFEXOR) 75 MG tablet     vitamin D2 (ERGOCALCIFEROL) 67917 units (1250 mcg) capsule     ACE/ARB NOT PRESCRIBED, INTENTIONAL,     ASPIRIN NOT PRESCRIBED (INTENTIONAL)     No current facility-administered medications for this visit.         PHYSICAL EXAM  Patient is a 79 year old  male   Vitals: Blood pressure 136/70, pulse 87, height 1.854 m (6' 1\"), weight 95.3 kg (210 lb), SpO2 98 %.  General Appearance Adult: Body mass index is 27.71 kg/m .  Alert, no acute distress, oriented, mild dementia   HENT: throat/mouth:normal, good dentition  Lungs: no respiratory distress, or pursed lip breathing  Heart: No obvious jugular venous distension present  Abdomen: soft, nontender, no organomegaly or masses  Musculoskeltal: extremities normal, no peripheral edema  Skin: no suspicious lesions or rashes  Neuro: Alert, oriented, speech and mentation normal  Psych: affect and mood normal  Gait: Normal    UA RESULTS:  Recent Labs   Lab Test 10/30/19  1406  10/15/18  2031   COLOR Yellow   < > Yellow   APPEARANCE Clear   < > Slightly Cloudy   URINEGLC Negative   < > Negative   URINEBILI Negative   < > Negative   URINEKETONE Negative   < > Negative   SG 1.015   < > " 1.019   UBLD Negative   < > Trace*   URINEPH 5.0   < > 5.0   PROTEIN Negative   < > 30*   UROBILINOGEN 0.2   < >  --    NITRITE Negative   < > Negative   LEUKEST Negative   < > Large*   RBCU  --   --  22*   WBCU  --   --  120*    < > = values in this interval not displayed.      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   - No evidence of invasion in the planes examined.   - Muscularis propria present.     Optimal treatment plan:  Date of Transurethral resection of bladder tumor (TURBT): 9/19/18  Grade/Stage: HG T1  Date of Re-staging Transurethral resection of bladder tumor (TURBT): 11/12/18  Residual disease: Yes  Grade/Stage: HG T1  Date of Re-staging Transurethral resection of bladder tumor (TURBT): 12/30/19  Residual disease: Yes  Grade/Stage: CIS    Induction (month 0) - BCG not warranted given treatment for his lymphoma. This treatment is now complete     - 2/3/2020     Cystoscopy (schedule one week prior to treatment when cysto and treatment coincide)  Month 3 - 4/1/19 with no evidence of disease, however evidence of bladder stone and BPH  Month 6 - 7/24/19 with no evidence of disease and well healed after TURP on 4/22/19  Month 9 - 10/30/19 with no clear evidence of disease, some raised erythema, cytology pending   Month 3 - Will follow up in April/May following BCG  Month 6 -   Month 9 -   Month 12  Month 15  Month 18  Month 21  Month 24  Month 27  Month 30  Month 36  Year 4  Year 5  Year 6  Year 8  Year 10    ASSESSMENT and PLAN  Gibson Villalta is a 79 year old male with a history of HG T1 bladder cancer and s/p TURP. He was not a candidate for BCG initially due to treatment for lymphoma, however this is now complete. Evidence of CIS at Transurethral resection of bladder tumor (TURBT) on 12/30/2019    - Plan for induction BCG  - BCG plan updated for start date of next week  - F/U with me in 3 months for  cystoscopy    I spent over 15 minutes with the patient.  Over half this time was spent on counseling regarding the above.    Ryan Camarillo MD   Urology  [facility]  Clinic Phone [phone]

## 2020-01-29 NOTE — Clinical Note
Hi Dr. Currie and Dr. Bender,I saw Nelson back today for follow up after his most recent Transurethral resection of bladder tumor (TURBT). This did show CIS bladder cancer. Given that his treatment for lymphoma is complete, he is now a candidate for BCG and we discussed starting this next week. I'll see him back for surveillance in 3 months.Thanks, Ryan Camarillo M.D.Cell: [phone]

## 2020-02-05 ENCOUNTER — INFUSION THERAPY VISIT (OUTPATIENT)
Dept: INFUSION THERAPY | Facility: CLINIC | Age: 80
End: 2020-02-05
Attending: UROLOGY
Payer: COMMERCIAL

## 2020-02-05 ENCOUNTER — TELEPHONE (OUTPATIENT)
Dept: UROLOGY | Facility: CLINIC | Age: 80
End: 2020-02-05

## 2020-02-05 DIAGNOSIS — C67.2 MALIGNANT NEOPLASM OF LATERAL WALL OF URINARY BLADDER (H): ICD-10-CM

## 2020-02-05 DIAGNOSIS — C67.2 MALIGNANT NEOPLASM OF LATERAL WALL OF URINARY BLADDER (H): Primary | ICD-10-CM

## 2020-02-05 LAB
ALBUMIN UR-MCNC: ABNORMAL MG/DL
APPEARANCE UR: CLEAR
BILIRUB UR QL STRIP: NEGATIVE
COLOR UR AUTO: YELLOW
GLUCOSE UR STRIP-MCNC: NEGATIVE MG/DL
HGB UR QL STRIP: NEGATIVE
KETONES UR STRIP-MCNC: NEGATIVE MG/DL
LEUKOCYTE ESTERASE UR QL STRIP: ABNORMAL
NITRATE UR QL: NEGATIVE
PH UR STRIP: 5 PH (ref 5–7)
SOURCE: ABNORMAL
SP GR UR STRIP: 1.02 (ref 1–1.03)
UROBILINOGEN UR STRIP-ACNC: 0.2 EU/DL (ref 0.2–1)

## 2020-02-05 PROCEDURE — 81003 URINALYSIS AUTO W/O SCOPE: CPT | Performed by: UROLOGY

## 2020-02-05 NOTE — TELEPHONE ENCOUNTER
[facility] Call Center    Phone Message    May a detailed message be left on voicemail: yes     Reason for Call: Other: Pt had a BCG treatment scheduled for today at the [facility] but it was unable to be completed because they ran out of time. It has been rescheduled for tomorrow, but they are needing to know if it is ok to use the results of the UA that they collected today, or if they would need to do a new one prior to treatment tomorrow. Please give the [facility] a call to provide instructins. [phone].     Action Taken: Message routed to:  Other: Urology    Travel Screening: Not Applicable

## 2020-02-05 NOTE — PROGRESS NOTES
Patient scheduled to have C1D1 BCG today.  Patient unable to receive his BCG today as he had difficulty producing a urine sample when he arrived at the clinic.  Plan is to reschedule BCG until tomorrow.  Per urology clinic, patient and his wife updated on the plan.    Dr. Ryan Camarillo paged to see if UA collected today could be used for BCG tomorrow. Call returned by urology clinic RN for Dr. Camarillo. Per RN at urology clinic, patient will have to repeat his UA tomorrow prior to his BCG scheduled on 2/6/20.  This RN called patient and his wife to update them on the plan to repeat the UA at the urology clinic on 2/6/20 around 1300 prior to his appointment for C1D1 BCG at 1400.  Wife verbalizes understanding.

## 2020-02-06 ENCOUNTER — TELEPHONE (OUTPATIENT)
Dept: LAB | Facility: CLINIC | Age: 80
End: 2020-02-06

## 2020-02-06 NOTE — TELEPHONE ENCOUNTER
"Patient called today to report that over the past week he has woken up each night in the middle of the night \"gasping for breath.\" Patient denies any CP/pressure, SOB, light headedness or dizziness with these episodes. Patient denies any chest pain/pressure or SOB at rest or with activity outside of the episodes in the middle of the night. Patient denies any LE edema or wt gain. Patient unable to provide RN with any BP or HR during these episodes. Patient requested RN send this to Dr. Larkin for review. RN will send to Dr. Larkin for review and recommendation.                   12/17/19 OV Dr. Larkin  Assessment and Plan:      79 year old with RV thrombus (noted on cardiac MRI in 2016) and LV thrombus (seen on echocardiogram from 5/2017), resolved no longer on coumadin, ischemic cardiomyopathy with LVEF 25-30% (dating back at least until 2016) status post biventricular ICD. He also has a history of HFrEF, anemia, CKD, dementia with short term memory loss and LBBB.     Today he has no chest pain, shortness of breath and is euvolemic on examination. He has had no recent arrhythmias detected on device check. Echocardiogram is stable and LV dilation has improved as is mitral regurgitation.      Given no active ischemic concerns, arrhythmias or significant fluid overload there is no further work up or treatment from a cardiac standpoint needed prior to his TURP procedure which is overall low risk. Post-procedure patient can have follow up with Dr. Hernandez or [facility] if any concerns. Recommendations have been communicated to Dr. Camarillo in urology.     Thank you for allowing me to partake in the care of this patient.      Lin Larkin MD MSc  [facility]      "

## 2020-02-07 NOTE — TELEPHONE ENCOUNTER
[place], MD Natalio Moffett, Daniel Kingston RN 10 minutes ago (9:55 AM)      Can we increase his diuretic a little bit in the meantime? If he's on 10 of furosemide, then give him an additional 20mg for three days.   He can follow up with Rose in the next week or two (he's Maribel's patient and a cardio-onc).    Routing comment      Spoke with patient's wife about Dr. Heard's recommendations. Patient's wife states that the patient slept well last night, but is willing to try the increased lasix dose over the weekend. Patient currently taking 20 mg daily of lasix, instructed patient to take 30 mg total daily for the next three days, and then follow up with Rose next week. OV made for 2/11/20 with Rose at 8:50AM. No further questions.

## 2020-02-11 ENCOUNTER — OFFICE VISIT (OUTPATIENT)
Dept: CARDIOLOGY | Facility: CLINIC | Age: 80
End: 2020-02-11
Payer: COMMERCIAL

## 2020-02-11 VITALS
BODY MASS INDEX: 27.57 KG/M2 | HEIGHT: 73 IN | SYSTOLIC BLOOD PRESSURE: 110 MMHG | WEIGHT: 208 LBS | DIASTOLIC BLOOD PRESSURE: 78 MMHG | HEART RATE: 88 BPM

## 2020-02-11 DIAGNOSIS — I50.23 ACUTE ON CHRONIC SYSTOLIC HEART FAILURE (H): Chronic | ICD-10-CM

## 2020-02-11 PROCEDURE — 99214 OFFICE O/P EST MOD 30 MIN: CPT | Performed by: NURSE PRACTITIONER

## 2020-02-11 RX ORDER — FUROSEMIDE 20 MG
30 TABLET ORAL DAILY
Qty: 90 TABLET | Refills: 1 | COMMUNITY
Start: 2020-02-11 | End: 2020-03-23

## 2020-02-11 ASSESSMENT — MIFFLIN-ST. JEOR: SCORE: 1717.36

## 2020-02-11 NOTE — PROGRESS NOTES
HPI and Plan:   I had the pleasure of seeing Gibson Villalta today at [facility] for evaluation of cardiomyopathy. He is a pleasant 79 year old patient of Dr. Heard.     Mr. Villalta has a past medical history significant for large B-cell lymphoma with stage II with transurethral resection of bladder tumor, ischemic cardiomyopathy  with LVEF 25-30% with (dating back at least until 2016) status post biventricular ICD, anemia, chronic kidney disease, RV thrombus  (noted on cardiac MRI in 2016), LV thrombus (seen on echocardiogram from 5/2017), Coumadin which was subsequently discontinued for GI bleed, dementia with short-term memory loss and left bundle branch block.    His most recent echocardiogram was in March of 2019. It revealed an LVEF of 25-30% with a mildly dilated LV (improved from previous echocardiogram from 1 month prior that showed severe dilation). He is seen frequently by electrophysiology/device clinic and follows also with Dr. Hernandez. He had an RV lead coil fracture and lead replacement and is doing well.     He was recently seen by Dr. Larkin on December 17, 2019 prior to his TURP procedure.  He was deemed a overall low risk and was cleared to move forward with his procedure.    He was scheduled to undergo a cystoscopy with transurethral resection of a bladder tumor on December 30, 2019.  Unfortunately, the procedure did not take place due to the patient being unable to urinate.  The patient is rescheduled for tomorrow February 12, 2020.    Today the patient presents to the cardiology clinic with worsening shortness of breath and difficulty lying flat in the bed.  He tells me he uses 1-2 pillows at night.  He often wakes up in the middle of the night needing to take a deep breath.  His weight is stable and he denies significant peripheral edema.  He is scheduled to see Dr. Heard in April with a repeat echocardiogram.    Physical Exam  Please see Below     Assessment and  Plan  1.  Ischemic cardiomyopathy with a ejection fraction of 25 to 30%.  Patient has noticed worsening shortness of breath and inability to lay flat in the bed for the last several weeks.  His weight is stable.  There is no JVP or change in his weight.  He is currently on Lasix 20 mg daily.  I have asked him to increase this to 30 mg daily.  Continue to monitor daily weights and eat a low sodium diet.  If this does not improve his symptoms, I have asked him to call the clinic within the next week or so.  He is scheduled to see Dr. Heard in 2 months with an echocardiogram.  Follow-up as scheduled.    2.  Stage II diffuse large B-cell lymphoma and history of bladder cancer.  He completed chemotherapy in the spring 2019.  He is scheduled to undergo a TURP procedure tomorrow February 12.  He is followed closely by Dr. Bender.      3.  History of RV and LV thrombus, resolved.  He was previously on warfarin however this was discontinued due to a GI bleed.  There is been no documented recurrence.    Thank you for allowing me to care for Gibson LA[place] Melaashliesaturnino today.    ALLEN Sim, CNP  Cardiology    Voice recognition software was used for this note, I have reviewed this note, but errors may have been missed.    No orders of the defined types were placed in this encounter.    Orders Placed This Encounter   Medications     furosemide (LASIX) 20 MG tablet     Sig: Take 1.5 tablets (30 mg) by mouth daily     Dispense:  90 tablet     Refill:  1     Medications Discontinued During This Encounter   Medication Reason     furosemide (LASIX) 20 MG tablet          CURRENT MEDICATIONS:  Current Outpatient Medications   Medication Sig Dispense Refill     ACE/ARB NOT PRESCRIBED, INTENTIONAL, ACE & ARB not prescribed due to Symptomatic hypotension not due to excessive diuresis       ASPIRIN NOT PRESCRIBED (INTENTIONAL) Please choose reason not prescribed, below       atorvastatin (LIPITOR) 40 MG tablet Take 1 tablet (40 mg) by mouth  At Bedtime 90 tablet 3     COENZYME Q-10 PO Take 100 mg by mouth every morning        ferrous sulfate (IRON) 325 (65 Fe) MG tablet Take 325 mg by mouth daily       furosemide (LASIX) 20 MG tablet Take 1.5 tablets (30 mg) by mouth daily 90 tablet 1     hydrALAZINE (APRESOLINE) 10 MG tablet Take 1 tablet (10 mg) by mouth 3 times daily 270 tablet 1     levofloxacin (LEVAQUIN) 500 MG tablet Take 1 tablet (500 mg) by mouth as needed (take 1 tablet 6 hours after each treatment and one tablet next am after each treatment) 12 tablet 0     metoprolol succinate ER (TOPROL-XL) 50 MG 24 hr tablet TAKE ONE TABLET BY MOUTH ONE TIME DAILY  90 tablet 3     multivitamin, therapeutic with minerals (MULTI-VITAMIN) TABS Take 1 tablet by mouth every morning        venlafaxine (EFFEXOR) 75 MG tablet TAKE 1 TABLET BY MOUTH TWICE DAILY  180 tablet 1     vitamin D2 (ERGOCALCIFEROL) 88265 units (1250 mcg) capsule TAKE ONE CAPSULE BY MOUTH ONCE A WEEK ON TUESDAY 12 capsule 1       ALLERGIES     Allergies   Allergen Reactions     Wasps [Hornets]      Sand wasp --- years ago.         PAST MEDICAL HISTORY:  Past Medical History:   Diagnosis Date     (HFpEF) heart failure with preserved ejection fraction (H)      Acute kidney injury (H) 2012     Anemia      Anxiety      Anxiety and depression      Bladder mass      BPH (benign prostatic hypertrophy)      Cardiomyopathy (H)     ICD implanted 11/2016     Carpal tunnel syndrome     Right     Chronic kidney disease (CKD), stage 3 (moderate)      Chronic systolic CHF (congestive heart failure) (H)      Dementia (H)      Depressive disorder      Diabetes (H)      Diffuse large B-cell lymphoma of extranodal site (H) 11/23/2018     Gastric mass      Gout      History of depression      LBBB (left bundle branch block) 2013     Lumbar herniated disc     L5-S1     Mixed cardiomyopathy 7/22/2016     Myocardial infarction (H) 1995 and 2010     Nonischemic cardiomyopathy (H) 7/22/2016     Obesity       Pacemaker     ICD/PM     Rosacea      Rotator cuff disorder     Tear x 2     RV (right ventricular) mural thrombus 7/22/2016     SOB (shortness of breath)        PAST SURGICAL HISTORY:  Past Surgical History:   Procedure Laterality Date     ANGIOPLASTY  1995 and 2010 with stent     BIOPSY      stomach     BONE MARROW BIOPSY, BONE SPECIMEN, NEEDLE/TROCAR N/A 10/30/2018    Procedure: BIOPSY BONE MARROW;  Surgeon: Jeb Romero MD;  Location:  GI     CARDIAC SURGERY      ICD/PM     CYSTOSCOPY       CYSTOSCOPY, TRANSURETHRAL RESECTION (TUR) PROSTATE, COMBINED  4/22/2019    Procedure: CYSTOSCOPY AND BIPOLAR TRANSURETHRAL RESECTION (TUR) PROSTATE;  Surgeon: Ryan Camarillo MD;  Location:  OR     CYSTOSCOPY, TRANSURETHRAL RESECTION (TUR) TUMOR BLADDER, COMBINED N/A 9/19/2018    Procedure: COMBINED CYSTOSCOPY, TRANSURETHRAL RESECTION (TUR) TUMOR BLADDER;  CYSTOSCOPY, TRANSURETHRAL RESECTION BLADDER TUMOR ;  Surgeon: Ryan Camarillo MD;  Location:  OR     CYSTOSCOPY, TRANSURETHRAL RESECTION (TUR) TUMOR BLADDER, COMBINED N/A 11/12/2018    Procedure: CYSTOSCOPY RESTAGING TRANSURETHRAL RESECTION BLADDER TUMOR;  Surgeon: Ryan Camarillo MD;  Location:  OR     CYSTOSCOPY, TRANSURETHRAL RESECTION (TUR) TUMOR BLADDER, COMBINED N/A 12/30/2019    Procedure: CYSTOSCOPY, WITH TRANSURETHRAL RESECTION BLADDER TUMOR;  Surgeon: Ryan Camarillo MD;  Location:  OR     EP LEAD REVISION DUAL N/A 10/24/2019    Procedure: EP Lead Revision Dual;  Surgeon: Josias Hernandez MD;  Location:  HEART CARDIAC CATH LAB     ESOPHAGOSCOPY, GASTROSCOPY, DUODENOSCOPY (EGD), COMBINED N/A 7/30/2018    Procedure: COMBINED ESOPHAGOSCOPY, GASTROSCOPY, DUODENOSCOPY (EGD), BIOPSY SINGLE OR MULTIPLE;  gastroscopy;  Surgeon: Parish Xiong MD;  Location:  GI     ESOPHAGOSCOPY, GASTROSCOPY, DUODENOSCOPY (EGD), COMBINED N/A 7/30/2018    Procedure: COMBINED ESOPHAGOSCOPY, GASTROSCOPY, DUODENOSCOPY (EGD);  EGD;  Surgeon: Parish Xiong MD;   Location:  GI     ESOPHAGOSCOPY, GASTROSCOPY, DUODENOSCOPY (EGD), COMBINED N/A 2018    Procedure: COMBINED ESOPHAGOSCOPY, GASTROSCOPY, DUODENOSCOPY (EGD), BIOPSY SINGLE OR MULTIPLE;  gastroscopy BIOPSYSHOULD BE SENT TO LAB IN NS NOT FORMALIN PER ;  Surgeon: Jonathon Bush MD;  Location:  GI     GASTRIC BYPASS       HEART CATH LEFT HEART CATH  16    Non ischemic cardiomyopathy; Non functionally significant LAD and RCA disease      IR PORT REMOVAL RIGHT  2019     LASER HOLMIUM LITHOTRIPSY BLADDER N/A 2019    Procedure: CYSTOSCOPY, HOLMIUM LASER LITHOLAPAXY ,  AND BIPOLAR TRANSURETHRAL RESECTION (TUR) PROSTATE;  Surgeon: Ryan Camarillo MD;  Location:  OR     OTHER SURGICAL HISTORY  2016    CRT-D implantation       FAMILY HISTORY:  Family History   Problem Relation Age of Onset     Coronary Artery Disease Father 39     Alzheimer Disease Mother      Coronary Artery Disease Son         Two sons have  from MIs (ages 39 and 51)       SOCIAL HISTORY:  Social History     Socioeconomic History     Marital status:      Spouse name: None     Number of children: None     Years of education: None     Highest education level: None   Occupational History     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     Comment: [employer]; retired   Social Needs     Financial resource strain: None     Food insecurity:     Worry: None     Inability: None     Transportation needs:     Medical: None     Non-medical: None   Tobacco Use     Smoking status: Former Smoker     Packs/day: 2.00     Years: 20.00     Pack years: 40.00     Types: Cigarettes     Start date:      Last attempt to quit: 1980     Years since quittin.1     Smokeless tobacco: Never Used     Tobacco comment: Quit in his 30s - 2 ppd for 20 years   Substance and Sexual Activity     Alcohol use: Yes     Alcohol/week: 0.0 standard drinks     Comment: rarely     Drug use: No     Sexual activity: Yes     Partners: Female  "  Lifestyle     Physical activity:     Days per week: None     Minutes per session: None     Stress: None   Relationships     Social connections:     Talks on phone: None     Gets together: None     Attends Mandaeism service: None     Active member of club or organization: None     Attends meetings of clubs or organizations: None     Relationship status: None     Intimate partner violence:     Fear of current or ex partner: None     Emotionally abused: None     Physically abused: None     Forced sexual activity: None   Other Topics Concern     Parent/sibling w/ CABG, MI or angioplasty before 65F 55M? No      Service Not Asked     Blood Transfusions Not Asked     Caffeine Concern Not Asked     Occupational Exposure Not Asked     Hobby Hazards Not Asked     Sleep Concern Not Asked     Stress Concern Not Asked     Weight Concern Not Asked     Special Diet Yes     Comment: low fat, low salt      Back Care Not Asked     Exercise No     Bike Helmet Not Asked     Seat Belt Not Asked     Self-Exams Not Asked   Social History Narrative     None       Review of Systems:  Skin:  Negative       Eyes:  Positive for glasses    ENT:  Negative      Respiratory:  Positive for shortness of breath(supine at hs.)     Cardiovascular:  Negative Positive for    Gastroenterology: Negative      Genitourinary:  (S) Positive for(Bladder CA)      Musculoskeletal:  Negative      Neurologic:  Positive for numbness or tingling of feet    Psychiatric:  Positive for(on edge)      Heme/Lymph/Imm:  Positive for allergies    Endocrine:  Negative        Physical Exam:  Vitals: /78   Pulse 88   Ht 1.854 m (6' 1\")   Wt 94.3 kg (208 lb)   BMI 27.44 kg/m      Constitutional:  cooperative, alert and oriented, well developed, well nourished, in no acute distress        Skin:  warm and dry to the touch          Head:  normocephalic        Eyes:  pupils equal and round;sclera white        Lymph:      ENT:  no pallor or cyanosis        Neck:  " carotid pulses are full and equal bilaterally, JVP normal, no carotid bruit        Respiratory:  normal breath sounds, clear to auscultation, normal A-P diameter, normal symmetry, normal respiratory excursion, no use of accessory muscles         Cardiac: normal S1 and S2;regular rhythm;apical impulse not displaced   S4;distant heart sounds            pulses full and equal     right radial artery;2+             left radial artery;2+                    GI:  abdomen soft;non-tender        Extremities and Muscular Skeletal:  no deformities, clubbing, cyanosis, erythema observed;no edema              Neurological:  no gross motor deficits;affect appropriate   decreased short term memory    Psych:  Alert and Oriented x 3    Encounter Diagnosis   Name Primary?     Acute on chronic systolic heart failure (H)        Recent Lab Results:  LIPID RESULTS:  Lab Results   Component Value Date    CHOL 165 08/02/2019    HDL 33 (L) 08/02/2019    LDL 98 09/06/2019    LDL 82 08/02/2019    TRIG 249 (H) 08/02/2019       LIVER ENZYME RESULTS:  Lab Results   Component Value Date    AST 26 01/06/2020    ALT 27 01/06/2020       CBC RESULTS:  Lab Results   Component Value Date    WBC 9.7 01/06/2020    RBC 4.70 01/06/2020    HGB 15.2 01/06/2020    HCT 45.9 01/06/2020    MCV 98 01/06/2020    MCH 32.3 01/06/2020    MCHC 33.1 01/06/2020    RDW 14.3 01/06/2020     01/06/2020       BMP RESULTS:  Lab Results   Component Value Date     01/06/2020    POTASSIUM 4.0 01/06/2020    CHLORIDE 105 01/06/2020    CO2 29 01/06/2020    ANIONGAP 4 01/06/2020     (H) 01/06/2020    BUN 23 01/06/2020    CR 1.40 (H) 01/06/2020    GFRESTIMATED 48 (L) 01/06/2020    GFRESTBLACK 55 (L) 01/06/2020    SHELLEY 9.5 01/06/2020        A1C RESULTS:  Lab Results   Component Value Date    A1C 6.7 (H) 09/06/2019       INR RESULTS:  Lab Results   Component Value Date    INR 1.01 09/09/2019    INR 1.06 11/27/2018           CC  No referring provider defined for this  encounter.

## 2020-02-11 NOTE — LETTER
2/11/2020    Akira Currie MD  [address]  [facility] [address]    RE: Gibson Villalta       Dear Colleague,    I had the pleasure of seeing Gibson Villalta in the [facility].    HPI and Plan:   I had the pleasure of seeing Gibson Vilallta today at [facility] for evaluation of cardiomyopathy. He is a pleasant 79 year old patient of Dr. Heard.     Mr. Villalta has a past medical history significant for large B-cell lymphoma with stage II with transurethral resection of bladder tumor, ischemic cardiomyopathy  with LVEF 25-30% with (dating back at least until 2016) status post biventricular ICD, anemia, chronic kidney disease, RV thrombus  (noted on cardiac MRI in 2016), LV thrombus (seen on echocardiogram from 5/2017), Coumadin which was subsequently discontinued for GI bleed, dementia with short-term memory loss and left bundle branch block.    His most recent echocardiogram was in March of 2019. It revealed an LVEF of 25-30% with a mildly dilated LV (improved from previous echocardiogram from 1 month prior that showed severe dilation). He is seen frequently by electrophysiology/device clinic and follows also with Dr. Hernandez. He had an RV lead coil fracture and lead replacement and is doing well.     He was recently seen by Dr. Larkni on December 17, 2019 prior to his TURP procedure.  He was deemed a overall low risk and was cleared to move forward with his procedure.    He was scheduled to undergo a cystoscopy with transurethral resection of a bladder tumor on December 30, 2019.  Unfortunately, the procedure did not take place due to the patient being unable to urinate.  The patient is rescheduled for tomorrow February 12, 2020.    Today the patient presents to the cardiology clinic with worsening shortness of breath and difficulty lying flat in the bed.  He tells me he uses 1-2 pillows at night.  He often wakes up in the  middle of the night needing to take a deep breath.  His weight is stable and he denies significant peripheral edema.  He is scheduled to see Dr. Heard in April with a repeat echocardiogram.    Physical Exam  Please see Below     Assessment and Plan  1.  Ischemic cardiomyopathy with a ejection fraction of 25 to 30%.  Patient has noticed worsening shortness of breath and inability to lay flat in the bed for the last several weeks.  His weight is stable.  There is no JVP or change in his weight.  He is currently on Lasix 20 mg daily.  I have asked him to increase this to 30 mg daily.  Continue to monitor daily weights and eat a low sodium diet.  If this does not improve his symptoms, I have asked him to call the clinic within the next week or so.  He is scheduled to see Dr. Heard in 2 months with an echocardiogram.  Follow-up as scheduled.    2.  Stage II diffuse large B-cell lymphoma and history of bladder cancer.  He completed chemotherapy in the spring 2019.  He is scheduled to undergo a TURP procedure tomorrow February 12.  He is followed closely by Dr. Bender.      3.  History of RV and LV thrombus, resolved.  He was previously on warfarin however this was discontinued due to a GI bleed.  There is been no documented recurrence.    Thank you for allowing me to care for Gibson Villalta today.    ALLEN Sim, CNP  Cardiology    Voice recognition software was used for this note, I have reviewed this note, but errors may have been missed.    No orders of the defined types were placed in this encounter.    Orders Placed This Encounter   Medications     furosemide (LASIX) 20 MG tablet     Sig: Take 1.5 tablets (30 mg) by mouth daily     Dispense:  90 tablet     Refill:  1     Medications Discontinued During This Encounter   Medication Reason     furosemide (LASIX) 20 MG tablet          CURRENT MEDICATIONS:  Current Outpatient Medications   Medication Sig Dispense Refill     ACE/ARB NOT PRESCRIBED, INTENTIONAL, ACE  & ARB not prescribed due to Symptomatic hypotension not due to excessive diuresis       ASPIRIN NOT PRESCRIBED (INTENTIONAL) Please choose reason not prescribed, below       atorvastatin (LIPITOR) 40 MG tablet Take 1 tablet (40 mg) by mouth At Bedtime 90 tablet 3     COENZYME Q-10 PO Take 100 mg by mouth every morning        ferrous sulfate (IRON) 325 (65 Fe) MG tablet Take 325 mg by mouth daily       furosemide (LASIX) 20 MG tablet Take 1.5 tablets (30 mg) by mouth daily 90 tablet 1     hydrALAZINE (APRESOLINE) 10 MG tablet Take 1 tablet (10 mg) by mouth 3 times daily 270 tablet 1     levofloxacin (LEVAQUIN) 500 MG tablet Take 1 tablet (500 mg) by mouth as needed (take 1 tablet 6 hours after each treatment and one tablet next am after each treatment) 12 tablet 0     metoprolol succinate ER (TOPROL-XL) 50 MG 24 hr tablet TAKE ONE TABLET BY MOUTH ONE TIME DAILY  90 tablet 3     multivitamin, therapeutic with minerals (MULTI-VITAMIN) TABS Take 1 tablet by mouth every morning        venlafaxine (EFFEXOR) 75 MG tablet TAKE 1 TABLET BY MOUTH TWICE DAILY  180 tablet 1     vitamin D2 (ERGOCALCIFEROL) 53785 units (1250 mcg) capsule TAKE ONE CAPSULE BY MOUTH ONCE A WEEK ON TUESDAY 12 capsule 1       ALLERGIES     Allergies   Allergen Reactions     Wasps [Hornets]      Sand wasp --- years ago.         PAST MEDICAL HISTORY:  Past Medical History:   Diagnosis Date     (HFpEF) heart failure with preserved ejection fraction (H)      Acute kidney injury (H) 2012     Anemia      Anxiety      Anxiety and depression      Bladder mass      BPH (benign prostatic hypertrophy)      Cardiomyopathy (H)     ICD implanted 11/2016     Carpal tunnel syndrome     Right     Chronic kidney disease (CKD), stage 3 (moderate)      Chronic systolic CHF (congestive heart failure) (H)      Dementia (H)      Depressive disorder      Diabetes (H)      Diffuse large B-cell lymphoma of extranodal site (H) 11/23/2018     Gastric mass      Gout      History  of depression      LBBB (left bundle branch block) 2013     Lumbar herniated disc     L5-S1     Mixed cardiomyopathy 7/22/2016     Myocardial infarction (H) 1995 and 2010     Nonischemic cardiomyopathy (H) 7/22/2016     Obesity      Pacemaker     ICD/PM     Rosacea      Rotator cuff disorder     Tear x 2     RV (right ventricular) mural thrombus 7/22/2016     SOB (shortness of breath)        PAST SURGICAL HISTORY:  Past Surgical History:   Procedure Laterality Date     ANGIOPLASTY  1995 and 2010 with stent     BIOPSY      stomach     BONE MARROW BIOPSY, BONE SPECIMEN, NEEDLE/TROCAR N/A 10/30/2018    Procedure: BIOPSY BONE MARROW;  Surgeon: Jeb Romero MD;  Location:  GI     CARDIAC SURGERY      ICD/PM     CYSTOSCOPY       CYSTOSCOPY, TRANSURETHRAL RESECTION (TUR) PROSTATE, COMBINED  4/22/2019    Procedure: CYSTOSCOPY AND BIPOLAR TRANSURETHRAL RESECTION (TUR) PROSTATE;  Surgeon: Ryan Camarillo MD;  Location:  OR     CYSTOSCOPY, TRANSURETHRAL RESECTION (TUR) TUMOR BLADDER, COMBINED N/A 9/19/2018    Procedure: COMBINED CYSTOSCOPY, TRANSURETHRAL RESECTION (TUR) TUMOR BLADDER;  CYSTOSCOPY, TRANSURETHRAL RESECTION BLADDER TUMOR ;  Surgeon: Ryan Camarillo MD;  Location:  OR     CYSTOSCOPY, TRANSURETHRAL RESECTION (TUR) TUMOR BLADDER, COMBINED N/A 11/12/2018    Procedure: CYSTOSCOPY RESTAGING TRANSURETHRAL RESECTION BLADDER TUMOR;  Surgeon: Ryan Camarillo MD;  Location:  OR     CYSTOSCOPY, TRANSURETHRAL RESECTION (TUR) TUMOR BLADDER, COMBINED N/A 12/30/2019    Procedure: CYSTOSCOPY, WITH TRANSURETHRAL RESECTION BLADDER TUMOR;  Surgeon: Ryan Camarillo MD;  Location:  OR     EP LEAD REVISION DUAL N/A 10/24/2019    Procedure: EP Lead Revision Dual;  Surgeon: Josias Heranndez MD;  Location:  HEART CARDIAC CATH LAB     ESOPHAGOSCOPY, GASTROSCOPY, DUODENOSCOPY (EGD), COMBINED N/A 7/30/2018    Procedure: COMBINED ESOPHAGOSCOPY, GASTROSCOPY, DUODENOSCOPY (EGD), BIOPSY SINGLE OR MULTIPLE;   gastroscopy;  Surgeon: Parish Xiong MD;  Location:  GI     ESOPHAGOSCOPY, GASTROSCOPY, DUODENOSCOPY (EGD), COMBINED N/A 2018    Procedure: COMBINED ESOPHAGOSCOPY, GASTROSCOPY, DUODENOSCOPY (EGD);  EGD;  Surgeon: Parish Xiong MD;  Location:  GI     ESOPHAGOSCOPY, GASTROSCOPY, DUODENOSCOPY (EGD), COMBINED N/A 2018    Procedure: COMBINED ESOPHAGOSCOPY, GASTROSCOPY, DUODENOSCOPY (EGD), BIOPSY SINGLE OR MULTIPLE;  gastroscopy BIOPSYSHOULD BE SENT TO LAB IN NS NOT FORMALIN PER ;  Surgeon: Jonathon Bush MD;  Location:  GI     GASTRIC BYPASS       HEART CATH LEFT HEART CATH  16    Non ischemic cardiomyopathy; Non functionally significant LAD and RCA disease      IR PORT REMOVAL RIGHT  2019     LASER HOLMIUM LITHOTRIPSY BLADDER N/A 2019    Procedure: CYSTOSCOPY, HOLMIUM LASER LITHOLAPAXY ,  AND BIPOLAR TRANSURETHRAL RESECTION (TUR) PROSTATE;  Surgeon: Ryan Camarillo MD;  Location:  OR     OTHER SURGICAL HISTORY  2016    CRT-D implantation       FAMILY HISTORY:  Family History   Problem Relation Age of Onset     Coronary Artery Disease Father 39     Alzheimer Disease Mother      Coronary Artery Disease Son         Two sons have  from MIs (ages 39 and 51)       SOCIAL HISTORY:  Social History     Socioeconomic History     Marital status:      Spouse name: None     Number of children: None     Years of education: None     Highest education level: None   Occupational History     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     Comment: [employer]; retired   Social Needs     Financial resource strain: None     Food insecurity:     Worry: None     Inability: None     Transportation needs:     Medical: None     Non-medical: None   Tobacco Use     Smoking status: Former Smoker     Packs/day: 2.00     Years: 20.00     Pack years: 40.00     Types: Cigarettes     Start date:      Last attempt to quit: 1980     Years since quittin.1     Smokeless tobacco: Never  "Used     Tobacco comment: Quit in his 30s - 2 ppd for 20 years   Substance and Sexual Activity     Alcohol use: Yes     Alcohol/week: 0.0 standard drinks     Comment: rarely     Drug use: No     Sexual activity: Yes     Partners: Female   Lifestyle     Physical activity:     Days per week: None     Minutes per session: None     Stress: None   Relationships     Social connections:     Talks on phone: None     Gets together: None     Attends Anabaptist service: None     Active member of club or organization: None     Attends meetings of clubs or organizations: None     Relationship status: None     Intimate partner violence:     Fear of current or ex partner: None     Emotionally abused: None     Physically abused: None     Forced sexual activity: None   Other Topics Concern     Parent/sibling w/ CABG, MI or angioplasty before 65F 55M? No      Service Not Asked     Blood Transfusions Not Asked     Caffeine Concern Not Asked     Occupational Exposure Not Asked     Hobby Hazards Not Asked     Sleep Concern Not Asked     Stress Concern Not Asked     Weight Concern Not Asked     Special Diet Yes     Comment: low fat, low salt      Back Care Not Asked     Exercise No     Bike Helmet Not Asked     Seat Belt Not Asked     Self-Exams Not Asked   Social History Narrative     None       Review of Systems:  Skin:  Negative       Eyes:  Positive for glasses    ENT:  Negative      Respiratory:  Positive for shortness of breath(supine at hs.)     Cardiovascular:  Negative Positive for    Gastroenterology: Negative      Genitourinary:  (S) Positive for(Bladder CA)      Musculoskeletal:  Negative      Neurologic:  Positive for numbness or tingling of feet    Psychiatric:  Positive for(on edge)      Heme/Lymph/Imm:  Positive for allergies    Endocrine:  Negative        Physical Exam:  Vitals: /78   Pulse 88   Ht 1.854 m (6' 1\")   Wt 94.3 kg (208 lb)   BMI 27.44 kg/m       Constitutional:  cooperative, alert and " oriented, well developed, well nourished, in no acute distress        Skin:  warm and dry to the touch          Head:  normocephalic        Eyes:  pupils equal and round;sclera white        Lymph:      ENT:  no pallor or cyanosis        Neck:  carotid pulses are full and equal bilaterally, JVP normal, no carotid bruit        Respiratory:  normal breath sounds, clear to auscultation, normal A-P diameter, normal symmetry, normal respiratory excursion, no use of accessory muscles         Cardiac: normal S1 and S2;regular rhythm;apical impulse not displaced   S4;distant heart sounds            pulses full and equal     right radial artery;2+             left radial artery;2+                    GI:  abdomen soft;non-tender        Extremities and Muscular Skeletal:  no deformities, clubbing, cyanosis, erythema observed;no edema              Neurological:  no gross motor deficits;affect appropriate   decreased short term memory    Psych:  Alert and Oriented x 3    Encounter Diagnosis   Name Primary?     Acute on chronic systolic heart failure (H)        Recent Lab Results:  LIPID RESULTS:  Lab Results   Component Value Date    CHOL 165 08/02/2019    HDL 33 (L) 08/02/2019    LDL 98 09/06/2019    LDL 82 08/02/2019    TRIG 249 (H) 08/02/2019       LIVER ENZYME RESULTS:  Lab Results   Component Value Date    AST 26 01/06/2020    ALT 27 01/06/2020       CBC RESULTS:  Lab Results   Component Value Date    WBC 9.7 01/06/2020    RBC 4.70 01/06/2020    HGB 15.2 01/06/2020    HCT 45.9 01/06/2020    MCV 98 01/06/2020    MCH 32.3 01/06/2020    MCHC 33.1 01/06/2020    RDW 14.3 01/06/2020     01/06/2020       BMP RESULTS:  Lab Results   Component Value Date     01/06/2020    POTASSIUM 4.0 01/06/2020    CHLORIDE 105 01/06/2020    CO2 29 01/06/2020    ANIONGAP 4 01/06/2020     (H) 01/06/2020    BUN 23 01/06/2020    CR 1.40 (H) 01/06/2020    GFRESTIMATED 48 (L) 01/06/2020    GFRESTBLACK 55 (L) 01/06/2020    SHELLEY 9.5  01/06/2020        A1C RESULTS:  Lab Results   Component Value Date    A1C 6.7 (H) 09/06/2019       INR RESULTS:  Lab Results   Component Value Date    INR 1.01 09/09/2019    INR 1.06 11/27/2018         Thank you for allowing me to participate in the care of your patient.    Sincerely,     ALLEN Sim [facility]

## 2020-02-12 ENCOUNTER — INFUSION THERAPY VISIT (OUTPATIENT)
Dept: INFUSION THERAPY | Facility: CLINIC | Age: 80
End: 2020-02-12
Attending: INTERNAL MEDICINE
Payer: COMMERCIAL

## 2020-02-12 VITALS
RESPIRATION RATE: 20 BRPM | HEART RATE: 94 BPM | TEMPERATURE: 97.5 F | DIASTOLIC BLOOD PRESSURE: 44 MMHG | SYSTOLIC BLOOD PRESSURE: 122 MMHG | OXYGEN SATURATION: 93 %

## 2020-02-12 DIAGNOSIS — C67.2 MALIGNANT NEOPLASM OF LATERAL WALL OF URINARY BLADDER (H): Primary | ICD-10-CM

## 2020-02-12 DIAGNOSIS — C67.9 MALIGNANT NEOPLASM OF URINARY BLADDER, UNSPECIFIED SITE (H): Primary | ICD-10-CM

## 2020-02-12 PROCEDURE — 25000128 H RX IP 250 OP 636: Performed by: UROLOGY

## 2020-02-12 PROCEDURE — 25000132 ZZH RX MED GY IP 250 OP 250 PS 637: Performed by: UROLOGY

## 2020-02-12 PROCEDURE — 25000125 ZZHC RX 250: Performed by: UROLOGY

## 2020-02-12 PROCEDURE — 51720 TREATMENT OF BLADDER LESION: CPT

## 2020-02-12 PROCEDURE — 81003 URINALYSIS AUTO W/O SCOPE: CPT | Mod: QW | Performed by: UROLOGY

## 2020-02-12 RX ORDER — LIDOCAINE HYDROCHLORIDE 20 MG/ML
10 JELLY TOPICAL
Status: DISCONTINUED | OUTPATIENT
Start: 2020-02-12 | End: 2020-02-12 | Stop reason: HOSPADM

## 2020-02-12 RX ADMIN — LIDOCAINE HYDROCHLORIDE 10 ML: 20 JELLY TOPICAL at 14:24

## 2020-02-12 RX ADMIN — Medication: at 14:24

## 2020-02-12 RX ADMIN — BACILLUS CALMETTE-GUERIN 50 MG: 50 POWDER, FOR SUSPENSION INTRAVESICAL at 14:24

## 2020-02-12 ASSESSMENT — PAIN SCALES - GENERAL: PAINLEVEL: NO PAIN (0)

## 2020-02-12 NOTE — PROGRESS NOTES
Infusion Nursing Note:  Gibson Villalta presents today for BCG.  Instillation number 1 of 6.   Patient seen by provider today: No   present during visit today: Not Applicable.    Note: Pt here with wife Izabella. Pt is alert and oriented x3. Pt and his wife, Izabella, feel that he can go home with BCG in his bladder to complete turning at home. Pt has dementia so there was some question as to whether or not he would remember to turn and hold BCG in for 2 hours. Wife Izabella will be with patient for the remainder of the afternoon and evening and can assist with prompting pt to turn and when to empty his bladder. Written post BCG instructions sent with pt and wife today. Pt and wife state that he has Levaquin at home and will take 6 hours after procedure and tomorrow morning.     Treatment Conditions:   Patient states no concerns or issues at this time.  Ok to proceed with treatment.     Labs Reviewed:  Urine analysis.    Procedure:  14F Straight catheter placed.   Lidocaine urojet 2% 10ml used.  Catheter placed without trauma.  Clear/yellow urine drained from bladder.    Patient turned every 15 minutes per protocol: Yes, turning to be completed at home per protocol.   Patient tolerated instillation without incident.      Discharge Plan:   Patient declined prescription refills.  Discharge instructions reviewed with: Patient and Family.  Patient and family verbalized understanding of discharge instructions and all questions answered.  AVS to patient via [employer].  Patient will return 2/19/20 for next appointment.   Patient discharged in stable condition accompanied by: self and wife.  Departure Mode: Ambulatory.    Vicki Valencia RN

## 2020-02-15 DIAGNOSIS — E55.9 VITAMIN D DEFICIENCY: ICD-10-CM

## 2020-02-15 NOTE — TELEPHONE ENCOUNTER
Requested Prescriptions   Pending Prescriptions Disp Refills     vitamin D2 (ERGOCALCIFEROL) 59273 units (1250 mcg) capsule 12 capsule 1     Sig: TAKE ONE CAPSULE BY MOUTH ONCE A WEEK ON TUESDAY       There is no refill protocol information for this order        Last Written Prescription Date:  9/03/19  Last Fill Quantity: 12 capsule,  # refills: 1   Last office visit: 9/6/2019 with prescribing provider:  Kush De La Garza Office Visit:   Next 5 appointments (look out 90 days)    Apr 20, 2020  1:30 PM CDT  Return Visit with  LAB DRAW 1  [facility] ([facility]) [facility]  [address]  [facility] [address]  [phone]   Apr 20, 2020  2:20 PM CDT  Return Visit with Maribel Bender MD  [facility] ([facility]) [address]  [facility] [address]  [phone]   Apr 29, 2020  1:45 PM CDT  Return Visit with Gibson Heard MD  [facility] ([facility]) [address]  [facility] [address] [phone] OPT 2            and prescription was provided. She also was found to have a multifactorial hyponatremia which was felt to be in part due to the infection and medication induced given hydrochlorothiazide plus Lexapro causing a SIADH-like syndrome. Nephrology was consulted and assisted in managing her sodium. This gradually normalized over the course of her hospitalization. Because of this the hydrochlorothiazide and Lexapro have been discontinued and will not be resumed on discharge. Overall her condition improved to the point where she be discharged home with close outpatient follow-up. She will require close outpatient follow-up with both primary care and nephrology. BRIEF PHYSICAL EXAMINATION AND LABORATORIES ON DAY OF DISCHARGE:  VITALS:  BP (!) 118/57   Pulse 78   Temp 98 °F (36.7 °C) (Oral)   Resp 19   Ht 5' 6\" (1.676 m)   Wt 197 lb (89.4 kg)   LMP  (LMP Unknown)   SpO2 96%   BMI 31.80 kg/m²     HEENT:    PERRLA. EOMI. Sclera clear. Buccal mucosa moist.  Nasal cannula oxygen is in place. Neck:    Supple. Trachea midline. No thyromegaly. No JVD. No bruits. Heart:    Rhythm regular, rate controlled. No murmurs. Lungs:    Good aeration throughout. No overt wheezes or rales are appreciated. Abdomen:   Soft. Non-tender. Non-distended. Bowel sounds positive. No organomegaly or masses. No pain on palpation  Extremities:    Peripheral pulses present. No peripheral edema. No ulcers. Neurologic:    Alert x 3. No focal deficit. Cranial nerves grossly intact. Skin:    No petechia. No hemorrhage. No wounds. Psych:   Behavior is normal. Mood appears normal. Speech is not rapid or pressured. Musculokeletal:  Spine ROM normal. Muscular strength intact. Gait not assessed. Genitalia/Breast:  Deferred      DISPOSITION:  The patient's condition is good. At this time the patient is without objective evidence of an acute process requiring continuing hospitalization or inpatient management.   They are stable for 1.25 MG (33635 UT) CAPS capsule  TAKE ONE CAPSULE BY MOUTH ONCE A WEEK                 FOLLOW UP/INSTRUCTIONS:  · This patient is instructed to follow-up with her primary care physician. · Patient is instructed to follow-up with the consults listed above as directed by them. · she is instructed to resume home medications and take new medications as indicated in the list above. · If the patient has a recurrence of symptoms, she is instructed to go to the ED. Preparing for this patient's discharge, including paperwork, orders, instructions, and meeting with patient did require > 30 minutes.     JEFF Iyer - TRACEY     2/4/2020  1:30 PM

## 2020-02-17 RX ORDER — ERGOCALCIFEROL 1.25 MG/1
CAPSULE, LIQUID FILLED ORAL
Qty: 12 CAPSULE | Refills: 1 | Status: SHIPPED | OUTPATIENT
Start: 2020-02-17 | End: 2020-01-01

## 2020-02-19 ENCOUNTER — INFUSION THERAPY VISIT (OUTPATIENT)
Dept: INFUSION THERAPY | Facility: CLINIC | Age: 80
End: 2020-02-19
Attending: UROLOGY
Payer: COMMERCIAL

## 2020-02-19 VITALS
TEMPERATURE: 97.5 F | RESPIRATION RATE: 16 BRPM | SYSTOLIC BLOOD PRESSURE: 124 MMHG | HEART RATE: 86 BPM | DIASTOLIC BLOOD PRESSURE: 81 MMHG

## 2020-02-19 DIAGNOSIS — C67.9 MALIGNANT NEOPLASM OF URINARY BLADDER, UNSPECIFIED SITE (H): Primary | ICD-10-CM

## 2020-02-19 DIAGNOSIS — C67.2 MALIGNANT NEOPLASM OF LATERAL WALL OF URINARY BLADDER (H): Primary | ICD-10-CM

## 2020-02-19 LAB
ALBUMIN UR-MCNC: NEGATIVE MG/DL
APPEARANCE UR: CLEAR
BILIRUB UR QL STRIP: NEGATIVE
COLOR UR AUTO: YELLOW
GLUCOSE UR STRIP-MCNC: NEGATIVE MG/DL
HGB UR QL STRIP: NEGATIVE
KETONES UR STRIP-MCNC: NEGATIVE MG/DL
LEUKOCYTE ESTERASE UR QL STRIP: NEGATIVE
NITRATE UR QL: NEGATIVE
PH UR STRIP: 5 PH (ref 5–7)
SOURCE: NORMAL
SP GR UR STRIP: 1.02 (ref 1–1.03)
UROBILINOGEN UR STRIP-ACNC: 0.2 EU/DL (ref 0.2–1)

## 2020-02-19 PROCEDURE — 51720 TREATMENT OF BLADDER LESION: CPT

## 2020-02-19 PROCEDURE — 25000125 ZZHC RX 250: Performed by: UROLOGY

## 2020-02-19 PROCEDURE — 25000128 H RX IP 250 OP 636: Performed by: UROLOGY

## 2020-02-19 PROCEDURE — 81003 URINALYSIS AUTO W/O SCOPE: CPT | Performed by: UROLOGY

## 2020-02-19 RX ORDER — LIDOCAINE HYDROCHLORIDE 20 MG/ML
10 JELLY TOPICAL
Status: DISCONTINUED | OUTPATIENT
Start: 2020-02-19 | End: 2020-02-19 | Stop reason: HOSPADM

## 2020-02-19 RX ADMIN — BACILLUS CALMETTE-GUERIN 50 MG: 50 POWDER, FOR SUSPENSION INTRAVESICAL at 14:37

## 2020-02-19 RX ADMIN — LIDOCAINE HYDROCHLORIDE 10 ML: 20 JELLY TOPICAL at 14:37

## 2020-02-19 ASSESSMENT — PAIN SCALES - GENERAL: PAINLEVEL: NO PAIN (0)

## 2020-02-19 NOTE — PROGRESS NOTES
Infusion Nursing Note:  Infusion Nursing Note:  Gibson Villalta presents today for BCG.  Instillation number 2 of 6.   Patient seen by provider today: No   present during visit today: Not Applicable.    Note: N/A.    Treatment Conditions:   Patient states no concerns or issues at this time.  Ok to proceed with treatment.     Labs Reviewed:  Urine analysis.  Results meet treatment conditions.     Procedure:  16F straight catheter placed.  Lidocaine urojet 2% 10ml used.  Catheter placed without trauma.  Clear/yellow urine drained from bladder.    Patient tolerated instillation without incident.      Discharge Plan:   Patient and/or family verbalized understanding of discharge instructions and all questions answered.  AVS to patient via [employer].  Patient will return in 1 week for next appointment.   Patient discharged in stable condition accompanied by: wife.  Departure Mode: Ambulatory.    Cande Patterson, RN, RN

## 2020-02-26 ENCOUNTER — INFUSION THERAPY VISIT (OUTPATIENT)
Dept: INFUSION THERAPY | Facility: CLINIC | Age: 80
End: 2020-02-26
Attending: INTERNAL MEDICINE
Payer: COMMERCIAL

## 2020-02-26 VITALS
HEART RATE: 93 BPM | SYSTOLIC BLOOD PRESSURE: 114 MMHG | RESPIRATION RATE: 16 BRPM | TEMPERATURE: 97.4 F | DIASTOLIC BLOOD PRESSURE: 75 MMHG

## 2020-02-26 DIAGNOSIS — C67.9 MALIGNANT NEOPLASM OF URINARY BLADDER, UNSPECIFIED SITE (H): ICD-10-CM

## 2020-02-26 DIAGNOSIS — C67.9 MALIGNANT NEOPLASM OF URINARY BLADDER, UNSPECIFIED SITE (H): Primary | ICD-10-CM

## 2020-02-26 LAB
ALBUMIN UR-MCNC: ABNORMAL MG/DL
APPEARANCE UR: CLEAR
BILIRUB UR QL STRIP: ABNORMAL
COLOR UR AUTO: YELLOW
GLUCOSE UR STRIP-MCNC: NEGATIVE MG/DL
HGB UR QL STRIP: NEGATIVE
KETONES UR STRIP-MCNC: ABNORMAL MG/DL
LEUKOCYTE ESTERASE UR QL STRIP: NEGATIVE
NITRATE UR QL: NEGATIVE
PH UR STRIP: 5.5 PH (ref 5–7)
SOURCE: ABNORMAL
SP GR UR STRIP: 1.02 (ref 1–1.03)
UROBILINOGEN UR STRIP-ACNC: 0.2 EU/DL (ref 0.2–1)

## 2020-02-26 PROCEDURE — 25000125 ZZHC RX 250: Performed by: UROLOGY

## 2020-02-26 PROCEDURE — 25000128 H RX IP 250 OP 636: Performed by: UROLOGY

## 2020-02-26 PROCEDURE — 81003 URINALYSIS AUTO W/O SCOPE: CPT | Performed by: UROLOGY

## 2020-02-26 PROCEDURE — 51720 TREATMENT OF BLADDER LESION: CPT

## 2020-02-26 RX ORDER — LIDOCAINE HYDROCHLORIDE 20 MG/ML
10 JELLY TOPICAL
Status: DISCONTINUED | OUTPATIENT
Start: 2020-02-26 | End: 2020-02-26 | Stop reason: HOSPADM

## 2020-02-26 RX ADMIN — BACILLUS CALMETTE-GUERIN 50 MG: 50 POWDER, FOR SUSPENSION INTRAVESICAL at 14:35

## 2020-02-26 RX ADMIN — LIDOCAINE HYDROCHLORIDE 10 ML: 20 JELLY TOPICAL at 13:34

## 2020-02-26 ASSESSMENT — PAIN SCALES - GENERAL: PAINLEVEL: NO PAIN (0)

## 2020-02-26 NOTE — PROGRESS NOTES
Infusion Nursing Note:  Gibson Villalta presents today for BCG.  Instillation number 3 of 6.   Patient seen by provider today: No   present during visit today: Not Applicable.    Note: N/A.    Treatment Conditions:   Patient states no concerns or issues at this time.  Ok to proceed with treatment.     Labs Reviewed:  Urine analysis.  Results meet treatment conditions.     Procedure:  16F straight catheter placed.  Catheter placed without trauma.  Clear/yellow urine drained from bladder.    Patient tolerated instillation without incident. Turning to be done at home.      Discharge Plan:   Patient and/or family verbalized understanding of discharge instructions and all questions answered.  AVS to patient via [employer].  Patient will return in 1 week for next appointment.   Patient discharged in stable condition accompanied by: wife.  Departure Mode: Ambulatory.    Cande Patterson, RN, RN

## 2020-03-04 ENCOUNTER — INFUSION THERAPY VISIT (OUTPATIENT)
Dept: INFUSION THERAPY | Facility: CLINIC | Age: 80
End: 2020-03-04
Attending: INTERNAL MEDICINE
Payer: COMMERCIAL

## 2020-03-04 VITALS — RESPIRATION RATE: 18 BRPM | SYSTOLIC BLOOD PRESSURE: 96 MMHG | HEART RATE: 90 BPM | DIASTOLIC BLOOD PRESSURE: 66 MMHG

## 2020-03-04 DIAGNOSIS — C67.9 MALIGNANT NEOPLASM OF URINARY BLADDER, UNSPECIFIED SITE (H): Primary | ICD-10-CM

## 2020-03-04 LAB
ALBUMIN UR-MCNC: ABNORMAL MG/DL
APPEARANCE UR: CLEAR
BILIRUB UR QL STRIP: ABNORMAL
COLOR UR AUTO: YELLOW
GLUCOSE UR STRIP-MCNC: NEGATIVE MG/DL
HGB UR QL STRIP: NEGATIVE
KETONES UR STRIP-MCNC: NEGATIVE MG/DL
LEUKOCYTE ESTERASE UR QL STRIP: ABNORMAL
NITRATE UR QL: NEGATIVE
PH UR STRIP: 5.5 PH (ref 5–7)
SOURCE: ABNORMAL
SP GR UR STRIP: 1.02 (ref 1–1.03)
UROBILINOGEN UR STRIP-ACNC: 0.2 EU/DL (ref 0.2–1)

## 2020-03-04 PROCEDURE — 51720 TREATMENT OF BLADDER LESION: CPT

## 2020-03-04 PROCEDURE — 25000132 ZZH RX MED GY IP 250 OP 250 PS 637: Performed by: UROLOGY

## 2020-03-04 PROCEDURE — 25000128 H RX IP 250 OP 636: Performed by: UROLOGY

## 2020-03-04 PROCEDURE — 25000125 ZZHC RX 250: Performed by: UROLOGY

## 2020-03-04 PROCEDURE — 81003 URINALYSIS AUTO W/O SCOPE: CPT | Performed by: UROLOGY

## 2020-03-04 RX ORDER — LIDOCAINE HYDROCHLORIDE 20 MG/ML
10 JELLY TOPICAL
Status: DISCONTINUED | OUTPATIENT
Start: 2020-03-04 | End: 2020-03-04 | Stop reason: HOSPADM

## 2020-03-04 RX ADMIN — LIDOCAINE HYDROCHLORIDE 10 ML: 20 JELLY TOPICAL at 14:32

## 2020-03-04 RX ADMIN — BACILLUS CALMETTE-GUERIN 50 MG: 50 POWDER, FOR SUSPENSION INTRAVESICAL at 14:33

## 2020-03-04 RX ADMIN — Medication: at 14:33

## 2020-03-04 ASSESSMENT — PAIN SCALES - GENERAL: PAINLEVEL: NO PAIN (0)

## 2020-03-04 NOTE — PROGRESS NOTES
Infusion Nursing Note:  Gibson Villalta presents today for BCG.  Instillation number 4 of 6.   Patient seen by provider today: No   present during visit today: Not Applicable.    Note: N/A.    Treatment Conditions:   Patient states no concerns or issues at this time.  Ok to proceed with treatment.     Labs Reviewed:  Urine analysis.    Procedure:  14F Straight catheter placed.   Lidocaine urojet 2% 10ml used.  Catheter placed without trauma.  Clear/yellow urine drained from bladder.    Patient turned every 15 minutes per protocol: Yes, turning to be completed at home per protocol.   Patient tolerated instillation without incident.      Discharge Plan:   Discharge instructions reviewed with: Patient and Family.  Patient and/or family verbalized understanding of discharge instructions and all questions answered.  Copy of AVS reviewed with patient and/or family.  Patient will return 3/11/20 for next appointment.  Patient discharged in stable condition accompanied by: wife.  Departure Mode: Ambulatory.    Rohan Ruelas, RN, RN

## 2020-03-11 DIAGNOSIS — C67.9 MALIGNANT NEOPLASM OF URINARY BLADDER, UNSPECIFIED SITE (H): Primary | ICD-10-CM

## 2020-03-15 ENCOUNTER — TELEPHONE (OUTPATIENT)
Dept: INFUSION THERAPY | Facility: CLINIC | Age: 80
End: 2020-03-15

## 2020-03-15 NOTE — TELEPHONE ENCOUNTER
Patient is currently scheduled for an appointment at [facility] in [place] on 3/16/20.  Called patient to review current visitor restrictions and complete COVID-19 Visitor Wellness Screening Tool.     Due to the recent public health concerns and in an effort to keep our patients and staff safe and healthy, we are implementing a screening process for the patients and visitors that come to our clinic.      At this time, we are limiting visitors to only one essential visitor.  An essential visitor is a legal guardian or agent who must be present for health care decisions.  This visitor must also pass our screening process, or they will be required to leave and wait elsewhere.      I am going to ask you a few questions, please answer yes or no.     Do you have a:  Fever?  No  Cough?  No  Shortness of breath?  No  Skin rash?  No    Within the past 14 days, have you been in contact with someone who:   Is currently being ruled out for COVID-19 (novel coronavirus)?  No   Has tested positive for COVID-19?  No   Has symptoms of a respiratory illness (fever, cough, shortness of breath)?  No    Have you recently traveled to an area with COVID-19 cases (refer to CDC Coronavirus webpage for COVID-19 areas)?  No    Within the past 3 weeks, have you been exposed to the following:   Pertussis?  No   Chickenpox?  No   Measles?  No    Patient PASSED the screening assessment.  Patient instructed to come to the clinic as planned for appointment and to call the clinic right away if anything changes in their health status.    Tonya Flores RN

## 2020-03-16 ENCOUNTER — INFUSION THERAPY VISIT (OUTPATIENT)
Dept: INFUSION THERAPY | Facility: CLINIC | Age: 80
End: 2020-03-16
Attending: UROLOGY
Payer: COMMERCIAL

## 2020-03-16 VITALS — SYSTOLIC BLOOD PRESSURE: 115 MMHG | HEART RATE: 82 BPM | DIASTOLIC BLOOD PRESSURE: 78 MMHG | RESPIRATION RATE: 18 BRPM

## 2020-03-16 DIAGNOSIS — C67.9 MALIGNANT NEOPLASM OF URINARY BLADDER, UNSPECIFIED SITE (H): Primary | ICD-10-CM

## 2020-03-16 PROCEDURE — 25000125 ZZHC RX 250: Performed by: UROLOGY

## 2020-03-16 PROCEDURE — 81003 URINALYSIS AUTO W/O SCOPE: CPT | Performed by: UROLOGY

## 2020-03-16 PROCEDURE — 25000132 ZZH RX MED GY IP 250 OP 250 PS 637: Performed by: UROLOGY

## 2020-03-16 PROCEDURE — 51720 TREATMENT OF BLADDER LESION: CPT

## 2020-03-16 PROCEDURE — 25000128 H RX IP 250 OP 636: Performed by: UROLOGY

## 2020-03-16 RX ORDER — LIDOCAINE HYDROCHLORIDE 20 MG/ML
10 JELLY TOPICAL
Status: DISCONTINUED | OUTPATIENT
Start: 2020-03-16 | End: 2020-03-16 | Stop reason: HOSPADM

## 2020-03-16 RX ADMIN — BACILLUS CALMETTE-GUERIN 50 MG: 50 POWDER, FOR SUSPENSION INTRAVESICAL at 15:51

## 2020-03-16 RX ADMIN — Medication: at 15:51

## 2020-03-16 RX ADMIN — LIDOCAINE HYDROCHLORIDE 10 ML: 20 JELLY TOPICAL at 15:51

## 2020-03-16 ASSESSMENT — PAIN SCALES - GENERAL: PAINLEVEL: NO PAIN (0)

## 2020-03-16 NOTE — PROGRESS NOTES
Infusion Nursing Note:  Gibson Villalta presents today for BCG.  Instillation number 5 of 6.   Patient seen by provider today: No   present during visit today: Not Applicable.    Note: Unable to register temperature-Patient Denies fevers    Treatment Conditions:   Patient states no concerns or issues at this time.  Ok to proceed with treatment.   Denies fever    Labs Reviewed:  Urine analysis.    Procedure:  14F Straight catheter placed.   Catheter placed without trauma.  Clear/yellow urine drained from bladder.    Patient turned every 15 minutes per protocol: Yes, turning to be completed at home per protocol.   Patient tolerated instillation without incident.      Discharge Plan:   Discharge instructions reviewed with: Patient and Family.  Patient and/or family verbalized understanding of discharge instructions and all questions answered.  Copy of AVS reviewed with patient and/or family.  Patient will return 3/23/20 for next appointment.  Patient discharged in stable condition accompanied by: wife.  Departure Mode: Ambulatory.    Rohan Ruelas, RN, RN

## 2020-03-18 ENCOUNTER — ANCILLARY PROCEDURE (OUTPATIENT)
Dept: CARDIOLOGY | Facility: CLINIC | Age: 80
End: 2020-03-18
Attending: INTERNAL MEDICINE
Payer: COMMERCIAL

## 2020-03-18 DIAGNOSIS — Z95.810 ICD (IMPLANTABLE CARDIOVERTER-DEFIBRILLATOR), DUAL, IN SITU: Primary | ICD-10-CM

## 2020-03-18 DIAGNOSIS — I42.9 CARDIOMYOPATHY, UNSPECIFIED TYPE (H): ICD-10-CM

## 2020-03-18 DIAGNOSIS — Z95.810 ICD (IMPLANTABLE CARDIOVERTER-DEFIBRILLATOR), DUAL, IN SITU: ICD-10-CM

## 2020-03-18 DIAGNOSIS — I42.9 CARDIOMYOPATHY, UNSPECIFIED TYPE (H): Chronic | ICD-10-CM

## 2020-03-18 PROCEDURE — 93295 DEV INTERROG REMOTE 1/2/MLT: CPT | Performed by: INTERNAL MEDICINE

## 2020-03-18 PROCEDURE — 93296 REM INTERROG EVL PM/IDS: CPT | Performed by: INTERNAL MEDICINE

## 2020-03-18 RX ORDER — HYDRALAZINE HYDROCHLORIDE 10 MG/1
10 TABLET, FILM COATED ORAL 3 TIMES DAILY
Qty: 270 TABLET | Refills: 3 | Status: SHIPPED | OUTPATIENT
Start: 2020-03-18 | End: 2020-01-01

## 2020-03-20 ENCOUNTER — TELEPHONE (OUTPATIENT)
Dept: ONCOLOGY | Facility: CLINIC | Age: 80
End: 2020-03-20

## 2020-03-20 DIAGNOSIS — I50.23 ACUTE ON CHRONIC SYSTOLIC HEART FAILURE (H): Chronic | ICD-10-CM

## 2020-03-20 NOTE — TELEPHONE ENCOUNTER
"Last Written Prescription Date:  2/11/20  Last Fill Quantity: 90 tablet,  # refills: 1   Last office visit: 9/6/2019 with prescribing provider:  Kush   Future Office Visit:   Next 5 appointments (look out 90 days)    Apr 20, 2020  1:30 PM CDT  Return Visit with  LAB DRAW 1  [facility] ([facility]) [facility]  [address]  [facility] [address] [phone]   Apr 20, 2020  2:20 PM CDT  Return Visit with Maribel Bender MD  [facility] ([facility]) [address]  [facility] [address] [phone]   Apr 29, 2020  1:45 PM CDT  Return Visit with Gibson Heard MD  [facility] ([facility]) [address]  [facility] [address] [phone] OPT 2         Ordered  Status  Priority  Ordering User  Department    02/11/20  (none)  (none)  Roes Elizondo, ALLEN CNP  [facility]          Requested Prescriptions   Pending Prescriptions Disp Refills     furosemide (LASIX) 20 MG tablet 90 tablet 1     Sig: Take 1.5 tablets (30 mg) by mouth daily       Diuretics (Including Combos) Protocol Failed - 3/20/2020  1:25 PM        Failed - Normal serum creatinine on file in past 12 months     Recent Labs   Lab Test 01/06/20  1357   CR 1.40*              Passed - Blood pressure under 140/90 in past 12 months     BP Readings from Last 3 Encounters:   03/16/20 115/78   03/04/20 96/66   02/26/20 114/75                 Passed - Recent (12 mo) or future (30 days) visit within the authorizing provider's specialty     Patient has had an office visit with the authorizing provider or a provider within the authorizing providers department within the previous 12 mos or has a future within next 30 days. See \"Patient Info\" tab in inbasket, or \"Choose Columns\" in Meds & Orders section of the refill encounter.              " Left message with patient's , Butch. Advised that paper prescription was mailed to patient and that this prescription for nebulizer unit can be taken to local pharmacy to see if they can supply the nebulizer unit. Butch verbalized understanding of message and will notify patient.   Passed - Medication is active on med list        Passed - Patient is age 18 or older        Passed - Normal serum potassium on file in past 12 months     Recent Labs   Lab Test 01/06/20  1357   POTASSIUM 4.0                    Passed - Normal serum sodium on file in past 12 months     Recent Labs   Lab Test 01/06/20  1357

## 2020-03-20 NOTE — TELEPHONE ENCOUNTER
Patient is currently scheduled for an appointment at [facility] in [place].  Called patient to review current visitor restrictions and complete COVID-19 Patient Infection/Travel Screening Tool.     Due to the recent public health concerns around COVID-19 and in an effort to keep our patients and staff safe and healthy, we are implementing a screening process for the patients that come to our clinic.      I am going to ask you a few questions, please answer yes or no.      Do you have a:  Fever (or reported chills)?  No  Cough?  No  Shortness of breath?  No  Rash?  No    In the last month, have you been in contact with someone who was confirmed or suspected to have Coronavirus/COVID-19?  No    Have you traveled internationally in the last month?  No  If so, where?  N/A     I also wanted to let you know that to protect our patients from the flu and other common illnesses, [facility] locations enforce visitor restrictions year round, but due to the community spread of COVID-19 in [place], we are taking additional precautionary steps to ensure the health of our patients.  At this time, NO visitors are allowed on our hospital and clinic campuses.     Patient PASSED the screening assessment.  Patient instructed to come to the clinic as planned for appointment and to call the clinic if any symptoms develop prior to their appointment.

## 2020-03-22 ENCOUNTER — HEALTH MAINTENANCE LETTER (OUTPATIENT)
Age: 80
End: 2020-03-22

## 2020-03-23 ENCOUNTER — INFUSION THERAPY VISIT (OUTPATIENT)
Dept: INFUSION THERAPY | Facility: CLINIC | Age: 80
End: 2020-03-23
Attending: FAMILY MEDICINE
Payer: COMMERCIAL

## 2020-03-23 VITALS
SYSTOLIC BLOOD PRESSURE: 122 MMHG | DIASTOLIC BLOOD PRESSURE: 73 MMHG | OXYGEN SATURATION: 94 % | TEMPERATURE: 97.5 F | HEART RATE: 85 BPM | RESPIRATION RATE: 16 BRPM

## 2020-03-23 DIAGNOSIS — C67.9 MALIGNANT NEOPLASM OF URINARY BLADDER, UNSPECIFIED SITE (H): Primary | ICD-10-CM

## 2020-03-23 DIAGNOSIS — C67.9 MALIGNANT NEOPLASM OF URINARY BLADDER, UNSPECIFIED SITE (H): ICD-10-CM

## 2020-03-23 LAB
ALBUMIN UR-MCNC: ABNORMAL MG/DL
APPEARANCE UR: CLEAR
BILIRUB UR QL STRIP: NEGATIVE
COLOR UR AUTO: YELLOW
GLUCOSE UR STRIP-MCNC: NEGATIVE MG/DL
HGB UR QL STRIP: NEGATIVE
KETONES UR STRIP-MCNC: NEGATIVE MG/DL
LEUKOCYTE ESTERASE UR QL STRIP: NEGATIVE
NITRATE UR QL: NEGATIVE
PH UR STRIP: 6.5 PH (ref 5–7)
SOURCE: ABNORMAL
SP GR UR STRIP: 1.02 (ref 1–1.03)
UROBILINOGEN UR STRIP-ACNC: 1 EU/DL (ref 0.2–1)

## 2020-03-23 PROCEDURE — 25000125 ZZHC RX 250: Performed by: UROLOGY

## 2020-03-23 PROCEDURE — 51720 TREATMENT OF BLADDER LESION: CPT

## 2020-03-23 PROCEDURE — 25000132 ZZH RX MED GY IP 250 OP 250 PS 637: Performed by: UROLOGY

## 2020-03-23 PROCEDURE — 81003 URINALYSIS AUTO W/O SCOPE: CPT | Performed by: UROLOGY

## 2020-03-23 PROCEDURE — 25000128 H RX IP 250 OP 636: Performed by: UROLOGY

## 2020-03-23 RX ORDER — FUROSEMIDE 20 MG
30 TABLET ORAL DAILY
Qty: 90 TABLET | Refills: 1 | Status: SHIPPED | OUTPATIENT
Start: 2020-03-23 | End: 2020-01-01

## 2020-03-23 RX ORDER — LIDOCAINE HYDROCHLORIDE 20 MG/ML
10 JELLY TOPICAL
Status: DISCONTINUED | OUTPATIENT
Start: 2020-03-23 | End: 2020-03-23 | Stop reason: HOSPADM

## 2020-03-23 RX ADMIN — BACILLUS CALMETTE-GUERIN 50 MG: 50 POWDER, FOR SUSPENSION INTRAVESICAL at 14:42

## 2020-03-23 RX ADMIN — Medication: at 14:42

## 2020-03-23 RX ADMIN — LIDOCAINE HYDROCHLORIDE 10 ML: 20 JELLY TOPICAL at 14:42

## 2020-03-23 ASSESSMENT — PAIN SCALES - GENERAL: PAINLEVEL: NO PAIN (0)

## 2020-03-23 NOTE — PROGRESS NOTES
Infusion Nursing Note:  Gibson Villalta presents today for BCG.  Instillation number 6 of 6.   Patient seen by provider today: No   present during visit today: Not Applicable.    Note: N/A.    Treatment Conditions:   Patient states no concerns or issues at this time.  Ok to proceed with treatment.     Labs Reviewed:  Urine analysis.  Results meet treatment conditions.     Procedure:  14F Straight catheter placed.   Lidocaine urojet 2% 10ml used.  Catheter placed without trauma.  Clear/yellow urine drained from bladder.    Patient turned every 15 minutes per protocol: Yes, turning to be completed at home per protocol.   Patient tolerated instillation without incident.      Discharge Plan:   Discharge instructions reviewed with: Patient.  Patient and/or family verbalized understanding of discharge instructions and all questions answered.  AVS to patient via [employer].     Patient discharged in stable condition accompanied by: self.  Departure Mode: Ambulatory.    Billy Delcid, RN, RN

## 2020-04-03 LAB
MDC_IDC_EPISODE_DTM: NORMAL
MDC_IDC_EPISODE_DURATION: 5 S
MDC_IDC_EPISODE_DURATION: 6 S
MDC_IDC_EPISODE_DURATION: 6 S
MDC_IDC_EPISODE_DURATION: 7 S
MDC_IDC_EPISODE_ID: 43
MDC_IDC_EPISODE_ID: 44
MDC_IDC_EPISODE_ID: 45
MDC_IDC_EPISODE_ID: 46
MDC_IDC_EPISODE_TYPE: NORMAL
MDC_IDC_LEAD_IMPLANT_DT: NORMAL
MDC_IDC_LEAD_LOCATION: NORMAL
MDC_IDC_LEAD_LOCATION_DETAIL_1: NORMAL
MDC_IDC_LEAD_MFG: NORMAL
MDC_IDC_LEAD_MODEL: NORMAL
MDC_IDC_LEAD_POLARITY_TYPE: NORMAL
MDC_IDC_LEAD_SERIAL: NORMAL
MDC_IDC_MSMT_BATTERY_DTM: NORMAL
MDC_IDC_MSMT_BATTERY_REMAINING_LONGEVITY: 49 MO
MDC_IDC_MSMT_BATTERY_RRT_TRIGGER: 2.73
MDC_IDC_MSMT_BATTERY_STATUS: NORMAL
MDC_IDC_MSMT_BATTERY_VOLTAGE: 2.97 V
MDC_IDC_MSMT_CAP_CHARGE_DTM: NORMAL
MDC_IDC_MSMT_CAP_CHARGE_ENERGY: 18 J
MDC_IDC_MSMT_CAP_CHARGE_TIME: 3.95
MDC_IDC_MSMT_CAP_CHARGE_TYPE: NORMAL
MDC_IDC_MSMT_LEADCHNL_LV_IMPEDANCE_VALUE: 155.46
MDC_IDC_MSMT_LEADCHNL_LV_IMPEDANCE_VALUE: 159.26
MDC_IDC_MSMT_LEADCHNL_LV_IMPEDANCE_VALUE: 159.26
MDC_IDC_MSMT_LEADCHNL_LV_IMPEDANCE_VALUE: 175.62
MDC_IDC_MSMT_LEADCHNL_LV_IMPEDANCE_VALUE: 180.5 OHM
MDC_IDC_MSMT_LEADCHNL_LV_IMPEDANCE_VALUE: 285 OHM
MDC_IDC_MSMT_LEADCHNL_LV_IMPEDANCE_VALUE: 342 OHM
MDC_IDC_MSMT_LEADCHNL_LV_IMPEDANCE_VALUE: 361 OHM
MDC_IDC_MSMT_LEADCHNL_LV_IMPEDANCE_VALUE: 361 OHM
MDC_IDC_MSMT_LEADCHNL_LV_IMPEDANCE_VALUE: 418 OHM
MDC_IDC_MSMT_LEADCHNL_LV_IMPEDANCE_VALUE: 513 OHM
MDC_IDC_MSMT_LEADCHNL_LV_IMPEDANCE_VALUE: 551 OHM
MDC_IDC_MSMT_LEADCHNL_LV_IMPEDANCE_VALUE: 551 OHM
MDC_IDC_MSMT_LEADCHNL_LV_IMPEDANCE_VALUE: 608 OHM
MDC_IDC_MSMT_LEADCHNL_LV_IMPEDANCE_VALUE: 646 OHM
MDC_IDC_MSMT_LEADCHNL_LV_PACING_THRESHOLD_AMPLITUDE: 0.75 V
MDC_IDC_MSMT_LEADCHNL_LV_PACING_THRESHOLD_PULSEWIDTH: 0.5 MS
MDC_IDC_MSMT_LEADCHNL_RA_IMPEDANCE_VALUE: 361 OHM
MDC_IDC_MSMT_LEADCHNL_RA_PACING_THRESHOLD_AMPLITUDE: 0.75 V
MDC_IDC_MSMT_LEADCHNL_RA_PACING_THRESHOLD_PULSEWIDTH: 0.4 MS
MDC_IDC_MSMT_LEADCHNL_RA_SENSING_INTR_AMPL: 0.5 MV
MDC_IDC_MSMT_LEADCHNL_RA_SENSING_INTR_AMPL: 0.5 MV
MDC_IDC_MSMT_LEADCHNL_RV_IMPEDANCE_VALUE: 475 OHM
MDC_IDC_MSMT_LEADCHNL_RV_IMPEDANCE_VALUE: 551 OHM
MDC_IDC_MSMT_LEADCHNL_RV_PACING_THRESHOLD_AMPLITUDE: 0.38 V
MDC_IDC_MSMT_LEADCHNL_RV_PACING_THRESHOLD_PULSEWIDTH: 0.4 MS
MDC_IDC_MSMT_LEADCHNL_RV_SENSING_INTR_AMPL: 15.88 MV
MDC_IDC_MSMT_LEADCHNL_RV_SENSING_INTR_AMPL: 7.38 MV
MDC_IDC_PG_IMPLANT_DTM: NORMAL
MDC_IDC_PG_MFG: NORMAL
MDC_IDC_PG_MODEL: NORMAL
MDC_IDC_PG_SERIAL: NORMAL
MDC_IDC_PG_TYPE: NORMAL
MDC_IDC_SESS_CLINIC_NAME: NORMAL
MDC_IDC_SESS_DTM: NORMAL
MDC_IDC_SESS_TYPE: NORMAL
MDC_IDC_SET_BRADY_AT_MODE_SWITCH_RATE: 171 {BEATS}/MIN
MDC_IDC_SET_BRADY_LOWRATE: 50 {BEATS}/MIN
MDC_IDC_SET_BRADY_MAX_SENSOR_RATE: 130 {BEATS}/MIN
MDC_IDC_SET_BRADY_MAX_TRACKING_RATE: 130 {BEATS}/MIN
MDC_IDC_SET_BRADY_MODE: NORMAL
MDC_IDC_SET_BRADY_PAV_DELAY_LOW: 170 MS
MDC_IDC_SET_BRADY_SAV_DELAY_LOW: 120 MS
MDC_IDC_SET_CRT_LVRV_DELAY: 0 MS
MDC_IDC_SET_CRT_PACED_CHAMBERS: NORMAL
MDC_IDC_SET_LEADCHNL_LV_PACING_AMPLITUDE: 1.25 V
MDC_IDC_SET_LEADCHNL_LV_PACING_ANODE_ELECTRODE_1: NORMAL
MDC_IDC_SET_LEADCHNL_LV_PACING_ANODE_LOCATION_1: NORMAL
MDC_IDC_SET_LEADCHNL_LV_PACING_CAPTURE_MODE: NORMAL
MDC_IDC_SET_LEADCHNL_LV_PACING_CATHODE_ELECTRODE_1: NORMAL
MDC_IDC_SET_LEADCHNL_LV_PACING_CATHODE_LOCATION_1: NORMAL
MDC_IDC_SET_LEADCHNL_LV_PACING_POLARITY: NORMAL
MDC_IDC_SET_LEADCHNL_LV_PACING_PULSEWIDTH: 0.5 MS
MDC_IDC_SET_LEADCHNL_RA_PACING_AMPLITUDE: 1.5 V
MDC_IDC_SET_LEADCHNL_RA_PACING_ANODE_ELECTRODE_1: NORMAL
MDC_IDC_SET_LEADCHNL_RA_PACING_ANODE_LOCATION_1: NORMAL
MDC_IDC_SET_LEADCHNL_RA_PACING_CAPTURE_MODE: NORMAL
MDC_IDC_SET_LEADCHNL_RA_PACING_CATHODE_ELECTRODE_1: NORMAL
MDC_IDC_SET_LEADCHNL_RA_PACING_CATHODE_LOCATION_1: NORMAL
MDC_IDC_SET_LEADCHNL_RA_PACING_POLARITY: NORMAL
MDC_IDC_SET_LEADCHNL_RA_PACING_PULSEWIDTH: 0.4 MS
MDC_IDC_SET_LEADCHNL_RA_SENSING_ANODE_ELECTRODE_1: NORMAL
MDC_IDC_SET_LEADCHNL_RA_SENSING_ANODE_LOCATION_1: NORMAL
MDC_IDC_SET_LEADCHNL_RA_SENSING_CATHODE_ELECTRODE_1: NORMAL
MDC_IDC_SET_LEADCHNL_RA_SENSING_CATHODE_LOCATION_1: NORMAL
MDC_IDC_SET_LEADCHNL_RA_SENSING_POLARITY: NORMAL
MDC_IDC_SET_LEADCHNL_RA_SENSING_SENSITIVITY: 0.3 MV
MDC_IDC_SET_LEADCHNL_RV_PACING_AMPLITUDE: 2 V
MDC_IDC_SET_LEADCHNL_RV_PACING_ANODE_ELECTRODE_1: NORMAL
MDC_IDC_SET_LEADCHNL_RV_PACING_ANODE_LOCATION_1: NORMAL
MDC_IDC_SET_LEADCHNL_RV_PACING_CAPTURE_MODE: NORMAL
MDC_IDC_SET_LEADCHNL_RV_PACING_CATHODE_ELECTRODE_1: NORMAL
MDC_IDC_SET_LEADCHNL_RV_PACING_CATHODE_LOCATION_1: NORMAL
MDC_IDC_SET_LEADCHNL_RV_PACING_POLARITY: NORMAL
MDC_IDC_SET_LEADCHNL_RV_PACING_PULSEWIDTH: 0.4 MS
MDC_IDC_SET_LEADCHNL_RV_SENSING_ANODE_ELECTRODE_1: NORMAL
MDC_IDC_SET_LEADCHNL_RV_SENSING_ANODE_LOCATION_1: NORMAL
MDC_IDC_SET_LEADCHNL_RV_SENSING_CATHODE_ELECTRODE_1: NORMAL
MDC_IDC_SET_LEADCHNL_RV_SENSING_CATHODE_LOCATION_1: NORMAL
MDC_IDC_SET_LEADCHNL_RV_SENSING_POLARITY: NORMAL
MDC_IDC_SET_LEADCHNL_RV_SENSING_SENSITIVITY: 0.3 MV
MDC_IDC_SET_ZONE_DETECTION_BEATS_DENOMINATOR: 40 {BEATS}
MDC_IDC_SET_ZONE_DETECTION_BEATS_NUMERATOR: 30 {BEATS}
MDC_IDC_SET_ZONE_DETECTION_INTERVAL: 300 MS
MDC_IDC_SET_ZONE_DETECTION_INTERVAL: 350 MS
MDC_IDC_SET_ZONE_DETECTION_INTERVAL: 360 MS
MDC_IDC_SET_ZONE_DETECTION_INTERVAL: 360 MS
MDC_IDC_SET_ZONE_DETECTION_INTERVAL: NORMAL
MDC_IDC_SET_ZONE_TYPE: NORMAL
MDC_IDC_STAT_AT_BURDEN_PERCENT: 0 %
MDC_IDC_STAT_AT_DTM_END: NORMAL
MDC_IDC_STAT_AT_DTM_START: NORMAL
MDC_IDC_STAT_BRADY_AP_VP_PERCENT: 1.31 %
MDC_IDC_STAT_BRADY_AP_VS_PERCENT: 0.01 %
MDC_IDC_STAT_BRADY_AS_VP_PERCENT: 96.76 %
MDC_IDC_STAT_BRADY_AS_VS_PERCENT: 1.92 %
MDC_IDC_STAT_BRADY_DTM_END: NORMAL
MDC_IDC_STAT_BRADY_DTM_START: NORMAL
MDC_IDC_STAT_BRADY_RA_PERCENT_PACED: 1.31 %
MDC_IDC_STAT_BRADY_RV_PERCENT_PACED: 96.6 %
MDC_IDC_STAT_CRT_DTM_END: NORMAL
MDC_IDC_STAT_CRT_DTM_START: NORMAL
MDC_IDC_STAT_CRT_LV_PERCENT_PACED: 96.52 %
MDC_IDC_STAT_CRT_PERCENT_PACED: 96.52 %
MDC_IDC_STAT_EPISODE_RECENT_COUNT: 0
MDC_IDC_STAT_EPISODE_RECENT_COUNT_DTM_END: NORMAL
MDC_IDC_STAT_EPISODE_RECENT_COUNT_DTM_START: NORMAL
MDC_IDC_STAT_EPISODE_TOTAL_COUNT: 0
MDC_IDC_STAT_EPISODE_TOTAL_COUNT: 1
MDC_IDC_STAT_EPISODE_TOTAL_COUNT_DTM_END: NORMAL
MDC_IDC_STAT_EPISODE_TOTAL_COUNT_DTM_START: NORMAL
MDC_IDC_STAT_EPISODE_TYPE: NORMAL
MDC_IDC_STAT_TACHYTHERAPY_ATP_DELIVERED_RECENT: 0
MDC_IDC_STAT_TACHYTHERAPY_ATP_DELIVERED_TOTAL: 0
MDC_IDC_STAT_TACHYTHERAPY_RECENT_DTM_END: NORMAL
MDC_IDC_STAT_TACHYTHERAPY_RECENT_DTM_START: NORMAL
MDC_IDC_STAT_TACHYTHERAPY_SHOCKS_ABORTED_RECENT: 0
MDC_IDC_STAT_TACHYTHERAPY_SHOCKS_ABORTED_TOTAL: 0
MDC_IDC_STAT_TACHYTHERAPY_SHOCKS_DELIVERED_RECENT: 0
MDC_IDC_STAT_TACHYTHERAPY_SHOCKS_DELIVERED_TOTAL: 0
MDC_IDC_STAT_TACHYTHERAPY_TOTAL_DTM_END: NORMAL
MDC_IDC_STAT_TACHYTHERAPY_TOTAL_DTM_START: NORMAL

## 2020-04-09 DIAGNOSIS — F32.A ANXIETY AND DEPRESSION: Chronic | ICD-10-CM

## 2020-04-09 DIAGNOSIS — F41.9 ANXIETY AND DEPRESSION: Chronic | ICD-10-CM

## 2020-04-09 RX ORDER — VENLAFAXINE 75 MG/1
75 TABLET ORAL 2 TIMES DAILY
Qty: 180 TABLET | Refills: 1 | Status: SHIPPED | OUTPATIENT
Start: 2020-04-09 | End: 2020-01-01

## 2020-04-09 NOTE — TELEPHONE ENCOUNTER
Last Written Prescription Date:  10/11/19  Last Fill Quantity: 180 tablet,  # refills: 1   Last office visit: 9/6/2019 with prescribing provider:  Kush   Future Office Visit:   Next 5 appointments (look out 90 days)    Apr 20, 2020  1:30 PM CDT  Return Visit with  LAB DRAW 1  [facility] ([facility]) [facility]  [address]  [facility] [address]  [phone]   Apr 20, 2020  2:20 PM CDT  Return Visit with Maribel Bender MD  [facility] ([facility]) [address]  [facility] [address] [phone]   Apr 29, 2020  1:45 PM CDT  Return Visit with Gibson Heard MD  [facility] ([facility]) [address]  [facility] [address] [phone] OPT 2             [facility] Follow-up to PHQ 7/27/2016 10/9/2018 9/6/2019   PHQ-9 9. Suicide Ideation past 2 weeks Not at all Not at all Not at all         Requested Prescriptions   Pending Prescriptions Disp Refills     venlafaxine (EFFEXOR) 75 MG tablet 180 tablet 1     Sig: Take 1 tablet (75 mg) by mouth 2 times daily       Serotonin-Norepinephrine Reuptake Inhibitors  Failed - 4/9/2020  1:13 PM        Failed - PHQ-9 score of less than 5 in past 6 months     Please review last PHQ-9 score.           Failed - Normal serum creatinine on file in past 12 months     Recent Labs   Lab Test 01/06/20  1357   CR 1.40*       Ok to refill medication if creatinine is low          Passed - Blood pressure under 140/90 in past 12 months     BP Readings from Last 3 Encounters:   03/23/20 122/73   03/16/20 115/78   03/04/20 96/66                 Passed - Medication is active on med list        Passed - Patient is age 18 or older        Passed - Recent (6 mo) or future (30 days) visit within the authorizing provider's specialty     Patient had office visit in the last 6 months or has  "a visit in the next 30 days with authorizing provider or within the authorizing provider's specialty.  See \"Patient Info\" tab in inbasket, or \"Choose Columns\" in Meds & Orders section of the refill encounter.                 "

## 2020-04-09 NOTE — TELEPHONE ENCOUNTER
Routing refill request to provider for review/approval because:  Labs out of range:  Creat  PHQ9 not undated - sent [employer] message.  Please authorize if appropriate.  Thanks,  Eri Goddard RN

## 2020-04-16 ENCOUNTER — TELEPHONE (OUTPATIENT)
Dept: ONCOLOGY | Facility: CLINIC | Age: 80
End: 2020-04-16

## 2020-04-16 NOTE — TELEPHONE ENCOUNTER
"Patient is currently scheduled for an appointment at [facility] in [place].  Called patient to review current visitor restrictions and complete COVID-19 Patient Infection/Travel Screening Tool.     Due to the recent public health concerns around COVID-19 and in an effort to keep our patients and staff safe and healthy, we are implementing a screening process for the patients that come to our clinic.      I am going to ask you a few questions, please answer yes or no.  Your honesty about any symptoms is critical, as it keeps patients and staff healthy.      Do you have a:  Fever (or reported chills)?  No  Cough?  No  Shortness of breath?  No  Rash?  No    In the last month, have you been in contact with someone who was confirmed or suspected to have Coronavirus/COVID-19?  No    Have you traveled internationally in the last month?  No  If so, where?  N/A     I also wanted to let you know that to protect our patients from the flu and other common illnesses, [facility] enforce visitor restrictions year round, but due to the community spread of COVID-19 in [place], we are taking additional precautionary steps to ensure the health of our patients.  At this time, NO visitors are allowed on our hospital and clinic campuses.     Patient PASSED the screening assessment.    Patient instructed to come to the clinic as planned for their scheduled appointment and to call the clinic if any symptoms develop prior to their appointment.    \"COVID-19 is contagious and can be dangerous for our patients and staff.  Please send us a MyChart message or call our clinic before coming in if you feel any of the following symptoms: fever, cough, congestion, runny nose, sore throat, muscle aches and pains, or shortness of breath.  If you are already at our clinic, it is very important that you be honest about any symptoms you are experiencing to ensure your safety and that of other patients and staff who " "treat you.  If you do have symptoms, we will have a nurse and/or provider asses you to determine next steps.\"    Shari J. Schoenberger, CMA on 4/16/2020 at 8:58 AM      "

## 2020-04-17 ENCOUNTER — HOSPITAL ENCOUNTER (OUTPATIENT)
Facility: CLINIC | Age: 80
Setting detail: SPECIMEN
Discharge: HOME | End: 2020-04-17
Attending: INTERNAL MEDICINE | Admitting: INTERNAL MEDICINE
Payer: COMMERCIAL

## 2020-04-17 ENCOUNTER — INFUSION THERAPY VISIT (OUTPATIENT)
Dept: INFUSION THERAPY | Facility: CLINIC | Age: 80
End: 2020-04-17
Attending: INTERNAL MEDICINE
Payer: COMMERCIAL

## 2020-04-17 DIAGNOSIS — C83.398 DIFFUSE LARGE B-CELL LYMPHOMA OF EXTRANODAL SITE: ICD-10-CM

## 2020-04-17 LAB
ALBUMIN SERPL-MCNC: 2.8 G/DL (ref 3.4–5)
ALP SERPL-CCNC: 190 U/L (ref 40–150)
ALT SERPL W P-5'-P-CCNC: 35 U/L (ref 0–70)
ANION GAP SERPL CALCULATED.3IONS-SCNC: 10 MMOL/L (ref 3–14)
AST SERPL W P-5'-P-CCNC: 40 U/L (ref 0–45)
BASOPHILS # BLD AUTO: 0 10E9/L (ref 0–0.2)
BASOPHILS NFR BLD AUTO: 0.3 %
BILIRUB SERPL-MCNC: 1.2 MG/DL (ref 0.2–1.3)
BUN SERPL-MCNC: 18 MG/DL (ref 7–30)
CALCIUM SERPL-MCNC: 9.7 MG/DL (ref 8.5–10.1)
CHLORIDE SERPL-SCNC: 101 MMOL/L (ref 94–109)
CO2 SERPL-SCNC: 25 MMOL/L (ref 20–32)
CREAT SERPL-MCNC: 1.15 MG/DL (ref 0.66–1.25)
DIFFERENTIAL METHOD BLD: ABNORMAL
EOSINOPHIL # BLD AUTO: 1.2 10E9/L (ref 0–0.7)
EOSINOPHIL NFR BLD AUTO: 9.7 %
ERYTHROCYTE [DISTWIDTH] IN BLOOD BY AUTOMATED COUNT: 14.3 % (ref 10–15)
GFR SERPL CREATININE-BSD FRML MDRD: 60 ML/MIN/{1.73_M2}
GLUCOSE SERPL-MCNC: 132 MG/DL (ref 70–99)
HCT VFR BLD AUTO: 43.2 % (ref 40–53)
HGB BLD-MCNC: 14.5 G/DL (ref 13.3–17.7)
IMM GRANULOCYTES # BLD: 0.1 10E9/L (ref 0–0.4)
IMM GRANULOCYTES NFR BLD: 0.4 %
LDH SERPL L TO P-CCNC: 356 U/L (ref 85–227)
LYMPHOCYTES # BLD AUTO: 1.4 10E9/L (ref 0.8–5.3)
LYMPHOCYTES NFR BLD AUTO: 10.7 %
MCH RBC QN AUTO: 31.7 PG (ref 26.5–33)
MCHC RBC AUTO-ENTMCNC: 33.6 G/DL (ref 31.5–36.5)
MCV RBC AUTO: 94 FL (ref 78–100)
MONOCYTES # BLD AUTO: 0.7 10E9/L (ref 0–1.3)
MONOCYTES NFR BLD AUTO: 5.7 %
NEUTROPHILS # BLD AUTO: 9.2 10E9/L (ref 1.6–8.3)
NEUTROPHILS NFR BLD AUTO: 73.2 %
NRBC # BLD AUTO: 0 10*3/UL
NRBC BLD AUTO-RTO: 0 /100
PLATELET # BLD AUTO: 284 10E9/L (ref 150–450)
POTASSIUM SERPL-SCNC: 3.8 MMOL/L (ref 3.4–5.3)
PROT SERPL-MCNC: 7.9 G/DL (ref 6.8–8.8)
RBC # BLD AUTO: 4.58 10E12/L (ref 4.4–5.9)
SODIUM SERPL-SCNC: 136 MMOL/L (ref 133–144)
WBC # BLD AUTO: 12.6 10E9/L (ref 4–11)

## 2020-04-17 PROCEDURE — 36415 COLL VENOUS BLD VENIPUNCTURE: CPT

## 2020-04-17 PROCEDURE — 85025 COMPLETE CBC W/AUTO DIFF WBC: CPT | Performed by: INTERNAL MEDICINE

## 2020-04-17 PROCEDURE — 83615 LACTATE (LD) (LDH) ENZYME: CPT | Performed by: INTERNAL MEDICINE

## 2020-04-17 PROCEDURE — 80053 COMPREHEN METABOLIC PANEL: CPT | Performed by: INTERNAL MEDICINE

## 2020-04-17 NOTE — PROGRESS NOTES
Medical Assistant Note:  Gibson Villalta presents today for blood draw.    Patient seen by provider today: No.   present during visit today: Not Applicable.    Concerns: No Concerns.    Procedure:  Lab draw site: RAC, Needle type: BF, Gauge: 23.    Post Assessment:  Labs drawn without difficulty: Yes.    Discharge Plan:  Departure Mode: Ambulatory.    Face to Face Time: 5 MIN  .    Lana Ibarra, CMA

## 2020-04-19 NOTE — PROGRESS NOTES
"[facility]    Hematology/Oncology Established Patient Follow-up Note      Today's Date: 04/20/20    Reason for Follow-up: Diffuse large B-cell lymphoma.    Gibson Villalta is a 79 year old male who is being evaluated via a billable telephone visit.      The patient has been notified of following:     \"This telephone visit will be conducted via a call between you and your physician/provider. We have found that certain health care needs can be provided without the need for a physical exam due to the COVID-19 pandemic.  This service lets us provide the care you need with a short phone conversation.  If a prescription is necessary we can send it directly to your pharmacy.  If lab work is needed we can place an order for that and you can then stop by our lab to have the test done at a later time.    Telephone visits are billed at different rates depending on your insurance coverage. During this emergency period, for some insurers they may be billed the same as an in-person visit.  Please reach out to your insurance provider with any questions.    If during the course of the call the physician/provider feels a telephone visit is not appropriate, you will not be charged for this service.\"    Patient has given verbal consent for Telephone visit?  Yes      HISTORY OF PRESENT ILLNESS: Gibson Villalta is a 79 year old male with history of dementia, RV and LV mural thrombus on chronic anticoagulation with Coumadin, status post Smita-en-Y gastric bypass, hypertension, chronic kidney disease, CAD, cardiomyopathy with EF 25-30%, diabetes mellitus who presents with the following oncologic history:  1.  7/2018: Hospitalized for melena.  7/30/2018 CT abdomen/pelvis with contrast showed postop changes of prior gastric bypass procedure, ill-defined soft tissue thickening in the proximal stomach, increased since 8/9/2016, no evidence for bowel obstruction, colitis, or diverticulitis; no free fluid; 2 bladder " wall lesions enhancing, largest measuring 3.9 x 1.8 cm and smaller bladder wall lesion near midline measuring 2 x 1.4 cm, several left renal cysts; liver, gallbladder, spleen, adrenal glands, pancreas, and remaining kidneys unremarkable.  2.  7/30/2018: Upper endoscopy showed a medium sized, fungating, partially circumferential mass with oozing and stigmata of recent bleeding on the greater curvature of the stomach with biopsies suggesting possible lymphoma but it is negative for adenocarcinoma; esophagus and jejunum normal; gastric bypass with normal-sized pouch and intact staple line.  Patient denied any associated abdominal pain, fevers, chills, night sweats, unintentional weight loss, chest pain, dyspnea, hematuria, dysuria.  3.  8/2/2018: Repeat upper endoscopy with biopsy of the stomach mass showed active inflammation and atypical cells with crush artifact.  There were insufficient number of atypical cells to render a definitive diagnosis.  4. 9/21/2018: Underwent TURBT under the care of Dr. Ryan Camarillo.  Pathology showed a T1 high-grade bladder cancer with micropapillary features.  5.  10/19/2018: Repeat upper endoscopy with biopsies confirmed diffuse large B-cell lymphoma, non-germinal center B-cell like, FISH positive for MYC.  Helicobacter pylori stain negative.  6. 10/30/2018: Bone marrow biopsy negative for lymphoma involvement.  7.  10/31/2018: PET/CT scan showed extensive hypermetabolic thickening of the stomach and several loops of small bowel in the left upper quadrant and right lower quadrant consistent with the diagnosed lymphoma, hypermetabolic mesenteric lymphadenopathy, slightly increased metabolic activity in the bone marrow, scattered small subcentimeter calcified and noncalcified pulmonary nodules with low metabolic activity.  8.  11/12/2018: Underwent cystoscopy with restaging TURBT which showed no evidence of tumor regrowth and no new bladder lesions.  Previous area of resection noted on  the right lateral wall just lateral to the right UO.  9.  11/26/2018: Started first-line therapy with mini R-CHOP chemotherapy.  10. 1/28/2019: After 3 cycles of mini R-CHOP, PET/CT scan showed marked improvement in the hypermetabolic thickening of the stomach and loops of small bowel in the left upper quadrant and right lower quadrant, consistent with good response to treatment.  Previous hypermetabolic mesenteric lymphadenopathy has resolved.  Scattered tiny subcentimeter calcified and noncalcified pulmonary nodules have low metabolic activity and are unchanged.  These could represent benign calcified and noncalcified granulomata.  11. 2/19/2019: Cycle 5 and 6 of chemotherapy, doxorubicin held due to worsening shortness of breath and worsened dilation of the left ventricle.  Completion of cycle 6 on 3/12/19.  12. 4/4/2019: PET/CT scan showed no residual lymphoma.  Calcification seen in the bladder.  Benign cyst in left kidney.      INTERIM HISTORY:  Nelson reports feeling overall relatively well and denies any fevers, chills, night sweats, unintentional weight loss, bowel or bladder dysfunction.  He denies any fatigue.  He recently completed his BCG treatments and reports no current bladder problems. He feels his dyspnea on exertion is per his usual and denies any dyspnea at rest.    REVIEW OF SYSTEMS:   14 point ROS was reviewed and is negative other than as noted above in HPI.       HOME MEDICATIONS:  Current Outpatient Medications   Medication Sig Dispense Refill     ACE/ARB NOT PRESCRIBED, INTENTIONAL, ACE & ARB not prescribed due to Symptomatic hypotension not due to excessive diuresis (Patient not taking: Reported on 3/16/2020)       ASPIRIN NOT PRESCRIBED (INTENTIONAL) Please choose reason not prescribed, below (Patient not taking: Reported on 3/16/2020)       atorvastatin (LIPITOR) 40 MG tablet Take 1 tablet (40 mg) by mouth At Bedtime 90 tablet 3     COENZYME Q-10 PO Take 100 mg by mouth every morning         ferrous sulfate (IRON) 325 (65 Fe) MG tablet Take 325 mg by mouth daily       furosemide (LASIX) 20 MG tablet Take 1.5 tablets (30 mg) by mouth daily 90 tablet 1     hydrALAZINE (APRESOLINE) 10 MG tablet Take 1 tablet (10 mg) by mouth 3 times daily 270 tablet 3     levofloxacin (LEVAQUIN) 500 MG tablet Take 1 tablet (500 mg) by mouth as needed (take 1 tablet 6 hours after each treatment and one tablet next am after each treatment) 12 tablet 0     metoprolol succinate ER (TOPROL-XL) 50 MG 24 hr tablet TAKE ONE TABLET BY MOUTH ONE TIME DAILY  90 tablet 3     multivitamin, therapeutic with minerals (MULTI-VITAMIN) TABS Take 1 tablet by mouth every morning        venlafaxine (EFFEXOR) 75 MG tablet Take 1 tablet (75 mg) by mouth 2 times daily Follow-up with Dr. Currie in summer for your fasting full examination 180 tablet 1     vitamin D2 (ERGOCALCIFEROL) 97571 units (1250 mcg) capsule TAKE ONE CAPSULE BY MOUTH ONCE A WEEK ON TUESDAY 12 capsule 1         ALLERGIES:  Allergies   Allergen Reactions     Wasps [Hornets]      Sand wasp --- years ago.           PAST MEDICAL HISTORY:  Past Medical History:   Diagnosis Date     (HFpEF) heart failure with preserved ejection fraction (H)      Acute kidney injury (H) 2012     Anemia      Anxiety      Anxiety and depression      Bladder mass      BPH (benign prostatic hypertrophy)      Cardiomyopathy (H)     ICD implanted 11/2016     Carpal tunnel syndrome     Right     Chronic kidney disease (CKD), stage 3 (moderate)      Chronic systolic CHF (congestive heart failure) (H)      Dementia (H)      Depressive disorder      Diabetes (H)      Diffuse large B-cell lymphoma of extranodal site (H) 11/23/2018     Gastric mass      Gout      History of depression      LBBB (left bundle branch block) 2013     Lumbar herniated disc     L5-S1     Mixed cardiomyopathy 7/22/2016     Myocardial infarction (H) 1995 and 2010     Nonischemic cardiomyopathy (H) 7/22/2016     Obesity      Pacemaker      ICD/PM     Rosacea      Rotator cuff disorder     Tear x 2     RV (right ventricular) mural thrombus 7/22/2016     SOB (shortness of breath)          PAST SURGICAL HISTORY:  Past Surgical History:   Procedure Laterality Date     ANGIOPLASTY  1995 and 2010 with stent     BIOPSY      stomach     BONE MARROW BIOPSY, BONE SPECIMEN, NEEDLE/TROCAR N/A 10/30/2018    Procedure: BIOPSY BONE MARROW;  Surgeon: Jeb Romero MD;  Location:  GI     CARDIAC SURGERY      ICD/PM     CYSTOSCOPY       CYSTOSCOPY, TRANSURETHRAL RESECTION (TUR) PROSTATE, COMBINED  4/22/2019    Procedure: CYSTOSCOPY AND BIPOLAR TRANSURETHRAL RESECTION (TUR) PROSTATE;  Surgeon: Ryan Camarillo MD;  Location:  OR     CYSTOSCOPY, TRANSURETHRAL RESECTION (TUR) TUMOR BLADDER, COMBINED N/A 9/19/2018    Procedure: COMBINED CYSTOSCOPY, TRANSURETHRAL RESECTION (TUR) TUMOR BLADDER;  CYSTOSCOPY, TRANSURETHRAL RESECTION BLADDER TUMOR ;  Surgeon: Ryan Camarillo MD;  Location:  OR     CYSTOSCOPY, TRANSURETHRAL RESECTION (TUR) TUMOR BLADDER, COMBINED N/A 11/12/2018    Procedure: CYSTOSCOPY RESTAGING TRANSURETHRAL RESECTION BLADDER TUMOR;  Surgeon: Ryan Camarillo MD;  Location:  OR     CYSTOSCOPY, TRANSURETHRAL RESECTION (TUR) TUMOR BLADDER, COMBINED N/A 12/30/2019    Procedure: CYSTOSCOPY, WITH TRANSURETHRAL RESECTION BLADDER TUMOR;  Surgeon: Ryan Camarillo MD;  Location:  OR     EP LEAD REVISION DUAL N/A 10/24/2019    Procedure: EP Lead Revision Dual;  Surgeon: Josias Hernandez MD;  Location:  HEART CARDIAC CATH LAB     ESOPHAGOSCOPY, GASTROSCOPY, DUODENOSCOPY (EGD), COMBINED N/A 7/30/2018    Procedure: COMBINED ESOPHAGOSCOPY, GASTROSCOPY, DUODENOSCOPY (EGD), BIOPSY SINGLE OR MULTIPLE;  gastroscopy;  Surgeon: Parish Xiong MD;  Location:  GI     ESOPHAGOSCOPY, GASTROSCOPY, DUODENOSCOPY (EGD), COMBINED N/A 7/30/2018    Procedure: COMBINED ESOPHAGOSCOPY, GASTROSCOPY, DUODENOSCOPY (EGD);  EGD;  Surgeon: Parish Xiong MD;   Location:  GI     ESOPHAGOSCOPY, GASTROSCOPY, DUODENOSCOPY (EGD), COMBINED N/A 2018    Procedure: COMBINED ESOPHAGOSCOPY, GASTROSCOPY, DUODENOSCOPY (EGD), BIOPSY SINGLE OR MULTIPLE;  gastroscopy BIOPSYSHOULD BE SENT TO LAB IN NS NOT FORMALIN PER ;  Surgeon: Jonathon Bush MD;  Location:  GI     GASTRIC BYPASS       HEART CATH LEFT HEART CATH  16    Non ischemic cardiomyopathy; Non functionally significant LAD and RCA disease      IR PORT REMOVAL RIGHT  2019     LASER HOLMIUM LITHOTRIPSY BLADDER N/A 2019    Procedure: CYSTOSCOPY, HOLMIUM LASER LITHOLAPAXY ,  AND BIPOLAR TRANSURETHRAL RESECTION (TUR) PROSTATE;  Surgeon: Ryan Camarillo MD;  Location:  OR     OTHER SURGICAL HISTORY  2016    CRT-D implantation         SOCIAL HISTORY:  Social History     Socioeconomic History     Marital status:      Spouse name: Not on file     Number of children: Not on file     Years of education: Not on file     Highest education level: Not on file   Occupational History     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     Comment: [employer]; retired   Social Needs     Financial resource strain: Not on file     Food insecurity     Worry: Not on file     Inability: Not on file     Transportation needs     Medical: Not on file     Non-medical: Not on file   Tobacco Use     Smoking status: Former Smoker     Packs/day: 2.00     Years: 20.00     Pack years: 40.00     Types: Cigarettes     Start date:      Last attempt to quit: 1980     Years since quittin.3     Smokeless tobacco: Never Used     Tobacco comment: Quit in his 30s - 2 ppd for 20 years   Substance and Sexual Activity     Alcohol use: Yes     Alcohol/week: 0.0 standard drinks     Comment: rarely     Drug use: No     Sexual activity: Yes     Partners: Female   Lifestyle     Physical activity     Days per week: Not on file     Minutes per session: Not on file     Stress: Not on file   Relationships     Social connections      Talks on phone: Not on file     Gets together: Not on file     Attends Taoism service: Not on file     Active member of club or organization: Not on file     Attends meetings of clubs or organizations: Not on file     Relationship status: Not on file     Intimate partner violence     Fear of current or ex partner: Not on file     Emotionally abused: Not on file     Physically abused: Not on file     Forced sexual activity: Not on file   Other Topics Concern     Parent/sibling w/ CABG, MI or angioplasty before 65F 55M? No      Service Not Asked     Blood Transfusions Not Asked     Caffeine Concern Not Asked     Occupational Exposure Not Asked     Hobby Hazards Not Asked     Sleep Concern Not Asked     Stress Concern Not Asked     Weight Concern Not Asked     Special Diet Yes     Comment: low fat, low salt      Back Care Not Asked     Exercise No     Bike Helmet Not Asked     Seat Belt Not Asked     Self-Exams Not Asked   Social History Narrative     Not on file         FAMILY HISTORY:  Family History   Problem Relation Age of Onset     Coronary Artery Disease Father 39     Alzheimer Disease Mother      Coronary Artery Disease Son         Two sons have  from MIs (ages 39 and 51)         PHYSICAL EXAM:  Vital signs:  There were no vitals taken for this visit.   Not performed as this was a telephone visit.      LABS:  CBC RESULTS:   Recent Labs   Lab Test 20  1320   WBC 12.6*   RBC 4.58   HGB 14.5   HCT 43.2   MCV 94   MCH 31.7   MCHC 33.6   RDW 14.3          Last Comprehensive Metabolic Panel:  Sodium   Date Value Ref Range Status   2020 136 133 - 144 mmol/L Final     Potassium   Date Value Ref Range Status   2020 3.8 3.4 - 5.3 mmol/L Final     Chloride   Date Value Ref Range Status   2020 101 94 - 109 mmol/L Final     Carbon Dioxide   Date Value Ref Range Status   2020 25 20 - 32 mmol/L Final     Anion Gap   Date Value Ref Range Status   2020 10 3 - 14  mmol/L Final     Glucose   Date Value Ref Range Status   04/17/2020 132 (H) 70 - 99 mg/dL Final     Urea Nitrogen   Date Value Ref Range Status   04/17/2020 18 7 - 30 mg/dL Final     Creatinine   Date Value Ref Range Status   04/17/2020 1.15 0.66 - 1.25 mg/dL Final     GFR Estimate   Date Value Ref Range Status   04/17/2020 60 (L) >60 mL/min/[1.73_m2] Final     Comment:     Non  GFR Calc  Starting 12/18/2018, serum creatinine based estimated GFR (eGFR) will be   calculated using the Chronic Kidney Disease Epidemiology Collaboration   (CKD-EPI) equation.       Calcium   Date Value Ref Range Status   04/17/2020 9.7 8.5 - 10.1 mg/dL Final     Bilirubin Total   Date Value Ref Range Status   04/17/2020 1.2 0.2 - 1.3 mg/dL Final     Alkaline Phosphatase   Date Value Ref Range Status   04/17/2020 190 (H) 40 - 150 U/L Final     ALT   Date Value Ref Range Status   04/17/2020 35 0 - 70 U/L Final     AST   Date Value Ref Range Status   04/17/2020 40 0 - 45 U/L Final     LDH: 306 --> 356 (elevated)    PATHOLOGY:  12/30/2019: Bladder tumor biopsy showed urothelial carcinoma in situ and no evidence of invasion in the planes examined.  Muscularis propria present.    IMAGING:  None new.    ASSESSMENT/PLAN:  Gibson Villalta is a 79 year old male with the following issues:  1.  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  I discussed that his mildly elevated lactate dehydrogenase could potentially reflect the effects from his BCG treatments, which he completed recently.  However, if the LDH continues to rise over the next 3 months, will consider a repeat PET/CT scan.  -I recommended continued active surveillance with return in 3 months with repeat CBC, CMP, and LDH.    -I recommended repeating a PET/CT scan only if he  demonstrates signs or symptoms suggestive of lymphoma recurrence.       2.  High-grade bladder cancer, T1  -These were seen on CT abdomen/pelvis on 7/30/2018 and further evaluated by Dr. Howard Camarillo on 9/07/2018.    -He proceeded with TURBT x 2 in 9/2018 and 11/2018 with no residual tumor or new bladder cancers.  No adjuvant chemotherapy was recommended for his T1 tumor at that time.  -However, he was found to have urothelial carcinoma in situ and a bladder tumor biopsy on 12/30/2019.  There was no clinical evidence for muscle invasive disease.   -He has since completed 6 BCG treatments on 3/23/2020, tolerating those well with no new urinary problems.  -He has subsequent follow-up with Dr. Camarillo.    3. Cardiomyopathy with systolic dysfunction  -Stable.  His dyspnea on exertion is per his usual.  Continue Lasix and follow-up with cardiology as needed.    Also discussed infectious precautions during the current COVID-19 pandemic.     Return in 3 months.    Maribel Bender MD  Hematology/Oncology  [facility]    Phone call duration: 11 minutes

## 2020-04-20 ENCOUNTER — VIRTUAL VISIT (OUTPATIENT)
Dept: ONCOLOGY | Facility: CLINIC | Age: 80
End: 2020-04-20
Attending: INTERNAL MEDICINE
Payer: COMMERCIAL

## 2020-04-20 DIAGNOSIS — C83.398 DIFFUSE LARGE B-CELL LYMPHOMA OF EXTRANODAL SITE: Primary | ICD-10-CM

## 2020-04-20 DIAGNOSIS — I42.0 DILATED CARDIOMYOPATHY (H): ICD-10-CM

## 2020-04-20 DIAGNOSIS — C67.9 MALIGNANT NEOPLASM OF URINARY BLADDER, UNSPECIFIED SITE (H): ICD-10-CM

## 2020-04-20 PROCEDURE — 99213 OFFICE O/P EST LOW 20 MIN: CPT | Mod: 95 | Performed by: INTERNAL MEDICINE

## 2020-04-20 NOTE — PROGRESS NOTES
"Gibson Villalta is a 79 year old male who is being evaluated via a billable telephone visit.      The patient has been notified of following:     \"This telephone visit will be conducted via a call between you and your physician/provider. We have found that certain health care needs can be provided without the need for a physical exam.  This service lets us provide the care you need with a short phone conversation.  If a prescription is necessary we can send it directly to your pharmacy.  If lab work is needed we can place an order for that and you can then stop by our lab to have the test done at a later time.    Telephone visits are billed at different rates depending on your insurance coverage. During this emergency period, for some insurers they may be billed the same as an in-person visit.  Please reach out to your insurance provider with any questions.    If during the course of the call the physician/provider feels a telephone visit is not appropriate, you will not be charged for this service.\"    Patient has given verbal consent for Telephone visit?  Yes    Please call him at home: [phone]    Reviewed med list. No refills. No pain.       Daniella Leal CMA      "

## 2020-04-24 NOTE — TELEPHONE ENCOUNTER
Nelson's wife called clinic stating that he has experienced a new development of dark stools this week. He had labs done 4/17/20 which did show a normal Hgb of 14.5. Informed Nelson's wife that message will be sent to Dr. Bender and writer will call her back. She stated that they will be at [facility] this Wednesday if Dr. Bender would like a FIT or guaiac test done and she could pick it up. Dunia Gandara RN,BSN,OCN

## 2020-04-24 NOTE — TELEPHONE ENCOUNTER
Yes, it would be a good idea to know if the dark stools actually represented true melena.  Could you please have him do a fecal occult blood screen?     Thank you,   Maribel           Called left message with wife Izabella stating that Dr. Bender would like a stool occult blood screen done. Nelson is having an Echocardiogram done on Wednesday, left message for Izabella to call back to see if she could stop by and pick it up on Wednesday. Dunia Gandara RN,BSN,OCN

## 2020-04-28 NOTE — TELEPHONE ENCOUNTER
Called Izabella regarding picking up FIT, guaic stool test. She stated that his stool is no longer dark. Questioned if he still would like to proceed with FIT test and she stated no. Izabella is aware to call if he shows any signs of bleeding or dark stools again. Dunia Gandara RN,BSN,OCN

## 2020-04-28 NOTE — TELEPHONE ENCOUNTER
PATIENT WELLNESS TELEPHONE SCREENING     Step 1: Answer all screening questions and document in Epic.     1. In the past 3 weeks, have you been exposed to someone with a known positive illness below?  COVID-19: No  Chickenpox :No  Measles: No  Pertussis: No    2. Do you have any of the following new symptoms or symptoms that have started within the past 14 days?   Fever or reported chills: No   A new cough: No   Shortness of breath: No   Nausea, vomiting or diarrhea: No    Step 2: If the patient is positive for symptoms, consult the ordering provider/consult IP to determine if the procedure is deemed necessary and inform the patient you will call them back.     Step 3 . If no symptoms, patient informed of the no visitor policy in place. Yes    Step 4. If positive new symptoms, and the procedure is deemed necessary. Notify your manager/supervisor. The patient must be informed to call the procedural department.  A team member with the appropriate PPE will bring the mask to the patient at door 2. The patient will be brought to the procedural department and registered over the phone.

## 2020-04-29 NOTE — PROGRESS NOTES
"Gibson Villalta is a 79 year old male who is being evaluated via a billable video visit.      The patient has been notified of following:     \"This video visit will be conducted via a call between you and your physician/provider. We have found that certain health care needs can be provided without the need for an in-person physical exam.  This service lets us provide the care you need with a video conversation.  If a prescription is necessary we can send it directly to your pharmacy.  If lab work is needed we can place an order for that and you can then stop by our lab to have the test done at a later time.    Video visits are billed at different rates depending on your insurance coverage.  Please reach out to your insurance provider with any questions.    If during the course of the call the physician/provider feels a video visit is not appropriate, you will not be charged for this service.\"    Patient has given verbal consent for Video visit? Yes    How would you like to obtain your AVS? [place]    Patient would like the video invitation sent by: Text to cell phone: [phone] [facility]    Will anyone else be joining your video visit: Yes,     Vital signs reported by patient  BP.87/60  P. 75  Wt. 186lb  Ht. 6'1\"  Review Of Systems  Skin: NEGATIVE  Eyes:Ears/Nose/Throat: NEGATIVE  Respiratory: Sob   Cardiovascular:NEGATIVE  Gastrointestinal: NEGATIVE  Genitourinary:NEGATIVE   Musculoskeletal: NEGATIVE  Neurologic: NEGATIVE  Psychiatric: NEGATIVE  Hematologic/Lymphatic/Immunologic: NEGATIVE  Endocrine:  NEGATIVE    Liz Leon Lpn    Video-Visit Details    Type of service:  Video Visit   Patient not able to navigate the technology to open the link for the video visit. Converted to telephone visit.      Originating Location (pt. Location): Home    Distant Location (provider location):  [facility]     Mode of Communication:  Video Conference via [facility]    Cardiology " Progress Note:  Physician location: Home office  Duration of phone visit: 6 minutes    ROS, PMH, PSurgHx, FamHx, SocHx, medication list reviewed in EMR.    Telephone Exam:  General: No audible distress.  Psych: Affect normal, no pressured speech or flights of fancy.  Respiratory: Unlabored. Normal rate. No stridor or audible wheezing.          Interval History:     The patient is a very pleasant 79 year old whom I have been following for ischemic cardiomyopathy.  He has biventricular ICD and has required meticulous management of his volume status.  He currently feels that his dyspnea is stable.  No PND/orthopnea.  He can still get around his house and has good quality of life.                      Assessment and Plan:         1. Ischemic cardiomyopathy, adequately compensated at this time    Continue current cardiovascular medications.  I reviewed his echocardiogram from earlier today that shows his function is slightly worse than previous.  No wall motion abnormalities are consistent with his coronary artery disease.  Since his symptoms are stable, will continue current medical regimen without any changes.  Ideally, would offload him further but his blood pressure will not tolerate further diuretic or afterload reduction.    Follow-up 3 months with Rose Elizondo or Shona Amador      This note was transcribed using electronic voice recognition software and there may be typographical errors present.     Gibson Heard MD

## 2020-04-29 NOTE — LETTER
4/29/2020      RE: Gibson Villalta  [address]  [place] [address]       Dear Colleague,    Thank you for the opportunity to participate in the care of your patient, Gibson Villalta, at the [facility]. Please see a copy of my visit note below.        Interval History:     The patient is a very pleasant 79 year old whom I have been following for ischemic cardiomyopathy.  He has biventricular ICD and has required meticulous management of his volume status.  He currently feels that his dyspnea is stable.  No PND/orthopnea.  He can still get around his house and has good quality of life.                Assessment and Plan:         1. Ischemic cardiomyopathy, adequately compensated at this time    Continue current cardiovascular medications.  I reviewed his echocardiogram from earlier today that shows his function is slightly worse than previous.  No wall motion abnormalities are consistent with his coronary artery disease.  Since his symptoms are stable, will continue current medical regimen without any changes.  Ideally, would offload him further but his blood pressure will not tolerate further diuretic or afterload reduction.    Follow-up 3 months with Rose Elizondo or Shona Amador      This note was transcribed using electronic voice recognition software and there may be typographical errors present.     Gibson Heard MD       Please do not hesitate to contact me if you have any questions/concerns.     Sincerely,     Gibson Heard MD

## 2020-06-27 NOTE — TELEPHONE ENCOUNTER
Refill request:    FUROSEMIDE 20 MG TABLET    Summary: Take 1.5 tablets (30 mg) by mouth daily, Disp-90 tablet,R-1, E-Prescribe   Dose, Route, Frequency: 30 mg, Oral, DAILY  Start: 3/23/2020  Ord/Sold: 3/23/2020

## 2020-06-29 NOTE — TELEPHONE ENCOUNTER
Routing refill request to provider for review/approval because:  Please review for last BP  BP Readings from Last 6 Encounters:   04/29/20 (!) 87/60   03/23/20 122/73   03/16/20 115/78   03/04/20 96/66   02/26/20 114/75   02/19/20 124/81       Bushra Cope RN

## 2020-07-16 NOTE — PROGRESS NOTES
"Gibson Villalta is a 79 year old male who is being evaluated via a billable telephone visit.      The patient has been notified of following:     \"This telephone visit will be conducted via a call between you and your physician/provider. We have found that certain health care needs can be provided without the need for a physical exam.  This service lets us provide the care you need with a short phone conversation.  If a prescription is necessary we can send it directly to your pharmacy.  If lab work is needed we can place an order for that and you can then stop by our lab to have the test done at a later time.    Telephone visits are billed at different rates depending on your insurance coverage. During this emergency period, for some insurers they may be billed the same as an in-person visit.  Please reach out to your insurance provider with any questions.    If during the course of the call the physician/provider feels a telephone visit is not appropriate, you will not be charged for this service.\"    Patient has given verbal consent for Telephone visit?  Yes    What phone number would you like to be contacted at? [phone]     How would you like to obtain your AVS? Mail a copy    Spoke with pt on the phone; pt not able to obtained vitals.    Review Of Systems  Skin: NEGATIVE  Eyes:Ears/Nose/Throat: NEGATIVE  Respiratory: NEGATIVE  Cardiovascular: NEGATIVE  Gastrointestinal: NEGATIVE  Genitourinary:NEGATIVE   Musculoskeletal: NEGATIVE  Neurologic: NEGATIVE  Psychiatric: NEGATIVE  Hematologic/Lymphatic/Immunologic: NEGATIVE  Endocrine:  NEGATIVE    118/76, 178 pounds    Reviewed by FELY Chance, 7/16/20    HPI and Plan:   I had the pleasure of seeing Gibson Villalta today at [facility] for evaluation of cardiomyopathy. He is a pleasant 79 year old patient of Dr. Heard.      Mr. Villalta has a past medical history significant for large B-cell lymphoma with stage II with " transurethral resection of bladder tumor, ischemic cardiomyopathy  with LVEF 25-30% with (dating back at least until 2016) status post biventricular ICD, anemia, chronic kidney disease, RV thrombus  (noted on cardiac MRI in 2016), LV thrombus (seen on echocardiogram from 5/2017), Coumadin which was subsequently discontinued for GI bleed, dementia with short-term memory loss and left bundle branch block.    Patient has been doing well over the last several months.  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  He completed chemotherapy in the spring 2019.  He underwent a TURP procedure in February 2020 and is closely followed by Dr. Bender.    His most recent echocardiogram in April 2020 showed a worsening LV function estimated at less than 20%, posterior leaflet of the mitral valve appeared tethered and moderate mitral regurgitation was noted.    The patient was seen by Dr. Heard following the visit.  No changes were made at that time.    Today presents for virtual cardiology visit to follow-up on his ischemic cardiomyopathy.  He is doing well with out complaints of shortness of breath, orthopnea, PND or peripheral edema.  He has lost nearly 10 pounds over the last month and a half.  His blood pressure today is 118/66.    ROS:  12-pt ROS is negative except for as noted above.    No Physical Exam due to Telephone Visit    Assessment and Plan  1.  Ischemic cardiomyopathy with an ejection fraction less than 20%. Biv ICD.  The patient is doing well without complaints of shortness of breath or fluid retention.  He has lost nearly 10 pounds.  He is currently on Lasix 30 mg daily.  I have asked him to continue this.  He continues to take metoprolol 50 mg daily and hydralazine 10 mg 3 times daily.  Given worsening in his ejection fraction, I have asked him to increase hydralazine to 25 mg 3 times daily.  I have asked him to monitor his blood pressure 2 hours  after medication.  His blood pressure is fine today however I have recommended they keep a close watch on his blood pressure as we want to prevent hypotension.  I have asked him to follow-up in 2 to 4 weeks for virtual cardiology visit to assess this medical change.    2.  Stage II diffuse large B-cell lymphoma and history of bladder cancer.  He is followed closely by Dr. Bender.      3.  History of RV and LV thrombus, resolved.  He was previously on warfarin however this was discontinued due to a GI bleed.  There is been no documented recurrence.    Thank you for allowing me to care for Gibson Villalta today.    Phone duration: 22 minutes     This visit is being conducted as a virtual visit due to the emphasis on mitigation of the COVID-19 virus pandemic. The clinician has decided that the risk of an in-office visit outweighs the benefit for this patient.     ALLEN Sim, CNP  Cardiology    Voice recognition software was used for this note, I have reviewed this note, but errors may have been missed.    No orders of the defined types were placed in this encounter.    Orders Placed This Encounter   Medications     hydrALAZINE (APRESOLINE) 25 MG tablet     Sig: Take 1 tablet (25 mg) by mouth 3 times daily     Dispense:  280 tablet     Refill:  3     Medications Discontinued During This Encounter   Medication Reason     hydrALAZINE (APRESOLINE) 10 MG tablet          CURRENT MEDICATIONS:  Current Outpatient Medications   Medication Sig Dispense Refill     atorvastatin (LIPITOR) 40 MG tablet Take 1 tablet (40 mg) by mouth At Bedtime 90 tablet 3     COENZYME Q-10 PO Take 100 mg by mouth every morning        ferrous sulfate (IRON) 325 (65 Fe) MG tablet Take 325 mg by mouth daily       furosemide (LASIX) 20 MG tablet Take 1.5 tablets (30 mg) by mouth daily 90 tablet 1     hydrALAZINE (APRESOLINE) 25 MG tablet Take 1 tablet (25 mg) by mouth 3 times daily 280 tablet 3     metoprolol succinate ER (TOPROL-XL) 50 MG 24  hr tablet TAKE ONE TABLET BY MOUTH ONE TIME DAILY  90 tablet 3     multivitamin, therapeutic with minerals (MULTI-VITAMIN) TABS Take 1 tablet by mouth every morning        venlafaxine (EFFEXOR) 75 MG tablet Take 1 tablet (75 mg) by mouth 2 times daily Follow-up with Dr. Currie in summer for your fasting full examination 180 tablet 1     vitamin D2 (ERGOCALCIFEROL) 68615 units (1250 mcg) capsule TAKE ONE CAPSULE BY MOUTH ONCE A WEEK ON TUESDAY 12 capsule 1     ACE/ARB NOT PRESCRIBED, INTENTIONAL, ACE & ARB not prescribed due to Symptomatic hypotension not due to excessive diuresis (Patient not taking: Reported on 3/16/2020)       ASPIRIN NOT PRESCRIBED (INTENTIONAL) Please choose reason not prescribed, below (Patient not taking: Reported on 3/16/2020)       levofloxacin (LEVAQUIN) 500 MG tablet Take 1 tablet (500 mg) by mouth as needed (take 1 tablet 6 hours after each treatment and one tablet next am after each treatment) (Patient not taking: Reported on 4/29/2020) 12 tablet 0       ALLERGIES     Allergies   Allergen Reactions     Wasps [Hornets]      Sand wasp --- years ago.         PAST MEDICAL HISTORY:  Past Medical History:   Diagnosis Date     (HFpEF) heart failure with preserved ejection fraction (H)      Acute kidney injury (H) 2012     Anemia      Anxiety      Anxiety and depression      Bladder mass      BPH (benign prostatic hypertrophy)      Cardiomyopathy (H)     ICD implanted 11/2016     Carpal tunnel syndrome     Right     Chronic kidney disease (CKD), stage 3 (moderate)      Chronic systolic CHF (congestive heart failure) (H)      Dementia (H)      Dementia without behavioral disturbance, unspecified dementia type (H) 7/22/2016     Depressive disorder      Diabetes (H)      Diffuse large B-cell lymphoma of extranodal site (H) 11/23/2018     Former smoker      Gastric mass      Gout      History of depression      History of gastric bypass 7/22/2016     Iron deficiency anemia, unspecified iron  deficiency anemia type 10/15/2018     LBBB (left bundle branch block) 2013     Lumbar herniated disc     L5-S1     Malignant neoplasm of lateral wall of urinary bladder (H) 11/15/2019    Added automatically from request for surgery [id number]     Mixed cardiomyopathy 7/22/2016     Myocardial infarction (H) 1995 and 2010     Nonischemic cardiomyopathy (H) 7/22/2016     Obesity      Pacemaker     ICD/PM     Rosacea      Rotator cuff disorder     Tear x 2     RV (right ventricular) mural thrombus 7/22/2016     SOB (shortness of breath)        PAST SURGICAL HISTORY:  Past Surgical History:   Procedure Laterality Date     ANGIOPLASTY  1995 and 2010 with stent     BIOPSY      stomach     BONE MARROW BIOPSY, BONE SPECIMEN, NEEDLE/TROCAR N/A 10/30/2018    Procedure: BIOPSY BONE MARROW;  Surgeon: Jeb Romero MD;  Location:  GI     CARDIAC SURGERY      ICD/PM     CYSTOSCOPY       CYSTOSCOPY, TRANSURETHRAL RESECTION (TUR) PROSTATE, COMBINED  4/22/2019    Procedure: CYSTOSCOPY AND BIPOLAR TRANSURETHRAL RESECTION (TUR) PROSTATE;  Surgeon: Ryan Camarillo MD;  Location:  OR     CYSTOSCOPY, TRANSURETHRAL RESECTION (TUR) TUMOR BLADDER, COMBINED N/A 9/19/2018    Procedure: COMBINED CYSTOSCOPY, TRANSURETHRAL RESECTION (TUR) TUMOR BLADDER;  CYSTOSCOPY, TRANSURETHRAL RESECTION BLADDER TUMOR ;  Surgeon: Ryan Camarillo MD;  Location:  OR     CYSTOSCOPY, TRANSURETHRAL RESECTION (TUR) TUMOR BLADDER, COMBINED N/A 11/12/2018    Procedure: CYSTOSCOPY RESTAGING TRANSURETHRAL RESECTION BLADDER TUMOR;  Surgeon: Ryan Camarillo MD;  Location:  OR     CYSTOSCOPY, TRANSURETHRAL RESECTION (TUR) TUMOR BLADDER, COMBINED N/A 12/30/2019    Procedure: CYSTOSCOPY, WITH TRANSURETHRAL RESECTION BLADDER TUMOR;  Surgeon: Ryan Camarillo MD;  Location:  OR     EP LEAD REVISION DUAL N/A 10/24/2019    Procedure: EP Lead Revision Dual;  Surgeon: Josias Hernandez MD;  Location:  HEART CARDIAC CATH LAB     ESOPHAGOSCOPY, GASTROSCOPY,  DUODENOSCOPY (EGD), COMBINED N/A 2018    Procedure: COMBINED ESOPHAGOSCOPY, GASTROSCOPY, DUODENOSCOPY (EGD), BIOPSY SINGLE OR MULTIPLE;  gastroscopy;  Surgeon: Parish Xiong MD;  Location:  GI     ESOPHAGOSCOPY, GASTROSCOPY, DUODENOSCOPY (EGD), COMBINED N/A 2018    Procedure: COMBINED ESOPHAGOSCOPY, GASTROSCOPY, DUODENOSCOPY (EGD);  EGD;  Surgeon: Parish Xiong MD;  Location:  GI     ESOPHAGOSCOPY, GASTROSCOPY, DUODENOSCOPY (EGD), COMBINED N/A 2018    Procedure: COMBINED ESOPHAGOSCOPY, GASTROSCOPY, DUODENOSCOPY (EGD), BIOPSY SINGLE OR MULTIPLE;  gastroscopy BIOPSYSHOULD BE SENT TO LAB IN NS NOT FORMALIN PER ;  Surgeon: Jonathon Bush MD;  Location:  GI     GASTRIC BYPASS       HEART CATH LEFT HEART CATH  16    Non ischemic cardiomyopathy; Non functionally significant LAD and RCA disease      IR PORT REMOVAL RIGHT  2019     LASER HOLMIUM LITHOTRIPSY BLADDER N/A 2019    Procedure: CYSTOSCOPY, HOLMIUM LASER LITHOLAPAXY ,  AND BIPOLAR TRANSURETHRAL RESECTION (TUR) PROSTATE;  Surgeon: Ryan Camarillo MD;  Location:  OR     OTHER SURGICAL HISTORY  2016    CRT-D implantation       FAMILY HISTORY:  Family History   Problem Relation Age of Onset     Coronary Artery Disease Father 39     Alzheimer Disease Mother      Coronary Artery Disease Son         Two sons have  from MIs (ages 39 and 51)       SOCIAL HISTORY:  Social History     Socioeconomic History     Marital status:      Spouse name: None     Number of children: None     Years of education: None     Highest education level: None   Occupational History     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     Comment: [employer]; retired   Social Needs     Financial resource strain: None     Food insecurity     Worry: None     Inability: None     Transportation needs     Medical: None     Non-medical: None   Tobacco Use     Smoking status: Former Smoker     Packs/day: 2.00     Years: 20.00     Pack years:  40.00     Types: Cigarettes     Start date:      Last attempt to quit: 1980     Years since quittin.5     Smokeless tobacco: Never Used     Tobacco comment: Quit in his 30s - 2 ppd for 20 years   Substance and Sexual Activity     Alcohol use: Yes     Alcohol/week: 0.0 standard drinks     Comment: rarely     Drug use: No     Sexual activity: Yes     Partners: Female   Lifestyle     Physical activity     Days per week: None     Minutes per session: None     Stress: None   Relationships     Social connections     Talks on phone: None     Gets together: None     Attends [employer] service: None     Active member of club or organization: None     Attends meetings of clubs or organizations: None     Relationship status: None     Intimate partner violence     Fear of current or ex partner: None     Emotionally abused: None     Physically abused: None     Forced sexual activity: None   Other Topics Concern     Parent/sibling w/ CABG, MI or angioplasty before 65F 55M? No      Service Not Asked     Blood Transfusions Not Asked     Caffeine Concern Not Asked     Occupational Exposure Not Asked     Hobby Hazards Not Asked     Sleep Concern Not Asked     Stress Concern Not Asked     Weight Concern Not Asked     Special Diet Yes     Comment: low fat, low salt      Back Care Not Asked     Exercise No     Bike Helmet Not Asked     Seat Belt Not Asked     Self-Exams Not Asked   Social History Narrative     None       Recent Lab Results:  LIPID RESULTS:  Lab Results   Component Value Date    CHOL 165 2019    HDL 33 (L) 2019    LDL 98 2019    LDL 82 2019    TRIG 249 (H) 2019       LIVER ENZYME RESULTS:  Lab Results   Component Value Date    AST 40 2020    ALT 35 2020       CBC RESULTS:  Lab Results   Component Value Date    WBC 12.6 (H) 2020    RBC 4.58 2020    HGB 14.5 2020    HCT 43.2 2020    MCV 94 2020    MCH 31.7 2020    MCHC 33.6  04/17/2020    RDW 14.3 04/17/2020     04/17/2020       BMP RESULTS:  Lab Results   Component Value Date     04/17/2020    POTASSIUM 3.8 04/17/2020    CHLORIDE 101 04/17/2020    CO2 25 04/17/2020    ANIONGAP 10 04/17/2020     (H) 04/17/2020    BUN 18 04/17/2020    CR 1.15 04/17/2020    GFRESTIMATED 60 (L) 04/17/2020    GFRESTBLACK 70 04/17/2020    SHELLEY 9.7 04/17/2020        A1C RESULTS:  Lab Results   Component Value Date    A1C 6.7 (H) 09/06/2019       INR RESULTS:  Lab Results   Component Value Date    INR 1.01 09/09/2019    INR 1.06 11/27/2018           CC  Gibson Heard MD  [id number]  [place] [id number]

## 2020-07-16 NOTE — LETTER
7/16/2020    Akira Currie MD  [address]  [facility] [address]    RE: Gibson Villalta       Dear Colleague,    I had the pleasure of seeing Gibson Villalta in the [facility].    Gibson Villalta is a 79 year old male who is being evaluated via a billable telephone visit.      HPI and Plan:   I had the pleasure of seeing Gibson Villalta today at [facility] for evaluation of cardiomyopathy. He is a pleasant 79 year old patient of Dr. Heard.      Mr. Villalta has a past medical history significant for large B-cell lymphoma with stage II with transurethral resection of bladder tumor, ischemic cardiomyopathy  with LVEF 25-30% with (dating back at least until 2016) status post biventricular ICD, anemia, chronic kidney disease, RV thrombus  (noted on cardiac MRI in 2016), LV thrombus (seen on echocardiogram from 5/2017), Coumadin which was subsequently discontinued for GI bleed, dementia with short-term memory loss and left bundle branch block.    Patient has been doing well over the last several months.  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  He completed chemotherapy in the spring 2019.  He underwent a TURP procedure in February 2020 and is closely followed by Dr. Bender.    His most recent echocardiogram in April 2020 showed a worsening LV function estimated at less than 20%, posterior leaflet of the mitral valve appeared tethered and moderate mitral regurgitation was noted.    The patient was seen by Dr. Heard following the visit.  No changes were made at that time.    Today presents for virtual cardiology visit to follow-up on his ischemic cardiomyopathy.  He is doing well with out complaints of shortness of breath, orthopnea, PND or peripheral edema.  He has lost nearly 10 pounds over the last month and a half.  His blood pressure today is  118/66.    ROS:  12-pt ROS is negative except for as noted above.    No Physical Exam due to Telephone Visit    Assessment and Plan  1.  Ischemic cardiomyopathy with an ejection fraction less than 20%. Biv ICD.  The patient is doing well without complaints of shortness of breath or fluid retention.  He has lost nearly 10 pounds.  He is currently on Lasix 30 mg daily.  I have asked him to continue this.  He continues to take metoprolol 50 mg daily and hydralazine 10 mg 3 times daily.  Given worsening in his ejection fraction, I have asked him to increase hydralazine to 25 mg 3 times daily.  I have asked him to monitor his blood pressure 2 hours after medication.  His blood pressure is fine today however I have recommended they keep a close watch on his blood pressure as we want to prevent hypotension.  I have asked him to follow-up in 2 to 4 weeks for virtual cardiology visit to assess this medical change.    2.  Stage II diffuse large B-cell lymphoma and history of bladder cancer.  He is followed closely by Dr. Bender.      3.  History of RV and LV thrombus, resolved.  He was previously on warfarin however this was discontinued due to a GI bleed.  There is been no documented recurrence.    Thank you for allowing me to care for Gibson Villalta today.    Phone duration: 22 minutes     This visit is being conducted as a virtual visit due to the emphasis on mitigation of the COVID-19 virus pandemic. The clinician has decided that the risk of an in-office visit outweighs the benefit for this patient.     ALLEN Sim, CNP  Cardiology    Voice recognition software was used for this note, I have reviewed this note, but errors may have been missed.    No orders of the defined types were placed in this encounter.    Orders Placed This Encounter   Medications     hydrALAZINE (APRESOLINE) 25 MG tablet     Sig: Take 1 tablet (25 mg) by mouth 3 times daily     Dispense:  280 tablet     Refill:  3     Medications  Discontinued During This Encounter   Medication Reason     hydrALAZINE (APRESOLINE) 10 MG tablet          CURRENT MEDICATIONS:  Current Outpatient Medications   Medication Sig Dispense Refill     atorvastatin (LIPITOR) 40 MG tablet Take 1 tablet (40 mg) by mouth At Bedtime 90 tablet 3     COENZYME Q-10 PO Take 100 mg by mouth every morning        ferrous sulfate (IRON) 325 (65 Fe) MG tablet Take 325 mg by mouth daily       furosemide (LASIX) 20 MG tablet Take 1.5 tablets (30 mg) by mouth daily 90 tablet 1     hydrALAZINE (APRESOLINE) 25 MG tablet Take 1 tablet (25 mg) by mouth 3 times daily 280 tablet 3     metoprolol succinate ER (TOPROL-XL) 50 MG 24 hr tablet TAKE ONE TABLET BY MOUTH ONE TIME DAILY  90 tablet 3     multivitamin, therapeutic with minerals (MULTI-VITAMIN) TABS Take 1 tablet by mouth every morning        venlafaxine (EFFEXOR) 75 MG tablet Take 1 tablet (75 mg) by mouth 2 times daily Follow-up with Dr. Currie in summer for your fasting full examination 180 tablet 1     vitamin D2 (ERGOCALCIFEROL) 07858 units (1250 mcg) capsule TAKE ONE CAPSULE BY MOUTH ONCE A WEEK ON TUESDAY 12 capsule 1     ACE/ARB NOT PRESCRIBED, INTENTIONAL, ACE & ARB not prescribed due to Symptomatic hypotension not due to excessive diuresis (Patient not taking: Reported on 3/16/2020)       ASPIRIN NOT PRESCRIBED (INTENTIONAL) Please choose reason not prescribed, below (Patient not taking: Reported on 3/16/2020)       levofloxacin (LEVAQUIN) 500 MG tablet Take 1 tablet (500 mg) by mouth as needed (take 1 tablet 6 hours after each treatment and one tablet next am after each treatment) (Patient not taking: Reported on 4/29/2020) 12 tablet 0       ALLERGIES     Allergies   Allergen Reactions     Wasps [Hornets]      Sand wasp --- years ago.         PAST MEDICAL HISTORY:  Past Medical History:   Diagnosis Date     (HFpEF) heart failure with preserved ejection fraction (H)      Acute kidney injury (H) 2012     Anemia      Anxiety       Anxiety and depression      Bladder mass      BPH (benign prostatic hypertrophy)      Cardiomyopathy (H)     ICD implanted 11/2016     Carpal tunnel syndrome     Right     Chronic kidney disease (CKD), stage 3 (moderate)      Chronic systolic CHF (congestive heart failure) (H)      Dementia (H)      Dementia without behavioral disturbance, unspecified dementia type (H) 7/22/2016     Depressive disorder      Diabetes (H)      Diffuse large B-cell lymphoma of extranodal site (H) 11/23/2018     Former smoker      Gastric mass      Gout      History of depression      History of gastric bypass 7/22/2016     Iron deficiency anemia, unspecified iron deficiency anemia type 10/15/2018     LBBB (left bundle branch block) 2013     Lumbar herniated disc     L5-S1     Malignant neoplasm of lateral wall of urinary bladder (H) 11/15/2019    Added automatically from request for surgery [id number]     Mixed cardiomyopathy 7/22/2016     Myocardial infarction (H) 1995 and 2010     Nonischemic cardiomyopathy (H) 7/22/2016     Obesity      Pacemaker     ICD/PM     Rosacea      Rotator cuff disorder     Tear x 2     RV (right ventricular) mural thrombus 7/22/2016     SOB (shortness of breath)        PAST SURGICAL HISTORY:  Past Surgical History:   Procedure Laterality Date     ANGIOPLASTY  1995 and 2010 with stent     BIOPSY      stomach     BONE MARROW BIOPSY, BONE SPECIMEN, NEEDLE/TROCAR N/A 10/30/2018    Procedure: BIOPSY BONE MARROW;  Surgeon: Jeb Romero MD;  Location:  GI     CARDIAC SURGERY      ICD/PM     CYSTOSCOPY       CYSTOSCOPY, TRANSURETHRAL RESECTION (TUR) PROSTATE, COMBINED  4/22/2019    Procedure: CYSTOSCOPY AND BIPOLAR TRANSURETHRAL RESECTION (TUR) PROSTATE;  Surgeon: Ryan Camarillo MD;  Location:  OR     CYSTOSCOPY, TRANSURETHRAL RESECTION (TUR) TUMOR BLADDER, COMBINED N/A 9/19/2018    Procedure: COMBINED CYSTOSCOPY, TRANSURETHRAL RESECTION (TUR) TUMOR BLADDER;  CYSTOSCOPY, TRANSURETHRAL RESECTION  BLADDER TUMOR ;  Surgeon: Ryan Camarillo MD;  Location:  OR     CYSTOSCOPY, TRANSURETHRAL RESECTION (TUR) TUMOR BLADDER, COMBINED N/A 11/12/2018    Procedure: CYSTOSCOPY RESTAGING TRANSURETHRAL RESECTION BLADDER TUMOR;  Surgeon: Ryan Camarillo MD;  Location:  OR     CYSTOSCOPY, TRANSURETHRAL RESECTION (TUR) TUMOR BLADDER, COMBINED N/A 12/30/2019    Procedure: CYSTOSCOPY, WITH TRANSURETHRAL RESECTION BLADDER TUMOR;  Surgeon: Ryan Camarillo MD;  Location:  OR     EP LEAD REVISION DUAL N/A 10/24/2019    Procedure: EP Lead Revision Dual;  Surgeon: Josias Hernandez MD;  Location:  HEART CARDIAC CATH LAB     ESOPHAGOSCOPY, GASTROSCOPY, DUODENOSCOPY (EGD), COMBINED N/A 7/30/2018    Procedure: COMBINED ESOPHAGOSCOPY, GASTROSCOPY, DUODENOSCOPY (EGD), BIOPSY SINGLE OR MULTIPLE;  gastroscopy;  Surgeon: Parish Xiong MD;  Location:  GI     ESOPHAGOSCOPY, GASTROSCOPY, DUODENOSCOPY (EGD), COMBINED N/A 7/30/2018    Procedure: COMBINED ESOPHAGOSCOPY, GASTROSCOPY, DUODENOSCOPY (EGD);  EGD;  Surgeon: Parish Xiong MD;  Location:  GI     ESOPHAGOSCOPY, GASTROSCOPY, DUODENOSCOPY (EGD), COMBINED N/A 8/2/2018    Procedure: COMBINED ESOPHAGOSCOPY, GASTROSCOPY, DUODENOSCOPY (EGD), BIOPSY SINGLE OR MULTIPLE;  gastroscopy BIOPSYSHOULD BE SENT TO LAB IN NS NOT FORMALIN PER ;  Surgeon: Jonathon Bush MD;  Location:  GI     GASTRIC BYPASS  2001     HEART CATH LEFT HEART CATH  7/19/16    Non ischemic cardiomyopathy; Non functionally significant LAD and RCA disease      IR PORT REMOVAL RIGHT  9/9/2019     LASER HOLMIUM LITHOTRIPSY BLADDER N/A 4/22/2019    Procedure: CYSTOSCOPY, HOLMIUM LASER LITHOLAPAXY ,  AND BIPOLAR TRANSURETHRAL RESECTION (TUR) PROSTATE;  Surgeon: Ryan Camarillo MD;  Location:  OR     OTHER SURGICAL HISTORY  Nov 2016    CRT-D implantation       FAMILY HISTORY:  Family History   Problem Relation Age of Onset     Coronary Artery Disease Father 39     Alzheimer Disease Mother       Coronary Artery Disease Son         Two sons have  from MIs (ages 39 and 51)       SOCIAL HISTORY:  Social History     Socioeconomic History     Marital status:      Spouse name: None     Number of children: None     Years of education: None     Highest education level: None   Occupational History     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     Comment: [employer]; retired   Social Needs     Financial resource strain: None     Food insecurity     Worry: None     Inability: None     Transportation needs     Medical: None     Non-medical: None   Tobacco Use     Smoking status: Former Smoker     Packs/day: 2.00     Years: 20.00     Pack years: 40.00     Types: Cigarettes     Start date:      Last attempt to quit: 1980     Years since quittin.5     Smokeless tobacco: Never Used     Tobacco comment: Quit in his 30s - 2 ppd for 20 years   Substance and Sexual Activity     Alcohol use: Yes     Alcohol/week: 0.0 standard drinks     Comment: rarely     Drug use: No     Sexual activity: Yes     Partners: Female   Lifestyle     Physical activity     Days per week: None     Minutes per session: None     Stress: None   Relationships     Social connections     Talks on phone: None     Gets together: None     Attends Tenriism service: None     Active member of club or organization: None     Attends meetings of clubs or organizations: None     Relationship status: None     Intimate partner violence     Fear of current or ex partner: None     Emotionally abused: None     Physically abused: None     Forced sexual activity: None   Other Topics Concern     Parent/sibling w/ CABG, MI or angioplasty before 65F 55M? No      Service Not Asked     Blood Transfusions Not Asked     Caffeine Concern Not Asked     Occupational Exposure Not Asked     Hobby Hazards Not Asked     Sleep Concern Not Asked     Stress Concern Not Asked     Weight Concern Not Asked     Special Diet Yes     Comment: low fat, low salt       Back Care Not Asked     Exercise No     Bike Helmet Not Asked     Seat Belt Not Asked     Self-Exams Not Asked   Social History Narrative     None       Recent Lab Results:  LIPID RESULTS:  Lab Results   Component Value Date    CHOL 165 08/02/2019    HDL 33 (L) 08/02/2019    LDL 98 09/06/2019    LDL 82 08/02/2019    TRIG 249 (H) 08/02/2019       LIVER ENZYME RESULTS:  Lab Results   Component Value Date    AST 40 04/17/2020    ALT 35 04/17/2020       CBC RESULTS:  Lab Results   Component Value Date    WBC 12.6 (H) 04/17/2020    RBC 4.58 04/17/2020    HGB 14.5 04/17/2020    HCT 43.2 04/17/2020    MCV 94 04/17/2020    MCH 31.7 04/17/2020    MCHC 33.6 04/17/2020    RDW 14.3 04/17/2020     04/17/2020       BMP RESULTS:  Lab Results   Component Value Date     04/17/2020    POTASSIUM 3.8 04/17/2020    CHLORIDE 101 04/17/2020    CO2 25 04/17/2020    ANIONGAP 10 04/17/2020     (H) 04/17/2020    BUN 18 04/17/2020    CR 1.15 04/17/2020    GFRESTIMATED 60 (L) 04/17/2020    GFRESTBLACK 70 04/17/2020    SHELLEY 9.7 04/17/2020        A1C RESULTS:  Lab Results   Component Value Date    A1C 6.7 (H) 09/06/2019       INR RESULTS:  Lab Results   Component Value Date    INR 1.01 09/09/2019    INR 1.06 11/27/2018       Thank you for allowing me to participate in the care of your patient.    Sincerely,     ALLEN Sim [facility]

## 2020-07-17 NOTE — PROGRESS NOTES
Medical Assistant Note:  Gibson Villalta presents today for blood draw.    Patient seen by provider today: No .   present during visit today: Not Applicable.    Concerns: No Concerns.    Procedure:  Lab draw site: lac, Needle type: bf, Gauge: 23.    Post Assessment:  Labs drawn without difficulty: Yes.    Discharge Plan:  Departure Mode: Ambulatory.    Face to Face Time: 5min .    Lana Ibarra, CMA

## 2020-07-17 NOTE — PROGRESS NOTES
"[facility]    Hematology/Oncology Established Patient Follow-up Note      Today's Date: 07/20/20    Reason for Follow-up: Diffuse large B-cell lymphoma.    Gibson Villalta is a 79 year old male who is being evaluated via a billable telephone visit.      The patient has been notified of following:     \"This telephone visit will be conducted via a call between you and your physician/provider. We have found that certain health care needs can be provided without the need for a physical exam due to the COVID-19 pandemic.  This service lets us provide the care you need with a short phone conversation.  If a prescription is necessary we can send it directly to your pharmacy.  If lab work is needed we can place an order for that and you can then stop by our lab to have the test done at a later time.    Telephone visits are billed at different rates depending on your insurance coverage. During this emergency period, for some insurers they may be billed the same as an in-person visit.  Please reach out to your insurance provider with any questions.    If during the course of the call the physician/provider feels a telephone visit is not appropriate, you will not be charged for this service.\"    Patient has given verbal consent for Telephone visit?  Yes      HISTORY OF PRESENT ILLNESS: Gibson Villalta is a 79 year old male with history of dementia, RV and LV mural thrombus on chronic anticoagulation with Coumadin, status post Smita-en-Y gastric bypass, hypertension, chronic kidney disease, CAD, cardiomyopathy with EF 25-30%, diabetes mellitus who presents with the following oncologic history:  1.  7/2018: Hospitalized for melena.  7/30/2018 CT abdomen/pelvis with contrast showed postop changes of prior gastric bypass procedure, ill-defined soft tissue thickening in the proximal stomach, increased since 8/9/2016, no evidence for bowel obstruction, colitis, or diverticulitis; no free fluid; 2 bladder " wall lesions enhancing, largest measuring 3.9 x 1.8 cm and smaller bladder wall lesion near midline measuring 2 x 1.4 cm, several left renal cysts; liver, gallbladder, spleen, adrenal glands, pancreas, and remaining kidneys unremarkable.  2.  7/30/2018: Upper endoscopy showed a medium sized, fungating, partially circumferential mass with oozing and stigmata of recent bleeding on the greater curvature of the stomach with biopsies suggesting possible lymphoma but it is negative for adenocarcinoma; esophagus and jejunum normal; gastric bypass with normal-sized pouch and intact staple line.  Patient denied any associated abdominal pain, fevers, chills, night sweats, unintentional weight loss, chest pain, dyspnea, hematuria, dysuria.  3.  8/2/2018: Repeat upper endoscopy with biopsy of the stomach mass showed active inflammation and atypical cells with crush artifact.  There were insufficient number of atypical cells to render a definitive diagnosis.  4. 9/21/2018: Underwent TURBT under the care of Dr. Ryan Camarillo.  Pathology showed a T1 high-grade bladder cancer with micropapillary features.  5.  10/19/2018: Repeat upper endoscopy with biopsies confirmed diffuse large B-cell lymphoma, non-germinal center B-cell like, FISH positive for MYC.  Helicobacter pylori stain negative.  6. 10/30/2018: Bone marrow biopsy negative for lymphoma involvement.  7.  10/31/2018: PET/CT scan showed extensive hypermetabolic thickening of the stomach and several loops of small bowel in the left upper quadrant and right lower quadrant consistent with the diagnosed lymphoma, hypermetabolic mesenteric lymphadenopathy, slightly increased metabolic activity in the bone marrow, scattered small subcentimeter calcified and noncalcified pulmonary nodules with low metabolic activity.  8.  11/12/2018: Underwent cystoscopy with restaging TURBT which showed no evidence of tumor regrowth and no new bladder lesions.  Previous area of resection noted on  the right lateral wall just lateral to the right UO.  9.  11/26/2018: Started first-line therapy with mini R-CHOP chemotherapy.  10. 1/28/2019: After 3 cycles of mini R-CHOP, PET/CT scan showed marked improvement in the hypermetabolic thickening of the stomach and loops of small bowel in the left upper quadrant and right lower quadrant, consistent with good response to treatment.  Previous hypermetabolic mesenteric lymphadenopathy has resolved.  Scattered tiny subcentimeter calcified and noncalcified pulmonary nodules have low metabolic activity and are unchanged.  These could represent benign calcified and noncalcified granulomata.  11. 2/19/2019: Cycle 5 and 6 of chemotherapy, doxorubicin held due to worsening shortness of breath and worsened dilation of the left ventricle.  Completion of cycle 6 on 3/12/19.  12. 4/4/2019: PET/CT scan showed no residual lymphoma.  Calcification seen in the bladder.  Benign cyst in left kidney.      INTERIM HISTORY:  Nelson reports intermittent darker stool but no pain.  He denies any fevers, chills, night sweats, unintentional weight loss, bowel or bladder dysfunction.  He denies any fatigue.  He recently completed his BCG treatments and reports no current bladder problems. He feels his dyspnea on exertion is per his usual and denies any dyspnea at rest.    REVIEW OF SYSTEMS:   14 point ROS was reviewed and is negative other than as noted above in HPI.       HOME MEDICATIONS:  Current Outpatient Medications   Medication Sig Dispense Refill     ACE/ARB NOT PRESCRIBED, INTENTIONAL, ACE & ARB not prescribed due to Symptomatic hypotension not due to excessive diuresis (Patient not taking: Reported on 3/16/2020)       ASPIRIN NOT PRESCRIBED (INTENTIONAL) Please choose reason not prescribed, below (Patient not taking: Reported on 3/16/2020)       atorvastatin (LIPITOR) 40 MG tablet Take 1 tablet (40 mg) by mouth At Bedtime 90 tablet 3     COENZYME Q-10 PO Take 100 mg by mouth every  morning        ferrous sulfate (IRON) 325 (65 Fe) MG tablet Take 325 mg by mouth daily       furosemide (LASIX) 20 MG tablet Take 1.5 tablets (30 mg) by mouth daily 90 tablet 1     hydrALAZINE (APRESOLINE) 25 MG tablet Take 1 tablet (25 mg) by mouth 3 times daily 280 tablet 3     levofloxacin (LEVAQUIN) 500 MG tablet Take 1 tablet (500 mg) by mouth as needed (take 1 tablet 6 hours after each treatment and one tablet next am after each treatment) (Patient not taking: Reported on 4/29/2020) 12 tablet 0     metoprolol succinate ER (TOPROL-XL) 50 MG 24 hr tablet TAKE ONE TABLET BY MOUTH ONE TIME DAILY  90 tablet 3     multivitamin, therapeutic with minerals (MULTI-VITAMIN) TABS Take 1 tablet by mouth every morning        venlafaxine (EFFEXOR) 75 MG tablet Take 1 tablet (75 mg) by mouth 2 times daily Follow-up with Dr. Currie in summer for your fasting full examination 180 tablet 1     vitamin D2 (ERGOCALCIFEROL) 25670 units (1250 mcg) capsule TAKE ONE CAPSULE BY MOUTH ONCE A WEEK ON TUESDAY 12 capsule 1         ALLERGIES:  Allergies   Allergen Reactions     Wasps [Hornets]      Sand wasp --- years ago.           PAST MEDICAL HISTORY:  Past Medical History:   Diagnosis Date     (HFpEF) heart failure with preserved ejection fraction (H)      Acute kidney injury (H) 2012     Anemia      Anxiety      Anxiety and depression      Bladder mass      BPH (benign prostatic hypertrophy)      Cardiomyopathy (H)     ICD implanted 11/2016     Carpal tunnel syndrome     Right     Chronic kidney disease (CKD), stage 3 (moderate)      Chronic systolic CHF (congestive heart failure) (H)      Dementia (H)      Dementia without behavioral disturbance, unspecified dementia type (H) 7/22/2016     Depressive disorder      Diabetes (H)      Diffuse large B-cell lymphoma of extranodal site (H) 11/23/2018     Former smoker      Gastric mass      Gout      History of depression      History of gastric bypass 7/22/2016     Iron deficiency anemia,  unspecified iron deficiency anemia type 10/15/2018     LBBB (left bundle branch block) 2013     Lumbar herniated disc     L5-S1     Malignant neoplasm of lateral wall of urinary bladder (H) 11/15/2019    Added automatically from request for surgery [id number]     Mixed cardiomyopathy 7/22/2016     Myocardial infarction (H) 1995 and 2010     Nonischemic cardiomyopathy (H) 7/22/2016     Obesity      Pacemaker     ICD/PM     Rosacea      Rotator cuff disorder     Tear x 2     RV (right ventricular) mural thrombus 7/22/2016     SOB (shortness of breath)          PAST SURGICAL HISTORY:  Past Surgical History:   Procedure Laterality Date     ANGIOPLASTY  1995 and 2010 with stent     BIOPSY      stomach     BONE MARROW BIOPSY, BONE SPECIMEN, NEEDLE/TROCAR N/A 10/30/2018    Procedure: BIOPSY BONE MARROW;  Surgeon: Jeb Romero MD;  Location:  GI     CARDIAC SURGERY      ICD/PM     CYSTOSCOPY       CYSTOSCOPY, TRANSURETHRAL RESECTION (TUR) PROSTATE, COMBINED  4/22/2019    Procedure: CYSTOSCOPY AND BIPOLAR TRANSURETHRAL RESECTION (TUR) PROSTATE;  Surgeon: Ryan Camarillo MD;  Location:  OR     CYSTOSCOPY, TRANSURETHRAL RESECTION (TUR) TUMOR BLADDER, COMBINED N/A 9/19/2018    Procedure: COMBINED CYSTOSCOPY, TRANSURETHRAL RESECTION (TUR) TUMOR BLADDER;  CYSTOSCOPY, TRANSURETHRAL RESECTION BLADDER TUMOR ;  Surgeon: Ryan Camarillo MD;  Location:  OR     CYSTOSCOPY, TRANSURETHRAL RESECTION (TUR) TUMOR BLADDER, COMBINED N/A 11/12/2018    Procedure: CYSTOSCOPY RESTAGING TRANSURETHRAL RESECTION BLADDER TUMOR;  Surgeon: Ryan Camarillo MD;  Location:  OR     CYSTOSCOPY, TRANSURETHRAL RESECTION (TUR) TUMOR BLADDER, COMBINED N/A 12/30/2019    Procedure: CYSTOSCOPY, WITH TRANSURETHRAL RESECTION BLADDER TUMOR;  Surgeon: Ryan Camarillo MD;  Location:  OR     EP LEAD REVISION DUAL N/A 10/24/2019    Procedure: EP Lead Revision Dual;  Surgeon: Josias Hernandez MD;  Location:  HEART CARDIAC CATH LAB      ESOPHAGOSCOPY, GASTROSCOPY, DUODENOSCOPY (EGD), COMBINED N/A 2018    Procedure: COMBINED ESOPHAGOSCOPY, GASTROSCOPY, DUODENOSCOPY (EGD), BIOPSY SINGLE OR MULTIPLE;  gastroscopy;  Surgeon: Parish Xiong MD;  Location:  GI     ESOPHAGOSCOPY, GASTROSCOPY, DUODENOSCOPY (EGD), COMBINED N/A 2018    Procedure: COMBINED ESOPHAGOSCOPY, GASTROSCOPY, DUODENOSCOPY (EGD);  EGD;  Surgeon: Parish Xiong MD;  Location:  GI     ESOPHAGOSCOPY, GASTROSCOPY, DUODENOSCOPY (EGD), COMBINED N/A 2018    Procedure: COMBINED ESOPHAGOSCOPY, GASTROSCOPY, DUODENOSCOPY (EGD), BIOPSY SINGLE OR MULTIPLE;  gastroscopy BIOPSYSHOULD BE SENT TO LAB IN NS NOT FORMALIN PER ;  Surgeon: Jonathon Bush MD;  Location:  GI     GASTRIC BYPASS       HEART CATH LEFT HEART CATH  16    Non ischemic cardiomyopathy; Non functionally significant LAD and RCA disease      IR PORT REMOVAL RIGHT  2019     LASER HOLMIUM LITHOTRIPSY BLADDER N/A 2019    Procedure: CYSTOSCOPY, HOLMIUM LASER LITHOLAPAXY ,  AND BIPOLAR TRANSURETHRAL RESECTION (TUR) PROSTATE;  Surgeon: Ryan Camarillo MD;  Location:  OR     OTHER SURGICAL HISTORY  2016    CRT-D implantation         SOCIAL HISTORY:  Social History     Socioeconomic History     Marital status:      Spouse name: Not on file     Number of children: Not on file     Years of education: Not on file     Highest education level: Not on file   Occupational History     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     Comment: [employer]; retired   Social Needs     Financial resource strain: Not on file     Food insecurity     Worry: Not on file     Inability: Not on file     Transportation needs     Medical: Not on file     Non-medical: Not on file   Tobacco Use     Smoking status: Former Smoker     Packs/day: 2.00     Years: 20.00     Pack years: 40.00     Types: Cigarettes     Start date:      Last attempt to quit: 1980     Years since quittin.5     Smokeless  tobacco: Never Used     Tobacco comment: Quit in his 30s - 2 ppd for 20 years   Substance and Sexual Activity     Alcohol use: Yes     Alcohol/week: 0.0 standard drinks     Comment: rarely     Drug use: No     Sexual activity: Yes     Partners: Female   Lifestyle     Physical activity     Days per week: Not on file     Minutes per session: Not on file     Stress: Not on file   Relationships     Social connections     Talks on phone: Not on file     Gets together: Not on file     Attends Protestant service: Not on file     Active member of club or organization: Not on file     Attends meetings of clubs or organizations: Not on file     Relationship status: Not on file     Intimate partner violence     Fear of current or ex partner: Not on file     Emotionally abused: Not on file     Physically abused: Not on file     Forced sexual activity: Not on file   Other Topics Concern     Parent/sibling w/ CABG, MI or angioplasty before 65F 55M? No      Service Not Asked     Blood Transfusions Not Asked     Caffeine Concern Not Asked     Occupational Exposure Not Asked     Hobby Hazards Not Asked     Sleep Concern Not Asked     Stress Concern Not Asked     Weight Concern Not Asked     Special Diet Yes     Comment: low fat, low salt      Back Care Not Asked     Exercise No     Bike Helmet Not Asked     Seat Belt Not Asked     Self-Exams Not Asked   Social History Narrative     Not on file         FAMILY HISTORY:  Family History   Problem Relation Age of Onset     Coronary Artery Disease Father 39     Alzheimer Disease Mother      Coronary Artery Disease Son         Two sons have  from MIs (ages 39 and 51)         PHYSICAL EXAM:  Vital signs:  There were no vitals taken for this visit.   Not performed as this was a telephone visit.      LABS:  CBC RESULTS:   Recent Labs   Lab Test 20  1321   WBC 7.5   RBC 4.03*   HGB 12.9*   HCT 39.1*   MCV 97   MCH 32.0   MCHC 33.0   RDW 18.2*          Last Comprehensive  Metabolic Panel:  Sodium   Date Value Ref Range Status   2020 142 133 - 144 mmol/L Final     Potassium   Date Value Ref Range Status   2020 3.5 3.4 - 5.3 mmol/L Final     Chloride   Date Value Ref Range Status   2020 109 94 - 109 mmol/L Final     Carbon Dioxide   Date Value Ref Range Status   2020 27 20 - 32 mmol/L Final     Anion Gap   Date Value Ref Range Status   2020 6 3 - 14 mmol/L Final     Glucose   Date Value Ref Range Status   2020 78 70 - 99 mg/dL Final     Urea Nitrogen   Date Value Ref Range Status   2020 17 7 - 30 mg/dL Final     Creatinine   Date Value Ref Range Status   2020 1.08 0.66 - 1.25 mg/dL Final     GFR Estimate   Date Value Ref Range Status   2020 65 >60 mL/min/[1.73_m2] Final     Comment:     Non  GFR Calc  Starting 2018, serum creatinine based estimated GFR (eGFR) will be   calculated using the Chronic Kidney Disease Epidemiology Collaboration   (CKD-EPI) equation.       Calcium   Date Value Ref Range Status   2020 8.9 8.5 - 10.1 mg/dL Final     Bilirubin Total   Date Value Ref Range Status   2020 0.8 0.2 - 1.3 mg/dL Final     Alkaline Phosphatase   Date Value Ref Range Status   2020 155 (H) 40 - 150 U/L Final     ALT   Date Value Ref Range Status   2020 23 0 - 70 U/L Final     AST   Date Value Ref Range Status   2020 19 0 - 45 U/L Final     LDH: 306 -> 356 -> 280    PATHOLOGY:  2019: Bladder tumor biopsy showed urothelial carcinoma in situ and no evidence of invasion in the planes examined.  Muscularis propria present.    IMAGIN2020: Echocardiogram showed mildly dilated left ventricle with LVEF < 20% and 2+ mitral regurgitation.    ASSESSMENT/PLAN:  Gibson Villalta is a 79 year old male with the following issues:  1.  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  I recommended repeating PET/CT scan to evaluate for recurrent diffuse large B-cell lymphoma of the stomach since he has initially presented with anemia when he was diagnosed.     2.  High-grade bladder cancer, T1  -These were seen on CT abdomen/pelvis on 7/30/2018 and further evaluated by Dr. Howard Camarillo on 9/07/2018.    -He proceeded with TURBT x 2 in 9/2018 and 11/2018 with no residual tumor or new bladder cancers.  No adjuvant chemotherapy was recommended for his T1 tumor at that time.  -However, he was found to have urothelial carcinoma in situ and a bladder tumor biopsy on 12/30/2019.  There was no clinical evidence for muscle invasive disease.   -He has since completed 6 BCG treatments on 3/23/2020, tolerating those well with no new urinary problems.  -He follows with Dr. Camarillo.    3. Cardiomyopathy with systolic dysfunction  -Stable.  His dyspnea on exertion is per his usual.  Continue Lasix and follow-up with cardiology as needed.    Return after PET/CT scan.    Maribel Bender MD  Hematology/Oncology  [facility]    Phone call duration: 11 minutes

## 2020-07-20 NOTE — LETTER
"    7/20/2020         RE: Gibson Villalta  [address]  [place] [address]        Dear Colleague,    Thank you for referring your patient, Gibson Villalta, to the [facility]. Please see a copy of my visit note below.    [facility]    Hematology/Oncology Established Patient Follow-up Note      Today's Date: 07/20/20    Reason for Follow-up: Diffuse large B-cell lymphoma.    Gibson Villalta is a 79 year old male who is being evaluated via a billable telephone visit.      The patient has been notified of following:     \"This telephone visit will be conducted via a call between you and your physician/provider. We have found that certain health care needs can be provided without the need for a physical exam due to the COVID-19 pandemic.  This service lets us provide the care you need with a short phone conversation.  If a prescription is necessary we can send it directly to your pharmacy.  If lab work is needed we can place an order for that and you can then stop by our lab to have the test done at a later time.    Telephone visits are billed at different rates depending on your insurance coverage. During this emergency period, for some insurers they may be billed the same as an in-person visit.  Please reach out to your insurance provider with any questions.    If during the course of the call the physician/provider feels a telephone visit is not appropriate, you will not be charged for this service.\"    Patient has given verbal consent for Telephone visit?  Yes      HISTORY OF PRESENT ILLNESS: Gibson Villalta is a 79 year old male with history of dementia, RV and LV mural thrombus on chronic anticoagulation with Coumadin, status post Smita-en-Y gastric bypass, hypertension, chronic kidney disease, CAD, cardiomyopathy with EF 25-30%, diabetes mellitus who presents with the following oncologic history:  1. 7/2018: Hospitalized for melena.  " 7/30/2018 CT abdomen/pelvis with contrast showed postop changes of prior gastric bypass procedure, ill-defined soft tissue thickening in the proximal stomach, increased since 8/9/2016, no evidence for bowel obstruction, colitis, or diverticulitis; no free fluid; 2 bladder wall lesions enhancing, largest measuring 3.9 x 1.8 cm and smaller bladder wall lesion near midline measuring 2 x 1.4 cm, several left renal cysts; liver, gallbladder, spleen, adrenal glands, pancreas, and remaining kidneys unremarkable.  2.  7/30/2018: Upper endoscopy showed a medium sized, fungating, partially circumferential mass with oozing and stigmata of recent bleeding on the greater curvature of the stomach with biopsies suggesting possible lymphoma but it is negative for adenocarcinoma; esophagus and jejunum normal; gastric bypass with normal-sized pouch and intact staple line.  Patient denied any associated abdominal pain, fevers, chills, night sweats, unintentional weight loss, chest pain, dyspnea, hematuria, dysuria.  3.  8/2/2018: Repeat upper endoscopy with biopsy of the stomach mass showed active inflammation and atypical cells with crush artifact.  There were insufficient number of atypical cells to render a definitive diagnosis.  4. 9/21/2018: Underwent TURBT under the care of Dr. Ryan Camarillo.  Pathology showed a T1 high-grade bladder cancer with micropapillary features.  5.  10/19/2018: Repeat upper endoscopy with biopsies confirmed diffuse large B-cell lymphoma, non-germinal center B-cell like, FISH positive for MYC.  Helicobacter pylori stain negative.  6. 10/30/2018: Bone marrow biopsy negative for lymphoma involvement.  7.  10/31/2018: PET/CT scan showed extensive hypermetabolic thickening of the stomach and several loops of small bowel in the left upper quadrant and right lower quadrant consistent with the diagnosed lymphoma, hypermetabolic mesenteric lymphadenopathy, slightly increased metabolic activity in the bone marrow,  scattered small subcentimeter calcified and noncalcified pulmonary nodules with low metabolic activity.  8.  11/12/2018: Underwent cystoscopy with restaging TURBT which showed no evidence of tumor regrowth and no new bladder lesions.  Previous area of resection noted on the right lateral wall just lateral to the right UO.  9.  11/26/2018: Started first-line therapy with mini R-CHOP chemotherapy.  10. 1/28/2019: After 3 cycles of mini R-CHOP, PET/CT scan showed marked improvement in the hypermetabolic thickening of the stomach and loops of small bowel in the left upper quadrant and right lower quadrant, consistent with good response to treatment.  Previous hypermetabolic mesenteric lymphadenopathy has resolved.  Scattered tiny subcentimeter calcified and noncalcified pulmonary nodules have low metabolic activity and are unchanged.  These could represent benign calcified and noncalcified granulomata.  11.  2/19/2019: Cycle 5 and 6 of chemotherapy, doxorubicin held due to worsening shortness of breath and worsened dilation of the left ventricle.  Completion of cycle 6 on 3/12/19.  12.  4/4/2019: PET/CT scan showed no residual lymphoma.  Calcification seen in the bladder.  Benign cyst in left kidney.      INTERIM HISTORY:  Nelson reports intermittent darker stool but no pain.  He denies any fevers, chills, night sweats, unintentional weight loss, bowel or bladder dysfunction.  He denies any fatigue.  He recently completed his BCG treatments and reports no current bladder problems. He feels his dyspnea on exertion is per his usual and denies any dyspnea at rest.    REVIEW OF SYSTEMS:   14 point ROS was reviewed and is negative other than as noted above in HPI.       HOME MEDICATIONS:  Current Outpatient Medications   Medication Sig Dispense Refill     ACE/ARB NOT PRESCRIBED, INTENTIONAL, ACE & ARB not prescribed due to Symptomatic hypotension not due to excessive diuresis (Patient not taking: Reported on 3/16/2020)        ASPIRIN NOT PRESCRIBED (INTENTIONAL) Please choose reason not prescribed, below (Patient not taking: Reported on 3/16/2020)       atorvastatin (LIPITOR) 40 MG tablet Take 1 tablet (40 mg) by mouth At Bedtime 90 tablet 3     COENZYME Q-10 PO Take 100 mg by mouth every morning        ferrous sulfate (IRON) 325 (65 Fe) MG tablet Take 325 mg by mouth daily       furosemide (LASIX) 20 MG tablet Take 1.5 tablets (30 mg) by mouth daily 90 tablet 1     hydrALAZINE (APRESOLINE) 25 MG tablet Take 1 tablet (25 mg) by mouth 3 times daily 280 tablet 3     levofloxacin (LEVAQUIN) 500 MG tablet Take 1 tablet (500 mg) by mouth as needed (take 1 tablet 6 hours after each treatment and one tablet next am after each treatment) (Patient not taking: Reported on 4/29/2020) 12 tablet 0     metoprolol succinate ER (TOPROL-XL) 50 MG 24 hr tablet TAKE ONE TABLET BY MOUTH ONE TIME DAILY  90 tablet 3     multivitamin, therapeutic with minerals (MULTI-VITAMIN) TABS Take 1 tablet by mouth every morning        venlafaxine (EFFEXOR) 75 MG tablet Take 1 tablet (75 mg) by mouth 2 times daily Follow-up with Dr. Currie in summer for your fasting full examination 180 tablet 1     vitamin D2 (ERGOCALCIFEROL) 63744 units (1250 mcg) capsule TAKE ONE CAPSULE BY MOUTH ONCE A WEEK ON TUESDAY 12 capsule 1         ALLERGIES:  Allergies   Allergen Reactions     Wasps [Hornets]      Sand wasp --- years ago.           PAST MEDICAL HISTORY:  Past Medical History:   Diagnosis Date     (HFpEF) heart failure with preserved ejection fraction (H)      Acute kidney injury (H) 2012     Anemia      Anxiety      Anxiety and depression      Bladder mass      BPH (benign prostatic hypertrophy)      Cardiomyopathy (H)     ICD implanted 11/2016     Carpal tunnel syndrome     Right     Chronic kidney disease (CKD), stage 3 (moderate)      Chronic systolic CHF (congestive heart failure) (H)      Dementia (H)      Dementia without behavioral disturbance, unspecified dementia type  (H) 7/22/2016     Depressive disorder      Diabetes (H)      Diffuse large B-cell lymphoma of extranodal site (H) 11/23/2018     Former smoker      Gastric mass      Gout      History of depression      History of gastric bypass 7/22/2016     Iron deficiency anemia, unspecified iron deficiency anemia type 10/15/2018     LBBB (left bundle branch block) 2013     Lumbar herniated disc     L5-S1     Malignant neoplasm of lateral wall of urinary bladder (H) 11/15/2019    Added automatically from request for surgery [id number]     Mixed cardiomyopathy 7/22/2016     Myocardial infarction (H) 1995 and 2010     Nonischemic cardiomyopathy (H) 7/22/2016     Obesity      Pacemaker     ICD/PM     Rosacea      Rotator cuff disorder     Tear x 2     RV (right ventricular) mural thrombus 7/22/2016     SOB (shortness of breath)          PAST SURGICAL HISTORY:  Past Surgical History:   Procedure Laterality Date     ANGIOPLASTY  1995 and 2010 with stent     BIOPSY      stomach     BONE MARROW BIOPSY, BONE SPECIMEN, NEEDLE/TROCAR N/A 10/30/2018    Procedure: BIOPSY BONE MARROW;  Surgeon: Jeb Romero MD;  Location:  GI     CARDIAC SURGERY      ICD/PM     CYSTOSCOPY       CYSTOSCOPY, TRANSURETHRAL RESECTION (TUR) PROSTATE, COMBINED  4/22/2019    Procedure: CYSTOSCOPY AND BIPOLAR TRANSURETHRAL RESECTION (TUR) PROSTATE;  Surgeon: Ryan Camarillo MD;  Location:  OR     CYSTOSCOPY, TRANSURETHRAL RESECTION (TUR) TUMOR BLADDER, COMBINED N/A 9/19/2018    Procedure: COMBINED CYSTOSCOPY, TRANSURETHRAL RESECTION (TUR) TUMOR BLADDER;  CYSTOSCOPY, TRANSURETHRAL RESECTION BLADDER TUMOR ;  Surgeon: Ryan Camarillo MD;  Location:  OR     CYSTOSCOPY, TRANSURETHRAL RESECTION (TUR) TUMOR BLADDER, COMBINED N/A 11/12/2018    Procedure: CYSTOSCOPY RESTAGING TRANSURETHRAL RESECTION BLADDER TUMOR;  Surgeon: Ryan Camarillo MD;  Location:  OR     CYSTOSCOPY, TRANSURETHRAL RESECTION (TUR) TUMOR BLADDER, COMBINED N/A 12/30/2019    Procedure:  CYSTOSCOPY, WITH TRANSURETHRAL RESECTION BLADDER TUMOR;  Surgeon: Ryan Camarillo MD;  Location:  OR     EP LEAD REVISION DUAL N/A 10/24/2019    Procedure: EP Lead Revision Dual;  Surgeon: Josias Hernandez MD;  Location:  HEART CARDIAC CATH LAB     ESOPHAGOSCOPY, GASTROSCOPY, DUODENOSCOPY (EGD), COMBINED N/A 7/30/2018    Procedure: COMBINED ESOPHAGOSCOPY, GASTROSCOPY, DUODENOSCOPY (EGD), BIOPSY SINGLE OR MULTIPLE;  gastroscopy;  Surgeon: Parish Xiong MD;  Location:  GI     ESOPHAGOSCOPY, GASTROSCOPY, DUODENOSCOPY (EGD), COMBINED N/A 7/30/2018    Procedure: COMBINED ESOPHAGOSCOPY, GASTROSCOPY, DUODENOSCOPY (EGD);  EGD;  Surgeon: Parish Xiong MD;  Location:  GI     ESOPHAGOSCOPY, GASTROSCOPY, DUODENOSCOPY (EGD), COMBINED N/A 8/2/2018    Procedure: COMBINED ESOPHAGOSCOPY, GASTROSCOPY, DUODENOSCOPY (EGD), BIOPSY SINGLE OR MULTIPLE;  gastroscopy BIOPSYSHOULD BE SENT TO LAB IN NS NOT FORMALIN PER ;  Surgeon: Jonathon Bush MD;  Location:  GI     GASTRIC BYPASS  2001     HEART CATH LEFT HEART CATH  7/19/16    Non ischemic cardiomyopathy; Non functionally significant LAD and RCA disease      IR PORT REMOVAL RIGHT  9/9/2019     LASER HOLMIUM LITHOTRIPSY BLADDER N/A 4/22/2019    Procedure: CYSTOSCOPY, HOLMIUM LASER LITHOLAPAXY ,  AND BIPOLAR TRANSURETHRAL RESECTION (TUR) PROSTATE;  Surgeon: Ryan Camarillo MD;  Location:  OR     OTHER SURGICAL HISTORY  Nov 2016    CRT-D implantation         SOCIAL HISTORY:  Social History     Socioeconomic History     Marital status:      Spouse name: Not on file     Number of children: Not on file     Years of education: Not on file     Highest education level: Not on file   Occupational History     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     Comment: [employer]; retired   Social Needs     Financial resource strain: Not on file     Food insecurity     Worry: Not on file     Inability: Not on file     Transportation needs     Medical: Not on file      Non-medical: Not on file   Tobacco Use     Smoking status: Former Smoker     Packs/day: 2.00     Years: 20.00     Pack years: 40.00     Types: Cigarettes     Start date:      Last attempt to quit: 1980     Years since quittin.5     Smokeless tobacco: Never Used     Tobacco comment: Quit in his 30s - 2 ppd for 20 years   Substance and Sexual Activity     Alcohol use: Yes     Alcohol/week: 0.0 standard drinks     Comment: rarely     Drug use: No     Sexual activity: Yes     Partners: Female   Lifestyle     Physical activity     Days per week: Not on file     Minutes per session: Not on file     Stress: Not on file   Relationships     Social connections     Talks on phone: Not on file     Gets together: Not on file     Attends Islam service: Not on file     Active member of club or organization: Not on file     Attends meetings of clubs or organizations: Not on file     Relationship status: Not on file     Intimate partner violence     Fear of current or ex partner: Not on file     Emotionally abused: Not on file     Physically abused: Not on file     Forced sexual activity: Not on file   Other Topics Concern     Parent/sibling w/ CABG, MI or angioplasty before 65F 55M? No      Service Not Asked     Blood Transfusions Not Asked     Caffeine Concern Not Asked     Occupational Exposure Not Asked     Hobby Hazards Not Asked     Sleep Concern Not Asked     Stress Concern Not Asked     Weight Concern Not Asked     Special Diet Yes     Comment: low fat, low salt      Back Care Not Asked     Exercise No     Bike Helmet Not Asked     Seat Belt Not Asked     Self-Exams Not Asked   Social History Narrative     Not on file         FAMILY HISTORY:  Family History   Problem Relation Age of Onset     Coronary Artery Disease Father 39     Alzheimer Disease Mother      Coronary Artery Disease Son         Two sons have  from MIs (ages 39 and 51)         PHYSICAL EXAM:  Vital signs:  There were no vitals  taken for this visit.   Not performed as this was a telephone visit.      LABS:  CBC RESULTS:   Recent Labs   Lab Test 20  1321   WBC 7.5   RBC 4.03*   HGB 12.9*   HCT 39.1*   MCV 97   MCH 32.0   MCHC 33.0   RDW 18.2*          Last Comprehensive Metabolic Panel:  Sodium   Date Value Ref Range Status   2020 142 133 - 144 mmol/L Final     Potassium   Date Value Ref Range Status   2020 3.5 3.4 - 5.3 mmol/L Final     Chloride   Date Value Ref Range Status   2020 109 94 - 109 mmol/L Final     Carbon Dioxide   Date Value Ref Range Status   2020 27 20 - 32 mmol/L Final     Anion Gap   Date Value Ref Range Status   2020 6 3 - 14 mmol/L Final     Glucose   Date Value Ref Range Status   2020 78 70 - 99 mg/dL Final     Urea Nitrogen   Date Value Ref Range Status   2020 17 7 - 30 mg/dL Final     Creatinine   Date Value Ref Range Status   2020 1.08 0.66 - 1.25 mg/dL Final     GFR Estimate   Date Value Ref Range Status   2020 65 >60 mL/min/[1.73_m2] Final     Comment:     Non  GFR Calc  Starting 2018, serum creatinine based estimated GFR (eGFR) will be   calculated using the Chronic Kidney Disease Epidemiology Collaboration   (CKD-EPI) equation.       Calcium   Date Value Ref Range Status   2020 8.9 8.5 - 10.1 mg/dL Final     Bilirubin Total   Date Value Ref Range Status   2020 0.8 0.2 - 1.3 mg/dL Final     Alkaline Phosphatase   Date Value Ref Range Status   2020 155 (H) 40 - 150 U/L Final     ALT   Date Value Ref Range Status   2020 23 0 - 70 U/L Final     AST   Date Value Ref Range Status   2020 19 0 - 45 U/L Final     LDH: 306 -> 356 -> 280    PATHOLOGY:  2019: Bladder tumor biopsy showed urothelial carcinoma in situ and no evidence of invasion in the planes examined.  Muscularis propria present.    IMAGIN2020: Echocardiogram showed mildly dilated left ventricle with LVEF < 20% and 2+ mitral  "regurgitation.    ASSESSMENT/PLAN:  Gibson Villalta is a 79 year old male with the following issues:  1.  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  I recommended repeating PET/CT scan to evaluate for recurrent diffuse large B-cell lymphoma of the stomach since he has initially presented with anemia when he was diagnosed.     2.  High-grade bladder cancer, T1  -These were seen on CT abdomen/pelvis on 7/30/2018 and further evaluated by Dr. Howard Camarillo on 9/07/2018.    -He proceeded with TURBT x 2 in 9/2018 and 11/2018 with no residual tumor or new bladder cancers.  No adjuvant chemotherapy was recommended for his T1 tumor at that time.  -However, he was found to have urothelial carcinoma in situ and a bladder tumor biopsy on 12/30/2019.  There was no clinical evidence for muscle invasive disease.   -He has since completed 6 BCG treatments on 3/23/2020, tolerating those well with no new urinary problems.  -He follows with Dr. Camarillo.    3. Cardiomyopathy with systolic dysfunction  -Stable.  His dyspnea on exertion is per his usual.  Continue Lasix and follow-up with cardiology as needed.    Return after PET/CT scan.    Maribel Bender MD  Hematology/Oncology  [facility]    Phone call duration: 11 minutes      Gibson Villalta is a 79 year old male who is being evaluated via a billable telephone visit.      The patient has been notified of following:     \"This telephone visit will be conducted via a call between you and your physician/provider. We have found that certain health care needs can be provided without the need for a physical exam.  This service lets us provide the care you need with a short phone conversation.  If a prescription is necessary we can send it directly to your pharmacy.  If lab work is needed we can place an order for that " "and you can then stop by our lab to have the test done at a later time.    Telephone visits are billed at different rates depending on your insurance coverage. During this emergency period, for some insurers they may be billed the same as an in-person visit.  Please reach out to your insurance provider with any questions.    If during the course of the call the physician/provider feels a telephone visit is not appropriate, you will not be charged for this service.\"    Patient has given verbal consent for Telephone visit?  Yes    What phone number would you like to be contacted at? [phone]    How would you like to obtain your AVS? MyChart    Phone call duration: 5 minutes  NO PAIN. NO REFILLS  Lana Ibarra CMA      Again, thank you for allowing me to participate in the care of your patient.        Sincerely,        Maribel Bender MD    "

## 2020-07-20 NOTE — PROGRESS NOTES
"Gibson Villalta is a 79 year old male who is being evaluated via a billable telephone visit.      The patient has been notified of following:     \"This telephone visit will be conducted via a call between you and your physician/provider. We have found that certain health care needs can be provided without the need for a physical exam.  This service lets us provide the care you need with a short phone conversation.  If a prescription is necessary we can send it directly to your pharmacy.  If lab work is needed we can place an order for that and you can then stop by our lab to have the test done at a later time.    Telephone visits are billed at different rates depending on your insurance coverage. During this emergency period, for some insurers they may be billed the same as an in-person visit.  Please reach out to your insurance provider with any questions.    If during the course of the call the physician/provider feels a telephone visit is not appropriate, you will not be charged for this service.\"    Patient has given verbal consent for Telephone visit?  Yes    What phone number would you like to be contacted at? [phone]    How would you like to obtain your AVS? MyChart    Phone call duration: 5 minutes  NO PAIN. NO REFILLS  Lana Ibarra CMA    "

## 2020-07-20 NOTE — LETTER
"    7/20/2020         RE: Gibson Villalta  [address]  [place] [address]        Dear Colleague,    Thank you for referring your patient, Gibson Villalta, to the [facility]. Please see a copy of my visit note below.    [facility]    Hematology/Oncology Established Patient Follow-up Note      Today's Date: 07/20/20    Reason for Follow-up: Diffuse large B-cell lymphoma.    Gibson Villalta is a 79 year old male who is being evaluated via a billable telephone visit.      The patient has been notified of following:     \"This telephone visit will be conducted via a call between you and your physician/provider. We have found that certain health care needs can be provided without the need for a physical exam due to the COVID-19 pandemic.  This service lets us provide the care you need with a short phone conversation.  If a prescription is necessary we can send it directly to your pharmacy.  If lab work is needed we can place an order for that and you can then stop by our lab to have the test done at a later time.    Telephone visits are billed at different rates depending on your insurance coverage. During this emergency period, for some insurers they may be billed the same as an in-person visit.  Please reach out to your insurance provider with any questions.    If during the course of the call the physician/provider feels a telephone visit is not appropriate, you will not be charged for this service.\"    Patient has given verbal consent for Telephone visit?  Yes      HISTORY OF PRESENT ILLNESS: Gibson Villalta is a 79 year old male with history of dementia, RV and LV mural thrombus on chronic anticoagulation with Coumadin, status post Smita-en-Y gastric bypass, hypertension, chronic kidney disease, CAD, cardiomyopathy with EF 25-30%, diabetes mellitus who presents with the following oncologic history:  1. 7/2018: Hospitalized for melena.  " 7/30/2018 CT abdomen/pelvis with contrast showed postop changes of prior gastric bypass procedure, ill-defined soft tissue thickening in the proximal stomach, increased since 8/9/2016, no evidence for bowel obstruction, colitis, or diverticulitis; no free fluid; 2 bladder wall lesions enhancing, largest measuring 3.9 x 1.8 cm and smaller bladder wall lesion near midline measuring 2 x 1.4 cm, several left renal cysts; liver, gallbladder, spleen, adrenal glands, pancreas, and remaining kidneys unremarkable.  2.  7/30/2018: Upper endoscopy showed a medium sized, fungating, partially circumferential mass with oozing and stigmata of recent bleeding on the greater curvature of the stomach with biopsies suggesting possible lymphoma but it is negative for adenocarcinoma; esophagus and jejunum normal; gastric bypass with normal-sized pouch and intact staple line.  Patient denied any associated abdominal pain, fevers, chills, night sweats, unintentional weight loss, chest pain, dyspnea, hematuria, dysuria.  3.  8/2/2018: Repeat upper endoscopy with biopsy of the stomach mass showed active inflammation and atypical cells with crush artifact.  There were insufficient number of atypical cells to render a definitive diagnosis.  4. 9/21/2018: Underwent TURBT under the care of Dr. Ryan Camarillo.  Pathology showed a T1 high-grade bladder cancer with micropapillary features.  5.  10/19/2018: Repeat upper endoscopy with biopsies confirmed diffuse large B-cell lymphoma, non-germinal center B-cell like, FISH positive for MYC.  Helicobacter pylori stain negative.  6. 10/30/2018: Bone marrow biopsy negative for lymphoma involvement.  7.  10/31/2018: PET/CT scan showed extensive hypermetabolic thickening of the stomach and several loops of small bowel in the left upper quadrant and right lower quadrant consistent with the diagnosed lymphoma, hypermetabolic mesenteric lymphadenopathy, slightly increased metabolic activity in the bone marrow,  scattered small subcentimeter calcified and noncalcified pulmonary nodules with low metabolic activity.  8.  11/12/2018: Underwent cystoscopy with restaging TURBT which showed no evidence of tumor regrowth and no new bladder lesions.  Previous area of resection noted on the right lateral wall just lateral to the right UO.  9.  11/26/2018: Started first-line therapy with mini R-CHOP chemotherapy.  10. 1/28/2019: After 3 cycles of mini R-CHOP, PET/CT scan showed marked improvement in the hypermetabolic thickening of the stomach and loops of small bowel in the left upper quadrant and right lower quadrant, consistent with good response to treatment.  Previous hypermetabolic mesenteric lymphadenopathy has resolved.  Scattered tiny subcentimeter calcified and noncalcified pulmonary nodules have low metabolic activity and are unchanged.  These could represent benign calcified and noncalcified granulomata.  11.  2/19/2019: Cycle 5 and 6 of chemotherapy, doxorubicin held due to worsening shortness of breath and worsened dilation of the left ventricle.  Completion of cycle 6 on 3/12/19.  12.  4/4/2019: PET/CT scan showed no residual lymphoma.  Calcification seen in the bladder.  Benign cyst in left kidney.      INTERIM HISTORY:  Nelson reports intermittent darker stool but no pain.  He denies any fevers, chills, night sweats, unintentional weight loss, bowel or bladder dysfunction.  He denies any fatigue.  He recently completed his BCG treatments and reports no current bladder problems. He feels his dyspnea on exertion is per his usual and denies any dyspnea at rest.    REVIEW OF SYSTEMS:   14 point ROS was reviewed and is negative other than as noted above in HPI.       HOME MEDICATIONS:  Current Outpatient Medications   Medication Sig Dispense Refill     ACE/ARB NOT PRESCRIBED, INTENTIONAL, ACE & ARB not prescribed due to Symptomatic hypotension not due to excessive diuresis (Patient not taking: Reported on 3/16/2020)        ASPIRIN NOT PRESCRIBED (INTENTIONAL) Please choose reason not prescribed, below (Patient not taking: Reported on 3/16/2020)       atorvastatin (LIPITOR) 40 MG tablet Take 1 tablet (40 mg) by mouth At Bedtime 90 tablet 3     COENZYME Q-10 PO Take 100 mg by mouth every morning        ferrous sulfate (IRON) 325 (65 Fe) MG tablet Take 325 mg by mouth daily       furosemide (LASIX) 20 MG tablet Take 1.5 tablets (30 mg) by mouth daily 90 tablet 1     hydrALAZINE (APRESOLINE) 25 MG tablet Take 1 tablet (25 mg) by mouth 3 times daily 280 tablet 3     levofloxacin (LEVAQUIN) 500 MG tablet Take 1 tablet (500 mg) by mouth as needed (take 1 tablet 6 hours after each treatment and one tablet next am after each treatment) (Patient not taking: Reported on 4/29/2020) 12 tablet 0     metoprolol succinate ER (TOPROL-XL) 50 MG 24 hr tablet TAKE ONE TABLET BY MOUTH ONE TIME DAILY  90 tablet 3     multivitamin, therapeutic with minerals (MULTI-VITAMIN) TABS Take 1 tablet by mouth every morning        venlafaxine (EFFEXOR) 75 MG tablet Take 1 tablet (75 mg) by mouth 2 times daily Follow-up with Dr. Currie in summer for your fasting full examination 180 tablet 1     vitamin D2 (ERGOCALCIFEROL) 47720 units (1250 mcg) capsule TAKE ONE CAPSULE BY MOUTH ONCE A WEEK ON TUESDAY 12 capsule 1         ALLERGIES:  Allergies   Allergen Reactions     Wasps [Hornets]      Sand wasp --- years ago.           PAST MEDICAL HISTORY:  Past Medical History:   Diagnosis Date     (HFpEF) heart failure with preserved ejection fraction (H)      Acute kidney injury (H) 2012     Anemia      Anxiety      Anxiety and depression      Bladder mass      BPH (benign prostatic hypertrophy)      Cardiomyopathy (H)     ICD implanted 11/2016     Carpal tunnel syndrome     Right     Chronic kidney disease (CKD), stage 3 (moderate)      Chronic systolic CHF (congestive heart failure) (H)      Dementia (H)      Dementia without behavioral disturbance, unspecified dementia type  (H) 7/22/2016     Depressive disorder      Diabetes (H)      Diffuse large B-cell lymphoma of extranodal site (H) 11/23/2018     Former smoker      Gastric mass      Gout      History of depression      History of gastric bypass 7/22/2016     Iron deficiency anemia, unspecified iron deficiency anemia type 10/15/2018     LBBB (left bundle branch block) 2013     Lumbar herniated disc     L5-S1     Malignant neoplasm of lateral wall of urinary bladder (H) 11/15/2019    Added automatically from request for surgery [id number]     Mixed cardiomyopathy 7/22/2016     Myocardial infarction (H) 1995 and 2010     Nonischemic cardiomyopathy (H) 7/22/2016     Obesity      Pacemaker     ICD/PM     Rosacea      Rotator cuff disorder     Tear x 2     RV (right ventricular) mural thrombus 7/22/2016     SOB (shortness of breath)          PAST SURGICAL HISTORY:  Past Surgical History:   Procedure Laterality Date     ANGIOPLASTY  1995 and 2010 with stent     BIOPSY      stomach     BONE MARROW BIOPSY, BONE SPECIMEN, NEEDLE/TROCAR N/A 10/30/2018    Procedure: BIOPSY BONE MARROW;  Surgeon: Jeb Romero MD;  Location:  GI     CARDIAC SURGERY      ICD/PM     CYSTOSCOPY       CYSTOSCOPY, TRANSURETHRAL RESECTION (TUR) PROSTATE, COMBINED  4/22/2019    Procedure: CYSTOSCOPY AND BIPOLAR TRANSURETHRAL RESECTION (TUR) PROSTATE;  Surgeon: Ryan Camarillo MD;  Location:  OR     CYSTOSCOPY, TRANSURETHRAL RESECTION (TUR) TUMOR BLADDER, COMBINED N/A 9/19/2018    Procedure: COMBINED CYSTOSCOPY, TRANSURETHRAL RESECTION (TUR) TUMOR BLADDER;  CYSTOSCOPY, TRANSURETHRAL RESECTION BLADDER TUMOR ;  Surgeon: Ryan Camarillo MD;  Location:  OR     CYSTOSCOPY, TRANSURETHRAL RESECTION (TUR) TUMOR BLADDER, COMBINED N/A 11/12/2018    Procedure: CYSTOSCOPY RESTAGING TRANSURETHRAL RESECTION BLADDER TUMOR;  Surgeon: Ryan Camarillo MD;  Location:  OR     CYSTOSCOPY, TRANSURETHRAL RESECTION (TUR) TUMOR BLADDER, COMBINED N/A 12/30/2019    Procedure:  CYSTOSCOPY, WITH TRANSURETHRAL RESECTION BLADDER TUMOR;  Surgeon: Ryan Camarillo MD;  Location:  OR     EP LEAD REVISION DUAL N/A 10/24/2019    Procedure: EP Lead Revision Dual;  Surgeon: Josias Hernandez MD;  Location:  HEART CARDIAC CATH LAB     ESOPHAGOSCOPY, GASTROSCOPY, DUODENOSCOPY (EGD), COMBINED N/A 7/30/2018    Procedure: COMBINED ESOPHAGOSCOPY, GASTROSCOPY, DUODENOSCOPY (EGD), BIOPSY SINGLE OR MULTIPLE;  gastroscopy;  Surgeon: Parish Xiong MD;  Location:  GI     ESOPHAGOSCOPY, GASTROSCOPY, DUODENOSCOPY (EGD), COMBINED N/A 7/30/2018    Procedure: COMBINED ESOPHAGOSCOPY, GASTROSCOPY, DUODENOSCOPY (EGD);  EGD;  Surgeon: Parish Xiong MD;  Location:  GI     ESOPHAGOSCOPY, GASTROSCOPY, DUODENOSCOPY (EGD), COMBINED N/A 8/2/2018    Procedure: COMBINED ESOPHAGOSCOPY, GASTROSCOPY, DUODENOSCOPY (EGD), BIOPSY SINGLE OR MULTIPLE;  gastroscopy BIOPSYSHOULD BE SENT TO LAB IN NS NOT FORMALIN PER ;  Surgeon: Jonathon Bush MD;  Location:  GI     GASTRIC BYPASS  2001     HEART CATH LEFT HEART CATH  7/19/16    Non ischemic cardiomyopathy; Non functionally significant LAD and RCA disease      IR PORT REMOVAL RIGHT  9/9/2019     LASER HOLMIUM LITHOTRIPSY BLADDER N/A 4/22/2019    Procedure: CYSTOSCOPY, HOLMIUM LASER LITHOLAPAXY ,  AND BIPOLAR TRANSURETHRAL RESECTION (TUR) PROSTATE;  Surgeon: Ryan Camarillo MD;  Location:  OR     OTHER SURGICAL HISTORY  Nov 2016    CRT-D implantation         SOCIAL HISTORY:  Social History     Socioeconomic History     Marital status:      Spouse name: Not on file     Number of children: Not on file     Years of education: Not on file     Highest education level: Not on file   Occupational History     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     Comment: [employer]; retired   Social Needs     Financial resource strain: Not on file     Food insecurity     Worry: Not on file     Inability: Not on file     Transportation needs     Medical: Not on file      Non-medical: Not on file   Tobacco Use     Smoking status: Former Smoker     Packs/day: 2.00     Years: 20.00     Pack years: 40.00     Types: Cigarettes     Start date:      Last attempt to quit: 1980     Years since quittin.5     Smokeless tobacco: Never Used     Tobacco comment: Quit in his 30s - 2 ppd for 20 years   Substance and Sexual Activity     Alcohol use: Yes     Alcohol/week: 0.0 standard drinks     Comment: rarely     Drug use: No     Sexual activity: Yes     Partners: Female   Lifestyle     Physical activity     Days per week: Not on file     Minutes per session: Not on file     Stress: Not on file   Relationships     Social connections     Talks on phone: Not on file     Gets together: Not on file     Attends Mandaen service: Not on file     Active member of club or organization: Not on file     Attends meetings of clubs or organizations: Not on file     Relationship status: Not on file     Intimate partner violence     Fear of current or ex partner: Not on file     Emotionally abused: Not on file     Physically abused: Not on file     Forced sexual activity: Not on file   Other Topics Concern     Parent/sibling w/ CABG, MI or angioplasty before 65F 55M? No      Service Not Asked     Blood Transfusions Not Asked     Caffeine Concern Not Asked     Occupational Exposure Not Asked     Hobby Hazards Not Asked     Sleep Concern Not Asked     Stress Concern Not Asked     Weight Concern Not Asked     Special Diet Yes     Comment: low fat, low salt      Back Care Not Asked     Exercise No     Bike Helmet Not Asked     Seat Belt Not Asked     Self-Exams Not Asked   Social History Narrative     Not on file         FAMILY HISTORY:  Family History   Problem Relation Age of Onset     Coronary Artery Disease Father 39     Alzheimer Disease Mother      Coronary Artery Disease Son         Two sons have  from MIs (ages 39 and 51)         PHYSICAL EXAM:  Vital signs:  There were no vitals  taken for this visit.   Not performed as this was a telephone visit.      LABS:  CBC RESULTS:   Recent Labs   Lab Test 20  1321   WBC 7.5   RBC 4.03*   HGB 12.9*   HCT 39.1*   MCV 97   MCH 32.0   MCHC 33.0   RDW 18.2*          Last Comprehensive Metabolic Panel:  Sodium   Date Value Ref Range Status   2020 142 133 - 144 mmol/L Final     Potassium   Date Value Ref Range Status   2020 3.5 3.4 - 5.3 mmol/L Final     Chloride   Date Value Ref Range Status   2020 109 94 - 109 mmol/L Final     Carbon Dioxide   Date Value Ref Range Status   2020 27 20 - 32 mmol/L Final     Anion Gap   Date Value Ref Range Status   2020 6 3 - 14 mmol/L Final     Glucose   Date Value Ref Range Status   2020 78 70 - 99 mg/dL Final     Urea Nitrogen   Date Value Ref Range Status   2020 17 7 - 30 mg/dL Final     Creatinine   Date Value Ref Range Status   2020 1.08 0.66 - 1.25 mg/dL Final     GFR Estimate   Date Value Ref Range Status   2020 65 >60 mL/min/[1.73_m2] Final     Comment:     Non  GFR Calc  Starting 2018, serum creatinine based estimated GFR (eGFR) will be   calculated using the Chronic Kidney Disease Epidemiology Collaboration   (CKD-EPI) equation.       Calcium   Date Value Ref Range Status   2020 8.9 8.5 - 10.1 mg/dL Final     Bilirubin Total   Date Value Ref Range Status   2020 0.8 0.2 - 1.3 mg/dL Final     Alkaline Phosphatase   Date Value Ref Range Status   2020 155 (H) 40 - 150 U/L Final     ALT   Date Value Ref Range Status   2020 23 0 - 70 U/L Final     AST   Date Value Ref Range Status   2020 19 0 - 45 U/L Final     LDH: 306 -> 356 -> 280    PATHOLOGY:  2019: Bladder tumor biopsy showed urothelial carcinoma in situ and no evidence of invasion in the planes examined.  Muscularis propria present.    IMAGIN2020: Echocardiogram showed mildly dilated left ventricle with LVEF < 20% and 2+ mitral  "regurgitation.    ASSESSMENT/PLAN:  Gibson Villalta is a 79 year old male with the following issues:  1.  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  I recommended repeating PET/CT scan to evaluate for recurrent diffuse large B-cell lymphoma of the stomach since he has initially presented with anemia when he was diagnosed.     2.  High-grade bladder cancer, T1  -These were seen on CT abdomen/pelvis on 7/30/2018 and further evaluated by Dr. Howard Camarillo on 9/07/2018.    -He proceeded with TURBT x 2 in 9/2018 and 11/2018 with no residual tumor or new bladder cancers.  No adjuvant chemotherapy was recommended for his T1 tumor at that time.  -However, he was found to have urothelial carcinoma in situ and a bladder tumor biopsy on 12/30/2019.  There was no clinical evidence for muscle invasive disease.   -He has since completed 6 BCG treatments on 3/23/2020, tolerating those well with no new urinary problems.  -He follows with Dr. Camarillo.    3. Cardiomyopathy with systolic dysfunction  -Stable.  His dyspnea on exertion is per his usual.  Continue Lasix and follow-up with cardiology as needed.    Return after PET/CT scan.    Maribel Bender MD  Hematology/Oncology  [facility]    Phone call duration: 11 minutes      Gibson Villalta is a 79 year old male who is being evaluated via a billable telephone visit.      The patient has been notified of following:     \"This telephone visit will be conducted via a call between you and your physician/provider. We have found that certain health care needs can be provided without the need for a physical exam.  This service lets us provide the care you need with a short phone conversation.  If a prescription is necessary we can send it directly to your pharmacy.  If lab work is needed we can place an order for that " "and you can then stop by our lab to have the test done at a later time.    Telephone visits are billed at different rates depending on your insurance coverage. During this emergency period, for some insurers they may be billed the same as an in-person visit.  Please reach out to your insurance provider with any questions.    If during the course of the call the physician/provider feels a telephone visit is not appropriate, you will not be charged for this service.\"    Patient has given verbal consent for Telephone visit?  Yes    What phone number would you like to be contacted at? [phone]    How would you like to obtain your AVS? MyChart    Phone call duration: 5 minutes  NO PAIN. NO REFILLS  aLna Ibarra CMA      Again, thank you for allowing me to participate in the care of your patient.        Sincerely,        Maribel Bender MD    "

## 2020-07-20 NOTE — PATIENT INSTRUCTIONS
1. Arrange for PET/CT scan - next available.  2. RTC MD phone visit after PET scan results.    PLEASE CALL TO SCHEDULE APPOINTMENTS

## 2020-07-24 NOTE — PROGRESS NOTES
"[facility]    Hematology/Oncology Established Patient Follow-up Note      Today's Date: 08/06/20    Reason for Follow-up: Diffuse large B-cell lymphoma.    Gibson Villalta is a 79 year old male who is being evaluated via a billable telephone visit.      The patient has been notified of following:     \"This telephone visit will be conducted via a call between you and your physician/provider. We have found that certain health care needs can be provided without the need for a physical exam due to the COVID-19 pandemic.  This service lets us provide the care you need with a short phone conversation.  If a prescription is necessary we can send it directly to your pharmacy.  If lab work is needed we can place an order for that and you can then stop by our lab to have the test done at a later time.    Telephone visits are billed at different rates depending on your insurance coverage. During this emergency period, for some insurers they may be billed the same as an in-person visit.  Please reach out to your insurance provider with any questions.    If during the course of the call the physician/provider feels a telephone visit is not appropriate, you will not be charged for this service.\"    Patient has given verbal consent for Telephone visit?  Yes      HISTORY OF PRESENT ILLNESS: Gibson Villalta is a 79 year old male with history of dementia, RV and LV mural thrombus on chronic anticoagulation with Coumadin, status post Smita-en-Y gastric bypass, hypertension, chronic kidney disease, CAD, cardiomyopathy with EF 25-30%, diabetes mellitus who presents with the following oncologic history:  1.  7/2018: Hospitalized for melena.  7/30/2018 CT abdomen/pelvis with contrast showed postop changes of prior gastric bypass procedure, ill-defined soft tissue thickening in the proximal stomach, increased since 8/9/2016, no evidence for bowel obstruction, colitis, or diverticulitis; no free fluid; 2 bladder " wall lesions enhancing, largest measuring 3.9 x 1.8 cm and smaller bladder wall lesion near midline measuring 2 x 1.4 cm, several left renal cysts; liver, gallbladder, spleen, adrenal glands, pancreas, and remaining kidneys unremarkable.  2.  7/30/2018: Upper endoscopy showed a medium sized, fungating, partially circumferential mass with oozing and stigmata of recent bleeding on the greater curvature of the stomach with biopsies suggesting possible lymphoma but it is negative for adenocarcinoma; esophagus and jejunum normal; gastric bypass with normal-sized pouch and intact staple line.  Patient denied any associated abdominal pain, fevers, chills, night sweats, unintentional weight loss, chest pain, dyspnea, hematuria, dysuria.  3.  8/2/2018: Repeat upper endoscopy with biopsy of the stomach mass showed active inflammation and atypical cells with crush artifact.  There were insufficient number of atypical cells to render a definitive diagnosis.  4. 9/21/2018: Underwent TURBT under the care of Dr. Ryan Camarillo.  Pathology showed a T1 high-grade bladder cancer with micropapillary features.  5.  10/19/2018: Repeat upper endoscopy with biopsies confirmed diffuse large B-cell lymphoma, non-germinal center B-cell like, FISH positive for MYC.  Helicobacter pylori stain negative.  6. 10/30/2018: Bone marrow biopsy negative for lymphoma involvement.  7.  10/31/2018: PET/CT scan showed extensive hypermetabolic thickening of the stomach and several loops of small bowel in the left upper quadrant and right lower quadrant consistent with the diagnosed lymphoma, hypermetabolic mesenteric lymphadenopathy, slightly increased metabolic activity in the bone marrow, scattered small subcentimeter calcified and noncalcified pulmonary nodules with low metabolic activity.  8.  11/12/2018: Underwent cystoscopy with restaging TURBT which showed no evidence of tumor regrowth and no new bladder lesions.  Previous area of resection noted on  the right lateral wall just lateral to the right UO.  9.  11/26/2018: Started first-line therapy with mini R-CHOP chemotherapy.  10. 1/28/2019: After 3 cycles of mini R-CHOP, PET/CT scan showed marked improvement in the hypermetabolic thickening of the stomach and loops of small bowel in the left upper quadrant and right lower quadrant, consistent with good response to treatment.  Previous hypermetabolic mesenteric lymphadenopathy has resolved.  Scattered tiny subcentimeter calcified and noncalcified pulmonary nodules have low metabolic activity and are unchanged.  These could represent benign calcified and noncalcified granulomata.  11.  2/19/2019: Cycle 5 and 6 of chemotherapy, doxorubicin held due to worsening shortness of breath and worsened dilation of the left ventricle.  Completion of cycle 6 on 3/12/19.  12.  4/4/2019: PET/CT scan showed no residual lymphoma.  Calcification seen in the bladder.  Benign cyst in left kidney.  13. 8/3/2020: PET/CT scan performed due to recurrent anemia. This showed hypermetabolic nodule in periphery of left side of prostate but no other hypermetabolism elsewhere.       INTERIM HISTORY:  Nelson reports feeling overall well and denies any fevers, chills, night sweats, unintentional weight loss, bowel or bladder dysfunction.  He feels his dyspnea on exertion is per his usual and denies any dyspnea at rest.    REVIEW OF SYSTEMS:   14 point ROS was reviewed and is negative other than as noted above in HPI.       HOME MEDICATIONS:  Current Outpatient Medications   Medication Sig Dispense Refill     ACE/ARB NOT PRESCRIBED, INTENTIONAL, ACE & ARB not prescribed due to Symptomatic hypotension not due to excessive diuresis (Patient not taking: Reported on 3/16/2020)       ASPIRIN NOT PRESCRIBED (INTENTIONAL) Please choose reason not prescribed, below (Patient not taking: Reported on 3/16/2020)       atorvastatin (LIPITOR) 40 MG tablet Take 1 tablet (40 mg) by mouth At Bedtime 90 tablet 3      COENZYME Q-10 PO Take 100 mg by mouth every morning        ferrous sulfate (IRON) 325 (65 Fe) MG tablet Take 325 mg by mouth daily       furosemide (LASIX) 20 MG tablet Take 1.5 tablets (30 mg) by mouth daily 90 tablet 1     hydrALAZINE (APRESOLINE) 25 MG tablet Take 1 tablet (25 mg) by mouth 3 times daily 280 tablet 3     levofloxacin (LEVAQUIN) 500 MG tablet Take 1 tablet (500 mg) by mouth as needed (take 1 tablet 6 hours after each treatment and one tablet next am after each treatment) (Patient not taking: Reported on 4/29/2020) 12 tablet 0     metoprolol succinate ER (TOPROL-XL) 50 MG 24 hr tablet TAKE ONE TABLET BY MOUTH ONE TIME DAILY  90 tablet 3     multivitamin, therapeutic with minerals (MULTI-VITAMIN) TABS Take 1 tablet by mouth every morning        venlafaxine (EFFEXOR) 75 MG tablet Take 1 tablet (75 mg) by mouth 2 times daily Follow-up with Dr. Currie in summer for your fasting full examination 180 tablet 1     vitamin D2 (ERGOCALCIFEROL) 16675 units (1250 mcg) capsule TAKE ONE CAPSULE BY MOUTH ONCE A WEEK ON TUESDAY 12 capsule 1         ALLERGIES:  Allergies   Allergen Reactions     Wasps [Hornets]      Sand wasp --- years ago.           PAST MEDICAL HISTORY:  Past Medical History:   Diagnosis Date     (HFpEF) heart failure with preserved ejection fraction (H)      Acute kidney injury (H) 2012     Anemia      Anxiety      Anxiety and depression      Bladder mass      BPH (benign prostatic hypertrophy)      Cardiomyopathy (H)     ICD implanted 11/2016     Carpal tunnel syndrome     Right     Chronic kidney disease (CKD), stage 3 (moderate)      Chronic systolic CHF (congestive heart failure) (H)      Dementia (H)      Dementia without behavioral disturbance, unspecified dementia type (H) 7/22/2016     Depressive disorder      Diabetes (H)      Diffuse large B-cell lymphoma of extranodal site (H) 11/23/2018     Former smoker      Gastric mass      Gout      History of depression      History of  gastric bypass 7/22/2016     Iron deficiency anemia, unspecified iron deficiency anemia type 10/15/2018     LBBB (left bundle branch block) 2013     Lumbar herniated disc     L5-S1     Malignant neoplasm of lateral wall of urinary bladder (H) 11/15/2019    Added automatically from request for surgery [id number]     Mixed cardiomyopathy 7/22/2016     Myocardial infarction (H) 1995 and 2010     Nonischemic cardiomyopathy (H) 7/22/2016     Obesity      Pacemaker     ICD/PM     Rosacea      Rotator cuff disorder     Tear x 2     RV (right ventricular) mural thrombus 7/22/2016     SOB (shortness of breath)          PAST SURGICAL HISTORY:  Past Surgical History:   Procedure Laterality Date     ANGIOPLASTY  1995 and 2010 with stent     BIOPSY      stomach     BONE MARROW BIOPSY, BONE SPECIMEN, NEEDLE/TROCAR N/A 10/30/2018    Procedure: BIOPSY BONE MARROW;  Surgeon: Jeb Romero MD;  Location:  GI     CARDIAC SURGERY      ICD/PM     CYSTOSCOPY       CYSTOSCOPY, TRANSURETHRAL RESECTION (TUR) PROSTATE, COMBINED  4/22/2019    Procedure: CYSTOSCOPY AND BIPOLAR TRANSURETHRAL RESECTION (TUR) PROSTATE;  Surgeon: Ryan Camarillo MD;  Location:  OR     CYSTOSCOPY, TRANSURETHRAL RESECTION (TUR) TUMOR BLADDER, COMBINED N/A 9/19/2018    Procedure: COMBINED CYSTOSCOPY, TRANSURETHRAL RESECTION (TUR) TUMOR BLADDER;  CYSTOSCOPY, TRANSURETHRAL RESECTION BLADDER TUMOR ;  Surgeon: Ryan Camarillo MD;  Location:  OR     CYSTOSCOPY, TRANSURETHRAL RESECTION (TUR) TUMOR BLADDER, COMBINED N/A 11/12/2018    Procedure: CYSTOSCOPY RESTAGING TRANSURETHRAL RESECTION BLADDER TUMOR;  Surgeon: Ryan Camarillo MD;  Location:  OR     CYSTOSCOPY, TRANSURETHRAL RESECTION (TUR) TUMOR BLADDER, COMBINED N/A 12/30/2019    Procedure: CYSTOSCOPY, WITH TRANSURETHRAL RESECTION BLADDER TUMOR;  Surgeon: Ryan Camarillo MD;  Location:  OR     EP LEAD REVISION DUAL N/A 10/24/2019    Procedure: EP Lead Revision Dual;  Surgeon: Josias Hernandez MD;   Location:  HEART CARDIAC CATH LAB     ESOPHAGOSCOPY, GASTROSCOPY, DUODENOSCOPY (EGD), COMBINED N/A 7/30/2018    Procedure: COMBINED ESOPHAGOSCOPY, GASTROSCOPY, DUODENOSCOPY (EGD), BIOPSY SINGLE OR MULTIPLE;  gastroscopy;  Surgeon: aPrish Xiong MD;  Location:  GI     ESOPHAGOSCOPY, GASTROSCOPY, DUODENOSCOPY (EGD), COMBINED N/A 7/30/2018    Procedure: COMBINED ESOPHAGOSCOPY, GASTROSCOPY, DUODENOSCOPY (EGD);  EGD;  Surgeon: Parish Xiong MD;  Location:  GI     ESOPHAGOSCOPY, GASTROSCOPY, DUODENOSCOPY (EGD), COMBINED N/A 8/2/2018    Procedure: COMBINED ESOPHAGOSCOPY, GASTROSCOPY, DUODENOSCOPY (EGD), BIOPSY SINGLE OR MULTIPLE;  gastroscopy BIOPSYSHOULD BE SENT TO LAB IN NS NOT FORMALIN PER ;  Surgeon: Jonathon Bush MD;  Location:  GI     GASTRIC BYPASS  2001     HEART CATH LEFT HEART CATH  7/19/16    Non ischemic cardiomyopathy; Non functionally significant LAD and RCA disease      IR PORT REMOVAL RIGHT  9/9/2019     LASER HOLMIUM LITHOTRIPSY BLADDER N/A 4/22/2019    Procedure: CYSTOSCOPY, HOLMIUM LASER LITHOLAPAXY ,  AND BIPOLAR TRANSURETHRAL RESECTION (TUR) PROSTATE;  Surgeon: Ryan Camarillo MD;  Location:  OR     OTHER SURGICAL HISTORY  Nov 2016    CRT-D implantation         SOCIAL HISTORY:  Social History     Socioeconomic History     Marital status:      Spouse name: Not on file     Number of children: Not on file     Years of education: Not on file     Highest education level: Not on file   Occupational History     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     Comment: [employer]; retired   Social Needs     Financial resource strain: Not on file     Food insecurity     Worry: Not on file     Inability: Not on file     Transportation needs     Medical: Not on file     Non-medical: Not on file   Tobacco Use     Smoking status: Former Smoker     Packs/day: 2.00     Years: 20.00     Pack years: 40.00     Types: Cigarettes     Start date: 1961     Last attempt to quit: 1/1/1980     Years  since quittin.5     Smokeless tobacco: Never Used     Tobacco comment: Quit in his 30s - 2 ppd for 20 years   Substance and Sexual Activity     Alcohol use: Yes     Alcohol/week: 0.0 standard drinks     Comment: rarely     Drug use: No     Sexual activity: Yes     Partners: Female   Lifestyle     Physical activity     Days per week: Not on file     Minutes per session: Not on file     Stress: Not on file   Relationships     Social connections     Talks on phone: Not on file     Gets together: Not on file     Attends Orthodoxy service: Not on file     Active member of club or organization: Not on file     Attends meetings of clubs or organizations: Not on file     Relationship status: Not on file     Intimate partner violence     Fear of current or ex partner: Not on file     Emotionally abused: Not on file     Physically abused: Not on file     Forced sexual activity: Not on file   Other Topics Concern     Parent/sibling w/ CABG, MI or angioplasty before 65F 55M? No      Service Not Asked     Blood Transfusions Not Asked     Caffeine Concern Not Asked     Occupational Exposure Not Asked     Hobby Hazards Not Asked     Sleep Concern Not Asked     Stress Concern Not Asked     Weight Concern Not Asked     Special Diet Yes     Comment: low fat, low salt      Back Care Not Asked     Exercise No     Bike Helmet Not Asked     Seat Belt Not Asked     Self-Exams Not Asked   Social History Narrative     Not on file         FAMILY HISTORY:  Family History   Problem Relation Age of Onset     Coronary Artery Disease Father 39     Alzheimer Disease Mother      Coronary Artery Disease Son         Two sons have  from MIs (ages 39 and 51)         PHYSICAL EXAM:  Vital signs:  There were no vitals taken for this visit.   Not performed as this was a telephone visit.      LABS:  CBC RESULTS:   Recent Labs   Lab Test 20  1321   WBC 7.5   RBC 4.03*   HGB 12.9*   HCT 39.1*   MCV 97   MCH 32.0   MCHC 33.0   RDW 18.2*           Last Comprehensive Metabolic Panel:  Sodium   Date Value Ref Range Status   07/17/2020 142 133 - 144 mmol/L Final     Potassium   Date Value Ref Range Status   07/17/2020 3.5 3.4 - 5.3 mmol/L Final     Chloride   Date Value Ref Range Status   07/17/2020 109 94 - 109 mmol/L Final     Carbon Dioxide   Date Value Ref Range Status   07/17/2020 27 20 - 32 mmol/L Final     Anion Gap   Date Value Ref Range Status   07/17/2020 6 3 - 14 mmol/L Final     Glucose   Date Value Ref Range Status   07/17/2020 78 70 - 99 mg/dL Final     Urea Nitrogen   Date Value Ref Range Status   07/17/2020 17 7 - 30 mg/dL Final     Creatinine   Date Value Ref Range Status   07/17/2020 1.08 0.66 - 1.25 mg/dL Final     GFR Estimate   Date Value Ref Range Status   07/17/2020 65 >60 mL/min/[1.73_m2] Final     Comment:     Non  GFR Calc  Starting 12/18/2018, serum creatinine based estimated GFR (eGFR) will be   calculated using the Chronic Kidney Disease Epidemiology Collaboration   (CKD-EPI) equation.       Calcium   Date Value Ref Range Status   07/17/2020 8.9 8.5 - 10.1 mg/dL Final     Bilirubin Total   Date Value Ref Range Status   07/17/2020 0.8 0.2 - 1.3 mg/dL Final     Alkaline Phosphatase   Date Value Ref Range Status   07/17/2020 155 (H) 40 - 150 U/L Final     ALT   Date Value Ref Range Status   07/17/2020 23 0 - 70 U/L Final     AST   Date Value Ref Range Status   07/17/2020 19 0 - 45 U/L Final     LDH: 306 -> 356 -> 280    PATHOLOGY:  12/30/2019: Bladder tumor biopsy showed urothelial carcinoma in situ and no evidence of invasion in the planes examined.  Muscularis propria present.    IMAGING:  Reviewed as per HPI.    ASSESSMENT/PLAN:  Gibson Villalta is a 79 year old male with the following issues:  1.  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  This shows a  hypermetabolic nodule in the periphery of the left side of the prostate.  I will discuss this with Dr. Camarillo, if he would recommend a prostate MRI if this findings is unrelated to his recent BCG treatments for bladder cancer.  Otherwise, there is no evidence of recurrent diffuse large B-cell lymphoma.  -I recommended to Nelson ongoing clinical surveillance for lymphoma.  I only recommended repeating a PET/CT scan if he shows any signs or symptoms suggestive of lymphoma recurrent in the future.  However, we will continue to monitor his CBC, CMP, and LDH.     2.  High-grade bladder cancer, T1  3.  Left prostate nodule  -These were seen on CT abdomen/pelvis on 7/30/2018 and further evaluated by Dr. Howard Camarillo on 9/07/2018.    -He proceeded with TURBT x 2 in 9/2018 and 11/2018 with no residual tumor or new bladder cancers.  No adjuvant chemotherapy was recommended for his T1 tumor at that time.  -However, he was found to have urothelial carcinoma in situ and a bladder tumor biopsy on 12/30/2019.  There was no clinical evidence for muscle invasive disease.   -He has since completed 6 BCG treatments on 3/23/2020, tolerating those well with no new urinary problems.  -He follows with Dr. Camarillo.  -Will have him revisit with Dr. Camarillo regarding the hypermetabolic nodule in the left side of the prostate.    4. Cardiomyopathy with systolic dysfunction  -Stable.  His dyspnea on exertion is per his usual.  Continue Lasix and follow-up with cardiology as needed.    Return in 3 months with lab draw prior to visit.    Maribel Bender MD  Hematology/Oncology  [facility]    Phone call duration: 11 minutes

## 2020-08-03 NOTE — TELEPHONE ENCOUNTER
Routing refill request to provider for review/approval because:  Drug not on the [facility] refill protocol     Last Written Prescription Date:  2-  Last Fill Quantity: 12,  # refills: 1   Last office visit: 9/6/2019 with prescribing provider:  Dr Currie   Future Office Visit:      Savita GU RN,BSN

## 2020-08-06 NOTE — LETTER
"    8/6/2020         RE: Gibson Villalta  [address]  [place] [address]        Dear Colleague,    Thank you for referring your patient, Gibson Villalta, to the [facility]. Please see a copy of my visit note below.    [facility]    Hematology/Oncology Established Patient Follow-up Note      Today's Date: 08/06/20    Reason for Follow-up: Diffuse large B-cell lymphoma.    Gibson Villalta is a 79 year old male who is being evaluated via a billable telephone visit.      The patient has been notified of following:     \"This telephone visit will be conducted via a call between you and your physician/provider. We have found that certain health care needs can be provided without the need for a physical exam due to the COVID-19 pandemic.  This service lets us provide the care you need with a short phone conversation.  If a prescription is necessary we can send it directly to your pharmacy.  If lab work is needed we can place an order for that and you can then stop by our lab to have the test done at a later time.    Telephone visits are billed at different rates depending on your insurance coverage. During this emergency period, for some insurers they may be billed the same as an in-person visit.  Please reach out to your insurance provider with any questions.    If during the course of the call the physician/provider feels a telephone visit is not appropriate, you will not be charged for this service.\"    Patient has given verbal consent for Telephone visit?  Yes      HISTORY OF PRESENT ILLNESS: Gibson Villalta is a 79 year old male with history of dementia, RV and LV mural thrombus on chronic anticoagulation with Coumadin, status post Smita-en-Y gastric bypass, hypertension, chronic kidney disease, CAD, cardiomyopathy with EF 25-30%, diabetes mellitus who presents with the following oncologic history:  1. 7/2018: Hospitalized for melena.  " 7/30/2018 CT abdomen/pelvis with contrast showed postop changes of prior gastric bypass procedure, ill-defined soft tissue thickening in the proximal stomach, increased since 8/9/2016, no evidence for bowel obstruction, colitis, or diverticulitis; no free fluid; 2 bladder wall lesions enhancing, largest measuring 3.9 x 1.8 cm and smaller bladder wall lesion near midline measuring 2 x 1.4 cm, several left renal cysts; liver, gallbladder, spleen, adrenal glands, pancreas, and remaining kidneys unremarkable.  2.  7/30/2018: Upper endoscopy showed a medium sized, fungating, partially circumferential mass with oozing and stigmata of recent bleeding on the greater curvature of the stomach with biopsies suggesting possible lymphoma but it is negative for adenocarcinoma; esophagus and jejunum normal; gastric bypass with normal-sized pouch and intact staple line.  Patient denied any associated abdominal pain, fevers, chills, night sweats, unintentional weight loss, chest pain, dyspnea, hematuria, dysuria.  3.  8/2/2018: Repeat upper endoscopy with biopsy of the stomach mass showed active inflammation and atypical cells with crush artifact.  There were insufficient number of atypical cells to render a definitive diagnosis.  4. 9/21/2018: Underwent TURBT under the care of Dr. Ryan Camarillo.  Pathology showed a T1 high-grade bladder cancer with micropapillary features.  5.  10/19/2018: Repeat upper endoscopy with biopsies confirmed diffuse large B-cell lymphoma, non-germinal center B-cell like, FISH positive for MYC.  Helicobacter pylori stain negative.  6. 10/30/2018: Bone marrow biopsy negative for lymphoma involvement.  7.  10/31/2018: PET/CT scan showed extensive hypermetabolic thickening of the stomach and several loops of small bowel in the left upper quadrant and right lower quadrant consistent with the diagnosed lymphoma, hypermetabolic mesenteric lymphadenopathy, slightly increased metabolic activity in the bone marrow,  scattered small subcentimeter calcified and noncalcified pulmonary nodules with low metabolic activity.  8.  11/12/2018: Underwent cystoscopy with restaging TURBT which showed no evidence of tumor regrowth and no new bladder lesions.  Previous area of resection noted on the right lateral wall just lateral to the right UO.  9.  11/26/2018: Started first-line therapy with mini R-CHOP chemotherapy.  10. 1/28/2019: After 3 cycles of mini R-CHOP, PET/CT scan showed marked improvement in the hypermetabolic thickening of the stomach and loops of small bowel in the left upper quadrant and right lower quadrant, consistent with good response to treatment.  Previous hypermetabolic mesenteric lymphadenopathy has resolved.  Scattered tiny subcentimeter calcified and noncalcified pulmonary nodules have low metabolic activity and are unchanged.  These could represent benign calcified and noncalcified granulomata.  11.  2/19/2019: Cycle 5 and 6 of chemotherapy, doxorubicin held due to worsening shortness of breath and worsened dilation of the left ventricle.  Completion of cycle 6 on 3/12/19.  12.  4/4/2019: PET/CT scan showed no residual lymphoma.  Calcification seen in the bladder.  Benign cyst in left kidney.  13. 8/3/2020: PET/CT scan performed due to recurrent anemia. This showed hypermetabolic nodule in periphery of left side of prostate but no other hypermetabolism elsewhere.       INTERIM HISTORY:  Nelson reports feeling overall well and denies any fevers, chills, night sweats, unintentional weight loss, bowel or bladder dysfunction.  He feels his dyspnea on exertion is per his usual and denies any dyspnea at rest.    REVIEW OF SYSTEMS:   14 point ROS was reviewed and is negative other than as noted above in HPI.       HOME MEDICATIONS:  Current Outpatient Medications   Medication Sig Dispense Refill     ACE/ARB NOT PRESCRIBED, INTENTIONAL, ACE & ARB not prescribed due to Symptomatic hypotension not due to excessive diuresis  (Patient not taking: Reported on 3/16/2020)       ASPIRIN NOT PRESCRIBED (INTENTIONAL) Please choose reason not prescribed, below (Patient not taking: Reported on 3/16/2020)       atorvastatin (LIPITOR) 40 MG tablet Take 1 tablet (40 mg) by mouth At Bedtime 90 tablet 3     COENZYME Q-10 PO Take 100 mg by mouth every morning        ferrous sulfate (IRON) 325 (65 Fe) MG tablet Take 325 mg by mouth daily       furosemide (LASIX) 20 MG tablet Take 1.5 tablets (30 mg) by mouth daily 90 tablet 1     hydrALAZINE (APRESOLINE) 25 MG tablet Take 1 tablet (25 mg) by mouth 3 times daily 280 tablet 3     levofloxacin (LEVAQUIN) 500 MG tablet Take 1 tablet (500 mg) by mouth as needed (take 1 tablet 6 hours after each treatment and one tablet next am after each treatment) (Patient not taking: Reported on 4/29/2020) 12 tablet 0     metoprolol succinate ER (TOPROL-XL) 50 MG 24 hr tablet TAKE ONE TABLET BY MOUTH ONE TIME DAILY  90 tablet 3     multivitamin, therapeutic with minerals (MULTI-VITAMIN) TABS Take 1 tablet by mouth every morning        venlafaxine (EFFEXOR) 75 MG tablet Take 1 tablet (75 mg) by mouth 2 times daily Follow-up with Dr. Currie in summer for your fasting full examination 180 tablet 1     vitamin D2 (ERGOCALCIFEROL) 25682 units (1250 mcg) capsule TAKE ONE CAPSULE BY MOUTH ONCE A WEEK ON TUESDAY 12 capsule 1         ALLERGIES:  Allergies   Allergen Reactions     Wasps [Hornets]      Sand wasp --- years ago.           PAST MEDICAL HISTORY:  Past Medical History:   Diagnosis Date     (HFpEF) heart failure with preserved ejection fraction (H)      Acute kidney injury (H) 2012     Anemia      Anxiety      Anxiety and depression      Bladder mass      BPH (benign prostatic hypertrophy)      Cardiomyopathy (H)     ICD implanted 11/2016     Carpal tunnel syndrome     Right     Chronic kidney disease (CKD), stage 3 (moderate)      Chronic systolic CHF (congestive heart failure) (H)      Dementia (H)      Dementia without  behavioral disturbance, unspecified dementia type (H) 7/22/2016     Depressive disorder      Diabetes (H)      Diffuse large B-cell lymphoma of extranodal site (H) 11/23/2018     Former smoker      Gastric mass      Gout      History of depression      History of gastric bypass 7/22/2016     Iron deficiency anemia, unspecified iron deficiency anemia type 10/15/2018     LBBB (left bundle branch block) 2013     Lumbar herniated disc     L5-S1     Malignant neoplasm of lateral wall of urinary bladder (H) 11/15/2019    Added automatically from request for surgery [id number]     Mixed cardiomyopathy 7/22/2016     Myocardial infarction (H) 1995 and 2010     Nonischemic cardiomyopathy (H) 7/22/2016     Obesity      Pacemaker     ICD/PM     Rosacea      Rotator cuff disorder     Tear x 2     RV (right ventricular) mural thrombus 7/22/2016     SOB (shortness of breath)          PAST SURGICAL HISTORY:  Past Surgical History:   Procedure Laterality Date     ANGIOPLASTY  1995 and 2010 with stent     BIOPSY      stomach     BONE MARROW BIOPSY, BONE SPECIMEN, NEEDLE/TROCAR N/A 10/30/2018    Procedure: BIOPSY BONE MARROW;  Surgeon: Jeb Romero MD;  Location:  GI     CARDIAC SURGERY      ICD/PM     CYSTOSCOPY       CYSTOSCOPY, TRANSURETHRAL RESECTION (TUR) PROSTATE, COMBINED  4/22/2019    Procedure: CYSTOSCOPY AND BIPOLAR TRANSURETHRAL RESECTION (TUR) PROSTATE;  Surgeon: Ryan Camarillo MD;  Location:  OR     CYSTOSCOPY, TRANSURETHRAL RESECTION (TUR) TUMOR BLADDER, COMBINED N/A 9/19/2018    Procedure: COMBINED CYSTOSCOPY, TRANSURETHRAL RESECTION (TUR) TUMOR BLADDER;  CYSTOSCOPY, TRANSURETHRAL RESECTION BLADDER TUMOR ;  Surgeon: Ryan Camarillo MD;  Location:  OR     CYSTOSCOPY, TRANSURETHRAL RESECTION (TUR) TUMOR BLADDER, COMBINED N/A 11/12/2018    Procedure: CYSTOSCOPY RESTAGING TRANSURETHRAL RESECTION BLADDER TUMOR;  Surgeon: Ryan Camarillo MD;  Location:  OR     CYSTOSCOPY, TRANSURETHRAL RESECTION (TUR) TUMOR  BLADDER, COMBINED N/A 12/30/2019    Procedure: CYSTOSCOPY, WITH TRANSURETHRAL RESECTION BLADDER TUMOR;  Surgeon: Ryan Camarillo MD;  Location:  OR     EP LEAD REVISION DUAL N/A 10/24/2019    Procedure: EP Lead Revision Dual;  Surgeon: Josias Hernandez MD;  Location:  HEART CARDIAC CATH LAB     ESOPHAGOSCOPY, GASTROSCOPY, DUODENOSCOPY (EGD), COMBINED N/A 7/30/2018    Procedure: COMBINED ESOPHAGOSCOPY, GASTROSCOPY, DUODENOSCOPY (EGD), BIOPSY SINGLE OR MULTIPLE;  gastroscopy;  Surgeon: Parish Xiong MD;  Location:  GI     ESOPHAGOSCOPY, GASTROSCOPY, DUODENOSCOPY (EGD), COMBINED N/A 7/30/2018    Procedure: COMBINED ESOPHAGOSCOPY, GASTROSCOPY, DUODENOSCOPY (EGD);  EGD;  Surgeon: Parish Xiong MD;  Location:  GI     ESOPHAGOSCOPY, GASTROSCOPY, DUODENOSCOPY (EGD), COMBINED N/A 8/2/2018    Procedure: COMBINED ESOPHAGOSCOPY, GASTROSCOPY, DUODENOSCOPY (EGD), BIOPSY SINGLE OR MULTIPLE;  gastroscopy BIOPSYSHOULD BE SENT TO LAB IN NS NOT FORMALIN PER ;  Surgeon: Jonathon Bush MD;  Location:  GI     GASTRIC BYPASS  2001     HEART CATH LEFT HEART CATH  7/19/16    Non ischemic cardiomyopathy; Non functionally significant LAD and RCA disease      IR PORT REMOVAL RIGHT  9/9/2019     LASER HOLMIUM LITHOTRIPSY BLADDER N/A 4/22/2019    Procedure: CYSTOSCOPY, HOLMIUM LASER LITHOLAPAXY ,  AND BIPOLAR TRANSURETHRAL RESECTION (TUR) PROSTATE;  Surgeon: Ryan Camarillo MD;  Location:  OR     OTHER SURGICAL HISTORY  Nov 2016    CRT-D implantation         SOCIAL HISTORY:  Social History     Socioeconomic History     Marital status:      Spouse name: Not on file     Number of children: Not on file     Years of education: Not on file     Highest education level: Not on file   Occupational History     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     Comment: [employer]; retired   Social Needs     Financial resource strain: Not on file     Food insecurity     Worry: Not on file     Inability: Not on file      Transportation needs     Medical: Not on file     Non-medical: Not on file   Tobacco Use     Smoking status: Former Smoker     Packs/day: 2.00     Years: 20.00     Pack years: 40.00     Types: Cigarettes     Start date:      Last attempt to quit: 1980     Years since quittin.5     Smokeless tobacco: Never Used     Tobacco comment: Quit in his 30s - 2 ppd for 20 years   Substance and Sexual Activity     Alcohol use: Yes     Alcohol/week: 0.0 standard drinks     Comment: rarely     Drug use: No     Sexual activity: Yes     Partners: Female   Lifestyle     Physical activity     Days per week: Not on file     Minutes per session: Not on file     Stress: Not on file   Relationships     Social connections     Talks on phone: Not on file     Gets together: Not on file     Attends Zoroastrianism service: Not on file     Active member of club or organization: Not on file     Attends meetings of clubs or organizations: Not on file     Relationship status: Not on file     Intimate partner violence     Fear of current or ex partner: Not on file     Emotionally abused: Not on file     Physically abused: Not on file     Forced sexual activity: Not on file   Other Topics Concern     Parent/sibling w/ CABG, MI or angioplasty before 65F 55M? No      Service Not Asked     Blood Transfusions Not Asked     Caffeine Concern Not Asked     Occupational Exposure Not Asked     Hobby Hazards Not Asked     Sleep Concern Not Asked     Stress Concern Not Asked     Weight Concern Not Asked     Special Diet Yes     Comment: low fat, low salt      Back Care Not Asked     Exercise No     Bike Helmet Not Asked     Seat Belt Not Asked     Self-Exams Not Asked   Social History Narrative     Not on file         FAMILY HISTORY:  Family History   Problem Relation Age of Onset     Coronary Artery Disease Father 39     Alzheimer Disease Mother      Coronary Artery Disease Son         Two sons have  from MIs (ages 39 and 51)          PHYSICAL EXAM:  Vital signs:  There were no vitals taken for this visit.   Not performed as this was a telephone visit.      LABS:  CBC RESULTS:   Recent Labs   Lab Test 07/17/20  1321   WBC 7.5   RBC 4.03*   HGB 12.9*   HCT 39.1*   MCV 97   MCH 32.0   MCHC 33.0   RDW 18.2*          Last Comprehensive Metabolic Panel:  Sodium   Date Value Ref Range Status   07/17/2020 142 133 - 144 mmol/L Final     Potassium   Date Value Ref Range Status   07/17/2020 3.5 3.4 - 5.3 mmol/L Final     Chloride   Date Value Ref Range Status   07/17/2020 109 94 - 109 mmol/L Final     Carbon Dioxide   Date Value Ref Range Status   07/17/2020 27 20 - 32 mmol/L Final     Anion Gap   Date Value Ref Range Status   07/17/2020 6 3 - 14 mmol/L Final     Glucose   Date Value Ref Range Status   07/17/2020 78 70 - 99 mg/dL Final     Urea Nitrogen   Date Value Ref Range Status   07/17/2020 17 7 - 30 mg/dL Final     Creatinine   Date Value Ref Range Status   07/17/2020 1.08 0.66 - 1.25 mg/dL Final     GFR Estimate   Date Value Ref Range Status   07/17/2020 65 >60 mL/min/[1.73_m2] Final     Comment:     Non  GFR Calc  Starting 12/18/2018, serum creatinine based estimated GFR (eGFR) will be   calculated using the Chronic Kidney Disease Epidemiology Collaboration   (CKD-EPI) equation.       Calcium   Date Value Ref Range Status   07/17/2020 8.9 8.5 - 10.1 mg/dL Final     Bilirubin Total   Date Value Ref Range Status   07/17/2020 0.8 0.2 - 1.3 mg/dL Final     Alkaline Phosphatase   Date Value Ref Range Status   07/17/2020 155 (H) 40 - 150 U/L Final     ALT   Date Value Ref Range Status   07/17/2020 23 0 - 70 U/L Final     AST   Date Value Ref Range Status   07/17/2020 19 0 - 45 U/L Final     LDH: 306 -> 356 -> 280    PATHOLOGY:  12/30/2019: Bladder tumor biopsy showed urothelial carcinoma in situ and no evidence of invasion in the planes examined.  Muscularis propria present.    IMAGING:  Reviewed as per  HPI.    ASSESSMENT/PLAN:  Gibson Villalta is a 79 year old male with the following issues:  1.  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  This shows a hypermetabolic nodule in the periphery of the left side of the prostate.  I will discuss this with Dr. Camarillo, if he would recommend a prostate MRI if this findings is unrelated to his recent BCG treatments for bladder cancer.  Otherwise, there is no evidence of recurrent diffuse large B-cell lymphoma.  -I recommended to Nelson ongoing clinical surveillance for lymphoma.  I only recommended repeating a PET/CT scan if he shows any signs or symptoms suggestive of lymphoma recurrent in the future.  However, we will continue to monitor his CBC, CMP, and LDH.     2.  High-grade bladder cancer, T1  3.  Left prostate nodule  -These were seen on CT abdomen/pelvis on 7/30/2018 and further evaluated by Dr. Howard Camarillo on 9/07/2018.    -He proceeded with TURBT x 2 in 9/2018 and 11/2018 with no residual tumor or new bladder cancers.  No adjuvant chemotherapy was recommended for his T1 tumor at that time.  -However, he was found to have urothelial carcinoma in situ and a bladder tumor biopsy on 12/30/2019.  There was no clinical evidence for muscle invasive disease.   -He has since completed 6 BCG treatments on 3/23/2020, tolerating those well with no new urinary problems.  -He follows with Dr. Camarillo.  -Will have him revisit with Dr. Camarillo regarding the hypermetabolic nodule in the left side of the prostate.    4. Cardiomyopathy with systolic dysfunction  -Stable.  His dyspnea on exertion is per his usual.  Continue Lasix and follow-up with cardiology as needed.    Return in 3 months with lab draw prior to visit.    Maribel Bender MD  Hematology/Oncology  [facility]    Phone call duration: 11 minutes      Gibson  "JAYA Villalta is a 79 year old male who is being evaluated via a billable telephone visit.      The patient has been notified of following:     \"This telephone visit will be conducted via a call between you and your physician/provider. We have found that certain health care needs can be provided without the need for a physical exam.  This service lets us provide the care you need with a short phone conversation.  If a prescription is necessary we can send it directly to your pharmacy.  If lab work is needed we can place an order for that and you can then stop by our lab to have the test done at a later time.    Telephone visits are billed at different rates depending on your insurance coverage. During this emergency period, for some insurers they may be billed the same as an in-person visit.  Please reach out to your insurance provider with any questions.    If during the course of the call the physician/provider feels a telephone visit is not appropriate, you will not be charged for this service.\"    Patient has given verbal consent for Telephone visit?  Yes    What phone number would you like to be contacted at? [phone]    How would you like to obtain your AVS? MyChart    Phone call duration: 5 minutes    NO PAIN, NO REFILLS  Lana Ibarra [facility]        Again, thank you for allowing me to participate in the care of your patient.        Sincerely,        Maribel Bender MD    "

## 2020-08-06 NOTE — PROGRESS NOTES
"Gibson Villalta is a 79 year old male who is being evaluated via a billable telephone visit.      The patient has been notified of following:     \"This telephone visit will be conducted via a call between you and your physician/provider. We have found that certain health care needs can be provided without the need for a physical exam.  This service lets us provide the care you need with a short phone conversation.  If a prescription is necessary we can send it directly to your pharmacy.  If lab work is needed we can place an order for that and you can then stop by our lab to have the test done at a later time.    Telephone visits are billed at different rates depending on your insurance coverage. During this emergency period, for some insurers they may be billed the same as an in-person visit.  Please reach out to your insurance provider with any questions.    If during the course of the call the physician/provider feels a telephone visit is not appropriate, you will not be charged for this service.\"    Patient has given verbal consent for Telephone visit?  Yes    What phone number would you like to be contacted at? [phone]    How would you like to obtain your AVS? MyChart    Phone call duration: 5 minutes    NO PAIN, NO REFILLS  Lana Ibarra [facility]      "

## 2020-08-06 NOTE — LETTER
"    8/6/2020         RE: Gibson Villalta  [address]  [place] [address]        Dear Colleague,    Thank you for referring your patient, Gibson Villalta, to the [facility]. Please see a copy of my visit note below.    [facility]    Hematology/Oncology Established Patient Follow-up Note      Today's Date: 08/06/20    Reason for Follow-up: Diffuse large B-cell lymphoma.    Gibson Villalta is a 79 year old male who is being evaluated via a billable telephone visit.      The patient has been notified of following:     \"This telephone visit will be conducted via a call between you and your physician/provider. We have found that certain health care needs can be provided without the need for a physical exam due to the COVID-19 pandemic.  This service lets us provide the care you need with a short phone conversation.  If a prescription is necessary we can send it directly to your pharmacy.  If lab work is needed we can place an order for that and you can then stop by our lab to have the test done at a later time.    Telephone visits are billed at different rates depending on your insurance coverage. During this emergency period, for some insurers they may be billed the same as an in-person visit.  Please reach out to your insurance provider with any questions.    If during the course of the call the physician/provider feels a telephone visit is not appropriate, you will not be charged for this service.\"    Patient has given verbal consent for Telephone visit?  Yes      HISTORY OF PRESENT ILLNESS: Gibson Villalta is a 79 year old male with history of dementia, RV and LV mural thrombus on chronic anticoagulation with Coumadin, status post Smita-en-Y gastric bypass, hypertension, chronic kidney disease, CAD, cardiomyopathy with EF 25-30%, diabetes mellitus who presents with the following oncologic history:  1. 7/2018: Hospitalized for melena.  " 7/30/2018 CT abdomen/pelvis with contrast showed postop changes of prior gastric bypass procedure, ill-defined soft tissue thickening in the proximal stomach, increased since 8/9/2016, no evidence for bowel obstruction, colitis, or diverticulitis; no free fluid; 2 bladder wall lesions enhancing, largest measuring 3.9 x 1.8 cm and smaller bladder wall lesion near midline measuring 2 x 1.4 cm, several left renal cysts; liver, gallbladder, spleen, adrenal glands, pancreas, and remaining kidneys unremarkable.  2.  7/30/2018: Upper endoscopy showed a medium sized, fungating, partially circumferential mass with oozing and stigmata of recent bleeding on the greater curvature of the stomach with biopsies suggesting possible lymphoma but it is negative for adenocarcinoma; esophagus and jejunum normal; gastric bypass with normal-sized pouch and intact staple line.  Patient denied any associated abdominal pain, fevers, chills, night sweats, unintentional weight loss, chest pain, dyspnea, hematuria, dysuria.  3.  8/2/2018: Repeat upper endoscopy with biopsy of the stomach mass showed active inflammation and atypical cells with crush artifact.  There were insufficient number of atypical cells to render a definitive diagnosis.  4. 9/21/2018: Underwent TURBT under the care of Dr. Ryan Camarillo.  Pathology showed a T1 high-grade bladder cancer with micropapillary features.  5.  10/19/2018: Repeat upper endoscopy with biopsies confirmed diffuse large B-cell lymphoma, non-germinal center B-cell like, FISH positive for MYC.  Helicobacter pylori stain negative.  6. 10/30/2018: Bone marrow biopsy negative for lymphoma involvement.  7.  10/31/2018: PET/CT scan showed extensive hypermetabolic thickening of the stomach and several loops of small bowel in the left upper quadrant and right lower quadrant consistent with the diagnosed lymphoma, hypermetabolic mesenteric lymphadenopathy, slightly increased metabolic activity in the bone marrow,  scattered small subcentimeter calcified and noncalcified pulmonary nodules with low metabolic activity.  8.  11/12/2018: Underwent cystoscopy with restaging TURBT which showed no evidence of tumor regrowth and no new bladder lesions.  Previous area of resection noted on the right lateral wall just lateral to the right UO.  9.  11/26/2018: Started first-line therapy with mini R-CHOP chemotherapy.  10. 1/28/2019: After 3 cycles of mini R-CHOP, PET/CT scan showed marked improvement in the hypermetabolic thickening of the stomach and loops of small bowel in the left upper quadrant and right lower quadrant, consistent with good response to treatment.  Previous hypermetabolic mesenteric lymphadenopathy has resolved.  Scattered tiny subcentimeter calcified and noncalcified pulmonary nodules have low metabolic activity and are unchanged.  These could represent benign calcified and noncalcified granulomata.  11.  2/19/2019: Cycle 5 and 6 of chemotherapy, doxorubicin held due to worsening shortness of breath and worsened dilation of the left ventricle.  Completion of cycle 6 on 3/12/19.  12.  4/4/2019: PET/CT scan showed no residual lymphoma.  Calcification seen in the bladder.  Benign cyst in left kidney.  13. 8/3/2020: PET/CT scan performed due to recurrent anemia. This showed hypermetabolic nodule in periphery of left side of prostate but no other hypermetabolism elsewhere.       INTERIM HISTORY:  Nelson reports feeling overall well and denies any fevers, chills, night sweats, unintentional weight loss, bowel or bladder dysfunction.  He feels his dyspnea on exertion is per his usual and denies any dyspnea at rest.    REVIEW OF SYSTEMS:   14 point ROS was reviewed and is negative other than as noted above in HPI.       HOME MEDICATIONS:  Current Outpatient Medications   Medication Sig Dispense Refill     ACE/ARB NOT PRESCRIBED, INTENTIONAL, ACE & ARB not prescribed due to Symptomatic hypotension not due to excessive diuresis  (Patient not taking: Reported on 3/16/2020)       ASPIRIN NOT PRESCRIBED (INTENTIONAL) Please choose reason not prescribed, below (Patient not taking: Reported on 3/16/2020)       atorvastatin (LIPITOR) 40 MG tablet Take 1 tablet (40 mg) by mouth At Bedtime 90 tablet 3     COENZYME Q-10 PO Take 100 mg by mouth every morning        ferrous sulfate (IRON) 325 (65 Fe) MG tablet Take 325 mg by mouth daily       furosemide (LASIX) 20 MG tablet Take 1.5 tablets (30 mg) by mouth daily 90 tablet 1     hydrALAZINE (APRESOLINE) 25 MG tablet Take 1 tablet (25 mg) by mouth 3 times daily 280 tablet 3     levofloxacin (LEVAQUIN) 500 MG tablet Take 1 tablet (500 mg) by mouth as needed (take 1 tablet 6 hours after each treatment and one tablet next am after each treatment) (Patient not taking: Reported on 4/29/2020) 12 tablet 0     metoprolol succinate ER (TOPROL-XL) 50 MG 24 hr tablet TAKE ONE TABLET BY MOUTH ONE TIME DAILY  90 tablet 3     multivitamin, therapeutic with minerals (MULTI-VITAMIN) TABS Take 1 tablet by mouth every morning        venlafaxine (EFFEXOR) 75 MG tablet Take 1 tablet (75 mg) by mouth 2 times daily Follow-up with Dr. Currie in summer for your fasting full examination 180 tablet 1     vitamin D2 (ERGOCALCIFEROL) 91771 units (1250 mcg) capsule TAKE ONE CAPSULE BY MOUTH ONCE A WEEK ON TUESDAY 12 capsule 1         ALLERGIES:  Allergies   Allergen Reactions     Wasps [Hornets]      Sand wasp --- years ago.           PAST MEDICAL HISTORY:  Past Medical History:   Diagnosis Date     (HFpEF) heart failure with preserved ejection fraction (H)      Acute kidney injury (H) 2012     Anemia      Anxiety      Anxiety and depression      Bladder mass      BPH (benign prostatic hypertrophy)      Cardiomyopathy (H)     ICD implanted 11/2016     Carpal tunnel syndrome     Right     Chronic kidney disease (CKD), stage 3 (moderate)      Chronic systolic CHF (congestive heart failure) (H)      Dementia (H)      Dementia without  behavioral disturbance, unspecified dementia type (H) 7/22/2016     Depressive disorder      Diabetes (H)      Diffuse large B-cell lymphoma of extranodal site (H) 11/23/2018     Former smoker      Gastric mass      Gout      History of depression      History of gastric bypass 7/22/2016     Iron deficiency anemia, unspecified iron deficiency anemia type 10/15/2018     LBBB (left bundle branch block) 2013     Lumbar herniated disc     L5-S1     Malignant neoplasm of lateral wall of urinary bladder (H) 11/15/2019    Added automatically from request for surgery [id number]     Mixed cardiomyopathy 7/22/2016     Myocardial infarction (H) 1995 and 2010     Nonischemic cardiomyopathy (H) 7/22/2016     Obesity      Pacemaker     ICD/PM     Rosacea      Rotator cuff disorder     Tear x 2     RV (right ventricular) mural thrombus 7/22/2016     SOB (shortness of breath)          PAST SURGICAL HISTORY:  Past Surgical History:   Procedure Laterality Date     ANGIOPLASTY  1995 and 2010 with stent     BIOPSY      stomach     BONE MARROW BIOPSY, BONE SPECIMEN, NEEDLE/TROCAR N/A 10/30/2018    Procedure: BIOPSY BONE MARROW;  Surgeon: Jeb Romero MD;  Location:  GI     CARDIAC SURGERY      ICD/PM     CYSTOSCOPY       CYSTOSCOPY, TRANSURETHRAL RESECTION (TUR) PROSTATE, COMBINED  4/22/2019    Procedure: CYSTOSCOPY AND BIPOLAR TRANSURETHRAL RESECTION (TUR) PROSTATE;  Surgeon: Ryan Camarillo MD;  Location:  OR     CYSTOSCOPY, TRANSURETHRAL RESECTION (TUR) TUMOR BLADDER, COMBINED N/A 9/19/2018    Procedure: COMBINED CYSTOSCOPY, TRANSURETHRAL RESECTION (TUR) TUMOR BLADDER;  CYSTOSCOPY, TRANSURETHRAL RESECTION BLADDER TUMOR ;  Surgeon: Ryan Camarillo MD;  Location:  OR     CYSTOSCOPY, TRANSURETHRAL RESECTION (TUR) TUMOR BLADDER, COMBINED N/A 11/12/2018    Procedure: CYSTOSCOPY RESTAGING TRANSURETHRAL RESECTION BLADDER TUMOR;  Surgeon: Ryan Camarillo MD;  Location:  OR     CYSTOSCOPY, TRANSURETHRAL RESECTION (TUR) TUMOR  BLADDER, COMBINED N/A 12/30/2019    Procedure: CYSTOSCOPY, WITH TRANSURETHRAL RESECTION BLADDER TUMOR;  Surgeon: Ryan Camarillo MD;  Location:  OR     EP LEAD REVISION DUAL N/A 10/24/2019    Procedure: EP Lead Revision Dual;  Surgeon: Josias Hernandez MD;  Location:  HEART CARDIAC CATH LAB     ESOPHAGOSCOPY, GASTROSCOPY, DUODENOSCOPY (EGD), COMBINED N/A 7/30/2018    Procedure: COMBINED ESOPHAGOSCOPY, GASTROSCOPY, DUODENOSCOPY (EGD), BIOPSY SINGLE OR MULTIPLE;  gastroscopy;  Surgeon: Parish Xiong MD;  Location:  GI     ESOPHAGOSCOPY, GASTROSCOPY, DUODENOSCOPY (EGD), COMBINED N/A 7/30/2018    Procedure: COMBINED ESOPHAGOSCOPY, GASTROSCOPY, DUODENOSCOPY (EGD);  EGD;  Surgeon: Parish Xiong MD;  Location:  GI     ESOPHAGOSCOPY, GASTROSCOPY, DUODENOSCOPY (EGD), COMBINED N/A 8/2/2018    Procedure: COMBINED ESOPHAGOSCOPY, GASTROSCOPY, DUODENOSCOPY (EGD), BIOPSY SINGLE OR MULTIPLE;  gastroscopy BIOPSYSHOULD BE SENT TO LAB IN NS NOT FORMALIN PER ;  Surgeon: Jonathon Bush MD;  Location:  GI     GASTRIC BYPASS  2001     HEART CATH LEFT HEART CATH  7/19/16    Non ischemic cardiomyopathy; Non functionally significant LAD and RCA disease      IR PORT REMOVAL RIGHT  9/9/2019     LASER HOLMIUM LITHOTRIPSY BLADDER N/A 4/22/2019    Procedure: CYSTOSCOPY, HOLMIUM LASER LITHOLAPAXY ,  AND BIPOLAR TRANSURETHRAL RESECTION (TUR) PROSTATE;  Surgeon: Ryan Camarillo MD;  Location:  OR     OTHER SURGICAL HISTORY  Nov 2016    CRT-D implantation         SOCIAL HISTORY:  Social History     Socioeconomic History     Marital status:      Spouse name: Not on file     Number of children: Not on file     Years of education: Not on file     Highest education level: Not on file   Occupational History     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     Comment: [employer]; retired   Social Needs     Financial resource strain: Not on file     Food insecurity     Worry: Not on file     Inability: Not on file      Transportation needs     Medical: Not on file     Non-medical: Not on file   Tobacco Use     Smoking status: Former Smoker     Packs/day: 2.00     Years: 20.00     Pack years: 40.00     Types: Cigarettes     Start date:      Last attempt to quit: 1980     Years since quittin.5     Smokeless tobacco: Never Used     Tobacco comment: Quit in his 30s - 2 ppd for 20 years   Substance and Sexual Activity     Alcohol use: Yes     Alcohol/week: 0.0 standard drinks     Comment: rarely     Drug use: No     Sexual activity: Yes     Partners: Female   Lifestyle     Physical activity     Days per week: Not on file     Minutes per session: Not on file     Stress: Not on file   Relationships     Social connections     Talks on phone: Not on file     Gets together: Not on file     Attends Sikh service: Not on file     Active member of club or organization: Not on file     Attends meetings of clubs or organizations: Not on file     Relationship status: Not on file     Intimate partner violence     Fear of current or ex partner: Not on file     Emotionally abused: Not on file     Physically abused: Not on file     Forced sexual activity: Not on file   Other Topics Concern     Parent/sibling w/ CABG, MI or angioplasty before 65F 55M? No      Service Not Asked     Blood Transfusions Not Asked     Caffeine Concern Not Asked     Occupational Exposure Not Asked     Hobby Hazards Not Asked     Sleep Concern Not Asked     Stress Concern Not Asked     Weight Concern Not Asked     Special Diet Yes     Comment: low fat, low salt      Back Care Not Asked     Exercise No     Bike Helmet Not Asked     Seat Belt Not Asked     Self-Exams Not Asked   Social History Narrative     Not on file         FAMILY HISTORY:  Family History   Problem Relation Age of Onset     Coronary Artery Disease Father 39     Alzheimer Disease Mother      Coronary Artery Disease Son         Two sons have  from MIs (ages 39 and 51)          PHYSICAL EXAM:  Vital signs:  There were no vitals taken for this visit.   Not performed as this was a telephone visit.      LABS:  CBC RESULTS:   Recent Labs   Lab Test 07/17/20  1321   WBC 7.5   RBC 4.03*   HGB 12.9*   HCT 39.1*   MCV 97   MCH 32.0   MCHC 33.0   RDW 18.2*          Last Comprehensive Metabolic Panel:  Sodium   Date Value Ref Range Status   07/17/2020 142 133 - 144 mmol/L Final     Potassium   Date Value Ref Range Status   07/17/2020 3.5 3.4 - 5.3 mmol/L Final     Chloride   Date Value Ref Range Status   07/17/2020 109 94 - 109 mmol/L Final     Carbon Dioxide   Date Value Ref Range Status   07/17/2020 27 20 - 32 mmol/L Final     Anion Gap   Date Value Ref Range Status   07/17/2020 6 3 - 14 mmol/L Final     Glucose   Date Value Ref Range Status   07/17/2020 78 70 - 99 mg/dL Final     Urea Nitrogen   Date Value Ref Range Status   07/17/2020 17 7 - 30 mg/dL Final     Creatinine   Date Value Ref Range Status   07/17/2020 1.08 0.66 - 1.25 mg/dL Final     GFR Estimate   Date Value Ref Range Status   07/17/2020 65 >60 mL/min/[1.73_m2] Final     Comment:     Non  GFR Calc  Starting 12/18/2018, serum creatinine based estimated GFR (eGFR) will be   calculated using the Chronic Kidney Disease Epidemiology Collaboration   (CKD-EPI) equation.       Calcium   Date Value Ref Range Status   07/17/2020 8.9 8.5 - 10.1 mg/dL Final     Bilirubin Total   Date Value Ref Range Status   07/17/2020 0.8 0.2 - 1.3 mg/dL Final     Alkaline Phosphatase   Date Value Ref Range Status   07/17/2020 155 (H) 40 - 150 U/L Final     ALT   Date Value Ref Range Status   07/17/2020 23 0 - 70 U/L Final     AST   Date Value Ref Range Status   07/17/2020 19 0 - 45 U/L Final     LDH: 306 -> 356 -> 280    PATHOLOGY:  12/30/2019: Bladder tumor biopsy showed urothelial carcinoma in situ and no evidence of invasion in the planes examined.  Muscularis propria present.    IMAGING:  Reviewed as per  HPI.    ASSESSMENT/PLAN:  Gibson Villalta is a 79 year old male with the following issues:  1.  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  This shows a hypermetabolic nodule in the periphery of the left side of the prostate.  I will discuss this with Dr. Camarillo, if he would recommend a prostate MRI if this findings is unrelated to his recent BCG treatments for bladder cancer.  Otherwise, there is no evidence of recurrent diffuse large B-cell lymphoma.  -I recommended to Nelson ongoing clinical surveillance for lymphoma.  I only recommended repeating a PET/CT scan if he shows any signs or symptoms suggestive of lymphoma recurrent in the future.  However, we will continue to monitor his CBC, CMP, and LDH.     2.  High-grade bladder cancer, T1  3.  Left prostate nodule  -These were seen on CT abdomen/pelvis on 7/30/2018 and further evaluated by Dr. Howard Camarillo on 9/07/2018.    -He proceeded with TURBT x 2 in 9/2018 and 11/2018 with no residual tumor or new bladder cancers.  No adjuvant chemotherapy was recommended for his T1 tumor at that time.  -However, he was found to have urothelial carcinoma in situ and a bladder tumor biopsy on 12/30/2019.  There was no clinical evidence for muscle invasive disease.   -He has since completed 6 BCG treatments on 3/23/2020, tolerating those well with no new urinary problems.  -He follows with Dr. Camarillo.  -Will have him revisit with Dr. Camarillo regarding the hypermetabolic nodule in the left side of the prostate.    4. Cardiomyopathy with systolic dysfunction  -Stable.  His dyspnea on exertion is per his usual.  Continue Lasix and follow-up with cardiology as needed.    Return in 3 months with lab draw prior to visit.    Maribel Bender MD  Hematology/Oncology  [facility]    Phone call duration: 11 minutes      Gibson  "JAYA Villalta is a 79 year old male who is being evaluated via a billable telephone visit.      The patient has been notified of following:     \"This telephone visit will be conducted via a call between you and your physician/provider. We have found that certain health care needs can be provided without the need for a physical exam.  This service lets us provide the care you need with a short phone conversation.  If a prescription is necessary we can send it directly to your pharmacy.  If lab work is needed we can place an order for that and you can then stop by our lab to have the test done at a later time.    Telephone visits are billed at different rates depending on your insurance coverage. During this emergency period, for some insurers they may be billed the same as an in-person visit.  Please reach out to your insurance provider with any questions.    If during the course of the call the physician/provider feels a telephone visit is not appropriate, you will not be charged for this service.\"    Patient has given verbal consent for Telephone visit?  Yes    What phone number would you like to be contacted at? [phone]    How would you like to obtain your AVS? MyChart    Phone call duration: 5 minutes    NO PAIN, NO REFILLS  Lana Ibarra [facility]        Again, thank you for allowing me to participate in the care of your patient.        Sincerely,        Maribel Bender MD    "

## 2020-08-13 NOTE — TELEPHONE ENCOUNTER
[facility] Call Center    Phone Message    May a detailed message be left on voicemail: yes     Reason for Call: Other: Pt would like Dr Camarillo to know that imaging called the Pt to tell them that the Pt pacemaker is incompatible with the MRI and cancelled the appt.     Action Taken: Message routed to:  [facility] urology    Travel Screening: Not Applicable

## 2020-08-20 NOTE — PROGRESS NOTES
Subjective     Gibson Villalta is a 79 year old male who presents to clinic today for the following health issues:    HPI     Patient is following up for his diabetes.  Hemoglobin A1c 6.2.    Has stage III CKD that has been stable.    Depression continues to be in remission with Effexor.      Past Medical History:   Diagnosis Date     (HFpEF) heart failure with preserved ejection fraction (H)      Acute kidney injury (H) 2012     Anemia      Anxiety      Anxiety and depression      Bladder mass      BPH (benign prostatic hypertrophy)      Cardiomyopathy (H)     ICD implanted 11/2016     Carpal tunnel syndrome     Right     Chronic kidney disease (CKD), stage 3 (moderate)      Chronic systolic CHF (congestive heart failure) (H)      Dementia (H)      Dementia without behavioral disturbance, unspecified dementia type (H) 7/22/2016     Depressive disorder      Diabetes (H)      Diffuse large B-cell lymphoma of extranodal site (H) 11/23/2018     Former smoker      Gastric mass      Gout      History of depression      History of gastric bypass 7/22/2016     Iron deficiency anemia, unspecified iron deficiency anemia type 10/15/2018     LBBB (left bundle branch block) 2013     Lumbar herniated disc     L5-S1     Malignant neoplasm of lateral wall of urinary bladder (H) 11/15/2019    Added automatically from request for surgery [id number]     Mixed cardiomyopathy 7/22/2016     Myocardial infarction (H) 1995 and 2010     Nonischemic cardiomyopathy (H) 7/22/2016     Obesity      Pacemaker     ICD/PM     Rosacea      Rotator cuff disorder     Tear x 2     RV (right ventricular) mural thrombus 7/22/2016     SOB (shortness of breath)        Review of Systems   Constitutional: Negative for diaphoresis, fatigue and unexpected weight change.   Eyes: Negative for visual disturbance.   Respiratory: Negative for shortness of breath.    Cardiovascular: Negative for chest pain, palpitations and leg swelling.   Gastrointestinal:  "Negative for abdominal pain, constipation, diarrhea, nausea and vomiting.   Endocrine: Negative for polydipsia, polyphagia and polyuria.   Musculoskeletal: Negative for myalgias.   Neurological: Negative for tremors, weakness, light-headedness, numbness and headaches.       /69 (BP Location: Right arm, Cuff Size: Adult Regular)   Pulse 75   Temp 96.8  F (36  C) (Tympanic)   Ht 1.854 m (6' 1\")   Wt 84.5 kg (186 lb 4.8 oz)   SpO2 98%   BMI 24.58 kg/m      Physical Exam  Vitals signs and nursing note reviewed.   Constitutional:       General: He is not in acute distress.  Neck:      Thyroid: No thyromegaly.   Cardiovascular:      Rate and Rhythm: Normal rate and regular rhythm.      Heart sounds: Normal heart sounds.   Pulmonary:      Effort: Pulmonary effort is normal. No respiratory distress.      Breath sounds: Normal breath sounds.   Abdominal:      Palpations: Abdomen is soft.      Tenderness: There is no abdominal tenderness.   Musculoskeletal:         General: No edema.   Neurological:      Mental Status: He is alert and oriented to person, place, and time.      Coordination: Coordination normal.      Comments: No tremors   Psychiatric:         Mood and Affect: Mood and affect normal.           ICD-10-CM    1. Controlled type 2 diabetes mellitus with chronic kidney disease, without long-term current use of insulin, unspecified CKD stage (H)  E11.22 Comprehensive metabolic panel     Hemoglobin A1c     Lipid panel reflex to direct LDL Fasting     Albumin Random Urine Quantitative with Creat Ratio   2. CKD (chronic kidney disease) stage 3, GFR 30-59 ml/min (H)  N18.3 Comprehensive metabolic panel   3. Recurrent major depressive disorder, in full remission (H)  F33.42 TSH with free T4 reflex   4. Dementia without behavioral disturbance, unspecified dementia type (H)  F03.90 TSH with free T4 reflex   5. Iron deficiency anemia, unspecified iron deficiency anemia type  D50.9 CBC with platelets differential "     Iron and iron binding capacity     Ferritin   6. Vitamin D deficiency  E55.9 Vitamin D Deficiency       Patient Instructions   Monitor your blood pressure once a week.  Call doctor if:  -- your blood pressure for the top/upper number is greater than 140 or less than 90  -- your blood pressure for the bottom/lower number is greater than 90 or less than 60    Call doctor if your depression worsens or if you develop new symptoms.    Follow up yearly or as needed.

## 2020-08-20 NOTE — PATIENT INSTRUCTIONS
Monitor your blood pressure once a week.  Call doctor if:  -- your blood pressure for the top/upper number is greater than 140 or less than 90  -- your blood pressure for the bottom/lower number is greater than 90 or less than 60    Call doctor if your depression worsens or if you develop new symptoms.    Follow up yearly or as needed.

## 2020-08-31 NOTE — LETTER
"8/31/2020    Akira Currie MD  [address] [facility] [address]  [facility] [address]    RE: [place]       Dear Colleague,    I had the pleasure of seeing Gibson Villalta in the [facility].    Gibson Villalta is a 79 year old male who is being evaluated via a billable telephone visit.      The patient has been notified of following:     \"This telephone visit will be conducted via a call between you and your physician/provider. We have found that certain health care needs can be provided without the need for a physical exam.  This service lets us provide the care you need with a short phone conversation.  If a prescription is necessary we can send it directly to your pharmacy.  If lab work is needed we can place an order for that and you can then stop by our lab to have the test done at a later time.    Telephone visits are billed at different rates depending on your insurance coverage. During this emergency period, for some insurers they may be billed the same as an in-person visit.  Please reach out to your insurance provider with any questions.    If during the course of the call the physician/provider feels a telephone visit is not appropriate, you will not be charged for this service.\"    Patient has given verbal consent for Telephone visit?  Yes    What phone number would you like to be contacted at?   [phone]  How would you like to obtain your AVS? MyChart  Patient unable to assess vitals at this time.   Review Of Systems  Skin: NEGATIVE  Eyes:Ears/Nose/Throat: NEGATIVE  Respiratory: NEGATIVE  Cardiovascular:NEGATIVE  Gastrointestinal: NEGATIVE  Genitourinary:NEGATIVE   Musculoskeletal: NEGATIVE  Neurologic: NEGATIVE  Psychiatric: NEGATIVE  Hematologic/Lymphatic/Immunologic: NEGATIVE  Endocrine:  NEGATIVE    HPI and Plan:   I had the pleasure of seeing Gibson Villalta today at [facility] [place] for evaluation of cardiomyopathy. He " is a pleasant 79 year old patient of Dr. Heard.      Mr. Villalta has a past medical history significant for large B-cell lymphoma with stage II with transurethral resection of bladder tumor, ischemic cardiomyopathy  with LVEF 25-30% with (dating back at least until 2016) status post biventricular ICD, anemia, chronic kidney disease, RV thrombus  (noted on cardiac MRI in 2016), LV thrombus (seen on echocardiogram from 5/2017), Coumadin which was subsequently discontinued for GI bleed, dementia with short-term memory loss and left bundle branch block.     Patient has been doing well over the last several months.  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  He completed chemotherapy in the spring 2019.  He underwent a TURP procedure in February 2020 and is closely followed by Dr. Bender.     His most recent echocardiogram in April 2020 showed a worsening LV function estimated at less than 20%, posterior leaflet of the mitral valve appeared tethered and moderate mitral regurgitation was noted.     The patient was seen by Dr. Heard following the visit.  No changes were made at that time.     Today presents for virtual cardiology visit to follow-up on his ischemic cardiomyopathy.  He is doing well with out complaints of shortness of breath, orthopnea, PND or peripheral edema.  His weight is stable at 184 pounds.  Blood pressure today is 103/68.  He denies lightheadedness and dizziness.     ROS:  12-pt ROS is negative except for as noted above.     No Physical Exam due to Telephone Visit     Assessment and Plan  1.  Ischemic cardiomyopathy with an ejection fraction less than 20%. Biv ICD.  The patient is doing well without complaints of shortness of breath or fluid retention.    His weight is stable at 184 pounds. He is currently on Lasix 30 mg daily.  I have asked him to continue this.  He continues to take metoprolol 50 mg daily and hydralazine 25 mg 3  times daily.    I have asked him to increase his metoprolol to 75 mg daily.  If he develops symptoms of lightheadedness or dizziness with this change, he asked him to call the clinic.  I have encouraged him to continue daily weights and a 2 g sodium diet.  Follow-up with Dr. Heard in 3 to 4 months.    2.  Stage II diffuse large B-cell lymphoma and history of bladder cancer.  He is followed closely by Dr. Bender.      3.  History of RV and LV thrombus, resolved.  He was previously on warfarin however this was discontinued due to a GI bleed.  There is been no documented recurrence.     Thank you for allowing me to care for Gibson Villalta today.     Phone duration: 15 minutes      This visit is being conducted as a virtual visit due to the emphasis on mitigation of the COVID-19 virus pandemic. The clinician has decided that the risk of an in-office visit outweighs the benefit for this patient.     Voice recognition software was used for this note, I have reviewed this note, but errors may have been missed.       Thank you for allowing me to participate in the care of your patient.    Sincerely,     ALLEN Sim [facility] [place]

## 2020-08-31 NOTE — PROGRESS NOTES
"Gibson Villalta is a 79 year old male who is being evaluated via a billable telephone visit.      The patient has been notified of following:     \"This telephone visit will be conducted via a call between you and your physician/provider. We have found that certain health care needs can be provided without the need for a physical exam.  This service lets us provide the care you need with a short phone conversation.  If a prescription is necessary we can send it directly to your pharmacy.  If lab work is needed we can place an order for that and you can then stop by our lab to have the test done at a later time.    Telephone visits are billed at different rates depending on your insurance coverage. During this emergency period, for some insurers they may be billed the same as an in-person visit.  Please reach out to your insurance provider with any questions.    If during the course of the call the physician/provider feels a telephone visit is not appropriate, you will not be charged for this service.\"    Patient has given verbal consent for Telephone visit?  Yes    What phone number would you like to be contacted at?   [phone]  How would you like to obtain your AVS? Medard[facility]art  Patient unable to assess vitals at this time.   Review Of Systems  Skin: NEGATIVE  Eyes:Ears/Nose/Throat: NEGATIVE  Respiratory: NEGATIVE  Cardiovascular:NEGATIVE  Gastrointestinal: NEGATIVE  Genitourinary:NEGATIVE   Musculoskeletal: NEGATIVE  Neurologic: NEGATIVE  Psychiatric: NEGATIVE  Hematologic/Lymphatic/Immunologic: NEGATIVE  Endocrine:  NEGATIVE    HPI and Plan:   I had the pleasure of seeing Gibson Villalta today at [facility] for evaluation of cardiomyopathy. He is a pleasant 79 year old patient of Dr. Heard.      Mr. Villalta has a past medical history significant for large B-cell lymphoma with stage II with transurethral resection of bladder tumor, ischemic cardiomyopathy  with LVEF 25-30% " with (dating back at least until 2016) status post biventricular ICD, anemia, chronic kidney disease, RV thrombus  (noted on cardiac MRI in 2016), LV thrombus (seen on echocardiogram from 5/2017), Coumadin which was subsequently discontinued for GI bleed, dementia with short-term memory loss and left bundle branch block.     Patient has been doing well over the last several months.  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  He completed chemotherapy in the spring 2019.  He underwent a TURP procedure in February 2020 and is closely followed by Dr. Bender.     His most recent echocardiogram in April 2020 showed a worsening LV function estimated at less than 20%, posterior leaflet of the mitral valve appeared tethered and moderate mitral regurgitation was noted.     The patient was seen by Dr. Heard following the visit.  No changes were made at that time.     Today presents for virtual cardiology visit to follow-up on his ischemic cardiomyopathy.  He is doing well with out complaints of shortness of breath, orthopnea, PND or peripheral edema.  His weight is stable at 184 pounds.  Blood pressure today is 103/68.  He denies lightheadedness and dizziness.     ROS:  12-pt ROS is negative except for as noted above.     No Physical Exam due to Telephone Visit     Assessment and Plan  1.  Ischemic cardiomyopathy with an ejection fraction less than 20%. Biv ICD.  The patient is doing well without complaints of shortness of breath or fluid retention.    His weight is stable at 184 pounds. He is currently on Lasix 30 mg daily.  I have asked him to continue this.  He continues to take metoprolol 50 mg daily and hydralazine 25 mg 3 times daily.    I have asked him to increase his metoprolol to 75 mg daily.  If he develops symptoms of lightheadedness or dizziness with this change, he asked him to call the clinic.  I have encouraged him to continue daily weights  and a 2 g sodium diet.  Follow-up with Dr. Heard in 3 to 4 months.    2.  Stage II diffuse large B-cell lymphoma and history of bladder cancer.  He is followed closely by Dr. Bender.      3.  History of RV and LV thrombus, resolved.  He was previously on warfarin however this was discontinued due to a GI bleed.  There is been no documented recurrence.     Thank you for allowing me to care for Gibson Villalta today.     Phone duration: 15 minutes      This visit is being conducted as a virtual visit due to the emphasis on mitigation of the COVID-19 virus pandemic. The clinician has decided that the risk of an in-office visit outweighs the benefit for this patient.      ALLEN Sim, CNP  Cardiology     Voice recognition software was used for this note, I have reviewed this note, but errors may have been missed.

## 2020-09-02 NOTE — PATIENT INSTRUCTIONS
"AFTER YOUR CYSTOSCOPY  ?  ?  You have just completed a cystoscopy, or \"cysto\", which allowed your physician to learn more about your bladder (or to remove a stent placed after surgery). We suggest that you continue to avoid caffeine, fruit juice, and alcohol for the next 24 hours, however, you are encouraged to return to your normal activities.  ?  ?  A few things that are considered normal after your cystoscopy:  ?  * small amount of bleeding (or spotting) that clears within the next 24 hours  ?  * slight burning sensation with urination  ?  * sensation of needing to void (urinate) more frequently  ?  * the feeling of \"air\" in your urine  ?  * mild discomfort that is relieved with Tylenol    * bladder spasms  ?  ?  ?  Please contact our office promptly if you:  ?  * develop a fever above 101 degrees  ?  * are unable to urinate  ?  * develop bright red blood that does not stop  ?  * experience severe pain or swelling  ?  ?  ?  And of course, please contact our office with any concerns or questions [phone]  ?    AFTER YOUR CYSTOSCOPY        You have just completed a cystoscopy, or \"cysto\", which allowed your physician to learn more about your bladder (or to remove a stent placed after surgery). We suggest that you continue to avoid caffeine, fruit juice, and alcohol for the next 24 hours, however, you are encouraged to return to your normal activities.         A few things that are considered normal after your cystoscopy:     * Small amount of bleeding (or spotting) that clears within the next 24 hours     * Slight burning sensation with urination     * Sensation to of needing to avoid more frequently     * The feeling of \"air\" in your urine     * Mild discomfort that is relieved with Tylenol        Please contact our office promptly if you:     * Develop a fever above 101 degrees     * Are unable to urinate     * Develop bright red blood that does not stop     * Severe pain or swelling         Please contact " our office with any concerns or questions @[facility].

## 2020-09-02 NOTE — PROGRESS NOTES
[facility]  CHIEF COMPLAINT   It was my pleasure to see Gibson Villalta who is a 79 year old male for follow-up of bladder cancer.      HPI  Gibson Villalta is a very pleasant 79 year old male who presents with a history of mild dementia, RV and LV mural thrombus on chronic anticoagulation with Coumadin, status post Smita-en-Y gastric bypass, hypertension, CKD, CAD, cardiomyopathy with EF of 20 to 30%, diabetes mellitus.  He also has history of diffuse large B-cell lymphoma who presented with melena and soft tissue thickening of his stomach in July 2018.     9/19/18: Transurethral resection of bladder tumor (TURBT) with T1HG bladder cancer  10/19/18: Repeat upper endoscopy with biopsies confirmed diffuse large B-cell lymphoma  10/30/18: Bone marrow biopsy negative for lymphoma involvement  10/31/18: PET scan showed extensive hypermetabolic thickening of the stomach and several loops of small bowel in the left upper quadrant and right lower quadrant consistent with the diagnosis of lymphoma  11/12/18: Re-staging Transurethral resection of bladder tumor (TURBT) with confirmation of his T1 HG bladder cancer.  Following discussion with his oncologist, Dr. Bender at [facility], we discussed that treatment for his bladder cancer with BCG treatment would not be warranted at this time given his upcoming chemotherapy treatment for his lymphoma and that there would be little benefit of treatment given that this requires an intact immune response.  We will plan for management with surveillance cystoscopy  11/26/18: Started first-line therapy with many R-CHOP chemotherapy.  He follows with Dr. Bender at [facility]  1/28/19: After 3 cycles of mini R-CHOP, PET scan showed marked improvement consistent with a good response to treatment  2/19/19: Cycle 5 and 6 of chemotherapy, doxorubicin held due to worsening shortness of breath and worsened dilation of his left ventricle.  Completion of cycle 6 on March 12, 2019 4/1/19:  Follow-up with me for surveillance cystoscopy with evidence of a bladder stone and significant prostatic hypertrophy  4/4/19: PET scan showed no residual lymphoma.  Calcification seen in the bladder.  4/22/19: Cystlolithalopaxy and Transurethral resection of the prostate with no evidence of malignancy  10/30/19: last surveillance cystoscopy with some raised erythema at the prior resection site. Cytology returned as positive for malignant cells  12/30/19: Re-staging Transurethral resection of bladder tumor (TURBT) with evidence of CIS    1/29/20: He returns to discuss starting BCG    TODAY: surveillance cystoscopy    Past Medical History:   Diagnosis Date     (HFpEF) heart failure with preserved ejection fraction (H)      Acute kidney injury (H) 2012     Anemia      Anxiety      Anxiety and depression      Bladder mass      BPH (benign prostatic hypertrophy)      Cardiomyopathy (H)     ICD implanted 11/2016     Carpal tunnel syndrome     Right     Chronic kidney disease (CKD), stage 3 (moderate)      Chronic systolic CHF (congestive heart failure) (H)      Dementia (H)      Dementia without behavioral disturbance, unspecified dementia type (H) 7/22/2016     Depressive disorder      Diabetes (H)      Diffuse large B-cell lymphoma of extranodal site (H) 11/23/2018     Former smoker      Gastric mass      Gout      History of depression      History of gastric bypass 7/22/2016     Iron deficiency anemia, unspecified iron deficiency anemia type 10/15/2018     LBBB (left bundle branch block) 2013     Lumbar herniated disc     L5-S1     Malignant neoplasm of lateral wall of urinary bladder (H) 11/15/2019    Added automatically from request for surgery [id number]     Mixed cardiomyopathy 7/22/2016     Myocardial infarction (H) 1995 and 2010     Nonischemic cardiomyopathy (H) 7/22/2016     Obesity      Pacemaker     ICD/PM     Rosacea      Rotator cuff disorder     Tear x 2     RV (right ventricular) mural thrombus 7/22/2016      SOB (shortness of breath)      Past Surgical History:   Procedure Laterality Date     ANGIOPLASTY  1995 and 2010 with stent     BIOPSY      stomach     BONE MARROW BIOPSY, BONE SPECIMEN, NEEDLE/TROCAR N/A 10/30/2018    Procedure: BIOPSY BONE MARROW;  Surgeon: Jeb Romero MD;  Location:  GI     CARDIAC SURGERY      ICD/PM     CYSTOSCOPY       CYSTOSCOPY, TRANSURETHRAL RESECTION (TUR) PROSTATE, COMBINED  4/22/2019    Procedure: CYSTOSCOPY AND BIPOLAR TRANSURETHRAL RESECTION (TUR) PROSTATE;  Surgeon: Ryan Camarillo MD;  Location:  OR     CYSTOSCOPY, TRANSURETHRAL RESECTION (TUR) TUMOR BLADDER, COMBINED N/A 9/19/2018    Procedure: COMBINED CYSTOSCOPY, TRANSURETHRAL RESECTION (TUR) TUMOR BLADDER;  CYSTOSCOPY, TRANSURETHRAL RESECTION BLADDER TUMOR ;  Surgeon: Ryan Camarillo MD;  Location:  OR     CYSTOSCOPY, TRANSURETHRAL RESECTION (TUR) TUMOR BLADDER, COMBINED N/A 11/12/2018    Procedure: CYSTOSCOPY RESTAGING TRANSURETHRAL RESECTION BLADDER TUMOR;  Surgeon: Ryan Camarillo MD;  Location:  OR     CYSTOSCOPY, TRANSURETHRAL RESECTION (TUR) TUMOR BLADDER, COMBINED N/A 12/30/2019    Procedure: CYSTOSCOPY, WITH TRANSURETHRAL RESECTION BLADDER TUMOR;  Surgeon: Ryan Camarillo MD;  Location:  OR     EP LEAD REVISION DUAL N/A 10/24/2019    Procedure: EP Lead Revision Dual;  Surgeon: Josias Hernandez MD;  Location:  HEART CARDIAC CATH LAB     ESOPHAGOSCOPY, GASTROSCOPY, DUODENOSCOPY (EGD), COMBINED N/A 7/30/2018    Procedure: COMBINED ESOPHAGOSCOPY, GASTROSCOPY, DUODENOSCOPY (EGD), BIOPSY SINGLE OR MULTIPLE;  gastroscopy;  Surgeon: Parish Xiong MD;  Location:  GI     ESOPHAGOSCOPY, GASTROSCOPY, DUODENOSCOPY (EGD), COMBINED N/A 7/30/2018    Procedure: COMBINED ESOPHAGOSCOPY, GASTROSCOPY, DUODENOSCOPY (EGD);  EGD;  Surgeon: Parish Xiong MD;  Location:  GI     ESOPHAGOSCOPY, GASTROSCOPY, DUODENOSCOPY (EGD), COMBINED N/A 8/2/2018    Procedure: COMBINED ESOPHAGOSCOPY, GASTROSCOPY,  "DUODENOSCOPY (EGD), BIOPSY SINGLE OR MULTIPLE;  gastroscopy BIOPSYSHOULD BE SENT TO LAB IN NS NOT FORMALIN PER ;  Surgeon: Jonathon Bush MD;  Location:  GI     GASTRIC BYPASS  2001     HEART CATH LEFT HEART CATH  7/19/16    Non ischemic cardiomyopathy; Non functionally significant LAD and RCA disease      IR PORT REMOVAL RIGHT  9/9/2019     LASER HOLMIUM LITHOTRIPSY BLADDER N/A 4/22/2019    Procedure: CYSTOSCOPY, HOLMIUM LASER LITHOLAPAXY ,  AND BIPOLAR TRANSURETHRAL RESECTION (TUR) PROSTATE;  Surgeon: Ryan Camarillo MD;  Location:  OR     OTHER SURGICAL HISTORY  Nov 2016    CRT-D implantation     Current Outpatient Medications   Medication     atorvastatin (LIPITOR) 40 MG tablet     COENZYME Q-10 PO     ferrous sulfate (IRON) 325 (65 Fe) MG tablet     furosemide (LASIX) 20 MG tablet     hydrALAZINE (APRESOLINE) 25 MG tablet     multivitamin, therapeutic with minerals (MULTI-VITAMIN) TABS     venlafaxine (EFFEXOR) 75 MG tablet     vitamin D2 (ERGOCALCIFEROL) 66975 units (1250 mcg) capsule     ACE/ARB NOT PRESCRIBED, INTENTIONAL,     ASPIRIN NOT PRESCRIBED (INTENTIONAL)     levofloxacin (LEVAQUIN) 500 MG tablet     No current facility-administered medications for this visit.         PHYSICAL EXAM  Patient is a 79 year old  male   Vitals: Blood pressure 112/78, pulse 78, height 1.854 m (6' 1\"), weight 84.4 kg (186 lb), SpO2 97 %.  General Appearance Adult: Body mass index is 24.54 kg/m .  Alert, no acute distress, oriented, mild dementia   HENT: throat/mouth:normal, good dentition  Lungs: no respiratory distress, or pursed lip breathing  Heart: No obvious jugular venous distension present  Abdomen: soft, nontender, no organomegaly or masses  Musculoskeltal: extremities normal, no peripheral edema  Skin: no suspicious lesions or rashes  Neuro: Alert, oriented, speech and mentation normal  Psych: affect and mood normal  Gait: Normal    UA RESULTS:  Recent Labs   Lab Test 09/02/20  1441  " 10/15/18  2031   COLOR Yellow   < > Yellow   APPEARANCE Clear   < > Slightly Cloudy   URINEGLC Negative   < > Negative   URINEBILI Negative   < > Negative   URINEKETONE Negative   < > Negative   SG 1.025   < > 1.019   UBLD Negative   < > Trace*   URINEPH 5.0   < > 5.0   PROTEIN Negative   < > 30*   UROBILINOGEN 0.2   < >  --    NITRITE Negative   < > Negative   LEUKEST Negative   < > Large*   RBCU  --   --  22*   WBCU  --   --  120*    < > = values in this interval not displayed.      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   - No evidence of invasion in the planes examined.   - Muscularis propria present.     CYSTOSCOPY PROCEDURE NOTE:    Gibson Villalta is a 79 year old male     Pt ID verified with patient: Yes     Procedure verified with patient: Yes     Procedure confirmed with physician and support staff: Yes     Consent form confirmed with physician and support staff.    Sign In  History and Physical Exam reviewed.  Informed Consent Discussed: Yes   Sign in Communication: Yes   Time Out:  Team Confirms the Correct Patient, Correct Procedure; Yes , Correct Site and Site Marking, Correct Position (if applicable).    Affirmation of Time Out: Yes   Sign Out:  Sign Out Discussion: Yes   Physician: Ryan Camarillo MD    A urinalysis was performed revealing no evidence of infection.    The benefits, risks, alternatives of the cystoscopy procedure and personnel were discussed with the patient. The verbal consent was obtained and the patient agrees to proceed.      Description of procedure:   After fully informed, voluntary consent was obtained, the patient was brought into the procedure room, identified and placed in a supine position on the cystoscopy table.  The groin/scrotum were prepped with betadine and draped in a sterile fashion.  Urojet lidocaine gel was introduced.  A 15F flexible cystoscope was inserted into the  urethra, and the bladder and urethra wereexamined in a systematic manner.  The patient tolerated the procedure well and there were no complications.      Cystoscopic findings:  The urethra was normal without strictures.  The prostate was 3-4cm long and demonstrated evidence of prior Transurethral resection of the prostate.  There was no median lobe.  The external sphincter coapted normally and the bladder neck was open following the Transurethral resection of the prostate. The bladder was  entered and careful pan endoscopy was carried out. The posterior, superior and lateral walls and dome of the bladder were all well visualized and the scope was retroflexed upon itself..  There was moderate/severe trabeculation.  There were no neoplasms, stones, or diverticula identifed.  The ureteric orifices were normal in position and number and effluxing clear urine. There was no evidence of recurrence at the previous sites of resection.     Optimal treatment plan:  Date of Transurethral resection of bladder tumor (TURBT): 9/19/18  Grade/Stage: HG T1  Date of Re-staging Transurethral resection of bladder tumor (TURBT): 11/12/18  Residual disease: Yes  Grade/Stage: HG T1  Date of Re-staging Transurethral resection of bladder tumor (TURBT): 12/30/19  Residual disease: Yes  Grade/Stage: CIS    Induction (month 0) - BCG not warranted given treatment for his lymphoma. This treatment is now complete - 2/3/2020     Cystoscopy (schedule one week prior to treatment when cysto and treatment coincide)  Month 3 - 4/1/19 with no evidence of disease, however evidence of bladder stone and BPH  Month 6 - 7/24/19 with no evidence of disease and well healed after TURP on 4/22/19  Month 9 - 10/30/19 with no clear evidence of disease, some raised erythema, cytology pending     After recurrence  Month 3 - Missed   Month 6 - Missed  Month 9 - no evidence of disease, cytology pending  Month 12  Month 15  Month 18  Month 21  Month 24  Month 27  Month  30  Month 36  Year 4  Year 5  Year 6  Year 8  Year 10    ASSESSMENT and PLAN  Gibson Villalta is a 79 year old male with a history of HG T1 bladder cancer and s/p TURP. He was not a candidate for BCG initially due to treatment for lymphoma, however this is now complete. Evidence of CIS at Transurethral resection of bladder tumor (TURBT) on 12/30/2019. Now s/p induction BCG.     Bladder cancer  -He is status post his induction BCG with no evidence of recurrence on surveillance cystoscopy today  -Cytology pending  -We discussed the option for maintenance BCG, however given his advanced age and high risk group for COVID-19, we discussed the risks and benefits of continued further exposure to the healthcare system versus the small advantage of recurrence reduction with maintenance BCG.  Via shared decision making with him and his wife we elected to forego maintenance BCG and will plan for follow-up cystoscopy in 3 months    Abnormal prostate on PET  -He has an abnormal lesion in his prostate noted on a recent PET scan.  Plan had been for prostate MRI, however his ICD is not MRI compatible  -We discussed the very low likelihood of him developing a clinically significant prostate cancer given his last PSA in 2015 was normal and that his pathology from his TURP did not have any evidence of malignancy.  -No further work-up at this time      I spent over 15 minutes with the patient.  Over half this time was spent on counseling regarding the above.    Ryan Camarillo MD   Urology  [facility]  Clinic Phone [phone]

## 2020-09-02 NOTE — Clinical Note
Hi Dr. Currie and Dr. Bender,    I saw Nelson in his wife back today for surveillance cystoscopy.  Thankfully there was no evidence of recurrent bladder cancer.  He completed his induction course of BCG and we discussed the risks and benefits of further maintenance BCG.  Given the COVID-19 pandemic, and his high risk for this given his age and other comorbidities, we elected to forego maintenance BCG.  I will plan to see him back in 3 months for surveillance cystoscopy.    Regarding his abnormal PET finding of the prostate, he is ineligible for a MRI of the prostate given his ICD.  We discussed that the likelihood of him developing a clinically significant prostate cancer at his age, given his last normal PSA in 2015, and I do not recommend further evaluation of this at this time.    Thanks,   Ryan Camarillo M.D.  Cell: [phone]

## 2020-09-02 NOTE — LETTER
9/2/2020       RE: Gibson Villalta  [address]  [place] [address]     Dear Colleague,    Thank you for referring your patient, Gibson Villalta, to the [facility] [place] at [facility]. Please see a copy of my visit note below.    [place]  CHIEF COMPLAINT   It was my pleasure to see Gibson Villalta who is a 79 year old male for follow-up of bladder cancer.      HPI  Gibson Villalta is a very pleasant 79 year old male who presents with a history of mild dementia, RV and LV mural thrombus on chronic anticoagulation with Coumadin, status post Smita-en-Y gastric bypass, hypertension, CKD, CAD, cardiomyopathy with EF of 20 to 30%, diabetes mellitus.  He also has history of diffuse large B-cell lymphoma who presented with melena and soft tissue thickening of his stomach in July 2018.     9/19/18: Transurethral resection of bladder tumor (TURBT) with T1HG bladder cancer  10/19/18: Repeat upper endoscopy with biopsies confirmed diffuse large B-cell lymphoma  10/30/18: Bone marrow biopsy negative for lymphoma involvement  10/31/18: PET scan showed extensive hypermetabolic thickening of the stomach and several loops of small bowel in the left upper quadrant and right lower quadrant consistent with the diagnosis of lymphoma  11/12/18: Re-staging Transurethral resection of bladder tumor (TURBT) with confirmation of his T1 HG bladder cancer.  Following discussion with his oncologist, Dr. Bender at [facility], we discussed that treatment for his bladder cancer with BCG treatment would not be warranted at this time given his upcoming chemotherapy treatment for his lymphoma and that there would be little benefit of treatment given that this requires an intact immune response.  We will plan for management with surveillance cystoscopy  11/26/18: Started first-line therapy with many R-CHOP chemotherapy.  He follows with  Dr. Bender at [facility]  1/28/19: After 3 cycles of mini R-CHOP, PET scan showed marked improvement consistent with a good response to treatment  2/19/19: Cycle 5 and 6 of chemotherapy, doxorubicin held due to worsening shortness of breath and worsened dilation of his left ventricle.  Completion of cycle 6 on March 12, 2019 4/1/19: Follow-up with me for surveillance cystoscopy with evidence of a bladder stone and significant prostatic hypertrophy  4/4/19: PET scan showed no residual lymphoma.  Calcification seen in the bladder.  4/22/19: Cystlolithalopaxy and Transurethral resection of the prostate with no evidence of malignancy  10/30/19: last surveillance cystoscopy with some raised erythema at the prior resection site. Cytology returned as positive for malignant cells  12/30/19: Re-staging Transurethral resection of bladder tumor (TURBT) with evidence of CIS    1/29/20: He returns to discuss starting BCG    TODAY: surveillance cystoscopy    Past Medical History:   Diagnosis Date     (HFpEF) heart failure with preserved ejection fraction (H)      Acute kidney injury (H) 2012     Anemia      Anxiety      Anxiety and depression      Bladder mass      BPH (benign prostatic hypertrophy)      Cardiomyopathy (H)     ICD implanted 11/2016     Carpal tunnel syndrome     Right     Chronic kidney disease (CKD), stage 3 (moderate)      Chronic systolic CHF (congestive heart failure) (H)      Dementia (H)      Dementia without behavioral disturbance, unspecified dementia type (H) 7/22/2016     Depressive disorder      Diabetes (H)      Diffuse large B-cell lymphoma of extranodal site (H) 11/23/2018     Former smoker      Gastric mass      Gout      History of depression      History of gastric bypass 7/22/2016     Iron deficiency anemia, unspecified iron deficiency anemia type 10/15/2018     LBBB (left bundle branch block) 2013     Lumbar herniated disc     L5-S1     Malignant neoplasm of lateral wall of urinary bladder (H)  11/15/2019    Added automatically from request for surgery [id number]     Mixed cardiomyopathy 7/22/2016     Myocardial infarction (H) 1995 and 2010     Nonischemic cardiomyopathy (H) 7/22/2016     Obesity      Pacemaker     ICD/PM     Rosacea      Rotator cuff disorder     Tear x 2     RV (right ventricular) mural thrombus 7/22/2016     SOB (shortness of breath)      Past Surgical History:   Procedure Laterality Date     ANGIOPLASTY  1995 and 2010 with stent     BIOPSY      stomach     BONE MARROW BIOPSY, BONE SPECIMEN, NEEDLE/TROCAR N/A 10/30/2018    Procedure: BIOPSY BONE MARROW;  Surgeon: Jeb Romero MD;  Location:  GI     CARDIAC SURGERY      ICD/PM     CYSTOSCOPY       CYSTOSCOPY, TRANSURETHRAL RESECTION (TUR) PROSTATE, COMBINED  4/22/2019    Procedure: CYSTOSCOPY AND BIPOLAR TRANSURETHRAL RESECTION (TUR) PROSTATE;  Surgeon: Ryan Camarillo MD;  Location:  OR     CYSTOSCOPY, TRANSURETHRAL RESECTION (TUR) TUMOR BLADDER, COMBINED N/A 9/19/2018    Procedure: COMBINED CYSTOSCOPY, TRANSURETHRAL RESECTION (TUR) TUMOR BLADDER;  CYSTOSCOPY, TRANSURETHRAL RESECTION BLADDER TUMOR ;  Surgeon: Ryan Camarillo MD;  Location: [facility]     CYSTOSCOPY, TRANSURETHRAL RESECTION (TUR) TUMOR BLADDER, COMBINED N/A 11/12/2018    Procedure: CYSTOSCOPY RESTAGING TRANSURETHRAL RESECTION BLADDER TUMOR;  Surgeon: Ryan Camarillo MD;  Location:  OR     CYSTOSCOPY, TRANSURETHRAL RESECTION (TUR) TUMOR BLADDER, COMBINED N/A 12/30/2019    Procedure: CYSTOSCOPY, WITH TRANSURETHRAL RESECTION BLADDER TUMOR;  Surgeon: Ryan Camarillo MD;  Location:  OR     EP LEAD REVISION DUAL N/A 10/24/2019    Procedure: EP Lead Revision Dual;  Surgeon: Josias Hernandez MD;  Location:  HEART CARDIAC CATH LAB     ESOPHAGOSCOPY, GASTROSCOPY, DUODENOSCOPY (EGD), COMBINED N/A 7/30/2018    Procedure: COMBINED ESOPHAGOSCOPY, GASTROSCOPY, DUODENOSCOPY (EGD), BIOPSY SINGLE OR MULTIPLE;  gastroscopy;  Surgeon: Parish Xiong MD;  Location:  GI  "    ESOPHAGOSCOPY, GASTROSCOPY, DUODENOSCOPY (EGD), COMBINED N/A 7/30/2018    Procedure: COMBINED ESOPHAGOSCOPY, GASTROSCOPY, DUODENOSCOPY (EGD);  EGD;  Surgeon: Parish Xiong MD;  Location:  GI     ESOPHAGOSCOPY, GASTROSCOPY, DUODENOSCOPY (EGD), COMBINED N/A 8/2/2018    Procedure: COMBINED ESOPHAGOSCOPY, GASTROSCOPY, DUODENOSCOPY (EGD), BIOPSY SINGLE OR MULTIPLE;  gastroscopy BIOPSYSHOULD BE SENT TO LAB IN NS NOT FORMALIN PER ;  Surgeon: Jonathon Bush MD;  Location:  GI     GASTRIC BYPASS  2001     HEART CATH LEFT HEART CATH  7/19/16    Non ischemic cardiomyopathy; Non functionally significant LAD and RCA disease      IR PORT REMOVAL RIGHT  9/9/2019     LASER HOLMIUM LITHOTRIPSY BLADDER N/A 4/22/2019    Procedure: CYSTOSCOPY, HOLMIUM LASER LITHOLAPAXY ,  AND BIPOLAR TRANSURETHRAL RESECTION (TUR) PROSTATE;  Surgeon: Ryan Camarillo MD;  Location:  OR     OTHER SURGICAL HISTORY  Nov 2016    CRT-D implantation     Current Outpatient Medications   Medication     atorvastatin (LIPITOR) 40 MG tablet     COENZYME Q-10 PO     ferrous sulfate (IRON) 325 (65 Fe) MG tablet     furosemide (LASIX) 20 MG tablet     hydrALAZINE (APRESOLINE) 25 MG tablet     multivitamin, therapeutic with minerals (MULTI-VITAMIN) TABS     venlafaxine (EFFEXOR) 75 MG tablet     vitamin D2 (ERGOCALCIFEROL) 24327 units (1250 mcg) capsule     ACE/ARB NOT PRESCRIBED, INTENTIONAL,     ASPIRIN NOT PRESCRIBED (INTENTIONAL)     levofloxacin (LEVAQUIN) 500 MG tablet     No current facility-administered medications for this visit.         PHYSICAL EXAM  Patient is a 79 year old  male   Vitals: Blood pressure 112/78, pulse 78, height 1.854 m (6' 1\"), weight 84.4 kg (186 lb), SpO2 97 %.  General Appearance Adult: Body mass index is 24.54 kg/m .  Alert, no acute distress, oriented, mild dementia   HENT: throat/mouth:normal, good dentition  Lungs: no respiratory distress, or pursed lip breathing  Heart: No obvious jugular venous distension " present  Abdomen: soft, nontender, no organomegaly or masses  Musculoskeltal: extremities normal, no peripheral edema  Skin: no suspicious lesions or rashes  Neuro: Alert, oriented, speech and mentation normal  Psych: affect and mood normal  Gait: Normal    UA RESULTS:  Recent Labs   Lab Test 09/02/20  1441  10/15/18  2031   COLOR Yellow   < > Yellow   APPEARANCE Clear   < > Slightly Cloudy   URINEGLC Negative   < > Negative   URINEBILI Negative   < > Negative   URINEKETONE Negative   < > Negative   SG 1.025   < > 1.019   UBLD Negative   < > Trace*   URINEPH 5.0   < > 5.0   PROTEIN Negative   < > 30*   UROBILINOGEN 0.2   < >  --    NITRITE Negative   < > Negative   LEUKEST Negative   < > Large*   RBCU  --   --  22*   WBCU  --   --  120*    < > = values in this interval not displayed.      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   - No evidence of invasion in the planes examined.   - Muscularis propria present.     CYSTOSCOPY PROCEDURE NOTE:    Gibson Villalta is a 79 year old male     Pt ID verified with patient: Yes     Procedure verified with patient: Yes     Procedure confirmed with physician and support staff: Yes     Consent form confirmed with physician and support staff.    Sign In  History and Physical Exam reviewed.  Informed Consent Discussed: Yes   Sign in Communication: Yes   Time Out:  Team Confirms the Correct Patient, Correct Procedure; Yes , Correct Site and Site Marking, Correct Position (if applicable).    Affirmation of Time Out: Yes   Sign Out:  Sign Out Discussion: Yes   Physician: Ryan Camarillo MD    A urinalysis was performed revealing no evidence of infection.    The benefits, risks, alternatives of the cystoscopy procedure and personnel were discussed with the patient. The verbal consent was obtained and the patient agrees to proceed.      Description of procedure:   After fully informed,  voluntary consent was obtained, the patient was brought into the procedure room, identified and placed in a supine position on the cystoscopy table.  The groin/scrotum were prepped with betadine and draped in a sterile fashion.  Urojet lidocaine gel was introduced.  A 15F flexible cystoscope was inserted into the urethra, and the bladder and urethra wereexamined in a systematic manner.  The patient tolerated the procedure well and there were no complications.      Cystoscopic findings:  The urethra was normal without strictures.  The prostate was 3-4cm long and demonstrated evidence of prior Transurethral resection of the prostate.  There was no median lobe.  The external sphincter coapted normally and the bladder neck was open following the Transurethral resection of the prostate. The bladder was  entered and careful pan endoscopy was carried out. The posterior, superior and lateral walls and dome of the bladder were all well visualized and the scope was retroflexed upon itself..  There was moderate/severe trabeculation.  There were no neoplasms, stones, or diverticula identifed.  The ureteric orifices were normal in position and number and effluxing clear urine. There was no evidence of recurrence at the previous sites of resection.     Optimal treatment plan:  Date of Transurethral resection of bladder tumor (TURBT): 9/19/18  Grade/Stage: HG T1  Date of Re-staging Transurethral resection of bladder tumor (TURBT): 11/12/18  Residual disease: Yes  Grade/Stage: HG T1  Date of Re-staging Transurethral resection of bladder tumor (TURBT): 12/30/19  Residual disease: Yes  Grade/Stage: CIS    Induction (month 0) - BCG not warranted given treatment for his lymphoma. This treatment is now complete - 2/3/2020     Cystoscopy (schedule one week prior to treatment when cysto and treatment coincide)  Month 3 - 4/1/19 with no evidence of disease, however evidence of bladder stone and BPH  Month 6 - 7/24/19 with no evidence of  disease and well healed after TURP on 4/22/19  Month 9 - 10/30/19 with no clear evidence of disease, some raised erythema, cytology pending     After recurrence  Month 3 - Missed   Month 6 - Missed  Month 9 - no evidence of disease, cytology pending  Month 12  Month 15  Month 18  Month 21  Month 24  Month 27  Month 30  Month 36  Year 4  Year 5  Year 6  Year 8  Year 10    ASSESSMENT and PLAN  Gibson Villalta is a 79 year old male with a history of HG T1 bladder cancer and s/p TURP. He was not a candidate for BCG initially due to treatment for lymphoma, however this is now complete. Evidence of CIS at Transurethral resection of bladder tumor (TURBT) on 12/30/2019. Now s/p induction BCG.     Bladder cancer  -He is status post his induction BCG with no evidence of recurrence on surveillance cystoscopy today  -Cytology pending  -We discussed the option for maintenance BCG, however given his advanced age and high risk group for COVID-19, we discussed the risks and benefits of continued further exposure to the healthcare system versus the small advantage of recurrence reduction with maintenance BCG.  Via shared decision making with him and his wife we elected to forego maintenance BCG and will plan for follow-up cystoscopy in 3 months    Abnormal prostate on PET  -He has an abnormal lesion in his prostate noted on a recent PET scan.  Plan had been for prostate MRI, however his ICD is not MRI compatible  -We discussed the very low likelihood of him developing a clinically significant prostate cancer given his last PSA in 2015 was normal and that his pathology from his TURP did not have any evidence of malignancy.  -No further work-up at this time      I spent over 15 minutes with the patient.  Over half this time was spent on counseling regarding the above.    Ryan Camarillo MD   Urology  [facility]  Clinic Phone [phone]

## 2020-09-02 NOTE — NURSING NOTE
Chief Complaint   Patient presents with     Cystoscopy     hx of bladder cancer   Prior to the start of the procedure and with procedural staff participation, I verbally confirmed the patient s identity using two indicators, relevant allergies, that the procedure was appropriate and matched the consent or emergent situation, and that the correct equipment/implants were available. Immediately prior to starting the procedure I conducted the Time Out with the procedural staff and re-confirmed the patient s name, procedure, and site/side. I have wiped the patient off with the povidone-Iodine solution, draped them,  used Lidocaine hydrochloride jelly, and instilled sterile water into the bladder. (The Joint Commission universal protocol was followed.)  Yes    Sedation (Moderate or Deep): None  5mL 2% lidocaine hydrochloride Urojet instilled into urethra.    NDC# [address]-0  Lot #: [id number]  Expiration Date:  4-22  Vicki Cavanaugh LPN

## 2020-09-03 PROBLEM — N18.30 CKD (CHRONIC KIDNEY DISEASE) STAGE 3, GFR 30-59 ML/MIN (H): Status: ACTIVE | Noted: 2020-01-01

## 2020-09-03 NOTE — TELEPHONE ENCOUNTER
Refill request:    ATORVASTATIN 40 MG TABLET    Summary: Take 1 tablet (40 mg) by mouth At Bedtime, Disp-90 tablet, R-3, E-Prescribe   Dose, Route, Frequency: 40 mg, Oral, AT BEDTIME  Start: 9/6/2019  Ord/Sold: 9/6/2019   Update History & Physical    The Patient's History and Physical of June 19, 2020,   Left breast us guided excisional biopsy was reviewed with the patient and I examined the patient. There was no change. The surgical site was confirmed by the patient and me. Plan:  The risk, benefits, expected outcome, and alternative to the recommended procedure have been discussed with the patient. Patient understands and wants to proceed with the procedure.     Electronically signed by Brenda Woodward MD on 6/30/2020 at 10:21 AM

## 2020-10-20 NOTE — TELEPHONE ENCOUNTER
Routing refill request to provider for review/approval because:  Labs out of range:  Cr  PCP no longer [facility] provider.    LINDA JacksonN, RN  [employer] Triage

## 2020-10-20 NOTE — TELEPHONE ENCOUNTER
Refill request:    VENLAXAFINE 75 MG TABLET    Summary: Take 1 tablet (75 mg) by mouth 2 times daily Follow-up with Dr. Currie in summer for your fasting full examination, Disp-180 tablet,R-1, E-Prescribe   Dose, Route, Frequency: 75 mg, Oral, 2 TIMES DAILY  Start: 4/9/2020  Ord/Sold: 4/9/2020

## 2020-10-27 NOTE — TELEPHONE ENCOUNTER
This refill request is a duplicate request, previously received or sent.  Sent denial notification to pharmacy.  Bushra Cope, Triage RN  [facility]

## 2020-10-30 NOTE — PROGRESS NOTES
"[facility]    Hematology/Oncology Established Patient Follow-up Note      Today's Date: 11/04/20    Reason for Follow-up: Diffuse large B-cell lymphoma.    Gibson Villalta is a 80 year old male who is being evaluated via a billable telephone visit.      The patient has been notified of following:     \"This telephone visit will be conducted via a call between you and your physician/provider. We have found that certain health care needs can be provided without the need for a physical exam due to the COVID-19 pandemic.  This service lets us provide the care you need with a short phone conversation.  If a prescription is necessary we can send it directly to your pharmacy.  If lab work is needed we can place an order for that and you can then stop by our lab to have the test done at a later time.    Telephone visits are billed at different rates depending on your insurance coverage. During this emergency period, for some insurers they may be billed the same as an in-person visit.  Please reach out to your insurance provider with any questions.    If during the course of the call the physician/provider feels a telephone visit is not appropriate, you will not be charged for this service.\"    Patient has given verbal consent for Telephone visit?  Yes      HISTORY OF PRESENT ILLNESS: Gibson Villalta is a 80 year old male with history of dementia, RV and LV mural thrombus on chronic anticoagulation with Coumadin, status post Smita-en-Y gastric bypass, hypertension, chronic kidney disease, CAD, cardiomyopathy with EF 25-30%, diabetes mellitus who presents with the following oncologic history:  1.  7/2018: Hospitalized for melena.  7/30/2018 CT abdomen/pelvis with contrast showed postop changes of prior gastric bypass procedure, ill-defined soft tissue thickening in the proximal stomach, increased since 8/9/2016, no evidence for bowel obstruction, colitis, or diverticulitis; no free fluid; 2 bladder " wall lesions enhancing, largest measuring 3.9 x 1.8 cm and smaller bladder wall lesion near midline measuring 2 x 1.4 cm, several left renal cysts; liver, gallbladder, spleen, adrenal glands, pancreas, and remaining kidneys unremarkable.  2.  7/30/2018: Upper endoscopy showed a medium sized, fungating, partially circumferential mass with oozing and stigmata of recent bleeding on the greater curvature of the stomach with biopsies suggesting possible lymphoma but it is negative for adenocarcinoma; esophagus and jejunum normal; gastric bypass with normal-sized pouch and intact staple line.  Patient denied any associated abdominal pain, fevers, chills, night sweats, unintentional weight loss, chest pain, dyspnea, hematuria, dysuria.  3.  8/2/2018: Repeat upper endoscopy with biopsy of the stomach mass showed active inflammation and atypical cells with crush artifact.  There were insufficient number of atypical cells to render a definitive diagnosis.  4. 9/21/2018: Underwent TURBT under the care of Dr. Ryan Camarillo.  Pathology showed a T1 high-grade bladder cancer with micropapillary features.  5.  10/19/2018: Repeat upper endoscopy with biopsies confirmed diffuse large B-cell lymphoma, non-germinal center B-cell like, FISH positive for MYC.  Helicobacter pylori stain negative.  6. 10/30/2018: Bone marrow biopsy negative for lymphoma involvement.  7.  10/31/2018: PET/CT scan showed extensive hypermetabolic thickening of the stomach and several loops of small bowel in the left upper quadrant and right lower quadrant consistent with the diagnosed lymphoma, hypermetabolic mesenteric lymphadenopathy, slightly increased metabolic activity in the bone marrow, scattered small subcentimeter calcified and noncalcified pulmonary nodules with low metabolic activity.  8.  11/12/2018: Underwent cystoscopy with restaging TURBT which showed no evidence of tumor regrowth and no new bladder lesions.  Previous area of resection noted on  the right lateral wall just lateral to the right UO.  9.  11/26/2018: Started first-line therapy with mini R-CHOP chemotherapy.  10. 1/28/2019: After 3 cycles of mini R-CHOP, PET/CT scan showed marked improvement in the hypermetabolic thickening of the stomach and loops of small bowel in the left upper quadrant and right lower quadrant, consistent with good response to treatment.  Previous hypermetabolic mesenteric lymphadenopathy has resolved.  Scattered tiny subcentimeter calcified and noncalcified pulmonary nodules have low metabolic activity and are unchanged.  These could represent benign calcified and noncalcified granulomata.  11.  2/19/2019: Cycle 5 and 6 of chemotherapy, doxorubicin held due to worsening shortness of breath and worsened dilation of the left ventricle.  Completion of cycle 6 on 3/12/19.  12.  4/4/2019: PET/CT scan showed no residual lymphoma.  Calcification seen in the bladder.  Benign cyst in left kidney.  13. 8/3/2020: PET/CT scan performed due to recurrent anemia. This showed hypermetabolic nodule in periphery of left side of prostate but no other hypermetabolism elsewhere.       INTERIM HISTORY:  Nelson reports good energy levels and denies any fevers, chills, night sweats, unintentional weight loss, bowel or bladder dysfunction.  He has occasional dyspnea on exertion but only if trying to move quickly.    REVIEW OF SYSTEMS:   14 point ROS was reviewed and is negative other than as noted above in HPI.       HOME MEDICATIONS:  Current Outpatient Medications   Medication Sig Dispense Refill     ACE/ARB NOT PRESCRIBED, INTENTIONAL, ACE & ARB not prescribed due to Symptomatic hypotension not due to excessive diuresis (Patient not taking: Reported on 3/16/2020)       ASPIRIN NOT PRESCRIBED (INTENTIONAL) Please choose reason not prescribed, below (Patient not taking: Reported on 3/16/2020)       atorvastatin (LIPITOR) 40 MG tablet Take 1 tablet (40 mg) by mouth At Bedtime 90 tablet 3      COENZYME Q-10 PO Take 100 mg by mouth every morning        ferrous sulfate (IRON) 325 (65 Fe) MG tablet Take 325 mg by mouth daily       furosemide (LASIX) 20 MG tablet Take 1.5 tablets (30 mg) by mouth daily 90 tablet 1     hydrALAZINE (APRESOLINE) 25 MG tablet Take 1 tablet (25 mg) by mouth 3 times daily 280 tablet 3     levofloxacin (LEVAQUIN) 500 MG tablet Take 1 tablet (500 mg) by mouth as needed (take 1 tablet 6 hours after each treatment and one tablet next am after each treatment) (Patient not taking: Reported on 8/31/2020) 12 tablet 0     metoprolol succinate ER (TOPROL XL) 50 MG 24 hr tablet Take 1.5 tablets (75 mg) by mouth daily 120 tablet 3     multivitamin, therapeutic with minerals (MULTI-VITAMIN) TABS Take 1 tablet by mouth every morning        venlafaxine (EFFEXOR) 75 MG tablet Take 1 tablet (75 mg) by mouth 2 times daily Follow-up with Dr. Currie in summer for your fasting full examination 60 tablet 0     vitamin D2 (ERGOCALCIFEROL) 55781 units (1250 mcg) capsule TAKE ONE CAPSULE BY MOUTH ONCE A WEEK ON Tuesday. Due for appointment. Please schedule: [phone] 12 capsule 0         ALLERGIES:  Allergies   Allergen Reactions     Wasps [Hornets]      Sand wasp --- years ago.           PAST MEDICAL HISTORY:  Past Medical History:   Diagnosis Date     (HFpEF) heart failure with preserved ejection fraction (H)      Acute kidney injury (H) 2012     Anemia      Anxiety      Anxiety and depression      Bladder mass      BPH (benign prostatic hypertrophy)      Cardiomyopathy (H)     ICD implanted 11/2016     Carpal tunnel syndrome     Right     Chronic kidney disease (CKD), stage 3 (moderate)      Chronic systolic CHF (congestive heart failure) (H)      Dementia (H)      Dementia without behavioral disturbance, unspecified dementia type (H) 7/22/2016     Depressive disorder      Diabetes (H)      Diffuse large B-cell lymphoma of extranodal site (H) 11/23/2018     Former smoker      Gastric mass      Gout       History of depression      History of gastric bypass 7/22/2016     Iron deficiency anemia, unspecified iron deficiency anemia type 10/15/2018     LBBB (left bundle branch block) 2013     Lumbar herniated disc     L5-S1     Malignant neoplasm of lateral wall of urinary bladder (H) 11/15/2019    Added automatically from request for surgery [id number]     Mixed cardiomyopathy 7/22/2016     Myocardial infarction (H) 1995 and 2010     Nonischemic cardiomyopathy (H) 7/22/2016     Obesity      Pacemaker     ICD/PM     Rosacea      Rotator cuff disorder     Tear x 2     RV (right ventricular) mural thrombus 7/22/2016     SOB (shortness of breath)          PAST SURGICAL HISTORY:  Past Surgical History:   Procedure Laterality Date     ANGIOPLASTY  1995 and 2010 with stent     BIOPSY      stomach     BONE MARROW BIOPSY, BONE SPECIMEN, NEEDLE/TROCAR N/A 10/30/2018    Procedure: BIOPSY BONE MARROW;  Surgeon: Jeb Romero MD;  Location:  GI     CARDIAC SURGERY      ICD/PM     CYSTOSCOPY       CYSTOSCOPY, TRANSURETHRAL RESECTION (TUR) PROSTATE, COMBINED  4/22/2019    Procedure: CYSTOSCOPY AND BIPOLAR TRANSURETHRAL RESECTION (TUR) PROSTATE;  Surgeon: Ryan Camarillo MD;  Location:  OR     CYSTOSCOPY, TRANSURETHRAL RESECTION (TUR) TUMOR BLADDER, COMBINED N/A 9/19/2018    Procedure: COMBINED CYSTOSCOPY, TRANSURETHRAL RESECTION (TUR) TUMOR BLADDER;  CYSTOSCOPY, TRANSURETHRAL RESECTION BLADDER TUMOR ;  Surgeon: Ryan Camarillo MD;  Location:  OR     CYSTOSCOPY, TRANSURETHRAL RESECTION (TUR) TUMOR BLADDER, COMBINED N/A 11/12/2018    Procedure: CYSTOSCOPY RESTAGING TRANSURETHRAL RESECTION BLADDER TUMOR;  Surgeon: Ryan Camarillo MD;  Location:  OR     CYSTOSCOPY, TRANSURETHRAL RESECTION (TUR) TUMOR BLADDER, COMBINED N/A 12/30/2019    Procedure: CYSTOSCOPY, WITH TRANSURETHRAL RESECTION BLADDER TUMOR;  Surgeon: Ryan Camarillo MD;  Location:  OR     EP LEAD REVISION DUAL N/A 10/24/2019    Procedure: EP Lead Revision  Dual;  Surgeon: Josias Hernandez MD;  Location:  HEART CARDIAC CATH LAB     ESOPHAGOSCOPY, GASTROSCOPY, DUODENOSCOPY (EGD), COMBINED N/A 7/30/2018    Procedure: COMBINED ESOPHAGOSCOPY, GASTROSCOPY, DUODENOSCOPY (EGD), BIOPSY SINGLE OR MULTIPLE;  gastroscopy;  Surgeon: Parish Xiong MD;  Location:  GI     ESOPHAGOSCOPY, GASTROSCOPY, DUODENOSCOPY (EGD), COMBINED N/A 7/30/2018    Procedure: COMBINED ESOPHAGOSCOPY, GASTROSCOPY, DUODENOSCOPY (EGD);  EGD;  Surgeon: Parish Xiong MD;  Location:  GI     ESOPHAGOSCOPY, GASTROSCOPY, DUODENOSCOPY (EGD), COMBINED N/A 8/2/2018    Procedure: COMBINED ESOPHAGOSCOPY, GASTROSCOPY, DUODENOSCOPY (EGD), BIOPSY SINGLE OR MULTIPLE;  gastroscopy BIOPSYSHOULD BE SENT TO LAB IN NS NOT FORMALIN PER ;  Surgeon: Jonathon Bush MD;  Location:  GI     GASTRIC BYPASS  2001     HEART CATH LEFT HEART CATH  7/19/16    Non ischemic cardiomyopathy; Non functionally significant LAD and RCA disease      IR PORT REMOVAL RIGHT  9/9/2019     LASER HOLMIUM LITHOTRIPSY BLADDER N/A 4/22/2019    Procedure: CYSTOSCOPY, HOLMIUM LASER LITHOLAPAXY ,  AND BIPOLAR TRANSURETHRAL RESECTION (TUR) PROSTATE;  Surgeon: Ryan Camarillo MD;  Location:  OR     OTHER SURGICAL HISTORY  Nov 2016    CRT-D implantation         SOCIAL HISTORY:  Social History     Socioeconomic History     Marital status:      Spouse name: Not on file     Number of children: Not on file     Years of education: Not on file     Highest education level: Not on file   Occupational History     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[employer]     Comment: [employer]; retired   Social Needs     Financial resource strain: Not on file     Food insecurity     Worry: Not on file     Inability: Not on file     Transportation needs     Medical: Not on file     Non-medical: Not on file   Tobacco Use     Smoking status: Former Smoker     Packs/day: 2.00     Years: 20.00     Pack years: 40.00     Types: Cigarettes     Start date: 1961      Quit date: 1980     Years since quittin.8     Smokeless tobacco: Never Used     Tobacco comment: Quit in his 30s - 2 ppd for 20 years   Substance and Sexual Activity     Alcohol use: Not Currently     Alcohol/week: 0.0 standard drinks     Comment: none     Drug use: No     Sexual activity: Yes     Partners: Female   Lifestyle     Physical activity     Days per week: Not on file     Minutes per session: Not on file     Stress: Not on file   Relationships     Social connections     Talks on phone: Not on file     Gets together: Not on file     Attends [employer] service: Not on file     Active member of club or organization: Not on file     Attends meetings of clubs or organizations: Not on file     Relationship status: Not on file     Intimate partner violence     Fear of current or ex partner: Not on file     Emotionally abused: Not on file     Physically abused: Not on file     Forced sexual activity: Not on file   Other Topics Concern     Parent/sibling w/ CABG, MI or angioplasty before 65F 55M? No      Service Not Asked     Blood Transfusions Not Asked     Caffeine Concern Not Asked     Occupational Exposure Not Asked     Hobby Hazards Not Asked     Sleep Concern Not Asked     Stress Concern Not Asked     Weight Concern Not Asked     Special Diet Yes     Comment: low fat, low salt      Back Care Not Asked     Exercise No     Bike Helmet Not Asked     Seat Belt Not Asked     Self-Exams Not Asked   Social History Narrative     Not on file         FAMILY HISTORY:  Family History   Problem Relation Age of Onset     Coronary Artery Disease Father 39     Alzheimer Disease Mother      Coronary Artery Disease Son         Two sons have  from MIs (ages 39 and 51)         PHYSICAL EXAM:  Vital signs:  There were no vitals taken for this visit.   Not performed as this was a telephone visit.      LABS:  CBC RESULTS:   Recent Labs   Lab Test 20  1348   WBC 6.4   RBC 4.64   HGB 15.1   HCT 46.8   MCV  101*   MCH 32.5   MCHC 32.3   RDW 15.1*   *     Last Comprehensive Metabolic Panel:  Sodium   Date Value Ref Range Status   11/02/2020 142 133 - 144 mmol/L Final     Potassium   Date Value Ref Range Status   11/02/2020 3.4 3.4 - 5.3 mmol/L Final     Chloride   Date Value Ref Range Status   11/02/2020 110 (H) 94 - 109 mmol/L Final     Carbon Dioxide   Date Value Ref Range Status   11/02/2020 25 20 - 32 mmol/L Final     Anion Gap   Date Value Ref Range Status   11/02/2020 7 3 - 14 mmol/L Final     Glucose   Date Value Ref Range Status   11/02/2020 76 70 - 99 mg/dL Final     Urea Nitrogen   Date Value Ref Range Status   11/02/2020 18 7 - 30 mg/dL Final     Creatinine   Date Value Ref Range Status   11/02/2020 1.35 (H) 0.66 - 1.25 mg/dL Final     GFR Estimate   Date Value Ref Range Status   11/02/2020 50 (L) >60 mL/min/[1.73_m2] Final     Comment:     Non  GFR Calc  Starting 12/18/2018, serum creatinine based estimated GFR (eGFR) will be   calculated using the Chronic Kidney Disease Epidemiology Collaboration   (CKD-EPI) equation.       Calcium   Date Value Ref Range Status   11/02/2020 9.1 8.5 - 10.1 mg/dL Final     Bilirubin Total   Date Value Ref Range Status   11/02/2020 0.7 0.2 - 1.3 mg/dL Final     Alkaline Phosphatase   Date Value Ref Range Status   11/02/2020 211 (H) 40 - 150 U/L Final     ALT   Date Value Ref Range Status   11/02/2020 31 0 - 70 U/L Final     AST   Date Value Ref Range Status   11/02/2020 24 0 - 45 U/L Final       LDH: 306 -> 356 -> 280 -> 270    PATHOLOGY:  12/30/2019: Bladder tumor biopsy showed urothelial carcinoma in situ and no evidence of invasion in the planes examined.  Muscularis propria present.    IMAGING:  Reviewed as per HPI.    ASSESSMENT/PLAN:  Gibson Villalta is a 80 year old male with the following issues:  1.  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  -I recommended to Nelson ongoing clinical surveillance for lymphoma.  I only recommended repeating a PET/CT scan if he shows any signs or symptoms suggestive of lymphoma recurrence in the future.  However, we will continue to monitor his CBC, CMP, and LDH.     2.  High-grade bladder cancer, T1  3.  Left prostate nodule  -These were seen on CT abdomen/pelvis on 7/30/2018 and further evaluated by Dr. Howard Camarillo on 9/07/2018.    -He proceeded with TURBT x 2 in 9/2018 and 11/2018 with no residual tumor or new bladder cancers.  No adjuvant chemotherapy was recommended for his T1 tumor at that time.  -However, he was found to have urothelial carcinoma in situ and a bladder tumor biopsy on 12/30/2019.  There was no clinical evidence for muscle invasive disease.   -He has since completed 6 BCG treatments on 3/23/2020, tolerating those well with no new urinary problems.  -He follows with Dr. Camarillo and decided not to proceed with any intervention for the prostate nodule that was seen on his last PET/CT scan from 8/3/2020.    4. Cardiomyopathy with systolic dysfunction  -Stable.  His dyspnea on exertion is per his usual.  Continue Lasix and follow-up with cardiology as needed.    Return in 3 months with lab draw prior to visit.    Maribel Bender MD  Hematology/Oncology  [facility]    Phone call duration: 7 minutes

## 2020-11-02 NOTE — PROGRESS NOTES
Medical Assistant Note:  Gibson Villalta presents today for Blood draw.    Patient seen by provider today: No.   present during visit today: Not Applicable.    Concerns: No Concerns.    Procedure:  Lab draw site: Right Hand, Needle type: BF, Gauge: 23.    Post Assessment:  Labs drawn without difficulty: Yes.    Discharge Plan:  Departure Mode: Ambulatory.    Face to Face Time: 5 Min.    Mayte Leal, CMA

## 2020-11-03 NOTE — PATIENT INSTRUCTIONS
Overall you are doing well.  We will continue your current medications but I would recommend reducing your Lipitor to 20 mg.  This may relieve some of your shoulder stiffness.  The most recent cholesterol levels looked very good.    Continue your current dose of hydralazine.  If you notice lightheadedness this medication should be reduced.    I would like to check your vitamin D level today.    High-dose vitamin D for some time and I want to rule out elevated levels.    I would like to see if that in clinic in 3 months to see how you are doing    Best regards  José Miguel

## 2020-11-03 NOTE — PROGRESS NOTES
Subjective     Gibson Villalta is a 80 year old male who presents to clinic today for the following health issues:    HPI 80-year-old male with complicated past medical history.  He has absolutely no complaints or concerns today.  He denies pain.  His wife states that he does have some shoulder and neck discomfort in the evenings.  The patient denies any lightheadedness.  There have been no issues of falls.  No behavioral issues of note.    Patient has a history of vitamin D deficiency and he is on high-dose vitamin D.  Family is concerned that this may be too much for him.  He denies any lassitude or fatigue.    No endocrine hematologic or allergic symptoms otherwise noted         Chief Complaint   Patient presents with     [facility]     Past Medical History:   Diagnosis Date     (HFpEF) heart failure with preserved ejection fraction (H)      Acute kidney injury (H) 2012     Anemia      Anxiety      Anxiety and depression      Bladder mass      BPH (benign prostatic hypertrophy)      Cardiomyopathy (H)     ICD implanted 11/2016     Carpal tunnel syndrome     Right     Chronic kidney disease (CKD), stage 3 (moderate)      Chronic systolic CHF (congestive heart failure) (H)      Dementia (H)      Dementia without behavioral disturbance, unspecified dementia type (H) 7/22/2016     Depressive disorder      Diabetes (H)      Diffuse large B-cell lymphoma of extranodal site (H) 11/23/2018     Former smoker      Gastric mass      Gout      History of depression      History of gastric bypass 7/22/2016     Iron deficiency anemia, unspecified iron deficiency anemia type 10/15/2018     LBBB (left bundle branch block) 2013     Lumbar herniated disc     L5-S1     Malignant neoplasm of lateral wall of urinary bladder (H) 11/15/2019    Added automatically from request for surgery [id number]     Mixed cardiomyopathy 7/22/2016     Myocardial infarction (H) 1995 and 2010     Nonischemic cardiomyopathy (H) 7/22/2016      "Obesity      Pacemaker     ICD/PM     Rosacea      Rotator cuff disorder     Tear x 2     RV (right ventricular) mural thrombus 7/22/2016     SOB (shortness of breath)        New Patient/Transfer of Care  New Patient/Transfer of Care    Review of Systems   Constitutional, HEENT, cardiovascular, pulmonary, gi and gu systems are negative, except as otherwise noted.      Objective    /78 (BP Location: Right arm, Patient Position: Sitting, Cuff Size: Adult Regular)   Pulse 77   Temp 97.1  F (36.2  C) (Temporal)   Ht 1.854 m (6' 1\")   Wt 85.3 kg (188 lb)   SpO2 97%   BMI 24.80 kg/m    Body mass index is 24.8 kg/m .  Physical Exam   Alert patient no apparent distress  No carotid bruits  Neck supple  No adenopathy  Lung fields are clear with good  The rate and rhythm  Abdomen soft good bowel sounds all the quadrants  Lower extremities are warm no edema  Affect and mood appear appropriate.  Patient has difficulty with recall.  His wife assists him.    No results found for any visits on 11/03/20.        Assessment & Plan     Gibson was seen today for establish care.    Diagnoses and all orders for this visit:    Vitamin deficiency  -     Albumin Random Urine Quantitative with Creat Ratio  -     Vitamin D Deficiency    Dementia without behavioral disturbance, unspecified dementia type (H)    Hyperlipidemia LDL goal <70    The patient has no concerns today.  I would like to assess a vitamin D level to make certain there is no vitamin D toxicity.    In conjunction with the patient's dementia he has lost some weight.  Most recent cholesterol levels have been quite low.  He has high-dose Lipitor and does have muscular pain.  I recommend reduction to 20 mg of Lipitor and following the patient's symptoms.  If there is no significant variance they can resume 40 mg daily.        See Patient Instructions    Return in about 3 months (around 2/3/2021).    José Miguel Gibson MD  [facility]    "

## 2020-11-04 NOTE — PROGRESS NOTES
"Gibson Villalta is a 80 year old male who is being evaluated via a billable telephone visit.      The patient has been notified of following:     \"This telephone visit will be conducted via a call between you and your physician/provider. We have found that certain health care needs can be provided without the need for a physical exam.  This service lets us provide the care you need with a short phone conversation.  If a prescription is necessary we can send it directly to your pharmacy.  If lab work is needed we can place an order for that and you can then stop by our lab to have the test done at a later time.    Telephone visits are billed at different rates depending on your insurance coverage. During this emergency period, for some insurers they may be billed the same as an in-person visit.  Please reach out to your insurance provider with any questions.    If during the course of the call the physician/provider feels a telephone visit is not appropriate, you will not be charged for this service.\"    Patient has given verbal consent for Telephone visit?  Yes    What phone number would you like to be contacted at? [phone]    How would you like to obtain your AVS? Susana    Phone call duration:     Arin Jung CMA  11/4/2020      1:36 PM        "

## 2020-11-04 NOTE — TELEPHONE ENCOUNTER
Recent OV to establish care with .  Prescription approved per [facility] Refill Protocol.

## 2020-11-04 NOTE — LETTER
"    11/4/2020         RE: Gibson Villalta  [address]  [place] [address]        Dear Colleague,    Thank you for referring your patient, Gibson Villalta, to the [facility]. Please see a copy of my visit note below.    [facility]    Hematology/Oncology Established Patient Follow-up Note      Today's Date: 11/04/20    Reason for Follow-up: Diffuse large B-cell lymphoma.    Gibson Villalta is a 80 year old male who is being evaluated via a billable telephone visit.      The patient has been notified of following:     \"This telephone visit will be conducted via a call between you and your physician/provider. We have found that certain health care needs can be provided without the need for a physical exam due to the COVID-19 pandemic.  This service lets us provide the care you need with a short phone conversation.  If a prescription is necessary we can send it directly to your pharmacy.  If lab work is needed we can place an order for that and you can then stop by our lab to have the test done at a later time.    Telephone visits are billed at different rates depending on your insurance coverage. During this emergency period, for some insurers they may be billed the same as an in-person visit.  Please reach out to your insurance provider with any questions.    If during the course of the call the physician/provider feels a telephone visit is not appropriate, you will not be charged for this service.\"    Patient has given verbal consent for Telephone visit?  Yes      HISTORY OF PRESENT ILLNESS: Gibson Villalta is a 80 year old male with history of dementia, RV and LV mural thrombus on chronic anticoagulation with Coumadin, status post Smita-en-Y gastric bypass, hypertension, chronic kidney disease, CAD, cardiomyopathy with EF 25-30%, diabetes mellitus who presents with the following oncologic history:  1. 7/2018: Hospitalized for " melena.  7/30/2018 CT abdomen/pelvis with contrast showed postop changes of prior gastric bypass procedure, ill-defined soft tissue thickening in the proximal stomach, increased since 8/9/2016, no evidence for bowel obstruction, colitis, or diverticulitis; no free fluid; 2 bladder wall lesions enhancing, largest measuring 3.9 x 1.8 cm and smaller bladder wall lesion near midline measuring 2 x 1.4 cm, several left renal cysts; liver, gallbladder, spleen, adrenal glands, pancreas, and remaining kidneys unremarkable.  2.  7/30/2018: Upper endoscopy showed a medium sized, fungating, partially circumferential mass with oozing and stigmata of recent bleeding on the greater curvature of the stomach with biopsies suggesting possible lymphoma but it is negative for adenocarcinoma; esophagus and jejunum normal; gastric bypass with normal-sized pouch and intact staple line.  Patient denied any associated abdominal pain, fevers, chills, night sweats, unintentional weight loss, chest pain, dyspnea, hematuria, dysuria.  3.  8/2/2018: Repeat upper endoscopy with biopsy of the stomach mass showed active inflammation and atypical cells with crush artifact.  There were insufficient number of atypical cells to render a definitive diagnosis.  4. 9/21/2018: Underwent TURBT under the care of Dr. Ryan Camarillo.  Pathology showed a T1 high-grade bladder cancer with micropapillary features.  5.  10/19/2018: Repeat upper endoscopy with biopsies confirmed diffuse large B-cell lymphoma, non-germinal center B-cell like, FISH positive for MYC.  Helicobacter pylori stain negative.  6. 10/30/2018: Bone marrow biopsy negative for lymphoma involvement.  7.  10/31/2018: PET/CT scan showed extensive hypermetabolic thickening of the stomach and several loops of small bowel in the left upper quadrant and right lower quadrant consistent with the diagnosed lymphoma, hypermetabolic mesenteric lymphadenopathy, slightly increased metabolic activity in the bone  marrow, scattered small subcentimeter calcified and noncalcified pulmonary nodules with low metabolic activity.  8.  11/12/2018: Underwent cystoscopy with restaging TURBT which showed no evidence of tumor regrowth and no new bladder lesions.  Previous area of resection noted on the right lateral wall just lateral to the right UO.  9.  11/26/2018: Started first-line therapy with mini R-CHOP chemotherapy.  10. 1/28/2019: After 3 cycles of mini R-CHOP, PET/CT scan showed marked improvement in the hypermetabolic thickening of the stomach and loops of small bowel in the left upper quadrant and right lower quadrant, consistent with good response to treatment.  Previous hypermetabolic mesenteric lymphadenopathy has resolved.  Scattered tiny subcentimeter calcified and noncalcified pulmonary nodules have low metabolic activity and are unchanged.  These could represent benign calcified and noncalcified granulomata.  11.  2/19/2019: Cycle 5 and 6 of chemotherapy, doxorubicin held due to worsening shortness of breath and worsened dilation of the left ventricle.  Completion of cycle 6 on 3/12/19.  12.  4/4/2019: PET/CT scan showed no residual lymphoma.  Calcification seen in the bladder.  Benign cyst in left kidney.  13. 8/3/2020: PET/CT scan performed due to recurrent anemia. This showed hypermetabolic nodule in periphery of left side of prostate but no other hypermetabolism elsewhere.       INTERIM HISTORY:  Nelson reports good energy levels and denies any fevers, chills, night sweats, unintentional weight loss, bowel or bladder dysfunction.  He has occasional dyspnea on exertion but only if trying to move quickly.    REVIEW OF SYSTEMS:   14 point ROS was reviewed and is negative other than as noted above in HPI.       HOME MEDICATIONS:  Current Outpatient Medications   Medication Sig Dispense Refill     ACE/ARB NOT PRESCRIBED, INTENTIONAL, ACE & ARB not prescribed due to Symptomatic hypotension not due to excessive diuresis  (Patient not taking: Reported on 3/16/2020)       ASPIRIN NOT PRESCRIBED (INTENTIONAL) Please choose reason not prescribed, below (Patient not taking: Reported on 3/16/2020)       atorvastatin (LIPITOR) 40 MG tablet Take 1 tablet (40 mg) by mouth At Bedtime 90 tablet 3     COENZYME Q-10 PO Take 100 mg by mouth every morning        ferrous sulfate (IRON) 325 (65 Fe) MG tablet Take 325 mg by mouth daily       furosemide (LASIX) 20 MG tablet Take 1.5 tablets (30 mg) by mouth daily 90 tablet 1     hydrALAZINE (APRESOLINE) 25 MG tablet Take 1 tablet (25 mg) by mouth 3 times daily 280 tablet 3     levofloxacin (LEVAQUIN) 500 MG tablet Take 1 tablet (500 mg) by mouth as needed (take 1 tablet 6 hours after each treatment and one tablet next am after each treatment) (Patient not taking: Reported on 8/31/2020) 12 tablet 0     metoprolol succinate ER (TOPROL XL) 50 MG 24 hr tablet Take 1.5 tablets (75 mg) by mouth daily 120 tablet 3     multivitamin, therapeutic with minerals (MULTI-VITAMIN) TABS Take 1 tablet by mouth every morning        venlafaxine (EFFEXOR) 75 MG tablet Take 1 tablet (75 mg) by mouth 2 times daily Follow-up with Dr. Currie in summer for your fasting full examination 60 tablet 0     vitamin D2 (ERGOCALCIFEROL) 08358 units (1250 mcg) capsule TAKE ONE CAPSULE BY MOUTH ONCE A WEEK ON Tuesday. Due for appointment. Please schedule: [phone] 12 capsule 0         ALLERGIES:  Allergies   Allergen Reactions     Wasps [Hornets]      Sand wasp --- years ago.           PAST MEDICAL HISTORY:  Past Medical History:   Diagnosis Date     (HFpEF) heart failure with preserved ejection fraction (H)      Acute kidney injury (H) 2012     Anemia      Anxiety      Anxiety and depression      Bladder mass      BPH (benign prostatic hypertrophy)      Cardiomyopathy (H)     ICD implanted 11/2016     Carpal tunnel syndrome     Right     Chronic kidney disease (CKD), stage 3 (moderate)      Chronic systolic CHF (congestive heart  failure) (H)      Dementia (H)      Dementia without behavioral disturbance, unspecified dementia type (H) 7/22/2016     Depressive disorder      Diabetes (H)      Diffuse large B-cell lymphoma of extranodal site (H) 11/23/2018     Former smoker      Gastric mass      Gout      History of depression      History of gastric bypass 7/22/2016     Iron deficiency anemia, unspecified iron deficiency anemia type 10/15/2018     LBBB (left bundle branch block) 2013     Lumbar herniated disc     L5-S1     Malignant neoplasm of lateral wall of urinary bladder (H) 11/15/2019    Added automatically from request for surgery [id number]     Mixed cardiomyopathy 7/22/2016     Myocardial infarction (H) 1995 and 2010     Nonischemic cardiomyopathy (H) 7/22/2016     Obesity      Pacemaker     ICD/PM     Rosacea      Rotator cuff disorder     Tear x 2     RV (right ventricular) mural thrombus 7/22/2016     SOB (shortness of breath)          PAST SURGICAL HISTORY:  Past Surgical History:   Procedure Laterality Date     ANGIOPLASTY  1995 and 2010 with stent     BIOPSY      stomach     BONE MARROW BIOPSY, BONE SPECIMEN, NEEDLE/TROCAR N/A 10/30/2018    Procedure: BIOPSY BONE MARROW;  Surgeon: Jeb Romero MD;  Location:  GI     CARDIAC SURGERY      ICD/PM     CYSTOSCOPY       CYSTOSCOPY, TRANSURETHRAL RESECTION (TUR) PROSTATE, COMBINED  4/22/2019    Procedure: CYSTOSCOPY AND BIPOLAR TRANSURETHRAL RESECTION (TUR) PROSTATE;  Surgeon: Ryan Camarillo MD;  Location:  OR     CYSTOSCOPY, TRANSURETHRAL RESECTION (TUR) TUMOR BLADDER, COMBINED N/A 9/19/2018    Procedure: COMBINED CYSTOSCOPY, TRANSURETHRAL RESECTION (TUR) TUMOR BLADDER;  CYSTOSCOPY, TRANSURETHRAL RESECTION BLADDER TUMOR ;  Surgeon: Ryan Camarillo MD;  Location:  OR     CYSTOSCOPY, TRANSURETHRAL RESECTION (TUR) TUMOR BLADDER, COMBINED N/A 11/12/2018    Procedure: CYSTOSCOPY RESTAGING TRANSURETHRAL RESECTION BLADDER TUMOR;  Surgeon: Ryan Camarillo MD;  Location:  OR      CYSTOSCOPY, TRANSURETHRAL RESECTION (TUR) TUMOR BLADDER, COMBINED N/A 12/30/2019    Procedure: CYSTOSCOPY, WITH TRANSURETHRAL RESECTION BLADDER TUMOR;  Surgeon: Ryan Camarillo MD;  Location:  OR     EP LEAD REVISION DUAL N/A 10/24/2019    Procedure: EP Lead Revision Dual;  Surgeon: Josias Hernandez MD;  Location:  HEART CARDIAC CATH LAB     ESOPHAGOSCOPY, GASTROSCOPY, DUODENOSCOPY (EGD), COMBINED N/A 7/30/2018    Procedure: COMBINED ESOPHAGOSCOPY, GASTROSCOPY, DUODENOSCOPY (EGD), BIOPSY SINGLE OR MULTIPLE;  gastroscopy;  Surgeon: Parish Xiong MD;  Location:  GI     ESOPHAGOSCOPY, GASTROSCOPY, DUODENOSCOPY (EGD), COMBINED N/A 7/30/2018    Procedure: COMBINED ESOPHAGOSCOPY, GASTROSCOPY, DUODENOSCOPY (EGD);  EGD;  Surgeon: Parish Xiong MD;  Location:  GI     ESOPHAGOSCOPY, GASTROSCOPY, DUODENOSCOPY (EGD), COMBINED N/A 8/2/2018    Procedure: COMBINED ESOPHAGOSCOPY, GASTROSCOPY, DUODENOSCOPY (EGD), BIOPSY SINGLE OR MULTIPLE;  gastroscopy BIOPSYSHOULD BE SENT TO LAB IN NS NOT FORMALIN PER ;  Surgeon: Jonathon Bush MD;  Location:  GI     GASTRIC BYPASS  2001     HEART CATH LEFT HEART CATH  7/19/16    Non ischemic cardiomyopathy; Non functionally significant LAD and RCA disease      IR PORT REMOVAL RIGHT  9/9/2019     LASER HOLMIUM LITHOTRIPSY BLADDER N/A 4/22/2019    Procedure: CYSTOSCOPY, HOLMIUM LASER LITHOLAPAXY ,  AND BIPOLAR TRANSURETHRAL RESECTION (TUR) PROSTATE;  Surgeon: Ryan Camarillo MD;  Location:  OR     OTHER SURGICAL HISTORY  Nov 2016    CRT-D implantation         SOCIAL HISTORY:  Social History     Socioeconomic History     Marital status:      Spouse name: Not on file     Number of children: Not on file     Years of education: Not on file     Highest education level: Not on file   Occupational History     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     Comment: [employer]; retired   Social Needs     Financial resource strain: Not on file     Food insecurity      Worry: Not on file     Inability: Not on file     Transportation needs     Medical: Not on file     Non-medical: Not on file   Tobacco Use     Smoking status: Former Smoker     Packs/day: 2.00     Years: 20.00     Pack years: 40.00     Types: Cigarettes     Start date:      Quit date: 1980     Years since quittin.8     Smokeless tobacco: Never Used     Tobacco comment: Quit in his 30s - 2 ppd for 20 years   Substance and Sexual Activity     Alcohol use: Not Currently     Alcohol/week: 0.0 standard drinks     Comment: none     Drug use: No     Sexual activity: Yes     Partners: Female   Lifestyle     Physical activity     Days per week: Not on file     Minutes per session: Not on file     Stress: Not on file   Relationships     Social connections     Talks on phone: Not on file     Gets together: Not on file     Attends Adventism service: Not on file     Active member of club or organization: Not on file     Attends meetings of clubs or organizations: Not on file     Relationship status: Not on file     Intimate partner violence     Fear of current or ex partner: Not on file     Emotionally abused: Not on file     Physically abused: Not on file     Forced sexual activity: Not on file   Other Topics Concern     Parent/sibling w/ CABG, MI or angioplasty before 65F 55M? No      Service Not Asked     Blood Transfusions Not Asked     Caffeine Concern Not Asked     Occupational Exposure Not Asked     Hobby Hazards Not Asked     Sleep Concern Not Asked     Stress Concern Not Asked     Weight Concern Not Asked     Special Diet Yes     Comment: low fat, low salt      Back Care Not Asked     Exercise No     Bike Helmet Not Asked     Seat Belt Not Asked     Self-Exams Not Asked   Social History Narrative     Not on file         FAMILY HISTORY:  Family History   Problem Relation Age of Onset     Coronary Artery Disease Father 39     Alzheimer Disease Mother      Coronary Artery Disease Son         Two sons  have  from MIs (ages 39 and 51)         PHYSICAL EXAM:  Vital signs:  There were no vitals taken for this visit.   Not performed as this was a telephone visit.      LABS:  CBC RESULTS:   Recent Labs   Lab Test 20  1348   WBC 6.4   RBC 4.64   HGB 15.1   HCT 46.8   *   MCH 32.5   MCHC 32.3   RDW 15.1*   *     Last Comprehensive Metabolic Panel:  Sodium   Date Value Ref Range Status   2020 142 133 - 144 mmol/L Final     Potassium   Date Value Ref Range Status   2020 3.4 3.4 - 5.3 mmol/L Final     Chloride   Date Value Ref Range Status   2020 110 (H) 94 - 109 mmol/L Final     Carbon Dioxide   Date Value Ref Range Status   2020 25 20 - 32 mmol/L Final     Anion Gap   Date Value Ref Range Status   2020 7 3 - 14 mmol/L Final     Glucose   Date Value Ref Range Status   2020 76 70 - 99 mg/dL Final     Urea Nitrogen   Date Value Ref Range Status   2020 18 7 - 30 mg/dL Final     Creatinine   Date Value Ref Range Status   2020 1.35 (H) 0.66 - 1.25 mg/dL Final     GFR Estimate   Date Value Ref Range Status   2020 50 (L) >60 mL/min/[1.73_m2] Final     Comment:     Non  GFR Calc  Starting 2018, serum creatinine based estimated GFR (eGFR) will be   calculated using the Chronic Kidney Disease Epidemiology Collaboration   (CKD-EPI) equation.       Calcium   Date Value Ref Range Status   2020 9.1 8.5 - 10.1 mg/dL Final     Bilirubin Total   Date Value Ref Range Status   2020 0.7 0.2 - 1.3 mg/dL Final     Alkaline Phosphatase   Date Value Ref Range Status   2020 211 (H) 40 - 150 U/L Final     ALT   Date Value Ref Range Status   2020 31 0 - 70 U/L Final     AST   Date Value Ref Range Status   2020 24 0 - 45 U/L Final       LDH: 306 -> 356 -> 280 -> 270    PATHOLOGY:  2019: Bladder tumor biopsy showed urothelial carcinoma in situ and no evidence of invasion in the planes examined.  Muscularis propria  "present.    IMAGING:  Reviewed as per HPI.    ASSESSMENT/PLAN:  Gibson Villalta is a 80 year old male with the following issues:  1.  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  -I recommended to Nelson ongoing clinical surveillance for lymphoma.  I only recommended repeating a PET/CT scan if he shows any signs or symptoms suggestive of lymphoma recurrence in the future.  However, we will continue to monitor his CBC, CMP, and LDH.     2.  High-grade bladder cancer, T1  3.  Left prostate nodule  -These were seen on CT abdomen/pelvis on 7/30/2018 and further evaluated by Dr. Howard Camarillo on 9/07/2018.    -He proceeded with TURBT x 2 in 9/2018 and 11/2018 with no residual tumor or new bladder cancers.  No adjuvant chemotherapy was recommended for his T1 tumor at that time.  -However, he was found to have urothelial carcinoma in situ and a bladder tumor biopsy on 12/30/2019.  There was no clinical evidence for muscle invasive disease.   -He has since completed 6 BCG treatments on 3/23/2020, tolerating those well with no new urinary problems.  -He follows with Dr. Camarillo and decided not to proceed with any intervention for the prostate nodule that was seen on his last PET/CT scan from 8/3/2020.    4. Cardiomyopathy with systolic dysfunction  -Stable.  His dyspnea on exertion is per his usual.  Continue Lasix and follow-up with cardiology as needed.    Return in 3 months with lab draw prior to visit.    Maribel Bender MD  Hematology/Oncology  [facility]    Phone call duration: 7 minutes      Gibson Villalta is a 80 year old male who is being evaluated via a billable telephone visit.      The patient has been notified of following:     \"This telephone visit will be conducted via a call between you and your " "physician/provider. We have found that certain health care needs can be provided without the need for a physical exam.  This service lets us provide the care you need with a short phone conversation.  If a prescription is necessary we can send it directly to your pharmacy.  If lab work is needed we can place an order for that and you can then stop by our lab to have the test done at a later time.    Telephone visits are billed at different rates depending on your insurance coverage. During this emergency period, for some insurers they may be billed the same as an in-person visit.  Please reach out to your insurance provider with any questions.    If during the course of the call the physician/provider feels a telephone visit is not appropriate, you will not be charged for this service.\"    Patient has given verbal consent for Telephone visit?  Yes    What phone number would you like to be contacted at? [phone]    How would you like to obtain your AVS? [employer]avo    Phone call duration:     Arin Jung CMA  11/4/2020      1:36 PM            Again, thank you for allowing me to participate in the care of your patient.        Sincerely,        Maribel Bender MD    "

## 2020-11-04 NOTE — LETTER
"    11/4/2020         RE: Gibson Villalta  [address]  [place] [address]        Dear Colleague,    Thank you for referring your patient, Gibson Villalta, to the [facility]. Please see a copy of my visit note below.    [facility]    Hematology/Oncology Established Patient Follow-up Note      Today's Date: 11/04/20    Reason for Follow-up: Diffuse large B-cell lymphoma.    Gibson Villalta is a 80 year old male who is being evaluated via a billable telephone visit.      The patient has been notified of following:     \"This telephone visit will be conducted via a call between you and your physician/provider. We have found that certain health care needs can be provided without the need for a physical exam due to the COVID-19 pandemic.  This service lets us provide the care you need with a short phone conversation.  If a prescription is necessary we can send it directly to your pharmacy.  If lab work is needed we can place an order for that and you can then stop by our lab to have the test done at a later time.    Telephone visits are billed at different rates depending on your insurance coverage. During this emergency period, for some insurers they may be billed the same as an in-person visit.  Please reach out to your insurance provider with any questions.    If during the course of the call the physician/provider feels a telephone visit is not appropriate, you will not be charged for this service.\"    Patient has given verbal consent for Telephone visit?  Yes      HISTORY OF PRESENT ILLNESS: Gibson Villalta is a 80 year old male with history of dementia, RV and LV mural thrombus on chronic anticoagulation with Coumadin, status post Smita-en-Y gastric bypass, hypertension, chronic kidney disease, CAD, cardiomyopathy with EF 25-30%, diabetes mellitus who presents with the following oncologic history:  1. 7/2018: Hospitalized for " melena.  7/30/2018 CT abdomen/pelvis with contrast showed postop changes of prior gastric bypass procedure, ill-defined soft tissue thickening in the proximal stomach, increased since 8/9/2016, no evidence for bowel obstruction, colitis, or diverticulitis; no free fluid; 2 bladder wall lesions enhancing, largest measuring 3.9 x 1.8 cm and smaller bladder wall lesion near midline measuring 2 x 1.4 cm, several left renal cysts; liver, gallbladder, spleen, adrenal glands, pancreas, and remaining kidneys unremarkable.  2.  7/30/2018: Upper endoscopy showed a medium sized, fungating, partially circumferential mass with oozing and stigmata of recent bleeding on the greater curvature of the stomach with biopsies suggesting possible lymphoma but it is negative for adenocarcinoma; esophagus and jejunum normal; gastric bypass with normal-sized pouch and intact staple line.  Patient denied any associated abdominal pain, fevers, chills, night sweats, unintentional weight loss, chest pain, dyspnea, hematuria, dysuria.  3.  8/2/2018: Repeat upper endoscopy with biopsy of the stomach mass showed active inflammation and atypical cells with crush artifact.  There were insufficient number of atypical cells to render a definitive diagnosis.  4. 9/21/2018: Underwent TURBT under the care of Dr. Ryan Camarillo.  Pathology showed a T1 high-grade bladder cancer with micropapillary features.  5.  10/19/2018: Repeat upper endoscopy with biopsies confirmed diffuse large B-cell lymphoma, non-germinal center B-cell like, FISH positive for MYC.  Helicobacter pylori stain negative.  6. 10/30/2018: Bone marrow biopsy negative for lymphoma involvement.  7.  10/31/2018: PET/CT scan showed extensive hypermetabolic thickening of the stomach and several loops of small bowel in the left upper quadrant and right lower quadrant consistent with the diagnosed lymphoma, hypermetabolic mesenteric lymphadenopathy, slightly increased metabolic activity in the bone  marrow, scattered small subcentimeter calcified and noncalcified pulmonary nodules with low metabolic activity.  8.  11/12/2018: Underwent cystoscopy with restaging TURBT which showed no evidence of tumor regrowth and no new bladder lesions.  Previous area of resection noted on the right lateral wall just lateral to the right UO.  9.  11/26/2018: Started first-line therapy with mini R-CHOP chemotherapy.  10. 1/28/2019: After 3 cycles of mini R-CHOP, PET/CT scan showed marked improvement in the hypermetabolic thickening of the stomach and loops of small bowel in the left upper quadrant and right lower quadrant, consistent with good response to treatment.  Previous hypermetabolic mesenteric lymphadenopathy has resolved.  Scattered tiny subcentimeter calcified and noncalcified pulmonary nodules have low metabolic activity and are unchanged.  These could represent benign calcified and noncalcified granulomata.  11.  2/19/2019: Cycle 5 and 6 of chemotherapy, doxorubicin held due to worsening shortness of breath and worsened dilation of the left ventricle.  Completion of cycle 6 on 3/12/19.  12.  4/4/2019: PET/CT scan showed no residual lymphoma.  Calcification seen in the bladder.  Benign cyst in left kidney.  13. 8/3/2020: PET/CT scan performed due to recurrent anemia. This showed hypermetabolic nodule in periphery of left side of prostate but no other hypermetabolism elsewhere.       INTERIM HISTORY:  Nelson reports good energy levels and denies any fevers, chills, night sweats, unintentional weight loss, bowel or bladder dysfunction.  He has occasional dyspnea on exertion but only if trying to move quickly.    REVIEW OF SYSTEMS:   14 point ROS was reviewed and is negative other than as noted above in HPI.       HOME MEDICATIONS:  Current Outpatient Medications   Medication Sig Dispense Refill     ACE/ARB NOT PRESCRIBED, INTENTIONAL, ACE & ARB not prescribed due to Symptomatic hypotension not due to excessive diuresis  (Patient not taking: Reported on 3/16/2020)       ASPIRIN NOT PRESCRIBED (INTENTIONAL) Please choose reason not prescribed, below (Patient not taking: Reported on 3/16/2020)       atorvastatin (LIPITOR) 40 MG tablet Take 1 tablet (40 mg) by mouth At Bedtime 90 tablet 3     COENZYME Q-10 PO Take 100 mg by mouth every morning        ferrous sulfate (IRON) 325 (65 Fe) MG tablet Take 325 mg by mouth daily       furosemide (LASIX) 20 MG tablet Take 1.5 tablets (30 mg) by mouth daily 90 tablet 1     hydrALAZINE (APRESOLINE) 25 MG tablet Take 1 tablet (25 mg) by mouth 3 times daily 280 tablet 3     levofloxacin (LEVAQUIN) 500 MG tablet Take 1 tablet (500 mg) by mouth as needed (take 1 tablet 6 hours after each treatment and one tablet next am after each treatment) (Patient not taking: Reported on 8/31/2020) 12 tablet 0     metoprolol succinate ER (TOPROL XL) 50 MG 24 hr tablet Take 1.5 tablets (75 mg) by mouth daily 120 tablet 3     multivitamin, therapeutic with minerals (MULTI-VITAMIN) TABS Take 1 tablet by mouth every morning        venlafaxine (EFFEXOR) 75 MG tablet Take 1 tablet (75 mg) by mouth 2 times daily Follow-up with Dr. Currie in summer for your fasting full examination 60 tablet 0     vitamin D2 (ERGOCALCIFEROL) 17274 units (1250 mcg) capsule TAKE ONE CAPSULE BY MOUTH ONCE A WEEK ON Tuesday. Due for appointment. Please schedule: [phone] 12 capsule 0         ALLERGIES:  Allergies   Allergen Reactions     Wasps [Hornets]      Sand wasp --- years ago.           PAST MEDICAL HISTORY:  Past Medical History:   Diagnosis Date     (HFpEF) heart failure with preserved ejection fraction (H)      Acute kidney injury (H) 2012     Anemia      Anxiety      Anxiety and depression      Bladder mass      BPH (benign prostatic hypertrophy)      Cardiomyopathy (H)     ICD implanted 11/2016     Carpal tunnel syndrome     Right     Chronic kidney disease (CKD), stage 3 (moderate)      Chronic systolic CHF (congestive heart  failure) (H)      Dementia (H)      Dementia without behavioral disturbance, unspecified dementia type (H) 7/22/2016     Depressive disorder      Diabetes (H)      Diffuse large B-cell lymphoma of extranodal site (H) 11/23/2018     Former smoker      Gastric mass      Gout      History of depression      History of gastric bypass 7/22/2016     Iron deficiency anemia, unspecified iron deficiency anemia type 10/15/2018     LBBB (left bundle branch block) 2013     Lumbar herniated disc     L5-S1     Malignant neoplasm of lateral wall of urinary bladder (H) 11/15/2019    Added automatically from request for surgery [id number]     Mixed cardiomyopathy 7/22/2016     Myocardial infarction (H) 1995 and 2010     Nonischemic cardiomyopathy (H) 7/22/2016     Obesity      Pacemaker     ICD/PM     Rosacea      Rotator cuff disorder     Tear x 2     RV (right ventricular) mural thrombus 7/22/2016     SOB (shortness of breath)          PAST SURGICAL HISTORY:  Past Surgical History:   Procedure Laterality Date     ANGIOPLASTY  1995 and 2010 with stent     BIOPSY      stomach     BONE MARROW BIOPSY, BONE SPECIMEN, NEEDLE/TROCAR N/A 10/30/2018    Procedure: BIOPSY BONE MARROW;  Surgeon: Jeb Romero MD;  Location:  GI     CARDIAC SURGERY      ICD/PM     CYSTOSCOPY       CYSTOSCOPY, TRANSURETHRAL RESECTION (TUR) PROSTATE, COMBINED  4/22/2019    Procedure: CYSTOSCOPY AND BIPOLAR TRANSURETHRAL RESECTION (TUR) PROSTATE;  Surgeon: Ryan Camarillo MD;  Location:  OR     CYSTOSCOPY, TRANSURETHRAL RESECTION (TUR) TUMOR BLADDER, COMBINED N/A 9/19/2018    Procedure: COMBINED CYSTOSCOPY, TRANSURETHRAL RESECTION (TUR) TUMOR BLADDER;  CYSTOSCOPY, TRANSURETHRAL RESECTION BLADDER TUMOR ;  Surgeon: Ryan Camarillo MD;  Location:  OR     CYSTOSCOPY, TRANSURETHRAL RESECTION (TUR) TUMOR BLADDER, COMBINED N/A 11/12/2018    Procedure: CYSTOSCOPY RESTAGING TRANSURETHRAL RESECTION BLADDER TUMOR;  Surgeon: Ryan Camarillo MD;  Location:  OR      CYSTOSCOPY, TRANSURETHRAL RESECTION (TUR) TUMOR BLADDER, COMBINED N/A 12/30/2019    Procedure: CYSTOSCOPY, WITH TRANSURETHRAL RESECTION BLADDER TUMOR;  Surgeon: Ryan Camarillo MD;  Location:  OR     EP LEAD REVISION DUAL N/A 10/24/2019    Procedure: EP Lead Revision Dual;  Surgeon: Josias Hernandez MD;  Location:  HEART CARDIAC CATH LAB     ESOPHAGOSCOPY, GASTROSCOPY, DUODENOSCOPY (EGD), COMBINED N/A 7/30/2018    Procedure: COMBINED ESOPHAGOSCOPY, GASTROSCOPY, DUODENOSCOPY (EGD), BIOPSY SINGLE OR MULTIPLE;  gastroscopy;  Surgeon: Parish Xiong MD;  Location:  GI     ESOPHAGOSCOPY, GASTROSCOPY, DUODENOSCOPY (EGD), COMBINED N/A 7/30/2018    Procedure: COMBINED ESOPHAGOSCOPY, GASTROSCOPY, DUODENOSCOPY (EGD);  EGD;  Surgeon: Parish Xiong MD;  Location:  GI     ESOPHAGOSCOPY, GASTROSCOPY, DUODENOSCOPY (EGD), COMBINED N/A 8/2/2018    Procedure: COMBINED ESOPHAGOSCOPY, GASTROSCOPY, DUODENOSCOPY (EGD), BIOPSY SINGLE OR MULTIPLE;  gastroscopy BIOPSYSHOULD BE SENT TO LAB IN NS NOT FORMALIN PER ;  Surgeon: Jonathon Bush MD;  Location:  GI     GASTRIC BYPASS  2001     HEART CATH LEFT HEART CATH  7/19/16    Non ischemic cardiomyopathy; Non functionally significant LAD and RCA disease      IR PORT REMOVAL RIGHT  9/9/2019     LASER HOLMIUM LITHOTRIPSY BLADDER N/A 4/22/2019    Procedure: CYSTOSCOPY, HOLMIUM LASER LITHOLAPAXY ,  AND BIPOLAR TRANSURETHRAL RESECTION (TUR) PROSTATE;  Surgeon: Ryan Camarillo MD;  Location:  OR     OTHER SURGICAL HISTORY  Nov 2016    CRT-D implantation         SOCIAL HISTORY:  Social History     Socioeconomic History     Marital status:      Spouse name: Not on file     Number of children: Not on file     Years of education: Not on file     Highest education level: Not on file   Occupational History     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[employer]     Comment: [employer]; retired   Social Needs     Financial resource strain: Not on file     Food insecurity      Worry: Not on file     Inability: Not on file     Transportation needs     Medical: Not on file     Non-medical: Not on file   Tobacco Use     Smoking status: Former Smoker     Packs/day: 2.00     Years: 20.00     Pack years: 40.00     Types: Cigarettes     Start date:      Quit date: 1980     Years since quittin.8     Smokeless tobacco: Never Used     Tobacco comment: Quit in his 30s - 2 ppd for 20 years   Substance and Sexual Activity     Alcohol use: Not Currently     Alcohol/week: 0.0 standard drinks     Comment: none     Drug use: No     Sexual activity: Yes     Partners: Female   Lifestyle     Physical activity     Days per week: Not on file     Minutes per session: Not on file     Stress: Not on file   Relationships     Social connections     Talks on phone: Not on file     Gets together: Not on file     Attends [employer] service: Not on file     Active member of club or organization: Not on file     Attends meetings of clubs or organizations: Not on file     Relationship status: Not on file     Intimate partner violence     Fear of current or ex partner: Not on file     Emotionally abused: Not on file     Physically abused: Not on file     Forced sexual activity: Not on file   Other Topics Concern     Parent/sibling w/ CABG, MI or angioplasty before 65F 55M? No      Service Not Asked     Blood Transfusions Not Asked     Caffeine Concern Not Asked     Occupational Exposure Not Asked     Hobby Hazards Not Asked     Sleep Concern Not Asked     Stress Concern Not Asked     Weight Concern Not Asked     Special Diet Yes     Comment: low fat, low salt      Back Care Not Asked     Exercise No     Bike Helmet Not Asked     Seat Belt Not Asked     Self-Exams Not Asked   Social History Narrative     Not on file         FAMILY HISTORY:  Family History   Problem Relation Age of Onset     Coronary Artery Disease Father 39     Alzheimer Disease Mother      Coronary Artery Disease Son         Two sons  have  from MIs (ages 39 and 51)         PHYSICAL EXAM:  Vital signs:  There were no vitals taken for this visit.   Not performed as this was a telephone visit.      LABS:  CBC RESULTS:   Recent Labs   Lab Test 20  1348   WBC 6.4   RBC 4.64   HGB 15.1   HCT 46.8   *   MCH 32.5   MCHC 32.3   RDW 15.1*   *     Last Comprehensive Metabolic Panel:  Sodium   Date Value Ref Range Status   2020 142 133 - 144 mmol/L Final     Potassium   Date Value Ref Range Status   2020 3.4 3.4 - 5.3 mmol/L Final     Chloride   Date Value Ref Range Status   2020 110 (H) 94 - 109 mmol/L Final     Carbon Dioxide   Date Value Ref Range Status   2020 25 20 - 32 mmol/L Final     Anion Gap   Date Value Ref Range Status   2020 7 3 - 14 mmol/L Final     Glucose   Date Value Ref Range Status   2020 76 70 - 99 mg/dL Final     Urea Nitrogen   Date Value Ref Range Status   2020 18 7 - 30 mg/dL Final     Creatinine   Date Value Ref Range Status   2020 1.35 (H) 0.66 - 1.25 mg/dL Final     GFR Estimate   Date Value Ref Range Status   2020 50 (L) >60 mL/min/[1.73_m2] Final     Comment:     Non  GFR Calc  Starting 2018, serum creatinine based estimated GFR (eGFR) will be   calculated using the Chronic Kidney Disease Epidemiology Collaboration   (CKD-EPI) equation.       Calcium   Date Value Ref Range Status   2020 9.1 8.5 - 10.1 mg/dL Final     Bilirubin Total   Date Value Ref Range Status   2020 0.7 0.2 - 1.3 mg/dL Final     Alkaline Phosphatase   Date Value Ref Range Status   2020 211 (H) 40 - 150 U/L Final     ALT   Date Value Ref Range Status   2020 31 0 - 70 U/L Final     AST   Date Value Ref Range Status   2020 24 0 - 45 U/L Final       LDH: 306 -> 356 -> 280 -> 270    PATHOLOGY:  2019: Bladder tumor biopsy showed urothelial carcinoma in situ and no evidence of invasion in the planes examined.  Muscularis propria  "present.    IMAGING:  Reviewed as per HPI.    ASSESSMENT/PLAN:  Gibson Villalta is a 80 year old male with the following issues:  1.  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  -I recommended to Nelson ongoing clinical surveillance for lymphoma.  I only recommended repeating a PET/CT scan if he shows any signs or symptoms suggestive of lymphoma recurrence in the future.  However, we will continue to monitor his CBC, CMP, and LDH.     2.  High-grade bladder cancer, T1  3.  Left prostate nodule  -These were seen on CT abdomen/pelvis on 7/30/2018 and further evaluated by Dr. Howard Camarillo on 9/07/2018.    -He proceeded with TURBT x 2 in 9/2018 and 11/2018 with no residual tumor or new bladder cancers.  No adjuvant chemotherapy was recommended for his T1 tumor at that time.  -However, he was found to have urothelial carcinoma in situ and a bladder tumor biopsy on 12/30/2019.  There was no clinical evidence for muscle invasive disease.   -He has since completed 6 BCG treatments on 3/23/2020, tolerating those well with no new urinary problems.  -He follows with Dr. Camarillo and decided not to proceed with any intervention for the prostate nodule that was seen on his last PET/CT scan from 8/3/2020.    4. Cardiomyopathy with systolic dysfunction  -Stable.  His dyspnea on exertion is per his usual.  Continue Lasix and follow-up with cardiology as needed.    Return in 3 months with lab draw prior to visit.    Maribel Bender MD  Hematology/Oncology  [facility]    Phone call duration: 7 minutes      Gibson Villalta is a 80 year old male who is being evaluated via a billable telephone visit.      The patient has been notified of following:     \"This telephone visit will be conducted via a call between you and your " "physician/provider. We have found that certain health care needs can be provided without the need for a physical exam.  This service lets us provide the care you need with a short phone conversation.  If a prescription is necessary we can send it directly to your pharmacy.  If lab work is needed we can place an order for that and you can then stop by our lab to have the test done at a later time.    Telephone visits are billed at different rates depending on your insurance coverage. During this emergency period, for some insurers they may be billed the same as an in-person visit.  Please reach out to your insurance provider with any questions.    If during the course of the call the physician/provider feels a telephone visit is not appropriate, you will not be charged for this service.\"    Patient has given verbal consent for Telephone visit?  Yes    What phone number would you like to be contacted at? [phone]    How would you like to obtain your AVS? [email or web address]    Phone call duration:     Arin Jung CMA  11/4/2020      1:36 PM            Again, thank you for allowing me to participate in the care of your patient.        Sincerely,        Maribel Bender MD    "

## 2020-12-02 NOTE — LETTER
12/2/2020       RE: Gibson Villalta  [address]  [place] [address]     Dear Colleague,    Thank you for referring your patient, Gibson Villalta, to the [facility] [place] at [facility]. Please see a copy of my visit note below.    [place]  CHIEF COMPLAINT   It was my pleasure to see Gibson Villalta who is a 80 year old male for follow-up of bladder cancer.      HPI  Gibson Villalta is a very pleasant 80 year old male who presents with a history of mild dementia, RV and LV mural thrombus on chronic anticoagulation with Coumadin, status post Smita-en-Y gastric bypass, hypertension, CKD, CAD, cardiomyopathy with EF of 20 to 30%, diabetes mellitus.  He also has history of diffuse large B-cell lymphoma who presented with melena and soft tissue thickening of his stomach in July 2018.     9/19/18: Transurethral resection of bladder tumor (TURBT) with T1HG bladder cancer  10/19/18: Repeat upper endoscopy with biopsies confirmed diffuse large B-cell lymphoma  10/30/18: Bone marrow biopsy negative for lymphoma involvement  10/31/18: PET scan showed extensive hypermetabolic thickening of the stomach and several loops of small bowel in the left upper quadrant and right lower quadrant consistent with the diagnosis of lymphoma  11/12/18: Re-staging Transurethral resection of bladder tumor (TURBT) with confirmation of his T1 HG bladder cancer.  Following discussion with his oncologist, Dr. Bender at [facility], we discussed that treatment for his bladder cancer with BCG treatment would not be warranted at this time given his upcoming chemotherapy treatment for his lymphoma and that there would be little benefit of treatment given that this requires an intact immune response.  We will plan for management with surveillance cystoscopy  11/26/18: Started first-line therapy with many R-CHOP chemotherapy.  He follows with Dr. Bender  at [facility]  1/28/19: After 3 cycles of mini R-CHOP, PET scan showed marked improvement consistent with a good response to treatment  2/19/19: Cycle 5 and 6 of chemotherapy, doxorubicin held due to worsening shortness of breath and worsened dilation of his left ventricle.  Completion of cycle 6 on March 12, 2019 4/1/19: Follow-up with me for surveillance cystoscopy with evidence of a bladder stone and significant prostatic hypertrophy  4/4/19: PET scan showed no residual lymphoma.  Calcification seen in the bladder.  4/22/19: Cystlolithalopaxy and Transurethral resection of the prostate with no evidence of malignancy  10/30/19: last surveillance cystoscopy with some raised erythema at the prior resection site. Cytology returned as positive for malignant cells  12/30/19: Re-staging Transurethral resection of bladder tumor (TURBT) with evidence of CIS    1/29/20: He returns to discuss starting BCG    TODAY: surveillance cystoscopy    Past Medical History:   Diagnosis Date     (HFpEF) heart failure with preserved ejection fraction (H)      Acute kidney injury (H) 2012     Anemia      Anxiety      Anxiety and depression      Bladder mass      BPH (benign prostatic hypertrophy)      Cardiomyopathy (H)     ICD implanted 11/2016     Carpal tunnel syndrome     Right     Chronic kidney disease (CKD), stage 3 (moderate)      Chronic systolic CHF (congestive heart failure) (H)      Dementia (H)      Dementia without behavioral disturbance, unspecified dementia type (H) 7/22/2016     Depressive disorder      Diabetes (H)      Diffuse large B-cell lymphoma of extranodal site (H) 11/23/2018     Former smoker      Gastric mass      Gout      History of depression      History of gastric bypass 7/22/2016     Iron deficiency anemia, unspecified iron deficiency anemia type 10/15/2018     LBBB (left bundle branch block) 2013     Lumbar herniated disc     L5-S1     Malignant neoplasm of lateral wall of urinary bladder (H)  11/15/2019    Added automatically from request for surgery [id number]     Mixed cardiomyopathy 7/22/2016     Myocardial infarction (H) 1995 and 2010     Nonischemic cardiomyopathy (H) 7/22/2016     Obesity      Pacemaker     ICD/PM     Rosacea      Rotator cuff disorder     Tear x 2     RV (right ventricular) mural thrombus 7/22/2016     SOB (shortness of breath)      Past Surgical History:   Procedure Laterality Date     ANGIOPLASTY  1995 and 2010 with stent     BIOPSY      stomach     BONE MARROW BIOPSY, BONE SPECIMEN, NEEDLE/TROCAR N/A 10/30/2018    Procedure: BIOPSY BONE MARROW;  Surgeon: Jeb Romero MD;  Location:  GI     CARDIAC SURGERY      ICD/PM     CYSTOSCOPY       CYSTOSCOPY, TRANSURETHRAL RESECTION (TUR) PROSTATE, COMBINED  4/22/2019    Procedure: CYSTOSCOPY AND BIPOLAR TRANSURETHRAL RESECTION (TUR) PROSTATE;  Surgeon: Ryan Camarillo MD;  Location:  OR     CYSTOSCOPY, TRANSURETHRAL RESECTION (TUR) TUMOR BLADDER, COMBINED N/A 9/19/2018    Procedure: COMBINED CYSTOSCOPY, TRANSURETHRAL RESECTION (TUR) TUMOR BLADDER;  CYSTOSCOPY, TRANSURETHRAL RESECTION BLADDER TUMOR ;  Surgeon: Ryan Camarillo MD;  Location: [facility]     CYSTOSCOPY, TRANSURETHRAL RESECTION (TUR) TUMOR BLADDER, COMBINED N/A 11/12/2018    Procedure: CYSTOSCOPY RESTAGING TRANSURETHRAL RESECTION BLADDER TUMOR;  Surgeon: Ryan Camarillo MD;  Location:  OR     CYSTOSCOPY, TRANSURETHRAL RESECTION (TUR) TUMOR BLADDER, COMBINED N/A 12/30/2019    Procedure: CYSTOSCOPY, WITH TRANSURETHRAL RESECTION BLADDER TUMOR;  Surgeon: Ryan Camarillo MD;  Location:  OR     EP LEAD REVISION DUAL N/A 10/24/2019    Procedure: EP Lead Revision Dual;  Surgeon: Josias Hernandez MD;  Location:  HEART CARDIAC CATH LAB     ESOPHAGOSCOPY, GASTROSCOPY, DUODENOSCOPY (EGD), COMBINED N/A 7/30/2018    Procedure: COMBINED ESOPHAGOSCOPY, GASTROSCOPY, DUODENOSCOPY (EGD), BIOPSY SINGLE OR MULTIPLE;  gastroscopy;  Surgeon: Parish Xiong MD;  Location:  GI  "    ESOPHAGOSCOPY, GASTROSCOPY, DUODENOSCOPY (EGD), COMBINED N/A 7/30/2018    Procedure: COMBINED ESOPHAGOSCOPY, GASTROSCOPY, DUODENOSCOPY (EGD);  EGD;  Surgeon: Parish Xiong MD;  Location:  GI     ESOPHAGOSCOPY, GASTROSCOPY, DUODENOSCOPY (EGD), COMBINED N/A 8/2/2018    Procedure: COMBINED ESOPHAGOSCOPY, GASTROSCOPY, DUODENOSCOPY (EGD), BIOPSY SINGLE OR MULTIPLE;  gastroscopy BIOPSYSHOULD BE SENT TO LAB IN NS NOT FORMALIN PER ;  Surgeon: Jonathon Bush MD;  Location:  GI     GASTRIC BYPASS  2001     HEART CATH LEFT HEART CATH  7/19/16    Non ischemic cardiomyopathy; Non functionally significant LAD and RCA disease      IR PORT REMOVAL RIGHT  9/9/2019     LASER HOLMIUM LITHOTRIPSY BLADDER N/A 4/22/2019    Procedure: CYSTOSCOPY, HOLMIUM LASER LITHOLAPAXY ,  AND BIPOLAR TRANSURETHRAL RESECTION (TUR) PROSTATE;  Surgeon: Ryan Camarillo MD;  Location:  OR     OTHER SURGICAL HISTORY  Nov 2016    CRT-D implantation     Current Outpatient Medications   Medication     atorvastatin (LIPITOR) 40 MG tablet     ciprofloxacin (CIPRO) 500 MG tablet     COENZYME Q-10 PO     ferrous sulfate (IRON) 325 (65 Fe) MG tablet     furosemide (LASIX) 20 MG tablet     hydrALAZINE (APRESOLINE) 25 MG tablet     metoprolol succinate ER (TOPROL XL) 50 MG 24 hr tablet     multivitamin, therapeutic with minerals (MULTI-VITAMIN) TABS     venlafaxine (EFFEXOR) 75 MG tablet     vitamin D2 (ERGOCALCIFEROL) 03832 units (1250 mcg) capsule     ACE/ARB NOT PRESCRIBED, INTENTIONAL,     ASPIRIN NOT PRESCRIBED (INTENTIONAL)     Current Facility-Administered Medications   Medication     lidocaine (XYLOCAINE) 2 % external gel        PHYSICAL EXAM  Patient is a 80 year old  male   Vitals: Blood pressure 120/70, pulse 70, height 1.854 m (6' 1\"), weight 95.3 kg (210 lb), SpO2 97 %.  General Appearance Adult: Body mass index is 27.71 kg/m .  Alert, no acute distress, oriented, mild dementia   HENT: throat/mouth:normal, good dentition  Lungs: " no respiratory distress, or pursed lip breathing  Heart: No obvious jugular venous distension present  Abdomen: soft, nontender, no organomegaly or masses  Musculoskeltal: extremities normal, no peripheral edema  Skin: no suspicious lesions or rashes  Neuro: Alert, oriented, speech and mentation normal  Psych: affect and mood normal  Gait: Normal    UA RESULTS:  Recent Labs   Lab Test 09/02/20  1441  10/15/18  2031   COLOR Yellow   < > Yellow   APPEARANCE Clear   < > Slightly Cloudy   URINEGLC Negative   < > Negative   URINEBILI Negative   < > Negative   URINEKETONE Negative   < > Negative   SG 1.025   < > 1.019   UBLD Negative   < > Trace*   URINEPH 5.0   < > 5.0   PROTEIN Negative   < > 30*   UROBILINOGEN 0.2   < >  --    NITRITE Negative   < > Negative   LEUKEST Negative   < > Large*   RBCU  --   --  22*   WBCU  --   --  120*    < > = values in this interval not displayed.      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   - No evidence of invasion in the planes examined.   - Muscularis propria present.     CYSTOSCOPY PROCEDURE NOTE:    Gibson Villalta is a 79 year old male     Pt ID verified with patient: Yes     Procedure verified with patient: Yes     Procedure confirmed with physician and support staff: Yes     Consent form confirmed with physician and support staff.    Sign In  History and Physical Exam reviewed.  Informed Consent Discussed: Yes   Sign in Communication: Yes   Time Out:  Team Confirms the Correct Patient, Correct Procedure; Yes , Correct Site and Site Marking, Correct Position (if applicable).    Affirmation of Time Out: Yes   Sign Out:  Sign Out Discussion: Yes   Physician: Ryan Camarillo MD    A urinalysis was performed revealing no evidence of infection.    The benefits, risks, alternatives of the cystoscopy procedure and personnel were discussed with the patient. The verbal consent was obtained and  the patient agrees to proceed.      Description of procedure:   After fully informed, voluntary consent was obtained, the patient was brought into the procedure room, identified and placed in a supine position on the cystoscopy table.  The groin/scrotum were prepped with betadine and draped in a sterile fashion.  Urojet lidocaine gel was introduced.  A 15F flexible cystoscope was inserted into the urethra, and the bladder and urethra wereexamined in a systematic manner.  The patient tolerated the procedure well and there were no complications.      Cystoscopic findings:  The urethra was normal without strictures.  The prostate was 3-4cm long and demonstrated evidence of prior Transurethral resection of the prostate.  There was no median lobe.  The external sphincter coapted normally and the bladder neck was open following the Transurethral resection of the prostate. The bladder was  entered and careful pan endoscopy was carried out. The posterior, superior and lateral walls and dome of the bladder were all well visualized and the scope was retroflexed upon itself..  There was moderate/severe trabeculation.  There were no neoplasms, stones, or diverticula identifed.  The ureteric orifices were normal in position and number and effluxing clear urine. There was no evidence of recurrence at the previous sites of resection.     Optimal treatment plan:  Date of Transurethral resection of bladder tumor (TURBT): 9/19/18  Grade/Stage: HG T1  Date of Re-staging Transurethral resection of bladder tumor (TURBT): 11/12/18  Residual disease: Yes  Grade/Stage: HG T1  Date of Re-staging Transurethral resection of bladder tumor (TURBT): 12/30/19  Residual disease: Yes  Grade/Stage: CIS    Induction (month 0) - BCG not warranted given treatment for his lymphoma. This treatment is now complete - 2/3/2020     Cystoscopy (schedule one week prior to treatment when cysto and treatment coincide)  Month 3 - 4/1/19 with no evidence of  disease, however evidence of bladder stone and BPH  Month 6 - 7/24/19 with no evidence of disease and well healed after TURP on 4/22/19  Month 9 - 10/30/19 with no clear evidence of disease, some raised erythema, cytology pending     After recurrence  Month 3 - Missed due to COVID-19  Month 6 - Missed due to COVID-19  Month 9 - 9/2/20, no evidence of disease, cytology neagitive  Month 12 - 12/2/20, no evidence of disease, cytology pending  Month 15  Month 18  Month 21  Month 24  Month 27  Month 30  Month 36  Year 4  Year 5  Year 6  Year 8  Year 10    ASSESSMENT and PLAN  Gibson Villalta is a 80 year old male with a history of HG T1 bladder cancer and s/p TURP. He was not a candidate for BCG initially due to treatment for lymphoma. Evidence of CIS at Transurethral resection of bladder tumor (TURBT) on 12/30/2019. Now s/p induction BCG.     Bladder cancer  -He is status post his induction BCG with no evidence of recurrence on surveillance cystoscopy today  -Cytology negative from Sept  -Plan for surveillance cystoscopy in 3 months  - No maintenance BCG due to COVID-19 and to limit his health care exposure    Abnormal prostate on PET  -He has an abnormal lesion in his prostate noted on a recent PET scan.  Plan had been for prostate MRI, however his ICD is not MRI compatible  -We discussed the very low likelihood of him developing a clinically significant prostate cancer given his last PSA in 2015 was normal and that his pathology from his TURP did not have any evidence of malignancy.  -No further work-up at this time      I spent over 15 minutes with the patient.  Over half this time was spent on counseling regarding the above.    Ryan Camarillo MD   Urology  [facility]  Clinic Phone [phone]

## 2020-12-02 NOTE — PATIENT INSTRUCTIONS
"AFTER YOUR CYSTOSCOPY  ?  ?  You have just completed a cystoscopy, or \"cysto\", which allowed your physician to learn more about your bladder (or to remove a stent placed after surgery). We suggest that you continue to avoid caffeine, fruit juice, and alcohol for the next 24 hours, however, you are encouraged to return to your normal activities.  ?  ?  A few things that are considered normal after your cystoscopy:  ?  * small amount of bleeding (or spotting) that clears within the next 24 hours  ?  * slight burning sensation with urination  ?  * sensation of needing to void (urinate) more frequently  ?  * the feeling of \"air\" in your urine  ?  * mild discomfort that is relieved with Tylenol    * bladder spasms  ?  ?  ?  Please contact our office promptly if you:  ?  * develop a fever above 101 degrees  ?  * are unable to urinate  ?  * develop bright red blood that does not stop  ?  * experience severe pain or swelling  ?  ?  ?  And of course, please contact our office with any concerns or questions [phone]  ?      AFTER YOUR CYSTOSCOPY        You have just completed a cystoscopy, or \"cysto\", which allowed your physician to learn more about your bladder (or to remove a stent placed after surgery). We suggest that you continue to avoid caffeine, fruit juice, and alcohol for the next 24 hours, however, you are encouraged to return to your normal activities.         A few things that are considered normal after your cystoscopy:     * Small amount of bleeding (or spotting) that clears within the next 24 hours     * Slight burning sensation with urination     * Sensation to of needing to avoid more frequently     * The feeling of \"air\" in your urine     * Mild discomfort that is relieved with Tylenol        Please contact our office promptly if you:     * Develop a fever above 101 degrees     * Are unable to urinate     * Develop bright red blood that does not stop     * Severe pain or swelling         Please contact " our office with any concerns or questions @[facility].

## 2020-12-02 NOTE — PROGRESS NOTES
[facility]  CHIEF COMPLAINT   It was my pleasure to see Gibson Villalta who is a 80 year old male for follow-up of bladder cancer.      HPI  Gibson Villalta is a very pleasant 80 year old male who presents with a history of mild dementia, RV and LV mural thrombus on chronic anticoagulation with Coumadin, status post Smita-en-Y gastric bypass, hypertension, CKD, CAD, cardiomyopathy with EF of 20 to 30%, diabetes mellitus.  He also has history of diffuse large B-cell lymphoma who presented with melena and soft tissue thickening of his stomach in July 2018.     9/19/18: Transurethral resection of bladder tumor (TURBT) with T1HG bladder cancer  10/19/18: Repeat upper endoscopy with biopsies confirmed diffuse large B-cell lymphoma  10/30/18: Bone marrow biopsy negative for lymphoma involvement  10/31/18: PET scan showed extensive hypermetabolic thickening of the stomach and several loops of small bowel in the left upper quadrant and right lower quadrant consistent with the diagnosis of lymphoma  11/12/18: Re-staging Transurethral resection of bladder tumor (TURBT) with confirmation of his T1 HG bladder cancer.  Following discussion with his oncologist, Dr. Bender at [facility], we discussed that treatment for his bladder cancer with BCG treatment would not be warranted at this time given his upcoming chemotherapy treatment for his lymphoma and that there would be little benefit of treatment given that this requires an intact immune response.  We will plan for management with surveillance cystoscopy  11/26/18: Started first-line therapy with many R-CHOP chemotherapy.  He follows with Dr. Bender at [facility]  1/28/19: After 3 cycles of mini R-CHOP, PET scan showed marked improvement consistent with a good response to treatment  2/19/19: Cycle 5 and 6 of chemotherapy, doxorubicin held due to worsening shortness of breath and worsened dilation of his left ventricle.  Completion of cycle 6 on March 12, 2019 4/1/19:  Follow-up with me for surveillance cystoscopy with evidence of a bladder stone and significant prostatic hypertrophy  4/4/19: PET scan showed no residual lymphoma.  Calcification seen in the bladder.  4/22/19: Cystlolithalopaxy and Transurethral resection of the prostate with no evidence of malignancy  10/30/19: last surveillance cystoscopy with some raised erythema at the prior resection site. Cytology returned as positive for malignant cells  12/30/19: Re-staging Transurethral resection of bladder tumor (TURBT) with evidence of CIS    1/29/20: He returns to discuss starting BCG    TODAY: surveillance cystoscopy    Past Medical History:   Diagnosis Date     (HFpEF) heart failure with preserved ejection fraction (H)      Acute kidney injury (H) 2012     Anemia      Anxiety      Anxiety and depression      Bladder mass      BPH (benign prostatic hypertrophy)      Cardiomyopathy (H)     ICD implanted 11/2016     Carpal tunnel syndrome     Right     Chronic kidney disease (CKD), stage 3 (moderate)      Chronic systolic CHF (congestive heart failure) (H)      Dementia (H)      Dementia without behavioral disturbance, unspecified dementia type (H) 7/22/2016     Depressive disorder      Diabetes (H)      Diffuse large B-cell lymphoma of extranodal site (H) 11/23/2018     Former smoker      Gastric mass      Gout      History of depression      History of gastric bypass 7/22/2016     Iron deficiency anemia, unspecified iron deficiency anemia type 10/15/2018     LBBB (left bundle branch block) 2013     Lumbar herniated disc     L5-S1     Malignant neoplasm of lateral wall of urinary bladder (H) 11/15/2019    Added automatically from request for surgery [id number]     Mixed cardiomyopathy 7/22/2016     Myocardial infarction (H) 1995 and 2010     Nonischemic cardiomyopathy (H) 7/22/2016     Obesity      Pacemaker     ICD/PM     Rosacea      Rotator cuff disorder     Tear x 2     RV (right ventricular) mural thrombus 7/22/2016      SOB (shortness of breath)      Past Surgical History:   Procedure Laterality Date     ANGIOPLASTY  1995 and 2010 with stent     BIOPSY      stomach     BONE MARROW BIOPSY, BONE SPECIMEN, NEEDLE/TROCAR N/A 10/30/2018    Procedure: BIOPSY BONE MARROW;  Surgeon: Jeb Romero MD;  Location:  GI     CARDIAC SURGERY      ICD/PM     CYSTOSCOPY       CYSTOSCOPY, TRANSURETHRAL RESECTION (TUR) PROSTATE, COMBINED  4/22/2019    Procedure: CYSTOSCOPY AND BIPOLAR TRANSURETHRAL RESECTION (TUR) PROSTATE;  Surgeon: Ryan Camarillo MD;  Location:  OR     CYSTOSCOPY, TRANSURETHRAL RESECTION (TUR) TUMOR BLADDER, COMBINED N/A 9/19/2018    Procedure: COMBINED CYSTOSCOPY, TRANSURETHRAL RESECTION (TUR) TUMOR BLADDER;  CYSTOSCOPY, TRANSURETHRAL RESECTION BLADDER TUMOR ;  Surgeon: Ryan Camarillo MD;  Location:  OR     CYSTOSCOPY, TRANSURETHRAL RESECTION (TUR) TUMOR BLADDER, COMBINED N/A 11/12/2018    Procedure: CYSTOSCOPY RESTAGING TRANSURETHRAL RESECTION BLADDER TUMOR;  Surgeon: Ryan Camarillo MD;  Location:  OR     CYSTOSCOPY, TRANSURETHRAL RESECTION (TUR) TUMOR BLADDER, COMBINED N/A 12/30/2019    Procedure: CYSTOSCOPY, WITH TRANSURETHRAL RESECTION BLADDER TUMOR;  Surgeon: Ryan Camarillo MD;  Location:  OR     EP LEAD REVISION DUAL N/A 10/24/2019    Procedure: EP Lead Revision Dual;  Surgeon: Josias Hernandez MD;  Location:  HEART CARDIAC CATH LAB     ESOPHAGOSCOPY, GASTROSCOPY, DUODENOSCOPY (EGD), COMBINED N/A 7/30/2018    Procedure: COMBINED ESOPHAGOSCOPY, GASTROSCOPY, DUODENOSCOPY (EGD), BIOPSY SINGLE OR MULTIPLE;  gastroscopy;  Surgeon: Parish Xiong MD;  Location:  GI     ESOPHAGOSCOPY, GASTROSCOPY, DUODENOSCOPY (EGD), COMBINED N/A 7/30/2018    Procedure: COMBINED ESOPHAGOSCOPY, GASTROSCOPY, DUODENOSCOPY (EGD);  EGD;  Surgeon: Parish Xiong MD;  Location:  GI     ESOPHAGOSCOPY, GASTROSCOPY, DUODENOSCOPY (EGD), COMBINED N/A 8/2/2018    Procedure: COMBINED ESOPHAGOSCOPY, GASTROSCOPY,  "DUODENOSCOPY (EGD), BIOPSY SINGLE OR MULTIPLE;  gastroscopy BIOPSYSHOULD BE SENT TO LAB IN NS NOT FORMALIN PER ;  Surgeon: Jonathon Bush MD;  Location:  GI     GASTRIC BYPASS  2001     HEART CATH LEFT HEART CATH  7/19/16    Non ischemic cardiomyopathy; Non functionally significant LAD and RCA disease      IR PORT REMOVAL RIGHT  9/9/2019     LASER HOLMIUM LITHOTRIPSY BLADDER N/A 4/22/2019    Procedure: CYSTOSCOPY, HOLMIUM LASER LITHOLAPAXY ,  AND BIPOLAR TRANSURETHRAL RESECTION (TUR) PROSTATE;  Surgeon: Ryan Camarillo MD;  Location:  OR     OTHER SURGICAL HISTORY  Nov 2016    CRT-D implantation     Current Outpatient Medications   Medication     atorvastatin (LIPITOR) 40 MG tablet     ciprofloxacin (CIPRO) 500 MG tablet     COENZYME Q-10 PO     ferrous sulfate (IRON) 325 (65 Fe) MG tablet     furosemide (LASIX) 20 MG tablet     hydrALAZINE (APRESOLINE) 25 MG tablet     metoprolol succinate ER (TOPROL XL) 50 MG 24 hr tablet     multivitamin, therapeutic with minerals (MULTI-VITAMIN) TABS     venlafaxine (EFFEXOR) 75 MG tablet     vitamin D2 (ERGOCALCIFEROL) 66000 units (1250 mcg) capsule     ACE/ARB NOT PRESCRIBED, INTENTIONAL,     ASPIRIN NOT PRESCRIBED (INTENTIONAL)     Current Facility-Administered Medications   Medication     lidocaine (XYLOCAINE) 2 % external gel        PHYSICAL EXAM  Patient is a 80 year old  male   Vitals: Blood pressure 120/70, pulse 70, height 1.854 m (6' 1\"), weight 95.3 kg (210 lb), SpO2 97 %.  General Appearance Adult: Body mass index is 27.71 kg/m .  Alert, no acute distress, oriented, mild dementia   HENT: throat/mouth:normal, good dentition  Lungs: no respiratory distress, or pursed lip breathing  Heart: No obvious jugular venous distension present  Abdomen: soft, nontender, no organomegaly or masses  Musculoskeltal: extremities normal, no peripheral edema  Skin: no suspicious lesions or rashes  Neuro: Alert, oriented, speech and mentation normal  Psych: affect and " mood normal  Gait: Normal    UA RESULTS:  Recent Labs   Lab Test 09/02/20  1441  10/15/18  2031   COLOR Yellow   < > Yellow   APPEARANCE Clear   < > Slightly Cloudy   URINEGLC Negative   < > Negative   URINEBILI Negative   < > Negative   URINEKETONE Negative   < > Negative   SG 1.025   < > 1.019   UBLD Negative   < > Trace*   URINEPH 5.0   < > 5.0   PROTEIN Negative   < > 30*   UROBILINOGEN 0.2   < >  --    NITRITE Negative   < > Negative   LEUKEST Negative   < > Large*   RBCU  --   --  22*   WBCU  --   --  120*    < > = values in this interval not displayed.      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   - No evidence of invasion in the planes examined.   - Muscularis propria present.     CYSTOSCOPY PROCEDURE NOTE:    Gibson Villalta is a 79 year old male     Pt ID verified with patient: Yes     Procedure verified with patient: Yes     Procedure confirmed with physician and support staff: Yes     Consent form confirmed with physician and support staff.    Sign In  History and Physical Exam reviewed.  Informed Consent Discussed: Yes   Sign in Communication: Yes   Time Out:  Team Confirms the Correct Patient, Correct Procedure; Yes , Correct Site and Site Marking, Correct Position (if applicable).    Affirmation of Time Out: Yes   Sign Out:  Sign Out Discussion: Yes   Physician: Ryan Camarillo MD    A urinalysis was performed revealing no evidence of infection.    The benefits, risks, alternatives of the cystoscopy procedure and personnel were discussed with the patient. The verbal consent was obtained and the patient agrees to proceed.      Description of procedure:   After fully informed, voluntary consent was obtained, the patient was brought into the procedure room, identified and placed in a supine position on the cystoscopy table.  The groin/scrotum were prepped with betadine and draped in a sterile fashion.  [employer]VCt  lidocaine gel was introduced.  A 15F flexible cystoscope was inserted into the urethra, and the bladder and urethra wereexamined in a systematic manner.  The patient tolerated the procedure well and there were no complications.      Cystoscopic findings:  The urethra was normal without strictures.  The prostate was 3-4cm long and demonstrated evidence of prior Transurethral resection of the prostate.  There was no median lobe.  The external sphincter coapted normally and the bladder neck was open following the Transurethral resection of the prostate. The bladder was  entered and careful pan endoscopy was carried out. The posterior, superior and lateral walls and dome of the bladder were all well visualized and the scope was retroflexed upon itself..  There was moderate/severe trabeculation.  There were no neoplasms, stones, or diverticula identifed.  The ureteric orifices were normal in position and number and effluxing clear urine. There was no evidence of recurrence at the previous sites of resection.     Optimal treatment plan:  Date of Transurethral resection of bladder tumor (TURBT): 9/19/18  Grade/Stage: HG T1  Date of Re-staging Transurethral resection of bladder tumor (TURBT): 11/12/18  Residual disease: Yes  Grade/Stage: HG T1  Date of Re-staging Transurethral resection of bladder tumor (TURBT): 12/30/19  Residual disease: Yes  Grade/Stage: CIS    Induction (month 0) - BCG not warranted given treatment for his lymphoma. This treatment is now complete - 2/3/2020     Cystoscopy (schedule one week prior to treatment when cysto and treatment coincide)  Month 3 - 4/1/19 with no evidence of disease, however evidence of bladder stone and BPH  Month 6 - 7/24/19 with no evidence of disease and well healed after TURP on 4/22/19  Month 9 - 10/30/19 with no clear evidence of disease, some raised erythema, cytology pending     After recurrence  Month 3 - Missed due to COVID-19  Month 6 - Missed due to COVID-19  Month 9  - 9/2/20, no evidence of disease, cytology neagitive  Month 12 - 12/2/20, no evidence of disease, cytology pending  Month 15  Month 18  Month 21  Month 24  Month 27  Month 30  Month 36  Year 4  Year 5  Year 6  Year 8  Year 10    ASSESSMENT and PLAN  Gibson Villalta is a 80 year old male with a history of HG T1 bladder cancer and s/p TURP. He was not a candidate for BCG initially due to treatment for lymphoma. Evidence of CIS at Transurethral resection of bladder tumor (TURBT) on 12/30/2019. Now s/p induction BCG.     Bladder cancer  -He is status post his induction BCG with no evidence of recurrence on surveillance cystoscopy today  -Cytology negative from Sept  -Plan for surveillance cystoscopy in 3 months  - No maintenance BCG due to COVID-19 and to limit his health care exposure    Abnormal prostate on PET  -He has an abnormal lesion in his prostate noted on a recent PET scan.  Plan had been for prostate MRI, however his ICD is not MRI compatible  -We discussed the very low likelihood of him developing a clinically significant prostate cancer given his last PSA in 2015 was normal and that his pathology from his TURP did not have any evidence of malignancy.  -No further work-up at this time      I spent over 15 minutes with the patient.  Over half this time was spent on counseling regarding the above.    Ryan Camarillo MD   Urology  [facility]  Clinic Phone [phone]

## 2020-12-02 NOTE — NURSING NOTE
Chief Complaint   Patient presents with     HX malignant neoplasm of bladder     5mL 2% lidocaine hydrochloride Urojet instilled into urethra.    NDC# [address]-6  Lot #: [id number]  Expiration Date:  6-22    Flori Frias

## 2020-12-17 NOTE — PATIENT INSTRUCTIONS
1. Follow up with Dr. Heard in 6 months with an echocardiogram and after device check  2. Continue current medications

## 2020-12-17 NOTE — LETTER
12/17/2020    José Miguel Gibson MD  [address]  [facility] [address]    RE: Gibson Villalta       Dear Colleague,    I had the pleasure of seeing Gibson Villalta in the [facility].      HPI:     Mr. Villalta is a 80 year old male and has a past medical history significant for large B-cell lymphoma with stage II with transurethral resection of bladder tumor, ischemic cardiomyopathy  with LVEF 25-30% with (dating back at least until 2016) status post biventricular ICD, anemia, chronic kidney disease, RV thrombus  (noted on cardiac MRI in 2016), LV thrombus (seen on echocardiogram from 5/2017), Coumadin which was subsequently discontinued for GI bleed, dementia with short-term memory loss and left bundle branch block. He is here for annual follow up ([facility] clinic). Normally follows up with Dr. Heard.    He has no ICD shocks and reports overall doing well. Actually losing weight. No LE edema or sob or chest pain. No changes in medications since he saw Rose in August.      His most recent echocardiogram in April 2020 showed a worsening LV function estimated at less than 20%, posterior leaflet of the mitral valve appeared tethered and moderate mitral regurgitation was noted. His  Blood pressure has been very well controlled and thus he has not had issues with any MR related SOB.      Imaging reviewed:     ECHO 4/2020    Interpretation Summary     The left ventricle is mildly dilated.  Left ventricular systolic function is severely reduced.  The visual ejection fraction is estimated at <20%.  There are regional wall motion abnormalities as specified.  Posterior leaflet of mitral valve appears tethered.  There is moderate (2+) mitral regurgitation.  Compared to prior study, changes are noted.  _____________________________________________________________________________    Left Ventricle  The left ventricle is mildly dilated. Diastolic Doppler findings (E/E' ratio  and/or  other parameters) suggest left ventricular filling pressures are  increased. Left ventricular systolic function is severely reduced. The visual  ejection fraction is estimated at <20%. There is apical akinesis. There is  severe inferolateral wall hypokinesis. There is moderate to severe anterior  wall hypokinesis. Best motion is seen in basal anteroseptal and basal lateral  walls. There are regional wall motion abnormalities as specified.      ASSESSMENT/PLAN:     1.  Ischemic cardiomyopathy with an ejection fraction less than 20%. Biv ICD.  -stable, compensated  -no shocks, device check today   -continue lasix dosing and daily weights, afterload reduction with hydral  -on metoprolol for beta blockade  -low sodium diet      2. Moderate MR: asymptomatic, yearly echocardiogram and afterload reduction as above     3.  History of RV and LV thrombus, resolved.  He was previously on warfarin however this was discontinued due to a GI bleed.  There is been no documented recurrence.    Follow up with Dr. Heard in 6 months.     Lin Larkin MD [facility]  [facility]     PAST MEDICAL HISTORY  Past Medical History:   Diagnosis Date     (HFpEF) heart failure with preserved ejection fraction (H)      Acute kidney injury (H) 2012     Anemia      Anxiety      Anxiety and depression      Bladder mass      BPH (benign prostatic hypertrophy)      Cardiomyopathy (H)     ICD implanted 11/2016     Carpal tunnel syndrome     Right     Chronic kidney disease (CKD), stage 3 (moderate)      Chronic systolic CHF (congestive heart failure) (H)      Dementia (H)      Dementia without behavioral disturbance, unspecified dementia type (H) 7/22/2016     Depressive disorder      Diabetes (H)      Diffuse large B-cell lymphoma of extranodal site (H) 11/23/2018     Former smoker      Gastric mass      Gout      History of depression      History of gastric bypass 7/22/2016     Iron deficiency anemia, unspecified iron deficiency anemia type  10/15/2018     LBBB (left bundle branch block) 2013     Lumbar herniated disc     L5-S1     Malignant neoplasm of lateral wall of urinary bladder (H) 11/15/2019    Added automatically from request for surgery [id number]     Mixed cardiomyopathy 7/22/2016     Myocardial infarction (H) 1995 and 2010     Nonischemic cardiomyopathy (H) 7/22/2016     Obesity      Pacemaker     ICD/PM     Rosacea      Rotator cuff disorder     Tear x 2     RV (right ventricular) mural thrombus 7/22/2016     SOB (shortness of breath)        CURRENT MEDICATIONS  Current Outpatient Medications   Medication Sig Dispense Refill     atorvastatin (LIPITOR) 40 MG tablet Take 1 tablet (40 mg) by mouth At Bedtime (Patient taking differently: Take 20 mg by mouth At Bedtime ) 90 tablet 3     COENZYME Q-10 PO Take 100 mg by mouth every morning        ferrous sulfate (IRON) 325 (65 Fe) MG tablet Take 325 mg by mouth daily       furosemide (LASIX) 20 MG tablet Take 1.5 tablets (30 mg) by mouth daily 90 tablet 1     hydrALAZINE (APRESOLINE) 25 MG tablet Take 1 tablet (25 mg) by mouth 3 times daily 280 tablet 3     metoprolol succinate ER (TOPROL XL) 50 MG 24 hr tablet Take 1.5 tablets (75 mg) by mouth daily 120 tablet 3     multivitamin, therapeutic with minerals (MULTI-VITAMIN) TABS Take 1 tablet by mouth every morning        venlafaxine (EFFEXOR) 75 MG tablet Take 1 tablet (75 mg) by mouth 2 times daily 60 tablet 6     Vitamin D, Cholecalciferol, 25 MCG (1000 UT) TABS Take 2,000 Units by mouth daily       ACE/ARB NOT PRESCRIBED, INTENTIONAL, ACE & ARB not prescribed due to Symptomatic hypotension not due to excessive diuresis (Patient not taking: Reported on 3/16/2020)       ASPIRIN NOT PRESCRIBED (INTENTIONAL) Please choose reason not prescribed, below (Patient not taking: Reported on 3/16/2020)       vitamin D2 (ERGOCALCIFEROL) 84471 units (1250 mcg) capsule TAKE ONE CAPSULE BY MOUTH ONCE A WEEK ON Tuesday. Due for appointment. Please schedule:  [phone] (Patient not taking: Reported on 12/17/2020) 12 capsule 0       PAST SURGICAL HISTORY:  Past Surgical History:   Procedure Laterality Date     ANGIOPLASTY  1995 and 2010 with stent     BIOPSY      stomach     BONE MARROW BIOPSY, BONE SPECIMEN, NEEDLE/TROCAR N/A 10/30/2018    Procedure: BIOPSY BONE MARROW;  Surgeon: Jeb Romero MD;  Location:  GI     CARDIAC SURGERY      ICD/PM     CYSTOSCOPY       CYSTOSCOPY, TRANSURETHRAL RESECTION (TUR) PROSTATE, COMBINED  4/22/2019    Procedure: CYSTOSCOPY AND BIPOLAR TRANSURETHRAL RESECTION (TUR) PROSTATE;  Surgeon: Ryan Camarillo MD;  Location:  OR     CYSTOSCOPY, TRANSURETHRAL RESECTION (TUR) TUMOR BLADDER, COMBINED N/A 9/19/2018    Procedure: COMBINED CYSTOSCOPY, TRANSURETHRAL RESECTION (TUR) TUMOR BLADDER;  CYSTOSCOPY, TRANSURETHRAL RESECTION BLADDER TUMOR ;  Surgeon: Ryan Camarillo MD;  Location:  OR     CYSTOSCOPY, TRANSURETHRAL RESECTION (TUR) TUMOR BLADDER, COMBINED N/A 11/12/2018    Procedure: CYSTOSCOPY RESTAGING TRANSURETHRAL RESECTION BLADDER TUMOR;  Surgeon: Ryan Camarillo MD;  Location:  OR     CYSTOSCOPY, TRANSURETHRAL RESECTION (TUR) TUMOR BLADDER, COMBINED N/A 12/30/2019    Procedure: CYSTOSCOPY, WITH TRANSURETHRAL RESECTION BLADDER TUMOR;  Surgeon: Ryan Camarillo MD;  Location:  OR     EP LEAD REVISION DUAL N/A 10/24/2019    Procedure: EP Lead Revision Dual;  Surgeon: Josias Hernandez MD;  Location:  HEART CARDIAC CATH LAB     ESOPHAGOSCOPY, GASTROSCOPY, DUODENOSCOPY (EGD), COMBINED N/A 7/30/2018    Procedure: COMBINED ESOPHAGOSCOPY, GASTROSCOPY, DUODENOSCOPY (EGD), BIOPSY SINGLE OR MULTIPLE;  gastroscopy;  Surgeon: Parish Xiong MD;  Location:  GI     ESOPHAGOSCOPY, GASTROSCOPY, DUODENOSCOPY (EGD), COMBINED N/A 7/30/2018    Procedure: COMBINED ESOPHAGOSCOPY, GASTROSCOPY, DUODENOSCOPY (EGD);  EGD;  Surgeon: Parish Xiong MD;  Location:  GI     ESOPHAGOSCOPY, GASTROSCOPY, DUODENOSCOPY (EGD), COMBINED N/A  2018    Procedure: COMBINED ESOPHAGOSCOPY, GASTROSCOPY, DUODENOSCOPY (EGD), BIOPSY SINGLE OR MULTIPLE;  gastroscopy BIOPSYSHOULD BE SENT TO LAB IN NS NOT FORMALIN PER ;  Surgeon: Jonathon Bush MD;  Location:  GI     GASTRIC BYPASS       HEART CATH LEFT HEART CATH  16    Non ischemic cardiomyopathy; Non functionally significant LAD and RCA disease      IR PORT REMOVAL RIGHT  2019     LASER HOLMIUM LITHOTRIPSY BLADDER N/A 2019    Procedure: CYSTOSCOPY, HOLMIUM LASER LITHOLAPAXY ,  AND BIPOLAR TRANSURETHRAL RESECTION (TUR) PROSTATE;  Surgeon: Ryan Camarillo MD;  Location:  OR     OTHER SURGICAL HISTORY  2016    CRT-D implantation       ALLERGIES     Allergies   Allergen Reactions     Wasps [Hornets]      Sand wasp --- years ago.         FAMILY HISTORY  Family History   Problem Relation Age of Onset     Coronary Artery Disease Father 39     Alzheimer Disease Mother      Coronary Artery Disease Son         Two sons have  from MIs (ages 39 and 51)       SOCIAL HISTORY  Social History     Socioeconomic History     Marital status:      Spouse name: Not on file     Number of children: Not on file     Years of education: Not on file     Highest education level: Not on file   Occupational History     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     Comment: [employer]; retired   Social Needs     Financial resource strain: Not on file     Food insecurity     Worry: Not on file     Inability: Not on file     Transportation needs     Medical: Not on file     Non-medical: Not on file   Tobacco Use     Smoking status: Former Smoker     Packs/day: 2.00     Years: 20.00     Pack years: 40.00     Types: Cigarettes     Start date:      Quit date: 1980     Years since quittin.9     Smokeless tobacco: Never Used     Tobacco comment: Quit in his 30s - 2 ppd for 20 years   Substance and Sexual Activity     Alcohol use: Not Currently     Alcohol/week: 0.0 standard drinks      "Comment: none     Drug use: No     Sexual activity: Yes     Partners: Female   Lifestyle     Physical activity     Days per week: Not on file     Minutes per session: Not on file     Stress: Not on file   Relationships     Social connections     Talks on phone: Not on file     Gets together: Not on file     Attends Zoroastrian service: Not on file     Active member of club or organization: Not on file     Attends meetings of clubs or organizations: Not on file     Relationship status: Not on file     Intimate partner violence     Fear of current or ex partner: Not on file     Emotionally abused: Not on file     Physically abused: Not on file     Forced sexual activity: Not on file   Other Topics Concern     Parent/sibling w/ CABG, MI or angioplasty before 65F 55M? No      Service Not Asked     Blood Transfusions Not Asked     Caffeine Concern Not Asked     Occupational Exposure Not Asked     Hobby Hazards Not Asked     Sleep Concern Not Asked     Stress Concern Not Asked     Weight Concern Not Asked     Special Diet Yes     Comment: low fat, low salt      Back Care Not Asked     Exercise No     Bike Helmet Not Asked     Seat Belt Not Asked     Self-Exams Not Asked   Social History Narrative     Not on file       ROS:   Constitutional: No fever, chills, or sweats. No weight gain/loss   ENT: No visual disturbance, ear ache, epistaxis, sore throat  Allergies/Immunologic: Negative  Respiratory: No cough, hemoptysia  Cardiovascular: As per HPI  GI: No nausea, vomiting, hematemesis, melena, or hematochezia  : No urinary frequency, dysuria, or hematuria  Integument: Negative  Psychiatric: Negative  Neuro: Negative  Endocrinology: Negative   Musculoskeletal: Negative  Vascular: No walking impairment, claudication, ischemic rest pain or nonhealing wounds    EXAM:  /70   Pulse 78   Ht 1.854 m (6' 1\")   Wt 87.5 kg (193 lb)   BMI 25.46 kg/m    In general, the patient is a pleasant male in no apparent distress. "    HEENT: NC/AT.  PERRLA.  EOMI.  Sclerae white, not injected.  Nares clear.  Pharynx without erythema or exudate.  Dentition intact.    Neck: No adenopathy.  No thyromegaly. Carotids +2/2 bilaterally without bruits.  No jugular venous distension.   Heart: RRR. Normal S1, S2 splits physiologically. +apical murmur, rub, click, or gallop. The PMI is in the 5th ICS in the midclavicular line. There is no heave.    Lungs: CTA.  No ronchi, wheezes, rales.  No dullness to percussion.   Abdomen: Soft, nontender, nondistended. No organomegaly. No AAA.  No bruits.   Extremities: No clubbing, cyanosis, or edema.  No wounds. No varicose veins signs of chronic venous insufficiency.   Vascular: No bruits are noted.    Labs:  LIPID RESULTS:  Lab Results   Component Value Date    CHOL 128 08/20/2020    HDL 39 (L) 08/20/2020    LDL 69 08/20/2020    TRIG 100 08/20/2020    NHDL 89 08/20/2020       LIVER ENZYME RESULTS:  Lab Results   Component Value Date    AST 24 11/02/2020    ALT 31 11/02/2020       CBC RESULTS:  Lab Results   Component Value Date    WBC 6.4 11/02/2020    RBC 4.64 11/02/2020    HGB 15.1 11/02/2020    HCT 46.8 11/02/2020     (H) 11/02/2020    MCH 32.5 11/02/2020    MCHC 32.3 11/02/2020    RDW 15.1 (H) 11/02/2020     (L) 11/02/2020       BMP RESULTS:  Lab Results   Component Value Date     11/02/2020    POTASSIUM 3.4 11/02/2020    CHLORIDE 110 (H) 11/02/2020    CO2 25 11/02/2020    ANIONGAP 7 11/02/2020    GLC 76 11/02/2020    BUN 18 11/02/2020    CR 1.35 (H) 11/02/2020    GFRESTIMATED 50 (L) 11/02/2020    GFRESTBLACK 57 (L) 11/02/2020    SHELLEY 9.1 11/02/2020        A1C RESULTS:  Lab Results   Component Value Date    A1C 6.2 (H) 08/20/2020       Thank you for allowing me to participate in the care of your patient.    Sincerely,     Lin Larkin MD     [facility]

## 2020-12-17 NOTE — PROGRESS NOTES
HPI:     Mr. Villalta is a 80 year old male and has a past medical history significant for large B-cell lymphoma with stage II with transurethral resection of bladder tumor, ischemic cardiomyopathy  with LVEF 25-30% with (dating back at least until 2016) status post biventricular ICD, anemia, chronic kidney disease, RV thrombus  (noted on cardiac MRI in 2016), LV thrombus (seen on echocardiogram from 5/2017), Coumadin which was subsequently discontinued for GI bleed, dementia with short-term memory loss and left bundle branch block. He is here for annual follow up ([facility]). Normally follows up with Dr. Heard.    He has no ICD shocks and reports overall doing well. Actually losing weight. No LE edema or sob or chest pain. No changes in medications since he saw Rose in August.      His most recent echocardiogram in April 2020 showed a worsening LV function estimated at less than 20%, posterior leaflet of the mitral valve appeared tethered and moderate mitral regurgitation was noted. His  Blood pressure has been very well controlled and thus he has not had issues with any MR related SOB.      Imaging reviewed:     ECHO 4/2020    Interpretation Summary     The left ventricle is mildly dilated.  Left ventricular systolic function is severely reduced.  The visual ejection fraction is estimated at <20%.  There are regional wall motion abnormalities as specified.  Posterior leaflet of mitral valve appears tethered.  There is moderate (2+) mitral regurgitation.  Compared to prior study, changes are noted.  _____________________________________________________________________________    Left Ventricle  The left ventricle is mildly dilated. Diastolic Doppler findings (E/E' ratio  and/or other parameters) suggest left ventricular filling pressures are  increased. Left ventricular systolic function is severely reduced. The visual  ejection fraction is estimated at <20%. There is apical akinesis. There is  severe  inferolateral wall hypokinesis. There is moderate to severe anterior  wall hypokinesis. Best motion is seen in basal anteroseptal and basal lateral  walls. There are regional wall motion abnormalities as specified.      ASSESSMENT/PLAN:     1.  Ischemic cardiomyopathy with an ejection fraction less than 20%. Biv ICD.  -stable, compensated  -no shocks, device check today   -continue lasix dosing and daily weights, afterload reduction with hydral  -on metoprolol for beta blockade  -low sodium diet      2. Moderate MR: asymptomatic, yearly echocardiogram and afterload reduction as above     3.  History of RV and LV thrombus, resolved.  He was previously on warfarin however this was discontinued due to a GI bleed.  There is been no documented recurrence.    Follow up with Dr. Heard in 6 months.     Lin Larkin MD MSC  [facility]     PAST MEDICAL HISTORY  Past Medical History:   Diagnosis Date     (HFpEF) heart failure with preserved ejection fraction (H)      Acute kidney injury (H) 2012     Anemia      Anxiety      Anxiety and depression      Bladder mass      BPH (benign prostatic hypertrophy)      Cardiomyopathy (H)     ICD implanted 11/2016     Carpal tunnel syndrome     Right     Chronic kidney disease (CKD), stage 3 (moderate)      Chronic systolic CHF (congestive heart failure) (H)      Dementia (H)      Dementia without behavioral disturbance, unspecified dementia type (H) 7/22/2016     Depressive disorder      Diabetes (H)      Diffuse large B-cell lymphoma of extranodal site (H) 11/23/2018     Former smoker      Gastric mass      Gout      History of depression      History of gastric bypass 7/22/2016     Iron deficiency anemia, unspecified iron deficiency anemia type 10/15/2018     LBBB (left bundle branch block) 2013     Lumbar herniated disc     L5-S1     Malignant neoplasm of lateral wall of urinary bladder (H) 11/15/2019    Added automatically from request for surgery [id number]     Mixed  cardiomyopathy 7/22/2016     Myocardial infarction (H) 1995 and 2010     Nonischemic cardiomyopathy (H) 7/22/2016     Obesity      Pacemaker     ICD/PM     Rosacea      Rotator cuff disorder     Tear x 2     RV (right ventricular) mural thrombus 7/22/2016     SOB (shortness of breath)        CURRENT MEDICATIONS  Current Outpatient Medications   Medication Sig Dispense Refill     atorvastatin (LIPITOR) 40 MG tablet Take 1 tablet (40 mg) by mouth At Bedtime (Patient taking differently: Take 20 mg by mouth At Bedtime ) 90 tablet 3     COENZYME Q-10 PO Take 100 mg by mouth every morning        ferrous sulfate (IRON) 325 (65 Fe) MG tablet Take 325 mg by mouth daily       furosemide (LASIX) 20 MG tablet Take 1.5 tablets (30 mg) by mouth daily 90 tablet 1     hydrALAZINE (APRESOLINE) 25 MG tablet Take 1 tablet (25 mg) by mouth 3 times daily 280 tablet 3     metoprolol succinate ER (TOPROL XL) 50 MG 24 hr tablet Take 1.5 tablets (75 mg) by mouth daily 120 tablet 3     multivitamin, therapeutic with minerals (MULTI-VITAMIN) TABS Take 1 tablet by mouth every morning        venlafaxine (EFFEXOR) 75 MG tablet Take 1 tablet (75 mg) by mouth 2 times daily 60 tablet 6     Vitamin D, Cholecalciferol, 25 MCG (1000 UT) TABS Take 2,000 Units by mouth daily       ACE/ARB NOT PRESCRIBED, INTENTIONAL, ACE & ARB not prescribed due to Symptomatic hypotension not due to excessive diuresis (Patient not taking: Reported on 3/16/2020)       ASPIRIN NOT PRESCRIBED (INTENTIONAL) Please choose reason not prescribed, below (Patient not taking: Reported on 3/16/2020)       vitamin D2 (ERGOCALCIFEROL) 68883 units (1250 mcg) capsule TAKE ONE CAPSULE BY MOUTH ONCE A WEEK ON Tuesday. Due for appointment. Please schedule: [phone] (Patient not taking: Reported on 12/17/2020) 12 capsule 0       PAST SURGICAL HISTORY:  Past Surgical History:   Procedure Laterality Date     ANGIOPLASTY  1995 and 2010 with stent     BIOPSY      stomach     BONE MARROW  BIOPSY, BONE SPECIMEN, NEEDLE/TROCAR N/A 10/30/2018    Procedure: BIOPSY BONE MARROW;  Surgeon: Jeb Romero MD;  Location:  GI     CARDIAC SURGERY      ICD/PM     CYSTOSCOPY       CYSTOSCOPY, TRANSURETHRAL RESECTION (TUR) PROSTATE, COMBINED  4/22/2019    Procedure: CYSTOSCOPY AND BIPOLAR TRANSURETHRAL RESECTION (TUR) PROSTATE;  Surgeon: Ryan Camarillo MD;  Location:  OR     CYSTOSCOPY, TRANSURETHRAL RESECTION (TUR) TUMOR BLADDER, COMBINED N/A 9/19/2018    Procedure: COMBINED CYSTOSCOPY, TRANSURETHRAL RESECTION (TUR) TUMOR BLADDER;  CYSTOSCOPY, TRANSURETHRAL RESECTION BLADDER TUMOR ;  Surgeon: Ryan Camarillo MD;  Location:  OR     CYSTOSCOPY, TRANSURETHRAL RESECTION (TUR) TUMOR BLADDER, COMBINED N/A 11/12/2018    Procedure: CYSTOSCOPY RESTAGING TRANSURETHRAL RESECTION BLADDER TUMOR;  Surgeon: Ryan Camarillo MD;  Location:  OR     CYSTOSCOPY, TRANSURETHRAL RESECTION (TUR) TUMOR BLADDER, COMBINED N/A 12/30/2019    Procedure: CYSTOSCOPY, WITH TRANSURETHRAL RESECTION BLADDER TUMOR;  Surgeon: Ryan Camarillo MD;  Location:  OR     EP LEAD REVISION DUAL N/A 10/24/2019    Procedure: EP Lead Revision Dual;  Surgeon: Josias Hernandez MD;  Location:  HEART CARDIAC CATH LAB     ESOPHAGOSCOPY, GASTROSCOPY, DUODENOSCOPY (EGD), COMBINED N/A 7/30/2018    Procedure: COMBINED ESOPHAGOSCOPY, GASTROSCOPY, DUODENOSCOPY (EGD), BIOPSY SINGLE OR MULTIPLE;  gastroscopy;  Surgeon: Parish Xiong MD;  Location:  GI     ESOPHAGOSCOPY, GASTROSCOPY, DUODENOSCOPY (EGD), COMBINED N/A 7/30/2018    Procedure: COMBINED ESOPHAGOSCOPY, GASTROSCOPY, DUODENOSCOPY (EGD);  EGD;  Surgeon: Parish Xiong MD;  Location:  GI     ESOPHAGOSCOPY, GASTROSCOPY, DUODENOSCOPY (EGD), COMBINED N/A 8/2/2018    Procedure: COMBINED ESOPHAGOSCOPY, GASTROSCOPY, DUODENOSCOPY (EGD), BIOPSY SINGLE OR MULTIPLE;  gastroscopy BIOPSYSHOULD BE SENT TO LAB IN NS NOT FORMALIN PER ;  Surgeon: Jonathon Bush MD;  Location: [facility]      GASTRIC BYPASS       HEART CATH LEFT HEART CATH  16    Non ischemic cardiomyopathy; Non functionally significant LAD and RCA disease      IR PORT REMOVAL RIGHT  2019     LASER HOLMIUM LITHOTRIPSY BLADDER N/A 2019    Procedure: CYSTOSCOPY, HOLMIUM LASER LITHOLAPAXY ,  AND BIPOLAR TRANSURETHRAL RESECTION (TUR) PROSTATE;  Surgeon: Ryan Camarillo MD;  Location: [facility] OR     OTHER SURGICAL HISTORY  2016    CRT-D implantation       ALLERGIES     Allergies   Allergen Reactions     Wasps [Hornets]      Sand wasp --- years ago.         FAMILY HISTORY  Family History   Problem Relation Age of Onset     Coronary Artery Disease Father 39     Alzheimer Disease Mother      Coronary Artery Disease Son         Two sons have  from MIs (ages 39 and 51)       SOCIAL HISTORY  Social History     Socioeconomic History     Marital status:      Spouse name: Not on file     Number of children: Not on file     Years of education: Not on file     Highest education level: Not on file   Occupational History     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     Comment: [employer]; retired   Social Needs     Financial resource strain: Not on file     Food insecurity     Worry: Not on file     Inability: Not on file     Transportation needs     Medical: Not on file     Non-medical: Not on file   Tobacco Use     Smoking status: Former Smoker     Packs/day: 2.00     Years: 20.00     Pack years: 40.00     Types: Cigarettes     Start date:      Quit date: 1980     Years since quittin.9     Smokeless tobacco: Never Used     Tobacco comment: Quit in his 30s - 2 ppd for 20 years   Substance and Sexual Activity     Alcohol use: Not Currently     Alcohol/week: 0.0 standard drinks     Comment: none     Drug use: No     Sexual activity: Yes     Partners: Female   Lifestyle     Physical activity     Days per week: Not on file     Minutes per session: Not on file     Stress: Not on file   Relationships     Social connections  "    Talks on phone: Not on file     Gets together: Not on file     Attends Christian service: Not on file     Active member of club or organization: Not on file     Attends meetings of clubs or organizations: Not on file     Relationship status: Not on file     Intimate partner violence     Fear of current or ex partner: Not on file     Emotionally abused: Not on file     Physically abused: Not on file     Forced sexual activity: Not on file   Other Topics Concern     Parent/sibling w/ CABG, MI or angioplasty before 65F 55M? No      Service Not Asked     Blood Transfusions Not Asked     Caffeine Concern Not Asked     Occupational Exposure Not Asked     Hobby Hazards Not Asked     Sleep Concern Not Asked     Stress Concern Not Asked     Weight Concern Not Asked     Special Diet Yes     Comment: low fat, low salt      Back Care Not Asked     Exercise No     Bike Helmet Not Asked     Seat Belt Not Asked     Self-Exams Not Asked   Social History Narrative     Not on file       ROS:   Constitutional: No fever, chills, or sweats. No weight gain/loss   ENT: No visual disturbance, ear ache, epistaxis, sore throat  Allergies/Immunologic: Negative  Respiratory: No cough, hemoptysia  Cardiovascular: As per HPI  GI: No nausea, vomiting, hematemesis, melena, or hematochezia  : No urinary frequency, dysuria, or hematuria  Integument: Negative  Psychiatric: Negative  Neuro: Negative  Endocrinology: Negative   Musculoskeletal: Negative  Vascular: No walking impairment, claudication, ischemic rest pain or nonhealing wounds    EXAM:  /70   Pulse 78   Ht 1.854 m (6' 1\")   Wt 87.5 kg (193 lb)   BMI 25.46 kg/m    In general, the patient is a pleasant male in no apparent distress.    HEENT: NC/AT.  PERRLA.  EOMI.  Sclerae white, not injected.  Nares clear.  Pharynx without erythema or exudate.  Dentition intact.    Neck: No adenopathy.  No thyromegaly. Carotids +2/2 bilaterally without bruits.  No jugular venous " distension.   Heart: RRR. Normal S1, S2 splits physiologically. +apical murmur, rub, click, or gallop. The PMI is in the 5th ICS in the midclavicular line. There is no heave.    Lungs: CTA.  No ronchi, wheezes, rales.  No dullness to percussion.   Abdomen: Soft, nontender, nondistended. No organomegaly. No AAA.  No bruits.   Extremities: No clubbing, cyanosis, or edema.  No wounds. No varicose veins signs of chronic venous insufficiency.   Vascular: No bruits are noted.    Labs:  LIPID RESULTS:  Lab Results   Component Value Date    CHOL 128 08/20/2020    HDL 39 (L) 08/20/2020    LDL 69 08/20/2020    TRIG 100 08/20/2020    NHDL 89 08/20/2020       LIVER ENZYME RESULTS:  Lab Results   Component Value Date    AST 24 11/02/2020    ALT 31 11/02/2020       CBC RESULTS:  Lab Results   Component Value Date    WBC 6.4 11/02/2020    RBC 4.64 11/02/2020    HGB 15.1 11/02/2020    HCT 46.8 11/02/2020     (H) 11/02/2020    MCH 32.5 11/02/2020    MCHC 32.3 11/02/2020    RDW 15.1 (H) 11/02/2020     (L) 11/02/2020       BMP RESULTS:  Lab Results   Component Value Date     11/02/2020    POTASSIUM 3.4 11/02/2020    CHLORIDE 110 (H) 11/02/2020    CO2 25 11/02/2020    ANIONGAP 7 11/02/2020    GLC 76 11/02/2020    BUN 18 11/02/2020    CR 1.35 (H) 11/02/2020    GFRESTIMATED 50 (L) 11/02/2020    GFRESTBLACK 57 (L) 11/02/2020    SHELLEY 9.1 11/02/2020        A1C RESULTS:  Lab Results   Component Value Date    A1C 6.2 (H) 08/20/2020

## 2020-12-17 NOTE — LETTER
12/17/2020    José Miguel Gibson MD  [address]  [facility] [address]    RE: Gibson Villalta       Dear Colleague,    I had the pleasure of seeing Gibson Villalta in the [facility].            HPI:     Mr. Villalta is a 80 year old male and has a past medical history significant for large B-cell lymphoma with stage II with transurethral resection of bladder tumor, ischemic cardiomyopathy  with LVEF 25-30% with (dating back at least until 2016) status post biventricular ICD, anemia, chronic kidney disease, RV thrombus  (noted on cardiac MRI in 2016), LV thrombus (seen on echocardiogram from 5/2017), Coumadin which was subsequently discontinued for GI bleed, dementia with short-term memory loss and left bundle branch block. He is here for annual follow up ([facility]). Normally follows up with Dr. Heard.    He has no ICD shocks and reports overall doing well. Actually losing weight. No LE edema or sob or chest pain. No changes in medications since he saw Rose in August.      His most recent echocardiogram in April 2020 showed a worsening LV function estimated at less than 20%, posterior leaflet of the mitral valve appeared tethered and moderate mitral regurgitation was noted. His  Blood pressure has been very well controlled and thus he has not had issues with any MR related SOB.      Imaging reviewed:     ECHO 4/2020    Interpretation Summary     The left ventricle is mildly dilated.  Left ventricular systolic function is severely reduced.  The visual ejection fraction is estimated at <20%.  There are regional wall motion abnormalities as specified.  Posterior leaflet of mitral valve appears tethered.  There is moderate (2+) mitral regurgitation.  Compared to prior study, changes are noted.  _____________________________________________________________________________    Left Ventricle  The left ventricle is mildly dilated. Diastolic Doppler findings (E/E'  ratio  and/or other parameters) suggest left ventricular filling pressures are  increased. Left ventricular systolic function is severely reduced. The visual  ejection fraction is estimated at <20%. There is apical akinesis. There is  severe inferolateral wall hypokinesis. There is moderate to severe anterior  wall hypokinesis. Best motion is seen in basal anteroseptal and basal lateral  walls. There are regional wall motion abnormalities as specified.      ASSESSMENT/PLAN:     1.  Ischemic cardiomyopathy with an ejection fraction less than 20%. Biv ICD.  -stable, compensated  -no shocks, device check today   -continue lasix dosing and daily weights, afterload reduction with hydral  -on metoprolol for beta blockade  -low sodium diet      2. Moderate MR: asymptomatic, yearly echocardiogram and afterload reduction as above     3.  History of RV and LV thrombus, resolved.  He was previously on warfarin however this was discontinued due to a GI bleed.  There is been no documented recurrence.    Follow up with Dr. Heard in 6 months.     Lin Larkin MD [facility]  [facility]     PAST MEDICAL HISTORY  Past Medical History:   Diagnosis Date     (HFpEF) heart failure with preserved ejection fraction (H)      Acute kidney injury (H) 2012     Anemia      Anxiety      Anxiety and depression      Bladder mass      BPH (benign prostatic hypertrophy)      Cardiomyopathy (H)     ICD implanted 11/2016     Carpal tunnel syndrome     Right     Chronic kidney disease (CKD), stage 3 (moderate)      Chronic systolic CHF (congestive heart failure) (H)      Dementia (H)      Dementia without behavioral disturbance, unspecified dementia type (H) 7/22/2016     Depressive disorder      Diabetes (H)      Diffuse large B-cell lymphoma of extranodal site (H) 11/23/2018     Former smoker      Gastric mass      Gout      History of depression      History of gastric bypass 7/22/2016     Iron deficiency anemia, unspecified iron deficiency  anemia type 10/15/2018     LBBB (left bundle branch block) 2013     Lumbar herniated disc     L5-S1     Malignant neoplasm of lateral wall of urinary bladder (H) 11/15/2019    Added automatically from request for surgery [id number]     Mixed cardiomyopathy 7/22/2016     Myocardial infarction (H) 1995 and 2010     Nonischemic cardiomyopathy (H) 7/22/2016     Obesity      Pacemaker     ICD/PM     Rosacea      Rotator cuff disorder     Tear x 2     RV (right ventricular) mural thrombus 7/22/2016     SOB (shortness of breath)        CURRENT MEDICATIONS  Current Outpatient Medications   Medication Sig Dispense Refill     atorvastatin (LIPITOR) 40 MG tablet Take 1 tablet (40 mg) by mouth At Bedtime (Patient taking differently: Take 20 mg by mouth At Bedtime ) 90 tablet 3     COENZYME Q-10 PO Take 100 mg by mouth every morning        ferrous sulfate (IRON) 325 (65 Fe) MG tablet Take 325 mg by mouth daily       furosemide (LASIX) 20 MG tablet Take 1.5 tablets (30 mg) by mouth daily 90 tablet 1     hydrALAZINE (APRESOLINE) 25 MG tablet Take 1 tablet (25 mg) by mouth 3 times daily 280 tablet 3     metoprolol succinate ER (TOPROL XL) 50 MG 24 hr tablet Take 1.5 tablets (75 mg) by mouth daily 120 tablet 3     multivitamin, therapeutic with minerals (MULTI-VITAMIN) TABS Take 1 tablet by mouth every morning        venlafaxine (EFFEXOR) 75 MG tablet Take 1 tablet (75 mg) by mouth 2 times daily 60 tablet 6     Vitamin D, Cholecalciferol, 25 MCG (1000 UT) TABS Take 2,000 Units by mouth daily       ACE/ARB NOT PRESCRIBED, INTENTIONAL, ACE & ARB not prescribed due to Symptomatic hypotension not due to excessive diuresis (Patient not taking: Reported on 3/16/2020)       ASPIRIN NOT PRESCRIBED (INTENTIONAL) Please choose reason not prescribed, below (Patient not taking: Reported on 3/16/2020)       vitamin D2 (ERGOCALCIFEROL) 31510 units (1250 mcg) capsule TAKE ONE CAPSULE BY MOUTH ONCE A WEEK ON Tuesday. Due for appointment. Please  schedule: [phone] (Patient not taking: Reported on 12/17/2020) 12 capsule 0       PAST SURGICAL HISTORY:  Past Surgical History:   Procedure Laterality Date     ANGIOPLASTY  1995 and 2010 with stent     BIOPSY      stomach     BONE MARROW BIOPSY, BONE SPECIMEN, NEEDLE/TROCAR N/A 10/30/2018    Procedure: BIOPSY BONE MARROW;  Surgeon: Jeb Romero MD;  Location:  GI     CARDIAC SURGERY      ICD/PM     CYSTOSCOPY       CYSTOSCOPY, TRANSURETHRAL RESECTION (TUR) PROSTATE, COMBINED  4/22/2019    Procedure: CYSTOSCOPY AND BIPOLAR TRANSURETHRAL RESECTION (TUR) PROSTATE;  Surgeon: Ryan Camarillo MD;  Location:  OR     CYSTOSCOPY, TRANSURETHRAL RESECTION (TUR) TUMOR BLADDER, COMBINED N/A 9/19/2018    Procedure: COMBINED CYSTOSCOPY, TRANSURETHRAL RESECTION (TUR) TUMOR BLADDER;  CYSTOSCOPY, TRANSURETHRAL RESECTION BLADDER TUMOR ;  Surgeon: Ryan Camarillo MD;  Location:  OR     CYSTOSCOPY, TRANSURETHRAL RESECTION (TUR) TUMOR BLADDER, COMBINED N/A 11/12/2018    Procedure: CYSTOSCOPY RESTAGING TRANSURETHRAL RESECTION BLADDER TUMOR;  Surgeon: Ryan Camarillo MD;  Location:  OR     CYSTOSCOPY, TRANSURETHRAL RESECTION (TUR) TUMOR BLADDER, COMBINED N/A 12/30/2019    Procedure: CYSTOSCOPY, WITH TRANSURETHRAL RESECTION BLADDER TUMOR;  Surgeon: Ryan Camarillo MD;  Location:  OR     EP LEAD REVISION DUAL N/A 10/24/2019    Procedure: EP Lead Revision Dual;  Surgeon: Josias Hernandez MD;  Location:  HEART CARDIAC CATH LAB     ESOPHAGOSCOPY, GASTROSCOPY, DUODENOSCOPY (EGD), COMBINED N/A 7/30/2018    Procedure: COMBINED ESOPHAGOSCOPY, GASTROSCOPY, DUODENOSCOPY (EGD), BIOPSY SINGLE OR MULTIPLE;  gastroscopy;  Surgeon: Parish Xiong MD;  Location:  GI     ESOPHAGOSCOPY, GASTROSCOPY, DUODENOSCOPY (EGD), COMBINED N/A 7/30/2018    Procedure: COMBINED ESOPHAGOSCOPY, GASTROSCOPY, DUODENOSCOPY (EGD);  EGD;  Surgeon: Parish Xiong MD;  Location:  GI     ESOPHAGOSCOPY, GASTROSCOPY, DUODENOSCOPY (EGD),  COMBINED N/A 2018    Procedure: COMBINED ESOPHAGOSCOPY, GASTROSCOPY, DUODENOSCOPY (EGD), BIOPSY SINGLE OR MULTIPLE;  gastroscopy BIOPSYSHOULD BE SENT TO LAB IN NS NOT FORMALIN PER ;  Surgeon: Jonathon Bush MD;  Location:  GI     GASTRIC BYPASS       HEART CATH LEFT HEART CATH  16    Non ischemic cardiomyopathy; Non functionally significant LAD and RCA disease      IR PORT REMOVAL RIGHT  2019     LASER HOLMIUM LITHOTRIPSY BLADDER N/A 2019    Procedure: CYSTOSCOPY, HOLMIUM LASER LITHOLAPAXY ,  AND BIPOLAR TRANSURETHRAL RESECTION (TUR) PROSTATE;  Surgeon: Ryan Camarillo MD;  Location:  OR     OTHER SURGICAL HISTORY  2016    CRT-D implantation       ALLERGIES     Allergies   Allergen Reactions     Wasps [Hornets]      Sand wasp --- years ago.         FAMILY HISTORY  Family History   Problem Relation Age of Onset     Coronary Artery Disease Father 39     Alzheimer Disease Mother      Coronary Artery Disease Son         Two sons have  from MIs (ages 39 and 51)       SOCIAL HISTORY  Social History     Socioeconomic History     Marital status:      Spouse name: Not on file     Number of children: Not on file     Years of education: Not on file     Highest education level: Not on file   Occupational History     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     Comment: [employer]; retired   Social Needs     Financial resource strain: Not on file     Food insecurity     Worry: Not on file     Inability: Not on file     Transportation needs     Medical: Not on file     Non-medical: Not on file   Tobacco Use     Smoking status: Former Smoker     Packs/day: 2.00     Years: 20.00     Pack years: 40.00     Types: Cigarettes     Start date:      Quit date: 1980     Years since quittin.9     Smokeless tobacco: Never Used     Tobacco comment: Quit in his 30s - 2 ppd for 20 years   Substance and Sexual Activity     Alcohol use: Not Currently     Alcohol/week: 0.0 standard  "drinks     Comment: none     Drug use: No     Sexual activity: Yes     Partners: Female   Lifestyle     Physical activity     Days per week: Not on file     Minutes per session: Not on file     Stress: Not on file   Relationships     Social connections     Talks on phone: Not on file     Gets together: Not on file     Attends Taoist service: Not on file     Active member of club or organization: Not on file     Attends meetings of clubs or organizations: Not on file     Relationship status: Not on file     Intimate partner violence     Fear of current or ex partner: Not on file     Emotionally abused: Not on file     Physically abused: Not on file     Forced sexual activity: Not on file   Other Topics Concern     Parent/sibling w/ CABG, MI or angioplasty before 65F 55M? No      Service Not Asked     Blood Transfusions Not Asked     Caffeine Concern Not Asked     Occupational Exposure Not Asked     Hobby Hazards Not Asked     Sleep Concern Not Asked     Stress Concern Not Asked     Weight Concern Not Asked     Special Diet Yes     Comment: low fat, low salt      Back Care Not Asked     Exercise No     Bike Helmet Not Asked     Seat Belt Not Asked     Self-Exams Not Asked   Social History Narrative     Not on file       ROS:   Constitutional: No fever, chills, or sweats. No weight gain/loss   ENT: No visual disturbance, ear ache, epistaxis, sore throat  Allergies/Immunologic: Negative  Respiratory: No cough, hemoptysia  Cardiovascular: As per HPI  GI: No nausea, vomiting, hematemesis, melena, or hematochezia  : No urinary frequency, dysuria, or hematuria  Integument: Negative  Psychiatric: Negative  Neuro: Negative  Endocrinology: Negative   Musculoskeletal: Negative  Vascular: No walking impairment, claudication, ischemic rest pain or nonhealing wounds    EXAM:  /70   Pulse 78   Ht 1.854 m (6' 1\")   Wt 87.5 kg (193 lb)   BMI 25.46 kg/m    In general, the patient is a pleasant male in no " apparent distress.    HEENT: NC/AT.  PERRLA.  EOMI.  Sclerae white, not injected.  Nares clear.  Pharynx without erythema or exudate.  Dentition intact.    Neck: No adenopathy.  No thyromegaly. Carotids +2/2 bilaterally without bruits.  No jugular venous distension.   Heart: RRR. Normal S1, S2 splits physiologically. +apical murmur, rub, click, or gallop. The PMI is in the 5th ICS in the midclavicular line. There is no heave.    Lungs: CTA.  No ronchi, wheezes, rales.  No dullness to percussion.   Abdomen: Soft, nontender, nondistended. No organomegaly. No AAA.  No bruits.   Extremities: No clubbing, cyanosis, or edema.  No wounds. No varicose veins signs of chronic venous insufficiency.   Vascular: No bruits are noted.    Labs:  LIPID RESULTS:  Lab Results   Component Value Date    CHOL 128 08/20/2020    HDL 39 (L) 08/20/2020    LDL 69 08/20/2020    TRIG 100 08/20/2020    NHDL 89 08/20/2020       LIVER ENZYME RESULTS:  Lab Results   Component Value Date    AST 24 11/02/2020    ALT 31 11/02/2020       CBC RESULTS:  Lab Results   Component Value Date    WBC 6.4 11/02/2020    RBC 4.64 11/02/2020    HGB 15.1 11/02/2020    HCT 46.8 11/02/2020     (H) 11/02/2020    MCH 32.5 11/02/2020    MCHC 32.3 11/02/2020    RDW 15.1 (H) 11/02/2020     (L) 11/02/2020       BMP RESULTS:  Lab Results   Component Value Date     11/02/2020    POTASSIUM 3.4 11/02/2020    CHLORIDE 110 (H) 11/02/2020    CO2 25 11/02/2020    ANIONGAP 7 11/02/2020    GLC 76 11/02/2020    BUN 18 11/02/2020    CR 1.35 (H) 11/02/2020    GFRESTIMATED 50 (L) 11/02/2020    GFRESTBLACK 57 (L) 11/02/2020    SHELLEY 9.1 11/02/2020        A1C RESULTS:  Lab Results   Component Value Date    A1C 6.2 (H) 08/20/2020           Thank you for allowing me to participate in the care of your patient.      Sincerely,     Lin Larkin MD     [facility]    cc:   ALLEN Sim CNP  [address]  [place]  [address]

## 2021-01-01 ENCOUNTER — HOSPITAL ENCOUNTER (OUTPATIENT)
Facility: CLINIC | Age: 81
Setting detail: SPECIMEN
Discharge: HOME | End: 2021-02-02
Attending: NEUROLOGICAL SURGERY | Admitting: INTERNAL MEDICINE
Payer: COMMERCIAL

## 2021-01-01 ENCOUNTER — VIRTUAL VISIT (OUTPATIENT)
Dept: ONCOLOGY | Facility: CLINIC | Age: 81
End: 2021-01-01
Attending: INTERNAL MEDICINE
Payer: COMMERCIAL

## 2021-01-01 ENCOUNTER — HOSPITAL ENCOUNTER (INPATIENT)
Facility: CLINIC | Age: 81
LOS: 1 days | Discharge: HOSPICE | DRG: 281 | End: 2021-04-07
Attending: EMERGENCY MEDICINE | Admitting: INTERNAL MEDICINE
Payer: COMMERCIAL

## 2021-01-01 ENCOUNTER — TELEPHONE (OUTPATIENT)
Dept: FAMILY MEDICINE | Facility: CLINIC | Age: 81
End: 2021-01-01

## 2021-01-01 ENCOUNTER — HEALTH MAINTENANCE LETTER (OUTPATIENT)
Age: 81
End: 2021-01-01

## 2021-01-01 ENCOUNTER — OFFICE VISIT (OUTPATIENT)
Dept: INFUSION THERAPY | Facility: CLINIC | Age: 81
End: 2021-01-01
Attending: INTERNAL MEDICINE
Payer: COMMERCIAL

## 2021-01-01 ENCOUNTER — OFFICE VISIT (OUTPATIENT)
Dept: UROLOGY | Facility: CLINIC | Age: 81
End: 2021-01-01
Payer: COMMERCIAL

## 2021-01-01 ENCOUNTER — OFFICE VISIT (OUTPATIENT)
Dept: FAMILY MEDICINE | Facility: CLINIC | Age: 81
End: 2021-01-01
Payer: COMMERCIAL

## 2021-01-01 ENCOUNTER — ANCILLARY PROCEDURE (OUTPATIENT)
Dept: CARDIOLOGY | Facility: CLINIC | Age: 81
End: 2021-01-01
Attending: INTERNAL MEDICINE
Payer: COMMERCIAL

## 2021-01-01 ENCOUNTER — TRANSFERRED RECORDS (OUTPATIENT)
Dept: HEALTH INFORMATION MANAGEMENT | Facility: CLINIC | Age: 81
End: 2021-01-01

## 2021-01-01 VITALS
SYSTOLIC BLOOD PRESSURE: 122 MMHG | HEART RATE: 78 BPM | DIASTOLIC BLOOD PRESSURE: 73 MMHG | TEMPERATURE: 96.7 F | WEIGHT: 183.2 LBS | OXYGEN SATURATION: 97 % | BODY MASS INDEX: 24.28 KG/M2 | HEIGHT: 73 IN

## 2021-01-01 VITALS
HEIGHT: 73 IN | SYSTOLIC BLOOD PRESSURE: 102 MMHG | WEIGHT: 180 LBS | BODY MASS INDEX: 23.86 KG/M2 | DIASTOLIC BLOOD PRESSURE: 62 MMHG

## 2021-01-01 VITALS
OXYGEN SATURATION: 97 % | RESPIRATION RATE: 18 BRPM | SYSTOLIC BLOOD PRESSURE: 105 MMHG | DIASTOLIC BLOOD PRESSURE: 63 MMHG | BODY MASS INDEX: 24.62 KG/M2 | HEART RATE: 96 BPM | WEIGHT: 186.6 LBS | TEMPERATURE: 97.3 F

## 2021-01-01 VITALS — BODY MASS INDEX: 24.14 KG/M2 | WEIGHT: 183 LBS

## 2021-01-01 DIAGNOSIS — E11.22 CONTROLLED TYPE 2 DIABETES MELLITUS WITH CHRONIC KIDNEY DISEASE, WITHOUT LONG-TERM CURRENT USE OF INSULIN, UNSPECIFIED CKD STAGE (H): Chronic | ICD-10-CM

## 2021-01-01 DIAGNOSIS — Z85.51 PERSONAL HISTORY OF MALIGNANT NEOPLASM OF BLADDER: Primary | ICD-10-CM

## 2021-01-01 DIAGNOSIS — I50.23 ACUTE ON CHRONIC SYSTOLIC HEART FAILURE (H): Chronic | ICD-10-CM

## 2021-01-01 DIAGNOSIS — C83.398 DIFFUSE LARGE B-CELL LYMPHOMA OF EXTRANODAL SITE: ICD-10-CM

## 2021-01-01 DIAGNOSIS — R79.89 ELEVATED TROPONIN: ICD-10-CM

## 2021-01-01 DIAGNOSIS — I42.9 CARDIOMYOPATHY, UNSPECIFIED TYPE (H): ICD-10-CM

## 2021-01-01 DIAGNOSIS — I42.0 DILATED CARDIOMYOPATHY (H): ICD-10-CM

## 2021-01-01 DIAGNOSIS — C83.398 DIFFUSE LARGE B-CELL LYMPHOMA OF EXTRANODAL SITE: Primary | ICD-10-CM

## 2021-01-01 DIAGNOSIS — F03.90 DEMENTIA WITHOUT BEHAVIORAL DISTURBANCE, UNSPECIFIED DEMENTIA TYPE: Chronic | ICD-10-CM

## 2021-01-01 DIAGNOSIS — I95.9 HYPOTENSION, UNSPECIFIED HYPOTENSION TYPE: ICD-10-CM

## 2021-01-01 DIAGNOSIS — Z95.810 ICD (IMPLANTABLE CARDIOVERTER-DEFIBRILLATOR) IN PLACE: ICD-10-CM

## 2021-01-01 DIAGNOSIS — I50.20 HFREF (HEART FAILURE WITH REDUCED EJECTION FRACTION) (H): Primary | Chronic | ICD-10-CM

## 2021-01-01 DIAGNOSIS — C67.9 MALIGNANT NEOPLASM OF URINARY BLADDER, UNSPECIFIED SITE (H): ICD-10-CM

## 2021-01-01 DIAGNOSIS — I42.9 CARDIOMYOPATHY (H): Primary | Chronic | ICD-10-CM

## 2021-01-01 LAB
ALBUMIN SERPL-MCNC: 3.8 G/DL (ref 3.4–5)
ALBUMIN UR-MCNC: 30 MG/DL
ALP SERPL-CCNC: 186 U/L (ref 40–150)
ALT SERPL W P-5'-P-CCNC: 22 U/L (ref 0–70)
ANION GAP SERPL CALCULATED.3IONS-SCNC: 4 MMOL/L (ref 3–14)
ANION GAP SERPL CALCULATED.3IONS-SCNC: 6 MMOL/L (ref 3–14)
APPEARANCE UR: CLEAR
APTT PPP: 33 SEC (ref 22–37)
AST SERPL W P-5'-P-CCNC: 21 U/L (ref 0–45)
BASOPHILS # BLD AUTO: 0 10E9/L (ref 0–0.2)
BASOPHILS # BLD AUTO: 0 10E9/L (ref 0–0.2)
BASOPHILS NFR BLD AUTO: 0.8 %
BASOPHILS NFR BLD AUTO: 0.8 %
BILIRUB SERPL-MCNC: 1.2 MG/DL (ref 0.2–1.3)
BILIRUB UR QL STRIP: NEGATIVE
BUN SERPL-MCNC: 17 MG/DL (ref 7–30)
BUN SERPL-MCNC: 20 MG/DL (ref 7–30)
CALCIUM SERPL-MCNC: 8.7 MG/DL (ref 8.5–10.1)
CALCIUM SERPL-MCNC: 9.4 MG/DL (ref 8.5–10.1)
CHLORIDE SERPL-SCNC: 106 MMOL/L (ref 94–109)
CHLORIDE SERPL-SCNC: 108 MMOL/L (ref 94–109)
CO2 BLDCOV-SCNC: 27 MMOL/L (ref 21–28)
CO2 SERPL-SCNC: 26 MMOL/L (ref 20–32)
CO2 SERPL-SCNC: 28 MMOL/L (ref 20–32)
COLOR UR AUTO: YELLOW
COPATH REPORT: NORMAL
CREAT SERPL-MCNC: 1.26 MG/DL (ref 0.66–1.25)
CREAT SERPL-MCNC: 1.47 MG/DL (ref 0.66–1.25)
DIFFERENTIAL METHOD BLD: ABNORMAL
DIFFERENTIAL METHOD BLD: ABNORMAL
EOSINOPHIL # BLD AUTO: 0.2 10E9/L (ref 0–0.7)
EOSINOPHIL # BLD AUTO: 0.2 10E9/L (ref 0–0.7)
EOSINOPHIL NFR BLD AUTO: 3 %
EOSINOPHIL NFR BLD AUTO: 3.9 %
ERYTHROCYTE [DISTWIDTH] IN BLOOD BY AUTOMATED COUNT: 15.9 % (ref 10–15)
ERYTHROCYTE [DISTWIDTH] IN BLOOD BY AUTOMATED COUNT: 16.2 % (ref 10–15)
ERYTHROCYTE [DISTWIDTH] IN BLOOD BY AUTOMATED COUNT: 16.5 % (ref 10–15)
GFR SERPL CREATININE-BSD FRML MDRD: 45 ML/MIN/{1.73_M2}
GFR SERPL CREATININE-BSD FRML MDRD: 54 ML/MIN/{1.73_M2}
GLUCOSE BLDC GLUCOMTR-MCNC: 100 MG/DL (ref 70–99)
GLUCOSE BLDC GLUCOMTR-MCNC: 119 MG/DL (ref 70–99)
GLUCOSE BLDC GLUCOMTR-MCNC: 87 MG/DL (ref 70–99)
GLUCOSE BLDC GLUCOMTR-MCNC: 92 MG/DL (ref 70–99)
GLUCOSE SERPL-MCNC: 107 MG/DL (ref 70–99)
GLUCOSE SERPL-MCNC: 123 MG/DL (ref 70–99)
GLUCOSE UR STRIP-MCNC: NEGATIVE MG/DL
HCT VFR BLD AUTO: 45.9 % (ref 40–53)
HCT VFR BLD AUTO: 47.8 % (ref 40–53)
HCT VFR BLD AUTO: 48.3 % (ref 40–53)
HGB BLD-MCNC: 15.3 G/DL (ref 13.3–17.7)
HGB BLD-MCNC: 15.6 G/DL (ref 13.3–17.7)
HGB BLD-MCNC: 15.7 G/DL (ref 13.3–17.7)
HGB UR QL STRIP: NEGATIVE
IMM GRANULOCYTES # BLD: 0 10E9/L (ref 0–0.4)
IMM GRANULOCYTES # BLD: 0 10E9/L (ref 0–0.4)
IMM GRANULOCYTES NFR BLD: 0 %
IMM GRANULOCYTES NFR BLD: 0.4 %
INTERPRETATION ECG - MUSE: NORMAL
KETONES UR STRIP-MCNC: NEGATIVE MG/DL
LABORATORY COMMENT REPORT: NORMAL
LACTATE BLD-SCNC: 1.7 MMOL/L (ref 0.7–2.1)
LDH SERPL L TO P-CCNC: 313 U/L (ref 85–227)
LEUKOCYTE ESTERASE UR QL STRIP: NEGATIVE
LYMPHOCYTES # BLD AUTO: 0.5 10E9/L (ref 0.8–5.3)
LYMPHOCYTES # BLD AUTO: 0.6 10E9/L (ref 0.8–5.3)
LYMPHOCYTES NFR BLD AUTO: 12.3 %
LYMPHOCYTES NFR BLD AUTO: 8.9 %
MCH RBC QN AUTO: 33.1 PG (ref 26.5–33)
MCH RBC QN AUTO: 33.3 PG (ref 26.5–33)
MCH RBC QN AUTO: 33.8 PG (ref 26.5–33)
MCHC RBC AUTO-ENTMCNC: 32.3 G/DL (ref 31.5–36.5)
MCHC RBC AUTO-ENTMCNC: 32.8 G/DL (ref 31.5–36.5)
MCHC RBC AUTO-ENTMCNC: 33.3 G/DL (ref 31.5–36.5)
MCV RBC AUTO: 101 FL (ref 78–100)
MCV RBC AUTO: 102 FL (ref 78–100)
MCV RBC AUTO: 103 FL (ref 78–100)
MDC_IDC_LEAD_IMPLANT_DT: NORMAL
MDC_IDC_LEAD_LOCATION: NORMAL
MDC_IDC_LEAD_LOCATION_DETAIL_1: NORMAL
MDC_IDC_LEAD_MFG: NORMAL
MDC_IDC_LEAD_MODEL: NORMAL
MDC_IDC_LEAD_POLARITY_TYPE: NORMAL
MDC_IDC_LEAD_SERIAL: NORMAL
MDC_IDC_MSMT_BATTERY_DTM: NORMAL
MDC_IDC_MSMT_BATTERY_REMAINING_LONGEVITY: 23 MO
MDC_IDC_MSMT_BATTERY_RRT_TRIGGER: 2.73
MDC_IDC_MSMT_BATTERY_STATUS: NORMAL
MDC_IDC_MSMT_BATTERY_VOLTAGE: 2.94 V
MDC_IDC_MSMT_CAP_CHARGE_DTM: NORMAL
MDC_IDC_MSMT_CAP_CHARGE_ENERGY: 18 J
MDC_IDC_MSMT_CAP_CHARGE_TIME: 4.17
MDC_IDC_MSMT_CAP_CHARGE_TYPE: NORMAL
MDC_IDC_MSMT_LEADCHNL_LV_IMPEDANCE_VALUE: 147.1
MDC_IDC_MSMT_LEADCHNL_LV_IMPEDANCE_VALUE: 147.1
MDC_IDC_MSMT_LEADCHNL_LV_IMPEDANCE_VALUE: 155.46
MDC_IDC_MSMT_LEADCHNL_LV_IMPEDANCE_VALUE: 160.94
MDC_IDC_MSMT_LEADCHNL_LV_IMPEDANCE_VALUE: 160.94
MDC_IDC_MSMT_LEADCHNL_LV_IMPEDANCE_VALUE: 285 OHM
MDC_IDC_MSMT_LEADCHNL_LV_IMPEDANCE_VALUE: 304 OHM
MDC_IDC_MSMT_LEADCHNL_LV_IMPEDANCE_VALUE: 304 OHM
MDC_IDC_MSMT_LEADCHNL_LV_IMPEDANCE_VALUE: 342 OHM
MDC_IDC_MSMT_LEADCHNL_LV_IMPEDANCE_VALUE: 361 OHM
MDC_IDC_MSMT_LEADCHNL_LV_IMPEDANCE_VALUE: 475 OHM
MDC_IDC_MSMT_LEADCHNL_LV_IMPEDANCE_VALUE: 513 OHM
MDC_IDC_MSMT_LEADCHNL_LV_IMPEDANCE_VALUE: 532 OHM
MDC_IDC_MSMT_LEADCHNL_LV_IMPEDANCE_VALUE: 532 OHM
MDC_IDC_MSMT_LEADCHNL_LV_IMPEDANCE_VALUE: 551 OHM
MDC_IDC_MSMT_LEADCHNL_LV_PACING_THRESHOLD_AMPLITUDE: 1.12 V
MDC_IDC_MSMT_LEADCHNL_LV_PACING_THRESHOLD_PULSEWIDTH: 0.5 MS
MDC_IDC_MSMT_LEADCHNL_RA_IMPEDANCE_VALUE: 361 OHM
MDC_IDC_MSMT_LEADCHNL_RA_PACING_THRESHOLD_AMPLITUDE: 0.75 V
MDC_IDC_MSMT_LEADCHNL_RA_PACING_THRESHOLD_PULSEWIDTH: 0.4 MS
MDC_IDC_MSMT_LEADCHNL_RA_SENSING_INTR_AMPL: 0.25 MV
MDC_IDC_MSMT_LEADCHNL_RA_SENSING_INTR_AMPL: 0.25 MV
MDC_IDC_MSMT_LEADCHNL_RV_IMPEDANCE_VALUE: 399 OHM
MDC_IDC_MSMT_LEADCHNL_RV_IMPEDANCE_VALUE: 456 OHM
MDC_IDC_MSMT_LEADCHNL_RV_PACING_THRESHOLD_AMPLITUDE: 0.62 V
MDC_IDC_MSMT_LEADCHNL_RV_PACING_THRESHOLD_PULSEWIDTH: 0.4 MS
MDC_IDC_MSMT_LEADCHNL_RV_SENSING_INTR_AMPL: 14.12 MV
MDC_IDC_MSMT_LEADCHNL_RV_SENSING_INTR_AMPL: 6.12 MV
MDC_IDC_PG_IMPLANT_DTM: NORMAL
MDC_IDC_PG_MFG: NORMAL
MDC_IDC_PG_MODEL: NORMAL
MDC_IDC_PG_SERIAL: NORMAL
MDC_IDC_PG_TYPE: NORMAL
MDC_IDC_SESS_CLINIC_NAME: NORMAL
MDC_IDC_SESS_DTM: NORMAL
MDC_IDC_SESS_TYPE: NORMAL
MDC_IDC_SET_BRADY_AT_MODE_SWITCH_RATE: 171 {BEATS}/MIN
MDC_IDC_SET_BRADY_LOWRATE: 50 {BEATS}/MIN
MDC_IDC_SET_BRADY_MAX_SENSOR_RATE: 130 {BEATS}/MIN
MDC_IDC_SET_BRADY_MAX_TRACKING_RATE: 130 {BEATS}/MIN
MDC_IDC_SET_BRADY_MODE: NORMAL
MDC_IDC_SET_BRADY_PAV_DELAY_LOW: 170 MS
MDC_IDC_SET_BRADY_SAV_DELAY_LOW: 140 MS
MDC_IDC_SET_CRT_LVRV_DELAY: 0 MS
MDC_IDC_SET_CRT_PACED_CHAMBERS: NORMAL
MDC_IDC_SET_LEADCHNL_LV_PACING_AMPLITUDE: 2.25 V
MDC_IDC_SET_LEADCHNL_LV_PACING_ANODE_ELECTRODE_1: NORMAL
MDC_IDC_SET_LEADCHNL_LV_PACING_ANODE_LOCATION_1: NORMAL
MDC_IDC_SET_LEADCHNL_LV_PACING_CAPTURE_MODE: NORMAL
MDC_IDC_SET_LEADCHNL_LV_PACING_CATHODE_ELECTRODE_1: NORMAL
MDC_IDC_SET_LEADCHNL_LV_PACING_CATHODE_LOCATION_1: NORMAL
MDC_IDC_SET_LEADCHNL_LV_PACING_POLARITY: NORMAL
MDC_IDC_SET_LEADCHNL_LV_PACING_PULSEWIDTH: 0.5 MS
MDC_IDC_SET_LEADCHNL_RA_PACING_AMPLITUDE: 1.5 V
MDC_IDC_SET_LEADCHNL_RA_PACING_ANODE_ELECTRODE_1: NORMAL
MDC_IDC_SET_LEADCHNL_RA_PACING_ANODE_LOCATION_1: NORMAL
MDC_IDC_SET_LEADCHNL_RA_PACING_CAPTURE_MODE: NORMAL
MDC_IDC_SET_LEADCHNL_RA_PACING_CATHODE_ELECTRODE_1: NORMAL
MDC_IDC_SET_LEADCHNL_RA_PACING_CATHODE_LOCATION_1: NORMAL
MDC_IDC_SET_LEADCHNL_RA_PACING_POLARITY: NORMAL
MDC_IDC_SET_LEADCHNL_RA_PACING_PULSEWIDTH: 0.4 MS
MDC_IDC_SET_LEADCHNL_RA_SENSING_ANODE_ELECTRODE_1: NORMAL
MDC_IDC_SET_LEADCHNL_RA_SENSING_ANODE_LOCATION_1: NORMAL
MDC_IDC_SET_LEADCHNL_RA_SENSING_CATHODE_ELECTRODE_1: NORMAL
MDC_IDC_SET_LEADCHNL_RA_SENSING_CATHODE_LOCATION_1: NORMAL
MDC_IDC_SET_LEADCHNL_RA_SENSING_POLARITY: NORMAL
MDC_IDC_SET_LEADCHNL_RA_SENSING_SENSITIVITY: 0.15 MV
MDC_IDC_SET_LEADCHNL_RV_PACING_AMPLITUDE: 2 V
MDC_IDC_SET_LEADCHNL_RV_PACING_ANODE_ELECTRODE_1: NORMAL
MDC_IDC_SET_LEADCHNL_RV_PACING_ANODE_LOCATION_1: NORMAL
MDC_IDC_SET_LEADCHNL_RV_PACING_CAPTURE_MODE: NORMAL
MDC_IDC_SET_LEADCHNL_RV_PACING_CATHODE_ELECTRODE_1: NORMAL
MDC_IDC_SET_LEADCHNL_RV_PACING_CATHODE_LOCATION_1: NORMAL
MDC_IDC_SET_LEADCHNL_RV_PACING_POLARITY: NORMAL
MDC_IDC_SET_LEADCHNL_RV_PACING_PULSEWIDTH: 0.4 MS
MDC_IDC_SET_LEADCHNL_RV_SENSING_ANODE_ELECTRODE_1: NORMAL
MDC_IDC_SET_LEADCHNL_RV_SENSING_ANODE_LOCATION_1: NORMAL
MDC_IDC_SET_LEADCHNL_RV_SENSING_CATHODE_ELECTRODE_1: NORMAL
MDC_IDC_SET_LEADCHNL_RV_SENSING_CATHODE_LOCATION_1: NORMAL
MDC_IDC_SET_LEADCHNL_RV_SENSING_POLARITY: NORMAL
MDC_IDC_SET_LEADCHNL_RV_SENSING_SENSITIVITY: 0.3 MV
MDC_IDC_SET_ZONE_DETECTION_BEATS_DENOMINATOR: 40 {BEATS}
MDC_IDC_SET_ZONE_DETECTION_BEATS_NUMERATOR: 30 {BEATS}
MDC_IDC_SET_ZONE_DETECTION_INTERVAL: 300 MS
MDC_IDC_SET_ZONE_DETECTION_INTERVAL: 350 MS
MDC_IDC_SET_ZONE_DETECTION_INTERVAL: 360 MS
MDC_IDC_SET_ZONE_DETECTION_INTERVAL: 360 MS
MDC_IDC_SET_ZONE_DETECTION_INTERVAL: NORMAL
MDC_IDC_SET_ZONE_TYPE: NORMAL
MDC_IDC_STAT_AT_BURDEN_PERCENT: 0 %
MDC_IDC_STAT_AT_DTM_END: NORMAL
MDC_IDC_STAT_AT_DTM_START: NORMAL
MDC_IDC_STAT_BRADY_AP_VP_PERCENT: 2.15 %
MDC_IDC_STAT_BRADY_AP_VS_PERCENT: 0.01 %
MDC_IDC_STAT_BRADY_AS_VP_PERCENT: 97.18 %
MDC_IDC_STAT_BRADY_AS_VS_PERCENT: 0.66 %
MDC_IDC_STAT_BRADY_DTM_END: NORMAL
MDC_IDC_STAT_BRADY_DTM_START: NORMAL
MDC_IDC_STAT_BRADY_RA_PERCENT_PACED: 2.16 %
MDC_IDC_STAT_BRADY_RV_PERCENT_PACED: 98.82 %
MDC_IDC_STAT_CRT_DTM_END: NORMAL
MDC_IDC_STAT_CRT_DTM_START: NORMAL
MDC_IDC_STAT_CRT_LV_PERCENT_PACED: 98.75 %
MDC_IDC_STAT_CRT_PERCENT_PACED: 98.75 %
MDC_IDC_STAT_EPISODE_RECENT_COUNT: 0
MDC_IDC_STAT_EPISODE_RECENT_COUNT_DTM_END: NORMAL
MDC_IDC_STAT_EPISODE_RECENT_COUNT_DTM_START: NORMAL
MDC_IDC_STAT_EPISODE_TOTAL_COUNT: 0
MDC_IDC_STAT_EPISODE_TOTAL_COUNT: 1
MDC_IDC_STAT_EPISODE_TOTAL_COUNT_DTM_END: NORMAL
MDC_IDC_STAT_EPISODE_TOTAL_COUNT_DTM_START: NORMAL
MDC_IDC_STAT_EPISODE_TYPE: NORMAL
MDC_IDC_STAT_TACHYTHERAPY_ATP_DELIVERED_RECENT: 0
MDC_IDC_STAT_TACHYTHERAPY_ATP_DELIVERED_TOTAL: 0
MDC_IDC_STAT_TACHYTHERAPY_RECENT_DTM_END: NORMAL
MDC_IDC_STAT_TACHYTHERAPY_RECENT_DTM_START: NORMAL
MDC_IDC_STAT_TACHYTHERAPY_SHOCKS_ABORTED_RECENT: 0
MDC_IDC_STAT_TACHYTHERAPY_SHOCKS_ABORTED_TOTAL: 0
MDC_IDC_STAT_TACHYTHERAPY_SHOCKS_DELIVERED_RECENT: 0
MDC_IDC_STAT_TACHYTHERAPY_SHOCKS_DELIVERED_TOTAL: 0
MDC_IDC_STAT_TACHYTHERAPY_TOTAL_DTM_END: NORMAL
MDC_IDC_STAT_TACHYTHERAPY_TOTAL_DTM_START: NORMAL
MONOCYTES # BLD AUTO: 0.8 10E9/L (ref 0–1.3)
MONOCYTES # BLD AUTO: 0.8 10E9/L (ref 0–1.3)
MONOCYTES NFR BLD AUTO: 14.5 %
MONOCYTES NFR BLD AUTO: 15.4 %
NEUTROPHILS # BLD AUTO: 3.5 10E9/L (ref 1.6–8.3)
NEUTROPHILS # BLD AUTO: 3.8 10E9/L (ref 1.6–8.3)
NEUTROPHILS NFR BLD AUTO: 67.6 %
NEUTROPHILS NFR BLD AUTO: 72.4 %
NITRATE UR QL: NEGATIVE
NRBC # BLD AUTO: 0 10*3/UL
NRBC # BLD AUTO: 0 10*3/UL
NRBC BLD AUTO-RTO: 0 /100
NRBC BLD AUTO-RTO: 0 /100
PCO2 BLDV: 46 MM HG (ref 40–50)
PH BLDV: 7.37 PH (ref 7.32–7.43)
PH UR STRIP: 5 PH (ref 5–7)
PLATELET # BLD AUTO: 122 10E9/L (ref 150–450)
PLATELET # BLD AUTO: 130 10E9/L (ref 150–450)
PLATELET # BLD AUTO: 134 10E9/L (ref 150–450)
PO2 BLDV: 18 MM HG (ref 25–47)
POTASSIUM SERPL-SCNC: 3.3 MMOL/L (ref 3.4–5.3)
POTASSIUM SERPL-SCNC: 4.1 MMOL/L (ref 3.4–5.3)
PROT SERPL-MCNC: 8 G/DL (ref 6.8–8.8)
RBC # BLD AUTO: 4.52 10E12/L (ref 4.4–5.9)
RBC # BLD AUTO: 4.69 10E12/L (ref 4.4–5.9)
RBC # BLD AUTO: 4.74 10E12/L (ref 4.4–5.9)
SAO2 % BLDV FROM PO2: 26 %
SARS-COV-2 RNA RESP QL NAA+PROBE: NEGATIVE
SODIUM SERPL-SCNC: 138 MMOL/L (ref 133–144)
SODIUM SERPL-SCNC: 140 MMOL/L (ref 133–144)
SOURCE: ABNORMAL
SP GR UR STRIP: 1.02 (ref 1–1.03)
SPECIMEN SOURCE: NORMAL
TROPONIN I SERPL-MCNC: 0.09 UG/L (ref 0–0.04)
TROPONIN I SERPL-MCNC: 19.57 UG/L (ref 0–0.04)
TROPONIN I SERPL-MCNC: 28.45 UG/L (ref 0–0.04)
TROPONIN I SERPL-MCNC: 9.52 UG/L (ref 0–0.04)
UFH PPP CHRO-ACNC: 0.32 IU/ML
UFH PPP CHRO-ACNC: 0.38 IU/ML
UFH PPP CHRO-ACNC: 0.65 IU/ML
UFH PPP CHRO-ACNC: <0.1 IU/ML
UROBILINOGEN UR STRIP-ACNC: 0.2 EU/DL (ref 0.2–1)
WBC # BLD AUTO: 5.1 10E9/L (ref 4–11)
WBC # BLD AUTO: 5.3 10E9/L (ref 4–11)
WBC # BLD AUTO: 6.6 10E9/L (ref 4–11)

## 2021-01-01 PROCEDURE — 99223 1ST HOSP IP/OBS HIGH 75: CPT | Mod: AI | Performed by: INTERNAL MEDICINE

## 2021-01-01 PROCEDURE — 82803 BLOOD GASES ANY COMBINATION: CPT

## 2021-01-01 PROCEDURE — 250N000013 HC RX MED GY IP 250 OP 250 PS 637: Performed by: INTERNAL MEDICINE

## 2021-01-01 PROCEDURE — 93005 ELECTROCARDIOGRAM TRACING: CPT

## 2021-01-01 PROCEDURE — 85025 COMPLETE CBC W/AUTO DIFF WBC: CPT | Performed by: INTERNAL MEDICINE

## 2021-01-01 PROCEDURE — 85520 HEPARIN ASSAY: CPT | Performed by: STUDENT IN AN ORGANIZED HEALTH CARE EDUCATION/TRAINING PROGRAM

## 2021-01-01 PROCEDURE — 999N001193 HC VIDEO/TELEPHONE VISIT; NO CHARGE

## 2021-01-01 PROCEDURE — 80053 COMPREHEN METABOLIC PANEL: CPT | Performed by: INTERNAL MEDICINE

## 2021-01-01 PROCEDURE — 99232 SBSQ HOSP IP/OBS MODERATE 35: CPT | Mod: 25 | Performed by: INTERNAL MEDICINE

## 2021-01-01 PROCEDURE — 81003 URINALYSIS AUTO W/O SCOPE: CPT | Performed by: UROLOGY

## 2021-01-01 PROCEDURE — 36415 COLL VENOUS BLD VENIPUNCTURE: CPT | Performed by: STUDENT IN AN ORGANIZED HEALTH CARE EDUCATION/TRAINING PROGRAM

## 2021-01-01 PROCEDURE — 210N000001 HC R&B IMCU HEART CARE

## 2021-01-01 PROCEDURE — 52000 CYSTOURETHROSCOPY: CPT | Performed by: UROLOGY

## 2021-01-01 PROCEDURE — 84484 ASSAY OF TROPONIN QUANT: CPT | Performed by: INTERNAL MEDICINE

## 2021-01-01 PROCEDURE — 36415 COLL VENOUS BLD VENIPUNCTURE: CPT | Performed by: INTERNAL MEDICINE

## 2021-01-01 PROCEDURE — 80048 BASIC METABOLIC PNL TOTAL CA: CPT | Performed by: EMERGENCY MEDICINE

## 2021-01-01 PROCEDURE — 83615 LACTATE (LD) (LDH) ENZYME: CPT | Performed by: INTERNAL MEDICINE

## 2021-01-01 PROCEDURE — 85730 THROMBOPLASTIN TIME PARTIAL: CPT | Performed by: INTERNAL MEDICINE

## 2021-01-01 PROCEDURE — 93296 REM INTERROG EVL PM/IDS: CPT | Performed by: INTERNAL MEDICINE

## 2021-01-01 PROCEDURE — 83605 ASSAY OF LACTIC ACID: CPT

## 2021-01-01 PROCEDURE — 99239 HOSP IP/OBS DSCHRG MGMT >30: CPT | Performed by: STUDENT IN AN ORGANIZED HEALTH CARE EDUCATION/TRAINING PROGRAM

## 2021-01-01 PROCEDURE — 99222 1ST HOSP IP/OBS MODERATE 55: CPT | Performed by: INTERNAL MEDICINE

## 2021-01-01 PROCEDURE — 85025 COMPLETE CBC W/AUTO DIFF WBC: CPT | Performed by: EMERGENCY MEDICINE

## 2021-01-01 PROCEDURE — 85520 HEPARIN ASSAY: CPT | Performed by: INTERNAL MEDICINE

## 2021-01-01 PROCEDURE — 85027 COMPLETE CBC AUTOMATED: CPT | Performed by: INTERNAL MEDICINE

## 2021-01-01 PROCEDURE — 36415 COLL VENOUS BLD VENIPUNCTURE: CPT

## 2021-01-01 PROCEDURE — 99213 OFFICE O/P EST LOW 20 MIN: CPT | Performed by: INTERNAL MEDICINE

## 2021-01-01 PROCEDURE — 999N001017 HC STATISTIC GLUCOSE BY METER IP

## 2021-01-01 PROCEDURE — 99285 EMERGENCY DEPT VISIT HI MDM: CPT | Mod: 25

## 2021-01-01 PROCEDURE — 250N000013 HC RX MED GY IP 250 OP 250 PS 637: Performed by: EMERGENCY MEDICINE

## 2021-01-01 PROCEDURE — 88112 CYTOPATH CELL ENHANCE TECH: CPT | Performed by: PATHOLOGY

## 2021-01-01 PROCEDURE — 84484 ASSAY OF TROPONIN QUANT: CPT | Performed by: EMERGENCY MEDICINE

## 2021-01-01 PROCEDURE — 87635 SARS-COV-2 COVID-19 AMP PRB: CPT | Performed by: EMERGENCY MEDICINE

## 2021-01-01 PROCEDURE — C9803 HOPD COVID-19 SPEC COLLECT: HCPCS

## 2021-01-01 PROCEDURE — 99214 OFFICE O/P EST MOD 30 MIN: CPT | Mod: 95 | Performed by: INTERNAL MEDICINE

## 2021-01-01 PROCEDURE — 93295 DEV INTERROG REMOTE 1/2/MLT: CPT | Performed by: INTERNAL MEDICINE

## 2021-01-01 PROCEDURE — 250N000011 HC RX IP 250 OP 636: Performed by: INTERNAL MEDICINE

## 2021-01-01 RX ORDER — AMOXICILLIN 250 MG
2 CAPSULE ORAL 2 TIMES DAILY PRN
Status: DISCONTINUED | OUTPATIENT
Start: 2021-01-01 | End: 2021-01-01 | Stop reason: HOSPADM

## 2021-01-01 RX ORDER — LIDOCAINE 40 MG/G
CREAM TOPICAL
Status: DISCONTINUED | OUTPATIENT
Start: 2021-01-01 | End: 2021-01-01

## 2021-01-01 RX ORDER — AMOXICILLIN 250 MG
1 CAPSULE ORAL 2 TIMES DAILY PRN
Status: DISCONTINUED | OUTPATIENT
Start: 2021-01-01 | End: 2021-01-01 | Stop reason: HOSPADM

## 2021-01-01 RX ORDER — ATORVASTATIN CALCIUM 40 MG/1
20 TABLET, FILM COATED ORAL DAILY
Status: ON HOLD | COMMUNITY
End: 2021-01-01

## 2021-01-01 RX ORDER — ISOSORBIDE MONONITRATE 30 MG/1
30 TABLET, EXTENDED RELEASE ORAL DAILY
Status: DISCONTINUED | OUTPATIENT
Start: 2021-01-01 | End: 2021-01-01 | Stop reason: HOSPADM

## 2021-01-01 RX ORDER — FUROSEMIDE 20 MG
TABLET ORAL
Qty: 90 TABLET | Refills: 0 | Status: ON HOLD | OUTPATIENT
Start: 2021-01-01 | End: 2021-01-01

## 2021-01-01 RX ORDER — NICOTINE POLACRILEX 4 MG
15-30 LOZENGE BUCCAL
Status: DISCONTINUED | OUTPATIENT
Start: 2021-01-01 | End: 2021-01-01 | Stop reason: HOSPADM

## 2021-01-01 RX ORDER — NITROGLYCERIN 0.4 MG/1
0.4 TABLET SUBLINGUAL EVERY 5 MIN PRN
Status: DISCONTINUED | OUTPATIENT
Start: 2021-01-01 | End: 2021-01-01 | Stop reason: HOSPADM

## 2021-01-01 RX ORDER — POLYETHYLENE GLYCOL 3350 17 G/17G
17 POWDER, FOR SOLUTION ORAL DAILY PRN
Status: DISCONTINUED | OUTPATIENT
Start: 2021-01-01 | End: 2021-01-01 | Stop reason: HOSPADM

## 2021-01-01 RX ORDER — ACETAMINOPHEN 325 MG/1
650 TABLET ORAL EVERY 4 HOURS PRN
Status: DISCONTINUED | OUTPATIENT
Start: 2021-01-01 | End: 2021-01-01 | Stop reason: HOSPADM

## 2021-01-01 RX ORDER — LIDOCAINE HYDROCHLORIDE 20 MG/ML
JELLY TOPICAL ONCE
Status: COMPLETED | OUTPATIENT
Start: 2021-01-01 | End: 2021-01-01

## 2021-01-01 RX ORDER — HYDRALAZINE HYDROCHLORIDE 25 MG/1
25 TABLET, FILM COATED ORAL 2 TIMES DAILY
Status: ON HOLD | COMMUNITY
End: 2021-01-01

## 2021-01-01 RX ORDER — DEXTROSE MONOHYDRATE 25 G/50ML
25-50 INJECTION, SOLUTION INTRAVENOUS
Status: DISCONTINUED | OUTPATIENT
Start: 2021-01-01 | End: 2021-01-01 | Stop reason: HOSPADM

## 2021-01-01 RX ORDER — ASPIRIN 81 MG/1
324 TABLET, CHEWABLE ORAL ONCE
Status: COMPLETED | OUTPATIENT
Start: 2021-01-01 | End: 2021-01-01

## 2021-01-01 RX ORDER — HEPARIN SODIUM 10000 [USP'U]/100ML
0-5000 INJECTION, SOLUTION INTRAVENOUS CONTINUOUS
Status: DISCONTINUED | OUTPATIENT
Start: 2021-01-01 | End: 2021-01-01 | Stop reason: HOSPADM

## 2021-01-01 RX ORDER — FUROSEMIDE 20 MG
20 TABLET ORAL DAILY
Qty: 90 TABLET | Refills: 0 | COMMUNITY
Start: 2021-01-01

## 2021-01-01 RX ORDER — ACETAMINOPHEN 650 MG/1
650 SUPPOSITORY RECTAL EVERY 4 HOURS PRN
Status: DISCONTINUED | OUTPATIENT
Start: 2021-01-01 | End: 2021-01-01 | Stop reason: HOSPADM

## 2021-01-01 RX ORDER — ATORVASTATIN CALCIUM 20 MG/1
20 TABLET, FILM COATED ORAL DAILY
Status: ON HOLD | COMMUNITY
End: 2021-01-01

## 2021-01-01 RX ORDER — BISACODYL 10 MG
10 SUPPOSITORY, RECTAL RECTAL DAILY PRN
Status: DISCONTINUED | OUTPATIENT
Start: 2021-01-01 | End: 2021-01-01 | Stop reason: HOSPADM

## 2021-01-01 RX ORDER — ONDANSETRON 4 MG/1
4 TABLET, ORALLY DISINTEGRATING ORAL EVERY 6 HOURS PRN
Status: DISCONTINUED | OUTPATIENT
Start: 2021-01-01 | End: 2021-01-01 | Stop reason: HOSPADM

## 2021-01-01 RX ORDER — ONDANSETRON 2 MG/ML
4 INJECTION INTRAMUSCULAR; INTRAVENOUS EVERY 6 HOURS PRN
Status: DISCONTINUED | OUTPATIENT
Start: 2021-01-01 | End: 2021-01-01 | Stop reason: HOSPADM

## 2021-01-01 RX ORDER — LIDOCAINE 40 MG/G
CREAM TOPICAL
Status: DISCONTINUED | OUTPATIENT
Start: 2021-01-01 | End: 2021-01-01 | Stop reason: HOSPADM

## 2021-01-01 RX ADMIN — ISOSORBIDE MONONITRATE 30 MG: 30 TABLET, EXTENDED RELEASE ORAL at 08:06

## 2021-01-01 RX ADMIN — HEPARIN SODIUM 1000 UNITS/HR: 10000 INJECTION, SOLUTION INTRAVENOUS at 06:17

## 2021-01-01 RX ADMIN — ISOSORBIDE MONONITRATE 30 MG: 30 TABLET, EXTENDED RELEASE ORAL at 11:31

## 2021-01-01 RX ADMIN — ASPIRIN 81 MG CHEWABLE TABLET 324 MG: 81 TABLET CHEWABLE at 01:20

## 2021-01-01 RX ADMIN — LIDOCAINE HYDROCHLORIDE: 20 JELLY TOPICAL at 13:37

## 2021-01-01 ASSESSMENT — ACTIVITIES OF DAILY LIVING (ADL)
HEARING_DIFFICULTY_OR_DEAF: NO
WALKING_OR_CLIMBING_STAIRS_DIFFICULTY: NO
CONCENTRATING,_REMEMBERING_OR_MAKING_DECISIONS_DIFFICULTY: NO
DIFFICULTY_EATING/SWALLOWING: NO
PATIENT_/_FAMILY_COMMUNICATION_STYLE: SPOKEN LANGUAGE (ENGLISH OR BILINGUAL)
VISION_MANAGEMENT: GLASSES
FALL_HISTORY_WITHIN_LAST_SIX_MONTHS: NO
DIFFICULTY_COMMUNICATING: NO
DOING_ERRANDS_INDEPENDENTLY_DIFFICULTY: NO
HEARING_DIFFICULTY_OR_DEAF: NO
DRESSING/BATHING_DIFFICULTY: NO
WEAR_GLASSES_OR_BLIND: YES
TOILETING_ISSUES: NO

## 2021-01-01 ASSESSMENT — MIFFLIN-ST. JEOR
SCORE: 1599.87
SCORE: 1585.35

## 2021-01-01 ASSESSMENT — PAIN SCALES - GENERAL
PAINLEVEL: NO PAIN (0)
PAINLEVEL: NO PAIN (0)

## 2021-01-27 NOTE — PROGRESS NOTES
"[facility]    Hematology/Oncology Established Patient Follow-up Note      Today's Date: 2/3/2021    Reason for Follow-up: Diffuse large B-cell lymphoma.    Gibson Villalta is a 80 year old male who is being evaluated via a billable telephone visit.      The patient has been notified of following:     \"This telephone visit will be conducted via a call between you and your physician/provider. We have found that certain health care needs can be provided without the need for a physical exam due to the COVID-19 pandemic.  This service lets us provide the care you need with a short phone conversation.  If a prescription is necessary we can send it directly to your pharmacy.  If lab work is needed we can place an order for that and you can then stop by our lab to have the test done at a later time.    Telephone visits are billed at different rates depending on your insurance coverage. During this emergency period, for some insurers they may be billed the same as an in-person visit.  Please reach out to your insurance provider with any questions.    If during the course of the call the physician/provider feels a telephone visit is not appropriate, you will not be charged for this service.\"    Patient has given verbal consent for Telephone visit?  Yes    Phone visit duration: 10 minutes    HISTORY OF PRESENT ILLNESS: Gibson Villalta is a 80 year old male with history of dementia, RV and LV mural thrombus on chronic anticoagulation with Coumadin, status post Smita-en-Y gastric bypass, hypertension, chronic kidney disease, CAD, cardiomyopathy with EF 25-30%, diabetes mellitus who presents with the following oncologic history:  1.  7/2018: Hospitalized for melena.  7/30/2018 CT abdomen/pelvis with contrast showed postop changes of prior gastric bypass procedure, ill-defined soft tissue thickening in the proximal stomach, increased since 8/9/2016, no evidence for bowel obstruction, colitis, or " diverticulitis; no free fluid; 2 bladder wall lesions enhancing, largest measuring 3.9 x 1.8 cm and smaller bladder wall lesion near midline measuring 2 x 1.4 cm, several left renal cysts; liver, gallbladder, spleen, adrenal glands, pancreas, and remaining kidneys unremarkable.  2.  7/30/2018: Upper endoscopy showed a medium sized, fungating, partially circumferential mass with oozing and stigmata of recent bleeding on the greater curvature of the stomach with biopsies suggesting possible lymphoma but it is negative for adenocarcinoma; esophagus and jejunum normal; gastric bypass with normal-sized pouch and intact staple line.  Patient denied any associated abdominal pain, fevers, chills, night sweats, unintentional weight loss, chest pain, dyspnea, hematuria, dysuria.  3.  8/2/2018: Repeat upper endoscopy with biopsy of the stomach mass showed active inflammation and atypical cells with crush artifact.  There were insufficient number of atypical cells to render a definitive diagnosis.  4. 9/21/2018: Underwent TURBT under the care of Dr. Ryan Camarillo.  Pathology showed a T1 high-grade bladder cancer with micropapillary features.  5.  10/19/2018: Repeat upper endoscopy with biopsies confirmed diffuse large B-cell lymphoma, non-germinal center B-cell like, FISH positive for MYC.  Helicobacter pylori stain negative.  6. 10/30/2018: Bone marrow biopsy negative for lymphoma involvement.  7.  10/31/2018: PET/CT scan showed extensive hypermetabolic thickening of the stomach and several loops of small bowel in the left upper quadrant and right lower quadrant consistent with the diagnosed lymphoma, hypermetabolic mesenteric lymphadenopathy, slightly increased metabolic activity in the bone marrow, scattered small subcentimeter calcified and noncalcified pulmonary nodules with low metabolic activity.  8.  11/12/2018: Underwent cystoscopy with restaging TURBT which showed no evidence of tumor regrowth and no new bladder  lesions.  Previous area of resection noted on the right lateral wall just lateral to the right UO.  9.  11/26/2018: Started first-line therapy with mini R-CHOP chemotherapy.  10. 1/28/2019: After 3 cycles of mini R-CHOP, PET/CT scan showed marked improvement in the hypermetabolic thickening of the stomach and loops of small bowel in the left upper quadrant and right lower quadrant, consistent with good response to treatment.  Previous hypermetabolic mesenteric lymphadenopathy has resolved.  Scattered tiny subcentimeter calcified and noncalcified pulmonary nodules have low metabolic activity and are unchanged.  These could represent benign calcified and noncalcified granulomata.  11.  2/19/2019: Cycle 5 and 6 of chemotherapy, doxorubicin held due to worsening shortness of breath and worsened dilation of the left ventricle.  Completion of cycle 6 on 3/12/19.  12.  4/4/2019: PET/CT scan showed no residual lymphoma.  Calcification seen in the bladder.  Benign cyst in left kidney.  13. 8/3/2020: PET/CT scan performed due to recurrent anemia. This showed hypermetabolic nodule in periphery of left side of prostate but no other hypermetabolism elsewhere.       INTERIM HISTORY:  Nelson reports very good energy levels and denies any fevers, chills, night sweats, unintentional weight loss, bowel or bladder dysfunction.  He denies any worsening dyspnea.      REVIEW OF SYSTEMS:   14 point ROS was reviewed and is negative other than as noted above in HPI.       HOME MEDICATIONS:  Current Outpatient Medications   Medication Sig Dispense Refill     atorvastatin (LIPITOR) 40 MG tablet Take 1 tablet (40 mg) by mouth At Bedtime (Patient taking differently: Take 20 mg by mouth At Bedtime ) 90 tablet 3     COENZYME Q-10 PO Take 100 mg by mouth every morning        ferrous sulfate (IRON) 325 (65 Fe) MG tablet Take 325 mg by mouth daily       furosemide (LASIX) 20 MG tablet Take 1.5 tablets (30 mg) by mouth daily 90 tablet 1      hydrALAZINE (APRESOLINE) 25 MG tablet Take 1 tablet (25 mg) by mouth 3 times daily 280 tablet 3     metoprolol succinate ER (TOPROL XL) 50 MG 24 hr tablet Take 1.5 tablets (75 mg) by mouth daily 120 tablet 3     multivitamin, therapeutic with minerals (MULTI-VITAMIN) TABS Take 1 tablet by mouth every morning        venlafaxine (EFFEXOR) 75 MG tablet Take 1 tablet (75 mg) by mouth 2 times daily 60 tablet 6     Vitamin D, Cholecalciferol, 25 MCG (1000 UT) TABS Take 2,000 Units by mouth daily           ALLERGIES:  Allergies   Allergen Reactions     Wasps [Hornets]      Sand wasp --- years ago.           PAST MEDICAL HISTORY:  Past Medical History:   Diagnosis Date     (HFpEF) heart failure with preserved ejection fraction (H)      Acute kidney injury (H) 2012     Anemia      Anxiety      Anxiety and depression      Bladder mass      BPH (benign prostatic hypertrophy)      Cardiomyopathy (H)     ICD implanted 11/2016     Carpal tunnel syndrome     Right     Chronic kidney disease (CKD), stage 3 (moderate)      Chronic systolic CHF (congestive heart failure) (H)      Dementia (H)      Dementia without behavioral disturbance, unspecified dementia type (H) 7/22/2016     Depressive disorder      Diabetes (H)      Diffuse large B-cell lymphoma of extranodal site (H) 11/23/2018     Former smoker      Gastric mass      Gout      History of depression      History of gastric bypass 7/22/2016     Iron deficiency anemia, unspecified iron deficiency anemia type 10/15/2018     LBBB (left bundle branch block) 2013     Lumbar herniated disc     L5-S1     Malignant neoplasm of lateral wall of urinary bladder (H) 11/15/2019    Added automatically from request for surgery [id number]     Mixed cardiomyopathy 7/22/2016     Myocardial infarction (H) 1995 and 2010     Nonischemic cardiomyopathy (H) 7/22/2016     Obesity      Pacemaker     ICD/PM     Rosacea      Rotator cuff disorder     Tear x 2     RV (right ventricular) mural thrombus  7/22/2016     SOB (shortness of breath)          PAST SURGICAL HISTORY:  Past Surgical History:   Procedure Laterality Date     ANGIOPLASTY  1995 and 2010 with stent     BIOPSY      stomach     BONE MARROW BIOPSY, BONE SPECIMEN, NEEDLE/TROCAR N/A 10/30/2018    Procedure: BIOPSY BONE MARROW;  Surgeon: Jeb Romero MD;  Location:  GI     CARDIAC SURGERY      ICD/PM     CYSTOSCOPY       CYSTOSCOPY, TRANSURETHRAL RESECTION (TUR) PROSTATE, COMBINED  4/22/2019    Procedure: CYSTOSCOPY AND BIPOLAR TRANSURETHRAL RESECTION (TUR) PROSTATE;  Surgeon: Ryan Camarillo MD;  Location:  OR     CYSTOSCOPY, TRANSURETHRAL RESECTION (TUR) TUMOR BLADDER, COMBINED N/A 9/19/2018    Procedure: COMBINED CYSTOSCOPY, TRANSURETHRAL RESECTION (TUR) TUMOR BLADDER;  CYSTOSCOPY, TRANSURETHRAL RESECTION BLADDER TUMOR ;  Surgeon: Ryan Camarillo MD;  Location:  OR     CYSTOSCOPY, TRANSURETHRAL RESECTION (TUR) TUMOR BLADDER, COMBINED N/A 11/12/2018    Procedure: CYSTOSCOPY RESTAGING TRANSURETHRAL RESECTION BLADDER TUMOR;  Surgeon: Ryan Camarillo MD;  Location:  OR     CYSTOSCOPY, TRANSURETHRAL RESECTION (TUR) TUMOR BLADDER, COMBINED N/A 12/30/2019    Procedure: CYSTOSCOPY, WITH TRANSURETHRAL RESECTION BLADDER TUMOR;  Surgeon: Ryan Camarillo MD;  Location:  OR     EP LEAD REVISION DUAL N/A 10/24/2019    Procedure: EP Lead Revision Dual;  Surgeon: Josias Hernandez MD;  Location:  HEART CARDIAC CATH LAB     ESOPHAGOSCOPY, GASTROSCOPY, DUODENOSCOPY (EGD), COMBINED N/A 7/30/2018    Procedure: COMBINED ESOPHAGOSCOPY, GASTROSCOPY, DUODENOSCOPY (EGD), BIOPSY SINGLE OR MULTIPLE;  gastroscopy;  Surgeon: Parish Xiong MD;  Location:  GI     ESOPHAGOSCOPY, GASTROSCOPY, DUODENOSCOPY (EGD), COMBINED N/A 7/30/2018    Procedure: COMBINED ESOPHAGOSCOPY, GASTROSCOPY, DUODENOSCOPY (EGD);  EGD;  Surgeon: Parish Xiong MD;  Location:  GI     ESOPHAGOSCOPY, GASTROSCOPY, DUODENOSCOPY (EGD), COMBINED N/A 8/2/2018    Procedure: COMBINED  ESOPHAGOSCOPY, GASTROSCOPY, DUODENOSCOPY (EGD), BIOPSY SINGLE OR MULTIPLE;  gastroscopy BIOPSYSHOULD BE SENT TO LAB IN NS NOT FORMALIN PER ;  Surgeon: Jonathon Bush MD;  Location:  GI     GASTRIC BYPASS       HEART CATH LEFT HEART CATH  16    Non ischemic cardiomyopathy; Non functionally significant LAD and RCA disease      IR PORT REMOVAL RIGHT  2019     LASER HOLMIUM LITHOTRIPSY BLADDER N/A 2019    Procedure: CYSTOSCOPY, HOLMIUM LASER LITHOLAPAXY ,  AND BIPOLAR TRANSURETHRAL RESECTION (TUR) PROSTATE;  Surgeon: Ryan Camarillo MD;  Location:  OR     OTHER SURGICAL HISTORY  2016    CRT-D implantation         SOCIAL HISTORY:  Social History     Socioeconomic History     Marital status:      Spouse name: Not on file     Number of children: Not on file     Years of education: Not on file     Highest education level: Not on file   Occupational History     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     Comment: [employer]; retired   Social Needs     Financial resource strain: Not on file     Food insecurity     Worry: Not on file     Inability: Not on file     Transportation needs     Medical: Not on file     Non-medical: Not on file   Tobacco Use     Smoking status: Former Smoker     Packs/day: 2.00     Years: 20.00     Pack years: 40.00     Types: Cigarettes     Start date:      Quit date: 1980     Years since quittin.0     Smokeless tobacco: Never Used     Tobacco comment: Quit in his 30s - 2 ppd for 20 years   Substance and Sexual Activity     Alcohol use: Not Currently     Alcohol/week: 0.0 standard drinks     Comment: none     Drug use: No     Sexual activity: Yes     Partners: Female   Lifestyle     Physical activity     Days per week: Not on file     Minutes per session: Not on file     Stress: Not on file   Relationships     Social connections     Talks on phone: Not on file     Gets together: Not on file     Attends Anglican service: Not on file     Active  member of club or organization: Not on file     Attends meetings of clubs or organizations: Not on file     Relationship status: Not on file     Intimate partner violence     Fear of current or ex partner: Not on file     Emotionally abused: Not on file     Physically abused: Not on file     Forced sexual activity: Not on file   Other Topics Concern     Parent/sibling w/ CABG, MI or angioplasty before 65F 55M? No      Service Not Asked     Blood Transfusions Not Asked     Caffeine Concern Not Asked     Occupational Exposure Not Asked     Hobby Hazards Not Asked     Sleep Concern Not Asked     Stress Concern Not Asked     Weight Concern Not Asked     Special Diet Yes     Comment: low fat, low salt      Back Care Not Asked     Exercise No     Bike Helmet Not Asked     Seat Belt Not Asked     Self-Exams Not Asked   Social History Narrative     Not on file         FAMILY HISTORY:  Family History   Problem Relation Age of Onset     Coronary Artery Disease Father 39     Alzheimer Disease Mother      Coronary Artery Disease Son         Two sons have  from MIs (ages 39 and 51)         PHYSICAL EXAM:  Vital signs:  There were no vitals taken for this visit.   Not performed as this was a telephone visit.      LABS:  CBC RESULTS:   Recent Labs   Lab Test 21  1132   WBC 5.1   RBC 4.74   HGB 15.7   HCT 47.8   *   MCH 33.1*   MCHC 32.8   RDW 16.5*   *     Last Comprehensive Metabolic Panel:  Sodium   Date Value Ref Range Status   2021 140 133 - 144 mmol/L Final     Potassium   Date Value Ref Range Status   2021 3.3 (L) 3.4 - 5.3 mmol/L Final     Chloride   Date Value Ref Range Status   2021 108 94 - 109 mmol/L Final     Carbon Dioxide   Date Value Ref Range Status   2021 28 20 - 32 mmol/L Final     Anion Gap   Date Value Ref Range Status   2021 4 3 - 14 mmol/L Final     Glucose   Date Value Ref Range Status   2021 123 (H) 70 - 99 mg/dL Final     Urea Nitrogen    Date Value Ref Range Status   02/02/2021 17 7 - 30 mg/dL Final     Creatinine   Date Value Ref Range Status   02/02/2021 1.26 (H) 0.66 - 1.25 mg/dL Final     GFR Estimate   Date Value Ref Range Status   02/02/2021 54 (L) >60 mL/min/[1.73_m2] Final     Comment:     Non  GFR Calc  Starting 12/18/2018, serum creatinine based estimated GFR (eGFR) will be   calculated using the Chronic Kidney Disease Epidemiology Collaboration   (CKD-EPI) equation.       Calcium   Date Value Ref Range Status   02/02/2021 9.4 8.5 - 10.1 mg/dL Final     Bilirubin Total   Date Value Ref Range Status   02/02/2021 1.2 0.2 - 1.3 mg/dL Final     Alkaline Phosphatase   Date Value Ref Range Status   02/02/2021 186 (H) 40 - 150 U/L Final     ALT   Date Value Ref Range Status   02/02/2021 22 0 - 70 U/L Final     AST   Date Value Ref Range Status   02/02/2021 21 0 - 45 U/L Final       LDH: 306 -> 356 -> 280 -> 270 --> 313    PATHOLOGY:  12/30/2019: Bladder tumor biopsy showed urothelial carcinoma in situ and no evidence of invasion in the planes examined.  Muscularis propria present.    IMAGING:  None new since prior visit.    ASSESSMENT/PLAN:  Gibson Villalta is a 80 year old male with the following issues:  1.  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 His LDH has increased further but he has no anemia or constitutional symptoms to suggest recurrence.  -I recommended to Nelson ongoing clinical surveillance for lymphoma.  I only recommended repeating a PET/CT scan if he shows any signs or symptoms suggestive of lymphoma recurrence in the future.  However, we will continue to monitor his CBC, CMP, and LDH.     2.  High-grade bladder cancer, T1  3.  Left prostate nodule  -These were seen on CT abdomen/pelvis on 7/30/2018 and  further evaluated by Dr. Howard Camarillo on 9/07/2018.    -He proceeded with TURBT x 2 in 9/2018 and 11/2018 with no residual tumor or new bladder cancers.  No adjuvant chemotherapy was recommended for his T1 tumor at that time.  -However, he was found to have urothelial carcinoma in situ and a bladder tumor biopsy on 12/30/2019.  There was no clinical evidence for muscle invasive disease.   -He has since completed 6 BCG treatments on 3/23/2020, tolerating those well with no new urinary problems.  -He follows with Dr. Camarillo and decided not to proceed with any intervention for the prostate nodule that was seen on his last PET/CT scan from 8/3/2020.    4. Cardiomyopathy with systolic dysfunction  -Stable.  His dyspnea on exertion is per his usual.  Continue Lasix and follow-up with cardiology as needed.    Return in 3 months with lab draw prior to visit.    Maribel Bender MD  Hematology/Oncology  [facility]

## 2021-02-02 NOTE — PROGRESS NOTES
Medical Assistant Note:  Gibson Villalta presents today for lab draw.    Patient seen by provider today: No.   present during visit today: Not Applicable.    Concerns: No Concerns.    Procedure:  Lab draw site: LAC, Needle type: Butterfly, Gauge: 23.    Post Assessment:  Labs drawn without difficulty: Yes.    Discharge Plan:  Departure Mode: Ambulatory.    Face to Face Time: 4 min      Shari Schoenberger, CMA

## 2021-02-02 NOTE — PROGRESS NOTES
Assessment & Plan     HFrEF (heart failure with reduced ejection fraction) (H)  Patient appears euvolemic at present time.  No changes in his medications    Controlled type 2 diabetes mellitus with chronic kidney disease, without long-term current use of insulin, unspecified CKD stage (H)  Globin A1c is 6.2 performed 5 months ago.    Dementia without behavioral disturbance, unspecified dementia type (H)  No significant change in dementia.  No behavioral disturbance.    Patient has some neck stiffness periodically which is bilateral      Review of external notes as documented above          See Patient Instructions    Return in about 6 months (around 8/2/2021).    José Miguel Gibson MD  [facility] RAYMOND Damon is a 80 year old who presents to clinic today for the following health issues     HPI patient presents for medication recheck.  Overall is doing well.  Hemoglobin was 6.25 at time of his next clinic visit.    Patient denies any new symptoms.  No chest pain or shortness of breath.  No orthopnea.  Recheck medication    New Patient/Transfer of Care  New Patient/Transfer of Care    Review of Systems   Constitutional, HEENT, cardiovascular, pulmonary, gi and gu systems are negative, except as otherwise noted.      Objective    There were no vitals taken for this visit.  There is no height or weight on file to calculate BMI.  Physical Exam   Alert patient patient is in no distress  Lungs are clear without wheezes rales or rhonchi heart S1-S2 regular rhythm    No results displayed because visit has over 200 results.

## 2021-02-02 NOTE — LETTER
2/2/2021         RE: Gibson Villalta  [address]  [place] [address]        Dear Colleague,    Thank you for referring your patient, Gibson Villalta, to the [facility]. Please see a copy of my visit note below.    Medical Assistant Note:  Gibson Villalta presents today for lab draw.    Patient seen by provider today: No.   present during visit today: Not Applicable.    Concerns: No Concerns.    Procedure:  Lab draw site: LAC, Needle type: Butterfly, Gauge: 23.    Post Assessment:  Labs drawn without difficulty: Yes.    Discharge Plan:  Departure Mode: Ambulatory.    Face to Face Time: 4 min      Shari Schoenberger, CMA      Again, thank you for allowing me to participate in the care of your patient.        Sincerely,         Lab Draw

## 2021-02-02 NOTE — PATIENT INSTRUCTIONS
You appear to be doing well.  Continue your current medications    I would like to see you back in 6 months    Best regards  José Miguel

## 2021-02-03 NOTE — PROGRESS NOTES
Nelson is a 80 year old who is being evaluated via a billable telephone visit.      What phone number would you like to be contacted at? # [phone]  How would you like to obtain your AVS? Susana Mendoza     Nelson is a 80 year old who presents to clinic today for the following health issues               Objective    Vitals - Patient Reported  Pain Score: No Pain (0)           Phone call duration     Lin Steele, [place]

## 2021-02-03 NOTE — LETTER
"    2/3/2021         RE: Gibson Villalta  [address]  [place] [address]        Dear Colleague,    Thank you for referring your patient, Gibson Villalta, to the [facility]. Please see a copy of my visit note below.    [facility]    Hematology/Oncology Established Patient Follow-up Note      Today's Date: 2/3/2021    Reason for Follow-up: Diffuse large B-cell lymphoma.    Gibson Villalta is a 80 year old male who is being evaluated via a billable telephone visit.      The patient has been notified of following:     \"This telephone visit will be conducted via a call between you and your physician/provider. We have found that certain health care needs can be provided without the need for a physical exam due to the COVID-19 pandemic.  This service lets us provide the care you need with a short phone conversation.  If a prescription is necessary we can send it directly to your pharmacy.  If lab work is needed we can place an order for that and you can then stop by our lab to have the test done at a later time.    Telephone visits are billed at different rates depending on your insurance coverage. During this emergency period, for some insurers they may be billed the same as an in-person visit.  Please reach out to your insurance provider with any questions.    If during the course of the call the physician/provider feels a telephone visit is not appropriate, you will not be charged for this service.\"    Patient has given verbal consent for Telephone visit?  Yes    Phone visit duration: 10 minutes    HISTORY OF PRESENT ILLNESS: Gibson Villalta is a 80 year old male with history of dementia, RV and LV mural thrombus on chronic anticoagulation with Coumadin, status post Smita-en-Y gastric bypass, hypertension, chronic kidney disease, CAD, cardiomyopathy with EF 25-30%, diabetes mellitus who presents with the following oncologic " history:  1.  7/2018: Hospitalized for melena.  7/30/2018 CT abdomen/pelvis with contrast showed postop changes of prior gastric bypass procedure, ill-defined soft tissue thickening in the proximal stomach, increased since 8/9/2016, no evidence for bowel obstruction, colitis, or diverticulitis; no free fluid; 2 bladder wall lesions enhancing, largest measuring 3.9 x 1.8 cm and smaller bladder wall lesion near midline measuring 2 x 1.4 cm, several left renal cysts; liver, gallbladder, spleen, adrenal glands, pancreas, and remaining kidneys unremarkable.  2.  7/30/2018: Upper endoscopy showed a medium sized, fungating, partially circumferential mass with oozing and stigmata of recent bleeding on the greater curvature of the stomach with biopsies suggesting possible lymphoma but it is negative for adenocarcinoma; esophagus and jejunum normal; gastric bypass with normal-sized pouch and intact staple line.  Patient denied any associated abdominal pain, fevers, chills, night sweats, unintentional weight loss, chest pain, dyspnea, hematuria, dysuria.  3.  8/2/2018: Repeat upper endoscopy with biopsy of the stomach mass showed active inflammation and atypical cells with crush artifact.  There were insufficient number of atypical cells to render a definitive diagnosis.  4. 9/21/2018: Underwent TURBT under the care of Dr. Ryan Camarillo.  Pathology showed a T1 high-grade bladder cancer with micropapillary features.  5.  10/19/2018: Repeat upper endoscopy with biopsies confirmed diffuse large B-cell lymphoma, non-germinal center B-cell like, FISH positive for MYC.  Helicobacter pylori stain negative.  6. 10/30/2018: Bone marrow biopsy negative for lymphoma involvement.  7.  10/31/2018: PET/CT scan showed extensive hypermetabolic thickening of the stomach and several loops of small bowel in the left upper quadrant and right lower quadrant consistent with the diagnosed lymphoma, hypermetabolic mesenteric lymphadenopathy, slightly  increased metabolic activity in the bone marrow, scattered small subcentimeter calcified and noncalcified pulmonary nodules with low metabolic activity.  8.  11/12/2018: Underwent cystoscopy with restaging TURBT which showed no evidence of tumor regrowth and no new bladder lesions.  Previous area of resection noted on the right lateral wall just lateral to the right UO.  9.  11/26/2018: Started first-line therapy with mini R-CHOP chemotherapy.  10. 1/28/2019: After 3 cycles of mini R-CHOP, PET/CT scan showed marked improvement in the hypermetabolic thickening of the stomach and loops of small bowel in the left upper quadrant and right lower quadrant, consistent with good response to treatment.  Previous hypermetabolic mesenteric lymphadenopathy has resolved.  Scattered tiny subcentimeter calcified and noncalcified pulmonary nodules have low metabolic activity and are unchanged.  These could represent benign calcified and noncalcified granulomata.  11.  2/19/2019: Cycle 5 and 6 of chemotherapy, doxorubicin held due to worsening shortness of breath and worsened dilation of the left ventricle.  Completion of cycle 6 on 3/12/19.  12.  4/4/2019: PET/CT scan showed no residual lymphoma.  Calcification seen in the bladder.  Benign cyst in left kidney.  13. 8/3/2020: PET/CT scan performed due to recurrent anemia. This showed hypermetabolic nodule in periphery of left side of prostate but no other hypermetabolism elsewhere.       INTERIM HISTORY:  Nelson reports very good energy levels and denies any fevers, chills, night sweats, unintentional weight loss, bowel or bladder dysfunction.  He denies any worsening dyspnea.      REVIEW OF SYSTEMS:   14 point ROS was reviewed and is negative other than as noted above in HPI.       HOME MEDICATIONS:  Current Outpatient Medications   Medication Sig Dispense Refill     atorvastatin (LIPITOR) 40 MG tablet Take 1 tablet (40 mg) by mouth At Bedtime (Patient taking differently: Take 20 mg  by mouth At Bedtime ) 90 tablet 3     COENZYME Q-10 PO Take 100 mg by mouth every morning        ferrous sulfate (IRON) 325 (65 Fe) MG tablet Take 325 mg by mouth daily       furosemide (LASIX) 20 MG tablet Take 1.5 tablets (30 mg) by mouth daily 90 tablet 1     hydrALAZINE (APRESOLINE) 25 MG tablet Take 1 tablet (25 mg) by mouth 3 times daily 280 tablet 3     metoprolol succinate ER (TOPROL XL) 50 MG 24 hr tablet Take 1.5 tablets (75 mg) by mouth daily 120 tablet 3     multivitamin, therapeutic with minerals (MULTI-VITAMIN) TABS Take 1 tablet by mouth every morning        venlafaxine (EFFEXOR) 75 MG tablet Take 1 tablet (75 mg) by mouth 2 times daily 60 tablet 6     Vitamin D, Cholecalciferol, 25 MCG (1000 UT) TABS Take 2,000 Units by mouth daily           ALLERGIES:  Allergies   Allergen Reactions     Wasps [Hornets]      Sand wasp --- years ago.           PAST MEDICAL HISTORY:  Past Medical History:   Diagnosis Date     (HFpEF) heart failure with preserved ejection fraction (H)      Acute kidney injury (H) 2012     Anemia      Anxiety      Anxiety and depression      Bladder mass      BPH (benign prostatic hypertrophy)      Cardiomyopathy (H)     ICD implanted 11/2016     Carpal tunnel syndrome     Right     Chronic kidney disease (CKD), stage 3 (moderate)      Chronic systolic CHF (congestive heart failure) (H)      Dementia (H)      Dementia without behavioral disturbance, unspecified dementia type (H) 7/22/2016     Depressive disorder      Diabetes (H)      Diffuse large B-cell lymphoma of extranodal site (H) 11/23/2018     Former smoker      Gastric mass      Gout      History of depression      History of gastric bypass 7/22/2016     Iron deficiency anemia, unspecified iron deficiency anemia type 10/15/2018     LBBB (left bundle branch block) 2013     Lumbar herniated disc     L5-S1     Malignant neoplasm of lateral wall of urinary bladder (H) 11/15/2019    Added automatically from request for surgery  [id number]     Mixed cardiomyopathy 7/22/2016     Myocardial infarction (H) 1995 and 2010     Nonischemic cardiomyopathy (H) 7/22/2016     Obesity      Pacemaker     ICD/PM     Rosacea      Rotator cuff disorder     Tear x 2     RV (right ventricular) mural thrombus 7/22/2016     SOB (shortness of breath)          PAST SURGICAL HISTORY:  Past Surgical History:   Procedure Laterality Date     ANGIOPLASTY  1995 and 2010 with stent     BIOPSY      stomach     BONE MARROW BIOPSY, BONE SPECIMEN, NEEDLE/TROCAR N/A 10/30/2018    Procedure: BIOPSY BONE MARROW;  Surgeon: Jeb Romero MD;  Location:  GI     CARDIAC SURGERY      ICD/PM     CYSTOSCOPY       CYSTOSCOPY, TRANSURETHRAL RESECTION (TUR) PROSTATE, COMBINED  4/22/2019    Procedure: CYSTOSCOPY AND BIPOLAR TRANSURETHRAL RESECTION (TUR) PROSTATE;  Surgeon: Ryan Camarillo MD;  Location:  OR     CYSTOSCOPY, TRANSURETHRAL RESECTION (TUR) TUMOR BLADDER, COMBINED N/A 9/19/2018    Procedure: COMBINED CYSTOSCOPY, TRANSURETHRAL RESECTION (TUR) TUMOR BLADDER;  CYSTOSCOPY, TRANSURETHRAL RESECTION BLADDER TUMOR ;  Surgeon: Ryan Camarillo MD;  Location:  OR     CYSTOSCOPY, TRANSURETHRAL RESECTION (TUR) TUMOR BLADDER, COMBINED N/A 11/12/2018    Procedure: CYSTOSCOPY RESTAGING TRANSURETHRAL RESECTION BLADDER TUMOR;  Surgeon: Ryan Camarillo MD;  Location:  OR     CYSTOSCOPY, TRANSURETHRAL RESECTION (TUR) TUMOR BLADDER, COMBINED N/A 12/30/2019    Procedure: CYSTOSCOPY, WITH TRANSURETHRAL RESECTION BLADDER TUMOR;  Surgeon: Ryan Camarillo MD;  Location:  OR     EP LEAD REVISION DUAL N/A 10/24/2019    Procedure: EP Lead Revision Dual;  Surgeon: Josias Hernandez MD;  Location:  HEART CARDIAC CATH LAB     ESOPHAGOSCOPY, GASTROSCOPY, DUODENOSCOPY (EGD), COMBINED N/A 7/30/2018    Procedure: COMBINED ESOPHAGOSCOPY, GASTROSCOPY, DUODENOSCOPY (EGD), BIOPSY SINGLE OR MULTIPLE;  gastroscopy;  Surgeon: Parish Xiong MD;  Location:  GI     ESOPHAGOSCOPY, GASTROSCOPY,  DUODENOSCOPY (EGD), COMBINED N/A 2018    Procedure: COMBINED ESOPHAGOSCOPY, GASTROSCOPY, DUODENOSCOPY (EGD);  EGD;  Surgeon: Parish Xiong MD;  Location:  GI     ESOPHAGOSCOPY, GASTROSCOPY, DUODENOSCOPY (EGD), COMBINED N/A 2018    Procedure: COMBINED ESOPHAGOSCOPY, GASTROSCOPY, DUODENOSCOPY (EGD), BIOPSY SINGLE OR MULTIPLE;  gastroscopy BIOPSYSHOULD BE SENT TO LAB IN NS NOT FORMALIN PER ;  Surgeon: Jonathon Bush MD;  Location:  GI     GASTRIC BYPASS       HEART CATH LEFT HEART CATH  16    Non ischemic cardiomyopathy; Non functionally significant LAD and RCA disease      IR PORT REMOVAL RIGHT  2019     LASER HOLMIUM LITHOTRIPSY BLADDER N/A 2019    Procedure: CYSTOSCOPY, HOLMIUM LASER LITHOLAPAXY ,  AND BIPOLAR TRANSURETHRAL RESECTION (TUR) PROSTATE;  Surgeon: Ryan Camarillo MD;  Location:  OR     OTHER SURGICAL HISTORY  2016    CRT-D implantation         SOCIAL HISTORY:  Social History     Socioeconomic History     Marital status:      Spouse name: Not on file     Number of children: Not on file     Years of education: Not on file     Highest education level: Not on file   Occupational History     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     Comment: [employer]; retired   Social Needs     Financial resource strain: Not on file     Food insecurity     Worry: Not on file     Inability: Not on file     Transportation needs     Medical: Not on file     Non-medical: Not on file   Tobacco Use     Smoking status: Former Smoker     Packs/day: 2.00     Years: 20.00     Pack years: 40.00     Types: Cigarettes     Start date:      Quit date: 1980     Years since quittin.0     Smokeless tobacco: Never Used     Tobacco comment: Quit in his 30s - 2 ppd for 20 years   Substance and Sexual Activity     Alcohol use: Not Currently     Alcohol/week: 0.0 standard drinks     Comment: none     Drug use: No     Sexual activity: Yes     Partners: Female   Lifestyle      Physical activity     Days per week: Not on file     Minutes per session: Not on file     Stress: Not on file   Relationships     Social connections     Talks on phone: Not on file     Gets together: Not on file     Attends Sabianist service: Not on file     Active member of club or organization: Not on file     Attends meetings of clubs or organizations: Not on file     Relationship status: Not on file     Intimate partner violence     Fear of current or ex partner: Not on file     Emotionally abused: Not on file     Physically abused: Not on file     Forced sexual activity: Not on file   Other Topics Concern     Parent/sibling w/ CABG, MI or angioplasty before 65F 55M? No      Service Not Asked     Blood Transfusions Not Asked     Caffeine Concern Not Asked     Occupational Exposure Not Asked     Hobby Hazards Not Asked     Sleep Concern Not Asked     Stress Concern Not Asked     Weight Concern Not Asked     Special Diet Yes     Comment: low fat, low salt      Back Care Not Asked     Exercise No     Bike Helmet Not Asked     Seat Belt Not Asked     Self-Exams Not Asked   Social History Narrative     Not on file         FAMILY HISTORY:  Family History   Problem Relation Age of Onset     Coronary Artery Disease Father 39     Alzheimer Disease Mother      Coronary Artery Disease Son         Two sons have  from MIs (ages 39 and 51)         PHYSICAL EXAM:  Vital signs:  There were no vitals taken for this visit.   Not performed as this was a telephone visit.      LABS:  CBC RESULTS:   Recent Labs   Lab Test 21  1132   WBC 5.1   RBC 4.74   HGB 15.7   HCT 47.8   *   MCH 33.1*   MCHC 32.8   RDW 16.5*   *     Last Comprehensive Metabolic Panel:  Sodium   Date Value Ref Range Status   2021 140 133 - 144 mmol/L Final     Potassium   Date Value Ref Range Status   2021 3.3 (L) 3.4 - 5.3 mmol/L Final     Chloride   Date Value Ref Range Status   2021 108 94 - 109 mmol/L Final      Carbon Dioxide   Date Value Ref Range Status   02/02/2021 28 20 - 32 mmol/L Final     Anion Gap   Date Value Ref Range Status   02/02/2021 4 3 - 14 mmol/L Final     Glucose   Date Value Ref Range Status   02/02/2021 123 (H) 70 - 99 mg/dL Final     Urea Nitrogen   Date Value Ref Range Status   02/02/2021 17 7 - 30 mg/dL Final     Creatinine   Date Value Ref Range Status   02/02/2021 1.26 (H) 0.66 - 1.25 mg/dL Final     GFR Estimate   Date Value Ref Range Status   02/02/2021 54 (L) >60 mL/min/[1.73_m2] Final     Comment:     Non  GFR Calc  Starting 12/18/2018, serum creatinine based estimated GFR (eGFR) will be   calculated using the Chronic Kidney Disease Epidemiology Collaboration   (CKD-EPI) equation.       Calcium   Date Value Ref Range Status   02/02/2021 9.4 8.5 - 10.1 mg/dL Final     Bilirubin Total   Date Value Ref Range Status   02/02/2021 1.2 0.2 - 1.3 mg/dL Final     Alkaline Phosphatase   Date Value Ref Range Status   02/02/2021 186 (H) 40 - 150 U/L Final     ALT   Date Value Ref Range Status   02/02/2021 22 0 - 70 U/L Final     AST   Date Value Ref Range Status   02/02/2021 21 0 - 45 U/L Final       LDH: 306 -> 356 -> 280 -> 270 --> 313    PATHOLOGY:  12/30/2019: Bladder tumor biopsy showed urothelial carcinoma in situ and no evidence of invasion in the planes examined.  Muscularis propria present.    IMAGING:  None new since prior visit.    ASSESSMENT/PLAN:  Gibson Villalta is a 80 year old male with the following issues:  1.  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 His LDH has increased further but he has no anemia or constitutional symptoms to suggest recurrence.  -I recommended to Nelson ongoing clinical surveillance for lymphoma.  I only recommended repeating a PET/CT scan  if he shows any signs or symptoms suggestive of lymphoma recurrence in the future.  However, we will continue to monitor his CBC, CMP, and LDH.     2.  High-grade bladder cancer, T1  3.  Left prostate nodule  -These were seen on CT abdomen/pelvis on 7/30/2018 and further evaluated by Dr. Howard Camarillo on 9/07/2018.    -He proceeded with TURBT x 2 in 9/2018 and 11/2018 with no residual tumor or new bladder cancers.  No adjuvant chemotherapy was recommended for his T1 tumor at that time.  -However, he was found to have urothelial carcinoma in situ and a bladder tumor biopsy on 12/30/2019.  There was no clinical evidence for muscle invasive disease.   -He has since completed 6 BCG treatments on 3/23/2020, tolerating those well with no new urinary problems.  -He follows with Dr. Camarillo and decided not to proceed with any intervention for the prostate nodule that was seen on his last PET/CT scan from 8/3/2020.    4. Cardiomyopathy with systolic dysfunction  -Stable.  His dyspnea on exertion is per his usual.  Continue Lasix and follow-up with cardiology as needed.    Return in 3 months with lab draw prior to visit.    Maribel Bender MD  Hematology/Oncology  [facility] Akil Damon is a 80 year old who is being evaluated via a billable telephone visit.      What phone number would you like to be contacted at? # [phone]  How would you like to obtain your AVS? Susana       Kelly Damon is a 80 year old who presents to clinic today for the following health issues               Objective    Vitals - Patient Reported  Pain Score: No Pain (0)           Phone call duration     Lin Steele [facility]        Again, thank you for allowing me to participate in the care of your patient.        Sincerely,        Maribel Benedr MD

## 2021-02-03 NOTE — LETTER
"    2/3/2021         RE: Gibson Villalta  [address]  [place] [address]        Dear Colleague,    Thank you for referring your patient, Gibson Villalta, to the [facility]. Please see a copy of my visit note below.    [facility]    Hematology/Oncology Established Patient Follow-up Note      Today's Date: 2/3/2021    Reason for Follow-up: Diffuse large B-cell lymphoma.    Gibson Villalta is a 80 year old male who is being evaluated via a billable telephone visit.      The patient has been notified of following:     \"This telephone visit will be conducted via a call between you and your physician/provider. We have found that certain health care needs can be provided without the need for a physical exam due to the COVID-19 pandemic.  This service lets us provide the care you need with a short phone conversation.  If a prescription is necessary we can send it directly to your pharmacy.  If lab work is needed we can place an order for that and you can then stop by our lab to have the test done at a later time.    Telephone visits are billed at different rates depending on your insurance coverage. During this emergency period, for some insurers they may be billed the same as an in-person visit.  Please reach out to your insurance provider with any questions.    If during the course of the call the physician/provider feels a telephone visit is not appropriate, you will not be charged for this service.\"    Patient has given verbal consent for Telephone visit?  Yes    Phone visit duration: 10 minutes    HISTORY OF PRESENT ILLNESS: Gibson Villalta is a 80 year old male with history of dementia, RV and LV mural thrombus on chronic anticoagulation with Coumadin, status post Smita-en-Y gastric bypass, hypertension, chronic kidney disease, CAD, cardiomyopathy with EF 25-30%, diabetes mellitus who presents with the following oncologic " history:  1.  7/2018: Hospitalized for melena.  7/30/2018 CT abdomen/pelvis with contrast showed postop changes of prior gastric bypass procedure, ill-defined soft tissue thickening in the proximal stomach, increased since 8/9/2016, no evidence for bowel obstruction, colitis, or diverticulitis; no free fluid; 2 bladder wall lesions enhancing, largest measuring 3.9 x 1.8 cm and smaller bladder wall lesion near midline measuring 2 x 1.4 cm, several left renal cysts; liver, gallbladder, spleen, adrenal glands, pancreas, and remaining kidneys unremarkable.  2.  7/30/2018: Upper endoscopy showed a medium sized, fungating, partially circumferential mass with oozing and stigmata of recent bleeding on the greater curvature of the stomach with biopsies suggesting possible lymphoma but it is negative for adenocarcinoma; esophagus and jejunum normal; gastric bypass with normal-sized pouch and intact staple line.  Patient denied any associated abdominal pain, fevers, chills, night sweats, unintentional weight loss, chest pain, dyspnea, hematuria, dysuria.  3.  8/2/2018: Repeat upper endoscopy with biopsy of the stomach mass showed active inflammation and atypical cells with crush artifact.  There were insufficient number of atypical cells to render a definitive diagnosis.  4. 9/21/2018: Underwent TURBT under the care of Dr. Ryan Camarillo.  Pathology showed a T1 high-grade bladder cancer with micropapillary features.  5.  10/19/2018: Repeat upper endoscopy with biopsies confirmed diffuse large B-cell lymphoma, non-germinal center B-cell like, FISH positive for MYC.  Helicobacter pylori stain negative.  6. 10/30/2018: Bone marrow biopsy negative for lymphoma involvement.  7.  10/31/2018: PET/CT scan showed extensive hypermetabolic thickening of the stomach and several loops of small bowel in the left upper quadrant and right lower quadrant consistent with the diagnosed lymphoma, hypermetabolic mesenteric lymphadenopathy, slightly  increased metabolic activity in the bone marrow, scattered small subcentimeter calcified and noncalcified pulmonary nodules with low metabolic activity.  8.  11/12/2018: Underwent cystoscopy with restaging TURBT which showed no evidence of tumor regrowth and no new bladder lesions.  Previous area of resection noted on the right lateral wall just lateral to the right UO.  9.  11/26/2018: Started first-line therapy with mini R-CHOP chemotherapy.  10. 1/28/2019: After 3 cycles of mini R-CHOP, PET/CT scan showed marked improvement in the hypermetabolic thickening of the stomach and loops of small bowel in the left upper quadrant and right lower quadrant, consistent with good response to treatment.  Previous hypermetabolic mesenteric lymphadenopathy has resolved.  Scattered tiny subcentimeter calcified and noncalcified pulmonary nodules have low metabolic activity and are unchanged.  These could represent benign calcified and noncalcified granulomata.  11.  2/19/2019: Cycle 5 and 6 of chemotherapy, doxorubicin held due to worsening shortness of breath and worsened dilation of the left ventricle.  Completion of cycle 6 on 3/12/19.  12.  4/4/2019: PET/CT scan showed no residual lymphoma.  Calcification seen in the bladder.  Benign cyst in left kidney.  13. 8/3/2020: PET/CT scan performed due to recurrent anemia. This showed hypermetabolic nodule in periphery of left side of prostate but no other hypermetabolism elsewhere.       INTERIM HISTORY:  Nelson reports very good energy levels and denies any fevers, chills, night sweats, unintentional weight loss, bowel or bladder dysfunction.  He denies any worsening dyspnea.      REVIEW OF SYSTEMS:   14 point ROS was reviewed and is negative other than as noted above in HPI.       HOME MEDICATIONS:  Current Outpatient Medications   Medication Sig Dispense Refill     atorvastatin (LIPITOR) 40 MG tablet Take 1 tablet (40 mg) by mouth At Bedtime (Patient taking differently: Take 20 mg  by mouth At Bedtime ) 90 tablet 3     COENZYME Q-10 PO Take 100 mg by mouth every morning        ferrous sulfate (IRON) 325 (65 Fe) MG tablet Take 325 mg by mouth daily       furosemide (LASIX) 20 MG tablet Take 1.5 tablets (30 mg) by mouth daily 90 tablet 1     hydrALAZINE (APRESOLINE) 25 MG tablet Take 1 tablet (25 mg) by mouth 3 times daily 280 tablet 3     metoprolol succinate ER (TOPROL XL) 50 MG 24 hr tablet Take 1.5 tablets (75 mg) by mouth daily 120 tablet 3     multivitamin, therapeutic with minerals (MULTI-VITAMIN) TABS Take 1 tablet by mouth every morning        venlafaxine (EFFEXOR) 75 MG tablet Take 1 tablet (75 mg) by mouth 2 times daily 60 tablet 6     Vitamin D, Cholecalciferol, 25 MCG (1000 UT) TABS Take 2,000 Units by mouth daily           ALLERGIES:  Allergies   Allergen Reactions     Wasps [Hornets]      Sand wasp --- years ago.           PAST MEDICAL HISTORY:  Past Medical History:   Diagnosis Date     (HFpEF) heart failure with preserved ejection fraction (H)      Acute kidney injury (H) 2012     Anemia      Anxiety      Anxiety and depression      Bladder mass      BPH (benign prostatic hypertrophy)      Cardiomyopathy (H)     ICD implanted 11/2016     Carpal tunnel syndrome     Right     Chronic kidney disease (CKD), stage 3 (moderate)      Chronic systolic CHF (congestive heart failure) (H)      Dementia (H)      Dementia without behavioral disturbance, unspecified dementia type (H) 7/22/2016     Depressive disorder      Diabetes (H)      Diffuse large B-cell lymphoma of extranodal site (H) 11/23/2018     Former smoker      Gastric mass      Gout      History of depression      History of gastric bypass 7/22/2016     Iron deficiency anemia, unspecified iron deficiency anemia type 10/15/2018     LBBB (left bundle branch block) 2013     Lumbar herniated disc     L5-S1     Malignant neoplasm of lateral wall of urinary bladder (H) 11/15/2019    Added automatically from request for surgery  [id number]     Mixed cardiomyopathy 7/22/2016     Myocardial infarction (H) 1995 and 2010     Nonischemic cardiomyopathy (H) 7/22/2016     Obesity      Pacemaker     ICD/PM     Rosacea      Rotator cuff disorder     Tear x 2     RV (right ventricular) mural thrombus 7/22/2016     SOB (shortness of breath)          PAST SURGICAL HISTORY:  Past Surgical History:   Procedure Laterality Date     ANGIOPLASTY  1995 and 2010 with stent     BIOPSY      stomach     BONE MARROW BIOPSY, BONE SPECIMEN, NEEDLE/TROCAR N/A 10/30/2018    Procedure: BIOPSY BONE MARROW;  Surgeon: Jeb Romero MD;  Location:  GI     CARDIAC SURGERY      ICD/PM     CYSTOSCOPY       CYSTOSCOPY, TRANSURETHRAL RESECTION (TUR) PROSTATE, COMBINED  4/22/2019    Procedure: CYSTOSCOPY AND BIPOLAR TRANSURETHRAL RESECTION (TUR) PROSTATE;  Surgeon: Ryan Camarillo MD;  Location:  OR     CYSTOSCOPY, TRANSURETHRAL RESECTION (TUR) TUMOR BLADDER, COMBINED N/A 9/19/2018    Procedure: COMBINED CYSTOSCOPY, TRANSURETHRAL RESECTION (TUR) TUMOR BLADDER;  CYSTOSCOPY, TRANSURETHRAL RESECTION BLADDER TUMOR ;  Surgeon: Ryan Camarillo MD;  Location:  OR     CYSTOSCOPY, TRANSURETHRAL RESECTION (TUR) TUMOR BLADDER, COMBINED N/A 11/12/2018    Procedure: CYSTOSCOPY RESTAGING TRANSURETHRAL RESECTION BLADDER TUMOR;  Surgeon: Ryan Camarillo MD;  Location:  OR     CYSTOSCOPY, TRANSURETHRAL RESECTION (TUR) TUMOR BLADDER, COMBINED N/A 12/30/2019    Procedure: CYSTOSCOPY, WITH TRANSURETHRAL RESECTION BLADDER TUMOR;  Surgeon: Ryan Camarillo MD;  Location:  OR     EP LEAD REVISION DUAL N/A 10/24/2019    Procedure: EP Lead Revision Dual;  Surgeon: Josias Hernandez MD;  Location:  HEART CARDIAC CATH LAB     ESOPHAGOSCOPY, GASTROSCOPY, DUODENOSCOPY (EGD), COMBINED N/A 7/30/2018    Procedure: COMBINED ESOPHAGOSCOPY, GASTROSCOPY, DUODENOSCOPY (EGD), BIOPSY SINGLE OR MULTIPLE;  gastroscopy;  Surgeon: Parish Xiong MD;  Location:  GI     ESOPHAGOSCOPY, GASTROSCOPY,  DUODENOSCOPY (EGD), COMBINED N/A 2018    Procedure: COMBINED ESOPHAGOSCOPY, GASTROSCOPY, DUODENOSCOPY (EGD);  EGD;  Surgeon: Parish Xiong MD;  Location:  GI     ESOPHAGOSCOPY, GASTROSCOPY, DUODENOSCOPY (EGD), COMBINED N/A 2018    Procedure: COMBINED ESOPHAGOSCOPY, GASTROSCOPY, DUODENOSCOPY (EGD), BIOPSY SINGLE OR MULTIPLE;  gastroscopy BIOPSYSHOULD BE SENT TO LAB IN NS NOT FORMALIN PER ;  Surgeon: Jonathon Bush MD;  Location:  GI     GASTRIC BYPASS       HEART CATH LEFT HEART CATH  16    Non ischemic cardiomyopathy; Non functionally significant LAD and RCA disease      IR PORT REMOVAL RIGHT  2019     LASER HOLMIUM LITHOTRIPSY BLADDER N/A 2019    Procedure: CYSTOSCOPY, HOLMIUM LASER LITHOLAPAXY ,  AND BIPOLAR TRANSURETHRAL RESECTION (TUR) PROSTATE;  Surgeon: Ryan Camarillo MD;  Location:  OR     OTHER SURGICAL HISTORY  2016    CRT-D implantation         SOCIAL HISTORY:  Social History     Socioeconomic History     Marital status:      Spouse name: Not on file     Number of children: Not on file     Years of education: Not on file     Highest education level: Not on file   Occupational History     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     Comment: [employer]; retired   Social Needs     Financial resource strain: Not on file     Food insecurity     Worry: Not on file     Inability: Not on file     Transportation needs     Medical: Not on file     Non-medical: Not on file   Tobacco Use     Smoking status: Former Smoker     Packs/day: 2.00     Years: 20.00     Pack years: 40.00     Types: Cigarettes     Start date:      Quit date: 1980     Years since quittin.0     Smokeless tobacco: Never Used     Tobacco comment: Quit in his 30s - 2 ppd for 20 years   Substance and Sexual Activity     Alcohol use: Not Currently     Alcohol/week: 0.0 standard drinks     Comment: none     Drug use: No     Sexual activity: Yes     Partners: Female   Lifestyle      Physical activity     Days per week: Not on file     Minutes per session: Not on file     Stress: Not on file   Relationships     Social connections     Talks on phone: Not on file     Gets together: Not on file     Attends [employer] service: Not on file     Active member of club or organization: Not on file     Attends meetings of clubs or organizations: Not on file     Relationship status: Not on file     Intimate partner violence     Fear of current or ex partner: Not on file     Emotionally abused: Not on file     Physically abused: Not on file     Forced sexual activity: Not on file   Other Topics Concern     Parent/sibling w/ CABG, MI or angioplasty before 65F 55M? No      Service Not Asked     Blood Transfusions Not Asked     Caffeine Concern Not Asked     Occupational Exposure Not Asked     Hobby Hazards Not Asked     Sleep Concern Not Asked     Stress Concern Not Asked     Weight Concern Not Asked     Special Diet Yes     Comment: low fat, low salt      Back Care Not Asked     Exercise No     Bike Helmet Not Asked     Seat Belt Not Asked     Self-Exams Not Asked   Social History Narrative     Not on file         FAMILY HISTORY:  Family History   Problem Relation Age of Onset     Coronary Artery Disease Father 39     Alzheimer Disease Mother      Coronary Artery Disease Son         Two sons have  from MIs (ages 39 and 51)         PHYSICAL EXAM:  Vital signs:  There were no vitals taken for this visit.   Not performed as this was a telephone visit.      LABS:  CBC RESULTS:   Recent Labs   Lab Test 21  1132   WBC 5.1   RBC 4.74   HGB 15.7   HCT 47.8   *   MCH 33.1*   MCHC 32.8   RDW 16.5*   *     Last Comprehensive Metabolic Panel:  Sodium   Date Value Ref Range Status   2021 140 133 - 144 mmol/L Final     Potassium   Date Value Ref Range Status   2021 3.3 (L) 3.4 - 5.3 mmol/L Final     Chloride   Date Value Ref Range Status   2021 108 94 - 109 mmol/L Final      Carbon Dioxide   Date Value Ref Range Status   02/02/2021 28 20 - 32 mmol/L Final     Anion Gap   Date Value Ref Range Status   02/02/2021 4 3 - 14 mmol/L Final     Glucose   Date Value Ref Range Status   02/02/2021 123 (H) 70 - 99 mg/dL Final     Urea Nitrogen   Date Value Ref Range Status   02/02/2021 17 7 - 30 mg/dL Final     Creatinine   Date Value Ref Range Status   02/02/2021 1.26 (H) 0.66 - 1.25 mg/dL Final     GFR Estimate   Date Value Ref Range Status   02/02/2021 54 (L) >60 mL/min/[1.73_m2] Final     Comment:     Non  GFR Calc  Starting 12/18/2018, serum creatinine based estimated GFR (eGFR) will be   calculated using the Chronic Kidney Disease Epidemiology Collaboration   (CKD-EPI) equation.       Calcium   Date Value Ref Range Status   02/02/2021 9.4 8.5 - 10.1 mg/dL Final     Bilirubin Total   Date Value Ref Range Status   02/02/2021 1.2 0.2 - 1.3 mg/dL Final     Alkaline Phosphatase   Date Value Ref Range Status   02/02/2021 186 (H) 40 - 150 U/L Final     ALT   Date Value Ref Range Status   02/02/2021 22 0 - 70 U/L Final     AST   Date Value Ref Range Status   02/02/2021 21 0 - 45 U/L Final       LDH: 306 -> 356 -> 280 -> 270 --> 313    PATHOLOGY:  12/30/2019: Bladder tumor biopsy showed urothelial carcinoma in situ and no evidence of invasion in the planes examined.  Muscularis propria present.    IMAGING:  None new since prior visit.    ASSESSMENT/PLAN:  Gibson Villalta is a 80 year old male with the following issues:  1.  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 His LDH has increased further but he has no anemia or constitutional symptoms to suggest recurrence.  -I recommended to Nelson ongoing clinical surveillance for lymphoma.  I only recommended repeating a PET/CT scan  if he shows any signs or symptoms suggestive of lymphoma recurrence in the future.  However, we will continue to monitor his CBC, CMP, and LDH.     2.  High-grade bladder cancer, T1  3.  Left prostate nodule  -These were seen on CT abdomen/pelvis on 7/30/2018 and further evaluated by Dr. Howard Camarillo on 9/07/2018.    -He proceeded with TURBT x 2 in 9/2018 and 11/2018 with no residual tumor or new bladder cancers.  No adjuvant chemotherapy was recommended for his T1 tumor at that time.  -However, he was found to have urothelial carcinoma in situ and a bladder tumor biopsy on 12/30/2019.  There was no clinical evidence for muscle invasive disease.   -He has since completed 6 BCG treatments on 3/23/2020, tolerating those well with no new urinary problems.  -He follows with Dr. Camarillo and decided not to proceed with any intervention for the prostate nodule that was seen on his last PET/CT scan from 8/3/2020.    4. Cardiomyopathy with systolic dysfunction  -Stable.  His dyspnea on exertion is per his usual.  Continue Lasix and follow-up with cardiology as needed.    Return in 3 months with lab draw prior to visit.    Maribel Bender MD  Hematology/Oncology  [facility] Akil Damon is a 80 year old who is being evaluated via a billable telephone visit.      What phone number would you like to be contacted at? # [phone]  How would you like to obtain your AVS? Susana       Kelly Damon is a 80 year old who presents to clinic today for the following health issues               Objective    Vitals - Patient Reported  Pain Score: No Pain (0)           Phone call duration     [facility]        Again, thank you for allowing me to participate in the care of your patient.        Sincerely,        Maribel Bender MD

## 2021-03-03 NOTE — LETTER
3/3/2021       RE: Gibson Villalta  [address]  [place] [address]     Dear Colleague,    Thank you for referring your patient, Gibson Villalta, to the [facility] [place] at [facility]. Please see a copy of my visit note below.    [place]  CHIEF COMPLAINT   It was my pleasure to see Gibson Villalta who is a 80 year old male for follow-up of bladder cancer.      HPI  Gibson Villalta is a very pleasant 80 year old male who presents with a history of mild dementia, RV and LV mural thrombus on chronic anticoagulation with Coumadin, status post Smita-en-Y gastric bypass, hypertension, CKD, CAD, cardiomyopathy with EF of 20 to 30%, diabetes mellitus.  He also has history of diffuse large B-cell lymphoma who presented with melena and soft tissue thickening of his stomach in July 2018.     9/19/18: Transurethral resection of bladder tumor (TURBT) with T1HG bladder cancer  10/19/18: Repeat upper endoscopy with biopsies confirmed diffuse large B-cell lymphoma  10/30/18: Bone marrow biopsy negative for lymphoma involvement  10/31/18: PET scan showed extensive hypermetabolic thickening of the stomach and several loops of small bowel in the left upper quadrant and right lower quadrant consistent with the diagnosis of lymphoma  11/12/18: Re-staging Transurethral resection of bladder tumor (TURBT) with confirmation of his T1 HG bladder cancer.  Following discussion with his oncologist, Dr. Bender at [facility], we discussed that treatment for his bladder cancer with BCG treatment would not be warranted at this time given his upcoming chemotherapy treatment for his lymphoma and that there would be little benefit of treatment given that this requires an intact immune response.  We will plan for management with surveillance cystoscopy  11/26/18: Started first-line therapy with many R-CHOP chemotherapy.  He follows with   Napoleon at [facility]  1/28/19: After 3 cycles of mini R-CHOP, PET scan showed marked improvement consistent with a good response to treatment  2/19/19: Cycle 5 and 6 of chemotherapy, doxorubicin held due to worsening shortness of breath and worsened dilation of his left ventricle.  Completion of cycle 6 on March 12, 2019 4/1/19: Follow-up with me for surveillance cystoscopy with evidence of a bladder stone and significant prostatic hypertrophy  4/4/19: PET scan showed no residual lymphoma.  Calcification seen in the bladder.  4/22/19: Cystlolithalopaxy and Transurethral resection of the prostate with no evidence of malignancy  10/30/19: last surveillance cystoscopy with some raised erythema at the prior resection site. Cytology returned as positive for malignant cells  12/30/19: Re-staging Transurethral resection of bladder tumor (TURBT) with evidence of CIS    1/29/20: He returns to discuss starting BCG    TODAY: surveillance cystoscopy    Past Medical History:   Diagnosis Date     (HFpEF) heart failure with preserved ejection fraction (H)      Acute kidney injury (H) 2012     Anemia      Anxiety      Anxiety and depression      Bladder mass      BPH (benign prostatic hypertrophy)      Cardiomyopathy (H)     ICD implanted 11/2016     Carpal tunnel syndrome     Right     Chronic kidney disease (CKD), stage 3 (moderate)      Chronic systolic CHF (congestive heart failure) (H)      Dementia (H)      Dementia without behavioral disturbance, unspecified dementia type (H) 7/22/2016     Depressive disorder      Diabetes (H)      Diffuse large B-cell lymphoma of extranodal site (H) 11/23/2018     Former smoker      Gastric mass      Gout      History of depression      History of gastric bypass 7/22/2016     Iron deficiency anemia, unspecified iron deficiency anemia type 10/15/2018     LBBB (left bundle branch block) 2013     Lumbar herniated disc     L5-S1     Malignant neoplasm of lateral wall of urinary bladder (H)  11/15/2019    Added automatically from request for surgery [id number]     Mixed cardiomyopathy 7/22/2016     Myocardial infarction (H) 1995 and 2010     Nonischemic cardiomyopathy (H) 7/22/2016     Obesity      Pacemaker     ICD/PM     Rosacea      Rotator cuff disorder     Tear x 2     RV (right ventricular) mural thrombus 7/22/2016     SOB (shortness of breath)      Past Surgical History:   Procedure Laterality Date     ANGIOPLASTY  1995 and 2010 with stent     BIOPSY      stomach     BONE MARROW BIOPSY, BONE SPECIMEN, NEEDLE/TROCAR N/A 10/30/2018    Procedure: BIOPSY BONE MARROW;  Surgeon: Jeb Romero MD;  Location:  GI     CARDIAC SURGERY      ICD/PM     CYSTOSCOPY       CYSTOSCOPY, TRANSURETHRAL RESECTION (TUR) PROSTATE, COMBINED  4/22/2019    Procedure: CYSTOSCOPY AND BIPOLAR TRANSURETHRAL RESECTION (TUR) PROSTATE;  Surgeon: Ryan Camarillo MD;  Location:  OR     CYSTOSCOPY, TRANSURETHRAL RESECTION (TUR) TUMOR BLADDER, COMBINED N/A 9/19/2018    Procedure: COMBINED CYSTOSCOPY, TRANSURETHRAL RESECTION (TUR) TUMOR BLADDER;  CYSTOSCOPY, TRANSURETHRAL RESECTION BLADDER TUMOR ;  Surgeon: Ryan Camarillo MD;  Location: [facility]     CYSTOSCOPY, TRANSURETHRAL RESECTION (TUR) TUMOR BLADDER, COMBINED N/A 11/12/2018    Procedure: CYSTOSCOPY RESTAGING TRANSURETHRAL RESECTION BLADDER TUMOR;  Surgeon: Ryan Camarillo MD;  Location:  OR     CYSTOSCOPY, TRANSURETHRAL RESECTION (TUR) TUMOR BLADDER, COMBINED N/A 12/30/2019    Procedure: CYSTOSCOPY, WITH TRANSURETHRAL RESECTION BLADDER TUMOR;  Surgeon: Ryan Camarillo MD;  Location:  OR     EP LEAD REVISION DUAL N/A 10/24/2019    Procedure: EP Lead Revision Dual;  Surgeon: Josias Hernandez MD;  Location:  HEART CARDIAC CATH LAB     ESOPHAGOSCOPY, GASTROSCOPY, DUODENOSCOPY (EGD), COMBINED N/A 7/30/2018    Procedure: COMBINED ESOPHAGOSCOPY, GASTROSCOPY, DUODENOSCOPY (EGD), BIOPSY SINGLE OR MULTIPLE;  gastroscopy;  Surgeon: Parish Xiong MD;  Location:  GI  "    ESOPHAGOSCOPY, GASTROSCOPY, DUODENOSCOPY (EGD), COMBINED N/A 7/30/2018    Procedure: COMBINED ESOPHAGOSCOPY, GASTROSCOPY, DUODENOSCOPY (EGD);  EGD;  Surgeon: Parish Xiong MD;  Location:  GI     ESOPHAGOSCOPY, GASTROSCOPY, DUODENOSCOPY (EGD), COMBINED N/A 8/2/2018    Procedure: COMBINED ESOPHAGOSCOPY, GASTROSCOPY, DUODENOSCOPY (EGD), BIOPSY SINGLE OR MULTIPLE;  gastroscopy BIOPSYSHOULD BE SENT TO LAB IN NS NOT FORMALIN PER ;  Surgeon: Jonathon Bush MD;  Location:  GI     GASTRIC BYPASS  2001     HEART CATH LEFT HEART CATH  7/19/16    Non ischemic cardiomyopathy; Non functionally significant LAD and RCA disease      IR PORT REMOVAL RIGHT  9/9/2019     LASER HOLMIUM LITHOTRIPSY BLADDER N/A 4/22/2019    Procedure: CYSTOSCOPY, HOLMIUM LASER LITHOLAPAXY ,  AND BIPOLAR TRANSURETHRAL RESECTION (TUR) PROSTATE;  Surgeon: Ryan Camarillo MD;  Location:  OR     OTHER SURGICAL HISTORY  Nov 2016    CRT-D implantation     Current Outpatient Medications   Medication     atorvastatin (LIPITOR) 20 MG tablet     COENZYME Q-10 PO     ferrous sulfate (IRON) 325 (65 Fe) MG tablet     furosemide (LASIX) 20 MG tablet     hydrALAZINE (APRESOLINE) 25 MG tablet     metoprolol succinate ER (TOPROL XL) 50 MG 24 hr tablet     multivitamin, therapeutic with minerals (MULTI-VITAMIN) TABS     venlafaxine (EFFEXOR) 75 MG tablet     Vitamin D, Cholecalciferol, 25 MCG (1000 UT) TABS     atorvastatin (LIPITOR) 40 MG tablet     No current facility-administered medications for this visit.         PHYSICAL EXAM  Patient is a 80 year old  male   Vitals: Blood pressure 102/62, height 1.854 m (6' 1\"), weight 81.6 kg (180 lb).  General Appearance Adult: Body mass index is 23.75 kg/m .  Alert, no acute distress, oriented, mild dementia   HENT: throat/mouth:normal, good dentition  Lungs: no respiratory distress, or pursed lip breathing  Heart: No obvious jugular venous distension present  Abdomen: soft, nontender, no organomegaly or " masses  Musculoskeltal: extremities normal, no peripheral edema  Skin: no suspicious lesions or rashes  Neuro: Alert, oriented, speech and mentation normal  Psych: affect and mood normal  Gait: Normal    UA RESULTS:  Recent Labs   Lab Test 03/03/21  1335 10/15/18  2031 10/15/18  2031   COLOR Yellow   < > Yellow   APPEARANCE Clear   < > Slightly Cloudy   URINEGLC Negative   < > Negative   URINEBILI Negative   < > Negative   URINEKETONE Negative   < > Negative   SG 1.020   < > 1.019   UBLD Negative   < > Trace*   URINEPH 5.0   < > 5.0   PROTEIN 30*   < > 30*   UROBILINOGEN 0.2   < >  --    NITRITE Negative   < > Negative   LEUKEST Negative   < > Large*   RBCU  --   --  22*   WBCU  --   --  120*    < > = values in this interval not displayed.      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   - No evidence of invasion in the planes examined.   - Muscularis propria present.     CYSTOSCOPY PROCEDURE NOTE:    Gibson Villalta is a 79 year old male     Pt ID verified with patient: Yes     Procedure verified with patient: Yes     Procedure confirmed with physician and support staff: Yes     Consent form confirmed with physician and support staff.    Sign In  History and Physical Exam reviewed.  Informed Consent Discussed: Yes   Sign in Communication: Yes   Time Out:  Team Confirms the Correct Patient, Correct Procedure; Yes , Correct Site and Site Marking, Correct Position (if applicable).    Affirmation of Time Out: Yes   Sign Out:  Sign Out Discussion: Yes   Physician: Ryan Camarillo MD    A urinalysis was performed revealing no evidence of infection.    The benefits, risks, alternatives of the cystoscopy procedure and personnel were discussed with the patient. The verbal consent was obtained and the patient agrees to proceed.      Description of procedure:   After fully informed, voluntary consent was obtained, the patient was  brought into the procedure room, identified and placed in a supine position on the cystoscopy table.  The groin/scrotum were prepped with betadine and draped in a sterile fashion.  Urojet lidocaine gel was introduced.  A 15F flexible cystoscope was inserted into the urethra, and the bladder and urethra wereexamined in a systematic manner.  The patient tolerated the procedure well and there were no complications.      Cystoscopic findings:  The urethra was normal without strictures.  The prostate was 3-4cm long and demonstrated evidence of prior Transurethral resection of the prostate.  There was no median lobe.  The external sphincter coapted normally and the bladder neck was open following the Transurethral resection of the prostate. The bladder was  entered and careful pan endoscopy was carried out. The posterior, superior and lateral walls and dome of the bladder were all well visualized and the scope was retroflexed upon itself..  There was moderate/severe trabeculation.  There were no neoplasms, stones, or diverticula identifed.  The ureteric orifices were normal in position and number and effluxing clear urine. There was no evidence of recurrence at the previous sites of resection.     Optimal treatment plan:  Date of Transurethral resection of bladder tumor (TURBT): 9/19/18  Grade/Stage: HG T1  Date of Re-staging Transurethral resection of bladder tumor (TURBT): 11/12/18  Residual disease: Yes  Grade/Stage: HG T1  Date of Re-staging Transurethral resection of bladder tumor (TURBT): 12/30/19  Residual disease: Yes  Grade/Stage: CIS    Induction (month 0) - BCG not warranted given treatment for his lymphoma. This treatment is now complete - 2/3/2020     Cystoscopy (schedule one week prior to treatment when cysto and treatment coincide)  Month 3 - 4/1/19 with no evidence of disease, however evidence of bladder stone and BPH  Month 6 - 7/24/19 with no evidence of disease and well healed after TURP on  4/22/19  Month 9 - 10/30/19 with no clear evidence of disease, some raised erythema, cytology pending     After recurrence  Month 3 - Missed due to COVID-19  Month 6 - Missed due to COVID-19  Month 9 - 9/2/20, no evidence of disease, cytology neagitive  Month 12 - 12/2/20, no evidence of disease, cytology negative   Month 15 - 3/3/21, no evidence of disease, cytology pending  Month 18  Month 21  Month 24  Month 27  Month 30  Month 36  Year 4  Year 5  Year 6  Year 8  Year 10    ASSESSMENT and PLAN  Gibson Villalta is a 80 year old male with a history of HG T1 bladder cancer and s/p TURP. He was not a candidate for BCG initially due to treatment for lymphoma. Evidence of CIS at Transurethral resection of bladder tumor (TURBT) on 12/30/2019. Now s/p induction BCG.     Bladder cancer  -He is status post his induction BCG with no evidence of recurrence on surveillance cystoscopy today  -Cytology negative from Dec  -Plan for surveillance cystoscopy in 3 months  - No maintenance BCG due to COVID-19 and to limit his health care exposure    Abnormal prostate on PET  -He has an abnormal lesion in his prostate noted on a recent PET scan.  Plan had been for prostate MRI, however his ICD is not MRI compatible  -We discussed the very low likelihood of him developing a clinically significant prostate cancer given his last PSA in 2015 was normal and that his pathology from his TURP did not have any evidence of malignancy.  -No further work-up at this time        Ryan Camarillo MD   Urology  [facility]  Clinic Phone [phone]

## 2021-03-03 NOTE — NURSING NOTE
Prior to the start of the procedure and with procedural staff participation, I verbally confirmed the patient s identity using two indicators, relevant allergies, that the procedure was appropriate and matched the consent or emergent situation, and that the correct equipment/implants were available. Immediately prior to starting the procedure I conducted the Time Out with the procedural staff and re-confirmed the patient s name, procedure, and site/side. I have wiped the patient off with the povidone-Iodine solution, draped them,  used Lidocaine hydrochloride jelly, and instilled sterile water into the bladder. (The Joint Commission universal protocol was followed.)  Yes    Sedation (Moderate or Deep): None  5mL 2% lidocaine hydrochloride Urojet instilled into urethra.    NDC# [address]-9  Lot #:[id number]  Expiration Date: 9-22

## 2021-03-03 NOTE — PROGRESS NOTES
[facility]  CHIEF COMPLAINT   It was my pleasure to see Gibson Villalta who is a 80 year old male for follow-up of bladder cancer.      HPI  Gibson Villalta is a very pleasant 80 year old male who presents with a history of mild dementia, RV and LV mural thrombus on chronic anticoagulation with Coumadin, status post Smita-en-Y gastric bypass, hypertension, CKD, CAD, cardiomyopathy with EF of 20 to 30%, diabetes mellitus.  He also has history of diffuse large B-cell lymphoma who presented with melena and soft tissue thickening of his stomach in July 2018.     9/19/18: Transurethral resection of bladder tumor (TURBT) with T1HG bladder cancer  10/19/18: Repeat upper endoscopy with biopsies confirmed diffuse large B-cell lymphoma  10/30/18: Bone marrow biopsy negative for lymphoma involvement  10/31/18: PET scan showed extensive hypermetabolic thickening of the stomach and several loops of small bowel in the left upper quadrant and right lower quadrant consistent with the diagnosis of lymphoma  11/12/18: Re-staging Transurethral resection of bladder tumor (TURBT) with confirmation of his T1 HG bladder cancer.  Following discussion with his oncologist, Dr. Bender at [facility], we discussed that treatment for his bladder cancer with BCG treatment would not be warranted at this time given his upcoming chemotherapy treatment for his lymphoma and that there would be little benefit of treatment given that this requires an intact immune response.  We will plan for management with surveillance cystoscopy  11/26/18: Started first-line therapy with many R-CHOP chemotherapy.  He follows with Dr. Bender at [facility]  1/28/19: After 3 cycles of mini R-CHOP, PET scan showed marked improvement consistent with a good response to treatment  2/19/19: Cycle 5 and 6 of chemotherapy, doxorubicin held due to worsening shortness of breath and worsened dilation of his left ventricle.  Completion of cycle 6 on March 12, 2019 4/1/19:  Follow-up with me for surveillance cystoscopy with evidence of a bladder stone and significant prostatic hypertrophy  4/4/19: PET scan showed no residual lymphoma.  Calcification seen in the bladder.  4/22/19: Cystlolithalopaxy and Transurethral resection of the prostate with no evidence of malignancy  10/30/19: last surveillance cystoscopy with some raised erythema at the prior resection site. Cytology returned as positive for malignant cells  12/30/19: Re-staging Transurethral resection of bladder tumor (TURBT) with evidence of CIS    1/29/20: He returns to discuss starting BCG    TODAY: surveillance cystoscopy    Past Medical History:   Diagnosis Date     (HFpEF) heart failure with preserved ejection fraction (H)      Acute kidney injury (H) 2012     Anemia      Anxiety      Anxiety and depression      Bladder mass      BPH (benign prostatic hypertrophy)      Cardiomyopathy (H)     ICD implanted 11/2016     Carpal tunnel syndrome     Right     Chronic kidney disease (CKD), stage 3 (moderate)      Chronic systolic CHF (congestive heart failure) (H)      Dementia (H)      Dementia without behavioral disturbance, unspecified dementia type (H) 7/22/2016     Depressive disorder      Diabetes (H)      Diffuse large B-cell lymphoma of extranodal site (H) 11/23/2018     Former smoker      Gastric mass      Gout      History of depression      History of gastric bypass 7/22/2016     Iron deficiency anemia, unspecified iron deficiency anemia type 10/15/2018     LBBB (left bundle branch block) 2013     Lumbar herniated disc     L5-S1     Malignant neoplasm of lateral wall of urinary bladder (H) 11/15/2019    Added automatically from request for surgery [id number]     Mixed cardiomyopathy 7/22/2016     Myocardial infarction (H) 1995 and 2010     Nonischemic cardiomyopathy (H) 7/22/2016     Obesity      Pacemaker     ICD/PM     Rosacea      Rotator cuff disorder     Tear x 2     RV (right ventricular) mural thrombus 7/22/2016      SOB (shortness of breath)      Past Surgical History:   Procedure Laterality Date     ANGIOPLASTY  1995 and 2010 with stent     BIOPSY      stomach     BONE MARROW BIOPSY, BONE SPECIMEN, NEEDLE/TROCAR N/A 10/30/2018    Procedure: BIOPSY BONE MARROW;  Surgeon: Jeb Romero MD;  Location:  GI     CARDIAC SURGERY      ICD/PM     CYSTOSCOPY       CYSTOSCOPY, TRANSURETHRAL RESECTION (TUR) PROSTATE, COMBINED  4/22/2019    Procedure: CYSTOSCOPY AND BIPOLAR TRANSURETHRAL RESECTION (TUR) PROSTATE;  Surgeon: Ryan Camarillo MD;  Location:  OR     CYSTOSCOPY, TRANSURETHRAL RESECTION (TUR) TUMOR BLADDER, COMBINED N/A 9/19/2018    Procedure: COMBINED CYSTOSCOPY, TRANSURETHRAL RESECTION (TUR) TUMOR BLADDER;  CYSTOSCOPY, TRANSURETHRAL RESECTION BLADDER TUMOR ;  Surgeon: Ryan Camarillo MD;  Location:  OR     CYSTOSCOPY, TRANSURETHRAL RESECTION (TUR) TUMOR BLADDER, COMBINED N/A 11/12/2018    Procedure: CYSTOSCOPY RESTAGING TRANSURETHRAL RESECTION BLADDER TUMOR;  Surgeon: Ryan Camarillo MD;  Location:  OR     CYSTOSCOPY, TRANSURETHRAL RESECTION (TUR) TUMOR BLADDER, COMBINED N/A 12/30/2019    Procedure: CYSTOSCOPY, WITH TRANSURETHRAL RESECTION BLADDER TUMOR;  Surgeon: Ryan Camarillo MD;  Location:  OR     EP LEAD REVISION DUAL N/A 10/24/2019    Procedure: EP Lead Revision Dual;  Surgeon: Josias Hernandez MD;  Location:  HEART CARDIAC CATH LAB     ESOPHAGOSCOPY, GASTROSCOPY, DUODENOSCOPY (EGD), COMBINED N/A 7/30/2018    Procedure: COMBINED ESOPHAGOSCOPY, GASTROSCOPY, DUODENOSCOPY (EGD), BIOPSY SINGLE OR MULTIPLE;  gastroscopy;  Surgeon: Parish Xiong MD;  Location:  GI     ESOPHAGOSCOPY, GASTROSCOPY, DUODENOSCOPY (EGD), COMBINED N/A 7/30/2018    Procedure: COMBINED ESOPHAGOSCOPY, GASTROSCOPY, DUODENOSCOPY (EGD);  EGD;  Surgeon: Parish Xiong MD;  Location:  GI     ESOPHAGOSCOPY, GASTROSCOPY, DUODENOSCOPY (EGD), COMBINED N/A 8/2/2018    Procedure: COMBINED ESOPHAGOSCOPY, GASTROSCOPY,  "DUODENOSCOPY (EGD), BIOPSY SINGLE OR MULTIPLE;  gastroscopy BIOPSYSHOULD BE SENT TO LAB IN NS NOT FORMALIN PER ;  Surgeon: Jonathon Bush MD;  Location:  GI     GASTRIC BYPASS  2001     HEART CATH LEFT HEART CATH  7/19/16    Non ischemic cardiomyopathy; Non functionally significant LAD and RCA disease      IR PORT REMOVAL RIGHT  9/9/2019     LASER HOLMIUM LITHOTRIPSY BLADDER N/A 4/22/2019    Procedure: CYSTOSCOPY, HOLMIUM LASER LITHOLAPAXY ,  AND BIPOLAR TRANSURETHRAL RESECTION (TUR) PROSTATE;  Surgeon: Ryan Camarillo MD;  Location:  OR     OTHER SURGICAL HISTORY  Nov 2016    CRT-D implantation     Current Outpatient Medications   Medication     atorvastatin (LIPITOR) 20 MG tablet     COENZYME Q-10 PO     ferrous sulfate (IRON) 325 (65 Fe) MG tablet     furosemide (LASIX) 20 MG tablet     hydrALAZINE (APRESOLINE) 25 MG tablet     metoprolol succinate ER (TOPROL XL) 50 MG 24 hr tablet     multivitamin, therapeutic with minerals (MULTI-VITAMIN) TABS     venlafaxine (EFFEXOR) 75 MG tablet     Vitamin D, Cholecalciferol, 25 MCG (1000 UT) TABS     atorvastatin (LIPITOR) 40 MG tablet     No current facility-administered medications for this visit.         PHYSICAL EXAM  Patient is a 80 year old  male   Vitals: Blood pressure 102/62, height 1.854 m (6' 1\"), weight 81.6 kg (180 lb).  General Appearance Adult: Body mass index is 23.75 kg/m .  Alert, no acute distress, oriented, mild dementia   HENT: throat/mouth:normal, good dentition  Lungs: no respiratory distress, or pursed lip breathing  Heart: No obvious jugular venous distension present  Abdomen: soft, nontender, no organomegaly or masses  Musculoskeltal: extremities normal, no peripheral edema  Skin: no suspicious lesions or rashes  Neuro: Alert, oriented, speech and mentation normal  Psych: affect and mood normal  Gait: Normal    UA RESULTS:  Recent Labs   Lab Test 03/03/21  1335 10/15/18  2031 10/15/18  2031   COLOR Yellow   < > Yellow "   APPEARANCE Clear   < > Slightly Cloudy   URINEGLC Negative   < > Negative   URINEBILI Negative   < > Negative   URINEKETONE Negative   < > Negative   SG 1.020   < > 1.019   UBLD Negative   < > Trace*   URINEPH 5.0   < > 5.0   PROTEIN 30*   < > 30*   UROBILINOGEN 0.2   < >  --    NITRITE Negative   < > Negative   LEUKEST Negative   < > Large*   RBCU  --   --  22*   WBCU  --   --  120*    < > = values in this interval not displayed.      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   - No evidence of invasion in the planes examined.   - Muscularis propria present.     CYSTOSCOPY PROCEDURE NOTE:    Gibson Villalta is a 79 year old male     Pt ID verified with patient: Yes     Procedure verified with patient: Yes     Procedure confirmed with physician and support staff: Yes     Consent form confirmed with physician and support staff.    Sign In  History and Physical Exam reviewed.  Informed Consent Discussed: Yes   Sign in Communication: Yes   Time Out:  Team Confirms the Correct Patient, Correct Procedure; Yes , Correct Site and Site Marking, Correct Position (if applicable).    Affirmation of Time Out: Yes   Sign Out:  Sign Out Discussion: Yes   Physician: Ryan Camarillo MD    A urinalysis was performed revealing no evidence of infection.    The benefits, risks, alternatives of the cystoscopy procedure and personnel were discussed with the patient. The verbal consent was obtained and the patient agrees to proceed.      Description of procedure:   After fully informed, voluntary consent was obtained, the patient was brought into the procedure room, identified and placed in a supine position on the cystoscopy table.  The groin/scrotum were prepped with betadine and draped in a sterile fashion.  Urojet lidocaine gel was introduced.  A 15F flexible cystoscope was inserted into the urethra, and the bladder and urethra wereexamined  in a systematic manner.  The patient tolerated the procedure well and there were no complications.      Cystoscopic findings:  The urethra was normal without strictures.  The prostate was 3-4cm long and demonstrated evidence of prior Transurethral resection of the prostate.  There was no median lobe.  The external sphincter coapted normally and the bladder neck was open following the Transurethral resection of the prostate. The bladder was  entered and careful pan endoscopy was carried out. The posterior, superior and lateral walls and dome of the bladder were all well visualized and the scope was retroflexed upon itself..  There was moderate/severe trabeculation.  There were no neoplasms, stones, or diverticula identifed.  The ureteric orifices were normal in position and number and effluxing clear urine. There was no evidence of recurrence at the previous sites of resection.     Optimal treatment plan:  Date of Transurethral resection of bladder tumor (TURBT): 9/19/18  Grade/Stage: HG T1  Date of Re-staging Transurethral resection of bladder tumor (TURBT): 11/12/18  Residual disease: Yes  Grade/Stage: HG T1  Date of Re-staging Transurethral resection of bladder tumor (TURBT): 12/30/19  Residual disease: Yes  Grade/Stage: CIS    Induction (month 0) - BCG not warranted given treatment for his lymphoma. This treatment is now complete - 2/3/2020     Cystoscopy (schedule one week prior to treatment when cysto and treatment coincide)  Month 3 - 4/1/19 with no evidence of disease, however evidence of bladder stone and BPH  Month 6 - 7/24/19 with no evidence of disease and well healed after TURP on 4/22/19  Month 9 - 10/30/19 with no clear evidence of disease, some raised erythema, cytology pending     After recurrence  Month 3 - Missed due to COVID-19  Month 6 - Missed due to COVID-19  Month 9 - 9/2/20, no evidence of disease, cytology neagitive  Month 12 - 12/2/20, no evidence of disease, cytology negative   Month 15 -  3/3/21, no evidence of disease, cytology pending  Month 18  Month 21  Month 24  Month 27  Month 30  Month 36  Year 4  Year 5  Year 6  Year 8  Year 10    ASSESSMENT and PLAN  Gibson Villalta is a 80 year old male with a history of HG T1 bladder cancer and s/p TURP. He was not a candidate for BCG initially due to treatment for lymphoma. Evidence of CIS at Transurethral resection of bladder tumor (TURBT) on 12/30/2019. Now s/p induction BCG.     Bladder cancer  -He is status post his induction BCG with no evidence of recurrence on surveillance cystoscopy today  -Cytology negative from Dec  -Plan for surveillance cystoscopy in 3 months  - No maintenance BCG due to COVID-19 and to limit his health care exposure    Abnormal prostate on PET  -He has an abnormal lesion in his prostate noted on a recent PET scan.  Plan had been for prostate MRI, however his ICD is not MRI compatible  -We discussed the very low likelihood of him developing a clinically significant prostate cancer given his last PSA in 2015 was normal and that his pathology from his TURP did not have any evidence of malignancy.  -No further work-up at this time        Ryan Camarillo MD   Urology  [facility]  Clinic Phone [phone]

## 2021-03-17 NOTE — TELEPHONE ENCOUNTER
Component      Latest Ref Rng & Units 7/17/2020 8/20/2020 11/2/2020 2/2/2021   Sodium      133 - 144 mmol/L 142 141 142 140   Potassium      3.4 - 5.3 mmol/L 3.5 4.7 3.4 3.3 (L)   Chloride      94 - 109 mmol/L 109 108 110 (H) 108   Carbon Dioxide      20 - 32 mmol/L 27 26 25 28   Anion Gap      3 - 14 mmol/L 6 7 7 4   Glucose      70 - 99 mg/dL 78 120 (H) 76 123 (H)   Urea Nitrogen      7 - 30 mg/dL 17 18 18 17   Creatinine      0.66 - 1.25 mg/dL 1.08 1.29 (H) 1.35 (H) 1.26 (H)   GFR Estimate      >60 mL/min/1.73:m2 65 52 (L) 50 (L) 54 (L)   GFR Estimate If Black      >60 mL/min/1.73:m2 75 61 57 (L) 62

## 2021-03-17 NOTE — TELEPHONE ENCOUNTER
Routing refill request to provider for review/approval because:  Labs out of range:  Potassium 3.3; creatine elevated (stable)  Julia Burden RN

## 2021-04-06 PROBLEM — I95.9 HYPOTENSION, UNSPECIFIED HYPOTENSION TYPE: Status: ACTIVE | Noted: 2021-01-01

## 2021-04-06 PROBLEM — R79.89 ELEVATED TROPONIN: Status: ACTIVE | Noted: 2021-01-01

## 2021-04-06 NOTE — ED PROVIDER NOTES
History     Chief Complaint:  Shoulder Pain and Hypotension      HPI   History is limited due to dementia.    Gibson Villalta is a 80 year old male with a history of MI, DM type 2, bladder cancer, CHF, and cardiomyopathy, and dementia who presents to the emergency department for evaluation of shoulder pain. Patient reports having shoulder pain for the past several weeks that worsened today. The pain worsens with movement. Patient also began feeling nauseated and lightheaded today. Wife notes that patient has been eating less and losing a lot of weight. Patient did have fall several days ago but no head trauma or loss of consciousness. No chest pain or shortness of breath.    Review of Systems   Unable to perform ROS: Dementia     Allergies:  No known drug allergies    Medications:    Atorvastatin  Coenzyme Q10  Lasix  Hydralazine  Metoprolol  Effexor    Past Medical History:    HFpEF  ARIELA  Anxiety  Depression  BPH  CTS  Cardiomyopathy  CKD stage 3  Chronic systolic CHF  Dementia  Depression  DM type 2  Diffuse large B-cell lymphoma  Gout  OSIEL  LBBB  Bladder cancer  MI  Obesity  Rosacea  RV mural thrombus    Past Surgical History:    Angioplasty  Stomach biopsy  Bone marrow biopsy  ICD pacemaker placement  Cystoscopy, TURP x4  EP lead revision dual  EGD with biopsy x3  Gastric bypass  Left heart cath  Port placement and removal  Lithotripsy  CRT-D implantation    Family History:    CAD   Alzheimer disease    Social History:  The patient presents to the emergency department with his wife.  Marital Status:     Physical Exam     Patient Vitals for the past 24 hrs:   BP Temp Temp src Pulse Resp SpO2   04/06/21 0055 (!) 80/65 -- -- 68 20 97 %   04/06/21 0035 (!) 81/61 -- -- 64 20 96 %   04/06/21 0015 (!) 76/27 -- -- 63 22 98 %   04/05/21 2355 (!) 81/61 -- -- 70 21 100 %   04/05/21 2335 (!) 85/52 97.1  F (36.2  C) Oral 78 16 --         Physical Exam  Eye:  Pupils are equal, round, and reactive.  Extraocular  movements intact.    ENT:  No rhinorrhea.  Moist mucus membranes.  Normal tongue and tonsil.    Cardiac:  Regular rate and rhythm.  No murmurs, gallops, or rubs.    Pulmonary:  Clear to auscultation bilaterally.  No wheezes, rales, or rhonchi.    Abdomen:  Positive bowel sounds.  Abdomen is soft and non-distended, without focal tenderness.    Musculoskeletal:  Normal movement of all extremities without evidence for deficit. Patient describes tightness in his bilateral trapezius muscles though palpation does not exacerbate his symptoms.    Skin:  diminished radial pulses with slow capillary refill.     Neurologic:  Non-focal exam without asymmetric weakness or numbness.     Psychiatric:  Normal affect with appropriate interaction with examiner. Patient is pleasantly demented.      Emergency Department Course   ECG  ECG taken at 2325, ECG read at 2330  Atrial-sensed ventricular-paced rhythm with premature atrial complexes with aberrant conduction. Abnormal ECG.  Rate 72 bpm. OR interval 126 ms. QRS duration 182 ms. QT/QTc 528/578 ms. P-R-T axes 52 176 83.     Laboratory:  CBC: WBC: 5.3, HGB: 15.6, PLT: 130 (L)  BMP: Glucose 107 (H), GFR: 45 (L), o/w WNL (Creatinine: 1.47 (H))  Troponin (Collected 2334): 0.093 (H)  ISTAT gases lactate samuel POCT: pH 7.37 / PCO2 46 / PO2 18 (L) / Bicarb 27 / lactic acid 1.7  Asymptomatic COVID-19 PCR: Pending       Emergency Department Course:  Reviewed:  I reviewed the patient's nursing notes, vitals, past medical records, Care Everywhere.     Assessments:  2349 I assessed the patient. Exam findings described above.    108 I reassessed and updated the patient.     Consults:   159 I spoke with Dr. Brand of the hospitalist services, who is in agreement to accept the patient for admission for further monitoring, evaluation, and treatment. Findings and plan explained to the Patient and spouse who consents to admission.     Interventions:  Aspirin 324 mg Oral    Disposition:  Admitted to the  "hospital.    Impression & Plan    Medical Decision Making:  This medically complicated 80-year-old gentleman with severe ischemic cardiomyopathy and dementia presents to us with concerns of having shoulder pain along with low blood pressures.  The patient's wife states that the patient has been complaining of bilateral shoulder pain \"for months.\"  It has been thought to be related to something more musculoskeletal considering that it bothers him to put his arms above his head or use his shoulders and arms.  The patient's wife gave the patient his nighttime medications and went to bed around 9 PM.  The patient believes he took his meds, but then an hour or so later had increasing discomfort into his shoulders and woke his wife, presenting to the emergency department.  He denies any associated symptoms of shortness of breath, nausea, or vomiting.  He did feel lightheaded when he stood up.    On my exam, the patient is resting comfortably in the bed.  He denies any significant pain at this time.  I was immediately concerned with his blood pressure with systolic blood pressures consistently in the 80s and sometimes dipping into the 70s.  His heart rate was appropriate.  His EKG shows a appropriate functioning pacemaker.  Physical exam is reassuring.  He states that his discomfort is in his bilateral trapezius, though palpation does not greatly exacerbate this discomfort.  He did describe discomfort when I asked him to raise his arms.  Otherwise he has no significant lower extremity edema and his lungs are clear.  His oxygenation is appropriate.  Blood work was obtained which is generally reassuring.  His troponin is very minimally elevated at 0.093.    Because of his significant low blood pressure but also concerned about his severe cardiomyopathy (most recent echocardiogram showing an EF of less than 20%), I gently hydrated him with 1 L of fluid.  I checked a lactate and VBG and these numbers are appropriate.  His " distal extremities are somewhat cool with slower capillary refill, though he continues to otherwise be symptom-free while he is lying in the bed.  I do not believe that he requires a central line or pressors at this time.  My leading supposition is that he took his nighttime medications including hydralazine and metoprolol and this may be resulting in this otherwise asymptomatic hypotension.  With his lack of symptoms at this time, minimally elevated troponin, and history, I am not going to initiate heparin at this time.  However, he does require admission to the hospital until he has appropriate stabilization of his blood pressures and that he is ruled out from a cardiac standpoint.  The patient is comfortable with this plan for admission as is his wife.  I spoke with Dr. Brand of the hospitalist service who agrees to accept care of the patient.    Of note, there are no beds in the hospital that can accommodate this patient.  I did offer him transfer to another facility but he prefers to stay here as all of his cardiac care has been managed by the physicians at this hospital.  He is comfortable staying in the emergency department until [place] [facility] [place]    Covid-19  Gibson Villalta was evaluated during a global COVID-19 pandemic, which necessitated consideration that the patient might be at risk for infection with the SARS-CoV-2 virus that causes COVID-19.   Applicable protocols for evaluation were followed during the patient's care.   COVID-19 was considered as part of the patient's evaluation. The plan for testing is:  a test was obtained during this visit.    Diagnosis:    ICD-10-CM    1. Hypotension, unspecified hypotension type  I95.9    2. Elevated troponin  R77.8      Sean Doll  4/5/2021   EMERGENCY DEPARTMENT  Scribe Disclosure:  I, Sean Doll, am serving as a scribe at 11:49 PM on 4/5/2021 to document services personally performed by Trierweiler, Chad A, MD based on my observations  and the provider's statements to me.          Trierweiler, Chad A, MD  04/06/21 2202

## 2021-04-06 NOTE — PROGRESS NOTES
[facility]    Medicine Progress Note - Hospitalist Service       Date of Admission:  4/5/2021  Date of Service: 04/06/2021    Assessment & Plan       Gibson Villalta is a very pleasant 80 year old male with dementia, mixed cardiomyopathy and systolic congestive heart failure with ejection fraction less than 15%, status post ICD placement, left bundle branch block, hypertension, CKD stage III, chronic anemia, status post gastric bypass status, diabetes, history of bladder cancer, BPH status post TURP, history of diffuse large B-cell lymphoma who was admitted on 4/5/2021 with bilateral shoulder pain concerning for anginal equivalent, elevated troponin, weakness.    Hypotension  Mixed cardiomyopathy and systolic CHF (EF<15%)  S/p PPM placement  Hypertension (on coreg and hydralazine)  LBBB  Bilateral shoulder pain x 2 weeks (anginal equivalent?), worse this evening prompting ED visit. Troponin 0.093 but in setting of CKD will need more data points to determine significance. Follows with [facility] cardiology.  Twelve-lead EKG with atrial-sensed ventricular paced rhythm with aberrant conduction. ECHO 09/2019 shows Left ventricular systolic function is severely reduced. The visual ejection fraction is estimated at <20%.  Plan:  -serial troponins => second troponin markedly elevated at 9.519 and rising, but given plans for hospice would not check further  -Heparin continued per wife request.   -aspirin given in ED  -cardiology consulted -> wife would not want invasice intervention    CKD stage 3  Chronic anemia, iron deficiency anemia  Renal function at baseline (GFR 45). Has macrocytosis () which could be related to his post gastric bypass status (B12 deficiency).     Diabetes, type 2  Managed PTA with diet control. No recent hemoglobin A1c available.    Chronic, stable problems:  H/o gastric bypass:  Diffuse large B-cell lymphoma  H/o bladder cancer  BPH  H/o  TURP    ~~~~~~~~~~~~~~~~~~~~~~~~~~~~~~~~~~~~~~~~~~~~~~~~~~~~~         Diet: Combination Diet Regular Diet Adult    DVT Prophylaxis: Heparin SQ  Renner Catheter: not present  Code Status: No CPR- Do NOT Intubate           Disposition Plan   Expected discharge: Tomorrow, recommended to prior living arrangement once hopisce .  Entered: Alessio James MD 04/06/2021, 5:43 PM       The patient's care was discussed with the Bedside Nurse, Patient and Patient's Family.    Alessio James MD  Hospitalist Service  [facility]  Contact information available via [facility] Paging/Directory    ______________________________________________________________________    Interval History     No complaints from patient  Wife would like hospice approach.     Data reviewed today: I reviewed all medications, new labs and imaging results over the last 24 hours. I personally reviewed no images or EKG's today.    Physical Exam   Vital Signs: Temp: 97.6  F (36.4  C) Temp src: Oral BP: 96/66 Pulse: 85   Resp: 16 SpO2: 94 % O2 Device: None (Room air)    Weight: 185 lbs 1.6 oz    Data   Recent Labs   Lab 04/06/21  1323 04/06/21  0840 04/06/21  0614 04/06/21  0509 04/05/21  2334   WBC  --   --  6.6  --  5.3   HGB  --   --  15.3  --  15.6   MCV  --   --  102*  --  103*   PLT  --   --  122*  --  130*   NA  --   --   --   --  138   POTASSIUM  --   --   --   --  4.1   CHLORIDE  --   --   --   --  106   CO2  --   --   --   --  26   BUN  --   --   --   --  20   CR  --   --   --   --  1.47*   ANIONGAP  --   --   --   --  6   SHELLEY  --   --   --   --  8.7   GLC  --   --   --   --  107*   TROPI 28.454* 19.569*  --  9.519* 0.093*     No results found for this or any previous visit (from the past 24 hour(s)).  Medications     heparin 1,000 Units/hr (04/06/21 1300)       isosorbide mononitrate  30 mg Oral Daily     sodium chloride (PF)  3 mL Intracatheter Q8H

## 2021-04-06 NOTE — CONSULTS
[facility]    Cardiology Consultation     Date of Admission:  4/5/2021    Primary Care Physician   José Miguel Gibson     Consult Date:  04/06/2021      REASON FOR CONSULTATION:  Troponin elevation, hypotension.      REFERRING PHYSICIAN:  Dr. Brand, Hospitalist Service.      IMPRESSION:   1.  Acute coronary syndrome.   2.  Ischemic cardiomyopathy with severe left ventricular systolic dysfunction.  LVEF less than 20%.   3.  Advanced dementia.   4.  Acute on chronic renal failure.   5.  History of gastrointestinal bleed.   6.  History of both RV and LV thrombus, however, not on oral anticoagulation due to GI bleed.      This is a delightful gentleman who is alert, but disoriented in time, place and person.  His symptoms may be unreliable.  His troponin did go up from less than 1 to 9.  However, he would not be a good candidate for invasive management of his coronary artery disease with the above comorbidities.  His hypotension is likely due to his severe left ventricular systolic dysfunction.  However, he does not have evidence of congestive heart failure at this time.      I would like to recommend conservative treatment for his acute coronary syndrome.  Unfortunately, he would not be a candidate for dual antiplatelet therapy with history of GI bleed.  However, I think 2 or 3 days of cooling off with IV heparin together with aspirin should be fine.  In the meantime, I would like to add a low dose of oral nitrates to see if this would help with his shoulder discomfort.  Ranolazine could be an option as well if nitrates are not efficacious.      Overall, this gentleman's prognosis is guarded.  I will be happy to follow his in-hospital course with you as necessary.      HISTORY OF PRESENT ILLNESS:  A 80-year-old gentleman with the above comorbidities.  History unfortunately I do not think it is reliable.  He has had ischemic cardiomyopathy for quite some time and also moderate mitral  regurgitation.  He has been medically managed.  Last angiogram was in 2016 which showed nonobstructive disease.  50%-60% LAD stenosis involving the proximal and the midportion noted.  The RCA has 60%-70% distal lesion.  These are being managed medically.  He has a biventricular ICD in place.  His EKG shows biventricular pacing.      He is admitted on this occasion with shoulder discomfort.  This gentleman thinks he may have pulled his labs.  He does say that when he moves the discomfort is worse.  He denies accompanying symptoms such as nausea, vomiting, diaphoresis or shortness of breath.        Past Medical History   I have reviewed this patient's medical history and updated it with pertinent information if needed.   Past Medical History:   Diagnosis Date     (HFpEF) heart failure with preserved ejection fraction (H)      Acute kidney injury (H) 2012     Anemia      Anxiety      Anxiety and depression      Bladder mass      BPH (benign prostatic hypertrophy)      Cardiomyopathy (H)     ICD implanted 11/2016     Carpal tunnel syndrome     Right     Chronic kidney disease (CKD), stage 3 (moderate)      Chronic systolic CHF (congestive heart failure) (H)      Dementia (H)      Dementia without behavioral disturbance, unspecified dementia type (H) 7/22/2016     Depressive disorder      Diabetes (H)      Diffuse large B-cell lymphoma of extranodal site (H) 11/23/2018     Former smoker      Gastric mass      Gout      History of depression      History of gastric bypass 7/22/2016     Iron deficiency anemia, unspecified iron deficiency anemia type 10/15/2018     LBBB (left bundle branch block) 2013     Lumbar herniated disc     L5-S1     Malignant neoplasm of lateral wall of urinary bladder (H) 11/15/2019    Added automatically from request for surgery [id number]     Mixed cardiomyopathy 7/22/2016     Myocardial infarction (H) 1995 and 2010     Nonischemic cardiomyopathy (H) 7/22/2016     Obesity      Pacemaker     ICD/PM      Rosacea      Rotator cuff disorder     Tear x 2     RV (right ventricular) mural thrombus 7/22/2016     SOB (shortness of breath)        Past Surgical History   I have reviewed this patient's surgical history and updated it with pertinent information if needed.  Past Surgical History:   Procedure Laterality Date     ANGIOPLASTY  1995 and 2010 with stent     BIOPSY      stomach     BONE MARROW BIOPSY, BONE SPECIMEN, NEEDLE/TROCAR N/A 10/30/2018    Procedure: BIOPSY BONE MARROW;  Surgeon: Jeb Romero MD;  Location:  GI     CARDIAC SURGERY      ICD/PM     CYSTOSCOPY       CYSTOSCOPY, TRANSURETHRAL RESECTION (TUR) PROSTATE, COMBINED  4/22/2019    Procedure: CYSTOSCOPY AND BIPOLAR TRANSURETHRAL RESECTION (TUR) PROSTATE;  Surgeon: Ryan Camarillo MD;  Location:  OR     CYSTOSCOPY, TRANSURETHRAL RESECTION (TUR) TUMOR BLADDER, COMBINED N/A 9/19/2018    Procedure: COMBINED CYSTOSCOPY, TRANSURETHRAL RESECTION (TUR) TUMOR BLADDER;  CYSTOSCOPY, TRANSURETHRAL RESECTION BLADDER TUMOR ;  Surgeon: Ryan Camarillo MD;  Location:  OR     CYSTOSCOPY, TRANSURETHRAL RESECTION (TUR) TUMOR BLADDER, COMBINED N/A 11/12/2018    Procedure: CYSTOSCOPY RESTAGING TRANSURETHRAL RESECTION BLADDER TUMOR;  Surgeon: Ryan Camarillo MD;  Location:  OR     CYSTOSCOPY, TRANSURETHRAL RESECTION (TUR) TUMOR BLADDER, COMBINED N/A 12/30/2019    Procedure: CYSTOSCOPY, WITH TRANSURETHRAL RESECTION BLADDER TUMOR;  Surgeon: Ryan Camarillo MD;  Location:  OR     EP LEAD REVISION DUAL N/A 10/24/2019    Procedure: EP Lead Revision Dual;  Surgeon: Josias Hernandez MD;  Location:  HEART CARDIAC CATH LAB     ESOPHAGOSCOPY, GASTROSCOPY, DUODENOSCOPY (EGD), COMBINED N/A 7/30/2018    Procedure: COMBINED ESOPHAGOSCOPY, GASTROSCOPY, DUODENOSCOPY (EGD), BIOPSY SINGLE OR MULTIPLE;  gastroscopy;  Surgeon: Parish Xiong MD;  Location:  GI     ESOPHAGOSCOPY, GASTROSCOPY, DUODENOSCOPY (EGD), COMBINED N/A 7/30/2018    Procedure: COMBINED  ESOPHAGOSCOPY, GASTROSCOPY, DUODENOSCOPY (EGD);  EGD;  Surgeon: Parish Xiong MD;  Location:  GI     ESOPHAGOSCOPY, GASTROSCOPY, DUODENOSCOPY (EGD), COMBINED N/A 8/2/2018    Procedure: COMBINED ESOPHAGOSCOPY, GASTROSCOPY, DUODENOSCOPY (EGD), BIOPSY SINGLE OR MULTIPLE;  gastroscopy BIOPSYSHOULD BE SENT TO LAB IN NS NOT FORMALIN PER ;  Surgeon: Jonathon Bush MD;  Location:  GI     GASTRIC BYPASS  2001     HEART CATH LEFT HEART CATH  7/19/16    Non ischemic cardiomyopathy; Non functionally significant LAD and RCA disease      IR PORT REMOVAL RIGHT  9/9/2019     LASER HOLMIUM LITHOTRIPSY BLADDER N/A 4/22/2019    Procedure: CYSTOSCOPY, HOLMIUM LASER LITHOLAPAXY ,  AND BIPOLAR TRANSURETHRAL RESECTION (TUR) PROSTATE;  Surgeon: Ryan Camarillo MD;  Location:  OR     OTHER SURGICAL HISTORY  Nov 2016    CRT-D implantation       Prior to Admission Medications   Prior to Admission Medications   Prescriptions Last Dose Informant Patient Reported? Taking?   COENZYME Q-10 PO Past Week at Unknown time Spouse/Significant Other Yes Yes   Sig: Take 100 mg by mouth every morning    Vitamin D, Cholecalciferol, 25 MCG (1000 UT) TABS  Spouse/Significant Other Yes Yes   Sig: Take 1,000 Units by mouth daily    atorvastatin (LIPITOR) 40 MG tablet Past Week at Unknown time Spouse/Significant Other Yes Yes   Sig: Take 20 mg by mouth daily   ferrous sulfate (IRON) 325 (65 Fe) MG tablet Past Week at Unknown time Spouse/Significant Other Yes Yes   Sig: Take 325 mg by mouth daily   furosemide (LASIX) 20 MG tablet Past Week at Unknown time Spouse/Significant Other No Yes   Sig: TAKE 1 & 1/2 TABLETS BY MOUTH DAILY    hydrALAZINE (APRESOLINE) 25 MG tablet Past Week at Unknown time Spouse/Significant Other Yes Yes   Sig: Take 25 mg by mouth 2 times daily   metoprolol succinate ER (TOPROL XL) 50 MG 24 hr tablet  Spouse/Significant Other No Yes   Sig: Take 1.5 tablets (75 mg) by mouth daily   multivitamin, therapeutic with  minerals (MULTI-VITAMIN) TABS  Spouse/Significant Other Yes Yes   Sig: Take 1 tablet by mouth every morning    venlafaxine (EFFEXOR) 75 MG tablet  Spouse/Significant Other No Yes   Sig: Take 1 tablet (75 mg) by mouth 2 times daily      Facility-Administered Medications: None     Allergies   Allergies   Allergen Reactions     Wasps [Hornets]      Sand wasp --- years ago.         Social History   I have reviewed this patient's social history and updated it with pertinent information if needed. Gibson Villalta  reports that he quit smoking about 41 years ago. His smoking use included cigarettes. He started smoking about 60 years ago. He has a 40.00 pack-year smoking history. He has never used smokeless tobacco. He reports previous alcohol use. He reports that he does not use drugs.    Family History   I have reviewed this patient's family history and updated it with pertinent information if needed.   Family History   Problem Relation Age of Onset     Coronary Artery Disease Father 39     Alzheimer Disease Mother      Coronary Artery Disease Son         Two sons have  from MIs (ages 39 and 51)       Review of Systems   The 10 point Review of Systems is negative other than noted in the HPI or here.     Physical Exam   Temp: 97.5  F (36.4  C) Temp src: Oral BP: 100/84 Pulse: 100   Resp: 18 SpO2: 95 % O2 Device: None (Room air) Oxygen Delivery: 2 LPM(placed for comfort pt SOB)  Vital Signs with Ranges  Temp:  [97.4  F (36.3  C)-97.6  F (36.4  C)] 97.5  F (36.4  C)  Pulse:  [] 100  Resp:  [18-27] 18  BP: ()/(67-84) 100/84  SpO2:  [95 %-98 %] 95 %  186 lbs 9.6 oz  GENERAL:  A very pleasant gentleman who is alert, and in absolutely no distress.   HEENT:  He has no clubbing, no peripheral central cyanosis.   NECK:  Veins are not distended.  No thyromegaly.   CARDIAC:  Cardiac apex is not palpable.  Auscultation reveals a soft S4.  Soft systolic murmur also present.   CHEST:  Symmetrical expansion without  the use of accessory muscles.  Breath sounds are normal.   ABDOMEN:  Soft and nontender.  No hepatosplenomegaly.  Lower limb pulses are diminished.   EXTREMITIES:  No significant peripheral edema.   CENTRAL NERVOUS SYSTEM:  Disoriented to time, place and person.  No gross neurologic deficits.      LABORATORY INVESTIGATIONS:  First troponin 0.093, second troponin 9.519.  Creatinine 1.47, GFR estimated at 45.  Electrolytes within normal limits.  White cell count 6.6, platelet count 122, hemoglobin 15.3.        Data   Results for orders placed or performed during the hospital encounter of 04/05/21 (from the past 24 hour(s))   Heparin Unfractionated Anti Xa Level   Result Value Ref Range    Heparin Unfractionated Anti Xa Level 0.65 IU/mL   Glucose by meter   Result Value Ref Range    Glucose 100 (H) 70 - 99 mg/dL   Heparin Unfractionated Anti Xa Level   Result Value Ref Range    Heparin Unfractionated Anti Xa Level 0.32 IU/mL   Care Management / Social Work IP Consult    Narrative    Claudia Slaughter ROSE [facility]     4/7/2021  9:46 AM  Care Management Initial Consult    General Information  Assessment completed with: Spouse or significant other, Family,   wife and son Leonard  Type of CM/SW Visit: Initial Assessment    Primary Care Provider verified and updated as needed:     Readmission within the last 30 days:        Reason for Consult: discharge planning, end of life/hospice  Advance Care Planning: Advance Care Planning Reviewed: (no ACP   documents)          Communication Assessment  Patient's communication style: spoken language (English or   Bilingual)    Hearing Difficulty or Deaf: no   Wear Glasses or Blind: yes    Cognitive  Cognitive/Neuro/Behavioral: .WDL except  Level of Consciousness:   confused  Arousal Level: opens eyes spontaneously  Orientation:   disoriented to, place, time, situation  Mood/Behavior: calm,   cooperative  Best Language: 0 - No aphasia  Speech: clear,   spontaneous, illogical    Living  Environment:   People in home: spouse     Current living Arrangements: apartment      Able to return to prior arrangements: yes       Family/Social Support:  Care provided by: spouse/significant other  Provides care for: no one  Marital Status:   Wife, Children  (Izabella)       Description of Support System: Supportive, Involved    Support Assessment: Adequate family and caregiver support,   Adequate social supports    Current Resources:   Patient receiving home care services: No     Community Resources: None  Equipment currently used at home: none  Supplies currently used at home: None    Employment/Financial:  Employment Status: retired        Financial Concerns: No concerns identified           Lifestyle & Psychosocial Needs:        Socioeconomic History     Marital status:      Spouse name: Not on file     Number of children: Not on file     Years of education: Not on file     Highest education level: Not on file   Occupational History     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[employer]     Comment: [employer]; retired     Tobacco Use     Smoking status: Former Smoker     Packs/day: 2.00     Years: 20.00     Pack years: 40.00     Types: Cigarettes     Start date:      Quit date: 1980     Years since quittin.2     Smokeless tobacco: Never Used     Tobacco comment: Quit in his 30s - 2 ppd for 20 years   Substance and Sexual Activity     Alcohol use: Not Currently     Alcohol/week: 0.0 standard drinks     Comment: none     Drug use: No     Sexual activity: Yes     Partners: Female       Functional Status:  Prior to admission patient needed assistance:   Dependent ADLs:: Ambulation-no assistive device          Mental Health Status:          Chemical Dependency Status:                Values/Beliefs:  Spiritual, Cultural Beliefs, [employer]ism Practices, Values that   affect care: no               Additional Information:  Per care management/social work consult for discharge planning   and a hospice consult  on discharge.  Patient was admitted on   21 with an elevated troponin and weakness.  The tentative   date of discharge is 21 or 21.  Reviewed chart and spoke   with patient, wife, and son regarding discharge plans.  Per   patient and family report, patient and wife live in an apartment.    Patient is independent at baseline.  Received a order for a   hospice consult and patient's family state they would like to   have patient return home upon discharge with hospice care.    Inquired as to whether they think they need any equipment such as   a hospital bed, commode, wheelchair, etc and patient's wife   states she doesn't think they would need anything at this time.    Offered a choice of hospice agencies and the family has chosen   [facility]. Referral made to Abhishek London    Hospice liaison.  Lita states she will begin working up the   referral and will plan to call patient's family around 10:30   today to have a hospice discussion.  Updated patient and family   and they are in agreement.      Will continue to follow.      JUAN MIGUEL Rossi, [facility]    [phone]  [facility]     Heparin Unfractionated Anti Xa Level   Result Value Ref Range    Heparin Unfractionated Anti Xa Level <0.10 IU/mL               MONA JORDAN MD, [facility]             D: 2021   T: 2021   MT: [id number]      Name:     ISAIAS DUMONT   MRN:      [id number]        Account:       [id number]   :      1940           Consult Date:  2021      Document: [id number]

## 2021-04-06 NOTE — H&P
[facility]  History and Physical  Hospitalist       Date of Admission:  4/5/2021    Assessment & Plan   Gibson Villalta is a very pleasant 80 year old male with dementia, mixed cardiomyopathy and systolic congestive heart failure with ejection fraction less than 15%, status post ICD placement, left bundle branch block, hypertension, CKD stage III, chronic anemia, status post gastric bypass status, diabetes, history of bladder cancer, BPH status post TURP, history of diffuse large B-cell lymphoma who was admitted on 4/5/2021 with bilateral shoulder pain concerning for anginal equivalent, elevated troponin, weakness.    Hypotension  Mixed cardiomyopathy and systolic CHF (EF<15%)  S/p PPM placement  Hypertension (on coreg and hydralazine)  LBBB  Bilateral shoulder pain x 2 weeks (anginal equivalent?), worse this evening prompting ED visit. Troponin 0.093 but in setting of CKD will need more data points to determine significance. Follows with [facility] cardiology.  Twelve-lead EKG with atrial-sensed ventricular paced rhythm with aberrant conduction.  -admit to inpatient  -serial troponins => second troponin markedly elevated at 9.519.  Initiate heparin infusion, low intensity without bolus.  -aspirin given in ED  -cardiology consultation  -TTE tomorrow      CKD stage 3  Chronic anemia, iron deficiency anemia  Renal function at baseline (GFR 45). Has macrocytosis () which could be related to his post gastric bypass status (B12 deficiency).     Diabetes, type 2  Managed PTA with diet control. No recent hemoglobin A1c available.  -check hemoglobin A1c  -glucose checks  -corrective dose aspart    Chronic, stable problems:  H/o gastric bypass:  Diffuse large B-cell lymphoma  H/o bladder cancer  BPH  H/o TURP    Asymptomatic Rule Out of COVID-19 infection  This patient was evaluated during a global COVID-19 pandemic, which necessitated consideration that the patient might be at risk for  infection with the SARS-CoV-2 virus that causes COVID-19. Applicable protocols for evaluation were followed during the patient's care. Low suspicion for infection.   -follow-up COVID-19 PCR test result =>pending    DVT prophylaxis: Pneumatic Compression Devices and Ambulate every shift  Code Status:  Full. Per multiple prior admission. Will need to engage in more thorough discussion with spouse during the daytime.     Disposition: Expected discharge in 2-3 days.    Asha Brand MD  Text Page    ~~~~~~~~~~~~~~~~~~~~~~~~~~~~~~~~~~~~~~~~~~~~~~~~~~~~~  Primary Care Physician   José Miguel Gibson    Chief Complaint   Bilateral shoulder aching     History is obtained from the patient and medical records.    History of Present Illness   Gibson Villalta is a very pleasant 80 year old male with dementia, mixed cardiomyopathy and systolic congestive heart failure with ejection fraction less than 15%, status post ICD placement, left bundle branch block, hypertension, diabetes, CKD stage III, chronic anemia, status post gastric bypass status, history of bladder cancer, BPH status post TURP, history of diffuse large B-cell lymphoma who presents with shoulder pain.  History is somewhat limited due to the patient's dementia but it appears that C he has had off-and-on shoulder pain for several weeks, both shoulders, he is not able to give an intensity number rating to this.  Per his wife he woke her up this evening because his bilateral shoulder pain was worse and that he felt he needed to seek medical attention.  His wife sets up his medications for him but he does not know if he took his usual medicines earlier today.  In the emergency department he had persistent blood pressures in the 75/50 range.  A liter of normal saline was given and aspirin, as well.  He denies any shortness of breath, chest pain, change in bowel or bladder function, neck pain, headache, vision changes, gait instability.    Most recent echocardiogram  in our system is from April 2020 which showed a mildly dilated left ventricle, LV systolic function severely reduced with ejection fraction less than 20%, regional wall motion abnormalities noted, posterior leaflet of the mitral valve appeared tethered, moderate mitral regurgitation.  He follows with cardiologist Dr. Larkin.     Case reviewed with Dr. Trierweiler from the Emergency Department. Labs and available imaging reviewed. Pertinent results include: troponin 0.093. Twelve-lead EKG with atrial-sensed ventricular paced rhythm with aberrant conduction.    Past Medical History    I have reviewed this patient's medical history and updated it with pertinent information if needed.   Past Medical History:   Diagnosis Date     (HFpEF) heart failure with preserved ejection fraction (H)      Acute kidney injury (H) 2012     Anemia      Anxiety      Anxiety and depression      Bladder mass      BPH (benign prostatic hypertrophy)      Cardiomyopathy (H)     ICD implanted 11/2016     Carpal tunnel syndrome     Right     Chronic kidney disease (CKD), stage 3 (moderate)      Chronic systolic CHF (congestive heart failure) (H)      Dementia (H)      Dementia without behavioral disturbance, unspecified dementia type (H) 7/22/2016     Depressive disorder      Diabetes (H)      Diffuse large B-cell lymphoma of extranodal site (H) 11/23/2018     Former smoker      Gastric mass      Gout      History of depression      History of gastric bypass 7/22/2016     Iron deficiency anemia, unspecified iron deficiency anemia type 10/15/2018     LBBB (left bundle branch block) 2013     Lumbar herniated disc     L5-S1     Malignant neoplasm of lateral wall of urinary bladder (H) 11/15/2019    Added automatically from request for surgery [id number]     Mixed cardiomyopathy 7/22/2016     Myocardial infarction (H) 1995 and 2010     Nonischemic cardiomyopathy (H) 7/22/2016     Obesity      Pacemaker     ICD/PM     Rosacea      Rotator cuff disorder      Tear x 2     RV (right ventricular) mural thrombus 7/22/2016     SOB (shortness of breath)        Past Surgical History   I have reviewed this patient's surgical history and updated it with pertinent information if needed.  Past Surgical History:   Procedure Laterality Date     ANGIOPLASTY  1995 and 2010 with stent     BIOPSY      stomach     BONE MARROW BIOPSY, BONE SPECIMEN, NEEDLE/TROCAR N/A 10/30/2018    Procedure: BIOPSY BONE MARROW;  Surgeon: Jeb Romero MD;  Location:  GI     CARDIAC SURGERY      ICD/PM     CYSTOSCOPY       CYSTOSCOPY, TRANSURETHRAL RESECTION (TUR) PROSTATE, COMBINED  4/22/2019    Procedure: CYSTOSCOPY AND BIPOLAR TRANSURETHRAL RESECTION (TUR) PROSTATE;  Surgeon: Ryan Camarillo MD;  Location:  OR     CYSTOSCOPY, TRANSURETHRAL RESECTION (TUR) TUMOR BLADDER, COMBINED N/A 9/19/2018    Procedure: COMBINED CYSTOSCOPY, TRANSURETHRAL RESECTION (TUR) TUMOR BLADDER;  CYSTOSCOPY, TRANSURETHRAL RESECTION BLADDER TUMOR ;  Surgeon: Ryan Camarillo MD;  Location:  OR     CYSTOSCOPY, TRANSURETHRAL RESECTION (TUR) TUMOR BLADDER, COMBINED N/A 11/12/2018    Procedure: CYSTOSCOPY RESTAGING TRANSURETHRAL RESECTION BLADDER TUMOR;  Surgeon: Ryan Camarillo MD;  Location:  OR     CYSTOSCOPY, TRANSURETHRAL RESECTION (TUR) TUMOR BLADDER, COMBINED N/A 12/30/2019    Procedure: CYSTOSCOPY, WITH TRANSURETHRAL RESECTION BLADDER TUMOR;  Surgeon: Ryan Camarillo MD;  Location:  OR     EP LEAD REVISION DUAL N/A 10/24/2019    Procedure: EP Lead Revision Dual;  Surgeon: Josias Hernandez MD;  Location:  HEART CARDIAC CATH LAB     ESOPHAGOSCOPY, GASTROSCOPY, DUODENOSCOPY (EGD), COMBINED N/A 7/30/2018    Procedure: COMBINED ESOPHAGOSCOPY, GASTROSCOPY, DUODENOSCOPY (EGD), BIOPSY SINGLE OR MULTIPLE;  gastroscopy;  Surgeon: Parish Xiong MD;  Location:  GI     ESOPHAGOSCOPY, GASTROSCOPY, DUODENOSCOPY (EGD), COMBINED N/A 7/30/2018    Procedure: COMBINED ESOPHAGOSCOPY, GASTROSCOPY, DUODENOSCOPY  (EGD);  EGD;  Surgeon: Parish Xiong MD;  Location:  GI     ESOPHAGOSCOPY, GASTROSCOPY, DUODENOSCOPY (EGD), COMBINED N/A 8/2/2018    Procedure: COMBINED ESOPHAGOSCOPY, GASTROSCOPY, DUODENOSCOPY (EGD), BIOPSY SINGLE OR MULTIPLE;  gastroscopy BIOPSYSHOULD BE SENT TO LAB IN NS NOT FORMALIN PER ;  Surgeon: Jonathon Bush MD;  Location:  GI     GASTRIC BYPASS  2001     HEART CATH LEFT HEART CATH  7/19/16    Non ischemic cardiomyopathy; Non functionally significant LAD and RCA disease      IR PORT REMOVAL RIGHT  9/9/2019     LASER HOLMIUM LITHOTRIPSY BLADDER N/A 4/22/2019    Procedure: CYSTOSCOPY, HOLMIUM LASER LITHOLAPAXY ,  AND BIPOLAR TRANSURETHRAL RESECTION (TUR) PROSTATE;  Surgeon: Ryan Camarillo MD;  Location:  OR     OTHER SURGICAL HISTORY  Nov 2016    CRT-D implantation       Prior to Admission Medications   Prior to Admission Medications   Prescriptions Last Dose Informant Patient Reported? Taking?   COENZYME Q-10 PO  Spouse/Significant Other Yes No   Sig: Take 100 mg by mouth every morning    Vitamin D, Cholecalciferol, 25 MCG (1000 UT) TABS   Yes No   Sig: Take 2,000 Units by mouth daily   atorvastatin (LIPITOR) 20 MG tablet   Yes No   Sig: Take 20 mg by mouth daily   atorvastatin (LIPITOR) 40 MG tablet   No No   Sig: Take 1 tablet (40 mg) by mouth At Bedtime   Patient not taking: Reported on 3/3/2021   ferrous sulfate (IRON) 325 (65 Fe) MG tablet  Spouse/Significant Other Yes No   Sig: Take 325 mg by mouth daily   furosemide (LASIX) 20 MG tablet   No No   Sig: TAKE 1 & 1/2 TABLETS BY MOUTH DAILY    hydrALAZINE (APRESOLINE) 25 MG tablet   No No   Sig: Take 1 tablet (25 mg) by mouth 3 times daily   Patient taking differently: Take 25 mg by mouth 2 times daily    metoprolol succinate ER (TOPROL XL) 50 MG 24 hr tablet   No No   Sig: Take 1.5 tablets (75 mg) by mouth daily   multivitamin, therapeutic with minerals (MULTI-VITAMIN) TABS  Spouse/Significant Other Yes No   Sig: Take 1 tablet by  mouth every morning    venlafaxine (EFFEXOR) 75 MG tablet   No No   Sig: Take 1 tablet (75 mg) by mouth 2 times daily      Facility-Administered Medications: None     Allergies   Allergies   Allergen Reactions     Wasps [Hornets]      Sand wasp --- years ago.         Social History   I have reviewed this patient's social history and updated it with pertinent information if needed. Gibson Villalta  reports that he quit smoking about 41 years ago. His smoking use included cigarettes. He started smoking about 60 years ago. He has a 40.00 pack-year smoking history. He has never used smokeless tobacco. He reports previous alcohol use. He reports that he does not use drugs.    Family History   I have reviewed this patient's family history and updated it with pertinent information if needed.   Family History   Problem Relation Age of Onset     Coronary Artery Disease Father 39     Alzheimer Disease Mother      Coronary Artery Disease Son         Two sons have  from MIs (ages 39 and 51)       Review of Systems   The 10 point Review of Systems is negative other than noted in the HPI or here.    Physical Exam   Temp: 97.1  F (36.2  C) Temp src: Oral BP: (!) 79/67 Pulse: 66   Resp: 19 SpO2: 97 % O2 Device: None (Room air)    Vital Signs with Ranges  Temp:  [97.1  F (36.2  C)] 97.1  F (36.2  C)  Pulse:  [63-78] 66  Resp:  [16-22] 19  BP: (76-85)/(27-67) 79/67  SpO2:  [96 %-100 %] 97 %  0 lbs 0 oz    Constitutional: Alert, NAD, pleasant and interactive  Eyes: PERRL, sclerae anicteric  HEENT: mmm, atraumatic  Respiratory: Lungs CTAB, no wheezes or crackles  No chest wall tenderness to palpation  Cardiovascular: RRR, soft systolic murmur  no LE edema  GI: soft, non-tender, nondistended  Skin/Integument: warm, dry, no acute rashes  Genitourinary: not examined  No CVA tenderness  Musc: ARTHUR, normal limb strength x 4  Neuro: AOx3, no focal deficits, no tremors, short-term memory impaired  psych: Pleasant affect, not anxious,  not confused      Data   Data reviewed today:  I personally reviewed: Twelve-lead EKG with atrial-sensed ventricular paced rhythm with aberrant conduction.  Recent Labs   Lab 04/05/21  2334   WBC 5.3   HGB 15.6   *   *      POTASSIUM 4.1   CHLORIDE 106   CO2 26   BUN 20   CR 1.47*   ANIONGAP 6   SHELLEY 8.7   *   TROPI 0.093*       Imaging:  No results found for this or any previous visit (from the past 24 hour(s)).

## 2021-04-06 NOTE — PROGRESS NOTES
Pt here with pain in shoulder possibly cardiac related- checking troponins, low bp systolic 80's- md ok with bp in the 80's right now- patient asymptomatic. A&O.  Has history of dementia-  Sometimes speech is illogical and sentences don't really make sense, will ramble, and is very forgetful.   Patient states he is in the hospital, stated that it is march or April, and stated that his bp is low. Tele 100 % v-paced.   Reg diet,  Up with sba.  Denies pain- scoring green on the Aggression Stop Light Tool. Plan- called hospitalist to see if can get imc orders.

## 2021-04-06 NOTE — PHARMACY-ADMISSION MEDICATION HISTORY
Pharmacy Medication History  Admission medication history interview status for the 4/5/2021  admission is complete. See EPIC admission navigator for prior to admission medications     Location of Interview: Patient room  Medication history sources: spouse, surescripts, chart review    Significant changes made to the medication list:  lipitor dose clarified, hydralazine changed to bid, vit D changed to 1000 units/day    In the past week, patient estimated taking medication this percent of the time: unknown  Additional medication history information:   n/a    Medication reconciliation completed by provider prior to medication history? No    Time spent in this activity: 30 minutes    Prior to Admission medications    Medication Sig Last Dose Taking? Auth Provider   atorvastatin (LIPITOR) 40 MG tablet Take 20 mg by mouth daily Past Week at Unknown time Yes Unknown, Entered By History   COENZYME Q-10 PO Take 100 mg by mouth every morning  Past Week at Unknown time Yes Reported, Patient   ferrous sulfate (IRON) 325 (65 Fe) MG tablet Take 325 mg by mouth daily Past Week at Unknown time Yes Reported, Patient   furosemide (LASIX) 20 MG tablet TAKE 1 & 1/2 TABLETS BY MOUTH DAILY  Past Week at Unknown time Yes José Miguel Gibson MD   hydrALAZINE (APRESOLINE) 25 MG tablet Take 25 mg by mouth 2 times daily Past Week at Unknown time Yes Unknown, Entered By History   metoprolol succinate ER (TOPROL XL) 50 MG 24 hr tablet Take 1.5 tablets (75 mg) by mouth daily  Yes Rose Elizondo APRN CNP   multivitamin, therapeutic with minerals (MULTI-VITAMIN) TABS Take 1 tablet by mouth every morning   Yes Reported, Patient   venlafaxine (EFFEXOR) 75 MG tablet Take 1 tablet (75 mg) by mouth 2 times daily  Yes José Miguel Gibson MD   Vitamin D, Cholecalciferol, 25 MCG (1000 UT) TABS Take 1,000 Units by mouth daily   Yes Reported, Patient       The information provided in this note is only as accurate as the sources available at the time of update(s)

## 2021-04-06 NOTE — PLAN OF CARE
Very pleasant pt with dementia. Illogical speech and disorientated x3. Very poor appetite and fluid intake. Not candidate for cardiac intervention. Not making urine and scanned for 160cc, MD aware. Trops continuing to rise up to 25 so far. On heparin infusion. Denies any pain or discomfort. Imdur started this shift. Wife at bedside.

## 2021-04-06 NOTE — UTILIZATION REVIEW
Admission Status; Secondary Review Determination    Under the authority of the Utilization Management Committee, the utilization review process indicated a secondary review on the above patient. The review outcome is based on review of the medical records, discussions with staff, and applying clinical experience noted on the date of the review.    (x) Inpatient Status Appropriate - This patient's medical care is consistent with medical management for inpatient care and reasonable inpatient medical practice.    RATIONALE FOR DETERMINATION:80-year-old male with history of ischemic cardiomyopathy, moderate mitral regurgitation managed medically for nonobstructive coronary disease and heart failure now presents to the hospital with intermittent shoulder discomfort and found to have significant hypotension, ejection fraction 15% or less with acute marked elevated troponin level from a baseline of 0.093 up to 9.5.  Inpatient care appropriate for acute coronary syndrome complicated with underlying significant ischemic cardiomyopathy and hypotension.    At the time of admission with the information available to the attending physician more than 2 nights Hospital complex care was anticipated, based on patient risk of adverse outcome if treated as outpatient and complex care required. Inpatient admission is appropriate based on the Medicare guidelines.    This document was produced using voice recognition software    The information on this document is developed by the utilization review team in order for the business office to ensure compliance. This only denotes the appropriateness of proper admission status and does not reflect the quality of care rendered.    The definitions of Inpatient Status and Observation Status used in making the determination above are those provided in the CMS Coverage Manual, Chapter 1 and Chapter 6, section 70.4.    Sincerely,    Dave Nguyễn MD  Utilization Review  Physician  Advisor  [facility].

## 2021-04-06 NOTE — PROVIDER NOTIFICATION
Paged dr. Brand:      272- domingo steinberg     Troponin jenny to 9.519.  denies any pain.  Do you want any interventions?  thanks.

## 2021-04-06 NOTE — ED NOTES
[facility]  ED Nurse Handoff Report    ED Chief complaint: Shoulder Pain and Hypotension      ED Diagnosis:   Final diagnoses:   Hypotension, unspecified hypotension type   Elevated troponin       Code Status: as per hospitalist    Allergies:   Allergies   Allergen Reactions     Wasps [Hornets]      Sand wasp --- years ago.         Patient Story: sudden nausea and bilateral shoulder pain. Pt walked in thru triage, BP was 85/52. Hx. Of heart attacks   Focused Assessment:  Ambulatory, A&Ox4, respirations even and unlabored. Skin dry, warm, pale. Hypotensive.   Results for orders placed or performed during the hospital encounter of 04/05/21   Troponin I     Status: Abnormal   Result Value Ref Range    Troponin I ES 0.093 (H) 0.000 - 0.045 ug/L   Basic metabolic panel     Status: Abnormal   Result Value Ref Range    Sodium 138 133 - 144 mmol/L    Potassium 4.1 3.4 - 5.3 mmol/L    Chloride 106 94 - 109 mmol/L    Carbon Dioxide 26 20 - 32 mmol/L    Anion Gap 6 3 - 14 mmol/L    Glucose 107 (H) 70 - 99 mg/dL    Urea Nitrogen 20 7 - 30 mg/dL    Creatinine 1.47 (H) 0.66 - 1.25 mg/dL    GFR Estimate 45 (L) >60 mL/min/[1.73_m2]    GFR Estimate If Black 52 (L) >60 mL/min/[1.73_m2]    Calcium 8.7 8.5 - 10.1 mg/dL   CBC with platelets differential     Status: Abnormal   Result Value Ref Range    WBC 5.3 4.0 - 11.0 10e9/L    RBC Count 4.69 4.4 - 5.9 10e12/L    Hemoglobin 15.6 13.3 - 17.7 g/dL    Hematocrit 48.3 40.0 - 53.0 %     (H) 78 - 100 fl    MCH 33.3 (H) 26.5 - 33.0 pg    MCHC 32.3 31.5 - 36.5 g/dL    RDW 16.2 (H) 10.0 - 15.0 %    Platelet Count 130 (L) 150 - 450 10e9/L    Diff Method Automated Method     % Neutrophils 72.4 %    % Lymphocytes 8.9 %    % Monocytes 14.5 %    % Eosinophils 3.0 %    % Basophils 0.8 %    % Immature Granulocytes 0.4 %    Nucleated RBCs 0 0 /100    Absolute Neutrophil 3.8 1.6 - 8.3 10e9/L    Absolute Lymphocytes 0.5 (L) 0.8 - 5.3 10e9/L    Absolute Monocytes 0.8 0.0 - 1.3  "10e9/L    Absolute Eosinophils 0.2 0.0 - 0.7 10e9/L    Absolute Basophils 0.0 0.0 - 0.2 10e9/L    Abs Immature Granulocytes 0.0 0 - 0.4 10e9/L    Absolute Nucleated RBC 0.0    EKG 12 lead     Status: None   Result Value Ref Range    Interpretation ECG Click View Image link to view waveform and result    ISTAT gases lactate samuel POCT     Status: Abnormal   Result Value Ref Range    Ph Venous 7.37 7.32 - 7.43 pH    PCO2 Venous 46 40 - 50 mm Hg    PO2 Venous 18 (L) 25 - 47 mm Hg    Bicarbonate Venous 27 21 - 28 mmol/L    O2 Sat Venous 26 %    Lactic Acid 1.7 0.7 - 2.1 mmol/L       Treatments and/or interventions provided: IVF\" got 1000cc in ED  Patient's response to treatments and/or interventions: comfortably resting in bed, hypotensve but asymptomatic     To be done/followed up on inpatient unit:  cardiology consult?    Does this patient have any cognitive concerns?: Baseline dementia and Forgetful    Activity level - Baseline/Home:  Independent  Activity Level - Current:   Independent    Patient's Preferred language: English   Needed?: No    Isolation: None  Infection: Not Applicable  Patient tested for COVID 19 prior to admission: YES  Bariatric?: No    Vital Signs:   Vitals:    04/05/21 2355 04/06/21 0015 04/06/21 0035 04/06/21 0055   BP: (!) 81/61 (!) 76/27 (!) 81/61 (!) 80/65   Pulse: 70 63 64 68   Resp: 21 22 20 20   Temp:       TempSrc:       SpO2: 100% 98% 96% 97%       Cardiac Rhythm:Cardiac Rhythm: Normal sinus rhythm;Ventricular paced    Was the PSS-3 completed:   Yes  What interventions are required if any?               Family Comments: wife at bedside  OBS brochure/video discussed/provided to patient/family: N/A            For the majority of the shift this patient's behavior was Green.   Behavioral interventions performed were n/a.    ED NURSE PHONE NUMBER: [phone]       "

## 2021-04-07 NOTE — CONSULTS
Care Management Initial Consult    General Information  Assessment completed with: Spouse or significant other, Family, wife and son Leonard  Type of CM/SW Visit: Initial Assessment    Primary Care Provider verified and updated as needed:     Readmission within the last 30 days:        Reason for Consult: discharge planning, end of life/hospice  Advance Care Planning: Advance Care Planning Reviewed: (no ACP documents)          Communication Assessment  Patient's communication style: spoken language (English or Bilingual)    Hearing Difficulty or Deaf: no   Wear Glasses or Blind: yes    Cognitive  Cognitive/Neuro/Behavioral: .WDL except  Level of Consciousness: confused  Arousal Level: opens eyes spontaneously  Orientation: disoriented to, place, time, situation  Mood/Behavior: calm, cooperative  Best Language: 0 - No aphasia  Speech: clear, spontaneous, illogical    Living Environment:   People in home: spouse     Current living Arrangements: apartment      Able to return to prior arrangements: yes       Family/Social Support:  Care provided by: spouse/significant other  Provides care for: no one  Marital Status:   Wife, Children  (Izabella)       Description of Support System: Supportive, Involved    Support Assessment: Adequate family and caregiver support, Adequate social supports    Current Resources:   Patient receiving home care services: No     Community Resources: None  Equipment currently used at home: none  Supplies currently used at home: None    Employment/Financial:  Employment Status: retired        Financial Concerns: No concerns identified           Lifestyle & Psychosocial Needs:        Socioeconomic History     Marital status:      Spouse name: Not on file     Number of children: Not on file     Years of education: Not on file     Highest education level: Not on file   Occupational History     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[employer]     Comment: [employer]; retired     Tobacco Use     Smoking  status: Former Smoker     Packs/day: 2.00     Years: 20.00     Pack years: 40.00     Types: Cigarettes     Start date:      Quit date: 1980     Years since quittin.2     Smokeless tobacco: Never Used     Tobacco comment: Quit in his 30s - 2 ppd for 20 years   Substance and Sexual Activity     Alcohol use: Not Currently     Alcohol/week: 0.0 standard drinks     Comment: none     Drug use: No     Sexual activity: Yes     Partners: Female       Functional Status:  Prior to admission patient needed assistance:   Dependent ADLs:: Ambulation-no assistive device          Mental Health Status:          Chemical Dependency Status:                Values/Beliefs:  Spiritual, Cultural Beliefs, Jewish Practices, Values that affect care: no               Additional Information:  Per care management/social work consult for discharge planning and a hospice consult on discharge.  Patient was admitted on 21 with an elevated troponin and weakness.  The tentative date of discharge is 21 or 21.  Reviewed chart and spoke with patient, wife, and son regarding discharge plans.  Per patient and family report, patient and wife live in an apartment.  Patient is independent at baseline.  Received a order for a hospice consult and patient's family state they would like to have patient return home upon discharge with hospice care.  Inquired as to whether they think they need any equipment such as a hospital bed, commode, wheelchair, etc and patient's wife states she doesn't think they would need anything at this time.  Offered a choice of hospice agencies and the family has chosen [facility]. Referral made to [facility]  Hospice liaison.  Lita states she will begin working up the referral and will plan to call patient's family around 10:30 today to have a hospice discussion.  Updated patient and family and they are in agreement.      Will continue to follow.      JUAN MIGUEL Rossi,  [facility]    [phone]  [facility]

## 2021-04-07 NOTE — PLAN OF CARE
Neuro- Alert to self, family, month, place. Disoriented to situations.   Most Recent Vitals- Temp: 97.6  F (36.4  C) Temp src: Oral BP: 94/67 Pulse: 88   Resp: 23 SpO2: 94 % O2 Device: None (Room air)   Tele/Cardiac- 100% V-paced   Resp- RA   Activity- Up with SBA  Pain- Denies   Drips- none   Drains/Tubes- P-IV right AC   Skin- Scabs from cat scratches, covered with foam  GI/- Not making urine, bladder Scan showed 0 at 2200.  Aggression Color- Green  COVID status- Negative  Plan- Discharge home with Hospice.  [facility] [place]-     Karli Recinos RN

## 2021-04-07 NOTE — PLAN OF CARE
Patient discharged home on Hospice  At 1745 accompanied by  NA  Written instructions sent with patient - and patient/family verbalized understanding.   Belongings sent with the patient -Yes  Home medications sent with patient -n/a  No new Prescriptions.

## 2021-04-07 NOTE — PLAN OF CARE
Pt awaiting discharge to Hospice. Wife and son at bedside. Denies pain or discomfort. Voided in BR and had formed BM.

## 2021-04-07 NOTE — PROGRESS NOTES
Care Management Discharge Note    Discharge Date: 04/07/21  Expected Time of Departure: 18:00    Discharge Disposition: Hospice    Discharge Services:  hospice    Discharge DME:  N/A    Discharge Transportation: family or friend will provide    Private pay costs discussed: Not applicable    PAS Confirmation Code:  N/A  Patient/family educated on Medicare website which has current facility and service quality ratings: no    Education Provided on the Discharge Plan:  yes  Persons Notified of Discharge Plans: patient, wife, and son  Patient/Family in Agreement with the Plan: yes    Handoff Referral Completed: Yes    Additional Information:  Received discharge orders for patient.  Patient is planning on discharging to home today, via family, around 18:00 with [facility].  Patient and family will be seen by [facility] tomorrow at home to complete the intake process.  Patient and family informed of the plan and in agreement to the plan.       JUAN MIGUEL Rossi, [facility]    [phone]  [facility]

## 2021-04-07 NOTE — PLAN OF CARE
A&O to self only. Very confused overnight but pleasant. /84 (BP Location: Left arm)   Pulse 100   Temp 97.4  F (36.3  C) (Oral)   Resp 18   Wt 84.6 kg (186 lb 9.6 oz)   SpO2 98%   BMI 24.62 kg/m   Tele 100% V-paced. Up with A1 and GB. Heparin gtt infusing at 800 units/hr. Heparin 10A recheck scheduled for 1300. Pt needs social work consult for hospice discharge today.

## 2021-04-07 NOTE — PROGRESS NOTES
[facility]    Cardiology Progress Note    Outpatient Cardiologist: Dr. Heard    Date of Admission: 04/05/2021  Service Date: 04/07/2021    Summary:  Mr. Gibson Villalta is a very pleasant 80 year old male with a past medical history of type 2 diabetes mellitus, bladder cancer, coronary artery disease, chronic HFrEF, ischemic cardiomyopathy, and dementia who was admitted on 4/5/2021 for hypotension and shoulder pain. Cardiology was consulted due to an elevated troponin level and concern for ACS.    Interval History   Patient is resting comfortably.  He is a poor historian but currently denies chest pain, shoulder pain, shortness of breath, palpitations, or any concerns this morning or overnight. The plan of care as outlined below was reviewed with the patient and his family. They stated understanding and agreement.  All questions were answered.    Telemetry: V. Paced    Assessment & Plan   1.  Acute coronary syndrome; known hx of CAD  - Troponin initially at 0.09 and has risen to 28.4.   -  Last coronary angiogram in 2016 showed nonobstructive CAD with a 50-60% LAD stenosis involving the proximal and the midportion noted. The RCA had a 60-70% distal lesion. This has been managed medically.    - PTA on atorvastatin 20 mg once daily. He has not been on aspirin due to history of GI bleeding.    2.  Ischemic cardiomyopathy with severe left ventricular systolic dysfunction  -  LVEF noted at <20% on most recent TTE 04/29/2020.  - He has a biventricular ICD in place.  - Clinically, appears euvolemic without signs or symptoms to suggest fluid overload or acute CHF.  - PTA on a regimen of Lasix 30 mg once daily, metoprolol succinate 75 mg once daily, and hydralazine 25 mg twice daily.    3.  Advanced dementia    4.  Acute on chronic renal failure  - Creatinine at 1.47 upon admission with a GFR estimated at 45 likely secondary to intermittent hypotension which is related to problem #2.  -  Baseline creatinine previously looks to be around the 1.1-1.3 range.    5.  History of GI bleed     6.  History of both RV and LV thrombus, however, not on oral anticoagulation due to GI bleed    Plan:   1. Patient is not a good candidate for invasive management of his coronary artery disease given his history of advanced dementia, GI bleeding, severe LV dysfunction, and CKD. Patient and family are agreeable to continue to conservative medical management.  2. Will trend down troponins.   3. Continue with the heparin drip today and possibly into tomorrow as well as newly added Imdur 30 mg once daily. Would recommend adding back PTA Lasix 30 mg daily as BP/renal function allow and consider adding back low-dose beta-blocker as BP allows.  4. Patient and family agreeable to discharging home with hospice care.  Anticipated likely discharge home this afternoon/evening versus tomorrow morning.  5. Cardiology will continue to follow along. Follow-up could be arranged with a cardiology ACE in 1 to 2 weeks post discharge versus on an as-needed basis going forward pending patient and family preference if enrolling in hospice.    Thank you for the opportunity to participate in this pleasant patient's care.     ALLEN Rashid, CNP   Nurse Practitioner  [facility]  Pager: [phone]  Text Page  (8am - 5pm, M-F)    Patient Active Problem List   Diagnosis     Mixed cardiomyopathy     RV (right ventricular) mural thrombus without MI (H)     Dementia without behavioral disturbance, unspecified dementia type (H)     History of gastric bypass     LBBB (left bundle branch block)     BPH (benign prostatic hypertrophy)     HFrEF (heart failure with reduced ejection fraction) (H)     Controlled type 2 diabetes mellitus with chronic kidney disease, without long-term current use of insulin, unspecified CKD stage (H)     Cardiomyopathy (H)     Prepatellar bursitis of left knee     Iron deficiency anemia, unspecified  iron deficiency anemia type     Bladder cancer (H)     Diffuse large B-cell lymphoma of extranodal site (H)     BPH with obstruction/lower urinary tract symptoms     Recurrent major depressive disorder, in full remission (H)     Malfunction of implantable defibrillator ventricular (ICD) lead     Malignant neoplasm of lateral wall of urinary bladder (H)     CKD (chronic kidney disease) stage 3, GFR 30-59 ml/min     Elevated troponin     Hypotension, unspecified hypotension type     Physical Exam   Temp: 97.5  F (36.4  C) Temp src: Oral BP: 100/84 Pulse: 100   Resp: 18 SpO2: 95 % O2 Device: None (Room air) Oxygen Delivery: 2 LPM(placed for comfort pt SOB)  Vitals:    04/06/21 0606 04/07/21 0513   Weight: 84 kg (185 lb 1.6 oz) 84.6 kg (186 lb 9.6 oz)     Vital Signs with Ranges  Temp:  [97.4  F (36.3  C)-98  F (36.7  C)] 97.5  F (36.4  C)  Pulse:  [] 100  Resp:  [18-27] 18  BP: ()/(67-84) 100/84  SpO2:  [95 %-98 %] 95 %  I/O last 3 completed shifts:  In: 460 [P.O.:460]  Out: 0     Constitutional: Appears his stated age, well nourished, and in no acute distress.  Eyes: Pupils equal, round. Sclerae anicteric.   HEENT: Normocephalic, atraumatic.   Neck: Supple. No JVD appreciated.  Respiratory: Breathing non-labored. Lungs clear to auscultation bilaterally. No crackles, wheezes, rhonchi, or rales.  Cardiovascular: Regular rate and rhythm, normal S1 and S2. No murmur, rub, or gallop. No LE edema bilaterally.   Skin: Warm, dry.   Musculoskeletal/Extremities: Moves all extremities well and symmetrically. Neurologic: No gross focal deficits. Alert, confused but cooperative.  Psychiatric: Affect appropriate, pleasant.    Medications     heparin 800 Units/hr (04/07/21 0000)       isosorbide mononitrate  30 mg Oral Daily     sodium chloride (PF)  3 mL Intracatheter Q8H     Data   Recent Labs   Lab 04/06/21  1323 04/06/21  0840 04/06/21  0614 04/06/21  0509 04/05/21  7471   WBC  --   --  6.6  --  5.3   HGB  --   --   15.3  --  15.6   MCV  --   --  102*  --  103*   PLT  --   --  122*  --  130*   NA  --   --   --   --  138   POTASSIUM  --   --   --   --  4.1   CHLORIDE  --   --   --   --  106   CO2  --   --   --   --  26   BUN  --   --   --   --  20   CR  --   --   --   --  1.47*   ANIONGAP  --   --   --   --  6   SHELLEY  --   --   --   --  8.7   GLC  --   --   --   --  107*   TROPI 28.454* 19.569*  --  9.519* 0.093*     This note was completed in part using Dragon voice recognition software. Although reviewed after completion, some word and grammatical errors may occur.

## 2021-04-07 NOTE — CONSULTS
Writer spoke with spouse Izabella to discuss hospice philosophy and benefit provided by medicare.  Izabella would like patient to discharge home with [facility] hospice services.  Family would like patient to discharge home today and are comfortable with enrolling in hospice from home on 4/8/2021.  Writer informed Izabella that hospice will call her directly to set up time that works for her.  Please plan to discharge patient with any new medications and comfort medications.      Hospice comfort medication recommendations:  Lorazepam 2mg/mL; Take 0.5-1mg (0.25-0.5mL)) PO/SL every 4 hours as needed for anxiety or restlessness.  QTY 30mL  Haloperidol 2mg/mL; Take 1-2mg (0.5-1mL) every 6 hours as needed for severe anxiety or nausea.  QTY 30mL   Atropine %1 drops; give 2-4 drops PO/SL every 2 hours as needed for oral secretions.  QTY 1 bottle   Senna 8.6mg tab; take 1-2 tabs twice daily as needed for constipation.  QTY 30 tabs   Acetaminophen 650mg suppository; insert 1 supp RI every 4 hours as needed for pain/fever.  QTY 2 Supps   Bisacodyl 10mg suppository; insert 1 supp RI 1-2 times daily as needed for constipation.  QTY 2 Supps   Hydromorphone 10mg/mL; take 2-4mg PO/SL every 2 hours as needed for pain/dyspnea.  QTY 30 mL     Thank you for this consult,    Lita Rachel RN  [facility] hospice referral specialist

## 2021-04-07 NOTE — DISCHARGE SUMMARY
[facility]  Hospitalist Discharge Summary      Date of Admission:  4/5/2021  Date of Discharge:  4/7/2021  Discharging Provider: Alessio James MD      Discharge Diagnoses     NSTEMI    Follow-ups Needed After Discharge   Follow-up Appointments     Follow-up and recommended labs and tests       Follow up with primary care provider, José Miguel Gibson, within 7 days for   hospital follow- up.  The following labs/tests are recommended: None.           Unresulted Labs Ordered in the Past 30 Days of this Admission     No orders found from 3/6/2021 to 4/6/2021.        Discharge Disposition     Admited to hospice care.    Discharged to home  Condition at discharge: Stable    Hospital Course         Gibson Villalta is a very pleasant 80 year old male with dementia, mixed cardiomyopathy and systolic congestive heart failure with ejection fraction less than 15%, status post ICD placement, left bundle branch block, hypertension, CKD stage III, chronic anemia, status post gastric bypass status, diabetes, history of bladder cancer, BPH status post TURP, history of diffuse large B-cell lymphoma who was admitted on 4/5/2021 with bilateral shoulder pain concerning for anginal equivalent, elevated troponin, weakness.    Hypotension  Mixed cardiomyopathy and systolic CHF (EF<15%)  S/p PPM placement  Hypertension (on coreg and hydralazine)  LBBB  Bilateral shoulder pain x 2 weeks (anginal equivalent?), worse this evening prompting ED visit. Troponin 0.093 but in setting of CKD will need more data points to determine significance. Follows with [facility] cardiology.  Twelve-lead EKG with atrial-sensed ventricular paced rhythm with aberrant conduction. ECHO 09/2019 shows Left ventricular systolic function is severely reduced. The visual ejection fraction is estimated at <20%.  Plan:  -serial troponins => second troponin markedly elevated at 9.519 and rising, but given plans for hospice would not check  further  -cardiology consulted -> wife would not want invasive intervention  -BB stopped  -Statin stopped    CKD stage 3  Chronic anemia, iron deficiency anemia  Renal function at baseline (GFR 45). Has macrocytosis () which could be related to his post gastric bypass status (B12 deficiency).     Diabetes, type 2  Managed PTA with diet control. No recent hemoglobin A1c available.    Chronic, stable problems:  H/o gastric bypass:  Diffuse large B-cell lymphoma  H/o bladder cancer  BPH  H/o TURP    ~~~~~~~~~~~~~~~~~~~~~~~~~~~~~~~~~~~~~~~~~~~~~~~~~~~~~        Consultations This Hospital Stay   CARDIOLOGY IP CONSULT  PHARMACY IP CONSULT  PHARMACY IP CONSULT  CARE MANAGEMENT / SOCIAL WORK IP CONSULT  SOCIAL WORK IP CONSULT    Code Status   No CPR- Do NOT Intubate    Time Spent on this Encounter   I, Alessio James MD, personally saw the patient today and spent greater than 30 minutes discharging this patient.       Alessio James MD  [facility] CORONARY [facility]ARE [facility]  [address] [place] [address], [address] [address]  Phone: [phone]  ______________________________________________________________________    Physical Exam   Vital Signs: Temp: 97.3  F (36.3  C) Temp src: Temporal BP: 105/63 Pulse: 96   Resp: 18 SpO2: 97 % O2 Device: None (Room air) Oxygen Delivery: 2 LPM(placed for comfort pt SOB)  Weight: 186 lbs 9.6 oz       Primary Care Physician   José Miguel Gibson    Discharge Orders      Reason for your hospital stay    You had abnormal labs concerning for worsening heart disease. You were seen by our Cardiology team and will be discharged on hospice.     Follow-up and recommended labs and tests     Follow up with primary care provider, José Miguel Gibson, within 7 days for hospital follow- up.  The following labs/tests are recommended: None.     Activity    Your activity upon discharge: activity as tolerated     Diet    Follow this diet upon discharge: Orders Placed This Encounter      Combination Diet  Regular Diet Adult       Significant Results and Procedures   Most Recent 3 CBC's:  Recent Labs   Lab Test 04/06/21  0614 04/05/21  2334 02/02/21  1132   WBC 6.6 5.3 5.1   HGB 15.3 15.6 15.7   * 103* 101*   * 130* 134*     Most Recent 3 BMP's:  Recent Labs   Lab Test 04/05/21  2334 02/02/21  1132 11/02/20  1348    140 142   POTASSIUM 4.1 3.3* 3.4   CHLORIDE 106 108 110*   CO2 26 28 25   BUN 20 17 18   CR 1.47* 1.26* 1.35*   ANIONGAP 6 4 7   SHELLEY 8.7 9.4 9.1   * 123* 76     Most Recent 2 LFT's:  Recent Labs   Lab Test 02/02/21  1132 11/02/20  1348   AST 21 24   ALT 22 31   ALKPHOS 186* 211*   BILITOTAL 1.2 0.7     Most Recent 3 INR's:  Recent Labs   Lab Test 09/09/19  0715 11/27/18  1125 07/31/18  1035   INR 1.01 1.06 1.21*     Most Recent 3 Troponin's:  Recent Labs   Lab Test 04/06/21  1323 04/06/21  0840 04/06/21  0509   TROPI 28.454* 19.569* 9.519*   ,   Results for orders placed or performed in visit on 03/18/21   Cardiac Device Check - Remote     Value    Date Time Interrogation Session [id number]    Implantable Pulse Generator  Medtronic    Implantable Pulse Generator Model [id number] Helixbindia MRI Quad CRT-D    Implantable Pulse Generator Serial Number [id number]    Type Interrogation Session Remote     Clinic Name [facility]    Implantable Pulse Generator Type Cardiac Resynchronization Therapy - Defibrillator    Implantable Pulse Generator Implant Date 20161104    Implantable Lead  Medtronic    Implantable Lead Model 6935M Sprint Quattro Secure S MRI SureScan    Implantable Lead Serial Number [id number]    Implantable Lead Implant Date 20191024    Implantable Lead Polarity Type Tripolar Lead    Implantable Lead Location Detail 1 UNKNOWN    Implantable Lead Location Right Ventricle    Implantable Lead  Medtronic    Implantable Lead Model 4398 Attain Performa Straight    Implantable Lead Serial Number [id number]    Implantable Lead Implant Date 20161104     Implantable Lead Polarity Type Quadripolar Lead    Implantable Lead Location Detail 1 UNKNOWN    Implantable Lead Location Left Ventricle    Implantable Lead  Medtronic    Implantable Lead Model 5076 CapSureFix Novus MRI SureScan    Implantable Lead Serial Number [id number]    Implantable Lead Implant Date 20161104    Implantable Lead Polarity Type Bipolar Lead    Implantable Lead Location Detail 1 UNKNOWN    Implantable Lead Location Right Atrium    Arnaud Setting Mode (NBG Code) DDDR    Arnaud Setting Lower Rate Limit 50    Arnaud Setting Maximum Tracking Rate 130    Arnaud Setting Maximum Sensor Rate 130    Arnaud Setting ASHLYN Delay Low 140    Arnaud Setting PAV Delay Low 170    Arnaud Setting AT Mode Switch Rate 171    Lead Channel Setting Sensing Polarity Bipolar    Lead Channel Setting Sensing Anode Location Right Atrium    Lead Channel Setting Sensing Anode Terminal Ring    Lead Channel Setting Sensing Cathode Location Right Atrium    Lead Channel Setting Sensing Cathode Terminal Tip    Lead Channel Setting Sensing Sensitivity 0.15    Lead Channel Setting Sensing Polarity Bipolar    Lead Channel Setting Sensing Anode Location Right Ventricle    Lead Channel Setting Sensing Anode Terminal Ring    Lead Channel Setting Sensing Cathode Location Right Ventricle    Lead Channel Setting Sensing Cathode Terminal Tip    Lead Channel Setting Sensing Sensitivity 0.3    Ventricular chambers paced during CRT pacing. BiV    CRT LV-RV Delay 0    Lead Channel Setting Pacing Polarity Bipolar    Lead Channel Setting Pacing Anode Location Right Atrium    Lead Channel Setting Pacing Anode Terminal Ring    Lead Channel Setting Sensing Cathode Location Right Atrium    Lead Channel Setting Sensing Cathode Terminal Tip    Lead Channel Setting Pacing Pulse Width 0.4    Lead Channel Setting Pacing Amplitude 1.5    Lead Channel Setting Pacing Capture Mode Adaptive    Lead Channel Setting Pacing Polarity Bipolar    Lead Channel  Setting Pacing Anode Location Right Ventricle    Lead Channel Setting Pacing Anode Terminal Ring    Lead Channel Setting Sensing Cathode Location Right Ventricle    Lead Channel Setting Sensing Cathode Terminal Tip    Lead Channel Setting Pacing Pulse Width 0.4    Lead Channel Setting Pacing Amplitude 2    Lead Channel Setting Pacing Capture Mode Adaptive    Lead Channel Setting Pacing Polarity Bipolar    Lead Channel Setting Pacing Anode Location Left Ventricle    Lead Channel Setting Pacing Anode Terminal Ring2    Lead Channel Setting Sensing Cathode Location Left Ventricle    Lead Channel Setting Sensing Cathode Terminal Tip    Lead Channel Setting Pacing Pulse Width 0.5    Lead Channel Setting Pacing Amplitude 2.25    Lead Channel Setting Pacing Capture Mode Adaptive    Zone Setting Type Category VF    Zone Setting Detection Interval 300    Zone Setting Detection Beats Numerator 30    Zone Setting Detection Beats Denominator 40    Zone Setting Type Category VT    Zone Setting Detection Interval Blank    Zone Setting Type Category VT    Zone Setting Detection Interval 360    Zone Setting Type Category VT    Zone Setting Detection Interval 360    Zone Setting Type Category ATRIAL_FIBRILLATION    Zone Setting Type Category AT/AF    Zone Setting Detection Interval 350    Lead Channel Impedance Value 361    Lead Channel Sensing Intrinsic Amplitude 0.25    Lead Channel Sensing Intrinsic Amplitude 0.25    Lead Channel Pacing Threshold Amplitude 0.75    Lead Channel Pacing Threshold Pulse Width 0.4    Lead Channel Impedance Value 456    Lead Channel Impedance Value 399    Lead Channel Sensing Intrinsic Amplitude 6.125    Lead Channel Sensing Intrinsic Amplitude 14.125    Lead Channel Pacing Threshold Amplitude 0.625    Lead Channel Pacing Threshold Pulse Width 0.4    Lead Channel Impedance Value 532    Lead Channel Impedance Value 551    Lead Channel Impedance Value 532    Lead Channel Impedance Value 361    Lead  Channel Impedance Value 513    Lead Channel Impedance Value 475    Lead Channel Impedance Value 342    Lead Channel Impedance Value 304    Lead Channel Impedance Value 304    Lead Channel Impedance Value 285    Lead Channel Impedance Value 160.941    Lead Channel Impedance Value 160.941    Lead Channel Impedance Value 155.455    Lead Channel Impedance Value 147.097    Lead Channel Impedance Value 147.097    Lead Channel Pacing Threshold Amplitude 1.125    Lead Channel Pacing Threshold Pulse Width 0.5    Battery Date Time of Measurements 49232788700383    Battery Status OK    Battery RRT Trigger 2.727    Battery Remaining Longevity 23    Battery Voltage 2.94    Capacitor Charge Type Reformation    Capacitor Last Charge Date Time 26682134735090    Capacitor Charge Time 4.174    Capacitor Charge Energy 18    Arnaud Statistic Date Time Start 04439736374810    Arnaud Statistic Date Time End 24977549920558    Arnaud Statistic RA Percent Paced 2.16    Arnaud Statistic RV Percent Paced 98.82    CRT Statistic LV Percent Paced 98.75    Arnaud Statistic AP  Percent 2.15    Arnaud Statistic AS  Percent 97.18    Arnaud Statistic AP VS Percent 0.01    Arnaud Statistic AS VS Percent 0.66    CRT Statistic Date Time Start 92313538589903    CRT Statistic Date Time End 49828500388579    CRT Statistic CRT Percent Paced 98.75    Atrial Tachy Statistic Date Time Start 53045112869530    Atrial Tachy Statistic Date Time End 21076341814708    Atrial Tachy Statistic AT/AF [place] Percent 0    Therapy Statistic Recent Shocks Delivered 0    Therapy Statistic Recent Shocks Aborted 0    Therapy Statistic Recent ATP Delivered 0    Therapy Statistic Recent Date Time Start 76078493881816    Therapy Statistic Recent Date Time End 14150850349845    Therapy Statistic Total Shocks Delivered 0    Therapy Statistic Total Shocks Aborted 0    Therapy Statistic Total ATP Delivered 0    Therapy Statistic Total  Date Time Start 65465745665606    Therapy Statistic  Total  Date Time End 91734461664339    Episode Statistic Recent Count 0    Episode Statistic Type Category AT/AF    Episode Statistic Recent Count 0    Episode Statistic Type Category SVT    Episode Statistic Recent Count 0    Episode Statistic Type Category VT    Episode Statistic Recent Count 0    Episode Statistic Type Category VF    Episode Statistic Recent Count 0    Episode Statistic Type Category VT    Episode Statistic Recent Count 0    Episode Statistic Type Category VT    Episode Statistic Recent Count 0    Episode Statistic Type Category VT    Episode Statistic Recent Date Time Start 76915434483574    Episode Statistic Recent Date Time End 33189053487490    Episode Statistic Recent Date Time Start 41458342919956    Episode Statistic Recent Date Time End 35190888626632    Episode Statistic Recent Date Time Start 86052366333383    Episode Statistic Recent Date Time End 61023143863315    Episode Statistic Recent Date Time Start 69795807126755    Episode Statistic Recent Date Time End 65427284150700    Episode Statistic Recent Date Time Start 76889920319661    Episode Statistic Recent Date Time End 24361198095448    Episode Statistic Recent Date Time Start 63779877343352    Episode Statistic Recent Date Time End 77897823690980    Episode Statistic Recent Date Time Start 33131391219983    Episode Statistic Recent Date Time End 54471281840261    Episode Statistic Total Count 1    Episode Statistic Type Category AT/AF    Episode Statistic Total Count 0    Episode Statistic Type Category SVT    Episode Statistic Total Count 0    Episode Statistic Type Category VT    Episode Statistic Total Count 0    Episode Statistic Type Category VF    Episode Statistic Total Count 0    Episode Statistic Type Category VT    Episode Statistic Total Count 0    Episode Statistic Type Category VT    Episode Statistic Total Count 0    Episode Statistic Type Category VT    Episode Statistic Total Date Time Start 70690513113106     Episode Statistic Total Date Time End 20210318042408    Episode Statistic Total Date Time Start 20161104142116    Episode Statistic Total Date Time End 20210318042408    Episode Statistic Total Date Time Start 20161104142116    Episode Statistic Total Date Time End 20210318042408    Episode Statistic Total Date Time Start 20161104142116    Episode Statistic Total Date Time End 20210318042408    Episode Statistic Total Date Time Start 20161104142116    Episode Statistic Total Date Time End 20210318042408    Episode Statistic Total Date Time Start 20161104142116    Episode Statistic Total Date Time End 20210318042408    Episode Statistic Total Date Time Start 20161104142116    Episode Statistic Total Date Time End 20210318042408    Narrative    Medtronic Amplia (CRT-D) Remote Device Check  AP: 2.1%  BiVP: 98.8% with a 0.6% VSR   Mode: DDDR    Presenting Rhythm: AS/BiVP rhythm in the 70's   Heart Rate: Stable with good variability.  HRs per histogram range from   60-120s and primarily from .    Sensing: WNL   Pacing Threshold: Stable   Impedance: Stable   Battery Status: Estimated at 1.9 years remaining   Atrial Arrhythmia: 0  Ventricular Arrhythmia: 0  ATP: 0  Shocks: 0    Care Plan: Scheduled for remote device check on 6/21/21.  Due to follow-up   with Dr. Heard in 6/2021.  Called and updated patient on remote results and   next remote date.  BESS Zaragoza      I have reviewed and interpreted the device interrogation, settings,   programming and nurse's summary. The device is functioning within normal   device parameters. I agree with the current findings, assessment and plan.       Discharge Medications   Current Discharge Medication List      CONTINUE these medications which have CHANGED    Details   furosemide (LASIX) 20 MG tablet Take 1 tablet (20 mg) by mouth daily  Qty: 90 tablet, Refills: 0    Associated Diagnoses: Acute on chronic systolic heart failure (H)         CONTINUE these medications which  have NOT CHANGED    Details   COENZYME Q-10 PO Take 100 mg by mouth every morning          STOP taking these medications       atorvastatin (LIPITOR) 40 MG tablet Comments:   Reason for Stopping:         ferrous sulfate (IRON) 325 (65 Fe) MG tablet Comments:   Reason for Stopping:         hydrALAZINE (APRESOLINE) 25 MG tablet Comments:   Reason for Stopping:         metoprolol succinate ER (TOPROL XL) 50 MG 24 hr tablet Comments:   Reason for Stopping:         multivitamin, therapeutic with minerals (MULTI-VITAMIN) TABS Comments:   Reason for Stopping:         venlafaxine (EFFEXOR) 75 MG tablet Comments:   Reason for Stopping:         Vitamin D, Cholecalciferol, 25 MCG (1000 UT) TABS Comments:   Reason for Stopping:             Allergies   Allergies   Allergen Reactions     Wasps [Hornets]      Sand wasp --- years ago.

## 2021-04-08 NOTE — TELEPHONE ENCOUNTER
Hospice called re: admit for today  Are you ok to sign orders/co-follow pt for hospice?    Admitting to hospice due to cancer, confirming that Dr. Gibson will [id number].    [phone]  [facility] ok to leave detailed vm.

## 2021-04-08 NOTE — TELEPHONE ENCOUNTER
Chief Complaint: Hypotension, Unspecified Hypotension Type, WED 07-APR-2021, 0 / 1    Pt ph: [phone]

## 2021-04-13 NOTE — TELEPHONE ENCOUNTER
Called and left a detailed message from Daniella notifying of below response from PCP     Symone MONTILLA RN

## 2021-05-05 ENCOUNTER — TELEPHONE (OUTPATIENT)
Dept: ONCOLOGY | Facility: CLINIC | Age: 81
End: 2021-05-05

## 2021-05-05 NOTE — TELEPHONE ENCOUNTER
Spoke to wife and patient passed away on 4/24/21.  Labs were already canceled via [employer].  Canceled Dr. Bender's upcoming visit.

## 2022-06-22 NOTE — ED TRIAGE NOTES
Bilateral shoulder pain started at 10pm.  Hx of 2 heart attacks   [FreeTextEntry1] :  discussed all options of no treatment non operative and operative and recommend arthroscopy wants to hold off is going to continue on a exercises of stretching and strengthening\par Home exercises\par  patient was instructed on modalities such as ice and heat, stretching, and strengthening exercises, for home program 2 to 3 times a week\par  as well as anti-inflammatories side effects were discussed\par Instructions: NSAIDS\par Patient is to begin over the counter oral anti-inflammatory medications on an as needed basis, as long as there are no contra-indications.  Patient is counseled on possible GI and blood pressure side effects.\par , also talked about injection he want to hold off, will see him back in 2-3 months

## 2022-12-15 NOTE — TELEPHONE ENCOUNTER
"Venlafaxine 75 mg    Last Written Prescription Date:  06/11/18  Last Fill Quantity: 180 tablets,  # refills: 1   Last office visit: 11/1/2018 with prescribing provider:  Katelyn Akhtar   Future Office Visit:      Requested Prescriptions   Pending Prescriptions Disp Refills     venlafaxine (EFFEXOR) 75 MG tablet [Pharmacy Med Name: VENLAFAXINE HCL 75 MG Tablet] 180 tablet 1     Sig: TAKE 1 TABLET TWICE DAILY    Serotonin-Norepinephrine Reuptake Inhibitors  Failed    11/7/2018  9:31 AM       Failed - Normal serum creatinine on file in past 12 months    Recent Labs   Lab Test  10/16/18   0607   CR  1.37*            Passed - Blood pressure under 140/90 in past 12 months    BP Readings from Last 3 Encounters:   11/01/18 100/70   10/30/18 138/85   10/30/18 121/73                Passed - Recent (12 mo) or future (30 days) visit within the authorizing provider's specialty    Patient had office visit in the last 12 months or has a visit in the next 30 days with authorizing provider or within the authorizing provider's specialty.  See \"Patient Info\" tab in inbasket, or \"Choose Columns\" in Meds & Orders section of the refill encounter.             Passed - Patient is age 18 or older          " ambulatory

## (undated) DEVICE — KIT LEAD END CAP 5867-3M

## (undated) DEVICE — SOL NACL 0.9% IRRIG 3000ML BAG 2B7477

## (undated) DEVICE — BAG CYSTO TABLE DRAIN

## (undated) DEVICE — CATH FOLEY 3WAY 22FR 30ML LATEX 0167SI22

## (undated) DEVICE — ESU GROUND PAD UNIVERSAL W/O CORD

## (undated) DEVICE — PAD CHUX UNDERPAD 23X24" 7136

## (undated) DEVICE — BAG URINARY DRAIN 4000ML LF 153509

## (undated) DEVICE — PACK PCMKR PERM SRG PROC LF SAN32PC573

## (undated) DEVICE — CABLE PACING ALLIGATOR CLIP 12FT 5833SL

## (undated) DEVICE — ESU ELEC LOOP 24FR RESECTION HF 12DEG WA22503D

## (undated) DEVICE — SHEATH PRELUDE SNAP 13CM 9FR

## (undated) DEVICE — PACK TUR CUSTOM SBA15RUFSE

## (undated) DEVICE — GLOVE PROTEXIS MICRO 7.5  2D73PM75

## (undated) DEVICE — GLOVE PROTEXIS W/NEU-THERA 7.5  2D73TE75

## (undated) DEVICE — KIT WRENCH 5873W

## (undated) DEVICE — SOL WATER IRRIG 1000ML BOTTLE 2F7114

## (undated) DEVICE — DRAPE LITHO LINGMAN W/POUCH 3" 1-0425

## (undated) DEVICE — RAD INTRODUCER KIT MICRO 5FRX10CM .018 NITINOL G/W

## (undated) DEVICE — SPECIMEN CONTAINER 60MLW/10% FORMALIN 59601

## (undated) DEVICE — BIPOLAR CUTTING LOOP

## (undated) DEVICE — SOL NACL 0.9% IRRIG 1000ML BOTTLE 2F7124

## (undated) DEVICE — CABLE HIGH FREQEUCY STERILE 8MM PLUG 300CM 277KB-D/10

## (undated) DEVICE — SOL NACL 0.9% IRRIG 1000ML BOTTLE 07138-09

## (undated) DEVICE — DRAPE GYN/UROLOGY FLUID POUCH TUR 29455

## (undated) DEVICE — PEN MARKING SKIN

## (undated) DEVICE — PACK CYSTOSCOPY SBA15CYFSI

## (undated) DEVICE — Device

## (undated) DEVICE — CATH PLUG W/CAP 000076

## (undated) DEVICE — BLADE ESU PLASMABLADE SPATULA TIP 4MM PS200-040

## (undated) DEVICE — DEVICE CATH STABILIZATION STATLOCK FOLEY 3-WAY FOL0105

## (undated) DEVICE — EVACUATOR BLADDER UROVAC LATEX M0067301250

## (undated) DEVICE — ESU ELEC LOOP 24FR 20750G

## (undated) DEVICE — TUBING SUCTION 12"X1/4" N612

## (undated) DEVICE — LASER FIBER HOLMIUM FLEXIVA 550UM M0068403930 840-393

## (undated) DEVICE — CATH URETHRAL HOTTER COUDE 22FR 30ML 3-WAY LATEX 6003L22

## (undated) DEVICE — DEFIB PRO-PADZ LVP LQD GEL ADULT 8900-2105-01

## (undated) RX ORDER — OXYCODONE AND ACETAMINOPHEN 5; 325 MG/1; MG/1
TABLET ORAL
Status: DISPENSED
Start: 2019-10-24

## (undated) RX ORDER — ONDANSETRON 2 MG/ML
INJECTION INTRAMUSCULAR; INTRAVENOUS
Status: DISPENSED
Start: 2018-09-19

## (undated) RX ORDER — PROPOFOL 10 MG/ML
INJECTION, EMULSION INTRAVENOUS
Status: DISPENSED
Start: 2018-10-17

## (undated) RX ORDER — CEFAZOLIN SODIUM 2 G/100ML
INJECTION, SOLUTION INTRAVENOUS
Status: DISPENSED
Start: 2018-09-19

## (undated) RX ORDER — GLYCOPYRROLATE 0.2 MG/ML
INJECTION, SOLUTION INTRAMUSCULAR; INTRAVENOUS
Status: DISPENSED
Start: 2018-09-19

## (undated) RX ORDER — CEFAZOLIN SODIUM 2 G/100ML
INJECTION, SOLUTION INTRAVENOUS
Status: DISPENSED
Start: 2018-10-17

## (undated) RX ORDER — FENTANYL CITRATE 50 UG/ML
INJECTION, SOLUTION INTRAMUSCULAR; INTRAVENOUS
Status: DISPENSED
Start: 2018-09-19

## (undated) RX ORDER — FENTANYL CITRATE 50 UG/ML
INJECTION, SOLUTION INTRAMUSCULAR; INTRAVENOUS
Status: DISPENSED
Start: 2019-12-30

## (undated) RX ORDER — LIDOCAINE HYDROCHLORIDE 20 MG/ML
INJECTION, SOLUTION EPIDURAL; INFILTRATION; INTRACAUDAL; PERINEURAL
Status: DISPENSED
Start: 2018-09-19

## (undated) RX ORDER — BUPIVACAINE HYDROCHLORIDE 2.5 MG/ML
INJECTION, SOLUTION EPIDURAL; INFILTRATION; INTRACAUDAL
Status: DISPENSED
Start: 2019-10-24

## (undated) RX ORDER — CEFAZOLIN SODIUM 2 G/100ML
INJECTION, SOLUTION INTRAVENOUS
Status: DISPENSED
Start: 2019-12-30

## (undated) RX ORDER — ONDANSETRON 2 MG/ML
INJECTION INTRAMUSCULAR; INTRAVENOUS
Status: DISPENSED
Start: 2019-12-30

## (undated) RX ORDER — CEFAZOLIN SODIUM 2 G/100ML
INJECTION, SOLUTION INTRAVENOUS
Status: DISPENSED
Start: 2018-11-12

## (undated) RX ORDER — FENTANYL CITRATE 50 UG/ML
INJECTION, SOLUTION INTRAMUSCULAR; INTRAVENOUS
Status: DISPENSED
Start: 2018-07-30

## (undated) RX ORDER — CEFAZOLIN SODIUM 2 G/100ML
INJECTION, SOLUTION INTRAVENOUS
Status: DISPENSED
Start: 2018-11-27

## (undated) RX ORDER — FENTANYL CITRATE 50 UG/ML
INJECTION, SOLUTION INTRAMUSCULAR; INTRAVENOUS
Status: DISPENSED
Start: 2019-10-24

## (undated) RX ORDER — FENTANYL CITRATE 50 UG/ML
INJECTION, SOLUTION INTRAMUSCULAR; INTRAVENOUS
Status: DISPENSED
Start: 2018-10-17

## (undated) RX ORDER — FENTANYL CITRATE 50 UG/ML
INJECTION, SOLUTION INTRAMUSCULAR; INTRAVENOUS
Status: DISPENSED
Start: 2019-04-22

## (undated) RX ORDER — CEFAZOLIN SODIUM 2 G/100ML
INJECTION, SOLUTION INTRAVENOUS
Status: DISPENSED
Start: 2019-10-24

## (undated) RX ORDER — ATROPA BELLADONNA AND OPIUM 16.2; 3 MG/1; MG/1
SUPPOSITORY RECTAL
Status: DISPENSED
Start: 2019-04-22

## (undated) RX ORDER — DEXAMETHASONE SODIUM PHOSPHATE 4 MG/ML
INJECTION, SOLUTION INTRA-ARTICULAR; INTRALESIONAL; INTRAMUSCULAR; INTRAVENOUS; SOFT TISSUE
Status: DISPENSED
Start: 2018-09-19

## (undated) RX ORDER — FENTANYL CITRATE 50 UG/ML
INJECTION, SOLUTION INTRAMUSCULAR; INTRAVENOUS
Status: DISPENSED
Start: 2018-11-12

## (undated) RX ORDER — LIDOCAINE HYDROCHLORIDE 10 MG/ML
INJECTION, SOLUTION EPIDURAL; INFILTRATION; INTRACAUDAL; PERINEURAL
Status: DISPENSED
Start: 2019-10-24

## (undated) RX ORDER — LIDOCAINE HYDROCHLORIDE 20 MG/ML
INJECTION, SOLUTION EPIDURAL; INFILTRATION; INTRACAUDAL; PERINEURAL
Status: DISPENSED
Start: 2019-12-30

## (undated) RX ORDER — CEFAZOLIN SODIUM 2 G/100ML
INJECTION, SOLUTION INTRAVENOUS
Status: DISPENSED
Start: 2019-04-22

## (undated) RX ORDER — CEFAZOLIN SODIUM 1 G/3ML
INJECTION, POWDER, FOR SOLUTION INTRAMUSCULAR; INTRAVENOUS
Status: DISPENSED
Start: 2018-10-17

## (undated) RX ORDER — FENTANYL CITRATE 50 UG/ML
INJECTION, SOLUTION INTRAMUSCULAR; INTRAVENOUS
Status: DISPENSED
Start: 2018-08-02